# Patient Record
Sex: FEMALE | Race: WHITE | NOT HISPANIC OR LATINO
[De-identification: names, ages, dates, MRNs, and addresses within clinical notes are randomized per-mention and may not be internally consistent; named-entity substitution may affect disease eponyms.]

---

## 2017-01-05 ENCOUNTER — APPOINTMENT (OUTPATIENT)
Dept: CARDIOLOGY | Facility: CLINIC | Age: 72
End: 2017-01-05

## 2017-01-05 VITALS — HEIGHT: 61 IN | BODY MASS INDEX: 25.3 KG/M2 | WEIGHT: 134 LBS

## 2017-01-05 VITALS — SYSTOLIC BLOOD PRESSURE: 132 MMHG | HEART RATE: 57 BPM | DIASTOLIC BLOOD PRESSURE: 80 MMHG

## 2017-01-05 RX ORDER — FLUTICASONE PROPIONATE 50 UG/1
50 SPRAY, METERED NASAL DAILY
Refills: 0 | Status: ACTIVE | COMMUNITY

## 2017-02-16 ENCOUNTER — APPOINTMENT (OUTPATIENT)
Dept: CARDIOLOGY | Facility: CLINIC | Age: 72
End: 2017-02-16

## 2017-02-16 VITALS — DIASTOLIC BLOOD PRESSURE: 58 MMHG | SYSTOLIC BLOOD PRESSURE: 112 MMHG

## 2017-02-16 VITALS — BODY MASS INDEX: 25.7 KG/M2 | WEIGHT: 136 LBS

## 2017-03-06 ENCOUNTER — APPOINTMENT (OUTPATIENT)
Dept: HEMATOLOGY ONCOLOGY | Facility: CLINIC | Age: 72
End: 2017-03-06

## 2017-03-06 ENCOUNTER — OUTPATIENT (OUTPATIENT)
Dept: OUTPATIENT SERVICES | Facility: HOSPITAL | Age: 72
LOS: 1 days | Discharge: HOME | End: 2017-03-06

## 2017-03-06 VITALS
TEMPERATURE: 97.1 F | WEIGHT: 136 LBS | BODY MASS INDEX: 25.68 KG/M2 | RESPIRATION RATE: 14 BRPM | HEART RATE: 58 BPM | DIASTOLIC BLOOD PRESSURE: 75 MMHG | HEIGHT: 61 IN | SYSTOLIC BLOOD PRESSURE: 184 MMHG

## 2017-03-06 DIAGNOSIS — I49.5 SICK SINUS SYNDROME: ICD-10-CM

## 2017-03-07 LAB
ALBUMIN MFR SERPL ELPH: 57.9 %
ALBUMIN SERPL ELPH-MCNC: 4.2 G/DL
ALBUMIN/GLOB SERPL ELPH: 1.4 ZZ
ALPHA1 GLOB MFR SERPL ELPH: 4 %
ALPHA1 GLOB SERPL ELPH-MCNC: 0.3 G/DL
ALPHA2 GLOB MFR SERPL ELPH: 9.8 %
ALPHA2 GLOB SERPL ELPH-MCNC: 0.7 G/DL
B-GLOBULIN SERPL ELPH-MCNC: 0.9 G/DL
BASOPHILS # BLD: 0.02 TH/MM3
BASOPHILS NFR BLD: 0.3 %
BETA1 GLOB MFR SERPL ELPH: 12.4 %
EOSINOPHIL # BLD: 0.2 TH/MM3
EOSINOPHIL NFR BLD: 3.2 %
ERYTHROCYTE [DISTWIDTH] IN BLOOD BY AUTOMATED COUNT: 14.3 %
FERRITIN SERPL-MCNC: 30 NG/ML
GAMMA GLOB MFR SERPL ELPH: 15.9 %
GAMMA GLOB SERPL ELPH-MCNC: 1.2 G/DL
GRANULOCYTES # BLD: 3.47 TH/MM3
GRANULOCYTES NFR BLD: 55.9 %
HCT VFR BLD AUTO: 34.7 %
HGB BLD-MCNC: 11.8 G/DL
IMM GRANULOCYTES # BLD: 0.01 TH/MM3
IMM GRANULOCYTES NFR BLD: 0.2 %
IRON SERPL-MCNC: 49 UG/DL
LACTATE DEHYDROGENASE (NORTH): 220 IU/L
LYMPHOCYTES # BLD: 1.65 TH/MM3
LYMPHOCYTES NFR BLD: 26.6 %
MCH RBC QN AUTO: 29.6 PG
MCHC RBC AUTO-ENTMCNC: 34 G/DL
MCV RBC AUTO: 87.2 FL
MONOCYTES # BLD: 0.86 TH/MM3
MONOCYTES NFR BLD: 13.8 %
PLATELET # BLD: 242 TH/MM3
PMV BLD AUTO: 11.1 FL
PROT PATTERN SERPL ELPH-IMP: 7.3 G/DL
PROT PATTERN SERPL ELPH-IMP: NORMAL
PROT SERPL-MCNC: 7.3 G/DL
RBC # BLD AUTO: 3.98 MIL/MM3
RETICS/RBC NFR: 0.61 %
TIBC SERPL-MCNC: 422 UG/DL
VIT B12 SERPL-MCNC: 476 PG/ML
WBC # BLD: 6.21 TH/MM3

## 2017-03-08 LAB
KAPPA LC FREE SER-MCNC: 20.7 MG/L
KAPPA LC FREE/LAMBDA FREE SER: 1.01
LAMBDA LC FREE SERPL-MCNC: 20.5 MG/L

## 2017-03-13 ENCOUNTER — RESULT REVIEW (OUTPATIENT)
Age: 72
End: 2017-03-13

## 2017-03-23 ENCOUNTER — APPOINTMENT (OUTPATIENT)
Dept: CARDIOLOGY | Facility: CLINIC | Age: 72
End: 2017-03-23

## 2017-03-23 VITALS — BODY MASS INDEX: 25.89 KG/M2 | SYSTOLIC BLOOD PRESSURE: 140 MMHG | DIASTOLIC BLOOD PRESSURE: 64 MMHG | WEIGHT: 137 LBS

## 2017-05-04 ENCOUNTER — MEDICATION RENEWAL (OUTPATIENT)
Age: 72
End: 2017-05-04

## 2017-05-25 ENCOUNTER — APPOINTMENT (OUTPATIENT)
Dept: CARDIOLOGY | Facility: CLINIC | Age: 72
End: 2017-05-25

## 2017-05-25 VITALS — HEIGHT: 61 IN | WEIGHT: 132 LBS | BODY MASS INDEX: 24.92 KG/M2

## 2017-05-25 VITALS — SYSTOLIC BLOOD PRESSURE: 150 MMHG | HEART RATE: 54 BPM | DIASTOLIC BLOOD PRESSURE: 70 MMHG

## 2017-05-25 RX ORDER — ERGOCALCIFEROL 1.25 MG/1
1.25 MG CAPSULE, LIQUID FILLED ORAL
Refills: 0 | Status: ACTIVE | COMMUNITY

## 2017-06-12 ENCOUNTER — APPOINTMENT (OUTPATIENT)
Dept: HEMATOLOGY ONCOLOGY | Facility: CLINIC | Age: 72
End: 2017-06-12

## 2017-06-14 ENCOUNTER — OUTPATIENT (OUTPATIENT)
Dept: OUTPATIENT SERVICES | Facility: HOSPITAL | Age: 72
LOS: 1 days | Discharge: HOME | End: 2017-06-14

## 2017-06-14 DIAGNOSIS — I49.5 SICK SINUS SYNDROME: ICD-10-CM

## 2017-06-20 ENCOUNTER — APPOINTMENT (OUTPATIENT)
Dept: HEMATOLOGY ONCOLOGY | Facility: CLINIC | Age: 72
End: 2017-06-20

## 2017-06-27 ENCOUNTER — OUTPATIENT (OUTPATIENT)
Dept: OUTPATIENT SERVICES | Facility: HOSPITAL | Age: 72
LOS: 1 days | Discharge: HOME | End: 2017-06-27

## 2017-06-27 ENCOUNTER — APPOINTMENT (OUTPATIENT)
Dept: HEMATOLOGY ONCOLOGY | Facility: CLINIC | Age: 72
End: 2017-06-27

## 2017-06-27 VITALS
HEART RATE: 62 BPM | BODY MASS INDEX: 24.73 KG/M2 | TEMPERATURE: 96.7 F | HEIGHT: 61 IN | DIASTOLIC BLOOD PRESSURE: 88 MMHG | RESPIRATION RATE: 14 BRPM | SYSTOLIC BLOOD PRESSURE: 181 MMHG | WEIGHT: 131 LBS

## 2017-06-27 DIAGNOSIS — I49.5 SICK SINUS SYNDROME: ICD-10-CM

## 2017-06-28 ENCOUNTER — OTHER (OUTPATIENT)
Age: 72
End: 2017-06-28

## 2017-06-28 DIAGNOSIS — R52 PAIN, UNSPECIFIED: ICD-10-CM

## 2017-06-28 LAB
BASOPHILS # BLD: 0.03 TH/MM3
BASOPHILS NFR BLD: 0.5 %
EOSINOPHIL # BLD: 0.2 TH/MM3
EOSINOPHIL NFR BLD: 3.2 %
ERYTHROCYTE [DISTWIDTH] IN BLOOD BY AUTOMATED COUNT: 14.6 %
FERRITIN SERPL-MCNC: 55 NG/ML
GRANULOCYTES # BLD: 3.19 TH/MM3
GRANULOCYTES NFR BLD: 51.6 %
HCT VFR BLD AUTO: 35 %
HGB BLD-MCNC: 11.8 G/DL
IMM GRANULOCYTES # BLD: 0.01 TH/MM3
IMM GRANULOCYTES NFR BLD: 0.2 %
LYMPHOCYTES # BLD: 1.98 TH/MM3
LYMPHOCYTES NFR BLD: 32 %
MCH RBC QN AUTO: 29.1 PG
MCHC RBC AUTO-ENTMCNC: 33.7 G/DL
MCV RBC AUTO: 86.4 FL
MONOCYTES # BLD: 0.77 TH/MM3
MONOCYTES NFR BLD: 12.5 %
PLATELET # BLD: 275 TH/MM3
PMV BLD AUTO: 10.8 FL
RBC # BLD AUTO: 4.05 MIL/MM3
WBC # BLD: 6.18 TH/MM3

## 2017-06-29 DIAGNOSIS — D64.9 ANEMIA, UNSPECIFIED: ICD-10-CM

## 2017-07-19 ENCOUNTER — MEDICATION RENEWAL (OUTPATIENT)
Age: 72
End: 2017-07-19

## 2017-07-26 ENCOUNTER — MOBILE ON CALL (OUTPATIENT)
Age: 72
End: 2017-07-26

## 2017-07-28 ENCOUNTER — APPOINTMENT (OUTPATIENT)
Dept: CARDIOLOGY | Facility: CLINIC | Age: 72
End: 2017-07-28

## 2017-07-28 VITALS
HEART RATE: 65 BPM | SYSTOLIC BLOOD PRESSURE: 181 MMHG | DIASTOLIC BLOOD PRESSURE: 73 MMHG | OXYGEN SATURATION: 100 % | WEIGHT: 131 LBS | BODY MASS INDEX: 24.75 KG/M2

## 2017-07-28 VITALS — DIASTOLIC BLOOD PRESSURE: 70 MMHG | SYSTOLIC BLOOD PRESSURE: 160 MMHG

## 2017-08-10 ENCOUNTER — APPOINTMENT (OUTPATIENT)
Dept: CARDIOLOGY | Facility: CLINIC | Age: 72
End: 2017-08-10

## 2017-08-29 ENCOUNTER — OUTPATIENT (OUTPATIENT)
Dept: OUTPATIENT SERVICES | Facility: HOSPITAL | Age: 72
LOS: 1 days | Discharge: HOME | End: 2017-08-29

## 2017-08-29 DIAGNOSIS — I48.0 PAROXYSMAL ATRIAL FIBRILLATION: ICD-10-CM

## 2017-08-29 DIAGNOSIS — Z01.818 ENCOUNTER FOR OTHER PREPROCEDURAL EXAMINATION: ICD-10-CM

## 2017-08-29 DIAGNOSIS — I49.5 SICK SINUS SYNDROME: ICD-10-CM

## 2017-09-08 ENCOUNTER — INPATIENT (INPATIENT)
Facility: HOSPITAL | Age: 72
LOS: 0 days | Discharge: HOME | End: 2017-09-09
Attending: INTERNAL MEDICINE

## 2017-09-08 DIAGNOSIS — E78.5 HYPERLIPIDEMIA, UNSPECIFIED: ICD-10-CM

## 2017-09-08 DIAGNOSIS — I49.5 SICK SINUS SYNDROME: ICD-10-CM

## 2017-09-08 DIAGNOSIS — I48.0 PAROXYSMAL ATRIAL FIBRILLATION: ICD-10-CM

## 2017-09-08 DIAGNOSIS — Z88.2 ALLERGY STATUS TO SULFONAMIDES: ICD-10-CM

## 2017-09-14 DIAGNOSIS — Z72.0 TOBACCO USE: ICD-10-CM

## 2017-09-14 DIAGNOSIS — D64.9 ANEMIA, UNSPECIFIED: ICD-10-CM

## 2017-09-14 DIAGNOSIS — E11.51 TYPE 2 DIABETES MELLITUS WITH DIABETIC PERIPHERAL ANGIOPATHY WITHOUT GANGRENE: ICD-10-CM

## 2017-09-14 DIAGNOSIS — I10 ESSENTIAL (PRIMARY) HYPERTENSION: ICD-10-CM

## 2017-09-14 DIAGNOSIS — Z85.828 PERSONAL HISTORY OF OTHER MALIGNANT NEOPLASM OF SKIN: ICD-10-CM

## 2017-09-14 DIAGNOSIS — Z79.4 LONG TERM (CURRENT) USE OF INSULIN: ICD-10-CM

## 2017-09-14 DIAGNOSIS — E03.9 HYPOTHYROIDISM, UNSPECIFIED: ICD-10-CM

## 2017-09-14 DIAGNOSIS — Z96.41 PRESENCE OF INSULIN PUMP (EXTERNAL) (INTERNAL): ICD-10-CM

## 2017-09-14 DIAGNOSIS — Z88.2 ALLERGY STATUS TO SULFONAMIDES: ICD-10-CM

## 2017-09-25 ENCOUNTER — APPOINTMENT (OUTPATIENT)
Dept: CARDIOLOGY | Facility: CLINIC | Age: 72
End: 2017-09-25

## 2017-09-25 VITALS
HEIGHT: 61 IN | DIASTOLIC BLOOD PRESSURE: 67 MMHG | BODY MASS INDEX: 24.73 KG/M2 | HEART RATE: 62 BPM | OXYGEN SATURATION: 97 % | WEIGHT: 131 LBS | SYSTOLIC BLOOD PRESSURE: 138 MMHG

## 2017-10-16 ENCOUNTER — APPOINTMENT (OUTPATIENT)
Dept: CARDIOLOGY | Facility: CLINIC | Age: 72
End: 2017-10-16

## 2017-10-16 VITALS — SYSTOLIC BLOOD PRESSURE: 140 MMHG | HEART RATE: 60 BPM | DIASTOLIC BLOOD PRESSURE: 80 MMHG

## 2017-10-16 VITALS — WEIGHT: 131 LBS | BODY MASS INDEX: 24.73 KG/M2 | HEIGHT: 61 IN

## 2017-10-20 ENCOUNTER — MEDICATION RENEWAL (OUTPATIENT)
Age: 72
End: 2017-10-20

## 2017-11-27 ENCOUNTER — OUTPATIENT (OUTPATIENT)
Dept: OUTPATIENT SERVICES | Facility: HOSPITAL | Age: 72
LOS: 1 days | Discharge: HOME | End: 2017-11-27

## 2017-11-27 ENCOUNTER — APPOINTMENT (OUTPATIENT)
Dept: HEMATOLOGY ONCOLOGY | Facility: CLINIC | Age: 72
End: 2017-11-27

## 2017-11-27 VITALS
HEART RATE: 63 BPM | DIASTOLIC BLOOD PRESSURE: 77 MMHG | TEMPERATURE: 96.5 F | RESPIRATION RATE: 14 BRPM | WEIGHT: 133 LBS | BODY MASS INDEX: 25.11 KG/M2 | SYSTOLIC BLOOD PRESSURE: 157 MMHG | HEIGHT: 61 IN

## 2017-11-27 DIAGNOSIS — Z00.00 ENCOUNTER FOR GENERAL ADULT MEDICAL EXAMINATION W/OUT ABNORMAL FINDINGS: ICD-10-CM

## 2017-11-27 LAB
BASOPHILS # BLD: 0.02 TH/MM3
BASOPHILS NFR BLD: 0.3 %
EOSINOPHIL # BLD: 0.18 TH/MM3
EOSINOPHIL NFR BLD: 2.7 %
ERYTHROCYTE [DISTWIDTH] IN BLOOD BY AUTOMATED COUNT: 14.3 %
GRANULOCYTES # BLD: 3.99 TH/MM3
GRANULOCYTES NFR BLD: 59.9 %
HCT VFR BLD AUTO: 32.4 %
HGB BLD-MCNC: 10.8 G/DL
IMM GRANULOCYTES # BLD: 0.01 TH/MM3
IMM GRANULOCYTES NFR BLD: 0.1 %
LYMPHOCYTES # BLD: 1.71 TH/MM3
LYMPHOCYTES NFR BLD: 25.6 %
MCH RBC QN AUTO: 29.8 PG
MCHC RBC AUTO-ENTMCNC: 33.3 G/DL
MCV RBC AUTO: 89.3 FL
MONOCYTES # BLD: 0.76 TH/MM3
MONOCYTES NFR BLD: 11.4 %
PLATELET # BLD: 243 TH/MM3
PMV BLD AUTO: 10.5 FL
RBC # BLD AUTO: 3.63 MIL/MM3
WBC # BLD: 6.67 TH/MM3

## 2017-11-28 DIAGNOSIS — D64.9 ANEMIA, UNSPECIFIED: ICD-10-CM

## 2017-11-28 LAB — FERRITIN SERPL-MCNC: 63 NG/ML

## 2017-12-08 ENCOUNTER — APPOINTMENT (OUTPATIENT)
Dept: CARDIOLOGY | Facility: CLINIC | Age: 72
End: 2017-12-08

## 2017-12-08 VITALS
OXYGEN SATURATION: 100 % | BODY MASS INDEX: 24.75 KG/M2 | HEART RATE: 60 BPM | SYSTOLIC BLOOD PRESSURE: 155 MMHG | WEIGHT: 131 LBS | DIASTOLIC BLOOD PRESSURE: 75 MMHG

## 2017-12-11 ENCOUNTER — MEDICATION RENEWAL (OUTPATIENT)
Age: 72
End: 2017-12-11

## 2018-01-09 ENCOUNTER — RESULT REVIEW (OUTPATIENT)
Age: 73
End: 2018-01-09

## 2018-01-09 ENCOUNTER — OTHER (OUTPATIENT)
Age: 73
End: 2018-01-09

## 2018-01-09 ENCOUNTER — APPOINTMENT (OUTPATIENT)
Dept: HEMATOLOGY ONCOLOGY | Facility: CLINIC | Age: 73
End: 2018-01-09

## 2018-01-09 VITALS
BODY MASS INDEX: 24.55 KG/M2 | RESPIRATION RATE: 14 BRPM | HEART RATE: 67 BPM | DIASTOLIC BLOOD PRESSURE: 73 MMHG | SYSTOLIC BLOOD PRESSURE: 156 MMHG | WEIGHT: 130 LBS | HEIGHT: 61 IN | TEMPERATURE: 97.2 F

## 2018-01-09 LAB
BASOPHILS # BLD: 0.02 TH/MM3
BASOPHILS NFR BLD: 0.3 %
EOSINOPHIL # BLD: 0.26 TH/MM3
EOSINOPHIL NFR BLD: 4.2 %
ERYTHROCYTE [DISTWIDTH] IN BLOOD BY AUTOMATED COUNT: 14 %
GRANULOCYTES # BLD: 3.49 TH/MM3
GRANULOCYTES NFR BLD: 55.7 %
HCT VFR BLD AUTO: 33.8 %
HGB BLD-MCNC: 11.2 G/DL
IMM GRANULOCYTES # BLD: 0.01 TH/MM3
IMM GRANULOCYTES NFR BLD: 0.2 %
LYMPHOCYTES # BLD: 1.74 TH/MM3
LYMPHOCYTES NFR BLD: 27.8 %
MCH RBC QN AUTO: 29.8 PG
MCHC RBC AUTO-ENTMCNC: 33.1 G/DL
MCV RBC AUTO: 89.9 FL
MONOCYTES # BLD: 0.74 TH/MM3
MONOCYTES NFR BLD: 11.8 %
PLATELET # BLD: 206 TH/MM3
PMV BLD AUTO: 10.9 FL
RBC # BLD AUTO: 3.76 MIL/MM3
RETICS/RBC NFR: 0.57 %
WBC # BLD: 6.26 TH/MM3

## 2018-01-12 ENCOUNTER — OTHER (OUTPATIENT)
Age: 73
End: 2018-01-12

## 2018-01-12 LAB — FERRITIN SERPL-MCNC: 79 NG/ML

## 2018-01-17 ENCOUNTER — CLINICAL ADVICE (OUTPATIENT)
Age: 73
End: 2018-01-17

## 2018-02-12 ENCOUNTER — OUTPATIENT (OUTPATIENT)
Dept: OUTPATIENT SERVICES | Facility: HOSPITAL | Age: 73
LOS: 1 days | Discharge: HOME | End: 2018-02-12

## 2018-02-12 DIAGNOSIS — R91.1 SOLITARY PULMONARY NODULE: ICD-10-CM

## 2018-03-01 ENCOUNTER — APPOINTMENT (OUTPATIENT)
Dept: HEMATOLOGY ONCOLOGY | Facility: CLINIC | Age: 73
End: 2018-03-01

## 2018-03-01 ENCOUNTER — OUTPATIENT (OUTPATIENT)
Dept: OUTPATIENT SERVICES | Facility: HOSPITAL | Age: 73
LOS: 1 days | Discharge: HOME | End: 2018-03-01

## 2018-03-01 VITALS
RESPIRATION RATE: 14 BRPM | BODY MASS INDEX: 24.55 KG/M2 | TEMPERATURE: 97.8 F | DIASTOLIC BLOOD PRESSURE: 73 MMHG | WEIGHT: 130 LBS | HEIGHT: 61 IN | SYSTOLIC BLOOD PRESSURE: 157 MMHG | HEART RATE: 65 BPM

## 2018-03-01 DIAGNOSIS — D64.9 ANEMIA, UNSPECIFIED: ICD-10-CM

## 2018-03-22 ENCOUNTER — RX RENEWAL (OUTPATIENT)
Age: 73
End: 2018-03-22

## 2018-05-15 ENCOUNTER — APPOINTMENT (OUTPATIENT)
Dept: CARDIOLOGY | Facility: CLINIC | Age: 73
End: 2018-05-15

## 2018-05-15 VITALS
BODY MASS INDEX: 23.41 KG/M2 | HEART RATE: 64 BPM | SYSTOLIC BLOOD PRESSURE: 140 MMHG | HEIGHT: 61 IN | DIASTOLIC BLOOD PRESSURE: 60 MMHG | WEIGHT: 124 LBS

## 2018-06-01 ENCOUNTER — APPOINTMENT (OUTPATIENT)
Dept: CARDIOLOGY | Facility: CLINIC | Age: 73
End: 2018-06-01

## 2018-06-01 VITALS
OXYGEN SATURATION: 99 % | HEART RATE: 61 BPM | DIASTOLIC BLOOD PRESSURE: 74 MMHG | WEIGHT: 134 LBS | BODY MASS INDEX: 25.3 KG/M2 | SYSTOLIC BLOOD PRESSURE: 135 MMHG | HEIGHT: 61 IN

## 2018-06-14 ENCOUNTER — OUTPATIENT (OUTPATIENT)
Dept: OUTPATIENT SERVICES | Facility: HOSPITAL | Age: 73
LOS: 1 days | Discharge: HOME | End: 2018-06-14

## 2018-06-14 ENCOUNTER — LABORATORY RESULT (OUTPATIENT)
Age: 73
End: 2018-06-14

## 2018-06-14 ENCOUNTER — APPOINTMENT (OUTPATIENT)
Dept: HEMATOLOGY ONCOLOGY | Facility: CLINIC | Age: 73
End: 2018-06-14

## 2018-06-14 VITALS
BODY MASS INDEX: 24.55 KG/M2 | SYSTOLIC BLOOD PRESSURE: 185 MMHG | WEIGHT: 130 LBS | TEMPERATURE: 97.6 F | RESPIRATION RATE: 14 BRPM | HEIGHT: 61 IN | HEART RATE: 66 BPM | DIASTOLIC BLOOD PRESSURE: 90 MMHG

## 2018-06-16 LAB
FERRITIN SERPL-MCNC: 105 NG/ML
HCT VFR BLD CALC: 33.8 %
HGB BLD-MCNC: 11.5 G/DL
MCHC RBC-ENTMCNC: 29.9 PG
MCHC RBC-ENTMCNC: 34 G/DL
MCV RBC AUTO: 88 FL
PLATELET # BLD AUTO: 247 K/UL
PMV BLD: 11.8 FL
RBC # BLD: 3.84 M/UL
RBC # FLD: 13.2 %
WBC # FLD AUTO: 7.26 K/UL

## 2018-06-18 DIAGNOSIS — D64.9 ANEMIA, UNSPECIFIED: ICD-10-CM

## 2018-06-26 ENCOUNTER — OUTPATIENT (OUTPATIENT)
Dept: OUTPATIENT SERVICES | Facility: HOSPITAL | Age: 73
LOS: 1 days | Discharge: HOME | End: 2018-06-26

## 2018-06-26 DIAGNOSIS — M54.2 CERVICALGIA: ICD-10-CM

## 2018-06-26 DIAGNOSIS — M54.9 DORSALGIA, UNSPECIFIED: ICD-10-CM

## 2018-07-31 ENCOUNTER — APPOINTMENT (OUTPATIENT)
Dept: NEUROSURGERY | Facility: CLINIC | Age: 73
End: 2018-07-31
Payer: MEDICARE

## 2018-07-31 PROCEDURE — 99205 OFFICE O/P NEW HI 60 MIN: CPT

## 2018-08-20 ENCOUNTER — OUTPATIENT (OUTPATIENT)
Dept: OUTPATIENT SERVICES | Facility: HOSPITAL | Age: 73
LOS: 1 days | Discharge: HOME | End: 2018-08-20

## 2018-08-20 VITALS
TEMPERATURE: 97 F | RESPIRATION RATE: 20 BRPM | WEIGHT: 132.06 LBS | OXYGEN SATURATION: 99 % | HEART RATE: 60 BPM | HEIGHT: 61 IN

## 2018-08-20 DIAGNOSIS — I73.9 PERIPHERAL VASCULAR DISEASE, UNSPECIFIED: ICD-10-CM

## 2018-08-20 DIAGNOSIS — I48.91 UNSPECIFIED ATRIAL FIBRILLATION: ICD-10-CM

## 2018-08-20 DIAGNOSIS — E11.9 TYPE 2 DIABETES MELLITUS WITHOUT COMPLICATIONS: ICD-10-CM

## 2018-08-20 DIAGNOSIS — J30.9 ALLERGIC RHINITIS, UNSPECIFIED: ICD-10-CM

## 2018-08-20 DIAGNOSIS — D64.9 ANEMIA, UNSPECIFIED: ICD-10-CM

## 2018-08-20 DIAGNOSIS — Z90.710 ACQUIRED ABSENCE OF BOTH CERVIX AND UTERUS: Chronic | ICD-10-CM

## 2018-08-20 DIAGNOSIS — E01.8 OTHER IODINE-DEFICIENCY RELATED THYROID DISORDERS AND ALLIED CONDITIONS: ICD-10-CM

## 2018-08-20 DIAGNOSIS — G72.9 MYOPATHY, UNSPECIFIED: ICD-10-CM

## 2018-08-20 DIAGNOSIS — Z01.818 ENCOUNTER FOR OTHER PREPROCEDURAL EXAMINATION: ICD-10-CM

## 2018-08-20 DIAGNOSIS — C80.1 MALIGNANT (PRIMARY) NEOPLASM, UNSPECIFIED: ICD-10-CM

## 2018-08-20 DIAGNOSIS — Z95.0 PRESENCE OF CARDIAC PACEMAKER: Chronic | ICD-10-CM

## 2018-08-20 DIAGNOSIS — Z98.890 OTHER SPECIFIED POSTPROCEDURAL STATES: Chronic | ICD-10-CM

## 2018-08-20 DIAGNOSIS — I10 ESSENTIAL (PRIMARY) HYPERTENSION: ICD-10-CM

## 2018-08-20 LAB
ALBUMIN SERPL ELPH-MCNC: 4.8 G/DL — SIGNIFICANT CHANGE UP (ref 3.5–5.2)
ALP SERPL-CCNC: 134 U/L — HIGH (ref 30–115)
ALT FLD-CCNC: 23 U/L — SIGNIFICANT CHANGE UP (ref 0–41)
ANION GAP SERPL CALC-SCNC: 14 MMOL/L — SIGNIFICANT CHANGE UP (ref 7–14)
APTT BLD: 44.6 SEC — HIGH (ref 27–39.2)
AST SERPL-CCNC: 26 U/L — SIGNIFICANT CHANGE UP (ref 0–41)
BASOPHILS # BLD AUTO: 0.03 K/UL — SIGNIFICANT CHANGE UP (ref 0–0.2)
BASOPHILS NFR BLD AUTO: 0.5 % — SIGNIFICANT CHANGE UP (ref 0–1)
BILIRUB SERPL-MCNC: 0.3 MG/DL — SIGNIFICANT CHANGE UP (ref 0.2–1.2)
BUN SERPL-MCNC: 8 MG/DL — LOW (ref 10–20)
CALCIUM SERPL-MCNC: 9.8 MG/DL — SIGNIFICANT CHANGE UP (ref 8.5–10.1)
CHLORIDE SERPL-SCNC: 99 MMOL/L — SIGNIFICANT CHANGE UP (ref 98–110)
CO2 SERPL-SCNC: 25 MMOL/L — SIGNIFICANT CHANGE UP (ref 17–32)
CREAT SERPL-MCNC: <0.5 MG/DL — LOW (ref 0.7–1.5)
EOSINOPHIL # BLD AUTO: 0.16 K/UL — SIGNIFICANT CHANGE UP (ref 0–0.7)
EOSINOPHIL NFR BLD AUTO: 2.9 % — SIGNIFICANT CHANGE UP (ref 0–8)
GLUCOSE SERPL-MCNC: 95 MG/DL — SIGNIFICANT CHANGE UP (ref 70–99)
HCT VFR BLD CALC: 36 % — LOW (ref 37–47)
HGB BLD-MCNC: 12.1 G/DL — SIGNIFICANT CHANGE UP (ref 12–16)
IMM GRANULOCYTES NFR BLD AUTO: 0.2 % — SIGNIFICANT CHANGE UP (ref 0.1–0.3)
INR BLD: 1.68 RATIO — HIGH (ref 0.65–1.3)
LYMPHOCYTES # BLD AUTO: 1.62 K/UL — SIGNIFICANT CHANGE UP (ref 1.2–3.4)
LYMPHOCYTES # BLD AUTO: 29.3 % — SIGNIFICANT CHANGE UP (ref 20.5–51.1)
MCHC RBC-ENTMCNC: 29.8 PG — SIGNIFICANT CHANGE UP (ref 27–31)
MCHC RBC-ENTMCNC: 33.6 G/DL — SIGNIFICANT CHANGE UP (ref 32–37)
MCV RBC AUTO: 88.7 FL — SIGNIFICANT CHANGE UP (ref 81–99)
MONOCYTES # BLD AUTO: 0.7 K/UL — HIGH (ref 0.1–0.6)
MONOCYTES NFR BLD AUTO: 12.7 % — HIGH (ref 1.7–9.3)
NEUTROPHILS # BLD AUTO: 3.01 K/UL — SIGNIFICANT CHANGE UP (ref 1.4–6.5)
NEUTROPHILS NFR BLD AUTO: 54.4 % — SIGNIFICANT CHANGE UP (ref 42.2–75.2)
NRBC # BLD: 0 /100 WBCS — SIGNIFICANT CHANGE UP (ref 0–0)
PLATELET # BLD AUTO: 265 K/UL — SIGNIFICANT CHANGE UP (ref 130–400)
POTASSIUM SERPL-MCNC: 5.3 MMOL/L — HIGH (ref 3.5–5)
POTASSIUM SERPL-SCNC: 5.3 MMOL/L — HIGH (ref 3.5–5)
PROT SERPL-MCNC: 7.5 G/DL — SIGNIFICANT CHANGE UP (ref 6–8)
PROTHROM AB SERPL-ACNC: 18 SEC — HIGH (ref 9.95–12.87)
RBC # BLD: 4.06 M/UL — LOW (ref 4.2–5.4)
RBC # FLD: 13.5 % — SIGNIFICANT CHANGE UP (ref 11.5–14.5)
SODIUM SERPL-SCNC: 138 MMOL/L — SIGNIFICANT CHANGE UP (ref 135–146)
WBC # BLD: 5.53 K/UL — SIGNIFICANT CHANGE UP (ref 4.8–10.8)
WBC # FLD AUTO: 5.53 K/UL — SIGNIFICANT CHANGE UP (ref 4.8–10.8)

## 2018-08-20 RX ORDER — AMLODIPINE BESYLATE AND BENAZEPRIL HYDROCHLORIDE 10; 20 MG/1; MG/1
1 CAPSULE ORAL
Qty: 0 | Refills: 0 | COMMUNITY

## 2018-08-20 NOTE — H&P PST ADULT - PSH
Cardiac pacemaker  9/9/17  H/O total hysterectomy  25+ yrs ago  History of surgery  MOHS procedure (face) x 3  History of surgery  Right Peripheral Stent 5 yrs

## 2018-08-20 NOTE — H&P PST ADULT - FAMILY HISTORY
Mother  Still living? Unknown  Family history of lung cancer, Age at diagnosis: Age Unknown     Father  Still living? Unknown  Cancer, Age at diagnosis: Age Unknown

## 2018-08-20 NOTE — H&P PST ADULT - HISTORY OF PRESENT ILLNESS
12/27/2017  Jessica Almonte  [address] [facility]      Dear Jesus Mckeon,    Your pap smear report has been reviewed by your physician and is determined to be normal.  Please call with any questions.           Sincerely,    Nursing Staff
72 y/o female reports persistent and worsening weakness bilateral lower extremities, associated with chronic lower back pain, and difficulty walking for couple years; elected for procedure. Denies chest pain, palp, SOB / STAPLES, cough, fever, dizziness, or syncope.

## 2018-08-20 NOTE — H&P PST ADULT - OTHER CARE PROVIDERS
Dr Paniagua Has appt 8/21/18;  Dr De La Cruz (PPM - LV May 2018);  Dr Ole Ag (Pulm; LV 2 weeks); Dr Dobbs (End) Dr Win (Neurology)

## 2018-08-20 NOTE — H&P PST ADULT - PMH
Afib  5+ yrs; on Eliquis  Anemia  Chronic, on Iron supplement  Diabetes  Insulin dependent - (has insulin Pump)  Dry eyes, bilateral    Essential hypertension    Hypothyroid    Lung nodules    Pacemaker  Sept 2017  Palpitations  occasionally; not for couple yrs  PVD (peripheral vascular disease)  S/p Right Peripheral Stent  Seasonal allergies    Skin cancer  Basal Cell Cancer face  (around nose) s/p MOHS procedure x 3  SSS (sick sinus syndrome)  S/p PPM  Transfusion history  S/p Hysterectomy

## 2018-08-21 ENCOUNTER — APPOINTMENT (OUTPATIENT)
Dept: CARDIOLOGY | Facility: CLINIC | Age: 73
End: 2018-08-21

## 2018-08-21 VITALS
BODY MASS INDEX: 25.3 KG/M2 | SYSTOLIC BLOOD PRESSURE: 140 MMHG | WEIGHT: 134 LBS | DIASTOLIC BLOOD PRESSURE: 56 MMHG | HEIGHT: 61 IN | HEART RATE: 61 BPM

## 2018-08-21 RX ORDER — BLOOD SUGAR DIAGNOSTIC
STRIP MISCELLANEOUS
Qty: 700 | Refills: 0 | Status: ACTIVE | COMMUNITY
Start: 2018-03-13

## 2018-08-23 ENCOUNTER — RESULT REVIEW (OUTPATIENT)
Age: 73
End: 2018-08-23

## 2018-08-23 ENCOUNTER — OUTPATIENT (OUTPATIENT)
Dept: OUTPATIENT SERVICES | Facility: HOSPITAL | Age: 73
LOS: 1 days | Discharge: HOME | End: 2018-08-23

## 2018-08-23 VITALS
TEMPERATURE: 98 F | HEIGHT: 61 IN | DIASTOLIC BLOOD PRESSURE: 58 MMHG | WEIGHT: 132.06 LBS | HEART RATE: 59 BPM | RESPIRATION RATE: 18 BRPM | SYSTOLIC BLOOD PRESSURE: 142 MMHG

## 2018-08-23 VITALS — DIASTOLIC BLOOD PRESSURE: 75 MMHG | SYSTOLIC BLOOD PRESSURE: 151 MMHG | RESPIRATION RATE: 18 BRPM | HEART RATE: 65 BPM

## 2018-08-23 DIAGNOSIS — Z95.0 PRESENCE OF CARDIAC PACEMAKER: Chronic | ICD-10-CM

## 2018-08-23 DIAGNOSIS — Z90.710 ACQUIRED ABSENCE OF BOTH CERVIX AND UTERUS: Chronic | ICD-10-CM

## 2018-08-23 DIAGNOSIS — Z98.890 OTHER SPECIFIED POSTPROCEDURAL STATES: Chronic | ICD-10-CM

## 2018-08-23 LAB — GLUCOSE BLDC GLUCOMTR-MCNC: 182 MG/DL — HIGH (ref 70–99)

## 2018-08-23 RX ORDER — APIXABAN 2.5 MG/1
1 TABLET, FILM COATED ORAL
Qty: 0 | Refills: 0 | COMMUNITY

## 2018-08-23 RX ORDER — MORPHINE SULFATE 50 MG/1
1 CAPSULE, EXTENDED RELEASE ORAL
Qty: 0 | Refills: 0 | Status: DISCONTINUED | OUTPATIENT
Start: 2018-08-23 | End: 2018-08-23

## 2018-08-23 RX ORDER — SODIUM CHLORIDE 9 MG/ML
1000 INJECTION, SOLUTION INTRAVENOUS
Qty: 0 | Refills: 0 | Status: DISCONTINUED | OUTPATIENT
Start: 2018-08-23 | End: 2018-08-23

## 2018-08-23 RX ORDER — ONDANSETRON 8 MG/1
4 TABLET, FILM COATED ORAL ONCE
Qty: 0 | Refills: 0 | Status: DISCONTINUED | OUTPATIENT
Start: 2018-08-23 | End: 2018-08-23

## 2018-08-23 RX ADMIN — MORPHINE SULFATE 1 MILLIGRAM(S): 50 CAPSULE, EXTENDED RELEASE ORAL at 10:39

## 2018-08-23 RX ADMIN — SODIUM CHLORIDE 80 MILLILITER(S): 9 INJECTION, SOLUTION INTRAVENOUS at 10:27

## 2018-08-23 NOTE — CHART NOTE - NSCHARTNOTEFT_GEN_A_CORE
Anesthesia Post Op Assessment  		(    ) Intubated           TV _____	Rate __sv___	FiO2__4LNC___  		( x   ) Patent airway. Full return of protective reflexes  		( x   )Full recovery from anesthesia/sedation to baseline status      Cardiovascular Function:  		BP:	122/87	                  Pulse:		65                  RR:		12                  Temp:		97.5                  O2Sat:           99%      Mental Status:  	        (  x  ) awake		  (  x  ) alert		 (    ) drowsy	               (    ) sedated      Nausea/Vomiting:  		(    ) Yes, See post-op orders		   ( x   ) No      Pain Scale: (0-10):			Treatment:     (  x  ) None	            (    ) See Post-Op/PCA Orders      Post-operative Fluids: 	   (    ) Oral	          (  x  ) See post-op Orders        Comments:    Uneventful. No complications from anesthesia.  Discharge when criteria met.

## 2018-08-23 NOTE — BRIEF OPERATIVE NOTE - PROCEDURE
<<-----Click on this checkbox to enter Procedure Biopsy of deep muscle of lower extremity  08/23/2018    Active  URMILA

## 2018-08-23 NOTE — ASU DISCHARGE PLAN (ADULT/PEDIATRIC). - MEDICATION SUMMARY - MEDICATIONS TO TAKE
I will START or STAY ON the medications listed below when I get home from the hospital:    Vit D 50, 000 units  -- 1 cap(s) by mouth every 7 days  -- Indication: For supplement    Vit C 500 mg  -- 1 tab(s) by mouth 2 times a day  -- Indication: For supplement    Fiber Well Gummy  -- 1 tab(s) by mouth once a day  -- Indication: For fiber supplement    Eliquis 5 mg oral tablet  -- 1 tab(s) by mouth 2 times a day  -- Indication: For blood thinner    NovoLOG 100 units/mL injectable solution  -- 2.8 unit(s) injectable once a day  Doses range 2 - 5 units / meal Bolus      -- Indication: For DM    NovoLOG  -- PUMP Basal 18.525 units / day  -- Indication: For DM    amlodipine-benazepril 5 mg-40 mg oral capsule  -- 1 cap(s) by mouth once a day (at bedtime)  -- Indication: For HTN    carvedilol 12.5 mg oral tablet  -- 1 tab(s) by mouth 2 times a day  -- Indication: For Cardiac    montelukast 10 mg oral tablet  -- 1 tab(s) by mouth once a day  -- Indication: For pulmonary    L-Carnitine  -- 1100 milligram(s) by mouth once a day  -- Indication: For alternative medicine    Tears Naturale preserved ophthalmic solution  -- 1 drop(s) to each affected eye once a day, As Needed  -- Indication: For topical agent    levothyroxine 88 mcg (0.088 mg) oral tablet  -- 1 tab(s) by mouth once a day  -- Indication: For hypothyroid    Multiple Vitamins oral tablet  -- 1 tab(s) by mouth once a day  -- Indication: For multi vits    Hair, Skin & Nails 5 mg oral capsule  -- 1 cap(s) by mouth once a day  -- Indication: For supplement

## 2018-08-24 PROBLEM — E11.9 TYPE 2 DIABETES MELLITUS WITHOUT COMPLICATIONS: Chronic | Status: ACTIVE | Noted: 2018-08-20

## 2018-08-24 PROBLEM — C44.90 UNSPECIFIED MALIGNANT NEOPLASM OF SKIN, UNSPECIFIED: Chronic | Status: ACTIVE | Noted: 2018-08-20

## 2018-08-24 PROBLEM — Z95.0 PRESENCE OF CARDIAC PACEMAKER: Chronic | Status: ACTIVE | Noted: 2018-08-20

## 2018-08-24 PROBLEM — I10 ESSENTIAL (PRIMARY) HYPERTENSION: Chronic | Status: ACTIVE | Noted: 2018-08-20

## 2018-08-24 PROBLEM — R00.2 PALPITATIONS: Chronic | Status: ACTIVE | Noted: 2018-08-20

## 2018-08-24 PROBLEM — E03.9 HYPOTHYROIDISM, UNSPECIFIED: Chronic | Status: ACTIVE | Noted: 2018-08-20

## 2018-08-24 PROBLEM — H04.123 DRY EYE SYNDROME OF BILATERAL LACRIMAL GLANDS: Chronic | Status: ACTIVE | Noted: 2018-08-20

## 2018-08-24 PROBLEM — J30.2 OTHER SEASONAL ALLERGIC RHINITIS: Chronic | Status: ACTIVE | Noted: 2018-08-20

## 2018-08-24 PROBLEM — I49.5 SICK SINUS SYNDROME: Chronic | Status: ACTIVE | Noted: 2018-08-20

## 2018-08-24 PROBLEM — Z92.89 PERSONAL HISTORY OF OTHER MEDICAL TREATMENT: Chronic | Status: ACTIVE | Noted: 2018-08-20

## 2018-08-24 LAB — SURGICAL PATHOLOGY STUDY: SIGNIFICANT CHANGE UP

## 2018-08-24 RX ORDER — APIXABAN 2.5 MG/1
1 TABLET, FILM COATED ORAL
Qty: 0 | Refills: 0 | DISCHARGE
Start: 2018-08-24

## 2018-09-05 DIAGNOSIS — I10 ESSENTIAL (PRIMARY) HYPERTENSION: ICD-10-CM

## 2018-09-05 DIAGNOSIS — E03.9 HYPOTHYROIDISM, UNSPECIFIED: ICD-10-CM

## 2018-09-05 DIAGNOSIS — Z79.01 LONG TERM (CURRENT) USE OF ANTICOAGULANTS: ICD-10-CM

## 2018-09-05 DIAGNOSIS — E11.9 TYPE 2 DIABETES MELLITUS WITHOUT COMPLICATIONS: ICD-10-CM

## 2018-09-05 DIAGNOSIS — Z91.040 LATEX ALLERGY STATUS: ICD-10-CM

## 2018-09-05 DIAGNOSIS — I48.91 UNSPECIFIED ATRIAL FIBRILLATION: ICD-10-CM

## 2018-09-05 DIAGNOSIS — G72.89 OTHER SPECIFIED MYOPATHIES: ICD-10-CM

## 2018-09-05 DIAGNOSIS — R29.898 OTHER SYMPTOMS AND SIGNS INVOLVING THE MUSCULOSKELETAL SYSTEM: ICD-10-CM

## 2018-09-05 DIAGNOSIS — Z79.4 LONG TERM (CURRENT) USE OF INSULIN: ICD-10-CM

## 2018-09-05 DIAGNOSIS — Z88.2 ALLERGY STATUS TO SULFONAMIDES: ICD-10-CM

## 2018-09-05 DIAGNOSIS — Z95.0 PRESENCE OF CARDIAC PACEMAKER: ICD-10-CM

## 2018-09-12 ENCOUNTER — APPOINTMENT (OUTPATIENT)
Dept: NEUROSURGERY | Facility: CLINIC | Age: 73
End: 2018-09-12
Payer: MEDICARE

## 2018-09-12 ENCOUNTER — OUTPATIENT (OUTPATIENT)
Dept: OUTPATIENT SERVICES | Facility: HOSPITAL | Age: 73
LOS: 1 days | Discharge: HOME | End: 2018-09-12

## 2018-09-12 DIAGNOSIS — I83.813 VARICOSE VEINS OF BILATERAL LOWER EXTREMITIES WITH PAIN: ICD-10-CM

## 2018-09-12 DIAGNOSIS — Z95.0 PRESENCE OF CARDIAC PACEMAKER: Chronic | ICD-10-CM

## 2018-09-12 DIAGNOSIS — Z98.890 OTHER SPECIFIED POSTPROCEDURAL STATES: Chronic | ICD-10-CM

## 2018-09-12 DIAGNOSIS — Z90.710 ACQUIRED ABSENCE OF BOTH CERVIX AND UTERUS: Chronic | ICD-10-CM

## 2018-09-12 PROCEDURE — 99213 OFFICE O/P EST LOW 20 MIN: CPT

## 2018-10-02 ENCOUNTER — APPOINTMENT (OUTPATIENT)
Dept: CARDIOLOGY | Facility: CLINIC | Age: 73
End: 2018-10-02

## 2018-10-02 VITALS — HEART RATE: 61 BPM

## 2018-10-02 VITALS — SYSTOLIC BLOOD PRESSURE: 132 MMHG | DIASTOLIC BLOOD PRESSURE: 64 MMHG

## 2018-10-02 VITALS — WEIGHT: 131 LBS | HEIGHT: 61 IN | BODY MASS INDEX: 24.73 KG/M2

## 2018-10-11 ENCOUNTER — APPOINTMENT (OUTPATIENT)
Dept: HEMATOLOGY ONCOLOGY | Facility: CLINIC | Age: 73
End: 2018-10-11

## 2018-10-23 ENCOUNTER — LABORATORY RESULT (OUTPATIENT)
Age: 73
End: 2018-10-23

## 2018-10-23 ENCOUNTER — OUTPATIENT (OUTPATIENT)
Dept: OUTPATIENT SERVICES | Facility: HOSPITAL | Age: 73
LOS: 1 days | Discharge: HOME | End: 2018-10-23

## 2018-10-23 ENCOUNTER — APPOINTMENT (OUTPATIENT)
Dept: HEMATOLOGY ONCOLOGY | Facility: CLINIC | Age: 73
End: 2018-10-23

## 2018-10-23 VITALS
WEIGHT: 132 LBS | HEART RATE: 61 BPM | HEIGHT: 61 IN | RESPIRATION RATE: 14 BRPM | DIASTOLIC BLOOD PRESSURE: 67 MMHG | TEMPERATURE: 97.5 F | SYSTOLIC BLOOD PRESSURE: 173 MMHG | BODY MASS INDEX: 24.92 KG/M2

## 2018-10-23 DIAGNOSIS — Z95.0 PRESENCE OF CARDIAC PACEMAKER: Chronic | ICD-10-CM

## 2018-10-23 DIAGNOSIS — Z98.890 OTHER SPECIFIED POSTPROCEDURAL STATES: Chronic | ICD-10-CM

## 2018-10-23 DIAGNOSIS — Z90.710 ACQUIRED ABSENCE OF BOTH CERVIX AND UTERUS: Chronic | ICD-10-CM

## 2018-10-24 LAB
FERRITIN SERPL-MCNC: 120 NG/ML
HCT VFR BLD CALC: 35.1 %
HGB BLD-MCNC: 11.7 G/DL
MCHC RBC-ENTMCNC: 30.5 PG
MCHC RBC-ENTMCNC: 33.3 G/DL
MCV RBC AUTO: 91.4 FL
PLATELET # BLD AUTO: 269 K/UL
PMV BLD: 11.2 FL
RBC # BLD: 3.84 M/UL
RBC # FLD: 13.6 %
WBC # FLD AUTO: 7.86 K/UL

## 2018-10-25 DIAGNOSIS — D64.9 ANEMIA, UNSPECIFIED: ICD-10-CM

## 2018-10-29 ENCOUNTER — OUTPATIENT (OUTPATIENT)
Dept: OUTPATIENT SERVICES | Facility: HOSPITAL | Age: 73
LOS: 1 days | Discharge: HOME | End: 2018-10-29

## 2018-10-29 VITALS
DIASTOLIC BLOOD PRESSURE: 59 MMHG | SYSTOLIC BLOOD PRESSURE: 133 MMHG | WEIGHT: 130.07 LBS | HEIGHT: 61 IN | RESPIRATION RATE: 18 BRPM | TEMPERATURE: 98 F | HEART RATE: 72 BPM

## 2018-10-29 VITALS — RESPIRATION RATE: 18 BRPM | SYSTOLIC BLOOD PRESSURE: 127 MMHG | DIASTOLIC BLOOD PRESSURE: 57 MMHG | HEART RATE: 60 BPM

## 2018-10-29 DIAGNOSIS — Z98.890 OTHER SPECIFIED POSTPROCEDURAL STATES: Chronic | ICD-10-CM

## 2018-10-29 DIAGNOSIS — Z95.0 PRESENCE OF CARDIAC PACEMAKER: Chronic | ICD-10-CM

## 2018-10-29 DIAGNOSIS — Z90.710 ACQUIRED ABSENCE OF BOTH CERVIX AND UTERUS: Chronic | ICD-10-CM

## 2018-10-29 LAB — GLUCOSE BLDC GLUCOMTR-MCNC: 210 MG/DL — HIGH (ref 70–99)

## 2018-10-29 RX ORDER — ACETAMINOPHEN 500 MG
650 TABLET ORAL ONCE
Qty: 0 | Refills: 0 | Status: COMPLETED | OUTPATIENT
Start: 2018-10-29 | End: 2018-10-29

## 2018-10-29 RX ORDER — DIPHENHYDRAMINE HCL 50 MG
25 CAPSULE ORAL EVERY 4 HOURS
Qty: 0 | Refills: 0 | Status: DISCONTINUED | OUTPATIENT
Start: 2018-10-29 | End: 2018-11-13

## 2018-10-29 RX ORDER — IMMUNE GLOBULIN (HUMAN) 10 G/100ML
25 INJECTION INTRAVENOUS; SUBCUTANEOUS ONCE
Qty: 0 | Refills: 0 | Status: COMPLETED | OUTPATIENT
Start: 2018-10-29 | End: 2018-10-29

## 2018-10-29 RX ADMIN — IMMUNE GLOBULIN (HUMAN) 41.67 GRAM(S): 10 INJECTION INTRAVENOUS; SUBCUTANEOUS at 10:04

## 2018-10-29 RX ADMIN — Medication 25 MILLIGRAM(S): at 10:05

## 2018-10-29 RX ADMIN — Medication 650 MILLIGRAM(S): at 10:05

## 2018-10-29 NOTE — ASU PATIENT PROFILE, ADULT - PMH
Afib  5+ yrs; on Eliquis  Anemia  Chronic, on Iron supplement  Diabetes  Insulin dependent - (has insulin Pump)  Dry eyes, bilateral    Essential hypertension    Hypothyroid    Inclusion body myositis (IBM)    Lung nodules    Pacemaker  Sept 2017  Palpitations  occasionally; not for couple yrs  PVD (peripheral vascular disease)  S/p Right Peripheral Stent  Seasonal allergies    Skin cancer  Basal Cell Cancer face  (around nose) s/p MOHS procedure x 3  SSS (sick sinus syndrome)  S/p PPM  Transfusion history  S/p Hysterectomy

## 2018-10-30 ENCOUNTER — OUTPATIENT (OUTPATIENT)
Dept: OUTPATIENT SERVICES | Facility: HOSPITAL | Age: 73
LOS: 1 days | Discharge: HOME | End: 2018-10-30

## 2018-10-30 VITALS
DIASTOLIC BLOOD PRESSURE: 64 MMHG | SYSTOLIC BLOOD PRESSURE: 130 MMHG | HEART RATE: 59 BPM | RESPIRATION RATE: 18 BRPM | TEMPERATURE: 98 F

## 2018-10-30 DIAGNOSIS — Z98.890 OTHER SPECIFIED POSTPROCEDURAL STATES: Chronic | ICD-10-CM

## 2018-10-30 DIAGNOSIS — Z90.710 ACQUIRED ABSENCE OF BOTH CERVIX AND UTERUS: Chronic | ICD-10-CM

## 2018-10-30 DIAGNOSIS — Z95.0 PRESENCE OF CARDIAC PACEMAKER: Chronic | ICD-10-CM

## 2018-10-30 RX ORDER — ACETAMINOPHEN 500 MG
650 TABLET ORAL ONCE
Qty: 0 | Refills: 0 | Status: COMPLETED | OUTPATIENT
Start: 2018-10-30 | End: 2018-10-30

## 2018-10-30 RX ORDER — IMMUNE GLOBULIN (HUMAN) 10 G/100ML
25 INJECTION INTRAVENOUS; SUBCUTANEOUS ONCE
Qty: 0 | Refills: 0 | Status: COMPLETED | OUTPATIENT
Start: 2018-10-30 | End: 2018-10-30

## 2018-10-30 RX ORDER — DIPHENHYDRAMINE HCL 50 MG
25 CAPSULE ORAL ONCE
Qty: 0 | Refills: 0 | Status: COMPLETED | OUTPATIENT
Start: 2018-10-30 | End: 2018-10-30

## 2018-10-30 RX ADMIN — IMMUNE GLOBULIN (HUMAN) 41.67 GRAM(S): 10 INJECTION INTRAVENOUS; SUBCUTANEOUS at 09:11

## 2018-10-30 RX ADMIN — Medication 25 MILLIGRAM(S): at 09:11

## 2018-10-30 RX ADMIN — Medication 650 MILLIGRAM(S): at 09:12

## 2018-10-31 ENCOUNTER — OUTPATIENT (OUTPATIENT)
Dept: OUTPATIENT SERVICES | Facility: HOSPITAL | Age: 73
LOS: 1 days | Discharge: HOME | End: 2018-10-31

## 2018-10-31 VITALS
TEMPERATURE: 98 F | SYSTOLIC BLOOD PRESSURE: 152 MMHG | HEART RATE: 59 BPM | WEIGHT: 130.07 LBS | DIASTOLIC BLOOD PRESSURE: 75 MMHG | RESPIRATION RATE: 18 BRPM | HEIGHT: 61 IN

## 2018-10-31 VITALS — SYSTOLIC BLOOD PRESSURE: 136 MMHG | DIASTOLIC BLOOD PRESSURE: 68 MMHG | RESPIRATION RATE: 18 BRPM | HEART RATE: 59 BPM

## 2018-10-31 VITALS — DIASTOLIC BLOOD PRESSURE: 75 MMHG | TEMPERATURE: 98 F | HEART RATE: 59 BPM | SYSTOLIC BLOOD PRESSURE: 152 MMHG

## 2018-10-31 DIAGNOSIS — G72.41 INCLUSION BODY MYOSITIS [IBM]: ICD-10-CM

## 2018-10-31 DIAGNOSIS — Z98.890 OTHER SPECIFIED POSTPROCEDURAL STATES: Chronic | ICD-10-CM

## 2018-10-31 DIAGNOSIS — Z95.0 PRESENCE OF CARDIAC PACEMAKER: Chronic | ICD-10-CM

## 2018-10-31 DIAGNOSIS — Z90.710 ACQUIRED ABSENCE OF BOTH CERVIX AND UTERUS: Chronic | ICD-10-CM

## 2018-10-31 RX ORDER — DIPHENHYDRAMINE HCL 50 MG
25 CAPSULE ORAL ONCE
Qty: 0 | Refills: 0 | Status: COMPLETED | OUTPATIENT
Start: 2018-10-31 | End: 2018-10-31

## 2018-10-31 RX ORDER — IMMUNE GLOBULIN (HUMAN) 10 G/100ML
25 INJECTION INTRAVENOUS; SUBCUTANEOUS ONCE
Qty: 0 | Refills: 0 | Status: COMPLETED | OUTPATIENT
Start: 2018-10-31 | End: 2018-10-31

## 2018-10-31 RX ORDER — ACETAMINOPHEN 500 MG
650 TABLET ORAL ONCE
Qty: 0 | Refills: 0 | Status: COMPLETED | OUTPATIENT
Start: 2018-10-31 | End: 2018-10-31

## 2018-10-31 RX ADMIN — Medication 25 MILLIGRAM(S): at 08:49

## 2018-10-31 RX ADMIN — IMMUNE GLOBULIN (HUMAN) 41.67 GRAM(S): 10 INJECTION INTRAVENOUS; SUBCUTANEOUS at 08:48

## 2018-10-31 RX ADMIN — Medication 650 MILLIGRAM(S): at 08:49

## 2018-11-01 ENCOUNTER — OUTPATIENT (OUTPATIENT)
Dept: OUTPATIENT SERVICES | Facility: HOSPITAL | Age: 73
LOS: 1 days | Discharge: HOME | End: 2018-11-01

## 2018-11-01 VITALS
HEIGHT: 61 IN | RESPIRATION RATE: 20 BRPM | WEIGHT: 130.07 LBS | TEMPERATURE: 98 F | SYSTOLIC BLOOD PRESSURE: 121 MMHG | DIASTOLIC BLOOD PRESSURE: 65 MMHG | HEART RATE: 59 BPM

## 2018-11-01 VITALS — SYSTOLIC BLOOD PRESSURE: 130 MMHG | RESPIRATION RATE: 18 BRPM | DIASTOLIC BLOOD PRESSURE: 60 MMHG | HEART RATE: 61 BPM

## 2018-11-01 DIAGNOSIS — Z95.0 PRESENCE OF CARDIAC PACEMAKER: Chronic | ICD-10-CM

## 2018-11-01 DIAGNOSIS — Z90.710 ACQUIRED ABSENCE OF BOTH CERVIX AND UTERUS: Chronic | ICD-10-CM

## 2018-11-01 DIAGNOSIS — Z98.890 OTHER SPECIFIED POSTPROCEDURAL STATES: Chronic | ICD-10-CM

## 2018-11-01 RX ORDER — DIPHENHYDRAMINE HCL 50 MG
25 CAPSULE ORAL EVERY 4 HOURS
Qty: 0 | Refills: 0 | Status: DISCONTINUED | OUTPATIENT
Start: 2018-11-01 | End: 2018-11-16

## 2018-11-01 RX ORDER — IMMUNE GLOBULIN (HUMAN) 10 G/100ML
25 INJECTION INTRAVENOUS; SUBCUTANEOUS ONCE
Qty: 0 | Refills: 0 | Status: COMPLETED | OUTPATIENT
Start: 2018-11-01 | End: 2018-11-01

## 2018-11-01 RX ORDER — ACETAMINOPHEN 500 MG
650 TABLET ORAL ONCE
Qty: 0 | Refills: 0 | Status: COMPLETED | OUTPATIENT
Start: 2018-11-01 | End: 2018-11-01

## 2018-11-01 RX ADMIN — Medication 650 MILLIGRAM(S): at 10:20

## 2018-11-01 RX ADMIN — IMMUNE GLOBULIN (HUMAN) 41.67 GRAM(S): 10 INJECTION INTRAVENOUS; SUBCUTANEOUS at 10:17

## 2018-11-01 RX ADMIN — Medication 25 MILLIGRAM(S): at 10:21

## 2018-11-02 ENCOUNTER — OUTPATIENT (OUTPATIENT)
Dept: OUTPATIENT SERVICES | Facility: HOSPITAL | Age: 73
LOS: 1 days | Discharge: HOME | End: 2018-11-02

## 2018-11-02 VITALS
DIASTOLIC BLOOD PRESSURE: 72 MMHG | TEMPERATURE: 98 F | HEART RATE: 60 BPM | RESPIRATION RATE: 18 BRPM | SYSTOLIC BLOOD PRESSURE: 129 MMHG

## 2018-11-02 VITALS — HEART RATE: 62 BPM | DIASTOLIC BLOOD PRESSURE: 66 MMHG | RESPIRATION RATE: 16 BRPM | SYSTOLIC BLOOD PRESSURE: 122 MMHG

## 2018-11-02 DIAGNOSIS — Z90.710 ACQUIRED ABSENCE OF BOTH CERVIX AND UTERUS: Chronic | ICD-10-CM

## 2018-11-02 DIAGNOSIS — Z98.890 OTHER SPECIFIED POSTPROCEDURAL STATES: Chronic | ICD-10-CM

## 2018-11-02 DIAGNOSIS — Z95.0 PRESENCE OF CARDIAC PACEMAKER: Chronic | ICD-10-CM

## 2018-11-02 RX ORDER — ACETAMINOPHEN 500 MG
650 TABLET ORAL ONCE
Qty: 0 | Refills: 0 | Status: COMPLETED | OUTPATIENT
Start: 2018-11-02 | End: 2018-11-02

## 2018-11-02 RX ORDER — DIPHENHYDRAMINE HCL 50 MG
25 CAPSULE ORAL EVERY 4 HOURS
Qty: 0 | Refills: 0 | Status: DISCONTINUED | OUTPATIENT
Start: 2018-11-02 | End: 2018-11-17

## 2018-11-02 RX ORDER — IMMUNE GLOBULIN (HUMAN) 10 G/100ML
25 INJECTION INTRAVENOUS; SUBCUTANEOUS ONCE
Qty: 0 | Refills: 0 | Status: COMPLETED | OUTPATIENT
Start: 2018-11-02 | End: 2018-11-02

## 2018-11-02 RX ADMIN — Medication 650 MILLIGRAM(S): at 09:28

## 2018-11-02 RX ADMIN — Medication 650 MILLIGRAM(S): at 09:29

## 2018-11-02 RX ADMIN — IMMUNE GLOBULIN (HUMAN) 41.67 GRAM(S): 10 INJECTION INTRAVENOUS; SUBCUTANEOUS at 09:28

## 2018-11-02 RX ADMIN — Medication 25 MILLIGRAM(S): at 09:29

## 2018-11-05 DIAGNOSIS — G72.41 INCLUSION BODY MYOSITIS [IBM]: ICD-10-CM

## 2018-11-07 DIAGNOSIS — G72.41 INCLUSION BODY MYOSITIS [IBM]: ICD-10-CM

## 2018-11-08 DIAGNOSIS — G72.41 INCLUSION BODY MYOSITIS [IBM]: ICD-10-CM

## 2018-11-09 DIAGNOSIS — G72.41 INCLUSION BODY MYOSITIS [IBM]: ICD-10-CM

## 2018-11-26 PROBLEM — G72.41 INCLUSION BODY MYOSITIS [IBM]: Chronic | Status: ACTIVE | Noted: 2018-10-29

## 2018-12-07 ENCOUNTER — RX RENEWAL (OUTPATIENT)
Age: 73
End: 2018-12-07

## 2018-12-07 NOTE — H&P ADULT - NSHPPOASURGSITEINCISION_GEN_ALL_CORE
no
Implemented All Universal Safety Interventions:  [place] to call system. Call bell, personal items and telephone within reach. Instruct patient to call for assistance. Room bathroom lighting operational. Non-slip footwear when patient is off stretcher. Physically safe environment: no spills, clutter or unnecessary equipment. Stretcher in lowest position, wheels locked, appropriate side rails in place.

## 2018-12-10 ENCOUNTER — OUTPATIENT (OUTPATIENT)
Dept: OUTPATIENT SERVICES | Facility: HOSPITAL | Age: 73
LOS: 1 days | Discharge: HOME | End: 2018-12-10

## 2018-12-10 VITALS
SYSTOLIC BLOOD PRESSURE: 133 MMHG | HEART RATE: 64 BPM | WEIGHT: 130.07 LBS | RESPIRATION RATE: 18 BRPM | HEIGHT: 61 IN | DIASTOLIC BLOOD PRESSURE: 73 MMHG | TEMPERATURE: 98 F

## 2018-12-10 VITALS — SYSTOLIC BLOOD PRESSURE: 128 MMHG | HEART RATE: 59 BPM | DIASTOLIC BLOOD PRESSURE: 62 MMHG | RESPIRATION RATE: 18 BRPM

## 2018-12-10 DIAGNOSIS — Z90.710 ACQUIRED ABSENCE OF BOTH CERVIX AND UTERUS: Chronic | ICD-10-CM

## 2018-12-10 DIAGNOSIS — Z98.890 OTHER SPECIFIED POSTPROCEDURAL STATES: Chronic | ICD-10-CM

## 2018-12-10 DIAGNOSIS — Z95.0 PRESENCE OF CARDIAC PACEMAKER: Chronic | ICD-10-CM

## 2018-12-10 RX ORDER — DIPHENHYDRAMINE HCL 50 MG
25 CAPSULE ORAL ONCE
Qty: 0 | Refills: 0 | Status: COMPLETED | OUTPATIENT
Start: 2018-12-10 | End: 2018-12-10

## 2018-12-10 RX ORDER — ACETAMINOPHEN 500 MG
650 TABLET ORAL ONCE
Qty: 0 | Refills: 0 | Status: COMPLETED | OUTPATIENT
Start: 2018-12-10 | End: 2018-12-10

## 2018-12-10 RX ORDER — IMMUNE GLOBULIN (HUMAN) 10 G/100ML
10 INJECTION INTRAVENOUS; SUBCUTANEOUS ONCE
Qty: 0 | Refills: 0 | Status: COMPLETED | OUTPATIENT
Start: 2018-12-10 | End: 2018-12-10

## 2018-12-10 RX ADMIN — Medication 650 MILLIGRAM(S): at 10:12

## 2018-12-10 RX ADMIN — IMMUNE GLOBULIN (HUMAN) 20 GRAM(S): 10 INJECTION INTRAVENOUS; SUBCUTANEOUS at 10:12

## 2018-12-10 RX ADMIN — Medication 25 MILLIGRAM(S): at 10:12

## 2018-12-11 ENCOUNTER — OUTPATIENT (OUTPATIENT)
Dept: OUTPATIENT SERVICES | Facility: HOSPITAL | Age: 73
LOS: 1 days | Discharge: HOME | End: 2018-12-11

## 2018-12-11 VITALS — SYSTOLIC BLOOD PRESSURE: 130 MMHG | HEART RATE: 58 BPM | DIASTOLIC BLOOD PRESSURE: 61 MMHG | RESPIRATION RATE: 18 BRPM

## 2018-12-11 VITALS
TEMPERATURE: 98 F | SYSTOLIC BLOOD PRESSURE: 144 MMHG | DIASTOLIC BLOOD PRESSURE: 74 MMHG | RESPIRATION RATE: 18 BRPM | HEART RATE: 57 BPM

## 2018-12-11 DIAGNOSIS — Z98.890 OTHER SPECIFIED POSTPROCEDURAL STATES: Chronic | ICD-10-CM

## 2018-12-11 DIAGNOSIS — Z95.0 PRESENCE OF CARDIAC PACEMAKER: Chronic | ICD-10-CM

## 2018-12-11 DIAGNOSIS — Z90.710 ACQUIRED ABSENCE OF BOTH CERVIX AND UTERUS: Chronic | ICD-10-CM

## 2018-12-11 RX ORDER — ACETAMINOPHEN 500 MG
650 TABLET ORAL ONCE
Qty: 0 | Refills: 0 | Status: COMPLETED | OUTPATIENT
Start: 2018-12-11 | End: 2018-12-11

## 2018-12-11 RX ORDER — IMMUNE GLOBULIN (HUMAN) 10 G/100ML
12 INJECTION INTRAVENOUS; SUBCUTANEOUS ONCE
Qty: 0 | Refills: 0 | Status: COMPLETED | OUTPATIENT
Start: 2018-12-11 | End: 2018-12-11

## 2018-12-11 RX ORDER — DIPHENHYDRAMINE HCL 50 MG
25 CAPSULE ORAL ONCE
Qty: 0 | Refills: 0 | Status: COMPLETED | OUTPATIENT
Start: 2018-12-11 | End: 2018-12-11

## 2018-12-11 RX ADMIN — IMMUNE GLOBULIN (HUMAN) 24 GRAM(S): 10 INJECTION INTRAVENOUS; SUBCUTANEOUS at 09:41

## 2018-12-11 RX ADMIN — Medication 650 MILLIGRAM(S): at 09:37

## 2018-12-11 RX ADMIN — Medication 25 MILLIGRAM(S): at 09:37

## 2018-12-12 ENCOUNTER — OUTPATIENT (OUTPATIENT)
Dept: OUTPATIENT SERVICES | Facility: HOSPITAL | Age: 73
LOS: 1 days | Discharge: HOME | End: 2018-12-12

## 2018-12-12 VITALS — SYSTOLIC BLOOD PRESSURE: 132 MMHG | HEART RATE: 57 BPM | DIASTOLIC BLOOD PRESSURE: 64 MMHG | RESPIRATION RATE: 16 BRPM

## 2018-12-12 VITALS
SYSTOLIC BLOOD PRESSURE: 129 MMHG | DIASTOLIC BLOOD PRESSURE: 70 MMHG | HEART RATE: 55 BPM | TEMPERATURE: 97 F | RESPIRATION RATE: 18 BRPM

## 2018-12-12 DIAGNOSIS — Z98.890 OTHER SPECIFIED POSTPROCEDURAL STATES: Chronic | ICD-10-CM

## 2018-12-12 DIAGNOSIS — Z90.710 ACQUIRED ABSENCE OF BOTH CERVIX AND UTERUS: Chronic | ICD-10-CM

## 2018-12-12 DIAGNOSIS — Z95.0 PRESENCE OF CARDIAC PACEMAKER: Chronic | ICD-10-CM

## 2018-12-12 RX ORDER — IMMUNE GLOBULIN (HUMAN) 10 G/100ML
12 INJECTION INTRAVENOUS; SUBCUTANEOUS ONCE
Qty: 0 | Refills: 0 | Status: COMPLETED | OUTPATIENT
Start: 2018-12-12 | End: 2018-12-12

## 2018-12-12 RX ORDER — DIPHENHYDRAMINE HCL 50 MG
25 CAPSULE ORAL ONCE
Qty: 0 | Refills: 0 | Status: COMPLETED | OUTPATIENT
Start: 2018-12-12 | End: 2018-12-12

## 2018-12-12 RX ORDER — ACETAMINOPHEN 500 MG
650 TABLET ORAL ONCE
Qty: 0 | Refills: 0 | Status: COMPLETED | OUTPATIENT
Start: 2018-12-12 | End: 2018-12-12

## 2018-12-12 RX ADMIN — Medication 650 MILLIGRAM(S): at 09:15

## 2018-12-12 RX ADMIN — Medication 25 MILLIGRAM(S): at 09:15

## 2018-12-12 RX ADMIN — IMMUNE GLOBULIN (HUMAN) 24 GRAM(S): 10 INJECTION INTRAVENOUS; SUBCUTANEOUS at 09:16

## 2018-12-12 NOTE — ASU PATIENT PROFILE, ADULT - AS SC BRADEN MOISTURE
Outpatient follow up consultation    IBD PAST HISTORY:  Joni is a 12yo male who returns to the Pediatric Gastroenterology clinic for ongoing management of penetrating perianal Crohn's disease diagnosed 2016 at age 11. Doing well on infliximab monotherapy.     Visual Extent of disease involvement:  Macroscopic lower tract involvement: colonic only  Macroscopic upper GI tract disease proximal to Ligament of Treitz: no      Macroscopic upper GI tract disease distal to Ligament of Treitz: no        Histopathologic involvement: Entire colon  Disease type: Inflammatory, penetrating  Growth: No evidence of growth delay  Extraintestinal manifestations: Arthritis, erythema nodosum (resolved on IFX)    Last exacerbation: 2016 - diagnosis  Last EGD: 2016 - Reactive changes in esophagus. Chronic gastric inflammation with single probable granuloma. Normal duodenum.  Last colonoscopy: 2016 - Abnormal perianal exam with large fleshy skin tags and likely draining fistula tracts. Active colitis with cryptitis and crypt abscesses, worst in the right and rectosigmoid. Granulomas present.   Small bowel imagin2016 - No small bowel involvement on MRE    Last medication change: Infliximab induction started 16.   Previous medications: None              Last prolonged prednisone course discontinued: 2016    TPMT: not done    Hospitalizations: None  Surgeries: None    Bone health: 25-OH vitamin D was 32 in 2016  Last DEXA scan: N/A    Vaccinations/Immunity:  Last influenza vaccine: 10/2016  Last pneumococcal vaccine: PCV23 given 17  HPV series completed: Dose #1 2013, Dose #2 2015, Dose #3 17  Varicella nonimmune based on titers 2016.  Hepatitis B immune based on titers 2016.  PPD/quantiferon gold: Negative 2016  CXR: Negative for TB, histoplasomosis 2016    RADIOLOGIC STUDIES: MRE 2016 - no small bowel involvement, possible right intersphincteric fistula,  mild diffuse cecal inflammation    PROCEDURES:    - 7/2016 EGD/colonoscopy - Reactive changes in esophagus. Chronic gastric inflammation with single probable granuloma. Normal duodenum. Abnormal perianal exam with large fleshy skin tags and likely draining fistula tracts. Active colitis with cryptitis and crypt abscesses, worst in the right and rectosigmoid. Granulomas present.       INTERVAL HISTORY: Joni returns with his mother. A few days before the fourth infusion he felt tired and had some abdominal pain and loose stools. His mother increased his dietary fiber and his symptoms improved. He has continued on a high fiber diet and felt good the entire 10-week interval in between infusions. He is receiving the infusion 2 weeks late because they have been busy and struggled to find time to schedule the infusion. Today his mother is wondering if it would be reasonable to stop the infusions, increase the interval, or decrease the dose since he seems to be doing well on the diet. She worries about the side effects of the infliximab.     Regular formed bowel movements. No diarrhea or rectal bleeding. Joni has not experienced any perianal discomfort and has not noticed any irregularities. His mother reports his activity level is back to baseline. No oral lesions, joint pain or rashes.     Current symptoms (on the worst day in past 7 days)  He reports on the worst day his general well-being is normal.     Limitations in daily activities were described as: no limitations.     Abdominal pain: none.    Stool number on the worst day in past 7 days: 2  . The number of liquid/watery stools per day was 0  . Most of the stools were described as formed.     Nocturnal diarrhea: no  .   He reported no bloody stools  . Typical amount of blood:  .    Extraintestinal manifestations:   Fever greater than 38.5C for 3 of last 7 days: no    Definite arthritis: no    Uveitis: no    Erythema nodosum:  no     Pyoderma gangrenosum: no     "    Enteral supplement: is not on an enteral supplement.  Enteral therapy is not being used as primary therapy.      Current Outpatient Prescriptions   Medication Sig Dispense Refill     InFLIXimab (REMICADE IV)        EPINEPHrine (EPIPEN) 0.3 MG/0.3ML injection Inject 0.3 mLs (0.3 mg) into the muscle once as needed for anaphylaxis 0.6 mL      albuterol (2.5 MG/3ML) 0.083% nebulizer solution Take 1 vial by nebulization every 6 hours as needed for shortness of breath / dyspnea or wheezing       CLARITIN 5 MG/5ML OR SYRP 1 tsp prn        BENADRYL 12.5 MG/5ML OR ELIX 1 1/2 tsp prn       Allergies: Nuts; Animal dander; and Cats    Past Medical History   Diagnosis Date     Crohn's disease (H) 7/2016     Nut allergy      Peanut, tree nut     FTND (full term normal delivery)      Environmental allergies      Family History   Problem Relation Age of Onset     Asthma Mother      Melanoma Mother      GASTROINTESTINAL DISEASE Maternal Grandmother      non-collagenous colitis     Skin Cancer Maternal Grandmother      squamous cell carcinoma     Crohn Disease No family hx of      Ulcerative Colitis No family hx of      Autoimmune Disease No family hx of      Liver Disease No family hx of      Pancreatitis No family hx of      Celiac Disease Maternal Aunt      Breast Cancer Other      Social History: Lives at home with parents and 17yo sister. Attends 8th grade. No pets. Enjoys recess.     Review of Systems: No oral lesions, fevers, joint pain or skin changes. Otherwise as above. All other systems negative per complete ROS.       Physical exam: BP 95/50 mmHg  Pulse 68  Ht 1.608 m (5' 3.31\")  Wt 40.4 kg (89 lb 1.1 oz)  BMI 15.62 kg/m2   GEN: Thin male in no acute distress. Answers questions appropriately. Cooperative with exam.   HEENT: NC/AT. Pupils equal and round. No scleral icterus. No rhinorrhea. MMMs. Braces. Small 1mm erythematous lesion on left buccal membrane.    LYMPH: No cervical or supraclavicular LAD " bilaterally.  PULM: CTAB. Breath sounds symmetric. No wheezes or crackles.  CV: RRR. Normal S1, S2. No murmurs.  ABD: Nondistended. Normoactive bowel sounds. Soft, no tenderness to palpation. No HSM or other masses.   EXT: No deformities, no clubbing. Cap refill <2sec. Radial pulse 2+.   SKIN: Dry chapped skin on hands. No jaundice, bruising or petechiae on incomplete skin exam.  RECTAL: Appropriately placed spherical anus. Two large perianal skin tags at 1200 and 0400, nontender. No fissures or fistulas. Digital exam deferred.    Results Reviewed:   Results for orders placed or performed in visit on 01/23/17   CBC with platelets differential   Result Value Ref Range    WBC 6.3 4.0 - 11.0 10e9/L    RBC Count 4.16 3.7 - 5.3 10e12/L    Hemoglobin 12.1 11.7 - 15.7 g/dL    Hematocrit 35.2 35.0 - 47.0 %    MCV 85 77 - 100 fl    MCH 29.1 26.5 - 33.0 pg    MCHC 34.4 31.5 - 36.5 g/dL    RDW 13.0 10.0 - 15.0 %    Platelet Count 323 150 - 450 10e9/L    Diff Method Automated Method     % Neutrophils 46.8 %    % Lymphocytes 36.6 %    % Monocytes 5.9 %    % Eosinophils 9.2 %    % Basophils 1.3 %    % Immature Granulocytes 0.2 %    Nucleated RBCs 0 0 /100    Absolute Neutrophil 2.9 1.3 - 7.0 10e9/L    Absolute Lymphocytes 2.3 1.0 - 5.8 10e9/L    Absolute Monocytes 0.4 0.0 - 1.3 10e9/L    Absolute Eosinophils 0.6 0.0 - 0.7 10e9/L    Absolute Basophils 0.1 0.0 - 0.2 10e9/L    Abs Immature Granulocytes 0.0 0 - 0.4 10e9/L    Absolute Nucleated RBC 0.0    Erythrocyte sedimentation rate auto   Result Value Ref Range    Sed Rate 8 0 - 15 mm/h   Hepatic panel   Result Value Ref Range    Bilirubin Direct 0.1 0.0 - 0.2 mg/dL    Bilirubin Total 0.4 0.2 - 1.3 mg/dL    Albumin 3.6 3.4 - 5.0 g/dL    Protein Total 7.4 6.8 - 8.8 g/dL    Alkaline Phosphatase 193 130 - 530 U/L    ALT 18 0 - 50 U/L    AST 18 0 - 35 U/L   CRP inflammation   Result Value Ref Range    CRP Inflammation <2.9 0.0 - 8.0 mg/L     Recent Results (from the past 168 hour(s))    CBC with platelets differential    Collection Time: 01/23/17  9:15 AM   Result Value Ref Range    WBC 6.3 4.0 - 11.0 10e9/L    RBC Count 4.16 3.7 - 5.3 10e12/L    Hemoglobin 12.1 11.7 - 15.7 g/dL    Hematocrit 35.2 35.0 - 47.0 %    MCV 85 77 - 100 fl    MCH 29.1 26.5 - 33.0 pg    MCHC 34.4 31.5 - 36.5 g/dL    RDW 13.0 10.0 - 15.0 %    Platelet Count 323 150 - 450 10e9/L    Diff Method Automated Method     % Neutrophils 46.8 %    % Lymphocytes 36.6 %    % Monocytes 5.9 %    % Eosinophils 9.2 %    % Basophils 1.3 %    % Immature Granulocytes 0.2 %    Nucleated RBCs 0 0 /100    Absolute Neutrophil 2.9 1.3 - 7.0 10e9/L    Absolute Lymphocytes 2.3 1.0 - 5.8 10e9/L    Absolute Monocytes 0.4 0.0 - 1.3 10e9/L    Absolute Eosinophils 0.6 0.0 - 0.7 10e9/L    Absolute Basophils 0.1 0.0 - 0.2 10e9/L    Abs Immature Granulocytes 0.0 0 - 0.4 10e9/L    Absolute Nucleated RBC 0.0    Erythrocyte sedimentation rate auto    Collection Time: 01/23/17  9:15 AM   Result Value Ref Range    Sed Rate 8 0 - 15 mm/h   Hepatic panel    Collection Time: 01/23/17  9:15 AM   Result Value Ref Range    Bilirubin Direct 0.1 0.0 - 0.2 mg/dL    Bilirubin Total 0.4 0.2 - 1.3 mg/dL    Albumin 3.6 3.4 - 5.0 g/dL    Protein Total 7.4 6.8 - 8.8 g/dL    Alkaline Phosphatase 193 130 - 530 U/L    ALT 18 0 - 50 U/L    AST 18 0 - 35 U/L   CRP inflammation    Collection Time: 01/23/17  9:15 AM   Result Value Ref Range    CRP Inflammation <2.9 0.0 - 8.0 mg/L       Assessment: Joni is a 12yo male with   1. Penetrating, perianal Crohn's disease - in clinical and biochemical remission on infliximab monotherapy (5mg/kg)  2. Intermittent abdominal and perianal discomfort, resolved  3. Perianal fistula(s), resolved  4. Anemia, resolved  5. Elevated inflammatory markers, resolved  6. Hypoalbuminemia, resolved  7. Low BMI, improving - Z-score improved from -5 at diagnosis to -1.9  8. Varicella nonimmune - discussed need to notify us if Joni is exposed to chickenpox  9.  FHx of squamous cell carcinoma and melanoma - needs yearly dermatology evaluation    In regards to the question of dietary therapy for Crohn's disease, there are no studies to support dietary therapy alone for Crohn's disease. Fistulizing disease is aggressive and I would recommend continuing infliximab. I would not recommend stopping or decreasing the infusions, because studies have shown that the best response to therapy occurs if a therapeutic level of drug is maintained until the next infusion. Gaps or irregular intervals in between infusion increase the risk of developing antibodies to infliximab, which render the drug ineffective.     Based on current information, my global assessment of current disease status is his disease is quiescent  Adherence assessment: Satisfactory  Joni s growth status is satisfactory  The overall nutritional status is at risk     Plan:   1. Continue infliximab 5mg/kg q8wks.  2. PCV23 and HPV#3 given in clinic today.   3. Will add on vitamin D to today's labs.   4. Referral placed to Peds Dermatology.   5. Follow up in 4 months. Will try to coordinate with infliximab infusion. Please call for any questions or concerns.     Health maintenance:    Screening for colon cancer should begin in 8/2024 at age 19, 8 years after diagnosis.    Recommend pneumococcal vaccine every 5 years. Next due in 2022 at age 16.    Recommend intramuscular influenza vaccine as soon as it is available each year.    Sincerely,     Jazmyn Clement MD  Pediatric Gastroenterology  [facility]      CC:  Yvan Coleman MD     (4) rarely moist

## 2018-12-13 ENCOUNTER — OUTPATIENT (OUTPATIENT)
Dept: OUTPATIENT SERVICES | Facility: HOSPITAL | Age: 73
LOS: 1 days | Discharge: HOME | End: 2018-12-13

## 2018-12-13 VITALS
RESPIRATION RATE: 18 BRPM | HEIGHT: 61 IN | DIASTOLIC BLOOD PRESSURE: 68 MMHG | HEART RATE: 62 BPM | WEIGHT: 130.07 LBS | TEMPERATURE: 98 F | SYSTOLIC BLOOD PRESSURE: 135 MMHG

## 2018-12-13 VITALS — RESPIRATION RATE: 18 BRPM | SYSTOLIC BLOOD PRESSURE: 133 MMHG | HEART RATE: 64 BPM | DIASTOLIC BLOOD PRESSURE: 73 MMHG

## 2018-12-13 DIAGNOSIS — Z98.890 OTHER SPECIFIED POSTPROCEDURAL STATES: Chronic | ICD-10-CM

## 2018-12-13 DIAGNOSIS — Z95.0 PRESENCE OF CARDIAC PACEMAKER: Chronic | ICD-10-CM

## 2018-12-13 DIAGNOSIS — Z90.710 ACQUIRED ABSENCE OF BOTH CERVIX AND UTERUS: Chronic | ICD-10-CM

## 2018-12-13 RX ORDER — IMMUNE GLOBULIN (HUMAN) 10 G/100ML
10 INJECTION INTRAVENOUS; SUBCUTANEOUS ONCE
Qty: 0 | Refills: 0 | Status: COMPLETED | OUTPATIENT
Start: 2018-12-13 | End: 2018-12-13

## 2018-12-13 RX ORDER — ACETAMINOPHEN 500 MG
650 TABLET ORAL ONCE
Qty: 0 | Refills: 0 | Status: COMPLETED | OUTPATIENT
Start: 2018-12-13 | End: 2018-12-13

## 2018-12-13 RX ORDER — DIPHENHYDRAMINE HCL 50 MG
25 CAPSULE ORAL EVERY 6 HOURS
Qty: 0 | Refills: 0 | Status: DISCONTINUED | OUTPATIENT
Start: 2018-12-13 | End: 2018-12-28

## 2018-12-13 RX ADMIN — Medication 25 MILLIGRAM(S): at 09:11

## 2018-12-13 RX ADMIN — Medication 650 MILLIGRAM(S): at 09:11

## 2018-12-13 RX ADMIN — IMMUNE GLOBULIN (HUMAN) 20 GRAM(S): 10 INJECTION INTRAVENOUS; SUBCUTANEOUS at 09:11

## 2018-12-14 ENCOUNTER — OUTPATIENT (OUTPATIENT)
Dept: OUTPATIENT SERVICES | Facility: HOSPITAL | Age: 73
LOS: 1 days | Discharge: HOME | End: 2018-12-14

## 2018-12-14 VITALS — HEART RATE: 62 BPM | SYSTOLIC BLOOD PRESSURE: 130 MMHG | RESPIRATION RATE: 18 BRPM | DIASTOLIC BLOOD PRESSURE: 68 MMHG

## 2018-12-14 VITALS
RESPIRATION RATE: 18 BRPM | SYSTOLIC BLOOD PRESSURE: 129 MMHG | TEMPERATURE: 99 F | HEIGHT: 61 IN | WEIGHT: 132.06 LBS | DIASTOLIC BLOOD PRESSURE: 67 MMHG | HEART RATE: 62 BPM

## 2018-12-14 DIAGNOSIS — Z98.890 OTHER SPECIFIED POSTPROCEDURAL STATES: Chronic | ICD-10-CM

## 2018-12-14 DIAGNOSIS — Z95.0 PRESENCE OF CARDIAC PACEMAKER: Chronic | ICD-10-CM

## 2018-12-14 DIAGNOSIS — Z90.710 ACQUIRED ABSENCE OF BOTH CERVIX AND UTERUS: Chronic | ICD-10-CM

## 2018-12-14 DIAGNOSIS — G72.41 INCLUSION BODY MYOSITIS [IBM]: ICD-10-CM

## 2018-12-14 RX ORDER — DIPHENHYDRAMINE HCL 50 MG
25 CAPSULE ORAL EVERY 4 HOURS
Qty: 0 | Refills: 0 | Status: DISCONTINUED | OUTPATIENT
Start: 2018-12-14 | End: 2018-12-29

## 2018-12-14 RX ORDER — IMMUNE GLOBULIN (HUMAN) 10 G/100ML
10 INJECTION INTRAVENOUS; SUBCUTANEOUS ONCE
Qty: 0 | Refills: 0 | Status: COMPLETED | OUTPATIENT
Start: 2018-12-14 | End: 2018-12-14

## 2018-12-14 RX ORDER — ACETAMINOPHEN 500 MG
650 TABLET ORAL ONCE
Qty: 0 | Refills: 0 | Status: COMPLETED | OUTPATIENT
Start: 2018-12-14 | End: 2018-12-14

## 2018-12-14 RX ADMIN — Medication 650 MILLIGRAM(S): at 10:00

## 2018-12-14 RX ADMIN — IMMUNE GLOBULIN (HUMAN) 20 GRAM(S): 10 INJECTION INTRAVENOUS; SUBCUTANEOUS at 10:12

## 2018-12-14 RX ADMIN — Medication 25 MILLIGRAM(S): at 10:09

## 2018-12-17 DIAGNOSIS — G72.41 INCLUSION BODY MYOSITIS [IBM]: ICD-10-CM

## 2018-12-18 DIAGNOSIS — G72.41 INCLUSION BODY MYOSITIS [IBM]: ICD-10-CM

## 2018-12-19 DIAGNOSIS — G72.41 INCLUSION BODY MYOSITIS [IBM]: ICD-10-CM

## 2018-12-19 LAB — GLUCOSE BLDC GLUCOMTR-MCNC: 119 MG/DL — HIGH (ref 70–99)

## 2018-12-28 ENCOUNTER — APPOINTMENT (OUTPATIENT)
Dept: CARDIOLOGY | Facility: CLINIC | Age: 73
End: 2018-12-28

## 2018-12-28 VITALS
OXYGEN SATURATION: 100 % | BODY MASS INDEX: 24.92 KG/M2 | HEART RATE: 61 BPM | HEIGHT: 61 IN | DIASTOLIC BLOOD PRESSURE: 73 MMHG | SYSTOLIC BLOOD PRESSURE: 133 MMHG | WEIGHT: 132 LBS

## 2018-12-28 NOTE — PROCEDURE
[No] : not [Pacemaker] : pacemaker [DDDR] : DDDR [Longevity: ___ months] : The estimated remaining battery life is [unfilled] months [Lead Imp:  ___ohms] : lead impedance was [unfilled] ohms [Sensing Amplitude ___mv] : sensing amplitude was [unfilled] mv [___V @] : [unfilled] V [___ ms] : [unfilled] ms [Pace ___ %] : Pace [unfilled]% [Programmed for Longevity] : output reprogrammed for improved battery longevity [de-identified] : [facility]  [de-identified] : L311 [de-identified] : 09/08/2017 [de-identified] : 60 [de-identified] : No events. Normal functioning device. No changes made. Patient monitor on and transmitting.

## 2019-01-07 ENCOUNTER — APPOINTMENT (OUTPATIENT)
Dept: HEMATOLOGY ONCOLOGY | Facility: CLINIC | Age: 74
End: 2019-01-07

## 2019-01-07 ENCOUNTER — LABORATORY RESULT (OUTPATIENT)
Age: 74
End: 2019-01-07

## 2019-01-07 ENCOUNTER — OUTPATIENT (OUTPATIENT)
Dept: OUTPATIENT SERVICES | Facility: HOSPITAL | Age: 74
LOS: 1 days | Discharge: HOME | End: 2019-01-07

## 2019-01-07 VITALS
SYSTOLIC BLOOD PRESSURE: 161 MMHG | HEIGHT: 61 IN | RESPIRATION RATE: 14 BRPM | TEMPERATURE: 96.3 F | BODY MASS INDEX: 24.55 KG/M2 | DIASTOLIC BLOOD PRESSURE: 71 MMHG | HEART RATE: 59 BPM | WEIGHT: 130 LBS

## 2019-01-07 DIAGNOSIS — Z98.890 OTHER SPECIFIED POSTPROCEDURAL STATES: Chronic | ICD-10-CM

## 2019-01-07 DIAGNOSIS — Z90.710 ACQUIRED ABSENCE OF BOTH CERVIX AND UTERUS: Chronic | ICD-10-CM

## 2019-01-07 DIAGNOSIS — Z95.0 PRESENCE OF CARDIAC PACEMAKER: Chronic | ICD-10-CM

## 2019-01-07 LAB
HCT VFR BLD CALC: 33.9 %
HGB BLD-MCNC: 11.5 G/DL
MCHC RBC-ENTMCNC: 30.7 PG
MCHC RBC-ENTMCNC: 33.9 G/DL
MCV RBC AUTO: 90.4 FL
PLATELET # BLD AUTO: 222 K/UL
PMV BLD: 11.6 FL
RBC # BLD: 3.75 M/UL
RBC # FLD: 13.4 %
WBC # FLD AUTO: 5.94 K/UL

## 2019-01-08 DIAGNOSIS — D64.9 ANEMIA, UNSPECIFIED: ICD-10-CM

## 2019-01-08 LAB
ALBUMIN SERPL ELPH-MCNC: 4.4 G/DL
ALP BLD-CCNC: 118 U/L
ALT SERPL-CCNC: 21 U/L
ANION GAP SERPL CALC-SCNC: 21 MMOL/L
AST SERPL-CCNC: 25 U/L
BILIRUB SERPL-MCNC: <0.2 MG/DL
BUN SERPL-MCNC: 17 MG/DL
CALCIUM SERPL-MCNC: 9.2 MG/DL
CHLORIDE SERPL-SCNC: 94 MMOL/L
CO2 SERPL-SCNC: 20 MMOL/L
CREAT SERPL-MCNC: 0.5 MG/DL
FERRITIN SERPL-MCNC: 113 NG/ML
GLUCOSE SERPL-MCNC: 95 MG/DL
IRON SERPL-MCNC: 57 UG/DL
POTASSIUM SERPL-SCNC: 4.8 MMOL/L
PROT SERPL-MCNC: 7.4 G/DL
SODIUM SERPL-SCNC: 135 MMOL/L

## 2019-01-08 NOTE — HISTORY OF PRESENT ILLNESS
[de-identified] : \par 72 year old female with hypertension, paroxysmal atrial fibrillation on xarelto for 2 years, diabetes, GE reflux, peripheral arterial disease s/ right lower extremity angioplasty. referred for anemia first noted in December 2016. Hemoglobin :10.8, mcv 91, iro 35 TIBC : 330 , saturation 11%. chemistry unremarkable. including kidney function. She denies hematochezia, reports mild fatigue and dyspnea on exertion. no craving for ice.no weight loss.She takes multivitamins , no iron. last colonoscopy 5 years ago. \par \par \par \par Interval History ( June 27 ,2017 ) : patient returns for follow up. she continues on iron on daily. She denies any recurrence of bleeding , EGD and colonoscopy were negative. Prior to her first visit , she had apparently awakened from sleep twice with severe bleeding from the mouth. \par \par \par Interval History (  patient returns for follow up. she continues on iron on daily. She denies any recurrence of bleeding , EGD and colonoscopy were negative. Prior to her first visit , she had apparently awakened from sleep twice with severe bleeding from the mouth. \par  [de-identified] : 01/09/2018 The patient returns for follow-up for iron deficiency anemia, she continues on iron one daily , she had pacemaker inserted and is stable from cardiac standpoint. Hb is down to 10.8 . She denies any hematochezia.\par \par 03/01/2018  atient returns for follow-up , she is currently on iron twice daily . She feels well and offers no complaints. Last ferritin from Jan 9 is 79 . recent CBC shows 11.3 , MCV 90 . wbc and platelets are normal . Follow-up CT chest for lung nodule shows new scattered right upper lobe tree-in-bud nodules and decreased nodular opacities in right lower lobe, likely infectious/inflammatory bronchiolitis. LLL nodule is unchanged. She denies any respiratory symptoms, no fever , weight loss. \par \par \par 06/14/2018 : patient returns for follow up , she denies any new complaints. 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 \par Colonoscopy: 2 years ago negative\par EGD 2 years: negative\par Uptodate with mammo. \par Follows up with  for myositis.

## 2019-01-08 NOTE — PHYSICAL EXAM
[Ambulatory and capable of all self care but unable to carry out any work activities] : Status 2- Ambulatory and capable of all self care but unable to carry out any work activities. Up and about more than 50% of waking hours [Normal] : normoactive bowel sounds, soft and nontender, no hepatosplenomegaly or masses appreciated [de-identified] : well preserved female in NAD. [de-identified] : 3/5 strength bilateral proximal legs

## 2019-01-08 NOTE — REVIEW OF SYSTEMS
[Fatigue] : fatigue [Negative] : Genitourinary [FreeTextEntry2] : Lightheadedness [FreeTextEntry9] : lower extremity weakness

## 2019-01-08 NOTE — ASSESSMENT
[FreeTextEntry1] : 74yo F with Chronic normocytic anemia , partially corrected with iron replacement PO with recent diagnosis of inclusion body myositis \par \par PLAN:\par Normocytic Anemia:\par -Hgb today 11.5. \par -Last ferritin 120.\par -Patient on one PO iron daily. Continue it. \par -Colonoscopy: 2 years ago negative\par -EGD 2 years: negative \par - Uptodate with mammo.\par - Labs ordered. \par \par Inclusion Body Myositis:\par -On IVIG treatment. F/u with .\par \par RTC in 3 months.\par Pt seen and examined with \par

## 2019-01-15 NOTE — H&P ADULT - NSHPPOASURGSITEINCISION_GEN_ALL_CORE
----- Message from Katie Mallory sent at 1/15/2019  3:19 PM CST -----  Type:  Patient Returning Call    Who Called: Patient  Who Left Message for Patient:  Leticia  Does the patient know what this is regarding?:  yes  Best Call Back Number:  [phone] (home)     Additional Information:  Na     no

## 2019-02-25 ENCOUNTER — APPOINTMENT (OUTPATIENT)
Dept: CARDIOLOGY | Facility: CLINIC | Age: 74
End: 2019-02-25

## 2019-02-25 VITALS
WEIGHT: 132 LBS | DIASTOLIC BLOOD PRESSURE: 70 MMHG | BODY MASS INDEX: 24.92 KG/M2 | HEART RATE: 62 BPM | SYSTOLIC BLOOD PRESSURE: 130 MMHG | HEIGHT: 61 IN

## 2019-02-25 NOTE — HISTORY OF PRESENT ILLNESS
[FreeTextEntry1] : The patient is being treated for inclusion myositis. She has had pain in her right calf. denies swelling. she is on A./C No chest pain or palpitations. .

## 2019-02-25 NOTE — REVIEW OF SYSTEMS
[Feeling Fatigued] : feeling fatigued [Negative] : Endocrine [Fever] : no fever [Headache] : no headache [Chills] : no chills

## 2019-02-25 NOTE — ASSESSMENT
[FreeTextEntry1] : The patient has has swelling of her right calf. The right leg appears larger than the left. . The patient has not had chest pain or shortness of breath . Pedal pulses are 2+ on the right side.

## 2019-02-25 NOTE — PHYSICAL EXAM
[General Appearance - Well Developed] : well developed [Normal Appearance] : normal appearance [Well Groomed] : well groomed [General Appearance - Well Nourished] : well nourished [No Deformities] : no deformities [General Appearance - In No Acute Distress] : no acute distress [Normal Conjunctiva] : the conjunctiva exhibited no abnormalities [Eyelids - No Xanthelasma] : the eyelids demonstrated no xanthelasmas [Normal Oral Mucosa] : normal oral mucosa [No Oral Pallor] : no oral pallor [No Oral Cyanosis] : no oral cyanosis [Respiration, Rhythm And Depth] : normal respiratory rhythm and effort [Exaggerated Use Of Accessory Muscles For Inspiration] : no accessory muscle use [Auscultation Breath Sounds / Voice Sounds] : lungs were clear to auscultation bilaterally [Abdomen Soft] : soft [Abdomen Tenderness] : non-tender [Abdomen Mass (___ Cm)] : no abdominal mass palpated [Abnormal Walk] : normal gait [Gait - Sufficient For Exercise Testing] : the gait was sufficient for exercise testing [Nail Clubbing] : no clubbing of the fingernails [Cyanosis, Localized] : no localized cyanosis [Petechial Hemorrhages (___cm)] : no petechial hemorrhages [Skin Color & Pigmentation] : normal skin color and pigmentation [] : no rash [No Venous Stasis] : no venous stasis [Skin Lesions] : no skin lesions [No Skin Ulcers] : no skin ulcer [No Xanthoma] : no  xanthoma was observed [Oriented To Time, Place, And Person] : oriented to person, place, and time [Affect] : the affect was normal [Mood] : the mood was normal [No Anxiety] : not feeling anxious [Normal Rate] : normal [Rhythm Regular] : regular [No Murmur] : no murmurs heard [2+] : left 2+ [No Pitting Edema] : no pitting edema present [FreeTextEntry1] : No JVD [Rt] : no varicose veins of the right leg

## 2019-02-25 NOTE — REASON FOR VISIT
[Follow-Up - Clinic] : a clinic follow-up of [Atrial Fibrillation] : atrial fibrillation [Hyperlipidemia] : hyperlipidemia [Hypertension] : hypertension [Palpitations] : palpitations [Supraventricular Tachycardia] : supraventricular tachycardia

## 2019-02-27 ENCOUNTER — OUTPATIENT (OUTPATIENT)
Dept: OUTPATIENT SERVICES | Facility: HOSPITAL | Age: 74
LOS: 1 days | Discharge: HOME | End: 2019-02-27

## 2019-02-27 DIAGNOSIS — Z90.710 ACQUIRED ABSENCE OF BOTH CERVIX AND UTERUS: Chronic | ICD-10-CM

## 2019-02-27 DIAGNOSIS — Z98.890 OTHER SPECIFIED POSTPROCEDURAL STATES: Chronic | ICD-10-CM

## 2019-02-27 DIAGNOSIS — Z95.0 PRESENCE OF CARDIAC PACEMAKER: Chronic | ICD-10-CM

## 2019-02-27 DIAGNOSIS — R91.8 OTHER NONSPECIFIC ABNORMAL FINDING OF LUNG FIELD: ICD-10-CM

## 2019-03-02 ENCOUNTER — FORM ENCOUNTER (OUTPATIENT)
Age: 74
End: 2019-03-02

## 2019-03-03 ENCOUNTER — OUTPATIENT (OUTPATIENT)
Dept: OUTPATIENT SERVICES | Facility: HOSPITAL | Age: 74
LOS: 1 days | Discharge: HOME | End: 2019-03-03

## 2019-03-03 DIAGNOSIS — Z98.890 OTHER SPECIFIED POSTPROCEDURAL STATES: Chronic | ICD-10-CM

## 2019-03-03 DIAGNOSIS — I49.9 CARDIAC ARRHYTHMIA, UNSPECIFIED: ICD-10-CM

## 2019-03-03 DIAGNOSIS — Z95.0 PRESENCE OF CARDIAC PACEMAKER: Chronic | ICD-10-CM

## 2019-03-03 DIAGNOSIS — Z90.710 ACQUIRED ABSENCE OF BOTH CERVIX AND UTERUS: Chronic | ICD-10-CM

## 2019-03-03 DIAGNOSIS — E11.9 TYPE 2 DIABETES MELLITUS WITHOUT COMPLICATIONS: ICD-10-CM

## 2019-03-03 DIAGNOSIS — K21.9 GASTRO-ESOPHAGEAL REFLUX DISEASE WITHOUT ESOPHAGITIS: ICD-10-CM

## 2019-03-03 DIAGNOSIS — I10 ESSENTIAL (PRIMARY) HYPERTENSION: ICD-10-CM

## 2019-03-03 DIAGNOSIS — R00.2 PALPITATIONS: ICD-10-CM

## 2019-04-14 ENCOUNTER — RX RENEWAL (OUTPATIENT)
Age: 74
End: 2019-04-14

## 2019-05-02 ENCOUNTER — APPOINTMENT (OUTPATIENT)
Dept: NEUROLOGY | Facility: CLINIC | Age: 74
End: 2019-05-02

## 2019-05-08 ENCOUNTER — RECORD ABSTRACTING (OUTPATIENT)
Age: 74
End: 2019-05-08

## 2019-05-08 DIAGNOSIS — Z87.81 PERSONAL HISTORY OF (HEALED) TRAUMATIC FRACTURE: ICD-10-CM

## 2019-05-08 DIAGNOSIS — Z87.828 PERSONAL HISTORY OF OTHER (HEALED) PHYSICAL INJURY AND TRAUMA: ICD-10-CM

## 2019-05-12 ENCOUNTER — RX RENEWAL (OUTPATIENT)
Age: 74
End: 2019-05-12

## 2019-05-30 ENCOUNTER — APPOINTMENT (OUTPATIENT)
Dept: NEUROLOGY | Facility: CLINIC | Age: 74
End: 2019-05-30
Payer: MEDICARE

## 2019-05-30 VITALS
HEART RATE: 73 BPM | BODY MASS INDEX: 24.55 KG/M2 | TEMPERATURE: 97 F | HEIGHT: 61 IN | SYSTOLIC BLOOD PRESSURE: 129 MMHG | OXYGEN SATURATION: 98 % | DIASTOLIC BLOOD PRESSURE: 69 MMHG | WEIGHT: 130 LBS

## 2019-05-30 PROCEDURE — 99214 OFFICE O/P EST MOD 30 MIN: CPT

## 2019-06-02 NOTE — PHYSICAL EXAM
[FreeTextEntry1] : Aranza is in the good mood. Her mental status exam is normal.\par She does have decreased strength in her .\par Her cervical range of motion this is slightly limited.\par She does have difficulties standing up from sitting position without using her hands.\par hergait is stable, but wide. and waddling in feature\par

## 2019-06-02 NOTE — HISTORY OF PRESENT ILLNESS
[FreeTextEntry1] : Aranza is here for followup she carries the diagnosis of inclusion body myositis. This is suggestive and confirmed based on her muscle biopsy.\par Aside from this she does have history of lumbar radicular disease.\par She says she is doing definitely better on IVIG treatment.\par On one occasion when her treatment was delayed clearly she was became more symptomatic.\par She reports her swallowing and breathing to be normal ankle her hand  did not change there ongoing issue continues to be her difficulty climbing steps or standing up from sitting position in other words the lower action with the proximal weakness. She is seeing her primary  physician. Her mood is described to be correct and her memory and cognitive function is good and he describes herself to be quite happy

## 2019-06-02 NOTE — ASSESSMENT
[FreeTextEntry1] : Angie is here for followup with the diagnosis of inclusion body myositis. She is on IVIG treatment and she says it is quite effective. We will continue the current level of care and the treatment I will see her in followup in 4 months

## 2019-06-06 ENCOUNTER — OUTPATIENT (OUTPATIENT)
Dept: OUTPATIENT SERVICES | Facility: HOSPITAL | Age: 74
LOS: 1 days | Discharge: HOME | End: 2019-06-06

## 2019-06-06 ENCOUNTER — APPOINTMENT (OUTPATIENT)
Dept: HEMATOLOGY ONCOLOGY | Facility: CLINIC | Age: 74
End: 2019-06-06
Payer: MEDICARE

## 2019-06-06 ENCOUNTER — LABORATORY RESULT (OUTPATIENT)
Age: 74
End: 2019-06-06

## 2019-06-06 VITALS
HEART RATE: 68 BPM | DIASTOLIC BLOOD PRESSURE: 79 MMHG | WEIGHT: 130 LBS | HEIGHT: 61 IN | SYSTOLIC BLOOD PRESSURE: 151 MMHG | BODY MASS INDEX: 24.55 KG/M2 | TEMPERATURE: 97.7 F | RESPIRATION RATE: 16 BRPM

## 2019-06-06 DIAGNOSIS — Z90.710 ACQUIRED ABSENCE OF BOTH CERVIX AND UTERUS: Chronic | ICD-10-CM

## 2019-06-06 DIAGNOSIS — Z98.890 OTHER SPECIFIED POSTPROCEDURAL STATES: Chronic | ICD-10-CM

## 2019-06-06 DIAGNOSIS — Z95.0 PRESENCE OF CARDIAC PACEMAKER: Chronic | ICD-10-CM

## 2019-06-06 PROCEDURE — 99212 OFFICE O/P EST SF 10 MIN: CPT

## 2019-06-06 NOTE — HISTORY OF PRESENT ILLNESS
[de-identified] : 01/09/2018 The patient returns for follow-up for iron deficiency anemia, she continues on iron one daily , she had pacemaker inserted and is stable from cardiac standpoint. Hb is down to 10.8 . She denies any hematochezia.\par \par 03/01/2018  atient returns for follow-up , she is currently on iron twice daily . She feels well and offers no complaints. Last ferritin from Jan 9 is 79 . recent CBC shows 11.3 , MCV 90 . wbc and platelets are normal . Follow-up CT chest for lung nodule shows new scattered right upper lobe tree-in-bud nodules and decreased nodular opacities in right lower lobe, likely infectious/inflammatory bronchiolitis. LLL nodule is unchanged. She denies any respiratory symptoms, no fever , weight loss. \par \par \par 06/14/2018 : patient returns for follow up , she denies any new complaints. Last blood work showed mild anemia , she continues on po iron twice daily and denies any new complaints .\par \par 06/06/2019 Patient returns for routine follow up for mild normocytic anemia . She complains of mild easy fatigability she attributes to her running around to visit her  in the hospital . she continues on iron one daily and denies hematochezia .  [de-identified] : \par 72 year old female with hypertension, paroxysmal atrial fibrillation on xarelto for 2 years, diabetes, GE reflux, peripheral arterial disease s/ right lower extremity angioplasty. referred for anemia first noted in December 2016. Hemoglobin :10.8, mcv 91, iro 35 TIBC : 330 , saturation 11%. chemistry unremarkable. including kidney function. She denies hematochezia, reports mild fatigue and dyspnea on exertion. no craving for ice.no weight loss.She takes multivitamins , no iron. last colonoscopy 5 years ago. \par \par \par \par Interval History ( June 27 ,2017 ) : patient returns for follow up. she continues on iron on daily. She denies any recurrence of bleeding , EGD and colonoscopy were negative. Prior to her first visit , she had apparently awakened from sleep twice with severe bleeding from the mouth. \par \par \par Interval History (  patient returns for follow up. she continues on iron on daily. She denies any recurrence of bleeding , EGD and colonoscopy were negative. Prior to her first visit , she had apparently awakened from sleep twice with severe bleeding from the mouth. \par

## 2019-06-06 NOTE — PHYSICAL EXAM
[Normal] : no JVD, no calf tenderness, venous stasis changes, varices [de-identified] : well preserved female , slightly pale in NAD.

## 2019-06-06 NOTE — ASSESSMENT
[FreeTextEntry1] : Chronic normocytic anemia , partially corrected with iron replacement . Nodular lung opacities probably inflammatory to be followed by pulmonary . CBC stable , continue on iron without change. follow up in 6 months .

## 2019-06-07 LAB
FERRITIN SERPL-MCNC: 115 NG/ML
HCT VFR BLD CALC: 34.7 %
HGB BLD-MCNC: 11.6 G/DL
MCHC RBC-ENTMCNC: 30.4 PG
MCHC RBC-ENTMCNC: 33.4 G/DL
MCV RBC AUTO: 91.1 FL
PLATELET # BLD AUTO: 245 K/UL
PMV BLD: 11.5 FL
RBC # BLD: 3.81 M/UL
RBC # FLD: 13.2 %
WBC # FLD AUTO: 6.39 K/UL

## 2019-06-11 DIAGNOSIS — D64.9 ANEMIA, UNSPECIFIED: ICD-10-CM

## 2019-06-14 ENCOUNTER — APPOINTMENT (OUTPATIENT)
Dept: CARDIOLOGY | Facility: CLINIC | Age: 74
End: 2019-06-14
Payer: MEDICARE

## 2019-06-14 VITALS
DIASTOLIC BLOOD PRESSURE: 52 MMHG | WEIGHT: 132 LBS | SYSTOLIC BLOOD PRESSURE: 130 MMHG | BODY MASS INDEX: 24.92 KG/M2 | HEIGHT: 61 IN | HEART RATE: 63 BPM

## 2019-06-14 PROCEDURE — 93000 ELECTROCARDIOGRAM COMPLETE: CPT

## 2019-06-14 PROCEDURE — 99214 OFFICE O/P EST MOD 30 MIN: CPT

## 2019-06-14 NOTE — ASSESSMENT
[FreeTextEntry1] : The patient has had no chest pain . She is a diabetic on insulin for many years . She has inclusion body Myositis . She is being by Neurology . The patient has PAF . PSVT and had had an ablation for the SVT . She wanted to have permanent make up done. She is diabetic with multiple medical issues and I had advised her not have this done. She has had MR in the past . Patient has a PPM and is atiral pacing .

## 2019-06-14 NOTE — REASON FOR VISIT
[Follow-Up - Clinic] : a clinic follow-up of [Atrial Fibrillation] : atrial fibrillation [Hyperlipidemia] : hyperlipidemia [Palpitations] : palpitations [Hypertension] : hypertension [Supraventricular Tachycardia] : supraventricular tachycardia

## 2019-06-14 NOTE — REVIEW OF SYSTEMS
[Feeling Fatigued] : feeling fatigued [Negative] : Endocrine [Headache] : no headache [Chills] : no chills [Fever] : no fever

## 2019-06-14 NOTE — HISTORY OF PRESENT ILLNESS
[FreeTextEntry1] : The patient is being treated for inclusion myositis. She has had pain in her right calf. denies swelling. she is on A./C No chest pain or palpitations. She has DM and is concerned about her risk of MI .

## 2019-06-19 ENCOUNTER — OTHER (OUTPATIENT)
Age: 74
End: 2019-06-19

## 2019-06-28 ENCOUNTER — APPOINTMENT (OUTPATIENT)
Dept: CARDIOLOGY | Facility: CLINIC | Age: 74
End: 2019-06-28
Payer: MEDICARE

## 2019-06-28 PROCEDURE — 93280 PM DEVICE PROGR EVAL DUAL: CPT

## 2019-06-28 PROCEDURE — 99213 OFFICE O/P EST LOW 20 MIN: CPT | Mod: 25

## 2019-07-04 NOTE — PROCEDURE
[No] : not [NSR] : normal sinus rhythm [Pacemaker] : pacemaker [DDD] : DDD [Longevity: ___ months] : The estimated remaining battery life is [unfilled] months [Threshold Testing Performed] : Threshold testing was performed [Lead Imp:  ___ohms] : lead impedance was [unfilled] ohms [Sensing Amplitude ___mv] : sensing amplitude was [unfilled] mv [___V @] : [unfilled] V [___ ms] : [unfilled] ms [Programmed for Longevity] : output reprogrammed for improved battery longevity [Pace ___ %] : Pace [unfilled]% [de-identified] : [facility] [de-identified] : 9/8/17 [de-identified] : L311 [de-identified] : 60 [de-identified] : 2 episdoes of AFL, longest 24 seconds fastest 122 bpm. on eloquis.

## 2019-07-04 NOTE — PHYSICAL EXAM
[General Appearance - Well Developed] : well developed [Normal Appearance] : normal appearance [General Appearance - Well Nourished] : well nourished [Well Groomed] : well groomed [No Deformities] : no deformities [General Appearance - In No Acute Distress] : no acute distress [Heart Sounds] : normal S1 and S2 [Heart Rate And Rhythm] : heart rate and rhythm were normal [Murmurs] : no murmurs present [Respiration, Rhythm And Depth] : normal respiratory rhythm and effort [Exaggerated Use Of Accessory Muscles For Inspiration] : no accessory muscle use [Clean] : clean [Auscultation Breath Sounds / Voice Sounds] : lungs were clear to auscultation bilaterally [Dry] : dry [Well-Healed] : well-healed [Abdomen Soft] : soft [Abdomen Tenderness] : non-tender [Nail Clubbing] : no clubbing of the fingernails [Abdomen Mass (___ Cm)] : no abdominal mass palpated [Petechial Hemorrhages (___cm)] : no petechial hemorrhages [Cyanosis, Localized] : no localized cyanosis [] : no ischemic changes

## 2019-07-17 ENCOUNTER — FORM ENCOUNTER (OUTPATIENT)
Age: 74
End: 2019-07-17

## 2019-07-18 ENCOUNTER — OUTPATIENT (OUTPATIENT)
Dept: OUTPATIENT SERVICES | Facility: HOSPITAL | Age: 74
LOS: 1 days | Discharge: HOME | End: 2019-07-18
Payer: MEDICARE

## 2019-07-18 DIAGNOSIS — Z98.890 OTHER SPECIFIED POSTPROCEDURAL STATES: Chronic | ICD-10-CM

## 2019-07-18 DIAGNOSIS — E78.5 HYPERLIPIDEMIA, UNSPECIFIED: ICD-10-CM

## 2019-07-18 DIAGNOSIS — Z95.0 PRESENCE OF CARDIAC PACEMAKER: Chronic | ICD-10-CM

## 2019-07-18 DIAGNOSIS — Z90.710 ACQUIRED ABSENCE OF BOTH CERVIX AND UTERUS: Chronic | ICD-10-CM

## 2019-07-18 PROCEDURE — 78452 HT MUSCLE IMAGE SPECT MULT: CPT | Mod: 26

## 2019-07-21 NOTE — ASU PATIENT PROFILE, ADULT - ACCEPTABLE
CT abdomen and pelvis:

7/21/2019



COMPARISON: 6/20/2017



HISTORY: Nausea, vomiting, assess for bowel obstruction



TECHNIQUE: Axial CT imaging at 5 mm intervals from lung bases through pubic symphysis with IV and ora
l contrast. Coronal reformatted imaging obtained.



FINDINGS: The imaged lung bases demonstrate mild linear density suggesting scar and/or volume loss. N
o free intraperitoneal air noted.



The hepatic parenchyma is diffusely hypodense, suggesting steatosis. The gallbladder, pancreas, adren
al glands, and kidneys are grossly unremarkable. There are small soft tissue nodules noted in the

upper abdomen, including 1 in the anterior gastrohepatic ligament region, a few adjacent to the super
ior posterior right aspect of the liver, and a few in the left upper quadrant. No normal spleen is

seen. Findings suggest splenosis.



There are post surgical clips in the presacral space with associated streak artifact limiting detaile
d assessment. The patient appears status post colectomy with a stable left lower quadrant ostomy.



The stomach is mildly distended and contains oral contrast media. There are multiple moderately dilat
ed fluid-filled loops of small bowel in the mid left abdomen extending into the left lower

quadrant. There appears to to be a transition point to decompressed small bowel in the right lower qu
adrant, best seen on axial image 73. Mild associated right lower quadrant mesenteric fat stranding.

Findings suggest small bowel obstruction.



There is scattered atherosclerotic calcification of the abdominal aorta and its branches. No abdomina
l or pelvic lymphadenopathy is noted.



There is a left hip prosthesis. No acute osseous abnormality is seen. Stable pelvic and lumbar spine 
degenerative changes.



IMPRESSION: Small bowel obstruction with transition point in right lower quadrant. Additional chronic
 findings as described above.



Reported By: Marcus Mercedes 

Electronically Signed:  7/21/2019 8:53 PM 0

## 2019-07-22 ENCOUNTER — APPOINTMENT (OUTPATIENT)
Dept: CARDIOLOGY | Facility: CLINIC | Age: 74
End: 2019-07-22
Payer: MEDICARE

## 2019-07-22 PROCEDURE — 93306 TTE W/DOPPLER COMPLETE: CPT

## 2019-07-29 ENCOUNTER — APPOINTMENT (OUTPATIENT)
Dept: CARDIOLOGY | Facility: CLINIC | Age: 74
End: 2019-07-29
Payer: MEDICARE

## 2019-07-29 PROCEDURE — 93880 EXTRACRANIAL BILAT STUDY: CPT

## 2019-08-22 ENCOUNTER — INPATIENT (INPATIENT)
Facility: HOSPITAL | Age: 74
LOS: 6 days | Discharge: NURSING, REHAB OR LONG-TERM CARE | End: 2019-08-29
Attending: SURGERY | Admitting: SURGERY
Payer: MEDICARE

## 2019-08-22 ENCOUNTER — APPOINTMENT (OUTPATIENT)
Dept: CARDIOLOGY | Facility: CLINIC | Age: 74
End: 2019-08-22

## 2019-08-22 VITALS
OXYGEN SATURATION: 98 % | TEMPERATURE: 97 F | SYSTOLIC BLOOD PRESSURE: 188 MMHG | DIASTOLIC BLOOD PRESSURE: 80 MMHG | RESPIRATION RATE: 18 BRPM | WEIGHT: 139.99 LBS | HEART RATE: 73 BPM

## 2019-08-22 DIAGNOSIS — Z98.890 OTHER SPECIFIED POSTPROCEDURAL STATES: Chronic | ICD-10-CM

## 2019-08-22 DIAGNOSIS — Z90.710 ACQUIRED ABSENCE OF BOTH CERVIX AND UTERUS: Chronic | ICD-10-CM

## 2019-08-22 DIAGNOSIS — Z95.0 PRESENCE OF CARDIAC PACEMAKER: Chronic | ICD-10-CM

## 2019-08-22 LAB
ALBUMIN SERPL ELPH-MCNC: 4.5 G/DL — SIGNIFICANT CHANGE UP (ref 3.5–5.2)
ALP SERPL-CCNC: 113 U/L — SIGNIFICANT CHANGE UP (ref 30–115)
ALT FLD-CCNC: 21 U/L — SIGNIFICANT CHANGE UP (ref 0–41)
ANION GAP SERPL CALC-SCNC: 11 MMOL/L — SIGNIFICANT CHANGE UP (ref 7–14)
ANION GAP SERPL CALC-SCNC: 13 MMOL/L — SIGNIFICANT CHANGE UP (ref 7–14)
APPEARANCE UR: CLEAR — SIGNIFICANT CHANGE UP
APTT BLD: 40.7 SEC — HIGH (ref 27–39.2)
AST SERPL-CCNC: 25 U/L — SIGNIFICANT CHANGE UP (ref 0–41)
BASOPHILS # BLD AUTO: 0.02 K/UL — SIGNIFICANT CHANGE UP (ref 0–0.2)
BASOPHILS # BLD AUTO: 0.03 K/UL — SIGNIFICANT CHANGE UP (ref 0–0.2)
BASOPHILS NFR BLD AUTO: 0.3 % — SIGNIFICANT CHANGE UP (ref 0–1)
BASOPHILS NFR BLD AUTO: 0.3 % — SIGNIFICANT CHANGE UP (ref 0–1)
BILIRUB SERPL-MCNC: 0.2 MG/DL — SIGNIFICANT CHANGE UP (ref 0.2–1.2)
BILIRUB UR-MCNC: NEGATIVE — SIGNIFICANT CHANGE UP
BUN SERPL-MCNC: 18 MG/DL — SIGNIFICANT CHANGE UP (ref 10–20)
BUN SERPL-MCNC: 8 MG/DL — LOW (ref 10–20)
CALCIUM SERPL-MCNC: 8.2 MG/DL — LOW (ref 8.5–10.1)
CALCIUM SERPL-MCNC: 9.3 MG/DL — SIGNIFICANT CHANGE UP (ref 8.5–10.1)
CHLORIDE SERPL-SCNC: 103 MMOL/L — SIGNIFICANT CHANGE UP (ref 98–110)
CHLORIDE SERPL-SCNC: 95 MMOL/L — LOW (ref 98–110)
CO2 SERPL-SCNC: 22 MMOL/L — SIGNIFICANT CHANGE UP (ref 17–32)
CO2 SERPL-SCNC: 22 MMOL/L — SIGNIFICANT CHANGE UP (ref 17–32)
COLOR SPEC: COLORLESS — SIGNIFICANT CHANGE UP
CREAT SERPL-MCNC: 0.5 MG/DL — LOW (ref 0.7–1.5)
CREAT SERPL-MCNC: <0.5 MG/DL — LOW (ref 0.7–1.5)
DIFF PNL FLD: NEGATIVE — SIGNIFICANT CHANGE UP
EOSINOPHIL # BLD AUTO: 0.15 K/UL — SIGNIFICANT CHANGE UP (ref 0–0.7)
EOSINOPHIL # BLD AUTO: 0.26 K/UL — SIGNIFICANT CHANGE UP (ref 0–0.7)
EOSINOPHIL NFR BLD AUTO: 1.4 % — SIGNIFICANT CHANGE UP (ref 0–8)
EOSINOPHIL NFR BLD AUTO: 3.3 % — SIGNIFICANT CHANGE UP (ref 0–8)
GLUCOSE BLDC GLUCOMTR-MCNC: 152 MG/DL — HIGH (ref 70–99)
GLUCOSE BLDC GLUCOMTR-MCNC: 313 MG/DL — HIGH (ref 70–99)
GLUCOSE SERPL-MCNC: 200 MG/DL — HIGH (ref 70–99)
GLUCOSE SERPL-MCNC: 209 MG/DL — HIGH (ref 70–99)
GLUCOSE UR QL: NEGATIVE — SIGNIFICANT CHANGE UP
HCT VFR BLD CALC: 32.6 % — LOW (ref 37–47)
HCT VFR BLD CALC: 35 % — LOW (ref 37–47)
HGB BLD-MCNC: 11.1 G/DL — LOW (ref 12–16)
HGB BLD-MCNC: 12.1 G/DL — SIGNIFICANT CHANGE UP (ref 12–16)
IMM GRANULOCYTES NFR BLD AUTO: 0.3 % — SIGNIFICANT CHANGE UP (ref 0.1–0.3)
IMM GRANULOCYTES NFR BLD AUTO: 0.7 % — HIGH (ref 0.1–0.3)
INR BLD: 1.29 RATIO — SIGNIFICANT CHANGE UP (ref 0.65–1.3)
KETONES UR-MCNC: SIGNIFICANT CHANGE UP
LEUKOCYTE ESTERASE UR-ACNC: NEGATIVE — SIGNIFICANT CHANGE UP
LYMPHOCYTES # BLD AUTO: 1.56 K/UL — SIGNIFICANT CHANGE UP (ref 1.2–3.4)
LYMPHOCYTES # BLD AUTO: 1.57 K/UL — SIGNIFICANT CHANGE UP (ref 1.2–3.4)
LYMPHOCYTES # BLD AUTO: 14.5 % — LOW (ref 20.5–51.1)
LYMPHOCYTES # BLD AUTO: 19.7 % — LOW (ref 20.5–51.1)
MAGNESIUM SERPL-MCNC: 1.6 MG/DL — LOW (ref 1.8–2.4)
MCHC RBC-ENTMCNC: 30.1 PG — SIGNIFICANT CHANGE UP (ref 27–31)
MCHC RBC-ENTMCNC: 30.9 PG — SIGNIFICANT CHANGE UP (ref 27–31)
MCHC RBC-ENTMCNC: 34 G/DL — SIGNIFICANT CHANGE UP (ref 32–37)
MCHC RBC-ENTMCNC: 34.6 G/DL — SIGNIFICANT CHANGE UP (ref 32–37)
MCV RBC AUTO: 88.3 FL — SIGNIFICANT CHANGE UP (ref 81–99)
MCV RBC AUTO: 89.3 FL — SIGNIFICANT CHANGE UP (ref 81–99)
MONOCYTES # BLD AUTO: 0.75 K/UL — HIGH (ref 0.1–0.6)
MONOCYTES # BLD AUTO: 0.79 K/UL — HIGH (ref 0.1–0.6)
MONOCYTES NFR BLD AUTO: 7.4 % — SIGNIFICANT CHANGE UP (ref 1.7–9.3)
MONOCYTES NFR BLD AUTO: 9.4 % — HIGH (ref 1.7–9.3)
NEUTROPHILS # BLD AUTO: 5.34 K/UL — SIGNIFICANT CHANGE UP (ref 1.4–6.5)
NEUTROPHILS # BLD AUTO: 8.13 K/UL — HIGH (ref 1.4–6.5)
NEUTROPHILS NFR BLD AUTO: 67 % — SIGNIFICANT CHANGE UP (ref 42.2–75.2)
NEUTROPHILS NFR BLD AUTO: 75.7 % — HIGH (ref 42.2–75.2)
NITRITE UR-MCNC: NEGATIVE — SIGNIFICANT CHANGE UP
NRBC # BLD: 0 /100 WBCS — SIGNIFICANT CHANGE UP (ref 0–0)
NRBC # BLD: 0 /100 WBCS — SIGNIFICANT CHANGE UP (ref 0–0)
PH UR: 7 — SIGNIFICANT CHANGE UP (ref 5–8)
PHOSPHATE SERPL-MCNC: 2.9 MG/DL — SIGNIFICANT CHANGE UP (ref 2.1–4.9)
PLATELET # BLD AUTO: 227 K/UL — SIGNIFICANT CHANGE UP (ref 130–400)
PLATELET # BLD AUTO: 244 K/UL — SIGNIFICANT CHANGE UP (ref 130–400)
POTASSIUM SERPL-MCNC: 3.8 MMOL/L — SIGNIFICANT CHANGE UP (ref 3.5–5)
POTASSIUM SERPL-MCNC: 4.7 MMOL/L — SIGNIFICANT CHANGE UP (ref 3.5–5)
POTASSIUM SERPL-SCNC: 3.8 MMOL/L — SIGNIFICANT CHANGE UP (ref 3.5–5)
POTASSIUM SERPL-SCNC: 4.7 MMOL/L — SIGNIFICANT CHANGE UP (ref 3.5–5)
PROT SERPL-MCNC: 8.2 G/DL — HIGH (ref 6–8)
PROT UR-MCNC: NEGATIVE — SIGNIFICANT CHANGE UP
PROTHROM AB SERPL-ACNC: 14.8 SEC — HIGH (ref 9.95–12.87)
RBC # BLD: 3.69 M/UL — LOW (ref 4.2–5.4)
RBC # BLD: 3.92 M/UL — LOW (ref 4.2–5.4)
RBC # FLD: 13.1 % — SIGNIFICANT CHANGE UP (ref 11.5–14.5)
RBC # FLD: 13.1 % — SIGNIFICANT CHANGE UP (ref 11.5–14.5)
SODIUM SERPL-SCNC: 130 MMOL/L — LOW (ref 135–146)
SODIUM SERPL-SCNC: 136 MMOL/L — SIGNIFICANT CHANGE UP (ref 135–146)
SP GR SPEC: 1.02 — SIGNIFICANT CHANGE UP (ref 1.01–1.02)
UROBILINOGEN FLD QL: SIGNIFICANT CHANGE UP
WBC # BLD: 10.73 K/UL — SIGNIFICANT CHANGE UP (ref 4.8–10.8)
WBC # BLD: 7.96 K/UL — SIGNIFICANT CHANGE UP (ref 4.8–10.8)
WBC # FLD AUTO: 10.73 K/UL — SIGNIFICANT CHANGE UP (ref 4.8–10.8)
WBC # FLD AUTO: 7.96 K/UL — SIGNIFICANT CHANGE UP (ref 4.8–10.8)

## 2019-08-22 PROCEDURE — 71260 CT THORAX DX C+: CPT | Mod: 26

## 2019-08-22 PROCEDURE — 73562 X-RAY EXAM OF KNEE 3: CPT | Mod: 26,LT

## 2019-08-22 PROCEDURE — 99283 EMERGENCY DEPT VISIT LOW MDM: CPT

## 2019-08-22 PROCEDURE — 73552 X-RAY EXAM OF FEMUR 2/>: CPT | Mod: 26,LT

## 2019-08-22 PROCEDURE — 71045 X-RAY EXAM CHEST 1 VIEW: CPT | Mod: 26

## 2019-08-22 PROCEDURE — 70450 CT HEAD/BRAIN W/O DYE: CPT | Mod: 26

## 2019-08-22 PROCEDURE — 72125 CT NECK SPINE W/O DYE: CPT | Mod: 26

## 2019-08-22 PROCEDURE — 74177 CT ABD & PELVIS W/CONTRAST: CPT | Mod: 26

## 2019-08-22 PROCEDURE — 99285 EMERGENCY DEPT VISIT HI MDM: CPT

## 2019-08-22 PROCEDURE — 72170 X-RAY EXAM OF PELVIS: CPT | Mod: 26

## 2019-08-22 PROCEDURE — 73630 X-RAY EXAM OF FOOT: CPT | Mod: 26,LT

## 2019-08-22 PROCEDURE — 73700 CT LOWER EXTREMITY W/O DYE: CPT | Mod: 26,LT

## 2019-08-22 RX ORDER — IBUPROFEN 200 MG
400 TABLET ORAL EVERY 8 HOURS
Refills: 0 | Status: DISCONTINUED | OUTPATIENT
Start: 2019-08-22 | End: 2019-08-29

## 2019-08-22 RX ORDER — MAGNESIUM SULFATE 500 MG/ML
1 VIAL (ML) INJECTION ONCE
Refills: 0 | Status: COMPLETED | OUTPATIENT
Start: 2019-08-22 | End: 2019-08-22

## 2019-08-22 RX ORDER — CARVEDILOL PHOSPHATE 80 MG/1
12.5 CAPSULE, EXTENDED RELEASE ORAL DAILY
Refills: 0 | Status: DISCONTINUED | OUTPATIENT
Start: 2019-08-22 | End: 2019-08-29

## 2019-08-22 RX ORDER — ACETAMINOPHEN 500 MG
650 TABLET ORAL ONCE
Refills: 0 | Status: COMPLETED | OUTPATIENT
Start: 2019-08-22 | End: 2019-08-22

## 2019-08-22 RX ORDER — ACETAMINOPHEN 500 MG
650 TABLET ORAL EVERY 6 HOURS
Refills: 0 | Status: DISCONTINUED | OUTPATIENT
Start: 2019-08-22 | End: 2019-08-29

## 2019-08-22 RX ORDER — INSULIN ASPART 100 [IU]/ML
2.8 INJECTION, SOLUTION SUBCUTANEOUS
Qty: 0 | Refills: 0 | DISCHARGE

## 2019-08-22 RX ORDER — OXYCODONE HYDROCHLORIDE 5 MG/1
5 TABLET ORAL EVERY 6 HOURS
Refills: 0 | Status: DISCONTINUED | OUTPATIENT
Start: 2019-08-22 | End: 2019-08-22

## 2019-08-22 RX ORDER — MONTELUKAST 4 MG/1
10 TABLET, CHEWABLE ORAL DAILY
Refills: 0 | Status: DISCONTINUED | OUTPATIENT
Start: 2019-08-22 | End: 2019-08-29

## 2019-08-22 RX ORDER — LISINOPRIL 2.5 MG/1
40 TABLET ORAL DAILY
Refills: 0 | Status: DISCONTINUED | OUTPATIENT
Start: 2019-08-22 | End: 2019-08-29

## 2019-08-22 RX ORDER — AMLODIPINE BESYLATE 2.5 MG/1
5 TABLET ORAL DAILY
Refills: 0 | Status: DISCONTINUED | OUTPATIENT
Start: 2019-08-22 | End: 2019-08-29

## 2019-08-22 RX ORDER — SODIUM CHLORIDE 9 MG/ML
1000 INJECTION INTRAMUSCULAR; INTRAVENOUS; SUBCUTANEOUS
Refills: 0 | Status: DISCONTINUED | OUTPATIENT
Start: 2019-08-22 | End: 2019-08-23

## 2019-08-22 RX ORDER — PANTOPRAZOLE SODIUM 20 MG/1
40 TABLET, DELAYED RELEASE ORAL
Refills: 0 | Status: DISCONTINUED | OUTPATIENT
Start: 2019-08-22 | End: 2019-08-29

## 2019-08-22 RX ORDER — ACETAMINOPHEN 500 MG
500 TABLET ORAL ONCE
Refills: 0 | Status: DISCONTINUED | OUTPATIENT
Start: 2019-08-22 | End: 2019-08-22

## 2019-08-22 RX ORDER — HEPARIN SODIUM 5000 [USP'U]/ML
5000 INJECTION INTRAVENOUS; SUBCUTANEOUS EVERY 8 HOURS
Refills: 0 | Status: DISCONTINUED | OUTPATIENT
Start: 2019-08-22 | End: 2019-08-26

## 2019-08-22 RX ORDER — INSULIN ASPART 100 [IU]/ML
0 INJECTION, SOLUTION SUBCUTANEOUS
Qty: 0 | Refills: 0 | DISCHARGE

## 2019-08-22 RX ORDER — OXYCODONE AND ACETAMINOPHEN 5; 325 MG/1; MG/1
1 TABLET ORAL ONCE
Refills: 0 | Status: DISCONTINUED | OUTPATIENT
Start: 2019-08-22 | End: 2019-08-22

## 2019-08-22 RX ORDER — LEVOCARNITINE 330 MG/1
1100 TABLET ORAL
Qty: 0 | Refills: 0 | DISCHARGE

## 2019-08-22 RX ORDER — LEVOTHYROXINE SODIUM 125 MCG
88 TABLET ORAL DAILY
Refills: 0 | Status: DISCONTINUED | OUTPATIENT
Start: 2019-08-22 | End: 2019-08-29

## 2019-08-22 RX ADMIN — HEPARIN SODIUM 5000 UNIT(S): 5000 INJECTION INTRAVENOUS; SUBCUTANEOUS at 21:54

## 2019-08-22 RX ADMIN — Medication 650 MILLIGRAM(S): at 17:14

## 2019-08-22 RX ADMIN — Medication 650 MILLIGRAM(S): at 07:56

## 2019-08-22 RX ADMIN — SODIUM CHLORIDE 100 MILLILITER(S): 9 INJECTION INTRAMUSCULAR; INTRAVENOUS; SUBCUTANEOUS at 17:16

## 2019-08-22 RX ADMIN — Medication 400 MILLIGRAM(S): at 21:49

## 2019-08-22 RX ADMIN — Medication 650 MILLIGRAM(S): at 03:05

## 2019-08-22 RX ADMIN — Medication 650 MILLIGRAM(S): at 05:16

## 2019-08-22 RX ADMIN — Medication 650 MILLIGRAM(S): at 11:42

## 2019-08-22 NOTE — H&P ADULT - HISTORY OF PRESENT ILLNESS
74y old f s/p pt's left knee "gave out" causing her to fall; pt with pain and swelling to her left knee; pt also hit head on carpeted floor, no LOC, pt is on Eliquis. The patient has RLE pain and swelling, was unable to ambulate in the ED, CT of the leg was done and showed Patella fx. 74y old f s/p pt's left knee "gave out" causing her to fall; pt with pain and swelling to her left knee; pt also hit head on carpeted floor, no LOC, pt is on Eliquis. The patient has LLE pain and swelling, was unable to ambulate in the ED, CT of the leg was done and showed Patella fx.

## 2019-08-22 NOTE — H&P ADULT - ASSESSMENT
74F s/p mechanical fall with Right patella fracture  -admit  -PT/Rehab  -Ortho consult  -NPO, IV fluids until Ortho sees the patient 74F s/p mechanical fall with Left patella fracture  -admit  -PT/Rehab  -Ortho consult  -NPO, IV fluids until Ortho sees the patient

## 2019-08-22 NOTE — ED PROVIDER NOTE - NS ED ROS FT
Constitutional: No fever, chills, unintended weight loss.  Eyes:  No visual changes, eye pain or discharge.  ENMT:  No hearing changes, pain, no sore throat or runny nose, no difficulty swallowing  Cardiac:  No chest pain, SOB or edema. No chest pain with exertion.  Respiratory:  No cough or respiratory distress. No hemoptysis. No history of asthma or RAD.  GI:  No nausea, vomiting, diarrhea or abdominal pain.  :  No dysuria, frequency or burning.  MS:  see hpi  Neuro:  No headache or weakness.  No LOC.  Skin:  No skin rash.   Endocrine: +hypothyroidism, +dm

## 2019-08-22 NOTE — H&P ADULT - ATTENDING COMMENTS
74F s/p mechanical fall with Left patella fracture  -admit  -PT/Rehab  -Ortho consult  -NPO, IV fluids until Ortho sees the patient

## 2019-08-22 NOTE — ED PROVIDER NOTE - CARE PLAN
Principal Discharge DX:	Patellar fracture  Secondary Diagnosis:	Fall, initial encounter Principal Discharge DX:	Patellar fracture  Secondary Diagnosis:	Fall, initial encounter  Secondary Diagnosis:	Inability to ambulate due to knee

## 2019-08-22 NOTE — H&P ADULT - NSHPLABSRESULTS_GEN_ALL_CORE
Labs:  CAPILLARY BLOOD GLUCOSE      POCT Blood Glucose.: 154 mg/dL (22 Aug 2019 09:44)  POCT Blood Glucose.: 209 mg/dL (22 Aug 2019 01:58)                          12.1   10.73 )-----------( 244      ( 22 Aug 2019 02:30 )             35.0       Auto Neutrophil %: 75.7 % (19 @ 02:30)  Auto Immature Granulocyte %: 0.7 % (19 @ 02:30)        130<L>  |  95<L>  |  18  ----------------------------<  209<H>  4.7   |  22  |  0.5<L>      Calcium, Total Serum: 9.3 mg/dL (19 @ 02:06)      LFTs:             8.2  | 0.2  | 25       ------------------[113     ( 22 Aug 2019 02:06 )  4.5  | x    | 21          Lipase:x      Amylase:x             Coags:     14.80  ----< 1.29    ( 22 Aug 2019 02:06 )     40.7            Urinalysis Basic - ( 22 Aug 2019 09:46 )    Color: Colorless / Appearance: Clear / S.025 / pH: x  Gluc: x / Ketone: Trace  / Bili: Negative / Urobili: <2 mg/dL   Blood: x / Protein: Negative / Nitrite: Negative   Leuk Esterase: Negative / RBC: x / WBC x   Sq Epi: x / Non Sq Epi: x / Bacteria: x        < from: CT Abdomen and Pelvis w/ IV Cont (19 @ 04:26) >    IMPRESSION:    1.  No CT evidence of acute traumatic injury to the chest, abdomen, or   pelvis.  2.  Scattered tree-in-bud nodules, not significantly changed since   2019 - likely on the basis of small airway disease/infection.    < end of copied text >    < from: CT Chest w/ IV Cont (19 @ 04:26) >    IMPRESSION:    1.  No CT evidence of acute traumatic injury to the chest, abdomen, or   pelvis.  2.  Scattered tree-in-bud nodules, not significantly changed since   2019 - likely on the basis of small airway disease/infection.    < end of copied text >    1< from: CT Cervical Spine No Cont (19 @ 02:36) >    IMPRESSION:    No evidence of acute fracture of the cervical spine.    < end of copied text > Labs:  CAPILLARY BLOOD GLUCOSE      POCT Blood Glucose.: 154 mg/dL (22 Aug 2019 09:44)  POCT Blood Glucose.: 209 mg/dL (22 Aug 2019 01:58)                          12.1   10.73 )-----------( 244      ( 22 Aug 2019 02:30 )             35.0       Auto Neutrophil %: 75.7 % (19 @ 02:30)  Auto Immature Granulocyte %: 0.7 % (19 @ 02:30)        130<L>  |  95<L>  |  18  ----------------------------<  209<H>  4.7   |  22  |  0.5<L>      Calcium, Total Serum: 9.3 mg/dL (19 @ 02:06)      LFTs:             8.2  | 0.2  | 25       ------------------[113     ( 22 Aug 2019 02:06 )  4.5  | x    | 21          Lipase:x      Amylase:x             Coags:     14.80  ----< 1.29    ( 22 Aug 2019 02:06 )     40.7            Urinalysis Basic - ( 22 Aug 2019 09:46 )    Color: Colorless / Appearance: Clear / S.025 / pH: x  Gluc: x / Ketone: Trace  / Bili: Negative / Urobili: <2 mg/dL   Blood: x / Protein: Negative / Nitrite: Negative   Leuk Esterase: Negative / RBC: x / WBC x   Sq Epi: x / Non Sq Epi: x / Bacteria: x        < from: CT Abdomen and Pelvis w/ IV Cont (19 @ 04:26) >    IMPRESSION:    1.  No CT evidence of acute traumatic injury to the chest, abdomen, or   pelvis.  2.  Scattered tree-in-bud nodules, not significantly changed since   2019 - likely on the basis of small airway disease/infection.    < end of copied text >    < from: CT Chest w/ IV Cont (19 @ 04:26) >    IMPRESSION:    1.  No CT evidence of acute traumatic injury to the chest, abdomen, or   pelvis.  2.  Scattered tree-in-bud nodules, not significantly changed since   2019 - likely on the basis of small airway disease/infection.    < end of copied text >    1< from: CT Cervical Spine No Cont (19 @ 02:36) >    IMPRESSION:    No evidence of acute fracture of the cervical spine.    < end of copied text >  < from: CT Knee No Cont, Left (19 @ 13:53) >      IMPRESSION:    1.  Diffuse osteopenia. Acute comminuted mildly displaced fracture of the   patella.    2.  Small joint lipohemarthrosis.    3.  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    < end of copied text >

## 2019-08-22 NOTE — ED PROVIDER NOTE - CLINICAL SUMMARY MEDICAL DECISION MAKING FREE TEXT BOX
75 Y/O F PMHX AS DOCUMENTED, AFIB ON ELIQUIS, S/P MECHANICAL FALL, C/O R KNEE PAIN. CT KENE WITH MINIMALLY DISPLACED PATELLAR FX. ORTHO CONSULTED. PT UNABLE TO AMBULATE AND ADMITTED TO TRAUMA SERVICE.

## 2019-08-22 NOTE — ED PROVIDER NOTE - PROGRESS NOTE DETAILS
trauma Dr. Ramsey request panCT panCT neg for acute injury, ?poss patella fx seen on lat view of femur XR, pt still w/sig pain, unable to bear weight or use crutches, fall risk may need admittion for PT/Rehab c/s - Trauma aware, pending final dispo recs from Dr. Epperson - Ortho called, resident currently scrubbed into case trauma team in conference until 8am - pt noted w/incr ecchymosis to L foot, states  stepped on her foot after she fell - will order add'l XR ORTHO PA AWARE, WILL EVALUATE PT IN THE ED PT SIGNED OUT TO ME BY DR. SMART, FOLLOW UP TRAUMA CONSULTATION, ORTHO CONSULTATION, REASSESS AND DISPO. RACHEL: patient resting comfortably, requesting something to eat. ct knee and ortho eval pending. RACHEL: patient to ct. + PATELLA FX, ORTHO AWARE. RACHEL: trauma resident Ade aware of patellar fx, will discuss with attending regarding patient admission given inability to ambulate. RACHEL: per trauma, admit to surgical floor.

## 2019-08-22 NOTE — ED ADULT NURSE NOTE - OBJECTIVE STATEMENT
Pt with complaints of S/P fall, claimed legs gave way and fell, hit her head, denies LOC, on Eliquis with left knee pain Pt with complaints of S/P fall, claimed legs gave way and fell, probably tripped on the rug, hit her head, denies LOC, on Eliquis with left knee pain and ecchymosis to right upper arm & elbow

## 2019-08-22 NOTE — H&P ADULT - NSICDXPASTSURGICALHX_GEN_ALL_CORE_FT
PAST SURGICAL HISTORY:  Cardiac pacemaker 9/9/17    H/O total hysterectomy 25+ yrs ago    History of surgery Right Peripheral Stent 5 yrs    History of surgery MOHS procedure (face) x 3

## 2019-08-22 NOTE — ED ADULT TRIAGE NOTE - CHIEF COMPLAINT QUOTE
pt's left knee "gave out" causing her to fall; pt with pain and swelling to her left knee; pt also hit head on carpeted floor, no LOC, pt is on Eliquis

## 2019-08-22 NOTE — H&P ADULT - NSICDXPASTMEDICALHX_GEN_ALL_CORE_FT
PAST MEDICAL HISTORY:  Afib 5+ yrs; on Eliquis    Anemia Chronic, on Iron supplement    Diabetes Insulin dependent - (has insulin Pump)    Dry eyes, bilateral     Essential hypertension     Hypothyroid     Inclusion body myositis (IBM)     Lung nodules     Pacemaker Sept 2017    Palpitations occasionally; not for couple yrs    PVD (peripheral vascular disease) S/p Right Peripheral Stent    Seasonal allergies     Skin cancer Basal Cell Cancer face  (around nose) s/p MOHS procedure x 3    SSS (sick sinus syndrome) S/p PPM    Transfusion history S/p Hysterectomy

## 2019-08-22 NOTE — ED ADULT NURSE NOTE - NSIMPLEMENTINTERV_GEN_ALL_ED
Implemented All Fall with Harm Risk Interventions:  [place] to call system. Call bell, personal items and telephone within reach. Instruct patient to call for assistance. Room bathroom lighting operational. Non-slip footwear when patient is off stretcher. Physically safe environment: no spills, clutter or unnecessary equipment. Stretcher in lowest position, wheels locked, appropriate side rails in place. Provide visual cue, wrist band, yellow gown, etc. Monitor gait and stability. Monitor for mental status changes and reorient to person, place, and time. Review medications for side effects contributing to fall risk. Reinforce activity limits and safety measures with patient and family. Provide visual clues: red socks.

## 2019-08-22 NOTE — H&P ADULT - NSICDXFAMILYHX_GEN_ALL_CORE_FT
FAMILY HISTORY:  Father  Still living? Unknown  Cancer, Age at diagnosis: Age Unknown    Mother  Still living? Unknown  Family history of lung cancer, Age at diagnosis: Age Unknown

## 2019-08-22 NOTE — CONSULT NOTE ADULT - SUBJECTIVE AND OBJECTIVE BOX
TRAUMA ACTIVATION LEVEL:  Aert    MECHANISM OF INJURY:      [] Blunt  	[] MVC	[x] Fall	[] Pedestrian Struck	[] Motorcycle   [] Assault   [] Bicycle collision  [] Sports injury     [] Penetrating  	[] Gun Shot Wound 		[] Stab Wound    GCS: 15	E: 4	V: 5	M: 6    HPI:  74y old f s/p pt's left knee "gave out" causing her to fall; pt with pain and swelling to her left knee; pt also hit head on carpeted floor, no LOC, pt is on Eliquis       PAST MEDICAL & SURGICAL HISTORY:  Inclusion body myositis (IBM)  Lung nodules  Transfusion history: S/p Hysterectomy  Dry eyes, bilateral  PVD (peripheral vascular disease): S/p Right Peripheral Stent  Seasonal allergies  Skin cancer: Basal Cell Cancer face  (around nose) s/p MOHS procedure x 3  Anemia: Chronic, on Iron supplement  Hypothyroid  SSS (sick sinus syndrome): S/p PPM  Diabetes: Insulin dependent - (has insulin Pump)  Pacemaker: 2017  Palpitations: occasionally; not for couple yrs  Essential hypertension  Afib: 5+ yrs; on Eliquis  History of surgery: MOHS procedure (face) x 3  Cardiac pacemaker: 17  History of surgery: Right Peripheral Stent 5 yrs  H/O total hysterectomy: 25+ yrs ago      Allergies    latex (Pruritus; Rash)  sulfonamides (Unknown)    Intolerances        Home Medications:  amlodipine-benazepril 5 mg-40 mg oral capsule: 1 cap(s) orally once a day (at bedtime) (12 Dec 2018 08:05)  carvedilol 12.5 mg oral tablet: 1 tab(s) orally 2 times a day (12 Dec 2018 08:05)  Eliquis 5 mg oral tablet: 1 tab(s) orally 2 times a day (12 Dec 2018 08:05)  Fiber Well Gummy: 1 tab(s) orally once a day (12 Dec 2018 08:05)  Hair, Skin &amp; Nails 5 mg oral capsule: 1 cap(s) orally once a day (12 Dec 2018 08:05)  L-Carnitine: 1100 milligram(s) orally once a day (12 Dec 2018 08:05)  levothyroxine 88 mcg (0.088 mg) oral tablet: 1 tab(s) orally once a day (12 Dec 2018 08:05)  montelukast 10 mg oral tablet: 1 tab(s) orally once a day (12 Dec 2018 08:05)  Multiple Vitamins oral tablet: 1 tab(s) orally once a day (12 Dec 2018 08:05)  NovoLOG: PUMP Basal 18.525 units / day (12 Dec 2018 08:05)  NovoLOG 100 units/mL injectable solution: 2.8 unit(s) injectable once a day  Doses range 2 - 5 units / meal Bolus     (12 Dec 2018 08:05)  Tears Naturale preserved ophthalmic solution: 1 drop(s) to each affected eye once a day, As Needed (12 Dec 2018 08:05)  Vit C 500 m tab(s) orally 2 times a day (12 Dec 2018 08:05)  Vit D 50, 000 units: 1 cap(s) orally every 7 days (12 Dec 2018 08:05)      ROS: 10-system review is otherwise negative except HPI above.      Primary Survey:    A - airway intact  B - bilateral breath sounds and good chest rise  C - palpable pulses in all extremities  D - GCS 15 on arrival, LAZARO  Exposure obtained    Vital Signs Last 24 Hrs  T(C): 36.7 (22 Aug 2019 02:00), Max: 36.7 (22 Aug 2019 02:00)  T(F): 98 (22 Aug 2019 02:00), Max: 98 (22 Aug 2019 02:00)  HR: 66 (22 Aug 2019 05:56) (66 - 75)  BP: 123/64 (22 Aug 2019 05:56) (123/64 - 199/81)  BP(mean): --  RR: 18 (22 Aug 2019 05:56) (18 - 19)  SpO2: 96% (22 Aug 2019 05:56) (96% - 98%)    Secondary Survey:   General: NAD  HEENT: Normocephalic, atraumatic, EOMI, PEERLA. no scalp lacerations   Neck: Soft, midline trachea. no cspine tenderness  Chest: No chest wall tenderness. or subq  emphysema   Cardiac: S1, S2, RRR  Respiratory: Bilateral breath sounds, clear and equal bilaterally  Abdomen: Soft, non-distended, non-tender, no rebound,   Groin: Normal appearing, pelvis stable   Ext: palp radial b/l UE, b/l DP palp in Lower Extrem.   Back: no TTP, no palpable runoff/stepoff/deformity  Rectal: No janusz blood, ROLAND with good tone    FAST    Procedures:    LABS:  Labs:  CAPILLARY BLOOD GLUCOSE      POCT Blood Glucose.: 209 mg/dL (22 Aug 2019 01:58)                          12.1   10.73 )-----------( 244      ( 22 Aug 2019 02:30 )             35.0       Auto Neutrophil %: 75.7 % (19 @ 02:30)  Auto Immature Granulocyte %: 0.7 % (19 @ 02:30)        130<L>  |  95<L>  |  18  ----------------------------<  209<H>  4.7   |  22  |  0.5<L>      Calcium, Total Serum: 9.3 mg/dL (19 @ 02:06)      LFTs:             8.2  | 0.2  | 25       ------------------[113     ( 22 Aug 2019 02:06 )  4.5  | x    | 21          Lipase:x      Amylase:x             Coags:     14.80  ----< 1.29    ( 22 Aug 2019 02:06 )     40.7                            RADIOLOGY & ADDITIONAL STUDIES:    < from: CT Head No Cont (19 @ 02:35) >  IMPRESSION:    No CT evidence of acute intracranial hemorrhage, midline shift, or mass   effect.    < end of copied text >    < from: CT Cervical Spine No Cont (19 @ 02:36) >    IMPRESSION:    No evidence of acute fracture of the cervical spine.    < end of copied text >      < from: CT Chest w/ IV Cont (19 @ 04:26) >  IMPRESSION:    No CT evidence of any acute or chronic injury to the chest, abdomen, or   pelvis.    < end of copied text >    < from: CT Abdomen and Pelvis w/ IV Cont (19 @ 04:26) >  IMPRESSION:    No CT evidence of any acute or chronic injury to the chest, abdomen, or   pelvis.    Stable appearance of focal left upper and right lower lobe   reticulonodular opacities.    < end of copied text >

## 2019-08-22 NOTE — ED ADULT NURSE REASSESSMENT NOTE - NS ED NURSE REASSESS COMMENT FT1
Pt returned from xray. Family at bedside. Pt comfortable. Awaiting CT. Will continue to monitor and assess.

## 2019-08-22 NOTE — ED PROVIDER NOTE - PHYSICAL EXAMINATION
VITAL SIGNS: I have reviewed nursing notes and confirm.  CONSTITUTIONAL: elderly f nad  SKIN: Skin exam is warm and dry, no acute rash.  HEAD: Normocephalic; atraumatic.  EYES: PERRL, EOM intact; conjunctiva and sclera clear.  ENT: No nasal discharge; airway clear.  NECK: Supple; non tender.  CHEST: atraumatic  CARD: S1, S2 normal; no murmurs, gallops, or rubs. Regular rate.  RESP: No wheezes, rales or rhonchi.  ABD: Normal bowel sounds; soft; non-distended; non-tender; no hepatosplenomegaly.  BACK: non-tender, no CVAT  PELVIS: stable, non-tender  EXT: RLE- +edema/ecchymosis of R knee, +ttp directly over patella, no crepitus, rom limited 2/2 pain, nvid, remainder of ext normal ROM. No clubbing, cyanosis or edema. DPI.  NEURO: Alert, oriented. Grossly unremarkable. No focal deficits.  PSYCH: Cooperative, appropriate.

## 2019-08-22 NOTE — ED PROVIDER NOTE - OBJECTIVE STATEMENT
74y f h/o AF on elliquis, sss s/p ppm, dm, htn, hypothyroidism, pvd s/p rle stent p/w L knee pain s/p mechanical fall. Tripped and fell onto R knee, then fell backward from pain & hit back of head. No loc. Unable to bear weight on RLE after fall. No ha, dizziness, blurry vision, neck pain or stiffness, cp/sob, nv, abd pain, back pain, hip pain, focal numbness or weakness. PMD Shalonda. 74y f h/o AF on eliquis, sss s/p ppm, dm, htn, hypothyroidism, pvd s/p rle stent p/w L knee pain s/p mechanical fall. Tripped and fell onto R knee, then fell backward from pain & hit back of head. No loc. Unable to bear weight on RLE after fall. No ha, dizziness, blurry vision, neck pain or stiffness, cp/sob, nv, abd pain, back pain, hip pain, focal numbness or weakness. PMD Shalonda.

## 2019-08-22 NOTE — CONSULT NOTE ADULT - ASSESSMENT
ASSESSMENT:      74y old f s/p mechanical fall from standing, found to be GCS 15 and hemodynamically stable within normal limits.     PLAN:    -Observation

## 2019-08-22 NOTE — CONSULT NOTE ADULT - SUBJECTIVE AND OBJECTIVE BOX
74F presenting with left knee pain s/p mechanical fall onto left knee. Patient denies pain elsewhere. Unable to ambulate post fall. Endorses hitting head but denies LOC. Prior to fall patient ambulated mostly without assistive devices, only cane for long distances. Denies any numbness, tingling of LLE    PAST MEDICAL & SURGICAL HISTORY:  Inclusion body myositis (IBM)  Lung nodules  Transfusion history: S/p Hysterectomy  Dry eyes, bilateral  PVD (peripheral vascular disease): S/p Right Peripheral Stent  Seasonal allergies  Skin cancer: Basal Cell Cancer face  (around nose) s/p MOHS procedure x 3  Anemia: Chronic, on Iron supplement  Hypothyroid  SSS (sick sinus syndrome): S/p PPM  Diabetes: Insulin dependent - (has insulin Pump)  Pacemaker: Sept 2017  Palpitations: occasionally; not for couple yrs  Essential hypertension  Afib: 5+ yrs; on Eliquis  History of surgery: MOHS procedure (face) x 3  Cardiac pacemaker: 9/9/17  History of surgery: Right Peripheral Stent 5 yrs  H/O total hysterectomy: 25+ yrs ago    Home Medications:  amlodipine-benazepril 5 mg-40 mg oral capsule: 1 cap(s) orally once a day (at bedtime) (22 Aug 2019 15:59)  carvedilol 12.5 mg oral tablet: 1 tab(s) orally 2 times a day (22 Aug 2019 15:59)  Eliquis 5 mg oral tablet: 1 tab(s) orally 2 times a day (22 Aug 2019 15:59)  levothyroxine 88 mcg (0.088 mg) oral tablet: 1 tab(s) orally once a day (22 Aug 2019 15:59)  montelukast 10 mg oral tablet: 1 tab(s) orally once a day (22 Aug 2019 15:59)  Multiple Vitamins oral tablet: 1 tab(s) orally once a day (22 Aug 2019 15:59)  Tears Naturale preserved ophthalmic solution: 1 drop(s) to each affected eye once a day, As Needed (22 Aug 2019 15:59)    Physical exam  NAD, AOx3  Non-labored breathing   LLE  No pain in the hip with log roll, axial loading   Skin intact  Mild swelling of left knee  TTP about knee  Unable to straight leg raise   No step off appreciated   EHL/FHL Motor intact  SP/DP/T/S/S SILT distally   Toes WWP      NTTP LUE, RUE, RLE, NVID, SILT, Extremities wwp    Pelvis stable to compression    Imaging:  Left knee XR/CT:  Acute comminuted minimally displaced intra-articular fracture of the patella.     Under sterile conditions, 7cc 1% lidocaine without epi was injected into left knee, rexamined, still unable to SLR    74F with minimally displaced patellar fx     -Knee Immbolizer   -WBAT in KI  -Ice/elevation  -Pain control  -Upon discharge patient can follow up in orthopedic clinic within 1 week   -PT 74F presenting with left knee pain s/p mechanical fall onto left knee. Patient denies pain elsewhere. Unable to ambulate post fall. Endorses hitting head but denies LOC. Prior to fall patient ambulated mostly without assistive devices, only cane for long distances. Denies any numbness, tingling of LLE    PAST MEDICAL & SURGICAL HISTORY:  Inclusion body myositis (IBM)  Lung nodules  Transfusion history: S/p Hysterectomy  Dry eyes, bilateral  PVD (peripheral vascular disease): S/p Right Peripheral Stent  Seasonal allergies  Skin cancer: Basal Cell Cancer face  (around nose) s/p MOHS procedure x 3  Anemia: Chronic, on Iron supplement  Hypothyroid  SSS (sick sinus syndrome): S/p PPM  Diabetes: Insulin dependent - (has insulin Pump)  Pacemaker: Sept 2017  Palpitations: occasionally; not for couple yrs  Essential hypertension  Afib: 5+ yrs; on Eliquis  History of surgery: MOHS procedure (face) x 3  Cardiac pacemaker: 9/9/17  History of surgery: Right Peripheral Stent 5 yrs  H/O total hysterectomy: 25+ yrs ago    Home Medications:  amlodipine-benazepril 5 mg-40 mg oral capsule: 1 cap(s) orally once a day (at bedtime) (22 Aug 2019 15:59)  carvedilol 12.5 mg oral tablet: 1 tab(s) orally 2 times a day (22 Aug 2019 15:59)  Eliquis 5 mg oral tablet: 1 tab(s) orally 2 times a day (22 Aug 2019 15:59)  levothyroxine 88 mcg (0.088 mg) oral tablet: 1 tab(s) orally once a day (22 Aug 2019 15:59)  montelukast 10 mg oral tablet: 1 tab(s) orally once a day (22 Aug 2019 15:59)  Multiple Vitamins oral tablet: 1 tab(s) orally once a day (22 Aug 2019 15:59)  Tears Naturale preserved ophthalmic solution: 1 drop(s) to each affected eye once a day, As Needed (22 Aug 2019 15:59)    Physical exam  NAD, AOx3  Non-labored breathing   LLE  No pain in the hip with log roll, axial loading   Skin intact  Mild swelling of left knee  TTP about knee  Unable to straight leg raise   No step off appreciated   EHL/FHL Motor intact  SP/DP/T/S/S SILT distally   Toes WWP      NTTP LUE, RUE, RLE, NVID, SILT, Extremities wwp    Pelvis stable to compression    Imaging:  Left knee XR/CT:  Acute comminuted minimally displaced intra-articular fracture of the patella.     Under sterile conditions, 7cc 1% lidocaine without epi was injected into left knee, rexamined, still unable to SLR    74F with minimally displaced patellar fx     -Knee Immbolizer   -WBAT in KI  -Ice/elevation  -Pain control  -Upon discharge patient can follow up in orthopedic clinic within 1 week   -PT  pt seen and examined   difficulty to SLR  xray/CT   left patella fx   will get US to evaluate injury to ext mech

## 2019-08-22 NOTE — ED ADULT NURSE REASSESSMENT NOTE - COMFORT CARE
assisted to bedpan/repositioned/plan of care explained/warm blanket provided
treatment delay explained/wait time explained
plan of care explained/repositioned/warm blanket provided/assisted to bedpan/treatment delay explained/wait time explained

## 2019-08-23 ENCOUNTER — TRANSCRIPTION ENCOUNTER (OUTPATIENT)
Age: 74
End: 2019-08-23

## 2019-08-23 LAB
ANION GAP SERPL CALC-SCNC: 12 MMOL/L — SIGNIFICANT CHANGE UP (ref 7–14)
BUN SERPL-MCNC: 13 MG/DL — SIGNIFICANT CHANGE UP (ref 10–20)
CALCIUM SERPL-MCNC: 9 MG/DL — SIGNIFICANT CHANGE UP (ref 8.5–10.1)
CHLORIDE SERPL-SCNC: 96 MMOL/L — LOW (ref 98–110)
CO2 SERPL-SCNC: 22 MMOL/L — SIGNIFICANT CHANGE UP (ref 17–32)
CREAT SERPL-MCNC: 0.5 MG/DL — LOW (ref 0.7–1.5)
GLUCOSE BLDC GLUCOMTR-MCNC: 157 MG/DL — HIGH (ref 70–99)
GLUCOSE BLDC GLUCOMTR-MCNC: 173 MG/DL — HIGH (ref 70–99)
GLUCOSE BLDC GLUCOMTR-MCNC: 242 MG/DL — HIGH (ref 70–99)
GLUCOSE BLDC GLUCOMTR-MCNC: 242 MG/DL — HIGH (ref 70–99)
GLUCOSE BLDC GLUCOMTR-MCNC: 48 MG/DL — LOW (ref 70–99)
GLUCOSE BLDC GLUCOMTR-MCNC: 81 MG/DL — SIGNIFICANT CHANGE UP (ref 70–99)
GLUCOSE SERPL-MCNC: 194 MG/DL — HIGH (ref 70–99)
HCT VFR BLD CALC: 34.4 % — LOW (ref 37–47)
HGB BLD-MCNC: 11.6 G/DL — LOW (ref 12–16)
MAGNESIUM SERPL-MCNC: 1.9 MG/DL — SIGNIFICANT CHANGE UP (ref 1.8–2.4)
MCHC RBC-ENTMCNC: 30.4 PG — SIGNIFICANT CHANGE UP (ref 27–31)
MCHC RBC-ENTMCNC: 33.7 G/DL — SIGNIFICANT CHANGE UP (ref 32–37)
MCV RBC AUTO: 90.1 FL — SIGNIFICANT CHANGE UP (ref 81–99)
NRBC # BLD: 0 /100 WBCS — SIGNIFICANT CHANGE UP (ref 0–0)
PHOSPHATE SERPL-MCNC: 3 MG/DL — SIGNIFICANT CHANGE UP (ref 2.1–4.9)
PLATELET # BLD AUTO: 256 K/UL — SIGNIFICANT CHANGE UP (ref 130–400)
POTASSIUM SERPL-MCNC: 4.9 MMOL/L — SIGNIFICANT CHANGE UP (ref 3.5–5)
POTASSIUM SERPL-SCNC: 4.9 MMOL/L — SIGNIFICANT CHANGE UP (ref 3.5–5)
RBC # BLD: 3.82 M/UL — LOW (ref 4.2–5.4)
RBC # FLD: 13.2 % — SIGNIFICANT CHANGE UP (ref 11.5–14.5)
SODIUM SERPL-SCNC: 130 MMOL/L — LOW (ref 135–146)
WBC # BLD: 9.98 K/UL — SIGNIFICANT CHANGE UP (ref 4.8–10.8)
WBC # FLD AUTO: 9.98 K/UL — SIGNIFICANT CHANGE UP (ref 4.8–10.8)

## 2019-08-23 PROCEDURE — 99232 SBSQ HOSP IP/OBS MODERATE 35: CPT

## 2019-08-23 RX ADMIN — Medication 400 MILLIGRAM(S): at 05:12

## 2019-08-23 RX ADMIN — HEPARIN SODIUM 5000 UNIT(S): 5000 INJECTION INTRAVENOUS; SUBCUTANEOUS at 13:17

## 2019-08-23 RX ADMIN — HEPARIN SODIUM 5000 UNIT(S): 5000 INJECTION INTRAVENOUS; SUBCUTANEOUS at 05:10

## 2019-08-23 RX ADMIN — Medication 400 MILLIGRAM(S): at 21:35

## 2019-08-23 RX ADMIN — Medication 88 MICROGRAM(S): at 05:12

## 2019-08-23 RX ADMIN — Medication 650 MILLIGRAM(S): at 18:38

## 2019-08-23 RX ADMIN — CARVEDILOL PHOSPHATE 12.5 MILLIGRAM(S): 80 CAPSULE, EXTENDED RELEASE ORAL at 05:10

## 2019-08-23 RX ADMIN — Medication 400 MILLIGRAM(S): at 21:50

## 2019-08-23 RX ADMIN — Medication 650 MILLIGRAM(S): at 18:08

## 2019-08-23 RX ADMIN — AMLODIPINE BESYLATE 5 MILLIGRAM(S): 2.5 TABLET ORAL at 05:10

## 2019-08-23 RX ADMIN — Medication 100 GRAM(S): at 00:29

## 2019-08-23 RX ADMIN — Medication 650 MILLIGRAM(S): at 12:26

## 2019-08-23 RX ADMIN — Medication 650 MILLIGRAM(S): at 12:27

## 2019-08-23 RX ADMIN — PANTOPRAZOLE SODIUM 40 MILLIGRAM(S): 20 TABLET, DELAYED RELEASE ORAL at 05:12

## 2019-08-23 RX ADMIN — Medication 650 MILLIGRAM(S): at 05:11

## 2019-08-23 RX ADMIN — Medication 1 TABLET(S): at 12:26

## 2019-08-23 RX ADMIN — LISINOPRIL 40 MILLIGRAM(S): 2.5 TABLET ORAL at 05:11

## 2019-08-23 RX ADMIN — Medication 400 MILLIGRAM(S): at 05:13

## 2019-08-23 RX ADMIN — SODIUM CHLORIDE 100 MILLILITER(S): 9 INJECTION INTRAMUSCULAR; INTRAVENOUS; SUBCUTANEOUS at 05:13

## 2019-08-23 RX ADMIN — HEPARIN SODIUM 5000 UNIT(S): 5000 INJECTION INTRAVENOUS; SUBCUTANEOUS at 21:36

## 2019-08-23 RX ADMIN — Medication 400 MILLIGRAM(S): at 13:18

## 2019-08-23 RX ADMIN — MONTELUKAST 10 MILLIGRAM(S): 4 TABLET, CHEWABLE ORAL at 12:26

## 2019-08-23 RX ADMIN — Medication 650 MILLIGRAM(S): at 05:13

## 2019-08-23 RX ADMIN — Medication 400 MILLIGRAM(S): at 13:19

## 2019-08-23 NOTE — PHYSICAL THERAPY INITIAL EVALUATION ADULT - GENERAL OBSERVATIONS, REHAB EVAL
pt encountered sitting in bedside chair, + left knee in immobilizer,  bedside pt encountered sitting in bedside chair, + left knee in immobilizer,  bedside. PT order is RLE NWB, PT order in ortho note is WBAT LLE with KI. ortho was called and writer personally met with Dr Ritchie who confirmed WBAT. writer consulted with medicine team who changed the order to WBAT BL LE.

## 2019-08-23 NOTE — PROVIDER CONTACT NOTE (OTHER) - SITUATION
Order for provider to RN/ok to use pt own insulin pump discontinued. Pt insulin pump remains in place at this time.

## 2019-08-23 NOTE — PHYSICAL THERAPY INITIAL EVALUATION ADULT - DISCHARGE DISPOSITION, PT EVAL
Abdomen soft, non-tender and non-distended without organomegaly or masses. Normal bowel sounds. rehabilitation facility

## 2019-08-23 NOTE — PHYSICAL THERAPY INITIAL EVALUATION ADULT - PLANNED THERAPY INTERVENTIONS, PT EVAL
balance training/gait training/bed mobility training/strengthening/stretching/neuromuscular re-education/ROM/transfer training

## 2019-08-23 NOTE — DISCHARGE NOTE NURSING/CASE MANAGEMENT/SOCIAL WORK - NSDCDPATPORTLINK_GEN_ALL_CORE
You can access the [facility] Patient Portal, offered by [facility], by registering with the following website: [email or web address]

## 2019-08-23 NOTE — CONSULT NOTE ADULT - ASSESSMENT
IMPRESSION: Rehab of left patella fx    PRECAUTIONS: [  ] Cardiac  [  ] Respiratory  [  ] Seizures [  ] Contact Isolation  [  ] Droplet Isolation  [  ] Other    Weight Bearing Status: wbat with knee immobilizer    RECOMMENDATION:    Out of Bed to Chair     DVT/Decubiti Prophylaxis    REHAB PLAN:     [ x  ] Bedside P/T 3-5 times a week   [ x  ]   Bedside O/T  2-3 times a week             [   ] No Rehab Therapy Indicated                   [   ]  Speech Therapy   Conditioning/ROM                                    ADL  Bed Mobility                                               Conditioning/ROM  Transfers                                                     Bed Mobility  Sitting /Standing Balance                         Transfers                                        Gait Training                                               Sitting/Standing Balance  Stair Training [   ]Applicable                    Home equipment Eval                                                                        Splinting  [   ] Only      GOALS:   ADL   [x   ]   Independent                    Transfers  [x   ] Independent                          Ambulation  [ x  ] Independent     [  x  ] With device                            [   ]  CG                                                         [   ]  CG                                                                  [   ] CG                            [    ] Min A                                                   [   ] Min A                                                              [   ] Min  A          DISCHARGE PLAN:   [   ]  Good candidate for Intensive Rehabilitation/Hospital based                                             Will tolerate 3hrs Intensive Rehab Daily                                       [  x  ]  Short Term Rehab in Skilled Nursing Facility                            vs           [  x  ]  Home with Outpatient or VN services                                         [    ]  Possible Candidate for Intensive Hospital based Rehab

## 2019-08-23 NOTE — PHYSICAL THERAPY INITIAL EVALUATION ADULT - LEVEL OF INDEPENDENCE: SIT/STAND, REHAB EVAL
difficult for patient to stand up from chair due to LLE immobilizer brace/moderate assist (50% patients effort)

## 2019-08-23 NOTE — PROGRESS NOTE ADULT - SUBJECTIVE AND OBJECTIVE BOX
GENERAL SURGERY PROGRESS NOTE     OSWMYA AMADO  74y  Female  Hospital day :1d  POD:  Procedure:     OVERNIGHT EVENTS:  Found to have L patellar fracture, immobilized by ortho.    T(F): 97.3 (19 @ 23:30), Max: 98 (19 @ 02:00)  HR: 61 (19 @ 23:30) (61 - 75)  BP: 167/73 (19 @ 23:30) (123/64 - 199/81)  ABP: --  ABP(mean): --  RR: 18 (19 @ 23:30) (16 - 19)  SpO2: 99% (19 @ 23:30) (96% - 100%)    DIET/FLUIDS: multivitamin 1 Tablet(s) Oral daily  sodium chloride 0.9%. 1000 milliLiter(s) IV Continuous <Continuous>  GI proph:  pantoprazole    Tablet 40 milliGRAM(s) Oral before breakfast  AC/ proph: heparin  Injectable 5000 Unit(s) SubCutaneous every 8 hours    PHYSICAL EXAM:  GENERAL: NAD, well-appearing  CHEST/LUNG: Clear to auscultation bilaterally  HEART: Regular rate and rhythm  ABDOMEN: Soft, Nontender, Nondistended;   EXTREMITIES: LLE in knee immobilizer      LABS  Labs:  CAPILLARY BLOOD GLUCOSE      POCT Blood Glucose.: 81 mg/dL (23 Aug 2019 00:51)  POCT Blood Glucose.: 48 mg/dL (23 Aug 2019 00:35)  POCT Blood Glucose.: 152 mg/dL (22 Aug 2019 21:54)  POCT Blood Glucose.: 313 mg/dL (22 Aug 2019 19:13)  POCT Blood Glucose.: 154 mg/dL (22 Aug 2019 09:44)  POCT Blood Glucose.: 209 mg/dL (22 Aug 2019 01:58)                          11.1   7.96  )-----------( 227      ( 22 Aug 2019 20:54 )             32.6       Auto Neutrophil %: 67.0 % (19 @ 20:54)  Auto Immature Granulocyte %: 0.3 % (19 @ 20:54)  Auto Neutrophil %: 75.7 % (19 @ 02:30)  Auto Immature Granulocyte %: 0.7 % (19 @ 02:30)    08-22    136  |  103  |  8<L>  ----------------------------<  200<H>  3.8   |  22  |  <0.5<L>      Calcium, Total Serum: 8.2 mg/dL (19 @ 20:54)      LFTs:             8.2  | 0.2  | 25       ------------------[113     ( 22 Aug 2019 02:06 )  4.5  | x    | 21          Coags:     14.80  ----< 1.29    ( 22 Aug 2019 02:06 )     40.7      Urinalysis Basic - ( 22 Aug 2019 09:46 )    Color: Colorless / Appearance: Clear / S.025 / pH: x  Gluc: x / Ketone: Trace  / Bili: Negative / Urobili: <2 mg/dL   Blood: x / Protein: Negative / Nitrite: Negative   Leuk Esterase: Negative / RBC: x / WBC x   Sq Epi: x / Non Sq Epi: x / Bacteria: x

## 2019-08-23 NOTE — CONSULT NOTE ADULT - SUBJECTIVE AND OBJECTIVE BOX
HPI:  74y old f s/p pt's left knee "gave out" causing her to fall; pt with pain and swelling to her left knee; pt also hit head on carpeted floor, no LOC, pt is on Eliquis. The patient has LLE pain and swelling, was unable to ambulate in the ED, CT of the leg was done and showed Patella fx. (22 Aug 2019 16:12). wbat with knee immobilizer as per ortho.      PAST MEDICAL & SURGICAL HISTORY:  Inclusion body myositis (IBM)  Lung nodules  Transfusion history: S/p Hysterectomy  Dry eyes, bilateral  PVD (peripheral vascular disease): S/p Right Peripheral Stent  Seasonal allergies  Skin cancer: Basal Cell Cancer face  (around nose) s/p MOHS procedure x 3  Anemia: Chronic, on Iron supplement  Hypothyroid  SSS (sick sinus syndrome): S/p PPM  Diabetes: Insulin dependent - (has insulin Pump)  Pacemaker: 2017  Palpitations: occasionally; not for couple yrs  Essential hypertension  Afib: 5+ yrs; on Eliquis  History of surgery: MOHS procedure (face) x 3  Cardiac pacemaker: 17  History of surgery: Right Peripheral Stent 5 yrs  H/O total hysterectomy: 25+ yrs ago      Hospital Course:    TODAY'S SUBJECTIVE & REVIEW OF SYMPTOMS:     Constitutional WNL   Cardio WNL   Resp WNL   GI WNL  Heme WNL  Endo WNL  Skin WNL  MSK pain  Neuro WNL  Cognitive WNL  Psych WNL      MEDICATIONS  (STANDING):  acetaminophen   Tablet .. 650 milliGRAM(s) Oral every 6 hours  amLODIPine   Tablet 5 milliGRAM(s) Oral daily  carvedilol Oral Tab/Cap - Peds 12.5 milliGRAM(s) Oral daily  heparin  Injectable 5000 Unit(s) SubCutaneous every 8 hours  ibuprofen  Tablet. 400 milliGRAM(s) Oral every 8 hours  levothyroxine 88 MICROGram(s) Oral daily  lisinopril 40 milliGRAM(s) Oral daily  montelukast 10 milliGRAM(s) Oral daily  multivitamin 1 Tablet(s) Oral daily  pantoprazole    Tablet 40 milliGRAM(s) Oral before breakfast  sodium chloride 0.9%. 1000 milliLiter(s) (100 mL/Hr) IV Continuous <Continuous>    MEDICATIONS  (PRN):  oxyCODONE    IR 5 milliGRAM(s) Oral every 6 hours PRN Severe Pain (7 - 10)  polyvinyl alcohol 1.4%/povidone 0.6% Ophthalmic Solution - Peds 1 Drop(s) Both EYES every 2 hours PRN Dry Eyes      FAMILY HISTORY:  Cancer (Father): Myeloma  Family history of lung cancer (Mother)      Allergies    latex (Pruritus; Rash)  sulfonamides (Unknown)    Intolerances        SOCIAL HISTORY:    [  ] Etoh  [  ] Smoking  [  ] Substance abuse     Home Environment:  [  ] Home Alone  [ x ] Lives with Family  [  ] Home Health Aid    Dwelling:  [  ] Apartment  [x  ] Private House  [  ] Adult Home  [  ] Skilled Nursing Facility      [  ] Short Term  [  ] Long Term  [x  ] Stairs       Elevator [  ]    FUNCTIONAL STATUS PTA: (Check all that apply)  Ambulation: [ x  ]Independent    [  ] Dependent     [  ] Non-Ambulatory  Assistive Device: [ x ] SA Cane  [  ]  Q Cane  [  ] Walker  [  ]  Wheelchair  ADL : [x  ] Independent  [  ]  Dependent       Vital Signs Last 24 Hrs  T(C): 35.6 (23 Aug 2019 07:55), Max: 36.3 (22 Aug 2019 23:30)  T(F): 96 (23 Aug 2019 07:55), Max: 97.3 (22 Aug 2019 23:30)  HR: 60 (23 Aug 2019 07:55) (60 - 66)  BP: 157/70 (23 Aug 2019 07:55) (157/70 - 181/76)  BP(mean): --  RR: 18 (23 Aug 2019 07:55) (17 - 18)  SpO2: 99% (22 Aug 2019 23:30) (97% - 99%)      PHYSICAL EXAM: Alert & Oriented X3  GENERAL: NAD, well-groomed, well-developed  HEAD:  Atraumatic, Normocephalic  CHEST/LUNG: Clear   HEART: S1S2+  ABDOMEN: Soft, Nontender  EXTREMITIES:  no calf tenderness    NERVOUS SYSTEM:  Cranial Nerves 2-12 intact [  ] Abnormal  [  ]  ROM: WFL all extremities [  ]  Abnormal [ x ]limited lle  Motor Strength: WFL all extremities  [  ]  Abnormal [ x ]limited lle  Sensation: intact to light touch [ x ] Abnormal [  ]  Reflexes: Symmetric [  ]  Abnormal [  ]    FUNCTIONAL STATUS:  Bed Mobility: Independent [  ]  Supervision [  ]  Needs Assistance [x  ]  N/A [  ]  Transfers: Independent [  ]  Supervision [  ]  Needs Assistance [x  ]  N/A [  ]   Ambulation: Independent [  ]  Supervision [  ]  Needs Assistance [  ]  N/A [  ]  ADL: Independent [  ] Requires Assistance [  ] N/A [  ]      LABS:                        11.1   7.96  )-----------( 227      ( 22 Aug 2019 20:54 )             32.6         136  |  103  |  8<L>  ----------------------------<  200<H>  3.8   |  22  |  <0.5<L>    Ca    8.2<L>      22 Aug 2019 20:54  Phos  2.9       Mg     1.6         TPro  8.2<H>  /  Alb  4.5  /  TBili  0.2  /  DBili  x   /  AST  25  /  ALT  21  /  AlkPhos  113      PT/INR - ( 22 Aug 2019 02:06 )   PT: 14.80 sec;   INR: 1.29 ratio         PTT - ( 22 Aug 2019 02:06 )  PTT:40.7 sec  Urinalysis Basic - ( 22 Aug 2019 09:46 )    Color: Colorless / Appearance: Clear / S.025 / pH: x  Gluc: x / Ketone: Trace  / Bili: Negative / Urobili: <2 mg/dL   Blood: x / Protein: Negative / Nitrite: Negative   Leuk Esterase: Negative / RBC: x / WBC x   Sq Epi: x / Non Sq Epi: x / Bacteria: x        RADIOLOGY & ADDITIONAL STUDIES:    Assesment:

## 2019-08-24 LAB
ANION GAP SERPL CALC-SCNC: 11 MMOL/L — SIGNIFICANT CHANGE UP (ref 7–14)
BASOPHILS # BLD AUTO: 0.03 K/UL — SIGNIFICANT CHANGE UP (ref 0–0.2)
BASOPHILS NFR BLD AUTO: 0.4 % — SIGNIFICANT CHANGE UP (ref 0–1)
BUN SERPL-MCNC: 17 MG/DL — SIGNIFICANT CHANGE UP (ref 10–20)
CALCIUM SERPL-MCNC: 9.1 MG/DL — SIGNIFICANT CHANGE UP (ref 8.5–10.1)
CHLORIDE SERPL-SCNC: 97 MMOL/L — LOW (ref 98–110)
CO2 SERPL-SCNC: 25 MMOL/L — SIGNIFICANT CHANGE UP (ref 17–32)
CREAT SERPL-MCNC: 0.8 MG/DL — SIGNIFICANT CHANGE UP (ref 0.7–1.5)
EOSINOPHIL # BLD AUTO: 0.47 K/UL — SIGNIFICANT CHANGE UP (ref 0–0.7)
EOSINOPHIL NFR BLD AUTO: 6.3 % — SIGNIFICANT CHANGE UP (ref 0–8)
GLUCOSE BLDC GLUCOMTR-MCNC: 116 MG/DL — HIGH (ref 70–99)
GLUCOSE BLDC GLUCOMTR-MCNC: 145 MG/DL — HIGH (ref 70–99)
GLUCOSE BLDC GLUCOMTR-MCNC: 200 MG/DL — HIGH (ref 70–99)
GLUCOSE BLDC GLUCOMTR-MCNC: 215 MG/DL — HIGH (ref 70–99)
GLUCOSE SERPL-MCNC: 146 MG/DL — HIGH (ref 70–99)
HCT VFR BLD CALC: 33.6 % — LOW (ref 37–47)
HGB BLD-MCNC: 11.4 G/DL — LOW (ref 12–16)
IMM GRANULOCYTES NFR BLD AUTO: 0.3 % — SIGNIFICANT CHANGE UP (ref 0.1–0.3)
LYMPHOCYTES # BLD AUTO: 1.76 K/UL — SIGNIFICANT CHANGE UP (ref 1.2–3.4)
LYMPHOCYTES # BLD AUTO: 23.7 % — SIGNIFICANT CHANGE UP (ref 20.5–51.1)
MAGNESIUM SERPL-MCNC: 1.8 MG/DL — SIGNIFICANT CHANGE UP (ref 1.8–2.4)
MCHC RBC-ENTMCNC: 30.2 PG — SIGNIFICANT CHANGE UP (ref 27–31)
MCHC RBC-ENTMCNC: 33.9 G/DL — SIGNIFICANT CHANGE UP (ref 32–37)
MCV RBC AUTO: 89.1 FL — SIGNIFICANT CHANGE UP (ref 81–99)
MONOCYTES # BLD AUTO: 0.71 K/UL — HIGH (ref 0.1–0.6)
MONOCYTES NFR BLD AUTO: 9.5 % — HIGH (ref 1.7–9.3)
NEUTROPHILS # BLD AUTO: 4.45 K/UL — SIGNIFICANT CHANGE UP (ref 1.4–6.5)
NEUTROPHILS NFR BLD AUTO: 59.8 % — SIGNIFICANT CHANGE UP (ref 42.2–75.2)
NRBC # BLD: 0 /100 WBCS — SIGNIFICANT CHANGE UP (ref 0–0)
PHOSPHATE SERPL-MCNC: 3.7 MG/DL — SIGNIFICANT CHANGE UP (ref 2.1–4.9)
PLATELET # BLD AUTO: 247 K/UL — SIGNIFICANT CHANGE UP (ref 130–400)
POTASSIUM SERPL-MCNC: 4.6 MMOL/L — SIGNIFICANT CHANGE UP (ref 3.5–5)
POTASSIUM SERPL-SCNC: 4.6 MMOL/L — SIGNIFICANT CHANGE UP (ref 3.5–5)
RBC # BLD: 3.77 M/UL — LOW (ref 4.2–5.4)
RBC # FLD: 13.3 % — SIGNIFICANT CHANGE UP (ref 11.5–14.5)
SODIUM SERPL-SCNC: 133 MMOL/L — LOW (ref 135–146)
WBC # BLD: 7.44 K/UL — SIGNIFICANT CHANGE UP (ref 4.8–10.8)
WBC # FLD AUTO: 7.44 K/UL — SIGNIFICANT CHANGE UP (ref 4.8–10.8)

## 2019-08-24 PROCEDURE — 99233 SBSQ HOSP IP/OBS HIGH 50: CPT

## 2019-08-24 RX ADMIN — Medication 88 MICROGRAM(S): at 06:47

## 2019-08-24 RX ADMIN — Medication 650 MILLIGRAM(S): at 23:41

## 2019-08-24 RX ADMIN — Medication 650 MILLIGRAM(S): at 06:48

## 2019-08-24 RX ADMIN — Medication 650 MILLIGRAM(S): at 23:37

## 2019-08-24 RX ADMIN — Medication 650 MILLIGRAM(S): at 17:56

## 2019-08-24 RX ADMIN — Medication 400 MILLIGRAM(S): at 06:46

## 2019-08-24 RX ADMIN — HEPARIN SODIUM 5000 UNIT(S): 5000 INJECTION INTRAVENOUS; SUBCUTANEOUS at 21:48

## 2019-08-24 RX ADMIN — Medication 400 MILLIGRAM(S): at 13:17

## 2019-08-24 RX ADMIN — Medication 1 TABLET(S): at 12:27

## 2019-08-24 RX ADMIN — Medication 650 MILLIGRAM(S): at 12:28

## 2019-08-24 RX ADMIN — Medication 650 MILLIGRAM(S): at 01:41

## 2019-08-24 RX ADMIN — Medication 650 MILLIGRAM(S): at 01:40

## 2019-08-24 RX ADMIN — MONTELUKAST 10 MILLIGRAM(S): 4 TABLET, CHEWABLE ORAL at 12:27

## 2019-08-24 RX ADMIN — LISINOPRIL 40 MILLIGRAM(S): 2.5 TABLET ORAL at 06:47

## 2019-08-24 RX ADMIN — Medication 400 MILLIGRAM(S): at 06:48

## 2019-08-24 RX ADMIN — Medication 650 MILLIGRAM(S): at 12:27

## 2019-08-24 RX ADMIN — Medication 400 MILLIGRAM(S): at 21:48

## 2019-08-24 RX ADMIN — AMLODIPINE BESYLATE 5 MILLIGRAM(S): 2.5 TABLET ORAL at 06:45

## 2019-08-24 RX ADMIN — CARVEDILOL PHOSPHATE 12.5 MILLIGRAM(S): 80 CAPSULE, EXTENDED RELEASE ORAL at 06:43

## 2019-08-24 RX ADMIN — Medication 400 MILLIGRAM(S): at 22:06

## 2019-08-24 RX ADMIN — HEPARIN SODIUM 5000 UNIT(S): 5000 INJECTION INTRAVENOUS; SUBCUTANEOUS at 13:17

## 2019-08-24 RX ADMIN — Medication 650 MILLIGRAM(S): at 17:30

## 2019-08-24 RX ADMIN — PANTOPRAZOLE SODIUM 40 MILLIGRAM(S): 20 TABLET, DELAYED RELEASE ORAL at 06:46

## 2019-08-24 RX ADMIN — Medication 650 MILLIGRAM(S): at 06:42

## 2019-08-24 RX ADMIN — HEPARIN SODIUM 5000 UNIT(S): 5000 INJECTION INTRAVENOUS; SUBCUTANEOUS at 06:44

## 2019-08-24 NOTE — OCCUPATIONAL THERAPY INITIAL EVALUATION ADULT - LIVES WITH, PROFILE
spouse/Pt lives in 2 story apartment. +13 steps to access second floor however pt utilized elevator in the hallway 2* inability to perform steps PTA. 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

## 2019-08-24 NOTE — OCCUPATIONAL THERAPY INITIAL EVALUATION ADULT - LEVEL OF INDEPENDENCE: SIT/STAND, REHAB EVAL
Pt with difficulty initiating/ completing lift off with LLE immobilized/moderate assist (50% patients effort)

## 2019-08-24 NOTE — OCCUPATIONAL THERAPY INITIAL EVALUATION ADULT - GENERAL OBSERVATIONS, REHAB EVAL
Pt received semi aceves in bed. NAD +IV lock, +insulin pump +immobilizer to LLE. +ace wrap to L knee. Pt. agreeable to OT IE.

## 2019-08-24 NOTE — PROGRESS NOTE ADULT - SUBJECTIVE AND OBJECTIVE BOX
GENERAL SURGERY PROGRESS NOTE     SOWMYA AMADO  [address] [place] [address] day :2d  POD:  Procedure:   Surgical Attending: Heriberto Walls  Overnight events: No acute events overnight. DC held for placement of SNF. Ambulationg with NGT. NGT will be clamped.    T(F): 96.5 (19 @ 00:00), Max: 96.9 (19 @ 04:41)  HR: 74 (19 @ 00:00) (60 - 74)  BP: 165/70 (19 @ 00:00) (157/70 - 168/69)  ABP: --  ABP(mean): --  RR: 18 (19 @ 00:00) (18 - 18)  SpO2: --      19 @ 07:01  -  19 @ 07:00  --------------------------------------------------------  IN:  Total IN: 0 mL    OUT:    Voided: 400 mL  Total OUT: 400 mL    Total NET: -400 mL      19 @ 07:01  -  19 @ 04:07  --------------------------------------------------------  IN:    Oral Fluid: 480 mL  Total IN: 480 mL    OUT:    Voided: 300 mL  Total OUT: 300 mL    Total NET: 180 mL        DIET/FLUIDS: multivitamin 1 Tablet(s) Oral daily     GI proph:  pantoprazole    Tablet 40 milliGRAM(s) Oral before breakfast    AC/ proph: heparin  Injectable 5000 Unit(s) SubCutaneous every 8 hours    ABx:     PHYSICAL EXAM:  GENERAL: NAD, well-appearing  CHEST/LUNG: Clear to auscultation bilaterally  HEART: Regular rate and rhythm  ABDOMEN: Soft, Nontender, Nondistended;   EXTREMITIES:  No clubbing, cyanosis, or edema    LABS  Labs:  CAPILLARY BLOOD GLUCOSE      POCT Blood Glucose.: 173 mg/dL (23 Aug 2019 21:19)  POCT Blood Glucose.: 242 mg/dL (23 Aug 2019 17:28)  POCT Blood Glucose.: 242 mg/dL (23 Aug 2019 12:02)  POCT Blood Glucose.: 157 mg/dL (23 Aug 2019 07:58)                    11.6   9.98  )-----------( 256      ( 23 Aug 2019 21:20 )             34.4           130<L>  |  96<L>  |  13  ----------------------------<  194<H>  4.9   |  22  |  0.5<L>    Calcium, Total Serum: 9.0 mg/dL (19 @ 21:20)    Urinalysis Basic - ( 22 Aug 2019 09:46 )    Color: Colorless / Appearance: Clear / S.025 / pH: x  Gluc: x / Ketone: Trace  / Bili: Negative / Urobili: <2 mg/dL   Blood: x / Protein: Negative / Nitrite: Negative   Leuk Esterase: Negative / RBC: x / WBC x   Sq Epi: x / Non Sq Epi: x / Bacteria: x    RADIOLOGY & ADDITIONAL TESTS: GENERAL SURGERY PROGRESS NOTE     SOWMYA AMADO  [address] [place] [address] day :2d  POD:  Procedure:   Surgical Attending: Heriberto Walls  Overnight events: No acute events overnight. DC held for placement of SNF. Ambulating with rolling walker    T(F): 96.5 (19 @ 00:00), Max: 96.9 (19 @ 04:41)  HR: 74 (19 @ 00:00) (60 - 74)  BP: 165/70 (19 @ 00:00) (157/70 - 168/69)  ABP: --  ABP(mean): --  RR: 18 (19 @ 00:00) (18 - 18)  SpO2: --      19 @ 07:01  -  19 @ 07:00  --------------------------------------------------------  IN:  Total IN: 0 mL    OUT:    Voided: 400 mL  Total OUT: 400 mL    Total NET: -400 mL      19 @ 07:01  -  19 @ 04:07  --------------------------------------------------------  IN:    Oral Fluid: 480 mL  Total IN: 480 mL    OUT:    Voided: 300 mL  Total OUT: 300 mL    Total NET: 180 mL        DIET/FLUIDS: multivitamin 1 Tablet(s) Oral daily     GI proph:  pantoprazole    Tablet 40 milliGRAM(s) Oral before breakfast    AC/ proph: heparin  Injectable 5000 Unit(s) SubCutaneous every 8 hours    ABx:     PHYSICAL EXAM:  GENERAL: NAD, well-appearing  CHEST/LUNG: Clear to auscultation bilaterally  HEART: Regular rate and rhythm  ABDOMEN: Soft, Nontender, Nondistended;   EXTREMITIES:  No clubbing, cyanosis, or edema    LABS  Labs:  CAPILLARY BLOOD GLUCOSE      POCT Blood Glucose.: 173 mg/dL (23 Aug 2019 21:19)  POCT Blood Glucose.: 242 mg/dL (23 Aug 2019 17:28)  POCT Blood Glucose.: 242 mg/dL (23 Aug 2019 12:02)  POCT Blood Glucose.: 157 mg/dL (23 Aug 2019 07:58)                    11.6   9.98  )-----------( 256      ( 23 Aug 2019 21:20 )             34.4           130<L>  |  96<L>  |  13  ----------------------------<  194<H>  4.9   |  22  |  0.5<L>    Calcium, Total Serum: 9.0 mg/dL (19 @ 21:20)    Urinalysis Basic - ( 22 Aug 2019 09:46 )    Color: Colorless / Appearance: Clear / S.025 / pH: x  Gluc: x / Ketone: Trace  / Bili: Negative / Urobili: <2 mg/dL   Blood: x / Protein: Negative / Nitrite: Negative   Leuk Esterase: Negative / RBC: x / WBC x   Sq Epi: x / Non Sq Epi: x / Bacteria: x    RADIOLOGY & ADDITIONAL TESTS:

## 2019-08-24 NOTE — OCCUPATIONAL THERAPY INITIAL EVALUATION ADULT - IMPAIRED TRANSFERS: TOILET, REHAB EVAL
Call to pt - requesting refills of lisinopril, metformin and Accu-check test strips.  New pharmacy - [facility].  Does not need refills on other meds at this time.  Last appointment 8/7/17, has appointment scheduled for 2/7/18  Prescription sent to pharmacy via E-prescribe, per Medication Refill Protocol     
Pt is calling to update pharmacy, he would like all medication refills or new prescription to be sent to [facility] on [address] [facility]. The phone number is [phone]  
impaired balance/decreased ROM/pain/LLE/decreased strength/decreased flexibility

## 2019-08-24 NOTE — OCCUPATIONAL THERAPY INITIAL EVALUATION ADULT - LEVEL OF INDEPENDENCE: STAND/SIT, REHAB EVAL
unable to perform/moderate assist (50% patients effort)/Pt with difficulty maintaining control with LLE immobilized during stand to sit transition.

## 2019-08-25 LAB
ANION GAP SERPL CALC-SCNC: 12 MMOL/L — SIGNIFICANT CHANGE UP (ref 7–14)
BASOPHILS # BLD AUTO: 0.02 K/UL — SIGNIFICANT CHANGE UP (ref 0–0.2)
BASOPHILS NFR BLD AUTO: 0.2 % — SIGNIFICANT CHANGE UP (ref 0–1)
BUN SERPL-MCNC: 19 MG/DL — SIGNIFICANT CHANGE UP (ref 10–20)
CALCIUM SERPL-MCNC: 9.1 MG/DL — SIGNIFICANT CHANGE UP (ref 8.5–10.1)
CHLORIDE SERPL-SCNC: 98 MMOL/L — SIGNIFICANT CHANGE UP (ref 98–110)
CO2 SERPL-SCNC: 23 MMOL/L — SIGNIFICANT CHANGE UP (ref 17–32)
CREAT SERPL-MCNC: 0.6 MG/DL — LOW (ref 0.7–1.5)
EOSINOPHIL # BLD AUTO: 0.26 K/UL — SIGNIFICANT CHANGE UP (ref 0–0.7)
EOSINOPHIL NFR BLD AUTO: 3.2 % — SIGNIFICANT CHANGE UP (ref 0–8)
GLUCOSE BLDC GLUCOMTR-MCNC: 135 MG/DL — HIGH (ref 70–99)
GLUCOSE BLDC GLUCOMTR-MCNC: 166 MG/DL — HIGH (ref 70–99)
GLUCOSE BLDC GLUCOMTR-MCNC: 167 MG/DL — HIGH (ref 70–99)
GLUCOSE BLDC GLUCOMTR-MCNC: 242 MG/DL — HIGH (ref 70–99)
GLUCOSE BLDC GLUCOMTR-MCNC: 58 MG/DL — LOW (ref 70–99)
GLUCOSE BLDC GLUCOMTR-MCNC: 86 MG/DL — SIGNIFICANT CHANGE UP (ref 70–99)
GLUCOSE SERPL-MCNC: 248 MG/DL — HIGH (ref 70–99)
HCT VFR BLD CALC: 34.3 % — LOW (ref 37–47)
HGB BLD-MCNC: 11.5 G/DL — LOW (ref 12–16)
IMM GRANULOCYTES NFR BLD AUTO: 0.2 % — SIGNIFICANT CHANGE UP (ref 0.1–0.3)
LYMPHOCYTES # BLD AUTO: 1.87 K/UL — SIGNIFICANT CHANGE UP (ref 1.2–3.4)
LYMPHOCYTES # BLD AUTO: 22.9 % — SIGNIFICANT CHANGE UP (ref 20.5–51.1)
MAGNESIUM SERPL-MCNC: 2 MG/DL — SIGNIFICANT CHANGE UP (ref 1.8–2.4)
MCHC RBC-ENTMCNC: 30.3 PG — SIGNIFICANT CHANGE UP (ref 27–31)
MCHC RBC-ENTMCNC: 33.5 G/DL — SIGNIFICANT CHANGE UP (ref 32–37)
MCV RBC AUTO: 90.3 FL — SIGNIFICANT CHANGE UP (ref 81–99)
MONOCYTES # BLD AUTO: 0.77 K/UL — HIGH (ref 0.1–0.6)
MONOCYTES NFR BLD AUTO: 9.4 % — HIGH (ref 1.7–9.3)
NEUTROPHILS # BLD AUTO: 5.24 K/UL — SIGNIFICANT CHANGE UP (ref 1.4–6.5)
NEUTROPHILS NFR BLD AUTO: 64.1 % — SIGNIFICANT CHANGE UP (ref 42.2–75.2)
NRBC # BLD: 0 /100 WBCS — SIGNIFICANT CHANGE UP (ref 0–0)
PHOSPHATE SERPL-MCNC: 4.3 MG/DL — SIGNIFICANT CHANGE UP (ref 2.1–4.9)
PLATELET # BLD AUTO: 257 K/UL — SIGNIFICANT CHANGE UP (ref 130–400)
POTASSIUM SERPL-MCNC: 5.5 MMOL/L — HIGH (ref 3.5–5)
POTASSIUM SERPL-SCNC: 5.5 MMOL/L — HIGH (ref 3.5–5)
RBC # BLD: 3.8 M/UL — LOW (ref 4.2–5.4)
RBC # FLD: 13.4 % — SIGNIFICANT CHANGE UP (ref 11.5–14.5)
SODIUM SERPL-SCNC: 133 MMOL/L — LOW (ref 135–146)
WBC # BLD: 8.18 K/UL — SIGNIFICANT CHANGE UP (ref 4.8–10.8)
WBC # FLD AUTO: 8.18 K/UL — SIGNIFICANT CHANGE UP (ref 4.8–10.8)

## 2019-08-25 PROCEDURE — 99233 SBSQ HOSP IP/OBS HIGH 50: CPT

## 2019-08-25 RX ORDER — MAGNESIUM SULFATE 500 MG/ML
2 VIAL (ML) INJECTION ONCE
Refills: 0 | Status: COMPLETED | OUTPATIENT
Start: 2019-08-25 | End: 2019-08-25

## 2019-08-25 RX ADMIN — Medication 400 MILLIGRAM(S): at 13:48

## 2019-08-25 RX ADMIN — Medication 650 MILLIGRAM(S): at 13:00

## 2019-08-25 RX ADMIN — Medication 650 MILLIGRAM(S): at 12:30

## 2019-08-25 RX ADMIN — Medication 400 MILLIGRAM(S): at 21:26

## 2019-08-25 RX ADMIN — PANTOPRAZOLE SODIUM 40 MILLIGRAM(S): 20 TABLET, DELAYED RELEASE ORAL at 06:03

## 2019-08-25 RX ADMIN — CARVEDILOL PHOSPHATE 12.5 MILLIGRAM(S): 80 CAPSULE, EXTENDED RELEASE ORAL at 05:52

## 2019-08-25 RX ADMIN — Medication 650 MILLIGRAM(S): at 23:52

## 2019-08-25 RX ADMIN — Medication 400 MILLIGRAM(S): at 05:51

## 2019-08-25 RX ADMIN — Medication 88 MICROGRAM(S): at 05:51

## 2019-08-25 RX ADMIN — Medication 650 MILLIGRAM(S): at 17:11

## 2019-08-25 RX ADMIN — Medication 400 MILLIGRAM(S): at 21:27

## 2019-08-25 RX ADMIN — Medication 650 MILLIGRAM(S): at 05:52

## 2019-08-25 RX ADMIN — AMLODIPINE BESYLATE 5 MILLIGRAM(S): 2.5 TABLET ORAL at 05:52

## 2019-08-25 RX ADMIN — Medication 400 MILLIGRAM(S): at 13:18

## 2019-08-25 RX ADMIN — Medication 1 TABLET(S): at 12:31

## 2019-08-25 RX ADMIN — HEPARIN SODIUM 5000 UNIT(S): 5000 INJECTION INTRAVENOUS; SUBCUTANEOUS at 05:52

## 2019-08-25 RX ADMIN — HEPARIN SODIUM 5000 UNIT(S): 5000 INJECTION INTRAVENOUS; SUBCUTANEOUS at 21:26

## 2019-08-25 RX ADMIN — Medication 50 GRAM(S): at 06:02

## 2019-08-25 RX ADMIN — HEPARIN SODIUM 5000 UNIT(S): 5000 INJECTION INTRAVENOUS; SUBCUTANEOUS at 13:19

## 2019-08-25 RX ADMIN — LISINOPRIL 40 MILLIGRAM(S): 2.5 TABLET ORAL at 05:51

## 2019-08-25 RX ADMIN — MONTELUKAST 10 MILLIGRAM(S): 4 TABLET, CHEWABLE ORAL at 12:31

## 2019-08-25 RX ADMIN — Medication 650 MILLIGRAM(S): at 17:08

## 2019-08-25 NOTE — PROGRESS NOTE ADULT - SUBJECTIVE AND OBJECTIVE BOX
GENERAL SURGERY PROGRESS NOTE     SOWMYA AMADO  74y  Female  Hospital day :3d  POD:  Procedure:   OVERNIGHT EVENTS: No acute events.     T(F): 97 (08-25-19 @ 00:00), Max: 97 (08-25-19 @ 00:00)  HR: 66 (08-25-19 @ 00:00) (60 - 73)  BP: 133/60 (08-25-19 @ 00:00) (133/60 - 196/67)  ABP: --  ABP(mean): --  RR: 18 (08-25-19 @ 00:00) (18 - 19)  SpO2: --    DIET/FLUIDS: multivitamin 1 Tablet(s) Oral daily    NG:                                                                                DRAINS:     BM:     EMESIS:     URINE:      GI proph:  pantoprazole    Tablet 40 milliGRAM(s) Oral before breakfast    AC/ proph: heparin  Injectable 5000 Unit(s) SubCutaneous every 8 hours    ABx:     PHYSICAL EXAM:  GENERAL: NAD, well-appearing  CHEST/LUNG: Clear to auscultation bilaterally  HEART: Regular rate and rhythm  ABDOMEN: Soft, Nontender, Nondistended;   EXTREMITIES:  No clubbing, cyanosis, or edema      LABS  Labs:  CAPILLARY BLOOD GLUCOSE      POCT Blood Glucose.: 58 mg/dL (25 Aug 2019 05:58)  POCT Blood Glucose.: 145 mg/dL (24 Aug 2019 21:12)  POCT Blood Glucose.: 215 mg/dL (24 Aug 2019 17:04)  POCT Blood Glucose.: 200 mg/dL (24 Aug 2019 11:45)  POCT Blood Glucose.: 116 mg/dL (24 Aug 2019 07:51)                          11.4   7.44  )-----------( 247      ( 24 Aug 2019 21:19 )             33.6       Auto Neutrophil %: 59.8 % (08-24-19 @ 21:19)  Auto Immature Granulocyte %: 0.3 % (08-24-19 @ 21:19)    08-24    133<L>  |  97<L>  |  17  ----------------------------<  146<H>  4.6   |  25  |  0.8      Calcium, Total Serum: 9.1 mg/dL (08-24-19 @ 21:19)      LFTs:         Coags:                    RADIOLOGY & ADDITIONAL TESTS:  no new studies since 8/22

## 2019-08-26 LAB
ANION GAP SERPL CALC-SCNC: 12 MMOL/L — SIGNIFICANT CHANGE UP (ref 7–14)
ANION GAP SERPL CALC-SCNC: 12 MMOL/L — SIGNIFICANT CHANGE UP (ref 7–14)
BASOPHILS # BLD AUTO: 0.04 K/UL — SIGNIFICANT CHANGE UP (ref 0–0.2)
BASOPHILS NFR BLD AUTO: 0.5 % — SIGNIFICANT CHANGE UP (ref 0–1)
BUN SERPL-MCNC: 15 MG/DL — SIGNIFICANT CHANGE UP (ref 10–20)
BUN SERPL-MCNC: 18 MG/DL — SIGNIFICANT CHANGE UP (ref 10–20)
CALCIUM SERPL-MCNC: 9.1 MG/DL — SIGNIFICANT CHANGE UP (ref 8.5–10.1)
CALCIUM SERPL-MCNC: 9.2 MG/DL — SIGNIFICANT CHANGE UP (ref 8.5–10.1)
CHLORIDE SERPL-SCNC: 95 MMOL/L — LOW (ref 98–110)
CHLORIDE SERPL-SCNC: 98 MMOL/L — SIGNIFICANT CHANGE UP (ref 98–110)
CO2 SERPL-SCNC: 22 MMOL/L — SIGNIFICANT CHANGE UP (ref 17–32)
CO2 SERPL-SCNC: 23 MMOL/L — SIGNIFICANT CHANGE UP (ref 17–32)
CREAT SERPL-MCNC: 0.5 MG/DL — LOW (ref 0.7–1.5)
CREAT SERPL-MCNC: 0.5 MG/DL — LOW (ref 0.7–1.5)
EOSINOPHIL # BLD AUTO: 0.28 K/UL — SIGNIFICANT CHANGE UP (ref 0–0.7)
EOSINOPHIL NFR BLD AUTO: 3.4 % — SIGNIFICANT CHANGE UP (ref 0–8)
GLUCOSE BLDC GLUCOMTR-MCNC: 126 MG/DL — HIGH (ref 70–99)
GLUCOSE BLDC GLUCOMTR-MCNC: 139 MG/DL — HIGH (ref 70–99)
GLUCOSE BLDC GLUCOMTR-MCNC: 169 MG/DL — HIGH (ref 70–99)
GLUCOSE BLDC GLUCOMTR-MCNC: 199 MG/DL — HIGH (ref 70–99)
GLUCOSE SERPL-MCNC: 144 MG/DL — HIGH (ref 70–99)
GLUCOSE SERPL-MCNC: 216 MG/DL — HIGH (ref 70–99)
HCT VFR BLD CALC: 32.3 % — LOW (ref 37–47)
HGB BLD-MCNC: 11 G/DL — LOW (ref 12–16)
IMM GRANULOCYTES NFR BLD AUTO: 0.4 % — HIGH (ref 0.1–0.3)
LYMPHOCYTES # BLD AUTO: 2.22 K/UL — SIGNIFICANT CHANGE UP (ref 1.2–3.4)
LYMPHOCYTES # BLD AUTO: 27.3 % — SIGNIFICANT CHANGE UP (ref 20.5–51.1)
MAGNESIUM SERPL-MCNC: 1.9 MG/DL — SIGNIFICANT CHANGE UP (ref 1.8–2.4)
MCHC RBC-ENTMCNC: 30.1 PG — SIGNIFICANT CHANGE UP (ref 27–31)
MCHC RBC-ENTMCNC: 34.1 G/DL — SIGNIFICANT CHANGE UP (ref 32–37)
MCV RBC AUTO: 88.3 FL — SIGNIFICANT CHANGE UP (ref 81–99)
MONOCYTES # BLD AUTO: 0.73 K/UL — HIGH (ref 0.1–0.6)
MONOCYTES NFR BLD AUTO: 9 % — SIGNIFICANT CHANGE UP (ref 1.7–9.3)
NEUTROPHILS # BLD AUTO: 4.83 K/UL — SIGNIFICANT CHANGE UP (ref 1.4–6.5)
NEUTROPHILS NFR BLD AUTO: 59.4 % — SIGNIFICANT CHANGE UP (ref 42.2–75.2)
NRBC # BLD: 0 /100 WBCS — SIGNIFICANT CHANGE UP (ref 0–0)
PHOSPHATE SERPL-MCNC: 4.2 MG/DL — SIGNIFICANT CHANGE UP (ref 2.1–4.9)
PLATELET # BLD AUTO: 252 K/UL — SIGNIFICANT CHANGE UP (ref 130–400)
POTASSIUM SERPL-MCNC: 5.1 MMOL/L — HIGH (ref 3.5–5)
POTASSIUM SERPL-MCNC: 5.2 MMOL/L — HIGH (ref 3.5–5)
POTASSIUM SERPL-SCNC: 5.1 MMOL/L — HIGH (ref 3.5–5)
POTASSIUM SERPL-SCNC: 5.2 MMOL/L — HIGH (ref 3.5–5)
RBC # BLD: 3.66 M/UL — LOW (ref 4.2–5.4)
RBC # FLD: 13.4 % — SIGNIFICANT CHANGE UP (ref 11.5–14.5)
SODIUM SERPL-SCNC: 129 MMOL/L — LOW (ref 135–146)
SODIUM SERPL-SCNC: 133 MMOL/L — LOW (ref 135–146)
WBC # BLD: 8.13 K/UL — SIGNIFICANT CHANGE UP (ref 4.8–10.8)
WBC # FLD AUTO: 8.13 K/UL — SIGNIFICANT CHANGE UP (ref 4.8–10.8)

## 2019-08-26 PROCEDURE — 99231 SBSQ HOSP IP/OBS SF/LOW 25: CPT

## 2019-08-26 RX ORDER — PSYLLIUM SEED (WITH DEXTROSE)
1 POWDER (GRAM) ORAL DAILY
Refills: 0 | Status: DISCONTINUED | OUTPATIENT
Start: 2019-08-26 | End: 2019-08-29

## 2019-08-26 RX ORDER — DOCUSATE SODIUM 100 MG
100 CAPSULE ORAL
Refills: 0 | Status: DISCONTINUED | OUTPATIENT
Start: 2019-08-26 | End: 2019-08-29

## 2019-08-26 RX ORDER — KETOROLAC TROMETHAMINE 30 MG/ML
15 SYRINGE (ML) INJECTION ONCE
Refills: 0 | Status: DISCONTINUED | OUTPATIENT
Start: 2019-08-26 | End: 2019-08-29

## 2019-08-26 RX ORDER — SENNA PLUS 8.6 MG/1
2 TABLET ORAL AT BEDTIME
Refills: 0 | Status: DISCONTINUED | OUTPATIENT
Start: 2019-08-26 | End: 2019-08-29

## 2019-08-26 RX ORDER — APIXABAN 2.5 MG/1
5 TABLET, FILM COATED ORAL
Refills: 0 | Status: DISCONTINUED | OUTPATIENT
Start: 2019-08-26 | End: 2019-08-29

## 2019-08-26 RX ADMIN — Medication 650 MILLIGRAM(S): at 17:53

## 2019-08-26 RX ADMIN — APIXABAN 5 MILLIGRAM(S): 2.5 TABLET, FILM COATED ORAL at 17:53

## 2019-08-26 RX ADMIN — Medication 650 MILLIGRAM(S): at 05:27

## 2019-08-26 RX ADMIN — Medication 650 MILLIGRAM(S): at 05:26

## 2019-08-26 RX ADMIN — Medication 650 MILLIGRAM(S): at 11:45

## 2019-08-26 RX ADMIN — Medication 400 MILLIGRAM(S): at 05:26

## 2019-08-26 RX ADMIN — Medication 400 MILLIGRAM(S): at 21:48

## 2019-08-26 RX ADMIN — AMLODIPINE BESYLATE 5 MILLIGRAM(S): 2.5 TABLET ORAL at 05:26

## 2019-08-26 RX ADMIN — Medication 400 MILLIGRAM(S): at 13:45

## 2019-08-26 RX ADMIN — HEPARIN SODIUM 5000 UNIT(S): 5000 INJECTION INTRAVENOUS; SUBCUTANEOUS at 05:26

## 2019-08-26 RX ADMIN — Medication 1 TABLET(S): at 11:45

## 2019-08-26 RX ADMIN — HEPARIN SODIUM 5000 UNIT(S): 5000 INJECTION INTRAVENOUS; SUBCUTANEOUS at 13:15

## 2019-08-26 RX ADMIN — Medication 400 MILLIGRAM(S): at 13:15

## 2019-08-26 RX ADMIN — CARVEDILOL PHOSPHATE 12.5 MILLIGRAM(S): 80 CAPSULE, EXTENDED RELEASE ORAL at 05:26

## 2019-08-26 RX ADMIN — Medication 650 MILLIGRAM(S): at 12:15

## 2019-08-26 RX ADMIN — LISINOPRIL 40 MILLIGRAM(S): 2.5 TABLET ORAL at 05:26

## 2019-08-26 RX ADMIN — Medication 88 MICROGRAM(S): at 05:26

## 2019-08-26 RX ADMIN — PANTOPRAZOLE SODIUM 40 MILLIGRAM(S): 20 TABLET, DELAYED RELEASE ORAL at 05:26

## 2019-08-26 RX ADMIN — Medication 650 MILLIGRAM(S): at 18:23

## 2019-08-26 RX ADMIN — Medication 400 MILLIGRAM(S): at 22:53

## 2019-08-26 RX ADMIN — Medication 400 MILLIGRAM(S): at 05:27

## 2019-08-26 RX ADMIN — MONTELUKAST 10 MILLIGRAM(S): 4 TABLET, CHEWABLE ORAL at 11:45

## 2019-08-26 RX ADMIN — SENNA PLUS 2 TABLET(S): 8.6 TABLET ORAL at 21:48

## 2019-08-26 RX ADMIN — Medication 1 PACKET(S): at 11:45

## 2019-08-26 RX ADMIN — Medication 100 MILLIGRAM(S): at 17:53

## 2019-08-26 NOTE — PROGRESS NOTE ADULT - SUBJECTIVE AND OBJECTIVE BOX
unable to obtain in patient ultrasound to r/o tendon tear    discussed with dr shah     knee immobilizer   wbat no rom   fu in office next tuesday   call [phone] for appoint ext [phone]

## 2019-08-26 NOTE — PROGRESS NOTE ADULT - SUBJECTIVE AND OBJECTIVE BOX
GENERAL SURGERY PROGRESS NOTE     SOMWYA AMADO  74y  Female  Hospital day: 5d    OVERNIGHT EVENTS: no acute events overnight. Patient ambulated a lot yesterday and was complaining of significant pain in L knee but did not want opioid medications due to nausea. Was given a one time dose of Toradol. Otherwise patient had no complaints.    T(F): 97.9 (08-26-19 @ 00:00), Max: 97.9 (08-26-19 @ 00:00)  HR: 62 (08-26-19 @ 00:30) (60 - 66)  BP: 156/60 (08-26-19 @ 00:30) (144/61 - 169/71)  RR: 18 (08-26-19 @ 00:30) (16 - 18)    DIET/FLUIDS: Regular diet     GI proph:  pantoprazole    Tablet 40 milliGRAM(s) Oral before breakfast    AC/ proph: heparin  Injectable 5000 Unit(s) SubCutaneous every 8 hours    PHYSICAL EXAM:  GENERAL: NAD, well-appearing  CHEST/LUNG: Clear to auscultation bilaterally  HEART: Regular rate and rhythm  ABDOMEN: Soft, Nontender, Nondistended;   EXTREMITIES:  No clubbing, cyanosis, or edema in RLE, LLE in knee imobilizer    Labs:  CAPILLARY BLOOD GLUCOSE  POCT Blood Glucose.: 242 mg/dL (25 Aug 2019 21:00)  POCT Blood Glucose.: 166 mg/dL (25 Aug 2019 16:38)  POCT Blood Glucose.: 167 mg/dL (25 Aug 2019 12:02)  POCT Blood Glucose.: 135 mg/dL (25 Aug 2019 07:45)  POCT Blood Glucose.: 86 mg/dL (25 Aug 2019 06:24)  POCT Blood Glucose.: 58 mg/dL (25 Aug 2019 05:58)                       11.5   8.18  )-----------( 257      ( 25 Aug 2019 20:47 )             34.3       Auto Neutrophil %: 64.1 % (08-25-19 @ 20:47)  Auto Immature Granulocyte %: 0.2 % (08-25-19 @ 20:47)    08-25    133<L>  |  98  |  19  ----------------------------<  248<H>  5.5<H>   |  23  |  0.6<L>    Calcium, Total Serum: 9.1 mg/dL (08-25-19 @ 20:47)

## 2019-08-27 LAB
ANION GAP SERPL CALC-SCNC: 12 MMOL/L — SIGNIFICANT CHANGE UP (ref 7–14)
BASOPHILS # BLD AUTO: 0.04 K/UL — SIGNIFICANT CHANGE UP (ref 0–0.2)
BASOPHILS NFR BLD AUTO: 0.4 % — SIGNIFICANT CHANGE UP (ref 0–1)
BUN SERPL-MCNC: 25 MG/DL — HIGH (ref 10–20)
CALCIUM SERPL-MCNC: 9.2 MG/DL — SIGNIFICANT CHANGE UP (ref 8.5–10.1)
CHLORIDE SERPL-SCNC: 94 MMOL/L — LOW (ref 98–110)
CO2 SERPL-SCNC: 23 MMOL/L — SIGNIFICANT CHANGE UP (ref 17–32)
CREAT SERPL-MCNC: 0.6 MG/DL — LOW (ref 0.7–1.5)
EOSINOPHIL # BLD AUTO: 0.24 K/UL — SIGNIFICANT CHANGE UP (ref 0–0.7)
EOSINOPHIL NFR BLD AUTO: 2.6 % — SIGNIFICANT CHANGE UP (ref 0–8)
GLUCOSE BLDC GLUCOMTR-MCNC: 126 MG/DL — HIGH (ref 70–99)
GLUCOSE BLDC GLUCOMTR-MCNC: 158 MG/DL — HIGH (ref 70–99)
GLUCOSE BLDC GLUCOMTR-MCNC: 219 MG/DL — HIGH (ref 70–99)
GLUCOSE BLDC GLUCOMTR-MCNC: 235 MG/DL — HIGH (ref 70–99)
GLUCOSE SERPL-MCNC: 228 MG/DL — HIGH (ref 70–99)
HCT VFR BLD CALC: 35 % — LOW (ref 37–47)
HGB BLD-MCNC: 11.8 G/DL — LOW (ref 12–16)
IMM GRANULOCYTES NFR BLD AUTO: 0.3 % — SIGNIFICANT CHANGE UP (ref 0.1–0.3)
LYMPHOCYTES # BLD AUTO: 2.19 K/UL — SIGNIFICANT CHANGE UP (ref 1.2–3.4)
LYMPHOCYTES # BLD AUTO: 23.8 % — SIGNIFICANT CHANGE UP (ref 20.5–51.1)
MAGNESIUM SERPL-MCNC: 1.7 MG/DL — LOW (ref 1.8–2.4)
MCHC RBC-ENTMCNC: 29.9 PG — SIGNIFICANT CHANGE UP (ref 27–31)
MCHC RBC-ENTMCNC: 33.7 G/DL — SIGNIFICANT CHANGE UP (ref 32–37)
MCV RBC AUTO: 88.8 FL — SIGNIFICANT CHANGE UP (ref 81–99)
MONOCYTES # BLD AUTO: 0.88 K/UL — HIGH (ref 0.1–0.6)
MONOCYTES NFR BLD AUTO: 9.5 % — HIGH (ref 1.7–9.3)
NEUTROPHILS # BLD AUTO: 5.84 K/UL — SIGNIFICANT CHANGE UP (ref 1.4–6.5)
NEUTROPHILS NFR BLD AUTO: 63.4 % — SIGNIFICANT CHANGE UP (ref 42.2–75.2)
NRBC # BLD: 0 /100 WBCS — SIGNIFICANT CHANGE UP (ref 0–0)
PHOSPHATE SERPL-MCNC: 4.2 MG/DL — SIGNIFICANT CHANGE UP (ref 2.1–4.9)
PLATELET # BLD AUTO: 291 K/UL — SIGNIFICANT CHANGE UP (ref 130–400)
POTASSIUM SERPL-MCNC: 5 MMOL/L — SIGNIFICANT CHANGE UP (ref 3.5–5)
POTASSIUM SERPL-SCNC: 5 MMOL/L — SIGNIFICANT CHANGE UP (ref 3.5–5)
RBC # BLD: 3.94 M/UL — LOW (ref 4.2–5.4)
RBC # FLD: 13.3 % — SIGNIFICANT CHANGE UP (ref 11.5–14.5)
SODIUM SERPL-SCNC: 129 MMOL/L — LOW (ref 135–146)
WBC # BLD: 9.22 K/UL — SIGNIFICANT CHANGE UP (ref 4.8–10.8)
WBC # FLD AUTO: 9.22 K/UL — SIGNIFICANT CHANGE UP (ref 4.8–10.8)

## 2019-08-27 PROCEDURE — 99231 SBSQ HOSP IP/OBS SF/LOW 25: CPT

## 2019-08-27 PROCEDURE — 76882 US LMTD JT/FCL EVL NVASC XTR: CPT | Mod: 26,LT

## 2019-08-27 RX ADMIN — Medication 100 MILLIGRAM(S): at 17:21

## 2019-08-27 RX ADMIN — Medication 400 MILLIGRAM(S): at 21:27

## 2019-08-27 RX ADMIN — Medication 400 MILLIGRAM(S): at 13:41

## 2019-08-27 RX ADMIN — Medication 650 MILLIGRAM(S): at 17:21

## 2019-08-27 RX ADMIN — Medication 650 MILLIGRAM(S): at 13:11

## 2019-08-27 RX ADMIN — Medication 100 MILLIGRAM(S): at 06:47

## 2019-08-27 RX ADMIN — Medication 650 MILLIGRAM(S): at 01:33

## 2019-08-27 RX ADMIN — Medication 88 MICROGRAM(S): at 06:46

## 2019-08-27 RX ADMIN — APIXABAN 5 MILLIGRAM(S): 2.5 TABLET, FILM COATED ORAL at 17:21

## 2019-08-27 RX ADMIN — APIXABAN 5 MILLIGRAM(S): 2.5 TABLET, FILM COATED ORAL at 06:46

## 2019-08-27 RX ADMIN — Medication 650 MILLIGRAM(S): at 13:41

## 2019-08-27 RX ADMIN — CARVEDILOL PHOSPHATE 12.5 MILLIGRAM(S): 80 CAPSULE, EXTENDED RELEASE ORAL at 06:46

## 2019-08-27 RX ADMIN — Medication 1 TABLET(S): at 13:11

## 2019-08-27 RX ADMIN — Medication 400 MILLIGRAM(S): at 06:48

## 2019-08-27 RX ADMIN — Medication 400 MILLIGRAM(S): at 06:47

## 2019-08-27 RX ADMIN — Medication 400 MILLIGRAM(S): at 21:28

## 2019-08-27 RX ADMIN — Medication 400 MILLIGRAM(S): at 13:12

## 2019-08-27 RX ADMIN — Medication 1 PACKET(S): at 13:11

## 2019-08-27 RX ADMIN — PANTOPRAZOLE SODIUM 40 MILLIGRAM(S): 20 TABLET, DELAYED RELEASE ORAL at 06:47

## 2019-08-27 RX ADMIN — SENNA PLUS 2 TABLET(S): 8.6 TABLET ORAL at 21:27

## 2019-08-27 RX ADMIN — Medication 650 MILLIGRAM(S): at 06:48

## 2019-08-27 RX ADMIN — Medication 650 MILLIGRAM(S): at 23:25

## 2019-08-27 RX ADMIN — MONTELUKAST 10 MILLIGRAM(S): 4 TABLET, CHEWABLE ORAL at 13:12

## 2019-08-27 RX ADMIN — Medication 650 MILLIGRAM(S): at 06:46

## 2019-08-27 RX ADMIN — LISINOPRIL 40 MILLIGRAM(S): 2.5 TABLET ORAL at 06:47

## 2019-08-27 RX ADMIN — AMLODIPINE BESYLATE 5 MILLIGRAM(S): 2.5 TABLET ORAL at 06:46

## 2019-08-27 NOTE — PROGRESS NOTE ADULT - SUBJECTIVE AND OBJECTIVE BOX
GENERAL SURGERY PROGRESS NOTE     SOWMYA AMADO  74y  Female  Hospital day :5d    OVERNIGHT EVENTS: No acute overnight events.     T(F): 96.7 (08-27-19 @ 07:50), Max: 97.3 (08-26-19 @ 15:00)  HR: 60 (08-27-19 @ 07:50) (60 - 61)  BP: 131/62 (08-27-19 @ 07:50) (131/62 - 170/71)  RR: 19 (08-27-19 @ 07:50) (18 - 19)    DIET/FLUIDS: multivitamin 1 Tablet(s) Oral daily     GI proph:  pantoprazole    Tablet 40 milliGRAM(s) Oral before breakfast    AC/ proph:   ABx:     PHYSICAL EXAM:  GENERAL: NAD, well-appearing  CHEST/LUNG: Clear to auscultation bilaterally  HEART: Regular rate and rhythm  ABDOMEN: Soft, Nontender, Nondistended;   EXTREMITIES:  No clubbing, cyanosis, or edema      LABS  Labs:  CAPILLARY BLOOD GLUCOSE      POCT Blood Glucose.: 158 mg/dL (27 Aug 2019 08:17)  POCT Blood Glucose.: 199 mg/dL (26 Aug 2019 21:14)  POCT Blood Glucose.: 169 mg/dL (26 Aug 2019 17:03)  POCT Blood Glucose.: 139 mg/dL (26 Aug 2019 12:15)                          11.0   8.13  )-----------( 252      ( 26 Aug 2019 21:51 )             32.3       Auto Immature Granulocyte %: 0.4 % (08-26-19 @ 21:51)  Auto Neutrophil %: 59.4 % (08-26-19 @ 21:51)    08-26    129<L>  |  95<L>  |  18  ----------------------------<  216<H>  5.1<H>   |  22  |  0.5<L>      Calcium, Total Serum: 9.2 mg/dL (08-26-19 @ 21:51)

## 2019-08-28 LAB
ANION GAP SERPL CALC-SCNC: 13 MMOL/L — SIGNIFICANT CHANGE UP (ref 7–14)
BUN SERPL-MCNC: 19 MG/DL — SIGNIFICANT CHANGE UP (ref 10–20)
CALCIUM SERPL-MCNC: 9.3 MG/DL — SIGNIFICANT CHANGE UP (ref 8.5–10.1)
CHLORIDE SERPL-SCNC: 101 MMOL/L — SIGNIFICANT CHANGE UP (ref 98–110)
CO2 SERPL-SCNC: 24 MMOL/L — SIGNIFICANT CHANGE UP (ref 17–32)
CREAT SERPL-MCNC: 0.5 MG/DL — LOW (ref 0.7–1.5)
GLUCOSE BLDC GLUCOMTR-MCNC: 134 MG/DL — HIGH (ref 70–99)
GLUCOSE BLDC GLUCOMTR-MCNC: 140 MG/DL — HIGH (ref 70–99)
GLUCOSE BLDC GLUCOMTR-MCNC: 148 MG/DL — HIGH (ref 70–99)
GLUCOSE BLDC GLUCOMTR-MCNC: 157 MG/DL — HIGH (ref 70–99)
GLUCOSE BLDC GLUCOMTR-MCNC: 191 MG/DL — HIGH (ref 70–99)
GLUCOSE BLDC GLUCOMTR-MCNC: 54 MG/DL — LOW (ref 70–99)
GLUCOSE SERPL-MCNC: 128 MG/DL — HIGH (ref 70–99)
HCT VFR BLD CALC: 33.3 % — LOW (ref 37–47)
HGB BLD-MCNC: 11.3 G/DL — LOW (ref 12–16)
MAGNESIUM SERPL-MCNC: 1.8 MG/DL — SIGNIFICANT CHANGE UP (ref 1.8–2.4)
MCHC RBC-ENTMCNC: 30.4 PG — SIGNIFICANT CHANGE UP (ref 27–31)
MCHC RBC-ENTMCNC: 33.9 G/DL — SIGNIFICANT CHANGE UP (ref 32–37)
MCV RBC AUTO: 89.5 FL — SIGNIFICANT CHANGE UP (ref 81–99)
NRBC # BLD: 0 /100 WBCS — SIGNIFICANT CHANGE UP (ref 0–0)
PHOSPHATE SERPL-MCNC: 4.9 MG/DL — SIGNIFICANT CHANGE UP (ref 2.1–4.9)
PLATELET # BLD AUTO: 264 K/UL — SIGNIFICANT CHANGE UP (ref 130–400)
POTASSIUM SERPL-MCNC: 4.9 MMOL/L — SIGNIFICANT CHANGE UP (ref 3.5–5)
POTASSIUM SERPL-SCNC: 4.9 MMOL/L — SIGNIFICANT CHANGE UP (ref 3.5–5)
RBC # BLD: 3.72 M/UL — LOW (ref 4.2–5.4)
RBC # FLD: 13.6 % — SIGNIFICANT CHANGE UP (ref 11.5–14.5)
SODIUM SERPL-SCNC: 138 MMOL/L — SIGNIFICANT CHANGE UP (ref 135–146)
WBC # BLD: 6.11 K/UL — SIGNIFICANT CHANGE UP (ref 4.8–10.8)
WBC # FLD AUTO: 6.11 K/UL — SIGNIFICANT CHANGE UP (ref 4.8–10.8)

## 2019-08-28 PROCEDURE — 99231 SBSQ HOSP IP/OBS SF/LOW 25: CPT

## 2019-08-28 RX ORDER — MAGNESIUM SULFATE 500 MG/ML
1 VIAL (ML) INJECTION ONCE
Refills: 0 | Status: COMPLETED | OUTPATIENT
Start: 2019-08-28 | End: 2019-08-28

## 2019-08-28 RX ADMIN — AMLODIPINE BESYLATE 5 MILLIGRAM(S): 2.5 TABLET ORAL at 05:35

## 2019-08-28 RX ADMIN — Medication 650 MILLIGRAM(S): at 17:15

## 2019-08-28 RX ADMIN — Medication 400 MILLIGRAM(S): at 05:36

## 2019-08-28 RX ADMIN — Medication 400 MILLIGRAM(S): at 21:10

## 2019-08-28 RX ADMIN — Medication 400 MILLIGRAM(S): at 14:47

## 2019-08-28 RX ADMIN — Medication 100 GRAM(S): at 05:34

## 2019-08-28 RX ADMIN — Medication 1 TABLET(S): at 12:41

## 2019-08-28 RX ADMIN — Medication 400 MILLIGRAM(S): at 14:17

## 2019-08-28 RX ADMIN — LISINOPRIL 40 MILLIGRAM(S): 2.5 TABLET ORAL at 05:35

## 2019-08-28 RX ADMIN — Medication 650 MILLIGRAM(S): at 13:11

## 2019-08-28 RX ADMIN — APIXABAN 5 MILLIGRAM(S): 2.5 TABLET, FILM COATED ORAL at 17:15

## 2019-08-28 RX ADMIN — Medication 650 MILLIGRAM(S): at 05:36

## 2019-08-28 RX ADMIN — PANTOPRAZOLE SODIUM 40 MILLIGRAM(S): 20 TABLET, DELAYED RELEASE ORAL at 06:26

## 2019-08-28 RX ADMIN — CARVEDILOL PHOSPHATE 12.5 MILLIGRAM(S): 80 CAPSULE, EXTENDED RELEASE ORAL at 05:35

## 2019-08-28 RX ADMIN — Medication 100 MILLIGRAM(S): at 05:35

## 2019-08-28 RX ADMIN — Medication 650 MILLIGRAM(S): at 12:41

## 2019-08-28 RX ADMIN — Medication 650 MILLIGRAM(S): at 05:35

## 2019-08-28 RX ADMIN — Medication 88 MICROGRAM(S): at 05:35

## 2019-08-28 RX ADMIN — APIXABAN 5 MILLIGRAM(S): 2.5 TABLET, FILM COATED ORAL at 05:35

## 2019-08-28 RX ADMIN — SENNA PLUS 2 TABLET(S): 8.6 TABLET ORAL at 21:10

## 2019-08-28 RX ADMIN — Medication 100 MILLIGRAM(S): at 17:15

## 2019-08-28 NOTE — PROGRESS NOTE ADULT - SUBJECTIVE AND OBJECTIVE BOX
GENERAL SURGERY PROGRESS NOTE     SOWMYA AMADO  74y  Female  Hospital day :6d    OVERNIGHT EVENTS: No acute overnight events    T(F): 96.1 (08-27-19 @ 23:00), Max: 96.7 (08-27-19 @ 07:50)  HR: 59 (08-27-19 @ 23:00) (59 - 65)  BP: 158/69 (08-27-19 @ 23:00) (131/62 - 179/72)  RR: 18 (08-27-19 @ 23:00) (18 - 20)      DIET/FLUIDS: magnesium sulfate  IVPB 1 Gram(s) IV Intermittent once  multivitamin 1 Tablet(s) Oral daily       GI proph:  pantoprazole    Tablet 40 milliGRAM(s) Oral before breakfast      PHYSICAL EXAM:  GENERAL: NAD, well-appearing  CHEST/LUNG: Clear to auscultation bilaterally  HEART: Regular rate and rhythm  ABDOMEN: Soft, Nontender, Nondistended;   EXTREMITIES:  No clubbing, cyanosis, or edema    LABS  Labs:  CAPILLARY BLOOD GLUCOSE  POCT Blood Glucose.: 219 mg/dL (27 Aug 2019 21:13)  POCT Blood Glucose.: 235 mg/dL (27 Aug 2019 16:55)  POCT Blood Glucose.: 126 mg/dL (27 Aug 2019 12:37)  POCT Blood Glucose.: 158 mg/dL (27 Aug 2019 08:17)                          11.8   9.22  )-----------( 291      ( 27 Aug 2019 21:14 )             35.0       Auto Neutrophil %: 63.4 % (08-27-19 @ 21:14)  Auto Immature Granulocyte %: 0.3 % (08-27-19 @ 21:14)    08-27    129<L>  |  94<L>  |  25<H>  ----------------------------<  228<H>  5.0   |  23  |  0.6<L>      Calcium, Total Serum: 9.2 mg/dL (08-27-19 @ 21:14)      RADIOLOGY & ADDITIONAL TESTS:  < from: US Extremity Nonvasc Limited, Left (08.27.19 @ 09:17) >  Impression:    Intact left distal quadriceps and patellar tendons.    Acute transverse patella fracture with 5 mm bony distraction, unchanged.    < end of copied text >

## 2019-08-29 ENCOUNTER — TRANSCRIPTION ENCOUNTER (OUTPATIENT)
Age: 74
End: 2019-08-29

## 2019-08-29 VITALS
RESPIRATION RATE: 18 BRPM | TEMPERATURE: 97 F | HEART RATE: 79 BPM | SYSTOLIC BLOOD PRESSURE: 155 MMHG | DIASTOLIC BLOOD PRESSURE: 67 MMHG

## 2019-08-29 LAB
GLUCOSE BLDC GLUCOMTR-MCNC: 116 MG/DL — HIGH (ref 70–99)
GLUCOSE BLDC GLUCOMTR-MCNC: 146 MG/DL — HIGH (ref 70–99)
GLUCOSE BLDC GLUCOMTR-MCNC: 152 MG/DL — HIGH (ref 70–99)

## 2019-08-29 PROCEDURE — 99231 SBSQ HOSP IP/OBS SF/LOW 25: CPT

## 2019-08-29 RX ORDER — OXYCODONE HYDROCHLORIDE 5 MG/1
1 TABLET ORAL
Qty: 6 | Refills: 0
Start: 2019-08-29 | End: 2019-08-30

## 2019-08-29 RX ADMIN — Medication 650 MILLIGRAM(S): at 17:25

## 2019-08-29 RX ADMIN — Medication 100 MILLIGRAM(S): at 05:29

## 2019-08-29 RX ADMIN — APIXABAN 5 MILLIGRAM(S): 2.5 TABLET, FILM COATED ORAL at 17:25

## 2019-08-29 RX ADMIN — Medication 650 MILLIGRAM(S): at 00:23

## 2019-08-29 RX ADMIN — AMLODIPINE BESYLATE 5 MILLIGRAM(S): 2.5 TABLET ORAL at 05:28

## 2019-08-29 RX ADMIN — PANTOPRAZOLE SODIUM 40 MILLIGRAM(S): 20 TABLET, DELAYED RELEASE ORAL at 08:10

## 2019-08-29 RX ADMIN — Medication 650 MILLIGRAM(S): at 06:15

## 2019-08-29 RX ADMIN — Medication 650 MILLIGRAM(S): at 11:55

## 2019-08-29 RX ADMIN — Medication 650 MILLIGRAM(S): at 11:56

## 2019-08-29 RX ADMIN — Medication 400 MILLIGRAM(S): at 05:30

## 2019-08-29 RX ADMIN — Medication 88 MICROGRAM(S): at 05:30

## 2019-08-29 RX ADMIN — CARVEDILOL PHOSPHATE 12.5 MILLIGRAM(S): 80 CAPSULE, EXTENDED RELEASE ORAL at 05:28

## 2019-08-29 RX ADMIN — Medication 1 TABLET(S): at 11:55

## 2019-08-29 RX ADMIN — LISINOPRIL 40 MILLIGRAM(S): 2.5 TABLET ORAL at 05:30

## 2019-08-29 RX ADMIN — Medication 100 MILLIGRAM(S): at 17:25

## 2019-08-29 RX ADMIN — Medication 400 MILLIGRAM(S): at 06:15

## 2019-08-29 RX ADMIN — Medication 650 MILLIGRAM(S): at 05:28

## 2019-08-29 RX ADMIN — APIXABAN 5 MILLIGRAM(S): 2.5 TABLET, FILM COATED ORAL at 05:28

## 2019-08-29 RX ADMIN — Medication 400 MILLIGRAM(S): at 13:53

## 2019-08-29 NOTE — PROGRESS NOTE ADULT - REASON FOR ADMISSION
s/p mechanical fall, +ht, -loc, +eliquis

## 2019-08-29 NOTE — DISCHARGE NOTE PROVIDER - CARE PROVIDER_API CALL
Jordan Argueta)  Orthopaedic Surgery  [address] [place] [address]  Phone: [phone]  Fax: [phone]  Follow Up Time:     Torsten Luu)  Surgery  [address] [place] [address], [address] [place] [address]  Phone: [phone]  Fax: [phone]  Follow Up Time:

## 2019-08-29 NOTE — PROGRESS NOTE ADULT - ATTENDING COMMENTS
74F s/p mechanical fall w/ +HT, -LOC, +eliquis found to have L patellar fracture.     Plan:  -appreciate ortho recs: WBAT, knee immobilizer  -FU PT rehab
awaiting auth, pt ready for discharge
patient may not qualify for rehab given mobility status, however she reports that the reason she is not comfortable going home is that her [employer] often causes her to be unable to rise from a chair.  We will have pt see her again and will have to assess whether this qualifies or is not likely to be improved from a rehab stay
pt ambulating, pain tolerable post-ambulation with po meds  awaiting sw for dispo  us now or as outpatient
pt refeused for snf by insurance, will perform p2p  pt says needs rehab
stable overnight   ambulating   discharge planning
doing well overnight   pain well controlled   ambulating   discharge planning

## 2019-08-29 NOTE — DIETITIAN INITIAL EVALUATION ADULT. - OTHER INFO
Nutrition Hx PTA: Pt reports great appetite/po intake, typically consumes 3 meals daily + snacks. Will occasionally drink a Glucerna for adequate nutrition and follows a gluten-free diet; she is allergic to pineapples. Follows a diabetic diet at home.     Pt p/w L patella fracture. Patient is recovering well. US knee found to have acute transverse patella fracture with 5 mm bony distraction.  Patient currently anticipated for discharge and awaiting insurance authorization.

## 2019-08-29 NOTE — DISCHARGE NOTE PROVIDER - HOSPITAL COURSE
74F w/hx of Afib on Eliquis, anemia, DM, HTN, hypothyroidism, inclusion body myositis, s/p pacemaker, sick sinus syndrome, PVD, who presented s/p fall from standing due to "knee giving out", +HT, -LOC, +Eliquis. Imaging performed was significant for intra-articular fracture of the patella. Patient was seen by orthopedics, recommended weight-bearing as tolerated , pain control, and follow up as outpatient. Patient was admitted to trauma surgery, was seen by physical therapy and recommended for short-term rehab at a SNF. Ultrasound was performed as an outpatient and showed no rupture of the quadriceps or patellar tendon. Patient is tolerating her diet, her pain is well-controlled, and working with physical therapy. She is stable for discharge to SNF for short-term rehab.

## 2019-08-29 NOTE — PROGRESS NOTE ADULT - PROVIDER SPECIALTY LIST ADULT
Orthopedics
Trauma Surgery

## 2019-08-29 NOTE — DIETITIAN INITIAL EVALUATION ADULT. - PHYSICAL APPEARANCE
alert and oriented. BMI: 25.8, IBW: 105#, UBW: ~130#, denies any recent wt loss. no edema noted, ecchymosis./well nourished

## 2019-08-29 NOTE — DIETITIAN INITIAL EVALUATION ADULT. - NAME AND PHONE
Pt to maintain % po intake of meals and snacks over the next 7 days. RD to monitor diet order, energy intake, glucose profile, NFPF

## 2019-08-29 NOTE — DIETITIAN INITIAL EVALUATION ADULT. - FEEDING SKILL
independent/Pt reports excellent appetite and is currently consuming % of her meal trays w/o any issues.

## 2019-08-29 NOTE — DISCHARGE NOTE PROVIDER - NSDCCPCAREPLAN_GEN_ALL_CORE_FT
PRINCIPAL DISCHARGE DIAGNOSIS  Diagnosis: Patellar fracture  Assessment and Plan of Treatment:       SECONDARY DISCHARGE DIAGNOSES  Diagnosis: Inability to ambulate due to knee  Assessment and Plan of Treatment:     Diagnosis: Fall, initial encounter  Assessment and Plan of Treatment:

## 2019-08-29 NOTE — DIETITIAN INITIAL EVALUATION ADULT. - ENERGY NEEDS
Calories: 1408-8279 kcal/day (MSJ x 1.2-1.3 AF)  Protein: 62-74 gm/day (1-1.2 gm/kg CBW)  Fluid: 1 ml/kcal

## 2019-08-29 NOTE — PROGRESS NOTE ADULT - ASSESSMENT
74F s/p mechanical fall w/ +HT, -LOC, +eliquis found to have L patellar fracture.     Plan:  -appreciate ortho recs: WBAT, knee immobilizer  -FU PT rehab / SNF
74F presented with L patella fracture. Patient is recovering well. US knee found to have acute transverse patella fracture with 5 mm bony distraction. Patient was able to get up from bed with only one patient care assistant. Patient continually stable within normal limits    Plan:  - Patient currently anticipated for discharge and awaiting insurance authorization
74F s/p mechanical fall w/ +HT, -LOC, +eliquis found to have L patellar fracture    Patient doing well. No pain at rest. Patient working with PT and ambulating with walker.     Plan:   - Patient wants to go to SNF, pending insurance (not able to contact insurance company over weekend)   - possibly US for query patella tendon rupture on Monday  - PT/rehab/encourage ambulation
74F s/p mechanical fall w/ +HT, -LOC, +eliquis found to have L patellar fracture.     Plan:  -appreciate ortho recs: WBAT, knee immobilizer  -FU PT rehab
Assessment:  74F presented with L patella fracture. Patient is recovering well.    Plan:  - Dispo planning  - Pending insurance authorization
Assessment:  74F presented with L patella fracture. Patient is recovering well. US knee was performed in hospital yesterday. K level now at 5.0.    Plan:  - Dispo planning  - Pending insurance authorization
Assessment:  74F s/p mechanical fall w/ +HT, -LOC, +eliquis found to have L patellar fracture    Plan:  -US vs MRI (pacemaker) for ? patellar rupture  -f/u social work for placement  -f/u physiatry  -f/u PT/rehab  -dispo planning

## 2019-08-29 NOTE — DISCHARGE NOTE PROVIDER - NSDCFUADDAPPT_GEN_ALL_CORE_FT
You are being discharged from [facility]. Please schedule a follow up appointment with Dr. Argueta[facility] in 1 week. You have been prescribed oxycodone for breakthrough pain relief, please take as directed and avoid driving while taking narcotic pain medications. If you have any further questions about your care, please do not hesitate to contact Dr. Luu's office.

## 2019-08-29 NOTE — DISCHARGE NOTE PROVIDER - CARE PROVIDERS DIRECT ADDRESSES
[email or web address]

## 2019-08-29 NOTE — PROGRESS NOTE ADULT - SUBJECTIVE AND OBJECTIVE BOX
GENERAL SURGERY PROGRESS NOTE     SOWMYA AMADO  74y  Female  Hospital day :7d    OVERNIGHT EVENTS: No acute events    T(F): 96.8 (08-29-19 @ 08:00), Max: 97 (08-28-19 @ 15:42)  HR: 62 (08-29-19 @ 08:00) (62 - 64)  BP: 138/65 (08-29-19 @ 08:00) (137/65 - 168/74)  RR: 18 (08-29-19 @ 08:00) (18 - 18)  SpO2: 99% (08-29-19 @ 05:25) (99% - 99%)    DIET/FLUIDS: multivitamin 1 Tablet(s) Oral daily       GI proph:  pantoprazole    Tablet 40 milliGRAM(s) Oral before breakfast    AC/ proph:   ABx:     PHYSICAL EXAM:  GENERAL: NAD, well-appearing  CHEST/LUNG: Clear to auscultation bilaterally  HEART: Regular rate and rhythm  ABDOMEN: Soft, Nontender, Nondistended;   EXTREMITIES:  No clubbing, cyanosis, or edema      LABS  Labs:  CAPILLARY BLOOD GLUCOSE      POCT Blood Glucose.: 116 mg/dL (29 Aug 2019 08:12)  POCT Blood Glucose.: 148 mg/dL (28 Aug 2019 21:10)  POCT Blood Glucose.: 191 mg/dL (28 Aug 2019 16:48)  POCT Blood Glucose.: 140 mg/dL (28 Aug 2019 11:33)                          11.3   6.11  )-----------( 264      ( 28 Aug 2019 06:06 )             33.3         08-28    138  |  101  |  19  ----------------------------<  128<H>  4.9   |  24  |  0.5<L>          RADIOLOGY & ADDITIONAL TESTS:        < from: US Extremity Nonvasc Limited, Left (08.27.19 @ 09:17) >    Impression:    Intact left distal quadriceps and patellar tendons.    Acute transverse patella fracture with 5 mm bony distraction, unchanged.    < end of copied text >

## 2019-08-29 NOTE — DISCHARGE NOTE PROVIDER - NSDCFUSCHEDAPPT_GEN_ALL_CORE_FT
SOWMYA AMADO ; 10/31/2019 ; [facility] [address] SOWMYA Jung ; 11/12/2019 ; [facility] [address]

## 2019-09-03 DIAGNOSIS — Z88.2 ALLERGY STATUS TO SULFONAMIDES: ICD-10-CM

## 2019-09-03 DIAGNOSIS — H04.123 DRY EYE SYNDROME OF BILATERAL LACRIMAL GLANDS: ICD-10-CM

## 2019-09-03 DIAGNOSIS — Z96.41 PRESENCE OF INSULIN PUMP (EXTERNAL) (INTERNAL): ICD-10-CM

## 2019-09-03 DIAGNOSIS — E03.9 HYPOTHYROIDISM, UNSPECIFIED: ICD-10-CM

## 2019-09-03 DIAGNOSIS — Z90.710 ACQUIRED ABSENCE OF BOTH CERVIX AND UTERUS: ICD-10-CM

## 2019-09-03 DIAGNOSIS — I49.5 SICK SINUS SYNDROME: ICD-10-CM
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
## 2019-09-03 DIAGNOSIS — S82.042A DISPLACED COMMINUTED FRACTURE OF LEFT PATELLA, INITIAL ENCOUNTER FOR CLOSED FRACTURE: ICD-10-CM

## 2019-09-03 DIAGNOSIS — Z79.01 LONG TERM (CURRENT) USE OF ANTICOAGULANTS: ICD-10-CM
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
## 2019-09-03 DIAGNOSIS — E11.51 TYPE 2 DIABETES MELLITUS WITH DIABETIC PERIPHERAL ANGIOPATHY WITHOUT GANGRENE: ICD-10-CM

## 2019-09-03 DIAGNOSIS — G72.41 INCLUSION BODY MYOSITIS [IBM]: ICD-10-CM

## 2019-09-03 DIAGNOSIS — Z95.0 PRESENCE OF CARDIAC PACEMAKER: ICD-10-CM
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
## 2019-09-03 DIAGNOSIS — Z79.899 OTHER LONG TERM (CURRENT) DRUG THERAPY: ICD-10-CM

## 2019-09-03 DIAGNOSIS — Z91.040 LATEX ALLERGY STATUS: ICD-10-CM
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
## 2019-09-03 DIAGNOSIS — I48.91 UNSPECIFIED ATRIAL FIBRILLATION: ICD-10-CM

## 2019-09-03 DIAGNOSIS — I10 ESSENTIAL (PRIMARY) HYPERTENSION: ICD-10-CM

## 2019-09-03 DIAGNOSIS — Z95.820 PERIPHERAL VASCULAR ANGIOPLASTY STATUS WITH IMPLANTS AND GRAFTS: ICD-10-CM

## 2019-09-03 DIAGNOSIS — D50.9 IRON DEFICIENCY ANEMIA, UNSPECIFIED: ICD-10-CM

## 2019-09-23 ENCOUNTER — APPOINTMENT (OUTPATIENT)
Dept: CARDIOLOGY | Facility: CLINIC | Age: 74
End: 2019-09-23
Payer: MEDICARE

## 2019-09-23 PROCEDURE — 93294 REM INTERROG EVL PM/LDLS PM: CPT

## 2019-09-23 PROCEDURE — 93296 REM INTERROG EVL PM/IDS: CPT

## 2019-10-31 ENCOUNTER — APPOINTMENT (OUTPATIENT)
Dept: CARDIOLOGY | Facility: CLINIC | Age: 74
End: 2019-10-31
Payer: MEDICARE

## 2019-10-31 VITALS
WEIGHT: 130 LBS | HEIGHT: 61 IN | BODY MASS INDEX: 24.55 KG/M2 | HEART RATE: 61 BPM | DIASTOLIC BLOOD PRESSURE: 46 MMHG | SYSTOLIC BLOOD PRESSURE: 124 MMHG

## 2019-10-31 DIAGNOSIS — Z87.898 PERSONAL HISTORY OF OTHER SPECIFIED CONDITIONS: ICD-10-CM

## 2019-10-31 PROCEDURE — 93000 ELECTROCARDIOGRAM COMPLETE: CPT

## 2019-10-31 PROCEDURE — 99214 OFFICE O/P EST MOD 30 MIN: CPT

## 2019-10-31 NOTE — PHYSICAL EXAM
[General Appearance - Well Developed] : well developed [Normal Appearance] : normal appearance [Well Groomed] : well groomed [General Appearance - Well Nourished] : well nourished [No Deformities] : no deformities [General Appearance - In No Acute Distress] : no acute distress [Normal Conjunctiva] : the conjunctiva exhibited no abnormalities [Eyelids - No Xanthelasma] : the eyelids demonstrated no xanthelasmas [Normal Oral Mucosa] : normal oral mucosa [No Oral Pallor] : no oral pallor [No Oral Cyanosis] : no oral cyanosis [FreeTextEntry1] : No JVD [Respiration, Rhythm And Depth] : normal respiratory rhythm and effort [Exaggerated Use Of Accessory Muscles For Inspiration] : no accessory muscle use [Auscultation Breath Sounds / Voice Sounds] : lungs were clear to auscultation bilaterally [Abdomen Soft] : soft [Abdomen Tenderness] : non-tender [Abdomen Mass (___ Cm)] : no abdominal mass palpated [Abnormal Walk] : normal gait [Gait - Sufficient For Exercise Testing] : the gait was sufficient for exercise testing [Nail Clubbing] : no clubbing of the fingernails [Cyanosis, Localized] : no localized cyanosis [Petechial Hemorrhages (___cm)] : no petechial hemorrhages [Skin Color & Pigmentation] : normal skin color and pigmentation [] : no rash [No Venous Stasis] : no venous stasis [Skin Lesions] : no skin lesions [No Skin Ulcers] : no skin ulcer [No Xanthoma] : no  xanthoma was observed [Oriented To Time, Place, And Person] : oriented to person, place, and time [Affect] : the affect was normal [Mood] : the mood was normal [No Anxiety] : not feeling anxious [Normal Rate] : normal [Rhythm Regular] : regular [No Murmur] : no murmurs heard [2+] : left 2+ [No Pitting Edema] : no pitting edema present [Rt] : no varicose veins of the right leg

## 2019-10-31 NOTE — ASSESSMENT
[FreeTextEntry1] : The patient has had no chest pain . She is a diabetic on insulin for many years . She has inclusion body Myositis . She is being by Neurology . The patient has PAF . PSVT and had had an ablation for the SVT . She has a PPM for bradycardia and this is followed by Dr. De La Cruz

## 2019-10-31 NOTE — REVIEW OF SYSTEMS
[Fever] : no fever [Headache] : no headache [Chills] : no chills [Feeling Fatigued] : feeling fatigued [Negative] : Endocrine

## 2019-11-18 ENCOUNTER — APPOINTMENT (OUTPATIENT)
Dept: HEMATOLOGY ONCOLOGY | Facility: CLINIC | Age: 74
End: 2019-11-18
Payer: MEDICARE

## 2019-11-18 ENCOUNTER — OUTPATIENT (OUTPATIENT)
Dept: OUTPATIENT SERVICES | Facility: HOSPITAL | Age: 74
LOS: 1 days | Discharge: HOME | End: 2019-11-18

## 2019-11-18 ENCOUNTER — LABORATORY RESULT (OUTPATIENT)
Age: 74
End: 2019-11-18

## 2019-11-18 VITALS
SYSTOLIC BLOOD PRESSURE: 161 MMHG | HEART RATE: 66 BPM | BODY MASS INDEX: 24.73 KG/M2 | RESPIRATION RATE: 16 BRPM | WEIGHT: 131 LBS | TEMPERATURE: 96.1 F | HEIGHT: 61 IN | DIASTOLIC BLOOD PRESSURE: 58 MMHG

## 2019-11-18 DIAGNOSIS — Z98.890 OTHER SPECIFIED POSTPROCEDURAL STATES: Chronic | ICD-10-CM

## 2019-11-18 DIAGNOSIS — Z95.0 PRESENCE OF CARDIAC PACEMAKER: Chronic | ICD-10-CM

## 2019-11-18 DIAGNOSIS — Z90.710 ACQUIRED ABSENCE OF BOTH CERVIX AND UTERUS: Chronic | ICD-10-CM

## 2019-11-18 PROCEDURE — 99213 OFFICE O/P EST LOW 20 MIN: CPT

## 2019-11-19 DIAGNOSIS — D64.9 ANEMIA, UNSPECIFIED: ICD-10-CM

## 2019-11-19 LAB
FERRITIN SERPL-MCNC: 111 NG/ML
HCT VFR BLD CALC: 32.1 %
HGB BLD-MCNC: 10.8 G/DL
MCHC RBC-ENTMCNC: 30.9 PG
MCHC RBC-ENTMCNC: 33.6 G/DL
MCV RBC AUTO: 91.7 FL
PLATELET # BLD AUTO: 238 K/UL
PMV BLD: 11 FL
RBC # BLD: 3.5 M/UL
RBC # FLD: 13.4 %
WBC # FLD AUTO: 10.11 K/UL

## 2019-11-19 NOTE — PHYSICAL EXAM
[Normal] : no JVD, no calf tenderness, venous stasis changes, varices [de-identified] : well preserved female , slightly pale in NAD.

## 2019-11-19 NOTE — HISTORY OF PRESENT ILLNESS
[de-identified] : 72 year old female with hypertension, paroxysmal atrial fibrillation on xarelto for 2 years, diabetes, GE reflux, peripheral arterial disease s/ right lower extremity angioplasty. referred for anemia first noted in December 2016. Hemoglobin :10.8, mcv 91, iro 35 TIBC : 330 , saturation 11%. chemistry unremarkable. including kidney function. She denies hematochezia, reports mild fatigue and dyspnea on exertion. no craving for ice.no weight loss.She takes multivitamins , no iron. last colonoscopy 5 years ago.  [de-identified] : 01/09/2018 The patient returns for follow-up for iron deficiency anemia, she continues on iron one daily , she had pacemaker inserted and is stable from cardiac standpoint. Hb is down to 10.8 . She denies any hematochezia.\par \par 03/01/2018  atient returns for follow-up , she is currently on iron twice daily . She feels well and offers no complaints. Last ferritin from Jan 9 is 79 . recent CBC shows 11.3 , MCV 90 . wbc and platelets are normal . Follow-up CT chest for lung nodule shows new scattered right upper lobe tree-in-bud nodules and decreased nodular opacities in right lower lobe, likely infectious/inflammatory bronchiolitis. LLL nodule is unchanged. She denies any respiratory symptoms, no fever , weight loss. \par \par \par 06/14/2018 : patient returns for follow up , she denies any new complaints. Last blood work showed mild anemia , she continues on po iron twice daily and denies any new complaints .\par \par 06/06/2019 Patient returns for routine follow up for mild normocytic anemia . She complains of mild easy fatigability she attributes to her running around to visit her  in the hospital . she continues on iron one daily and denies hematochezia . \par \par 11/18/2019 Patient returns for follow up , she sustained fracture of left patellae in August and was placed in a long leg cast in rehab for 6 weeks . not sure if she was given iron in rehab . Last Hb was 11.3 in August . she offers no complaints today , denies NSAID .

## 2019-11-27 ENCOUNTER — MEDICATION RENEWAL (OUTPATIENT)
Age: 74
End: 2019-11-27

## 2019-12-23 ENCOUNTER — APPOINTMENT (OUTPATIENT)
Dept: NEUROLOGY | Facility: CLINIC | Age: 74
End: 2019-12-23
Payer: MEDICARE

## 2019-12-23 VITALS
OXYGEN SATURATION: 99 % | HEIGHT: 61 IN | HEART RATE: 79 BPM | SYSTOLIC BLOOD PRESSURE: 122 MMHG | TEMPERATURE: 98.6 F | WEIGHT: 130 LBS | BODY MASS INDEX: 24.55 KG/M2 | DIASTOLIC BLOOD PRESSURE: 76 MMHG

## 2019-12-23 PROCEDURE — 99214 OFFICE O/P EST MOD 30 MIN: CPT

## 2019-12-24 NOTE — HISTORY OF PRESENT ILLNESS
[FreeTextEntry1] : Aranza is here for followup. 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 Currently she is being treated to 4 inclusion body myositis receiving IVIG treatment every 6 weeks. She says clearly  she sees advantage to the treatment and in fact when she reaches the 5th week in the course of her treatment of every 6 weeks she would feel becoming more weak and symptomatic.\par After her last visit to my office in May 2019 she did have a fall in August which was purely mechanical. She landed on her left knee and had a fracture of the kneecap. She was in a brace for few weeks. Now she is walking with a cane.\par Her description of the event is purely becoming more mechanically compromised in the setting that was already not fully function.\par Otherwise she is doing very well she is as usual in a very good spirits.\par She also has been taking care of her  with some medical issues. She is excised to that now she is having a great  great grandson. She denies having headache. There is no issues with her bladder or bowel movements. She has no issues with swallowing. She says she is still quite functional with her hands although it is not as strong as it should be she manages to walk even without cane.\par Her balance is good although patient stands from a sitting position she has to be more careful

## 2019-12-24 NOTE — PHYSICAL EXAM
[FreeTextEntry1] : Awake alert oriented x3 in a good mood. Follows 3-4 step commands. Crosses the midline well.\par She does have a tendency to tilt her head to the right side when she is sitting.\par Her attention and concentration is normal executive behavior and language are normal\par Cranial nerve exam is normal\par Motor exam shows 5 over 5 strength in proximal upper extremity and -4-4/5 distal.\par She does have a slight difficulty standing up without using her hands however she is able to manage it using her hands initially her gait is slightly wide and gradually is more stable and she lacks. She does limp on the left side considering her left knee pain.\par Her gait is a stable Romberg is negative\par The carotid exam shows no bruit the heart rate is regular and sinus

## 2019-12-24 NOTE — ASSESSMENT
[FreeTextEntry1] : Aranza is here for followup with a diagnosis of inclusion body myositis and significant other medical issues as described above. She has had a mechanical fall in August 2019. During that she did have a left kneecap fracture fortunately she is doing very well in that regard.\par She is at her baseline based on her description and understanding of the symptoms do IVIG treatment is working however towards the end of the course between 2 treatments she becomes a slightly more symptomatic.\par I will increase the frequency of the treatment from every 6 weeks to every 5 weeks. I will see her in the followup in 4-5 months. She will continue her physical therapy.

## 2019-12-26 ENCOUNTER — APPOINTMENT (OUTPATIENT)
Dept: CARDIOLOGY | Facility: CLINIC | Age: 74
End: 2019-12-26
Payer: MEDICARE

## 2019-12-26 PROCEDURE — 93294 REM INTERROG EVL PM/LDLS PM: CPT

## 2019-12-26 PROCEDURE — 93296 REM INTERROG EVL PM/IDS: CPT

## 2020-01-23 NOTE — H&P ADULT - NSHPPOADEEPVENOUSTHROMB_GEN_A_CORE
no CSE explained to pt. in detail;  risks include but not limited to HA, failure, nv injury.  All questions answered.

## 2020-03-03 ENCOUNTER — OUTPATIENT (OUTPATIENT)
Dept: OUTPATIENT SERVICES | Facility: HOSPITAL | Age: 75
LOS: 1 days | Discharge: HOME | End: 2020-03-03

## 2020-03-03 DIAGNOSIS — Z98.890 OTHER SPECIFIED POSTPROCEDURAL STATES: Chronic | ICD-10-CM

## 2020-03-03 DIAGNOSIS — Z90.710 ACQUIRED ABSENCE OF BOTH CERVIX AND UTERUS: Chronic | ICD-10-CM

## 2020-03-03 DIAGNOSIS — Z95.0 PRESENCE OF CARDIAC PACEMAKER: Chronic | ICD-10-CM

## 2020-03-03 DIAGNOSIS — M25.60 STIFFNESS OF UNSPECIFIED JOINT, NOT ELSEWHERE CLASSIFIED: ICD-10-CM

## 2020-03-03 DIAGNOSIS — S82.001A UNSPECIFIED FRACTURE OF RIGHT PATELLA, INITIAL ENCOUNTER FOR CLOSED FRACTURE: ICD-10-CM

## 2020-03-04 ENCOUNTER — APPOINTMENT (OUTPATIENT)
Dept: CARDIOLOGY | Facility: CLINIC | Age: 75
End: 2020-03-04
Payer: MEDICARE

## 2020-03-04 VITALS — HEIGHT: 61 IN | BODY MASS INDEX: 24.92 KG/M2 | WEIGHT: 132 LBS

## 2020-03-04 VITALS — SYSTOLIC BLOOD PRESSURE: 122 MMHG | DIASTOLIC BLOOD PRESSURE: 82 MMHG

## 2020-03-04 DIAGNOSIS — K90.0 CELIAC DISEASE: ICD-10-CM

## 2020-03-04 PROCEDURE — 93000 ELECTROCARDIOGRAM COMPLETE: CPT

## 2020-03-04 PROCEDURE — 99214 OFFICE O/P EST MOD 30 MIN: CPT

## 2020-03-04 NOTE — ASSESSMENT
[FreeTextEntry1] : The patient is going for cataract surgery . The patient has known DM and has had PAF and PSVT and has had an ablation in the past . She has had Sick sinus syndrome and PPM . She needs to go for cataract surgery . She has more than 4 METS exercise tolerance .The patiet had a negative nuclear stress test less than one year ago and has known normal LV systolic function   The patient should be considered an intermediate cardiac risk undergoing a minor risk procedure. If there is an unacceptable bleeding risk may stop her Eliquis for 48 hours before the procedure. She should take her BP medications on the morning of the procedure. She has multiple medical issues including insulin requiring DM and she needs separate medical clearance

## 2020-03-04 NOTE — HISTORY OF PRESENT ILLNESS
[FreeTextEntry1] : The patient is going for cataract surgery . She has had no chest pain . Ischemia work up was done less than one year ago .

## 2020-03-04 NOTE — PHYSICAL EXAM
[Normal Appearance] : normal appearance [General Appearance - Well Developed] : well developed [Well Groomed] : well groomed [General Appearance - Well Nourished] : well nourished [No Deformities] : no deformities [General Appearance - In No Acute Distress] : no acute distress [Normal Conjunctiva] : the conjunctiva exhibited no abnormalities [Eyelids - No Xanthelasma] : the eyelids demonstrated no xanthelasmas [Normal Oral Mucosa] : normal oral mucosa [No Oral Pallor] : no oral pallor [No Oral Cyanosis] : no oral cyanosis [FreeTextEntry1] : No JVD [Respiration, Rhythm And Depth] : normal respiratory rhythm and effort [Exaggerated Use Of Accessory Muscles For Inspiration] : no accessory muscle use [Auscultation Breath Sounds / Voice Sounds] : lungs were clear to auscultation bilaterally [Abdomen Soft] : soft [Abdomen Tenderness] : non-tender [Abdomen Mass (___ Cm)] : no abdominal mass palpated [Gait - Sufficient For Exercise Testing] : the gait was sufficient for exercise testing [Abnormal Walk] : normal gait [Cyanosis, Localized] : no localized cyanosis [Nail Clubbing] : no clubbing of the fingernails [Petechial Hemorrhages (___cm)] : no petechial hemorrhages [Skin Color & Pigmentation] : normal skin color and pigmentation [] : no rash [No Venous Stasis] : no venous stasis [Skin Lesions] : no skin lesions [No Skin Ulcers] : no skin ulcer [No Xanthoma] : no  xanthoma was observed [Oriented To Time, Place, And Person] : oriented to person, place, and time [Affect] : the affect was normal [Mood] : the mood was normal [No Anxiety] : not feeling anxious [Normal Rate] : normal [Rhythm Regular] : regular [No Murmur] : no murmurs heard [2+] : left 2+ [No Pitting Edema] : no pitting edema present [Rt] : no varicose veins of the right leg

## 2020-03-26 ENCOUNTER — RESULT REVIEW (OUTPATIENT)
Age: 75
End: 2020-03-26

## 2020-03-26 ENCOUNTER — APPOINTMENT (OUTPATIENT)
Dept: CARDIOLOGY | Facility: CLINIC | Age: 75
End: 2020-03-26
Payer: MEDICARE

## 2020-03-26 ENCOUNTER — OUTPATIENT (OUTPATIENT)
Dept: OUTPATIENT SERVICES | Facility: HOSPITAL | Age: 75
LOS: 1 days | Discharge: HOME | End: 2020-03-26
Payer: MEDICARE

## 2020-03-26 DIAGNOSIS — Z98.890 OTHER SPECIFIED POSTPROCEDURAL STATES: Chronic | ICD-10-CM

## 2020-03-26 DIAGNOSIS — Z90.710 ACQUIRED ABSENCE OF BOTH CERVIX AND UTERUS: Chronic | ICD-10-CM

## 2020-03-26 DIAGNOSIS — I48.0 PAROXYSMAL ATRIAL FIBRILLATION: ICD-10-CM

## 2020-03-26 DIAGNOSIS — Z95.0 PRESENCE OF CARDIAC PACEMAKER: Chronic | ICD-10-CM

## 2020-03-26 PROCEDURE — 75574 CT ANGIO HRT W/3D IMAGE: CPT | Mod: 26

## 2020-03-26 PROCEDURE — 93296 REM INTERROG EVL PM/IDS: CPT

## 2020-03-26 PROCEDURE — 93294 REM INTERROG EVL PM/LDLS PM: CPT

## 2020-04-02 ENCOUNTER — RX RENEWAL (OUTPATIENT)
Age: 75
End: 2020-04-02

## 2020-04-20 ENCOUNTER — RX RENEWAL (OUTPATIENT)
Age: 75
End: 2020-04-20

## 2020-05-07 ENCOUNTER — OUTPATIENT (OUTPATIENT)
Dept: OUTPATIENT SERVICES | Facility: HOSPITAL | Age: 75
LOS: 1 days | Discharge: HOME | End: 2020-05-07
Payer: MEDICARE

## 2020-05-07 DIAGNOSIS — R10.11 RIGHT UPPER QUADRANT PAIN: ICD-10-CM

## 2020-05-07 DIAGNOSIS — Z98.890 OTHER SPECIFIED POSTPROCEDURAL STATES: Chronic | ICD-10-CM

## 2020-05-07 DIAGNOSIS — Z95.0 PRESENCE OF CARDIAC PACEMAKER: Chronic | ICD-10-CM

## 2020-05-07 DIAGNOSIS — Z90.710 ACQUIRED ABSENCE OF BOTH CERVIX AND UTERUS: Chronic | ICD-10-CM

## 2020-05-07 PROCEDURE — 76700 US EXAM ABDOM COMPLETE: CPT | Mod: 26

## 2020-05-15 ENCOUNTER — APPOINTMENT (OUTPATIENT)
Dept: CARDIOLOGY | Facility: CLINIC | Age: 75
End: 2020-05-15
Payer: MEDICARE

## 2020-05-15 DIAGNOSIS — M50.90 CERVICAL DISC DISORDER, UNSPECIFIED, UNSPECIFIED CERVICAL REGION: ICD-10-CM

## 2020-05-15 DIAGNOSIS — I47.1 SUPRAVENTRICULAR TACHYCARDIA: ICD-10-CM

## 2020-05-15 DIAGNOSIS — I73.9 PERIPHERAL VASCULAR DISEASE, UNSPECIFIED: ICD-10-CM

## 2020-05-15 DIAGNOSIS — K21.9 GASTRO-ESOPHAGEAL REFLUX DISEASE W/OUT ESOPHAGITIS: ICD-10-CM

## 2020-05-15 DIAGNOSIS — I34.0 NONRHEUMATIC MITRAL (VALVE) INSUFFICIENCY: ICD-10-CM

## 2020-05-15 DIAGNOSIS — R00.2 PALPITATIONS: ICD-10-CM

## 2020-05-15 DIAGNOSIS — I49.9 CARDIAC ARRHYTHMIA, UNSPECIFIED: ICD-10-CM

## 2020-05-15 PROCEDURE — 99442: CPT

## 2020-05-15 NOTE — HISTORY OF PRESENT ILLNESS
[Verbal consent obtained from patient] : the patient, [unfilled] [FreeTextEntry1] : The patient had an episode of vague chest discomfort and arm pain while getting anesthesia for a cataract surgery . The episode lasted a couple of seconds only. Since that time she has not had further chest pain or SOB .

## 2020-05-15 NOTE — PLAN
[FreeTextEntry1] : For cardiac cath . Will have to stop Eliquis 3 days before the procedure but start ASA 81 mg qd . She has known PAD and suspected severe lesion in the LCX . She had a vague episode of left arm and chest pain which is atypical but she has long standing insulin requiring DM . Discussed with patient and she is agreeable to this

## 2020-05-22 ENCOUNTER — OUTPATIENT (OUTPATIENT)
Dept: OUTPATIENT SERVICES | Facility: HOSPITAL | Age: 75
LOS: 1 days | Discharge: HOME | End: 2020-05-22
Payer: MEDICARE

## 2020-05-22 VITALS
OXYGEN SATURATION: 100 % | HEART RATE: 60 BPM | RESPIRATION RATE: 16 BRPM | TEMPERATURE: 98 F | DIASTOLIC BLOOD PRESSURE: 65 MMHG | WEIGHT: 130.07 LBS | HEIGHT: 61 IN | SYSTOLIC BLOOD PRESSURE: 144 MMHG

## 2020-05-22 DIAGNOSIS — Z98.890 OTHER SPECIFIED POSTPROCEDURAL STATES: Chronic | ICD-10-CM

## 2020-05-22 DIAGNOSIS — Z01.818 ENCOUNTER FOR OTHER PREPROCEDURAL EXAMINATION: ICD-10-CM

## 2020-05-22 DIAGNOSIS — I25.10 ATHEROSCLEROTIC HEART DISEASE OF NATIVE CORONARY ARTERY WITHOUT ANGINA PECTORIS: ICD-10-CM

## 2020-05-22 DIAGNOSIS — Z98.49 CATARACT EXTRACTION STATUS, UNSPECIFIED EYE: Chronic | ICD-10-CM

## 2020-05-22 DIAGNOSIS — Z90.710 ACQUIRED ABSENCE OF BOTH CERVIX AND UTERUS: Chronic | ICD-10-CM

## 2020-05-22 DIAGNOSIS — Z95.0 PRESENCE OF CARDIAC PACEMAKER: Chronic | ICD-10-CM

## 2020-05-22 LAB
A1C WITH ESTIMATED AVERAGE GLUCOSE RESULT: 7.9 % — HIGH (ref 4–5.6)
ALBUMIN SERPL ELPH-MCNC: 4.4 G/DL — SIGNIFICANT CHANGE UP (ref 3.5–5.2)
ALP SERPL-CCNC: 118 U/L — HIGH (ref 30–115)
ALT FLD-CCNC: 23 U/L — SIGNIFICANT CHANGE UP (ref 0–41)
ANION GAP SERPL CALC-SCNC: 9 MMOL/L — SIGNIFICANT CHANGE UP (ref 7–14)
APTT BLD: 38.3 SEC — SIGNIFICANT CHANGE UP (ref 27–39.2)
AST SERPL-CCNC: 24 U/L — SIGNIFICANT CHANGE UP (ref 0–41)
BASOPHILS # BLD AUTO: 0.03 K/UL — SIGNIFICANT CHANGE UP (ref 0–0.2)
BASOPHILS NFR BLD AUTO: 0.4 % — SIGNIFICANT CHANGE UP (ref 0–1)
BILIRUB SERPL-MCNC: 0.3 MG/DL — SIGNIFICANT CHANGE UP (ref 0.2–1.2)
BUN SERPL-MCNC: 19 MG/DL — SIGNIFICANT CHANGE UP (ref 10–20)
CALCIUM SERPL-MCNC: 9.2 MG/DL — SIGNIFICANT CHANGE UP (ref 8.5–10.1)
CHLORIDE SERPL-SCNC: 98 MMOL/L — SIGNIFICANT CHANGE UP (ref 98–110)
CO2 SERPL-SCNC: 27 MMOL/L — SIGNIFICANT CHANGE UP (ref 17–32)
CREAT SERPL-MCNC: 0.5 MG/DL — LOW (ref 0.7–1.5)
EOSINOPHIL # BLD AUTO: 0.16 K/UL — SIGNIFICANT CHANGE UP (ref 0–0.7)
EOSINOPHIL NFR BLD AUTO: 2.4 % — SIGNIFICANT CHANGE UP (ref 0–8)
ESTIMATED AVERAGE GLUCOSE: 180 MG/DL — HIGH (ref 68–114)
GLUCOSE SERPL-MCNC: 94 MG/DL — SIGNIFICANT CHANGE UP (ref 70–99)
HCT VFR BLD CALC: 35.4 % — LOW (ref 37–47)
HGB BLD-MCNC: 11.6 G/DL — LOW (ref 12–16)
IMM GRANULOCYTES NFR BLD AUTO: 0.1 % — SIGNIFICANT CHANGE UP (ref 0.1–0.3)
INR BLD: 1.3 RATIO — SIGNIFICANT CHANGE UP (ref 0.65–1.3)
LYMPHOCYTES # BLD AUTO: 1.71 K/UL — SIGNIFICANT CHANGE UP (ref 1.2–3.4)
LYMPHOCYTES # BLD AUTO: 25.1 % — SIGNIFICANT CHANGE UP (ref 20.5–51.1)
MCHC RBC-ENTMCNC: 30.1 PG — SIGNIFICANT CHANGE UP (ref 27–31)
MCHC RBC-ENTMCNC: 32.8 G/DL — SIGNIFICANT CHANGE UP (ref 32–37)
MCV RBC AUTO: 91.7 FL — SIGNIFICANT CHANGE UP (ref 81–99)
MONOCYTES # BLD AUTO: 0.71 K/UL — HIGH (ref 0.1–0.6)
MONOCYTES NFR BLD AUTO: 10.4 % — HIGH (ref 1.7–9.3)
NEUTROPHILS # BLD AUTO: 4.18 K/UL — SIGNIFICANT CHANGE UP (ref 1.4–6.5)
NEUTROPHILS NFR BLD AUTO: 61.6 % — SIGNIFICANT CHANGE UP (ref 42.2–75.2)
NRBC # BLD: 0 /100 WBCS — SIGNIFICANT CHANGE UP (ref 0–0)
PLATELET # BLD AUTO: 247 K/UL — SIGNIFICANT CHANGE UP (ref 130–400)
POTASSIUM SERPL-MCNC: 5.4 MMOL/L — HIGH (ref 3.5–5)
POTASSIUM SERPL-SCNC: 5.4 MMOL/L — HIGH (ref 3.5–5)
PROT SERPL-MCNC: 7.8 G/DL — SIGNIFICANT CHANGE UP (ref 6–8)
PROTHROM AB SERPL-ACNC: 14.9 SEC — HIGH (ref 9.95–12.87)
RBC # BLD: 3.86 M/UL — LOW (ref 4.2–5.4)
RBC # FLD: 13.4 % — SIGNIFICANT CHANGE UP (ref 11.5–14.5)
SODIUM SERPL-SCNC: 134 MMOL/L — LOW (ref 135–146)
WBC # BLD: 6.8 K/UL — SIGNIFICANT CHANGE UP (ref 4.8–10.8)
WBC # FLD AUTO: 6.8 K/UL — SIGNIFICANT CHANGE UP (ref 4.8–10.8)

## 2020-05-22 PROCEDURE — 93010 ELECTROCARDIOGRAM REPORT: CPT

## 2020-05-22 RX ORDER — MONTELUKAST 4 MG/1
1 TABLET, CHEWABLE ORAL
Qty: 0 | Refills: 0 | DISCHARGE

## 2020-05-22 NOTE — H&P PST ADULT - NSICDXPASTSURGICALHX_GEN_ALL_CORE_FT
PAST SURGICAL HISTORY:  Cardiac pacemaker 9/9/17    H/O total hysterectomy 25+ yrs ago    History of surgery Right Peripheral Stent 5 yrs    History of surgery MOHS procedure (face) x 3    S/P ablation of atrial fibrillation     S/P cataract surgery

## 2020-05-22 NOTE — H&P PST ADULT - HISTORY OF PRESENT ILLNESS
Pt states a blockage was seen in the heart. Denies any chest pain, difficulty breathing, SOB, palpitations, dysuria, URI, or any other infections in the last 2 weeks. Denies any recent travel, contact, or exposure to any persons with known or suspected COVID-19. Pt also denies COVID testing within the last 2 weeks. Unable to climb flights of stairs without dyspnea. RADHA reviewed with patient.

## 2020-05-22 NOTE — H&P PST ADULT - NSICDXPASTMEDICALHX_GEN_ALL_CORE_FT
PAST MEDICAL HISTORY:  Afib 5+ yrs; on Eliquis    Anemia Chronic, on Iron supplement    Diabetes Insulin dependent - (has insulin Pump)    Dry eyes, bilateral     Essential hypertension     Hypothyroid     Inclusion body myositis (IBM)     Lung nodules     Pacemaker Sept 2017    Palpitations occasionally; not for couple yrs    PVD (peripheral vascular disease) S/p Right Peripheral leg Stent    Seasonal allergies     Skin cancer Basal Cell Cancer face  (around nose) s/p MOHS procedure x 3    SSS (sick sinus syndrome) S/p PPM    Transfusion history S/p Hysterectomy

## 2020-05-22 NOTE — H&P PST ADULT - PRIMARY CARE PROVIDER
Dr. Liz LV 3/2020 Dr. Paniagua (cardiologist) LV 3/2020 Dr. Augustine (pulmonary) LV 2/2020 Dr. Dobbs (endocrinologist) LV 9/2019

## 2020-06-04 ENCOUNTER — INPATIENT (INPATIENT)
Facility: HOSPITAL | Age: 75
LOS: 0 days | Discharge: HOME | End: 2020-06-05
Attending: INTERNAL MEDICINE | Admitting: INTERNAL MEDICINE
Payer: MEDICARE

## 2020-06-04 VITALS
OXYGEN SATURATION: 99 % | HEART RATE: 66 BPM | DIASTOLIC BLOOD PRESSURE: 58 MMHG | RESPIRATION RATE: 17 BRPM | TEMPERATURE: 97 F | SYSTOLIC BLOOD PRESSURE: 148 MMHG

## 2020-06-04 DIAGNOSIS — Z98.890 OTHER SPECIFIED POSTPROCEDURAL STATES: Chronic | ICD-10-CM

## 2020-06-04 DIAGNOSIS — Z90.710 ACQUIRED ABSENCE OF BOTH CERVIX AND UTERUS: Chronic | ICD-10-CM

## 2020-06-04 DIAGNOSIS — Z95.0 PRESENCE OF CARDIAC PACEMAKER: Chronic | ICD-10-CM

## 2020-06-04 DIAGNOSIS — Z98.49 CATARACT EXTRACTION STATUS, UNSPECIFIED EYE: Chronic | ICD-10-CM

## 2020-06-04 LAB
ANION GAP SERPL CALC-SCNC: 11 MMOL/L — SIGNIFICANT CHANGE UP (ref 7–14)
ANION GAP SERPL CALC-SCNC: 13 MMOL/L — SIGNIFICANT CHANGE UP (ref 7–14)
BUN SERPL-MCNC: 13 MG/DL — SIGNIFICANT CHANGE UP (ref 10–20)
BUN SERPL-MCNC: 28 MG/DL — HIGH (ref 10–20)
CALCIUM SERPL-MCNC: 9 MG/DL — SIGNIFICANT CHANGE UP (ref 8.5–10.1)
CALCIUM SERPL-MCNC: 9.3 MG/DL — SIGNIFICANT CHANGE UP (ref 8.5–10.1)
CHLORIDE SERPL-SCNC: 100 MMOL/L — SIGNIFICANT CHANGE UP (ref 98–110)
CHLORIDE SERPL-SCNC: 98 MMOL/L — SIGNIFICANT CHANGE UP (ref 98–110)
CO2 SERPL-SCNC: 23 MMOL/L — SIGNIFICANT CHANGE UP (ref 17–32)
CO2 SERPL-SCNC: 26 MMOL/L — SIGNIFICANT CHANGE UP (ref 17–32)
CREAT SERPL-MCNC: 0.5 MG/DL — LOW (ref 0.7–1.5)
CREAT SERPL-MCNC: <0.5 MG/DL — LOW (ref 0.7–1.5)
GLUCOSE BLDC GLUCOMTR-MCNC: 108 MG/DL — HIGH (ref 70–99)
GLUCOSE BLDC GLUCOMTR-MCNC: 113 MG/DL — HIGH (ref 70–99)
GLUCOSE BLDC GLUCOMTR-MCNC: 140 MG/DL — HIGH (ref 70–99)
GLUCOSE BLDC GLUCOMTR-MCNC: 240 MG/DL — HIGH (ref 70–99)
GLUCOSE BLDC GLUCOMTR-MCNC: 240 MG/DL — HIGH (ref 70–99)
GLUCOSE BLDC GLUCOMTR-MCNC: 258 MG/DL — HIGH (ref 70–99)
GLUCOSE BLDC GLUCOMTR-MCNC: 67 MG/DL — LOW (ref 70–99)
GLUCOSE BLDC GLUCOMTR-MCNC: 95 MG/DL — SIGNIFICANT CHANGE UP (ref 70–99)
GLUCOSE SERPL-MCNC: 158 MG/DL — HIGH (ref 70–99)
GLUCOSE SERPL-MCNC: 247 MG/DL — HIGH (ref 70–99)
HCT VFR BLD CALC: 34.1 % — LOW (ref 37–47)
HCT VFR BLD CALC: 34.4 % — LOW (ref 37–47)
HGB BLD-MCNC: 11.3 G/DL — LOW (ref 12–16)
HGB BLD-MCNC: 11.5 G/DL — LOW (ref 12–16)
MCHC RBC-ENTMCNC: 30.3 PG — SIGNIFICANT CHANGE UP (ref 27–31)
MCHC RBC-ENTMCNC: 30.6 PG — SIGNIFICANT CHANGE UP (ref 27–31)
MCHC RBC-ENTMCNC: 33.1 G/DL — SIGNIFICANT CHANGE UP (ref 32–37)
MCHC RBC-ENTMCNC: 33.4 G/DL — SIGNIFICANT CHANGE UP (ref 32–37)
MCV RBC AUTO: 90.5 FL — SIGNIFICANT CHANGE UP (ref 81–99)
MCV RBC AUTO: 92.4 FL — SIGNIFICANT CHANGE UP (ref 81–99)
NRBC # BLD: 0 /100 WBCS — SIGNIFICANT CHANGE UP (ref 0–0)
NRBC # BLD: 0 /100 WBCS — SIGNIFICANT CHANGE UP (ref 0–0)
PLATELET # BLD AUTO: 243 K/UL — SIGNIFICANT CHANGE UP (ref 130–400)
PLATELET # BLD AUTO: 258 K/UL — SIGNIFICANT CHANGE UP (ref 130–400)
POTASSIUM SERPL-MCNC: 4.4 MMOL/L — SIGNIFICANT CHANGE UP (ref 3.5–5)
POTASSIUM SERPL-MCNC: 4.9 MMOL/L — SIGNIFICANT CHANGE UP (ref 3.5–5)
POTASSIUM SERPL-SCNC: 4.4 MMOL/L — SIGNIFICANT CHANGE UP (ref 3.5–5)
POTASSIUM SERPL-SCNC: 4.9 MMOL/L — SIGNIFICANT CHANGE UP (ref 3.5–5)
RBC # BLD: 3.69 M/UL — LOW (ref 4.2–5.4)
RBC # BLD: 3.8 M/UL — LOW (ref 4.2–5.4)
RBC # FLD: 13.3 % — SIGNIFICANT CHANGE UP (ref 11.5–14.5)
RBC # FLD: 13.5 % — SIGNIFICANT CHANGE UP (ref 11.5–14.5)
SODIUM SERPL-SCNC: 135 MMOL/L — SIGNIFICANT CHANGE UP (ref 135–146)
SODIUM SERPL-SCNC: 136 MMOL/L — SIGNIFICANT CHANGE UP (ref 135–146)
WBC # BLD: 11.23 K/UL — HIGH (ref 4.8–10.8)
WBC # BLD: 6.79 K/UL — SIGNIFICANT CHANGE UP (ref 4.8–10.8)
WBC # FLD AUTO: 11.23 K/UL — HIGH (ref 4.8–10.8)
WBC # FLD AUTO: 6.79 K/UL — SIGNIFICANT CHANGE UP (ref 4.8–10.8)

## 2020-06-04 PROCEDURE — 93458 L HRT ARTERY/VENTRICLE ANGIO: CPT | Mod: 26,XU

## 2020-06-04 PROCEDURE — 92928 PRQ TCAT PLMT NTRAC ST 1 LES: CPT | Mod: LC

## 2020-06-04 PROCEDURE — 93571 IV DOP VEL&/PRESS C FLO 1ST: CPT | Mod: 26,LD

## 2020-06-04 PROCEDURE — 93572 IV DOP VEL&/PRESS C FLO EA: CPT | Mod: 26,LC

## 2020-06-04 RX ORDER — CARVEDILOL PHOSPHATE 80 MG/1
12.5 CAPSULE, EXTENDED RELEASE ORAL EVERY 12 HOURS
Refills: 0 | Status: DISCONTINUED | OUTPATIENT
Start: 2020-06-04 | End: 2020-06-05

## 2020-06-04 RX ORDER — SODIUM CHLORIDE 9 MG/ML
1000 INJECTION INTRAMUSCULAR; INTRAVENOUS; SUBCUTANEOUS
Refills: 0 | Status: DISCONTINUED | OUTPATIENT
Start: 2020-06-04 | End: 2020-06-05

## 2020-06-04 RX ORDER — APIXABAN 2.5 MG/1
1 TABLET, FILM COATED ORAL
Qty: 60 | Refills: 0
Start: 2020-06-04 | End: 2020-07-03

## 2020-06-04 RX ORDER — AMLODIPINE BESYLATE 2.5 MG/1
5 TABLET ORAL DAILY
Refills: 0 | Status: DISCONTINUED | OUTPATIENT
Start: 2020-06-05 | End: 2020-06-05

## 2020-06-04 RX ORDER — AMLODIPINE BESYLATE 2.5 MG/1
5 TABLET ORAL ONCE
Refills: 0 | Status: COMPLETED | OUTPATIENT
Start: 2020-06-04 | End: 2020-06-04

## 2020-06-04 RX ORDER — ATORVASTATIN CALCIUM 80 MG/1
40 TABLET, FILM COATED ORAL AT BEDTIME
Refills: 0 | Status: DISCONTINUED | OUTPATIENT
Start: 2020-06-04 | End: 2020-06-04

## 2020-06-04 RX ORDER — CLOPIDOGREL BISULFATE 75 MG/1
75 TABLET, FILM COATED ORAL DAILY
Refills: 0 | Status: DISCONTINUED | OUTPATIENT
Start: 2020-06-05 | End: 2020-06-05

## 2020-06-04 RX ORDER — LEVOTHYROXINE SODIUM 125 MCG
88 TABLET ORAL DAILY
Refills: 0 | Status: DISCONTINUED | OUTPATIENT
Start: 2020-06-05 | End: 2020-06-05

## 2020-06-04 RX ORDER — ACETAMINOPHEN 500 MG
650 TABLET ORAL ONCE
Refills: 0 | Status: COMPLETED | OUTPATIENT
Start: 2020-06-04 | End: 2020-06-04

## 2020-06-04 RX ORDER — ASPIRIN/CALCIUM CARB/MAGNESIUM 324 MG
1 TABLET ORAL
Qty: 30 | Refills: 0
Start: 2020-06-04 | End: 2020-07-03

## 2020-06-04 RX ORDER — CLOPIDOGREL BISULFATE 75 MG/1
1 TABLET, FILM COATED ORAL
Qty: 30 | Refills: 0
Start: 2020-06-04 | End: 2020-07-03

## 2020-06-04 RX ORDER — ASPIRIN/CALCIUM CARB/MAGNESIUM 324 MG
81 TABLET ORAL DAILY
Refills: 0 | Status: DISCONTINUED | OUTPATIENT
Start: 2020-06-05 | End: 2020-06-05

## 2020-06-04 RX ORDER — LISINOPRIL 2.5 MG/1
20 TABLET ORAL DAILY
Refills: 0 | Status: DISCONTINUED | OUTPATIENT
Start: 2020-06-04 | End: 2020-06-05

## 2020-06-04 RX ADMIN — LISINOPRIL 20 MILLIGRAM(S): 2.5 TABLET ORAL at 15:12

## 2020-06-04 RX ADMIN — SODIUM CHLORIDE 75 MILLILITER(S): 9 INJECTION INTRAMUSCULAR; INTRAVENOUS; SUBCUTANEOUS at 18:11

## 2020-06-04 RX ADMIN — AMLODIPINE BESYLATE 5 MILLIGRAM(S): 2.5 TABLET ORAL at 15:11

## 2020-06-04 RX ADMIN — Medication 650 MILLIGRAM(S): at 20:11

## 2020-06-04 RX ADMIN — SODIUM CHLORIDE 75 MILLILITER(S): 9 INJECTION INTRAMUSCULAR; INTRAVENOUS; SUBCUTANEOUS at 15:12

## 2020-06-04 RX ADMIN — CARVEDILOL PHOSPHATE 12.5 MILLIGRAM(S): 80 CAPSULE, EXTENDED RELEASE ORAL at 18:52

## 2020-06-04 NOTE — ASU PATIENT PROFILE, ADULT - PMH
Afib  5+ yrs; on Eliquis  Anemia  Chronic, on Iron supplement  Diabetes  Insulin dependent - (has insulin Pump)  Dry eyes, bilateral    Essential hypertension    Hypothyroid    Inclusion body myositis (IBM)    Lung nodules    Pacemaker  Sept 2017  Palpitations  occasionally; not for couple yrs  PVD (peripheral vascular disease)  S/p Right Peripheral leg Stent  Seasonal allergies    Skin cancer  Basal Cell Cancer face  (around nose) s/p MOHS procedure x 3  SSS (sick sinus syndrome)  S/p PPM  Transfusion history  S/p Hysterectomy

## 2020-06-04 NOTE — PROGRESS NOTE ADULT - SUBJECTIVE AND OBJECTIVE BOX
Cardiology Follow up    SOWMYA AMADO   75y Female  PAST MEDICAL & SURGICAL HISTORY:  Inclusion body myositis (IBM)  Lung nodules  Transfusion history: S/p Hysterectomy  Dry eyes, bilateral  PVD (peripheral vascular disease): S/p Right Peripheral leg Stent  Seasonal allergies  Skin cancer: Basal Cell Cancer face  (around nose) s/p MOHS procedure x 3  Anemia: Chronic, on Iron supplement  Hypothyroid  SSS (sick sinus syndrome): S/p PPM  Diabetes: Insulin dependent - (has insulin Pump)  Pacemaker: Sept 2017  Palpitations: occasionally; not for couple yrs  Essential hypertension  Afib: 5+ yrs; on Eliquis  S/P ablation of atrial fibrillation  S/P cataract surgery  History of surgery: MOHS procedure (face) x 3  Cardiac pacemaker: 9/9/17  History of surgery: Right Peripheral Stent 5 yrs  H/O total hysterectomy: 25+ yrs ago     HPI:    Allergies    latex (Pruritus; Rash)  sulfonamides (Unknown)    Intolerances    Pineapple (Unknown)    Patient without complaints.  Denies CP, SOB, palpitations, or dizziness    HR: 66  BP: 142/68  BP(mean): 105  RR: 12  SpO2: 98% on RA    REVIEW OF SYSTEMS:          CONSTITUTIONAL: No weakness, fevers or chills          EYES/ENT: No visual changes;  No vertigo or throat pain           NECK: No pain or stiffness          RESPIRATORY: No cough, wheezing, hemoptysis          CARDIOVASCULAR: no pain, no STAPLES, no palpitations           GASTROINTESTINAL: No abdominal or epigastric pain. No nausea, vomiting, or hematemesis;           GENITOURINARY: No dysuria, frequency or hematuria          NEUROLOGICAL: No numbness or weakness          SKIN: No itching, rashes    PHYSICAL EXAM:           CONSTITUTIONAL: Well-developed; well-nourished; in no acute distress  	SKIN: warm, dry  	HEAD: Normocephalic; atraumatic  	EYES: PERRL.  	ENT: No nasal discharge, airway clear, mucous membranes moist  	NECK: Supple; non tender.  	CARD: +S1, +S2, no murmurs, gallops, or rubs. irregular rate and rhythm    	RESP: No wheezes, rales or rhonchi. CTA B/L  	ABD: soft ntnd, + BS x 4 quadrants  	EXT: moves all extremities,  no clubbing, cyanosis or edema  	NEURO: Alert and oriented x3, no focal deficits          PSYCH: Cooperative, appropriate          VASCULAR:  + Rad / + PTs / + DPs          EXTREMITY:             Right Groin:  Dressing D/C/I, access site soft, no hematoma, no pain, + pulses/same as baseline, no sign of infection, no numbness  	              ECG:   P                                                                                                          LABS:  P    A/P:  I discussed the case with Cardiologist Dr. Gonzalez and recommend the following:    S/P PCI:    ACCESS: right femoral  CLOSURE: angioseal     INTERVENTION  ifR mid LAD : 0.82 --> significant lesion  iFR LCX : 0.87 --> significant lesion  PCI LCX: SYNERGY 2.75 x 38 mm                     CBC and BMP at 6 pm                   12 lead ECG at 6 pm                   Decrease Eliquis to 2.5 mg PO q12hr                    Continue DAPT ( Aspirin 81 mg daily and Plavix 75 mg daily ), B-Blocker, Statin Therapy                   Patient given 30 day supply of ( Aspirin 81 mg daily and Plavix 75 mg daily ) to take at home                   Patient agreeing to take DAPT as directed by cardiologist                    Pt given instructions on importance of taking antiplatelet medication or risk acute stent thrombosis/death                   Post cath instructions, access site care and activity restrictions reviewed with patient                     Discussed with patient to return to hospital if experience chest pain, shortness breath, dizziness and site bleeding                   Aggressive risk factor modification, diet counseling, smoking cessation discussed with patient                       Can discharge patient from cardiac standpoint at 7 pm after ambulating without symptoms and access site wnl blood work and ECG reviewed                    Follow up with Cardiology Dr. Gonzalez in two weeks. Instructed to call and make an appointment Cardiology Follow up    SOWMYA AMADO   75y Female  PAST MEDICAL & SURGICAL HISTORY:  Inclusion body myositis (IBM)  Lung nodules  Transfusion history: S/p Hysterectomy  Dry eyes, bilateral  PVD (peripheral vascular disease): S/p Right Peripheral leg Stent  Seasonal allergies  Skin cancer: Basal Cell Cancer face  (around nose) s/p MOHS procedure x 3  Anemia: Chronic, on Iron supplement  Hypothyroid  SSS (sick sinus syndrome): S/p PPM  Diabetes: Insulin dependent - (has insulin Pump)  Pacemaker: Sept 2017  Palpitations: occasionally; not for couple yrs  Essential hypertension  Afib: 5+ yrs; on Eliquis  S/P ablation of atrial fibrillation  S/P cataract surgery  History of surgery: MOHS procedure (face) x 3  Cardiac pacemaker: 9/9/17  History of surgery: Right Peripheral Stent 5 yrs  H/O total hysterectomy: 25+ yrs ago     HPI:    Allergies    latex (Pruritus; Rash)  sulfonamides (Unknown)    Intolerances    Pineapple (Unknown)    Patient without complaints.  Denies CP, SOB, palpitations, or dizziness    HR: 66  BP: 142/68  BP(mean): 105  RR: 12  SpO2: 98% on RA    REVIEW OF SYSTEMS:          CONSTITUTIONAL: No weakness, fevers or chills          EYES/ENT: No visual changes;  No vertigo or throat pain           NECK: No pain or stiffness          RESPIRATORY: No cough, wheezing, hemoptysis          CARDIOVASCULAR: no pain, no STAPLES, no palpitations           GASTROINTESTINAL: No abdominal or epigastric pain. No nausea, vomiting, or hematemesis;           GENITOURINARY: No dysuria, frequency or hematuria          NEUROLOGICAL: No numbness or weakness          SKIN: No itching, rashes    PHYSICAL EXAM:           CONSTITUTIONAL: Well-developed; well-nourished; in no acute distress  	SKIN: warm, dry  	HEAD: Normocephalic; atraumatic  	EYES: PERRL.  	ENT: No nasal discharge, airway clear, mucous membranes moist  	NECK: Supple; non tender.  	CARD: +S1, +S2, no murmurs, gallops, or rubs. irregular rate and rhythm    	RESP: No wheezes, rales or rhonchi. CTA B/L  	ABD: soft ntnd, + BS x 4 quadrants  	EXT: moves all extremities,  no clubbing, cyanosis or edema  	NEURO: Alert and oriented x3, no focal deficits          PSYCH: Cooperative, appropriate          VASCULAR:  + Rad / + PTs / + DPs          EXTREMITY:             Right Groin:  Dressing D/C/I, access site soft, no hematoma, no pain, + pulses/same as baseline, no sign of infection, no numbness  	              ECG:   P                                                                                                          LABS:  P    A/P:  I discussed the case with Cardiologist Dr. Gonzalez and recommend the following:    S/P PCI:    ACCESS: right femoral  CLOSURE: angioseal     INTERVENTION  ifR mid LAD : 0.82 --> significant lesion  iFR LCX : 0.87 --> significant lesion  PCI LCX: SYNERGY 2.75 x 38 mm                     CBC and BMP at 6 pm                   12 lead ECG at 6 pm                   Decrease Eliquis to 2.5 mg PO q12hr                    Resume feeding in 2hrs if no nausea. Monitor FS and go back to home dose insulin pump once feeding resumed                   Continue DAPT ( Aspirin 81 mg daily and Plavix 75 mg daily ), B-Blocker, Statin Therapy                   Patient given 30 day supply of ( Aspirin 81 mg daily and Plavix 75 mg daily ) to take at home                   Patient agreeing to take DAPT as directed by cardiologist                    Pt given instructions on importance of taking antiplatelet medication or risk acute stent thrombosis/death                   Post cath instructions, access site care and activity restrictions reviewed with patient                     Discussed with patient to return to hospital if experience chest pain, shortness breath, dizziness and site bleeding                   Aggressive risk factor modification, diet counseling, smoking cessation discussed with patient                       Can discharge patient from cardiac standpoint at 7 pm after ambulating without symptoms and access site wnl blood work and ECG reviewed                    Follow up with Cardiology Dr. Gonzalez in two weeks. Instructed to call and make an appointment

## 2020-06-04 NOTE — CHART NOTE - NSCHARTNOTEFT_GEN_A_CORE
PRE-OP DIAGNOSIS: recurrent chest pain, abnormal CCTA , HTN DM on insulin pump, DL    PROCEDURE: [facility] with coronary angiography    Physician: Dr Gonzalez  Assistant: Peter Pearson    ANESTHESIA TYPE:  [  ]General Anesthesia  [  ] Sedation  [  x] Local/Regional    ESTIMATED BLOOD LOSS:    10   mL    CONDITION  [  ] Critical  [  ] Serious  [  ]Fair  [x  ]Good      SPECIMENS REMOVED (IF APPLICABLE): N/A      IV CONTRAST:    150     mL      IMPLANTS (IF APPLICABLE)      FINDINGS    Left Heart Catheterization:    LVEDP: mild elevation         LEFT HEART CATHETERIZATION                                    Left main normal short    LAD:    MId LAD 80% lesion iFR 0.82                    Diag: patent     Left Circumflex: Prox/distal 80% diffuse disease   OM: patent     Right Coronary Artery: normal  RPDA normal       DOMINANCE: Right    ACCESS: right femoral  CLOSURE: angioseal     INTERVENTION  ifR mid LAD : 0.82 --> significant lesion  iFR LCX : 0.87 --> significant lesion  PCI LCX: SYNERGY 2.75 x 38 mm            PLAN OF CARE  [x ] D/C Home today after 6hrs if stable    DAPT(ASA plavix), B-blocker & Statin therapy  change eliquis to 2.5 mg bid   IV fluid  repeat cbc before discharge  resume feeding in 2hrs if no nausea . monitor FS and go back to home dose insulin pump once feeding resumed     Results of procedure/ plan of care discussed with patient/  in detail. PRE-OP DIAGNOSIS: recurrent chest pain, abnormal CCTA , HTN DM on insulin pump, DL    PROCEDURE: [facility] with coronary angiography    Physician: Dr Gonzalez  Assistant: Peter Pearson    ANESTHESIA TYPE:  [  ]General Anesthesia  [  ] Sedation  [  x] Local/Regional    ESTIMATED BLOOD LOSS:    10   mL    CONDITION  [  ] Critical  [  ] Serious  [  ]Fair  [x  ]Good      SPECIMENS REMOVED (IF APPLICABLE): N/A      IV CONTRAST:    150     mL      IMPLANTS (IF APPLICABLE)      FINDINGS    Left Heart Catheterization:    LVEDP: mild elevation         LEFT HEART CATHETERIZATION                                    Left main normal short    LAD:    MId LAD 80% lesion iFR 0.82                    Diag: patent     Left Circumflex: Prox/distal 80% diffuse disease , iFR .87  OM: patent     Right Coronary Artery: normal  RPDA normal       DOMINANCE: Right    ACCESS: right femoral  CLOSURE: angioseal     INTERVENTION  ifR mid LAD : 0.82 --> significant lesion  iFR LCX : 0.87 --> significant lesion  PCI LCX: SYNERGY 2.75 x 38 mm            PLAN OF CARE  [x ] D/C Home today after 6hrs if stable    DAPT(ASA plavix), B-blocker & Statin therapy  change eliquis to 2.5 mg bid   IV fluid  repeat cbc before discharge  resume feeding in 2hrs if no nausea . monitor FS and go back to home dose insulin pump once feeding resumed     Results of procedure/ plan of care discussed with patient/  in detail.

## 2020-06-04 NOTE — ASU PATIENT PROFILE, ADULT - PSH
Cardiac pacemaker  9/9/17  H/O total hysterectomy  25+ yrs ago  History of surgery  MOHS procedure (face) x 3  History of surgery  Right Peripheral Stent 5 yrs  S/P ablation of atrial fibrillation    S/P cataract surgery

## 2020-06-05 ENCOUNTER — TRANSCRIPTION ENCOUNTER (OUTPATIENT)
Age: 75
End: 2020-06-05

## 2020-06-05 VITALS — DIASTOLIC BLOOD PRESSURE: 57 MMHG | RESPIRATION RATE: 38 BRPM | SYSTOLIC BLOOD PRESSURE: 129 MMHG | HEART RATE: 70 BPM

## 2020-06-05 LAB
ANION GAP SERPL CALC-SCNC: 11 MMOL/L — SIGNIFICANT CHANGE UP (ref 7–14)
BUN SERPL-MCNC: 15 MG/DL — SIGNIFICANT CHANGE UP (ref 10–20)
CALCIUM SERPL-MCNC: 8.1 MG/DL — LOW (ref 8.5–10.1)
CHLORIDE SERPL-SCNC: 100 MMOL/L — SIGNIFICANT CHANGE UP (ref 98–110)
CO2 SERPL-SCNC: 22 MMOL/L — SIGNIFICANT CHANGE UP (ref 17–32)
CREAT SERPL-MCNC: 0.5 MG/DL — LOW (ref 0.7–1.5)
GLUCOSE BLDC GLUCOMTR-MCNC: 231 MG/DL — HIGH (ref 70–99)
GLUCOSE BLDC GLUCOMTR-MCNC: 294 MG/DL — HIGH (ref 70–99)
GLUCOSE BLDC GLUCOMTR-MCNC: 299 MG/DL — HIGH (ref 70–99)
GLUCOSE BLDC GLUCOMTR-MCNC: 311 MG/DL — HIGH (ref 70–99)
GLUCOSE SERPL-MCNC: 299 MG/DL — HIGH (ref 70–99)
HCT VFR BLD CALC: 29.5 % — LOW (ref 37–47)
HGB BLD-MCNC: 10 G/DL — LOW (ref 12–16)
MCHC RBC-ENTMCNC: 30.7 PG — SIGNIFICANT CHANGE UP (ref 27–31)
MCHC RBC-ENTMCNC: 33.9 G/DL — SIGNIFICANT CHANGE UP (ref 32–37)
MCV RBC AUTO: 90.5 FL — SIGNIFICANT CHANGE UP (ref 81–99)
NRBC # BLD: 0 /100 WBCS — SIGNIFICANT CHANGE UP (ref 0–0)
PLATELET # BLD AUTO: 209 K/UL — SIGNIFICANT CHANGE UP (ref 130–400)
POTASSIUM SERPL-MCNC: 4.6 MMOL/L — SIGNIFICANT CHANGE UP (ref 3.5–5)
POTASSIUM SERPL-SCNC: 4.6 MMOL/L — SIGNIFICANT CHANGE UP (ref 3.5–5)
RBC # BLD: 3.26 M/UL — LOW (ref 4.2–5.4)
RBC # FLD: 13.4 % — SIGNIFICANT CHANGE UP (ref 11.5–14.5)
SODIUM SERPL-SCNC: 133 MMOL/L — LOW (ref 135–146)
WBC # BLD: 7.78 K/UL — SIGNIFICANT CHANGE UP (ref 4.8–10.8)
WBC # FLD AUTO: 7.78 K/UL — SIGNIFICANT CHANGE UP (ref 4.8–10.8)

## 2020-06-05 PROCEDURE — 93280 PM DEVICE PROGR EVAL DUAL: CPT | Mod: 26

## 2020-06-05 PROCEDURE — 93010 ELECTROCARDIOGRAM REPORT: CPT

## 2020-06-05 PROCEDURE — 99238 HOSP IP/OBS DSCHRG MGMT 30/<: CPT

## 2020-06-05 RX ORDER — EZETIMIBE 10 MG/1
1 TABLET ORAL
Qty: 30 | Refills: 0
Start: 2020-06-05 | End: 2020-07-04

## 2020-06-05 RX ORDER — CHLORHEXIDINE GLUCONATE 213 G/1000ML
1 SOLUTION TOPICAL
Refills: 0 | Status: DISCONTINUED | OUTPATIENT
Start: 2020-06-05 | End: 2020-06-05

## 2020-06-05 RX ADMIN — CLOPIDOGREL BISULFATE 75 MILLIGRAM(S): 75 TABLET, FILM COATED ORAL at 14:18

## 2020-06-05 RX ADMIN — CARVEDILOL PHOSPHATE 12.5 MILLIGRAM(S): 80 CAPSULE, EXTENDED RELEASE ORAL at 05:51

## 2020-06-05 RX ADMIN — Medication 81 MILLIGRAM(S): at 14:18

## 2020-06-05 RX ADMIN — LISINOPRIL 20 MILLIGRAM(S): 2.5 TABLET ORAL at 05:51

## 2020-06-05 RX ADMIN — Medication 88 MICROGRAM(S): at 05:51

## 2020-06-05 RX ADMIN — AMLODIPINE BESYLATE 5 MILLIGRAM(S): 2.5 TABLET ORAL at 05:51

## 2020-06-05 RX ADMIN — CHLORHEXIDINE GLUCONATE 1 APPLICATION(S): 213 SOLUTION TOPICAL at 05:51

## 2020-06-05 NOTE — DISCHARGE NOTE PROVIDER - HOSPITAL COURSE
76 yo female patient with PMH DM on insulin pump, HTN, hypothyroidism admitted for elective cardiac cath.    Patient had done CCTA and was found to have CAD.    She had cardiac cath on 5/4/20 that showed mid LAD 80% lesion iFR 0.82 and prox/distal left circumflex 80% diffuse disease , iFR 0.87 s/p stent placement in left circumflex.    patient was admitted to CCU post cath for monitoring. She has minor bleed from groin access site that was controlled.    Patient is stable and ready for discharge. She will follow up with Dr Gonzalez in the clinic in 2 weeks.

## 2020-06-05 NOTE — CHART NOTE - NSCHARTNOTEFT_GEN_A_CORE
Patient with DC PPM [employer], nterrogation complete.  Device functioning well.   Full report in paper chart.    EP [id number]

## 2020-06-05 NOTE — PROGRESS NOTE ADULT - SUBJECTIVE AND OBJECTIVE BOX
Cardiology Follow up    SOWMYA AMADO   75y Female  PAST MEDICAL & SURGICAL HISTORY:  Inclusion body myositis (IBM)  Lung nodules  Transfusion history: S/p Hysterectomy  Dry eyes, bilateral  PVD (peripheral vascular disease): S/p Right Peripheral leg Stent  Seasonal allergies  Skin cancer: Basal Cell Cancer face  (around nose) s/p MOHS procedure x 3  Anemia: Chronic, on Iron supplement  Hypothyroid  SSS (sick sinus syndrome): S/p PPM  Diabetes: Insulin dependent - (has insulin Pump)  Pacemaker: Sept 2017  Palpitations: occasionally; not for couple yrs  Essential hypertension  Afib: 5+ yrs; on Eliquis  S/P ablation of atrial fibrillation  S/P cataract surgery  History of surgery: MOHS procedure (face) x 3  Cardiac pacemaker: 9/9/17  History of surgery: Right Peripheral Stent 5 yrs  H/O total hysterectomy: 25+ yrs ago       Allergies    latex (Pruritus; Rash)  sulfonamides (Unknown)    Intolerances    Pineapple (Unknown)    Patient without complaints.  Denies CP, SOB, palpitations, or dizziness  No events on telemetry overnight    Vital Signs Last 24 Hrs  T(C): 35.9 (05 Jun 2020 04:00), Max: 36.3 (04 Jun 2020 20:00)  T(F): 96.7 (05 Jun 2020 04:00), Max: 97.4 (04 Jun 2020 20:00)  HR: 68 (05 Jun 2020 08:15) (60 - 76)  BP: 136/65 (05 Jun 2020 08:15) (114/52 - 161/62)  BP(mean): 101 (05 Jun 2020 08:15) (68 - 705)  RR: 35 (05 Jun 2020 08:15) (17 - 35)  SpO2: 99% (05 Jun 2020 08:15) (96% - 100%)    MEDICATIONS  (STANDING):  amLODIPine   Tablet 5 milliGRAM(s) Oral daily  aspirin enteric coated 81 milliGRAM(s) Oral daily  carvedilol 12.5 milliGRAM(s) Oral every 12 hours  chlorhexidine 4% Liquid 1 Application(s) Topical <User Schedule>  clopidogrel Tablet 75 milliGRAM(s) Oral daily  levothyroxine 88 MICROGram(s) Oral daily  lisinopril 20 milliGRAM(s) Oral daily  sodium chloride 0.9%. 1000 milliLiter(s) (75 mL/Hr) IV Continuous <Continuous>    MEDICATIONS  (PRN):      REVIEW OF SYSTEMS:          CONSTITUTIONAL: No weakness, fevers or chills          EYES/ENT: No visual changes;  No vertigo or throat pain           NECK: No pain or stiffness          RESPIRATORY: No cough, wheezing, hemoptysis          CARDIOVASCULAR: no pain, no STAPLES, no palpitations           GASTROINTESTINAL: No abdominal or epigastric pain. No nausea, vomiting, or hematemesis;           GENITOURINARY: No dysuria, frequency or hematuria          NEUROLOGICAL: No numbness or weakness          SKIN: No itching, rashes    PHYSICAL EXAM:           CONSTITUTIONAL: Well-developed; well-nourished; in no acute distress  	SKIN: warm, dry  	HEAD: Normocephalic; atraumatic  	EYES: PERRL.  	ENT: No nasal discharge, airway clear, mucous membranes moist  	NECK: Supple; non tender.  	CARD: +S1, +S2, no murmurs, gallops, or rubs. Regular rate and rhythm    	RESP: No wheezes, rales or rhonchi. CTA B/L  	ABD: soft ntnd, + BS x 4 quadrants  	EXT: moves all extremities,  no clubbing, cyanosis or edema  	NEURO: Alert and oriented x3, no focal deficits          PSYCH: Cooperative, appropriate          VASCULAR:  +2 Rad / +2 PTs / + 2 DPs          EXTREMITY:             Right Groin: dressing removed, access site soft, ecchymotic area noted, s/p hematoma yesterday, no pain, + pulses, no sign of infection, no numbness  	             ECG:     < from: 12 Lead ECG (06.05.20 @ 07:36) >  Ventricular Rate 65 BPM    Atrial Rate 65 BPM    P-R Interval 118 ms    QRS Duration 88 ms    Q-T Interval 424 ms    QTC Calculation(Bezet) 440 ms    P Axis 56 degrees    R Axis 14 degrees    T Axis 20 degrees    Diagnosis Line Atrial-paced rhythm with Premature atrial complexes  Otherwise normal ECG    Confirmed by Kenyon Gonzalez ([id number]) on 6/5/2020 8:16:22 AM                                                                                                                  2D ECHO:      LABS:                        10.0   7.78  )-----------( 209      ( 05 Jun 2020 04:06 )             29.5     06-05    133<L>  |  100  |  15  ----------------------------<  299<H>  4.6   |  22  |  0.5<L>    Ca    8.1<L>      05 Jun 2020 04:06      A/P:  I discussed the case with Cardiologist Dr. Gonzalez and recommend the following:    ACCESS: right femoral  CLOSURE: angioseal     INTERVENTION  ifR mid LAD : 0.82 --> significant lesion  iFR LCX : 0.87 --> significant lesion  PCI LCX: SYNERGY 2.75 x 38 mm                     D/C Springer                   Ambulate around the unit                   Start taking Eliquis 2.5 mg PO Q12hr on 6/6/2020                   Start Zetia 10 mg PO q HS  	     Continue DAPT ( Aspirin 81 mg daily and Plavix 75 mg daily ), B-Blocker, Statin Therapy                   Patient given 30 day supply of ( Aspirin 81 mg daily and Plavix 75 mg daily ) to take at home                   Patient agreeing to take DAPT for at least one year or as directed by cardiologist                    Pt given instructions on importance of taking antiplatelet medication or risk acute stent thrombosis/death                   Post cath instructions, access site care and activity restrictions reviewed with patient                     Discussed with patient to return to hospital if experience chest pain, shortness breath, dizziness and site bleeding                   Aggressive risk factor modification, diet counseling, smoking cessation discussed with patient                       Can discharge patient from cardiac standpoint after ambulating without symptoms and access site wnl and patient voided after rosalie d/c                                           Follow up with Cardiology Dr. Gonzalez in two weeks.  Instructed to call and make an appointment [phone]

## 2020-06-05 NOTE — DISCHARGE NOTE NURSING/CASE MANAGEMENT/SOCIAL WORK - PATIENT PORTAL LINK FT
You can access the FollowMyHealth Patient Portal offered by [facility] by registering at the following website: [email or web address]. By joining [facility]’s FollowMyHealth portal, you will also be able to view your health information using other applications (apps) compatible with our system.

## 2020-06-05 NOTE — DISCHARGE NOTE PROVIDER - NSDCMRMEDTOKEN_GEN_ALL_CORE_FT
amlodipine-benazepril 5 mg-40 mg oral capsule: 1 cap(s) orally once a day (at bedtime)  Aspirin Enteric Coated 81 mg oral delayed release tablet: 1 tab(s) orally once a day MDD:1  carvedilol 12.5 mg oral tablet: 1 tab(s) orally 2 times a day  clopidogrel 75 mg oral tablet: 1 tab(s) orally once a day MDD:1  Eliquis 2.5 mg oral tablet: 1 tab(s) orally 2 times a day MDD:2  START TAKING MEDICATION ON 6/6/2020  ezetimibe 10 mg oral tablet: 1 tab(s) orally once a day (at bedtime) MDD:1  Gamunex 10% intravenous solution: every 5 weeks  insulin: Insulin Pump  levothyroxine 88 mcg (0.088 mg) oral tablet: 1 tab(s) orally once a day  Multiple Vitamins oral tablet: 1 tab(s) orally once a day  Vitamin C 250 mg oral tablet: 1 tab(s) orally 2 times a day  Zinc 140 mg (as elemental zinc 50 mg) oral tablet: 1 tab(s) orally once a day  ZyrTEC 10 mg oral tablet: 1 tab(s) orally once a day

## 2020-06-05 NOTE — DISCHARGE NOTE PROVIDER - NSDCCPCAREPLAN_GEN_ALL_CORE_FT
PRINCIPAL DISCHARGE DIAGNOSIS  Diagnosis: Coronary artery disease  Assessment and Plan of Treatment: You presented for elective cardiac catheterization that was done on 5/4/2020 and showed narrowing of 2 vessels. One stent was inserted in the left circumflex artery with no complications. You are ready and stable for discharge. Take your medications as prescribed and follow up with Dr Gonzalez in the clinics in 2 weeks.      SECONDARY DISCHARGE DIAGNOSES  Diagnosis: Atrial fibrillation  Assessment and Plan of Treatment: Eliquis dose was reduced to 2.5 mg twice daily because you were started on other blood thinners. Take your medications as prescribed and follow up with Dr Gonzalez in the clinics in 2 weeks.

## 2020-06-05 NOTE — DISCHARGE NOTE PROVIDER - NSDCFUSCHEDAPPT_GEN_ALL_CORE_FT
SOWMYA AMADO ; 06/15/2020 ; NP HemOnc [address] ; 06/19/2020 ; [facility] Cardio [address] ; 06/25/2020 ; [facility] Cardio [address] ; 07/09/2020 ; NP Neuro [address] SOWMYA AMADO ; 06/15/2020 ; NP HemOnc [address] ; 06/19/2020 ; [facility] Cardio [address] ; 06/25/2020 ; [facility] Cardio [address] ; 07/09/2020 ; NP Neuro [address] SOWMYA AMADO ; 06/15/2020 ; NP HemOnc [address] SOWMYA [address] ; 06/19/2020 ; [facility] Cardio [address] ; 06/25/2020 ; [facility] Cardio [address] ; 07/09/2020 ; NP Neuro [address] SOWMYA AMADO ; 06/15/2020 ; NP HemOnc [address] SOWMYA [address] ; 06/19/2020 ; [facility] Cardio [address] ; 06/25/2020 ; [facility] Cardio [address] ; 07/09/2020 ; NP Neuro [address]

## 2020-06-05 NOTE — DISCHARGE NOTE PROVIDER - CARE PROVIDER_API CALL
Kenyon Gonzalez)  Cardiovascular Disease; Internal Medicine; Interventional Cardiology; Nuclear Cardiology  [address] [place] [address]  Phone: [phone]  Fax: [phone]  Follow Up Time: 2 weeks

## 2020-06-06 PROBLEM — I73.9 PERIPHERAL VASCULAR DISEASE, UNSPECIFIED: Chronic | Status: ACTIVE | Noted: 2018-08-20
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
## 2020-06-09 DIAGNOSIS — Z79.01 LONG TERM (CURRENT) USE OF ANTICOAGULANTS: ICD-10-CM

## 2020-06-09 DIAGNOSIS — Z79.4 LONG TERM (CURRENT) USE OF INSULIN: ICD-10-CM

## 2020-06-09 DIAGNOSIS — E11.51 TYPE 2 DIABETES MELLITUS WITH DIABETIC PERIPHERAL ANGIOPATHY WITHOUT GANGRENE: ICD-10-CM

## 2020-06-09 DIAGNOSIS — I97.610 POSTPROCEDURAL HEMORRHAGE OF A CIRCULATORY SYSTEM ORGAN OR STRUCTURE FOLLOWING A CARDIAC CATHETERIZATION: ICD-10-CM

## 2020-06-09 DIAGNOSIS — I25.119 ATHEROSCLEROTIC HEART DISEASE OF NATIVE CORONARY ARTERY WITH UNSPECIFIED ANGINA PECTORIS: ICD-10-CM

## 2020-06-09 DIAGNOSIS — D64.9 ANEMIA, UNSPECIFIED: ICD-10-CM

## 2020-06-09 DIAGNOSIS — Z95.0 PRESENCE OF CARDIAC PACEMAKER: ICD-10-CM

## 2020-06-09 DIAGNOSIS — E78.5 HYPERLIPIDEMIA, UNSPECIFIED: ICD-10-CM

## 2020-06-09 DIAGNOSIS — Z95.820 PERIPHERAL VASCULAR ANGIOPLASTY STATUS WITH IMPLANTS AND GRAFTS: ICD-10-CM

## 2020-06-09 DIAGNOSIS — Z96.41 PRESENCE OF INSULIN PUMP (EXTERNAL) (INTERNAL): ICD-10-CM

## 2020-06-09 DIAGNOSIS — R91.8 OTHER NONSPECIFIC ABNORMAL FINDING OF LUNG FIELD: ICD-10-CM

## 2020-06-09 DIAGNOSIS — E03.9 HYPOTHYROIDISM, UNSPECIFIED: ICD-10-CM
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
## 2020-06-09 DIAGNOSIS — G72.41 INCLUSION BODY MYOSITIS [IBM]: ICD-10-CM

## 2020-06-09 DIAGNOSIS — I48.91 UNSPECIFIED ATRIAL FIBRILLATION: ICD-10-CM

## 2020-06-09 DIAGNOSIS — I10 ESSENTIAL (PRIMARY) HYPERTENSION: ICD-10-CM

## 2020-06-09 DIAGNOSIS — Z98.890 OTHER SPECIFIED POSTPROCEDURAL STATES: ICD-10-CM

## 2020-06-16 ENCOUNTER — APPOINTMENT (OUTPATIENT)
Dept: CARDIOLOGY | Facility: CLINIC | Age: 75
End: 2020-06-16
Payer: MEDICARE

## 2020-06-16 VITALS
DIASTOLIC BLOOD PRESSURE: 70 MMHG | TEMPERATURE: 97.8 F | WEIGHT: 133 LBS | HEART RATE: 60 BPM | BODY MASS INDEX: 25.11 KG/M2 | HEIGHT: 61 IN | SYSTOLIC BLOOD PRESSURE: 100 MMHG

## 2020-06-16 DIAGNOSIS — I10 ESSENTIAL (PRIMARY) HYPERTENSION: ICD-10-CM

## 2020-06-16 DIAGNOSIS — I49.5 SICK SINUS SYNDROME: ICD-10-CM

## 2020-06-16 DIAGNOSIS — E03.9 HYPOTHYROIDISM, UNSPECIFIED: ICD-10-CM

## 2020-06-16 PROCEDURE — 93000 ELECTROCARDIOGRAM COMPLETE: CPT

## 2020-06-16 PROCEDURE — 99214 OFFICE O/P EST MOD 30 MIN: CPT

## 2020-06-16 RX ORDER — CLOPIDOGREL BISULFATE 75 MG/1
75 TABLET, FILM COATED ORAL
Qty: 90 | Refills: 3 | Status: ACTIVE | COMMUNITY
Start: 2020-06-16 | End: 1900-01-01

## 2020-06-16 NOTE — HISTORY OF PRESENT ILLNESS
[Verbal consent obtained from patient] : the patient, [unfilled] [FreeTextEntry1] : The patient had cardiac cath . She had  a severe lesion in the LAD and LAD , both documented by FFR . She had some bleeding in the groin and had a brief period of PAF . Since that tie she has been feelng well.

## 2020-06-16 NOTE — REVIEW OF SYSTEMS
[Feeling Fatigued] : feeling fatigued [Negative] : Psychiatric [Fever] : no fever [Headache] : no headache [Chills] : no chills

## 2020-06-16 NOTE — PHYSICAL EXAM
[Normal Appearance] : normal appearance [General Appearance - Well Developed] : well developed [Well Groomed] : well groomed [General Appearance - Well Nourished] : well nourished [General Appearance - In No Acute Distress] : no acute distress [No Deformities] : no deformities [Normal Conjunctiva] : the conjunctiva exhibited no abnormalities [Eyelids - No Xanthelasma] : the eyelids demonstrated no xanthelasmas [No Oral Pallor] : no oral pallor [Normal Oral Mucosa] : normal oral mucosa [No Oral Cyanosis] : no oral cyanosis [Exaggerated Use Of Accessory Muscles For Inspiration] : no accessory muscle use [Auscultation Breath Sounds / Voice Sounds] : lungs were clear to auscultation bilaterally [Respiration, Rhythm And Depth] : normal respiratory rhythm and effort [Abdomen Soft] : soft [Abdomen Tenderness] : non-tender [Gait - Sufficient For Exercise Testing] : the gait was sufficient for exercise testing [Abdomen Mass (___ Cm)] : no abdominal mass palpated [Abnormal Walk] : normal gait [Cyanosis, Localized] : no localized cyanosis [Petechial Hemorrhages (___cm)] : no petechial hemorrhages [Nail Clubbing] : no clubbing of the fingernails [Skin Color & Pigmentation] : normal skin color and pigmentation [] : no ischemic changes [Skin Lesions] : no skin lesions [No Skin Ulcers] : no skin ulcer [No Venous Stasis] : no venous stasis [No Xanthoma] : no  xanthoma was observed [Oriented To Time, Place, And Person] : oriented to person, place, and time [Mood] : the mood was normal [Affect] : the affect was normal [No Anxiety] : not feeling anxious [Rhythm Regular] : regular [Normal Rate] : normal [No Murmur] : no murmurs heard [2+] : right 2+ [No Pitting Edema] : no pitting edema present [FreeTextEntry1] : No JVD [Rt] : no varicose veins of the right leg

## 2020-06-16 NOTE — ASSESSMENT
[FreeTextEntry1] : The patient had chest pain during cataract surgery . Work up had led to a cardiac cath .She has 2 vessel CAD and had PCI of the LCX and needs to come back for staged PCI of the LAD . She had some bleeding from the gorin and had PAF and she was held overnight . No symptoms since discharge . She is on DAPT and Eliquis . Will maintain this until after the second PCI .

## 2020-06-25 ENCOUNTER — APPOINTMENT (OUTPATIENT)
Dept: CARDIOLOGY | Facility: CLINIC | Age: 75
End: 2020-06-25
Payer: MEDICARE

## 2020-06-25 ENCOUNTER — APPOINTMENT (OUTPATIENT)
Dept: HEMATOLOGY ONCOLOGY | Facility: CLINIC | Age: 75
End: 2020-06-25

## 2020-06-25 PROCEDURE — 93294 REM INTERROG EVL PM/LDLS PM: CPT

## 2020-06-25 PROCEDURE — 93296 REM INTERROG EVL PM/IDS: CPT

## 2020-07-08 RX ORDER — CLOPIDOGREL 75 MG/1
75 TABLET, FILM COATED ORAL
Refills: 0 | Status: DISCONTINUED | COMMUNITY
End: 2020-07-08

## 2020-07-08 RX ORDER — APIXABAN 2.5 MG/1
2.5 TABLET, FILM COATED ORAL
Qty: 180 | Refills: 3 | Status: DISCONTINUED | COMMUNITY
Start: 2020-06-16 | End: 2020-07-08

## 2020-07-09 ENCOUNTER — APPOINTMENT (OUTPATIENT)
Dept: NEUROLOGY | Facility: CLINIC | Age: 75
End: 2020-07-09
Payer: MEDICARE

## 2020-07-09 VITALS
OXYGEN SATURATION: 97 % | WEIGHT: 130 LBS | BODY MASS INDEX: 24.55 KG/M2 | DIASTOLIC BLOOD PRESSURE: 80 MMHG | SYSTOLIC BLOOD PRESSURE: 118 MMHG | HEART RATE: 67 BPM | HEIGHT: 61 IN | TEMPERATURE: 98.5 F

## 2020-07-09 DIAGNOSIS — G72.41 INCLUSION BODY MYOSITIS [IBM]: ICD-10-CM

## 2020-07-09 PROCEDURE — 99214 OFFICE O/P EST MOD 30 MIN: CPT

## 2020-07-10 ENCOUNTER — APPOINTMENT (OUTPATIENT)
Dept: CARDIOLOGY | Facility: CLINIC | Age: 75
End: 2020-07-10
Payer: MEDICARE

## 2020-07-10 VITALS
DIASTOLIC BLOOD PRESSURE: 60 MMHG | HEIGHT: 61 IN | HEART RATE: 60 BPM | WEIGHT: 135 LBS | SYSTOLIC BLOOD PRESSURE: 110 MMHG | BODY MASS INDEX: 25.49 KG/M2

## 2020-07-10 PROBLEM — G72.41 INCLUSION BODY MYOSITIS: Status: ACTIVE | Noted: 2019-12-24

## 2020-07-10 PROCEDURE — 99214 OFFICE O/P EST MOD 30 MIN: CPT

## 2020-07-10 PROCEDURE — 93000 ELECTROCARDIOGRAM COMPLETE: CPT

## 2020-07-10 NOTE — ASSESSMENT
[FreeTextEntry1] : Inclusion body Myositis\par CAD\par Lumbar spine disease\par DLD\par \par will continue the IVIG\par F/u in 6 months

## 2020-07-10 NOTE — PHYSICAL EXAM
[General Appearance - Well Developed] : well developed [Normal Appearance] : normal appearance [Well Groomed] : well groomed [No Deformities] : no deformities [General Appearance - Well Nourished] : well nourished [Normal Conjunctiva] : the conjunctiva exhibited no abnormalities [General Appearance - In No Acute Distress] : no acute distress [Eyelids - No Xanthelasma] : the eyelids demonstrated no xanthelasmas [Normal Oral Mucosa] : normal oral mucosa [No Oral Pallor] : no oral pallor [No Oral Cyanosis] : no oral cyanosis [Respiration, Rhythm And Depth] : normal respiratory rhythm and effort [Exaggerated Use Of Accessory Muscles For Inspiration] : no accessory muscle use [Abdomen Soft] : soft [Abdomen Tenderness] : non-tender [Auscultation Breath Sounds / Voice Sounds] : lungs were clear to auscultation bilaterally [Abdomen Mass (___ Cm)] : no abdominal mass palpated [Gait - Sufficient For Exercise Testing] : the gait was sufficient for exercise testing [Abnormal Walk] : normal gait [Nail Clubbing] : no clubbing of the fingernails [Petechial Hemorrhages (___cm)] : no petechial hemorrhages [Cyanosis, Localized] : no localized cyanosis [Skin Color & Pigmentation] : normal skin color and pigmentation [] : no rash [No Venous Stasis] : no venous stasis [Skin Lesions] : no skin lesions [No Xanthoma] : no  xanthoma was observed [No Skin Ulcers] : no skin ulcer [Oriented To Time, Place, And Person] : oriented to person, place, and time [Mood] : the mood was normal [No Anxiety] : not feeling anxious [Affect] : the affect was normal [Normal Rate] : normal [No Murmur] : no murmurs heard [Rhythm Regular] : regular [2+] : left 2+ [No Pitting Edema] : no pitting edema present [FreeTextEntry1] : No JVD [Rt] : no varicose veins of the right leg

## 2020-07-10 NOTE — ASSESSMENT
[FreeTextEntry1] : The patient had chest pain during cataract surgery . Work up had led to a cardiac cath .She has 2 vessel CAD and had PCI of the LCX and needs to come back for staged PCI of the LAD . The patient has had no further CP . She may have aspirated her BP pill last night . She is going to see Dr. Augustine today . The patient had hyponatremia . Sodium will be repeated at PAT

## 2020-07-10 NOTE — HISTORY OF PRESENT ILLNESS
[FreeTextEntry1] : The patient had cardiac cath . She had  a severe lesion in the LCX and LAD , both documented by FFR . She had some bleeding in the groin and had a brief period of PAF .. The patient had PCI of LCX . She is awaiting PCI of LAD . Last night while taking her BP medication she had choked and had wheezing most of the night . She had thought she may have asirated the pill . She is not improved however.

## 2020-07-10 NOTE — PHYSICAL EXAM
[FreeTextEntry1] : A/A/Ox3\par follows 4 step commands\par looks tired \par good moods\par CN--normal\par No double vision\par normal slightly low tone voice\par slightly week but stable \par able to stand up with a little help from the hand\par wide based gait \par puts less pressure on the left side\par

## 2020-07-10 NOTE — HISTORY OF PRESENT ILLNESS
[FreeTextEntry1] : Aranza is here for F/U.She is very much at her baseline.\par In March had her cataract surgery ,at the same time had a chest  and left arm pain. Later on was seen by her cardiologist Siva Hogue.\par The work up was suggestive of CAD.. Did an angio and after that in early June received a stent .\par Now she is doing well in that regard. Denies significant S.O.B .Has difficulty with the strength mainly proximal legs. She uses a lift to go up and down the stairs . OTher wise continues to be quite functional and taking care of the house and her  who has some memory decline.\par At times or in fact often the  is not sleeping well during the night and goes up and doen and this might also keep her sleep fragmented.\par She denies any issues with swallowing and B/B function.\par She continues to receive her IVIG treatment every 6 weeks . She describes her moods as good\par No Falls .\par Wals with a Walker

## 2020-07-15 ENCOUNTER — OUTPATIENT (OUTPATIENT)
Dept: OUTPATIENT SERVICES | Facility: HOSPITAL | Age: 75
LOS: 1 days | Discharge: HOME | End: 2020-07-15
Payer: MEDICARE

## 2020-07-15 VITALS
SYSTOLIC BLOOD PRESSURE: 177 MMHG | TEMPERATURE: 98 F | RESPIRATION RATE: 18 BRPM | HEIGHT: 61 IN | OXYGEN SATURATION: 98 % | WEIGHT: 132.28 LBS | DIASTOLIC BLOOD PRESSURE: 79 MMHG | HEART RATE: 64 BPM

## 2020-07-15 DIAGNOSIS — I25.10 ATHEROSCLEROTIC HEART DISEASE OF NATIVE CORONARY ARTERY WITHOUT ANGINA PECTORIS: ICD-10-CM

## 2020-07-15 DIAGNOSIS — Z01.818 ENCOUNTER FOR OTHER PREPROCEDURAL EXAMINATION: ICD-10-CM

## 2020-07-15 DIAGNOSIS — Z98.49 CATARACT EXTRACTION STATUS, UNSPECIFIED EYE: Chronic | ICD-10-CM

## 2020-07-15 DIAGNOSIS — Z98.890 OTHER SPECIFIED POSTPROCEDURAL STATES: Chronic | ICD-10-CM

## 2020-07-15 DIAGNOSIS — Z90.710 ACQUIRED ABSENCE OF BOTH CERVIX AND UTERUS: Chronic | ICD-10-CM

## 2020-07-15 DIAGNOSIS — Z95.0 PRESENCE OF CARDIAC PACEMAKER: Chronic | ICD-10-CM

## 2020-07-15 LAB
A1C WITH ESTIMATED AVERAGE GLUCOSE RESULT: 7.4 % — HIGH (ref 4–5.6)
ALBUMIN SERPL ELPH-MCNC: 4.9 G/DL — SIGNIFICANT CHANGE UP (ref 3.5–5.2)
ALP SERPL-CCNC: 110 U/L — SIGNIFICANT CHANGE UP (ref 30–115)
ALT FLD-CCNC: 19 U/L — SIGNIFICANT CHANGE UP (ref 0–41)
ANION GAP SERPL CALC-SCNC: 12 MMOL/L — SIGNIFICANT CHANGE UP (ref 7–14)
APPEARANCE UR: CLEAR — SIGNIFICANT CHANGE UP
APTT BLD: 38.8 SEC — SIGNIFICANT CHANGE UP (ref 27–39.2)
AST SERPL-CCNC: 25 U/L — SIGNIFICANT CHANGE UP (ref 0–41)
BASOPHILS # BLD AUTO: 0.04 K/UL — SIGNIFICANT CHANGE UP (ref 0–0.2)
BASOPHILS NFR BLD AUTO: 0.5 % — SIGNIFICANT CHANGE UP (ref 0–1)
BILIRUB SERPL-MCNC: 0.3 MG/DL — SIGNIFICANT CHANGE UP (ref 0.2–1.2)
BILIRUB UR-MCNC: NEGATIVE — SIGNIFICANT CHANGE UP
BUN SERPL-MCNC: 16 MG/DL — SIGNIFICANT CHANGE UP (ref 10–20)
CALCIUM SERPL-MCNC: 9.4 MG/DL — SIGNIFICANT CHANGE UP (ref 8.5–10.1)
CHLORIDE SERPL-SCNC: 94 MMOL/L — LOW (ref 98–110)
CO2 SERPL-SCNC: 25 MMOL/L — SIGNIFICANT CHANGE UP (ref 17–32)
COLOR SPEC: COLORLESS — SIGNIFICANT CHANGE UP
CREAT SERPL-MCNC: <0.5 MG/DL — LOW (ref 0.7–1.5)
DIFF PNL FLD: SIGNIFICANT CHANGE UP
EOSINOPHIL # BLD AUTO: 0.13 K/UL — SIGNIFICANT CHANGE UP (ref 0–0.7)
EOSINOPHIL NFR BLD AUTO: 1.8 % — SIGNIFICANT CHANGE UP (ref 0–8)
ESTIMATED AVERAGE GLUCOSE: 166 MG/DL — HIGH (ref 68–114)
GLUCOSE SERPL-MCNC: 124 MG/DL — HIGH (ref 70–99)
GLUCOSE UR QL: NEGATIVE — SIGNIFICANT CHANGE UP
HCT VFR BLD CALC: 33.6 % — LOW (ref 37–47)
HGB BLD-MCNC: 11.1 G/DL — LOW (ref 12–16)
IMM GRANULOCYTES NFR BLD AUTO: 0.3 % — SIGNIFICANT CHANGE UP (ref 0.1–0.3)
INR BLD: 1.32 RATIO — HIGH (ref 0.65–1.3)
KETONES UR-MCNC: NEGATIVE — SIGNIFICANT CHANGE UP
LEUKOCYTE ESTERASE UR-ACNC: NEGATIVE — SIGNIFICANT CHANGE UP
LYMPHOCYTES # BLD AUTO: 1.37 K/UL — SIGNIFICANT CHANGE UP (ref 1.2–3.4)
LYMPHOCYTES # BLD AUTO: 18.5 % — LOW (ref 20.5–51.1)
MCHC RBC-ENTMCNC: 31.2 PG — HIGH (ref 27–31)
MCHC RBC-ENTMCNC: 33 G/DL — SIGNIFICANT CHANGE UP (ref 32–37)
MCV RBC AUTO: 94.4 FL — SIGNIFICANT CHANGE UP (ref 81–99)
MONOCYTES # BLD AUTO: 0.73 K/UL — HIGH (ref 0.1–0.6)
MONOCYTES NFR BLD AUTO: 9.9 % — HIGH (ref 1.7–9.3)
NEUTROPHILS # BLD AUTO: 5.12 K/UL — SIGNIFICANT CHANGE UP (ref 1.4–6.5)
NEUTROPHILS NFR BLD AUTO: 69 % — SIGNIFICANT CHANGE UP (ref 42.2–75.2)
NITRITE UR-MCNC: NEGATIVE — SIGNIFICANT CHANGE UP
NRBC # BLD: 0 /100 WBCS — SIGNIFICANT CHANGE UP (ref 0–0)
PH UR: 6 — SIGNIFICANT CHANGE UP (ref 5–8)
PLATELET # BLD AUTO: 266 K/UL — SIGNIFICANT CHANGE UP (ref 130–400)
POTASSIUM SERPL-MCNC: 5.2 MMOL/L — HIGH (ref 3.5–5)
POTASSIUM SERPL-SCNC: 5.2 MMOL/L — HIGH (ref 3.5–5)
PROT SERPL-MCNC: 7.6 G/DL — SIGNIFICANT CHANGE UP (ref 6–8)
PROT UR-MCNC: NEGATIVE — SIGNIFICANT CHANGE UP
PROTHROM AB SERPL-ACNC: 15.2 SEC — HIGH (ref 9.95–12.87)
RBC # BLD: 3.56 M/UL — LOW (ref 4.2–5.4)
RBC # FLD: 12.8 % — SIGNIFICANT CHANGE UP (ref 11.5–14.5)
SODIUM SERPL-SCNC: 131 MMOL/L — LOW (ref 135–146)
SP GR SPEC: 1.01 — LOW (ref 1.01–1.02)
UROBILINOGEN FLD QL: SIGNIFICANT CHANGE UP
WBC # BLD: 7.41 K/UL — SIGNIFICANT CHANGE UP (ref 4.8–10.8)
WBC # FLD AUTO: 7.41 K/UL — SIGNIFICANT CHANGE UP (ref 4.8–10.8)

## 2020-07-15 PROCEDURE — 93010 ELECTROCARDIOGRAM REPORT: CPT

## 2020-07-15 RX ORDER — CETIRIZINE HYDROCHLORIDE 10 MG/1
1 TABLET ORAL
Qty: 0 | Refills: 0 | DISCHARGE

## 2020-07-15 RX ORDER — ZINC SULFATE TAB 220 MG (50 MG ZINC EQUIVALENT) 220 (50 ZN) MG
1 TAB ORAL
Qty: 0 | Refills: 0 | DISCHARGE

## 2020-07-15 NOTE — H&P PST ADULT - REASON FOR ADMISSION
74 yo female presents for PAST in preparation for left heart catheterization with possible PTCA on 7/23/2020

## 2020-07-15 NOTE — H&P PST ADULT - HISTORY OF PRESENT ILLNESS
Pt states there is a blockage in her heart. Denies any chest pain, difficulty breathing, SOB, palpitations, dysuria, URI, or any other infections in the last 2 weeks. Denies any recent travel, contact, or exposure to any persons with known or suspected COVID-19. Pt also denies COVID testing within the last 2 weeks. Poor exercise tolerance d/t muscle disorder. Unable to climb flight of stairs. RADHA reviewed with patient.

## 2020-07-16 ENCOUNTER — OUTPATIENT (OUTPATIENT)
Dept: OUTPATIENT SERVICES | Facility: HOSPITAL | Age: 75
LOS: 1 days | Discharge: HOME | End: 2020-07-16

## 2020-07-16 DIAGNOSIS — Z02.9 ENCOUNTER FOR ADMINISTRATIVE EXAMINATIONS, UNSPECIFIED: ICD-10-CM

## 2020-07-16 DIAGNOSIS — Z90.710 ACQUIRED ABSENCE OF BOTH CERVIX AND UTERUS: Chronic | ICD-10-CM

## 2020-07-16 DIAGNOSIS — Z98.890 OTHER SPECIFIED POSTPROCEDURAL STATES: Chronic | ICD-10-CM

## 2020-07-16 DIAGNOSIS — Z98.49 CATARACT EXTRACTION STATUS, UNSPECIFIED EYE: Chronic | ICD-10-CM

## 2020-07-16 DIAGNOSIS — Z95.0 PRESENCE OF CARDIAC PACEMAKER: Chronic | ICD-10-CM

## 2020-07-16 LAB
ANION GAP SERPL CALC-SCNC: 12 MMOL/L — SIGNIFICANT CHANGE UP (ref 7–14)
BUN SERPL-MCNC: 15 MG/DL — SIGNIFICANT CHANGE UP (ref 10–20)
CALCIUM SERPL-MCNC: 9.6 MG/DL — SIGNIFICANT CHANGE UP (ref 8.5–10.1)
CHLORIDE SERPL-SCNC: 92 MMOL/L — LOW (ref 98–110)
CO2 SERPL-SCNC: 25 MMOL/L — SIGNIFICANT CHANGE UP (ref 17–32)
CREAT SERPL-MCNC: 0.5 MG/DL — LOW (ref 0.7–1.5)
GLUCOSE SERPL-MCNC: 205 MG/DL — HIGH (ref 70–99)
POTASSIUM SERPL-MCNC: 5.4 MMOL/L — HIGH (ref 3.5–5)
POTASSIUM SERPL-SCNC: 5.4 MMOL/L — HIGH (ref 3.5–5)
SODIUM SERPL-SCNC: 129 MMOL/L — LOW (ref 135–146)

## 2020-07-20 ENCOUNTER — LABORATORY RESULT (OUTPATIENT)
Age: 75
End: 2020-07-20

## 2020-07-20 ENCOUNTER — OUTPATIENT (OUTPATIENT)
Dept: OUTPATIENT SERVICES | Facility: HOSPITAL | Age: 75
LOS: 1 days | Discharge: HOME | End: 2020-07-20

## 2020-07-20 DIAGNOSIS — Z98.890 OTHER SPECIFIED POSTPROCEDURAL STATES: Chronic | ICD-10-CM

## 2020-07-20 DIAGNOSIS — Z95.0 PRESENCE OF CARDIAC PACEMAKER: Chronic | ICD-10-CM

## 2020-07-20 DIAGNOSIS — Z98.49 CATARACT EXTRACTION STATUS, UNSPECIFIED EYE: Chronic | ICD-10-CM

## 2020-07-20 DIAGNOSIS — Z11.59 ENCOUNTER FOR SCREENING FOR OTHER VIRAL DISEASES: ICD-10-CM

## 2020-07-20 DIAGNOSIS — Z90.710 ACQUIRED ABSENCE OF BOTH CERVIX AND UTERUS: Chronic | ICD-10-CM

## 2020-07-23 ENCOUNTER — OUTPATIENT (OUTPATIENT)
Dept: OUTPATIENT SERVICES | Facility: HOSPITAL | Age: 75
LOS: 1 days | Discharge: HOME | End: 2020-07-23
Payer: MEDICARE

## 2020-07-23 DIAGNOSIS — Z98.890 OTHER SPECIFIED POSTPROCEDURAL STATES: Chronic | ICD-10-CM

## 2020-07-23 DIAGNOSIS — Z90.710 ACQUIRED ABSENCE OF BOTH CERVIX AND UTERUS: Chronic | ICD-10-CM

## 2020-07-23 DIAGNOSIS — Z95.0 PRESENCE OF CARDIAC PACEMAKER: Chronic | ICD-10-CM

## 2020-07-23 DIAGNOSIS — Z98.49 CATARACT EXTRACTION STATUS, UNSPECIFIED EYE: Chronic | ICD-10-CM

## 2020-07-23 LAB
ANION GAP SERPL CALC-SCNC: 13 MMOL/L — SIGNIFICANT CHANGE UP (ref 7–14)
ANION GAP SERPL CALC-SCNC: 14 MMOL/L — SIGNIFICANT CHANGE UP (ref 7–14)
BUN SERPL-MCNC: 12 MG/DL — SIGNIFICANT CHANGE UP (ref 10–20)
BUN SERPL-MCNC: 15 MG/DL — SIGNIFICANT CHANGE UP (ref 10–20)
CALCIUM SERPL-MCNC: 8.9 MG/DL — SIGNIFICANT CHANGE UP (ref 8.5–10.1)
CALCIUM SERPL-MCNC: 9.4 MG/DL — SIGNIFICANT CHANGE UP (ref 8.5–10.1)
CHLORIDE SERPL-SCNC: 92 MMOL/L — LOW (ref 98–110)
CHLORIDE SERPL-SCNC: 94 MMOL/L — LOW (ref 98–110)
CO2 SERPL-SCNC: 18 MMOL/L — SIGNIFICANT CHANGE UP (ref 17–32)
CO2 SERPL-SCNC: 21 MMOL/L — SIGNIFICANT CHANGE UP (ref 17–32)
CREAT SERPL-MCNC: 0.5 MG/DL — LOW (ref 0.7–1.5)
CREAT SERPL-MCNC: <0.5 MG/DL — LOW (ref 0.7–1.5)
GLUCOSE BLDC GLUCOMTR-MCNC: 295 MG/DL — HIGH (ref 70–99)
GLUCOSE BLDC GLUCOMTR-MCNC: 365 MG/DL — HIGH (ref 70–99)
GLUCOSE SERPL-MCNC: 275 MG/DL — HIGH (ref 70–99)
GLUCOSE SERPL-MCNC: 303 MG/DL — HIGH (ref 70–99)
HCT VFR BLD CALC: 30.4 % — LOW (ref 37–47)
HCT VFR BLD CALC: 31.7 % — LOW (ref 37–47)
HGB BLD-MCNC: 10.5 G/DL — LOW (ref 12–16)
HGB BLD-MCNC: 10.9 G/DL — LOW (ref 12–16)
MCHC RBC-ENTMCNC: 31 PG — SIGNIFICANT CHANGE UP (ref 27–31)
MCHC RBC-ENTMCNC: 31.3 PG — HIGH (ref 27–31)
MCHC RBC-ENTMCNC: 34.4 G/DL — SIGNIFICANT CHANGE UP (ref 32–37)
MCHC RBC-ENTMCNC: 34.5 G/DL — SIGNIFICANT CHANGE UP (ref 32–37)
MCV RBC AUTO: 89.7 FL — SIGNIFICANT CHANGE UP (ref 81–99)
MCV RBC AUTO: 91.1 FL — SIGNIFICANT CHANGE UP (ref 81–99)
NRBC # BLD: 0 /100 WBCS — SIGNIFICANT CHANGE UP (ref 0–0)
NRBC # BLD: 0 /100 WBCS — SIGNIFICANT CHANGE UP (ref 0–0)
PLATELET # BLD AUTO: 209 K/UL — SIGNIFICANT CHANGE UP (ref 130–400)
PLATELET # BLD AUTO: 211 K/UL — SIGNIFICANT CHANGE UP (ref 130–400)
POTASSIUM SERPL-MCNC: 5 MMOL/L — SIGNIFICANT CHANGE UP (ref 3.5–5)
POTASSIUM SERPL-MCNC: 5 MMOL/L — SIGNIFICANT CHANGE UP (ref 3.5–5)
POTASSIUM SERPL-SCNC: 5 MMOL/L — SIGNIFICANT CHANGE UP (ref 3.5–5)
POTASSIUM SERPL-SCNC: 5 MMOL/L — SIGNIFICANT CHANGE UP (ref 3.5–5)
RBC # BLD: 3.39 M/UL — LOW (ref 4.2–5.4)
RBC # BLD: 3.48 M/UL — LOW (ref 4.2–5.4)
RBC # FLD: 12.4 % — SIGNIFICANT CHANGE UP (ref 11.5–14.5)
RBC # FLD: 12.4 % — SIGNIFICANT CHANGE UP (ref 11.5–14.5)
SODIUM SERPL-SCNC: 126 MMOL/L — LOW (ref 135–146)
SODIUM SERPL-SCNC: 126 MMOL/L — LOW (ref 135–146)
WBC # BLD: 5.59 K/UL — SIGNIFICANT CHANGE UP (ref 4.8–10.8)
WBC # BLD: 9.15 K/UL — SIGNIFICANT CHANGE UP (ref 4.8–10.8)
WBC # FLD AUTO: 5.59 K/UL — SIGNIFICANT CHANGE UP (ref 4.8–10.8)
WBC # FLD AUTO: 9.15 K/UL — SIGNIFICANT CHANGE UP (ref 4.8–10.8)

## 2020-07-23 PROCEDURE — 92928 PRQ TCAT PLMT NTRAC ST 1 LES: CPT | Mod: LD

## 2020-07-23 PROCEDURE — 93458 L HRT ARTERY/VENTRICLE ANGIO: CPT | Mod: 26,XU

## 2020-07-23 RX ORDER — ASPIRIN/CALCIUM CARB/MAGNESIUM 324 MG
1 TABLET ORAL
Qty: 30 | Refills: 0
Start: 2020-07-23 | End: 2020-08-21

## 2020-07-23 RX ORDER — LEVOTHYROXINE SODIUM 125 MCG
1 TABLET ORAL
Qty: 0 | Refills: 0 | DISCHARGE

## 2020-07-23 RX ORDER — ASCORBIC ACID 60 MG
1 TABLET,CHEWABLE ORAL
Qty: 0 | Refills: 0 | DISCHARGE

## 2020-07-23 RX ORDER — APIXABAN 2.5 MG/1
1 TABLET, FILM COATED ORAL
Qty: 60 | Refills: 0
Start: 2020-07-23 | End: 2020-08-21

## 2020-07-23 NOTE — H&P CARDIOLOGY - HISTORY OF PRESENT ILLNESS
Pre cath note: Staged-PCI to LAD     indication:  [ ] STEMI                [ ] NSTEMI                 [ ] Acute coronary syndrome                     [ ]Unstable Angina   [ ] high risk  [ ] intermediate risk  [ ] low risk                     [ ] Stable Angina     non-invasive testing:                          Date:                     result: [ ] high risk  [ ] intermediate risk  [ ] low risk    Anti- Anginal medications:                    [ ] not used                       [x] used                   [ ] not used but strong indication not to use    Ejection Fraction                   [ ] <29            [ ] 30-39%   [ ] 40-49%     [x]>50%    CHF                   [ ] active (within last 14 days on meds   [ ] Chronic (on meds but no exacerbation)    COPD                   [ ] mild (on chronic bronchodilators)  [ ] moderate (on chronic steroid therapy)      [ ] severe (indication for home O2 or PACO2 >50)    Other risk factors:                       [ ] Previous MI                     [ ] CVA/ stroke                    [ ] carotid stent/ CEA                    [ ] PVD/PAD- (arterial aneurysm, non-palpable pulses, tortuous vessel with inability to insert catheter, infra-renal dissection, renal or subclavian artery stenosis)                    [x] diabetic                    [ ] previous CABG                    [ ] Renal Failure                           10.5   5.59  )-----------( 209      ( 23 Jul 2020 08:16 )             30.4     07-23    126<L>  |  92<L>  |  15  ----------------------------<  303<H>  5.0   |  21  |  0.5<L>    Ca    9.4      23 Jul 2020 08:16                         -Start IV Fluids NS at : 3ml/Kg for 1 Hr prior to proecudure

## 2020-07-23 NOTE — PROGRESS NOTE ADULT - SUBJECTIVE AND OBJECTIVE BOX
Cardiology Follow up    SOWMYA AMADO   75y Female  PAST MEDICAL & SURGICAL HISTORY:  Inclusion body myositis (IBM)  Lung nodules  Transfusion history: S/p Hysterectomy  Dry eyes, bilateral  PVD (peripheral vascular disease): S/p Right Peripheral leg Stent  Seasonal allergies  Skin cancer: Basal Cell Cancer face  (around nose) s/p MOHS procedure x 3  Anemia: Chronic, on Iron supplement  Hypothyroid  SSS (sick sinus syndrome): S/p PPM  Diabetes: Insulin dependent - (has insulin Pump)  Pacemaker: Sept 2017  Palpitations: occasionally; not for couple yrs  Essential hypertension  Afib: 5+ yrs; on Eliquis  S/P ablation of atrial fibrillation  S/P cataract surgery  History of surgery: MOHS procedure (face) x 3  Cardiac pacemaker: 9/9/17  History of surgery: Right Peripheral Stent 5 yrs  H/O total hysterectomy: 25+ yrs ago       HPI:  Pre cath note: Staged-PCI to LAD                               10.5   5.59  )-----------( 209      ( 23 Jul 2020 08:16 )             30.4     07-23    126<L>  |  92<L>  |  15  ----------------------------<  303<H>  5.0   |  21  |  0.5<L>    Ca    9.4      23 Jul 2020 08:16                         -Start IV Fluids NS at : 3ml/Kg for 1 Hr prior to proecudure (23 Jul 2020 08:57)    Allergies    latex (Pruritus; Rash)  sulfonamides (Unknown)    Intolerances    Pineapple (Unknown)    Patient without complaints. Pt ambulated without issues/symptoms  Denies CP, SOB, palpitations, or dizziness            REVIEW OF SYSTEMS:          All negative except as mentioned in HPI    PHYSICAL EXAM:           CONSTITUTIONAL: Well-developed; well-nourished; in no acute distress  	SKIN: warm, dry  	HEAD: Normocephalic; atraumatic  	EYES: PERRL.  	ENT: No nasal discharge, airway clear, mucous membranes moist  	NECK: Supple; non tender.  	CARD: +S1, +S2, no murmurs, gallops, or rubs. Regular rate and rhythm    	RESP: No wheezes, rales or rhonchi. CTA B/L  	ABD: soft ntnd, + BS x 4 quadrants  	EXT: moves all extremities,  no clubbing, cyanosis or edema  	NEURO: Alert and oriented x3, no focal deficits          PSYCH: Cooperative, appropriate          VASCULAR:  +2 Rad / +2 PTs / + 2 DPs          EXTREMITY:             Right Groin: **** Dressing D/C/I, pressure dressing removed, access site soft, no hematoma, no pain, + pulses/same as baseline, no sign of infection, no numbness****  	             ECG:                     Pending                                                                            LABS:                        10.5   5.59  )-----------( 209      ( 23 Jul 2020 08:16 )             30.4     07-23    126<L>  |  92<L>  |  15  ----------------------------<  303<H>  5.0   |  21  |  0.5<L>    Ca    9.4      23 Jul 2020 08:16              A/P:  I discussed the case with Cardiologist Dr. Gonzalez  and recommend the following:    S/P PCI:    Implants: Balloon Angioplasty and LUPE x 1 to prox to mid LAD (2.5x24mm Synergy)    Follow-up Care:                                           D/C Home today    Continue with ASA 81mg x 1 week then D/c    Plavix 75mg,   Eliquis (resume tomorrow, next dose 7/24 in am),    Monitor CBC/ BMP @ 1600    NS@100cc/hr x 6 hours                                        Monitor access site                   Patient agreeing to take DAPT for at least one year or as directed by cardiologist                    Pt given instructions on importance of taking antiplatelet medication or risk acute stent thrombosis/death                   Post cath instructions, access site care and activity restrictions reviewed with patient                     Discussed with patient to return to hospital if experience chest pain, shortness breath, dizziness and site bleeding                   Aggressive risk factor modification, diet counseling, smoking cessation discussed with patient                       Can discharge patient @1800 from cardiac standpoint after ambulating without symptoms and access site wnl and ECG reviewed                    Follow up with Cardiology Dr. Gonzalez in 2 weeks.  Instructed to call and make an appointment Cardiology Follow up    SOWMYA AMADO   75y Female  PAST MEDICAL & SURGICAL HISTORY:  Inclusion body myositis (IBM)  Lung nodules  Transfusion history: S/p Hysterectomy  Dry eyes, bilateral  PVD (peripheral vascular disease): S/p Right Peripheral leg Stent  Seasonal allergies  Skin cancer: Basal Cell Cancer face  (around nose) s/p MOHS procedure x 3  Anemia: Chronic, on Iron supplement  Hypothyroid  SSS (sick sinus syndrome): S/p PPM  Diabetes: Insulin dependent - (has insulin Pump)  Pacemaker: Sept 2017  Palpitations: occasionally; not for couple yrs  Essential hypertension  Afib: 5+ yrs; on Eliquis  S/P ablation of atrial fibrillation  S/P cataract surgery  History of surgery: MOHS procedure (face) x 3  Cardiac pacemaker: 9/9/17  History of surgery: Right Peripheral Stent 5 yrs  H/O total hysterectomy: 25+ yrs ago       HPI:  Pre cath note: Staged-PCI to LAD                               10.5 5.59  )-----------( 209      ( 23 Jul 2020 08:16 )             30.4     07-23    126<L>  |  92<L>  |  15  ----------------------------<  303<H>  5.0   |  21  |  0.5<L>    Ca    9.4      23 Jul 2020 08:16                         -Start IV Fluids NS at : 3ml/Kg for 1 Hr prior to proecudure (23 Jul 2020 08:57)    Allergies    latex (Pruritus; Rash)  sulfonamides (Unknown)    Intolerances    Pineapple (Unknown)    Patient without complaints. Pt ambulated without issues/symptoms  Denies CP, SOB, palpitations, or dizziness    	             ECG:                     Pending                                                                            LABS:                        10.5   5.59  )-----------( 209      ( 23 Jul 2020 08:16 )             30.4     07-23    126<L>  |  92<L>  |  15  ----------------------------<  303<H>  5.0   |  21  |  0.5<L>    Ca    9.4      23 Jul 2020 08:16              A/P:  I discussed the case with Cardiologist Dr. Gonzalez  and recommend the following:    S/P PCI:    Implants: Balloon Angioplasty and LUPE x 1 to prox to mid LAD (2.5x24mm Synergy)    Follow-up Care:                                           D/C Home today    Continue with ASA 81mg x 1 week then D/c    Plavix 75mg,   Eliquis (resume tomorrow, next dose 7/24 in am),    Monitor CBC/ BMP @ 1600    NS@100cc/hr x 6 hours                                        Monitor access site                   Patient agreeing to take DAPT for at least one year or as directed by cardiologist                    Pt given instructions on importance of taking antiplatelet medication or risk acute stent thrombosis/death                   Post cath instructions, access site care and activity restrictions reviewed with patient                     Discussed with patient to return to hospital if experience chest pain, shortness breath, dizziness and site bleeding                   Aggressive risk factor modification, diet counseling, smoking cessation discussed with patient                       Can discharge patient @1800 from cardiac standpoint after ambulating without symptoms and access site wnl and ECG reviewed                    Follow up with Cardiology Dr. Gonzalez in 2 weeks.  Instructed to call and make an appointment Cardiology Follow up    SOWMYA AMADO   75y Female  PAST MEDICAL & SURGICAL HISTORY:  Inclusion body myositis (IBM)  Lung nodules  Transfusion history: S/p Hysterectomy  Dry eyes, bilateral  PVD (peripheral vascular disease): S/p Right Peripheral leg Stent  Seasonal allergies  Skin cancer: Basal Cell Cancer face  (around nose) s/p MOHS procedure x 3  Anemia: Chronic, on Iron supplement  Hypothyroid  SSS (sick sinus syndrome): S/p PPM  Diabetes: Insulin dependent - (has insulin Pump)  Pacemaker: Sept 2017  Palpitations: occasionally; not for couple yrs  Essential hypertension  Afib: 5+ yrs; on Eliquis  S/P ablation of atrial fibrillation  S/P cataract surgery  History of surgery: MOHS procedure (face) x 3  Cardiac pacemaker: 9/9/17  History of surgery: Right Peripheral Stent 5 yrs  H/O total hysterectomy: 25+ yrs ago       HPI:  Pre cath note: Staged-PCI to LAD                               10.5   5.59  )-----------( 209      ( 23 Jul 2020 08:16 )             30.4     07-23    126<L>  |  92<L>  |  15  ----------------------------<  303<H>  5.0   |  21  |  0.5<L>    Ca    9.4      23 Jul 2020 08:16                         -Start IV Fluids NS at : 3ml/Kg for 1 Hr prior to proecudure (23 Jul 2020 08:57)    Allergies    latex (Pruritus; Rash)  sulfonamides (Unknown)    Intolerances    Pineapple (Unknown)    Patient without complaints. Pt ambulated without issues/symptoms  Denies CP, SOB, palpitations, or dizziness      REVIEW OF SYSTEMS:          All negative except as mentioned in HPI    PHYSICAL EXAM:           CONSTITUTIONAL: Well-developed; well-nourished; in no acute distress  	SKIN: warm, dry  	HEAD: Normocephalic; atraumatic  	EYES: PERRL.  	ENT: No nasal discharge, airway clear, mucous membranes moist  	NECK: Supple; non tender.  	CARD: +S1, +S2, no murmurs, gallops, or rubs. Regular rate and rhythm    	RESP: No wheezes, rales or rhonchi. CTA B/L  	ABD: soft ntnd, + BS x 4 quadrants  	EXT: moves all extremities,  no clubbing, cyanosis or edema  	NEURO: Alert and oriented x3, no focal deficits          PSYCH: Cooperative, appropriate          VASCULAR:  +2 Rad / +2 PTs / + 2 DPs          EXTREMITY:             Right Groin:  Dressing D/C/I, pressure dressing removed, access site small hematoma noted, no pain, + pulses, no sign of infection, no numbness  	               	             ECG:                     Pending                                                                            LABS:                        10.5   5.59  )-----------( 209      ( 23 Jul 2020 08:16 )             30.4     07-23    126<L>  |  92<L>  |  15  ----------------------------<  303<H>  5.0   |  21  |  0.5<L>    Ca    9.4      23 Jul 2020 08:16              A/P:  I discussed the case with Cardiologist Dr. Gonzalez  and recommend the following:    S/P PCI:    Implants: Balloon Angioplasty and LUPE x 1 to prox to mid LAD (2.5x24mm Synergy)    Follow-up Care:                                           D/C Home today    Continue with ASA 81mg x 1 week then D/c    Plavix 75mg,   Eliquis (resume Saturday, next dose 7/25 in am),    Monitor CBC/ BMP @ 1600    NS@100cc/hr x 6 hours                                        Monitor access site                   Patient agreeing to take DAPT for at least one year or as directed by cardiologist                    Pt given instructions on importance of taking antiplatelet medication or risk acute stent thrombosis/death                   Post cath instructions, access site care and activity restrictions reviewed with patient                     Discussed with patient to return to hospital if experience chest pain, shortness breath, dizziness and site bleeding                   Aggressive risk factor modification, diet counseling, smoking cessation discussed with patient                       Can discharge patient @1800 from cardiac standpoint after ambulating without symptoms and access site wnl and ECG reviewed                    Follow up with Cardiology Dr. Gonzalez in 2 weeks.  Instructed to call and make an appointment

## 2020-07-23 NOTE — CHART NOTE - NSCHARTNOTEFT_GEN_A_CORE
Preliminary Cardiac Catheterization Post-Procedure Report:07-23-20 @ 11:40    Procedure Performed:  [x] Left Heart Catheterization  [ ] Right Heart Catheterization  [x] Percutaneous Coronary Intervention    Primary Physician: Dr. Carlos MD  Assistant(s): Dr. Ismael MD and Dr. Deisi MD    Preliminary Procedure Summary (Official full report to follow)    Pre-procedure diagnosis: Stable Angina, staged pci of prox/mid LAD (iFR 0.82)  Post Procedure Diagnosis/Impression: staged pci of prox/mid LAD (iFR 0.82)    Left Heart Catheterization:  approximate EF%: not evaluated  [ ] Normal Coronary Arteries  [ ] Luminal Irregularities  [ ] non-obstructive CAD  [ ] 1 vessel coronary artery disease (prox/mid LAD)      Anesthesia Type  [x] conscious sedation  [x] local/regional anesthesia  [  ] general anesthesia    Estimated Blood Loss  [x ] less than 30 ml    Amount of Contrast used:  150 ml    Access  [x] Rt. Femoral A (manual)  [ ] Rt. Femoral V  [ ] Rt. Radial A  [ ] Rt. Brachial V    Condition of patient after procedure  [x] stable  [  ] guarded  [  ] satisfactory     CATH SUMMARY/FINDINGS:    Dominance:   [x] Right  [ ] Left                  LM: normal        LAD:  80% diffuse lesion from prox to mid LAD, s/p PCI and stent placement    Diag:   D1: mild disease    LCx: minor irregularities, patent prox LCX to OM2 stent    OM1: minor irregularities  OM2: patent stent    RCA:  prox RCA: minor irregularities    RPL: normal    RPDA: normal    Implants: Balloon Angioplasty and LUPE x 1 to prox to mid LAD (2.5x24mm Synergy)    Follow-up Care:  [x] D/C Home today  [x] Continue with ASA 81mg, Plavix 75mg, Eliquis (resume tomorrow, next dose 7/24 in am), Crestor, amlodipine/benazapril, Coreg, ad zetia  [x] intensive medical management  Monitor BUN/Cr  NS@100cc/hr x 6 hours  F/U with Cardiologist as outpatient

## 2020-07-24 LAB — GLUCOSE BLDC GLUCOMTR-MCNC: 268 MG/DL — HIGH (ref 70–99)

## 2020-07-28 DIAGNOSIS — E78.49 OTHER HYPERLIPIDEMIA: ICD-10-CM

## 2020-07-28 DIAGNOSIS — I10 ESSENTIAL (PRIMARY) HYPERTENSION: ICD-10-CM

## 2020-07-28 DIAGNOSIS — Z95.5 PRESENCE OF CORONARY ANGIOPLASTY IMPLANT AND GRAFT: ICD-10-CM

## 2020-07-28 DIAGNOSIS — Z79.01 LONG TERM (CURRENT) USE OF ANTICOAGULANTS: ICD-10-CM

## 2020-07-28 DIAGNOSIS — I20.8 OTHER FORMS OF ANGINA PECTORIS: ICD-10-CM

## 2020-07-28 DIAGNOSIS — Z95.0 PRESENCE OF CARDIAC PACEMAKER: ICD-10-CM

## 2020-07-28 DIAGNOSIS — D64.9 ANEMIA, UNSPECIFIED: ICD-10-CM

## 2020-07-28 DIAGNOSIS — I48.91 UNSPECIFIED ATRIAL FIBRILLATION: ICD-10-CM

## 2020-07-28 DIAGNOSIS — E03.9 HYPOTHYROIDISM, UNSPECIFIED: ICD-10-CM

## 2020-07-28 DIAGNOSIS — Z87.891 PERSONAL HISTORY OF NICOTINE DEPENDENCE: ICD-10-CM

## 2020-07-28 DIAGNOSIS — I25.118 ATHEROSCLEROTIC HEART DISEASE OF NATIVE CORONARY ARTERY WITH OTHER FORMS OF ANGINA PECTORIS: ICD-10-CM

## 2020-07-28 DIAGNOSIS — E11.9 TYPE 2 DIABETES MELLITUS WITHOUT COMPLICATIONS: ICD-10-CM

## 2020-07-28 DIAGNOSIS — Z79.02 LONG TERM (CURRENT) USE OF ANTITHROMBOTICS/ANTIPLATELETS: ICD-10-CM

## 2020-07-28 DIAGNOSIS — Z91.040 LATEX ALLERGY STATUS: ICD-10-CM

## 2020-07-28 DIAGNOSIS — Z88.2 ALLERGY STATUS TO SULFONAMIDES: ICD-10-CM

## 2020-08-08 ENCOUNTER — APPOINTMENT (OUTPATIENT)
Dept: CARDIOLOGY | Facility: CLINIC | Age: 75
End: 2020-08-08
Payer: MEDICARE

## 2020-08-08 VITALS
TEMPERATURE: 97.3 F | DIASTOLIC BLOOD PRESSURE: 60 MMHG | BODY MASS INDEX: 24.94 KG/M2 | WEIGHT: 132 LBS | SYSTOLIC BLOOD PRESSURE: 120 MMHG

## 2020-08-08 PROCEDURE — 93000 ELECTROCARDIOGRAM COMPLETE: CPT

## 2020-08-08 PROCEDURE — 99214 OFFICE O/P EST MOD 30 MIN: CPT

## 2020-08-08 RX ORDER — ASPIRIN 81 MG
81 TABLET, DELAYED RELEASE (ENTERIC COATED) ORAL DAILY
Refills: 0 | Status: DISCONTINUED | COMMUNITY
End: 2020-08-08

## 2020-08-08 NOTE — REVIEW OF SYSTEMS
[Fever] : no fever [Chills] : no chills [Headache] : no headache [Feeling Fatigued] : feeling fatigued [Negative] : Endocrine

## 2020-08-08 NOTE — REASON FOR VISIT
[Follow-Up - Clinic] : a clinic follow-up of [Hyperlipidemia] : hyperlipidemia [Hypertension] : hypertension [Atrial Fibrillation] : atrial fibrillation [Palpitations] : palpitations [Supraventricular Tachycardia] : supraventricular tachycardia

## 2020-08-08 NOTE — HISTORY OF PRESENT ILLNESS
[FreeTextEntry1] : The patient had cardiac cath . She had  a severe lesion in the LCX and LAD , both documented by FFR . She had some bleeding in the groin and had a brief period of PAF .. The patient had PCI of LCX .  She has subsequently had PCI of the LAD . She has not had chest pain . Had some bleeding pst procedure but was able to go home the same day .

## 2020-08-08 NOTE — ASSESSMENT
[FreeTextEntry1] : The patient had PCI of the LAD . LCX stent was patent . LDL is very low . She has some groin pain but no hematoma on exam . The patient has had no chest pain .

## 2020-08-08 NOTE — PHYSICAL EXAM
[General Appearance - Well Developed] : well developed [Normal Appearance] : normal appearance [Well Groomed] : well groomed [General Appearance - Well Nourished] : well nourished [General Appearance - In No Acute Distress] : no acute distress [Normal Conjunctiva] : the conjunctiva exhibited no abnormalities [No Deformities] : no deformities [Normal Oral Mucosa] : normal oral mucosa [Eyelids - No Xanthelasma] : the eyelids demonstrated no xanthelasmas [No Oral Pallor] : no oral pallor [No Oral Cyanosis] : no oral cyanosis [FreeTextEntry1] : No JVD [Respiration, Rhythm And Depth] : normal respiratory rhythm and effort [Exaggerated Use Of Accessory Muscles For Inspiration] : no accessory muscle use [Auscultation Breath Sounds / Voice Sounds] : lungs were clear to auscultation bilaterally [Abdomen Tenderness] : non-tender [Abdomen Soft] : soft [Nail Clubbing] : no clubbing of the fingernails [Abnormal Walk] : normal gait [Abdomen Mass (___ Cm)] : no abdominal mass palpated [Gait - Sufficient For Exercise Testing] : the gait was sufficient for exercise testing [Petechial Hemorrhages (___cm)] : no petechial hemorrhages [Cyanosis, Localized] : no localized cyanosis [Skin Color & Pigmentation] : normal skin color and pigmentation [] : no rash [No Venous Stasis] : no venous stasis [No Xanthoma] : no  xanthoma was observed [No Skin Ulcers] : no skin ulcer [Skin Lesions] : no skin lesions [Oriented To Time, Place, And Person] : oriented to person, place, and time [Affect] : the affect was normal [Mood] : the mood was normal [No Anxiety] : not feeling anxious [Normal Rate] : normal [Rhythm Regular] : regular [No Murmur] : no murmurs heard [No Pitting Edema] : no pitting edema present [2+] : right 2+ [Rt] : no varicose veins of the right leg

## 2020-08-16 ENCOUNTER — OUTPATIENT (OUTPATIENT)
Dept: OUTPATIENT SERVICES | Facility: HOSPITAL | Age: 75
LOS: 1 days | Discharge: HOME | End: 2020-08-16
Payer: MEDICARE

## 2020-08-16 ENCOUNTER — RESULT REVIEW (OUTPATIENT)
Age: 75
End: 2020-08-16

## 2020-08-16 DIAGNOSIS — Z90.710 ACQUIRED ABSENCE OF BOTH CERVIX AND UTERUS: Chronic | ICD-10-CM

## 2020-08-16 DIAGNOSIS — R07.9 CHEST PAIN, UNSPECIFIED: ICD-10-CM

## 2020-08-16 DIAGNOSIS — Z98.890 OTHER SPECIFIED POSTPROCEDURAL STATES: Chronic | ICD-10-CM

## 2020-08-16 DIAGNOSIS — Z98.49 CATARACT EXTRACTION STATUS, UNSPECIFIED EYE: Chronic | ICD-10-CM

## 2020-08-16 DIAGNOSIS — Z95.0 PRESENCE OF CARDIAC PACEMAKER: Chronic | ICD-10-CM

## 2020-08-16 PROCEDURE — 71250 CT THORAX DX C-: CPT | Mod: 26

## 2020-09-18 ENCOUNTER — RX RENEWAL (OUTPATIENT)
Age: 75
End: 2020-09-18

## 2020-09-18 RX ORDER — EZETIMIBE 10 MG/1
10 TABLET ORAL
Qty: 90 | Refills: 3 | Status: ACTIVE | COMMUNITY
Start: 2020-09-18 | End: 1900-01-01

## 2020-09-24 ENCOUNTER — APPOINTMENT (OUTPATIENT)
Dept: CARDIOLOGY | Facility: CLINIC | Age: 75
End: 2020-09-24
Payer: MEDICARE

## 2020-09-24 PROCEDURE — 93296 REM INTERROG EVL PM/IDS: CPT

## 2020-09-24 PROCEDURE — 93294 REM INTERROG EVL PM/LDLS PM: CPT

## 2020-09-25 ENCOUNTER — APPOINTMENT (OUTPATIENT)
Dept: CARDIOLOGY | Facility: CLINIC | Age: 75
End: 2020-09-25
Payer: MEDICARE

## 2020-09-25 VITALS
WEIGHT: 130 LBS | DIASTOLIC BLOOD PRESSURE: 72 MMHG | HEIGHT: 61 IN | BODY MASS INDEX: 24.55 KG/M2 | HEART RATE: 72 BPM | SYSTOLIC BLOOD PRESSURE: 130 MMHG | TEMPERATURE: 97.4 F

## 2020-09-25 DIAGNOSIS — R00.1 BRADYCARDIA, UNSPECIFIED: ICD-10-CM

## 2020-09-25 PROCEDURE — 93280 PM DEVICE PROGR EVAL DUAL: CPT

## 2020-09-25 RX ORDER — EZETIMIBE 10 MG/1
10 TABLET ORAL DAILY
Refills: 0 | Status: COMPLETED | COMMUNITY
End: 2020-09-25

## 2020-10-14 ENCOUNTER — RESULT REVIEW (OUTPATIENT)
Age: 75
End: 2020-10-14

## 2020-10-14 ENCOUNTER — OUTPATIENT (OUTPATIENT)
Dept: OUTPATIENT SERVICES | Facility: HOSPITAL | Age: 75
LOS: 1 days | Discharge: HOME | End: 2020-10-14
Payer: MEDICARE

## 2020-10-14 DIAGNOSIS — R10.2 PELVIC AND PERINEAL PAIN: ICD-10-CM

## 2020-10-14 DIAGNOSIS — Z95.0 PRESENCE OF CARDIAC PACEMAKER: Chronic | ICD-10-CM

## 2020-10-14 DIAGNOSIS — Z98.890 OTHER SPECIFIED POSTPROCEDURAL STATES: Chronic | ICD-10-CM

## 2020-10-14 DIAGNOSIS — Z90.710 ACQUIRED ABSENCE OF BOTH CERVIX AND UTERUS: Chronic | ICD-10-CM

## 2020-10-14 DIAGNOSIS — Z98.49 CATARACT EXTRACTION STATUS, UNSPECIFIED EYE: Chronic | ICD-10-CM

## 2020-10-14 PROCEDURE — 72190 X-RAY EXAM OF PELVIS: CPT | Mod: 26

## 2020-10-20 ENCOUNTER — NON-APPOINTMENT (OUTPATIENT)
Age: 75
End: 2020-10-20

## 2020-10-30 ENCOUNTER — NON-APPOINTMENT (OUTPATIENT)
Age: 75
End: 2020-10-30

## 2020-11-05 ENCOUNTER — APPOINTMENT (OUTPATIENT)
Dept: CARDIOLOGY | Facility: CLINIC | Age: 75
End: 2020-11-05
Payer: MEDICARE

## 2020-11-05 VITALS
WEIGHT: 139 LBS | HEIGHT: 61 IN | HEART RATE: 60 BPM | TEMPERATURE: 95.8 F | DIASTOLIC BLOOD PRESSURE: 60 MMHG | BODY MASS INDEX: 26.24 KG/M2 | SYSTOLIC BLOOD PRESSURE: 120 MMHG

## 2020-11-05 PROCEDURE — 99072 ADDL SUPL MATRL&STAF TM PHE: CPT

## 2020-11-05 PROCEDURE — 93000 ELECTROCARDIOGRAM COMPLETE: CPT

## 2020-11-05 PROCEDURE — 99214 OFFICE O/P EST MOD 30 MIN: CPT

## 2020-11-05 RX ORDER — ETHACRYNIC ACID 25 MG/1
25 TABLET ORAL
Qty: 15 | Refills: 3 | Status: ACTIVE | COMMUNITY
Start: 2020-11-05 | End: 1900-01-01

## 2020-11-05 NOTE — HISTORY OF PRESENT ILLNESS
[FreeTextEntry1] : The patient had cardiac cath . She had  a severe lesion in the LCX and LAD , both documented by FFR . She had some bleeding in the groin and had a brief period of PAF .. The patient had PCI of LCX .  She has subsequently had PCI of the LAD . She has not had chest pain . The patient has had a fall about a month ago . She was said to have a pelvic fracture . She has had selling over both legs for the past one month . She is on Lotrel but has been on this for many years . No chest pain .

## 2020-11-05 NOTE — ASSESSMENT
[FreeTextEntry1] : The patient had an apparent hip fracture more than a month ago .Since that time she has gained 9 pounds and developed edema . No SOB . The patient has a sulfa allergy . Will try Edecrin however the patient had a low sodium back in July on the blood work from her PCP .  .

## 2020-11-06 ENCOUNTER — LABORATORY RESULT (OUTPATIENT)
Age: 75
End: 2020-11-06

## 2020-11-06 DIAGNOSIS — E87.5 HYPERKALEMIA: ICD-10-CM

## 2020-11-08 LAB
ANION GAP SERPL CALC-SCNC: 12 MMOL/L
BUN SERPL-MCNC: 16 MG/DL
CALCIUM SERPL-MCNC: 9.7 MG/DL
CHLORIDE SERPL-SCNC: 100 MMOL/L
CO2 SERPL-SCNC: 23 MMOL/L
CREAT SERPL-MCNC: 0.9 MG/DL
GLUCOSE SERPL-MCNC: 119 MG/DL
POTASSIUM SERPL-SCNC: 4.9 MMOL/L
POTASSIUM SERPL-SCNC: 6.1 MMOL/L
SODIUM SERPL-SCNC: 135 MMOL/L
T4 FREE SERPL-MCNC: 1.5 NG/DL
TSH SERPL-ACNC: 2.03 UIU/ML

## 2020-11-13 ENCOUNTER — NON-APPOINTMENT (OUTPATIENT)
Age: 75
End: 2020-11-13

## 2020-11-17 ENCOUNTER — NON-APPOINTMENT (OUTPATIENT)
Age: 75
End: 2020-11-17

## 2020-11-17 ENCOUNTER — APPOINTMENT (OUTPATIENT)
Dept: CARDIOLOGY | Facility: CLINIC | Age: 75
End: 2020-11-17
Payer: MEDICARE

## 2020-11-17 VITALS
SYSTOLIC BLOOD PRESSURE: 117 MMHG | HEIGHT: 61 IN | WEIGHT: 130 LBS | BODY MASS INDEX: 24.55 KG/M2 | TEMPERATURE: 97.5 F | DIASTOLIC BLOOD PRESSURE: 70 MMHG

## 2020-11-17 DIAGNOSIS — I48.0 PAROXYSMAL ATRIAL FIBRILLATION: ICD-10-CM

## 2020-11-17 PROCEDURE — 99213 OFFICE O/P EST LOW 20 MIN: CPT

## 2020-11-17 PROCEDURE — 93280 PM DEVICE PROGR EVAL DUAL: CPT

## 2020-11-17 PROCEDURE — 99072 ADDL SUPL MATRL&STAF TM PHE: CPT

## 2020-11-17 RX ORDER — BENAZEPRIL HYDROCHLORIDE 40 MG/1
40 TABLET, FILM COATED ORAL DAILY
Qty: 90 | Refills: 3 | Status: ACTIVE | COMMUNITY
Start: 2020-11-05

## 2020-11-17 RX ORDER — APIXABAN 2.5 MG/1
2.5 TABLET, FILM COATED ORAL
Refills: 0 | Status: ACTIVE | COMMUNITY

## 2020-11-17 RX ORDER — INSULIN ASPART 100 [IU]/ML
100 INJECTION, SOLUTION INTRAVENOUS; SUBCUTANEOUS
Refills: 0 | Status: ACTIVE | COMMUNITY
Start: 2018-05-23

## 2020-11-17 NOTE — PROCEDURE
[No] : not [NSR] : normal sinus rhythm [See Scanned Paceart Report] : See scanned paceart report [See Device Printout] : See device printout [Pacemaker] : pacemaker [DDD] : DDD [Threshold Testing Performed] : Threshold testing was performed [Programmed for Longevity] : output reprogrammed for improved battery longevity [Pace ___ %] : Pace [unfilled]% [de-identified] : [facility] [de-identified] : L311 [de-identified] : 9/8/17 [de-identified] : 60

## 2020-11-18 ENCOUNTER — OUTPATIENT (OUTPATIENT)
Dept: OUTPATIENT SERVICES | Facility: HOSPITAL | Age: 75
LOS: 1 days | Discharge: HOME | End: 2020-11-18
Payer: MEDICARE

## 2020-11-18 ENCOUNTER — RESULT REVIEW (OUTPATIENT)
Age: 75
End: 2020-11-18

## 2020-11-18 DIAGNOSIS — Z98.49 CATARACT EXTRACTION STATUS, UNSPECIFIED EYE: Chronic | ICD-10-CM

## 2020-11-18 DIAGNOSIS — Z90.710 ACQUIRED ABSENCE OF BOTH CERVIX AND UTERUS: Chronic | ICD-10-CM

## 2020-11-18 DIAGNOSIS — Z95.0 PRESENCE OF CARDIAC PACEMAKER: Chronic | ICD-10-CM

## 2020-11-18 DIAGNOSIS — E03.9 HYPOTHYROIDISM, UNSPECIFIED: ICD-10-CM

## 2020-11-18 DIAGNOSIS — Z98.890 OTHER SPECIFIED POSTPROCEDURAL STATES: Chronic | ICD-10-CM

## 2020-11-18 PROCEDURE — 93970 EXTREMITY STUDY: CPT | Mod: 26

## 2020-11-20 ENCOUNTER — APPOINTMENT (OUTPATIENT)
Dept: CARDIOLOGY | Facility: CLINIC | Age: 75
End: 2020-11-20
Payer: MEDICARE

## 2020-11-20 DIAGNOSIS — E11.9 TYPE 2 DIABETES MELLITUS W/OUT COMPLICATIONS: ICD-10-CM

## 2020-11-20 DIAGNOSIS — E78.5 HYPERLIPIDEMIA, UNSPECIFIED: ICD-10-CM

## 2020-11-20 DIAGNOSIS — Z86.79 PERSONAL HISTORY OF OTHER DISEASES OF THE CIRCULATORY SYSTEM: ICD-10-CM

## 2020-11-20 DIAGNOSIS — R60.0 LOCALIZED EDEMA: ICD-10-CM

## 2020-11-20 DIAGNOSIS — D64.9 ANEMIA, UNSPECIFIED: ICD-10-CM

## 2020-11-20 PROCEDURE — 93306 TTE W/DOPPLER COMPLETE: CPT

## 2020-11-23 PROBLEM — R60.0 EDEMA OF BOTH LEGS: Status: ACTIVE | Noted: 2020-11-05

## 2020-12-18 PROBLEM — R00.1 BRADYCARDIA: Status: ACTIVE | Noted: 2018-12-28

## 2020-12-18 NOTE — PROCEDURE
[See Scanned Paceart Report] : See scanned paceart report [Pacemaker] : pacemaker [Programmed for Longevity] : output reprogrammed for improved battery longevity [de-identified] : [facility]

## 2021-01-01 ENCOUNTER — FORM ENCOUNTER (OUTPATIENT)
Age: 76
End: 2021-01-01

## 2021-01-01 ENCOUNTER — APPOINTMENT (OUTPATIENT)
Dept: UROLOGY | Facility: CLINIC | Age: 76
End: 2021-01-01

## 2021-01-01 ENCOUNTER — TRANSCRIPTION ENCOUNTER (OUTPATIENT)
Age: 76
End: 2021-01-01

## 2021-01-01 ENCOUNTER — INPATIENT (INPATIENT)
Facility: HOSPITAL | Age: 76
LOS: 13 days | Discharge: NURSING, REHAB OR LONG-TERM CARE | End: 2021-05-22
Attending: HOSPITALIST | Admitting: HOSPITALIST
Payer: MEDICARE

## 2021-01-01 ENCOUNTER — APPOINTMENT (OUTPATIENT)
Dept: NEUROLOGY | Facility: CLINIC | Age: 76
End: 2021-01-01

## 2021-01-01 ENCOUNTER — INPATIENT (INPATIENT)
Facility: HOSPITAL | Age: 76
LOS: 10 days | Discharge: NURSING, REHAB OR LONG-TERM CARE | End: 2021-04-27
Attending: STUDENT IN AN ORGANIZED HEALTH CARE EDUCATION/TRAINING PROGRAM | Admitting: STUDENT IN AN ORGANIZED HEALTH CARE EDUCATION/TRAINING PROGRAM
Payer: MEDICARE

## 2021-01-01 ENCOUNTER — APPOINTMENT (OUTPATIENT)
Dept: CARDIOLOGY | Facility: CLINIC | Age: 76
End: 2021-01-01

## 2021-01-01 ENCOUNTER — RX RENEWAL (OUTPATIENT)
Age: 76
End: 2021-01-01

## 2021-01-01 ENCOUNTER — NON-APPOINTMENT (OUTPATIENT)
Age: 76
End: 2021-01-01

## 2021-01-01 ENCOUNTER — APPOINTMENT (OUTPATIENT)
Dept: CARDIOLOGY | Facility: CLINIC | Age: 76
End: 2021-01-01
Payer: MEDICARE

## 2021-01-01 ENCOUNTER — APPOINTMENT (OUTPATIENT)
Dept: NEUROLOGY | Facility: CLINIC | Age: 76
End: 2021-01-01
Payer: MEDICARE

## 2021-01-01 ENCOUNTER — INPATIENT (INPATIENT)
Facility: HOSPITAL | Age: 76
LOS: 18 days | Discharge: NURSING, REHAB OR LONG-TERM CARE | End: 2021-04-02
Attending: INTERNAL MEDICINE | Admitting: INTERNAL MEDICINE
Payer: MEDICARE

## 2021-01-01 VITALS
RESPIRATION RATE: 18 BRPM | SYSTOLIC BLOOD PRESSURE: 156 MMHG | TEMPERATURE: 97 F | DIASTOLIC BLOOD PRESSURE: 69 MMHG | OXYGEN SATURATION: 97 % | WEIGHT: 119.93 LBS | HEIGHT: 61 IN | HEART RATE: 80 BPM

## 2021-01-01 VITALS
OXYGEN SATURATION: 99 % | SYSTOLIC BLOOD PRESSURE: 132 MMHG | WEIGHT: 123 LBS | HEART RATE: 79 BPM | BODY MASS INDEX: 24.15 KG/M2 | HEIGHT: 60 IN | TEMPERATURE: 97 F | DIASTOLIC BLOOD PRESSURE: 70 MMHG

## 2021-01-01 VITALS
RESPIRATION RATE: 18 BRPM | SYSTOLIC BLOOD PRESSURE: 184 MMHG | HEART RATE: 72 BPM | TEMPERATURE: 96 F | DIASTOLIC BLOOD PRESSURE: 87 MMHG

## 2021-01-01 VITALS
HEART RATE: 87 BPM | DIASTOLIC BLOOD PRESSURE: 61 MMHG | SYSTOLIC BLOOD PRESSURE: 127 MMHG | OXYGEN SATURATION: 99 % | RESPIRATION RATE: 22 BRPM | TEMPERATURE: 98 F | WEIGHT: 130.07 LBS | HEIGHT: 61 IN

## 2021-01-01 VITALS
DIASTOLIC BLOOD PRESSURE: 62 MMHG | HEART RATE: 59 BPM | RESPIRATION RATE: 19 BRPM | SYSTOLIC BLOOD PRESSURE: 137 MMHG | TEMPERATURE: 97 F

## 2021-01-01 VITALS
TEMPERATURE: 97 F | DIASTOLIC BLOOD PRESSURE: 57 MMHG | OXYGEN SATURATION: 98 % | HEART RATE: 59 BPM | RESPIRATION RATE: 18 BRPM | SYSTOLIC BLOOD PRESSURE: 120 MMHG

## 2021-01-01 VITALS
HEIGHT: 61 IN | DIASTOLIC BLOOD PRESSURE: 57 MMHG | SYSTOLIC BLOOD PRESSURE: 112 MMHG | RESPIRATION RATE: 17 BRPM | HEART RATE: 112 BPM | TEMPERATURE: 99 F | OXYGEN SATURATION: 100 %

## 2021-01-01 DIAGNOSIS — Z79.4 LONG TERM (CURRENT) USE OF INSULIN: ICD-10-CM

## 2021-01-01 DIAGNOSIS — N12 TUBULO-INTERSTITIAL NEPHRITIS, NOT SPECIFIED AS ACUTE OR CHRONIC: ICD-10-CM

## 2021-01-01 DIAGNOSIS — Z95.5 PRESENCE OF CORONARY ANGIOPLASTY IMPLANT AND GRAFT: ICD-10-CM

## 2021-01-01 DIAGNOSIS — N17.9 ACUTE KIDNEY FAILURE, UNSPECIFIED: ICD-10-CM

## 2021-01-01 DIAGNOSIS — E86.0 DEHYDRATION: ICD-10-CM

## 2021-01-01 DIAGNOSIS — A41.9 SEPSIS, UNSPECIFIED ORGANISM: ICD-10-CM

## 2021-01-01 DIAGNOSIS — I49.5 SICK SINUS SYNDROME: ICD-10-CM

## 2021-01-01 DIAGNOSIS — Z98.890 OTHER SPECIFIED POSTPROCEDURAL STATES: Chronic | ICD-10-CM

## 2021-01-01 DIAGNOSIS — Z90.710 ACQUIRED ABSENCE OF BOTH CERVIX AND UTERUS: Chronic | ICD-10-CM

## 2021-01-01 DIAGNOSIS — M51.9 UNSPECIFIED THORACIC, THORACOLUMBAR AND LUMBOSACRAL INTERVERTEBRAL DISC DISORDER: ICD-10-CM

## 2021-01-01 DIAGNOSIS — N13.6 PYONEPHROSIS: ICD-10-CM

## 2021-01-01 DIAGNOSIS — E03.9 HYPOTHYROIDISM, UNSPECIFIED: ICD-10-CM

## 2021-01-01 DIAGNOSIS — N13.30 UNSPECIFIED HYDRONEPHROSIS: ICD-10-CM

## 2021-01-01 DIAGNOSIS — Z88.2 ALLERGY STATUS TO SULFONAMIDES: ICD-10-CM

## 2021-01-01 DIAGNOSIS — D63.1 ANEMIA IN CHRONIC KIDNEY DISEASE: ICD-10-CM

## 2021-01-01 DIAGNOSIS — E87.1 HYPO-OSMOLALITY AND HYPONATREMIA: ICD-10-CM

## 2021-01-01 DIAGNOSIS — D50.9 IRON DEFICIENCY ANEMIA, UNSPECIFIED: ICD-10-CM

## 2021-01-01 DIAGNOSIS — G72.41 INCLUSION BODY MYOSITIS [IBM]: ICD-10-CM

## 2021-01-01 DIAGNOSIS — Z91.018 ALLERGY TO OTHER FOODS: ICD-10-CM

## 2021-01-01 DIAGNOSIS — N13.39 OTHER HYDRONEPHROSIS: ICD-10-CM

## 2021-01-01 DIAGNOSIS — E87.2 ACIDOSIS: ICD-10-CM

## 2021-01-01 DIAGNOSIS — E11.51 TYPE 2 DIABETES MELLITUS WITH DIABETIC PERIPHERAL ANGIOPATHY WITHOUT GANGRENE: ICD-10-CM

## 2021-01-01 DIAGNOSIS — R65.21 SEVERE SEPSIS WITH SEPTIC SHOCK: ICD-10-CM

## 2021-01-01 DIAGNOSIS — I13.0 HYPERTENSIVE HEART AND CHRONIC KIDNEY DISEASE WITH HEART FAILURE AND STAGE 1 THROUGH STAGE 4 CHRONIC KIDNEY DISEASE, OR UNSPECIFIED CHRONIC KIDNEY DISEASE: ICD-10-CM

## 2021-01-01 DIAGNOSIS — A41.51 SEPSIS DUE TO ESCHERICHIA COLI [E. COLI]: ICD-10-CM

## 2021-01-01 DIAGNOSIS — I50.33 ACUTE ON CHRONIC DIASTOLIC (CONGESTIVE) HEART FAILURE: ICD-10-CM

## 2021-01-01 DIAGNOSIS — Z87.440 PERSONAL HISTORY OF URINARY (TRACT) INFECTIONS: ICD-10-CM

## 2021-01-01 DIAGNOSIS — I50.32 CHRONIC DIASTOLIC (CONGESTIVE) HEART FAILURE: ICD-10-CM

## 2021-01-01 DIAGNOSIS — I48.20 CHRONIC ATRIAL FIBRILLATION, UNSPECIFIED: ICD-10-CM

## 2021-01-01 DIAGNOSIS — D64.9 ANEMIA, UNSPECIFIED: ICD-10-CM

## 2021-01-01 DIAGNOSIS — D61.811 OTHER DRUG-INDUCED PANCYTOPENIA: ICD-10-CM

## 2021-01-01 DIAGNOSIS — I48.0 PAROXYSMAL ATRIAL FIBRILLATION: ICD-10-CM

## 2021-01-01 DIAGNOSIS — D69.59 OTHER SECONDARY THROMBOCYTOPENIA: ICD-10-CM

## 2021-01-01 DIAGNOSIS — I25.10 ATHEROSCLEROTIC HEART DISEASE OF NATIVE CORONARY ARTERY WITHOUT ANGINA PECTORIS: ICD-10-CM

## 2021-01-01 DIAGNOSIS — Z95.0 PRESENCE OF CARDIAC PACEMAKER: ICD-10-CM

## 2021-01-01 DIAGNOSIS — Z96.41 PRESENCE OF INSULIN PUMP (EXTERNAL) (INTERNAL): ICD-10-CM

## 2021-01-01 DIAGNOSIS — N18.30 CHRONIC KIDNEY DISEASE, STAGE 3 UNSPECIFIED: ICD-10-CM

## 2021-01-01 DIAGNOSIS — T36.1X5A ADVERSE EFFECT OF CEPHALOSPORINS AND OTHER BETA-LACTAM ANTIBIOTICS, INITIAL ENCOUNTER: ICD-10-CM

## 2021-01-01 DIAGNOSIS — M60.9 MYOSITIS, UNSPECIFIED: ICD-10-CM

## 2021-01-01 DIAGNOSIS — Z98.49 CATARACT EXTRACTION STATUS, UNSPECIFIED EYE: Chronic | ICD-10-CM

## 2021-01-01 DIAGNOSIS — E11.10 TYPE 2 DIABETES MELLITUS WITH KETOACIDOSIS WITHOUT COMA: ICD-10-CM

## 2021-01-01 DIAGNOSIS — E11.22 TYPE 2 DIABETES MELLITUS WITH DIABETIC CHRONIC KIDNEY DISEASE: ICD-10-CM

## 2021-01-01 DIAGNOSIS — Z79.01 LONG TERM (CURRENT) USE OF ANTICOAGULANTS: ICD-10-CM

## 2021-01-01 DIAGNOSIS — Z91.040 LATEX ALLERGY STATUS: ICD-10-CM

## 2021-01-01 DIAGNOSIS — Z85.828 PERSONAL HISTORY OF OTHER MALIGNANT NEOPLASM OF SKIN: ICD-10-CM

## 2021-01-01 DIAGNOSIS — Z95.0 PRESENCE OF CARDIAC PACEMAKER: Chronic | ICD-10-CM

## 2021-01-01 DIAGNOSIS — N17.0 ACUTE KIDNEY FAILURE WITH TUBULAR NECROSIS: ICD-10-CM

## 2021-01-01 DIAGNOSIS — I25.10 ATHEROSCLEROTIC HEART DISEASE OF NATIVE CORONARY ARTERY W/OUT ANGINA PECTORIS: ICD-10-CM

## 2021-01-01 DIAGNOSIS — Z79.890 HORMONE REPLACEMENT THERAPY: ICD-10-CM

## 2021-01-01 DIAGNOSIS — I47.2 VENTRICULAR TACHYCARDIA: ICD-10-CM

## 2021-01-01 DIAGNOSIS — Z95.828 PRESENCE OF OTHER VASCULAR IMPLANTS AND GRAFTS: ICD-10-CM

## 2021-01-01 DIAGNOSIS — K90.0 CELIAC DISEASE: ICD-10-CM

## 2021-01-01 DIAGNOSIS — Z16.12 EXTENDED SPECTRUM BETA LACTAMASE (ESBL) RESISTANCE: ICD-10-CM

## 2021-01-01 DIAGNOSIS — E87.5 HYPERKALEMIA: ICD-10-CM

## 2021-01-01 DIAGNOSIS — R33.9 RETENTION OF URINE, UNSPECIFIED: ICD-10-CM

## 2021-01-01 DIAGNOSIS — I77.9 DISORDER OF ARTERIES AND ARTERIOLES, UNSPECIFIED: ICD-10-CM

## 2021-01-01 DIAGNOSIS — B37.0 CANDIDAL STOMATITIS: ICD-10-CM

## 2021-01-01 DIAGNOSIS — Z98.61 ATHEROSCLEROTIC HEART DISEASE OF NATIVE CORONARY ARTERY W/OUT ANGINA PECTORIS: ICD-10-CM

## 2021-01-01 DIAGNOSIS — I27.20 PULMONARY HYPERTENSION, UNSPECIFIED: ICD-10-CM

## 2021-01-01 DIAGNOSIS — I21.A1 MYOCARDIAL INFARCTION TYPE 2: ICD-10-CM

## 2021-01-01 DIAGNOSIS — R53.1 WEAKNESS: ICD-10-CM

## 2021-01-01 DIAGNOSIS — B96.20 UNSPECIFIED ESCHERICHIA COLI [E. COLI] AS THE CAUSE OF DISEASES CLASSIFIED ELSEWHERE: ICD-10-CM

## 2021-01-01 LAB
% ALBUMIN: 43.5 % — SIGNIFICANT CHANGE UP
% ALPHA 1: 8.5 % — SIGNIFICANT CHANGE UP
% ALPHA 2: 15.5 % — SIGNIFICANT CHANGE UP
% BETA: 12.2 % — SIGNIFICANT CHANGE UP
% GAMMA, URINE: 12.7 % — SIGNIFICANT CHANGE UP
% GAMMA, URINE: 15.4 % — SIGNIFICANT CHANGE UP
% GAMMA: 20.3 % — SIGNIFICANT CHANGE UP
% M SPIKE: SIGNIFICANT CHANGE UP
-  AMIKACIN: SIGNIFICANT CHANGE UP
-  AMOXICILLIN/CLAVULANIC ACID: SIGNIFICANT CHANGE UP
-  AMPICILLIN/SULBACTAM: SIGNIFICANT CHANGE UP
-  AMPICILLIN: SIGNIFICANT CHANGE UP
-  AZTREONAM: SIGNIFICANT CHANGE UP
-  CEFAZOLIN: SIGNIFICANT CHANGE UP
-  CEFEPIME: SIGNIFICANT CHANGE UP
-  CEFOXITIN: SIGNIFICANT CHANGE UP
-  CEFTRIAXONE: SIGNIFICANT CHANGE UP
-  CIPROFLOXACIN: SIGNIFICANT CHANGE UP
-  DAPTOMYCIN: SIGNIFICANT CHANGE UP
-  ERTAPENEM: SIGNIFICANT CHANGE UP
-  ESBL: SIGNIFICANT CHANGE UP
-  GENTAMICIN: SIGNIFICANT CHANGE UP
-  IMIPENEM: SIGNIFICANT CHANGE UP
-  LEVOFLOXACIN: SIGNIFICANT CHANGE UP
-  LINEZOLID: SIGNIFICANT CHANGE UP
-  MEROPENEM: SIGNIFICANT CHANGE UP
-  NITROFURANTOIN: SIGNIFICANT CHANGE UP
-  PIPERACILLIN/TAZOBACTAM: SIGNIFICANT CHANGE UP
-  TETRACYCLINE: SIGNIFICANT CHANGE UP
-  TIGECYCLINE: SIGNIFICANT CHANGE UP
-  TOBRAMYCIN: SIGNIFICANT CHANGE UP
-  TRIMETHOPRIM/SULFAMETHOXAZOLE: SIGNIFICANT CHANGE UP
-  VANCOMYCIN: SIGNIFICANT CHANGE UP
A1C WITH ESTIMATED AVERAGE GLUCOSE RESULT: 8.1 % — HIGH (ref 4–5.6)
A1C WITH ESTIMATED AVERAGE GLUCOSE RESULT: 8.3 % — HIGH (ref 4–5.6)
ALBUMIN 24H MFR UR ELPH: 18.1 % — SIGNIFICANT CHANGE UP
ALBUMIN 24H MFR UR ELPH: 32.3 % — SIGNIFICANT CHANGE UP
ALBUMIN SERPL ELPH-MCNC: 1.8 G/DL — LOW (ref 3.5–5.2)
ALBUMIN SERPL ELPH-MCNC: 1.8 G/DL — LOW (ref 3.5–5.2)
ALBUMIN SERPL ELPH-MCNC: 2 G/DL — LOW (ref 3.5–5.2)
ALBUMIN SERPL ELPH-MCNC: 2 G/DL — LOW (ref 3.5–5.2)
ALBUMIN SERPL ELPH-MCNC: 2.1 G/DL — LOW (ref 3.5–5.2)
ALBUMIN SERPL ELPH-MCNC: 2.2 G/DL — LOW (ref 3.5–5.2)
ALBUMIN SERPL ELPH-MCNC: 2.2 G/DL — LOW (ref 3.6–5.5)
ALBUMIN SERPL ELPH-MCNC: 2.3 G/DL — LOW (ref 3.5–5.2)
ALBUMIN SERPL ELPH-MCNC: 2.5 G/DL — LOW (ref 3.5–5.2)
ALBUMIN SERPL ELPH-MCNC: 2.6 G/DL — LOW (ref 3.5–5.2)
ALBUMIN SERPL ELPH-MCNC: 2.7 G/DL — LOW (ref 3.5–5.2)
ALBUMIN SERPL ELPH-MCNC: 2.7 G/DL — LOW (ref 3.5–5.2)
ALBUMIN SERPL ELPH-MCNC: 2.8 G/DL — LOW (ref 3.5–5.2)
ALBUMIN SERPL ELPH-MCNC: 2.8 G/DL — LOW (ref 3.5–5.2)
ALBUMIN SERPL ELPH-MCNC: 2.9 G/DL — LOW (ref 3.5–5.2)
ALBUMIN SERPL ELPH-MCNC: 2.9 G/DL — LOW (ref 3.5–5.2)
ALBUMIN SERPL ELPH-MCNC: 3 G/DL — LOW (ref 3.5–5.2)
ALBUMIN SERPL ELPH-MCNC: 3 G/DL — LOW (ref 3.5–5.2)
ALBUMIN SERPL ELPH-MCNC: 3.1 G/DL — LOW (ref 3.5–5.2)
ALBUMIN SERPL ELPH-MCNC: 4 G/DL — SIGNIFICANT CHANGE UP (ref 3.5–5.2)
ALBUMIN/GLOB SERPL ELPH: 0.8 RATIO — SIGNIFICANT CHANGE UP
ALP SERPL-CCNC: 120 U/L — HIGH (ref 30–115)
ALP SERPL-CCNC: 128 U/L — HIGH (ref 30–115)
ALP SERPL-CCNC: 128 U/L — HIGH (ref 30–115)
ALP SERPL-CCNC: 129 U/L — HIGH (ref 30–115)
ALP SERPL-CCNC: 133 U/L — HIGH (ref 30–115)
ALP SERPL-CCNC: 135 U/L — HIGH (ref 30–115)
ALP SERPL-CCNC: 137 U/L — HIGH (ref 30–115)
ALP SERPL-CCNC: 143 U/L — HIGH (ref 30–115)
ALP SERPL-CCNC: 144 U/L — HIGH (ref 30–115)
ALP SERPL-CCNC: 148 U/L — HIGH (ref 30–115)
ALP SERPL-CCNC: 153 U/L — HIGH (ref 30–115)
ALP SERPL-CCNC: 158 U/L — HIGH (ref 30–115)
ALP SERPL-CCNC: 166 U/L — HIGH (ref 30–115)
ALP SERPL-CCNC: 169 U/L — HIGH (ref 30–115)
ALP SERPL-CCNC: 170 U/L — HIGH (ref 30–115)
ALP SERPL-CCNC: 172 U/L — HIGH (ref 30–115)
ALP SERPL-CCNC: 172 U/L — HIGH (ref 30–115)
ALP SERPL-CCNC: 174 U/L — HIGH (ref 30–115)
ALP SERPL-CCNC: 177 U/L — HIGH (ref 30–115)
ALP SERPL-CCNC: 179 U/L — HIGH (ref 30–115)
ALP SERPL-CCNC: 186 U/L — HIGH (ref 30–115)
ALP SERPL-CCNC: 190 U/L — HIGH (ref 30–115)
ALP SERPL-CCNC: 190 U/L — HIGH (ref 30–115)
ALP SERPL-CCNC: 191 U/L — HIGH (ref 30–115)
ALP SERPL-CCNC: 193 U/L — HIGH (ref 30–115)
ALP SERPL-CCNC: 194 U/L — HIGH (ref 30–115)
ALP SERPL-CCNC: 194 U/L — HIGH (ref 30–115)
ALP SERPL-CCNC: 196 U/L — HIGH (ref 30–115)
ALP SERPL-CCNC: 207 U/L — HIGH (ref 30–115)
ALP SERPL-CCNC: 216 U/L — HIGH (ref 30–115)
ALP SERPL-CCNC: 228 U/L — HIGH (ref 30–115)
ALP SERPL-CCNC: 232 U/L — HIGH (ref 30–115)
ALP SERPL-CCNC: 265 U/L — HIGH (ref 30–115)
ALP SERPL-CCNC: 285 U/L — HIGH (ref 30–115)
ALP SERPL-CCNC: 98 U/L — SIGNIFICANT CHANGE UP (ref 30–115)
ALPHA1 GLOB 24H MFR UR ELPH: 20.5 % — SIGNIFICANT CHANGE UP
ALPHA1 GLOB 24H MFR UR ELPH: 26.6 % — SIGNIFICANT CHANGE UP
ALPHA1 GLOB SERPL ELPH-MCNC: 0.4 G/DL — SIGNIFICANT CHANGE UP (ref 0.1–0.4)
ALPHA2 GLOB 24H MFR UR ELPH: 12.4 % — SIGNIFICANT CHANGE UP
ALPHA2 GLOB 24H MFR UR ELPH: 15.8 % — SIGNIFICANT CHANGE UP
ALPHA2 GLOB SERPL ELPH-MCNC: 0.8 G/DL — SIGNIFICANT CHANGE UP (ref 0.5–1)
ALT FLD-CCNC: 19 U/L — SIGNIFICANT CHANGE UP (ref 0–41)
ALT FLD-CCNC: 19 U/L — SIGNIFICANT CHANGE UP (ref 0–41)
ALT FLD-CCNC: 20 U/L — SIGNIFICANT CHANGE UP (ref 0–41)
ALT FLD-CCNC: 20 U/L — SIGNIFICANT CHANGE UP (ref 0–41)
ALT FLD-CCNC: 26 U/L — SIGNIFICANT CHANGE UP (ref 0–41)
ALT FLD-CCNC: 29 U/L — SIGNIFICANT CHANGE UP (ref 0–41)
ALT FLD-CCNC: 29 U/L — SIGNIFICANT CHANGE UP (ref 0–41)
ALT FLD-CCNC: 30 U/L — SIGNIFICANT CHANGE UP (ref 0–41)
ALT FLD-CCNC: 31 U/L — SIGNIFICANT CHANGE UP (ref 0–41)
ALT FLD-CCNC: 32 U/L — SIGNIFICANT CHANGE UP (ref 0–41)
ALT FLD-CCNC: 34 U/L — SIGNIFICANT CHANGE UP (ref 0–41)
ALT FLD-CCNC: 35 U/L — SIGNIFICANT CHANGE UP (ref 0–41)
ALT FLD-CCNC: 36 U/L — SIGNIFICANT CHANGE UP (ref 0–41)
ALT FLD-CCNC: 36 U/L — SIGNIFICANT CHANGE UP (ref 0–41)
ALT FLD-CCNC: 37 U/L — SIGNIFICANT CHANGE UP (ref 0–41)
ALT FLD-CCNC: 37 U/L — SIGNIFICANT CHANGE UP (ref 0–41)
ALT FLD-CCNC: 38 U/L — SIGNIFICANT CHANGE UP (ref 0–41)
ALT FLD-CCNC: 39 U/L — SIGNIFICANT CHANGE UP (ref 0–41)
ALT FLD-CCNC: 41 U/L — SIGNIFICANT CHANGE UP (ref 0–41)
ALT FLD-CCNC: 42 U/L — HIGH (ref 0–41)
ALT FLD-CCNC: 43 U/L — HIGH (ref 0–41)
ALT FLD-CCNC: 43 U/L — HIGH (ref 0–41)
ALT FLD-CCNC: 49 U/L — HIGH (ref 0–41)
ALT FLD-CCNC: 50 U/L — HIGH (ref 0–41)
ALT FLD-CCNC: 51 U/L — HIGH (ref 0–41)
ALT FLD-CCNC: 52 U/L — HIGH (ref 0–41)
ALT FLD-CCNC: 53 U/L — HIGH (ref 0–41)
ALT FLD-CCNC: 56 U/L — HIGH (ref 0–41)
ALT FLD-CCNC: 59 U/L — HIGH (ref 0–41)
ALT FLD-CCNC: 60 U/L — HIGH (ref 0–41)
ALT FLD-CCNC: 61 U/L — HIGH (ref 0–41)
ALT FLD-CCNC: 62 U/L — HIGH (ref 0–41)
ALT FLD-CCNC: 70 U/L — HIGH (ref 0–41)
ALT FLD-CCNC: 72 U/L — HIGH (ref 0–41)
ALT FLD-CCNC: 77 U/L — HIGH (ref 0–41)
ALT FLD-CCNC: 84 U/L — HIGH (ref 0–41)
ALT FLD-CCNC: 93 U/L — HIGH (ref 0–41)
AMYLASE P1 CFR SERPL: 36 U/L — SIGNIFICANT CHANGE UP (ref 25–115)
ANION GAP SERPL CALC-SCNC: 10 MMOL/L — SIGNIFICANT CHANGE UP (ref 7–14)
ANION GAP SERPL CALC-SCNC: 11 MMOL/L — SIGNIFICANT CHANGE UP (ref 7–14)
ANION GAP SERPL CALC-SCNC: 12 MMOL/L — SIGNIFICANT CHANGE UP (ref 7–14)
ANION GAP SERPL CALC-SCNC: 13 MMOL/L — SIGNIFICANT CHANGE UP (ref 7–14)
ANION GAP SERPL CALC-SCNC: 14 MMOL/L — SIGNIFICANT CHANGE UP (ref 7–14)
ANION GAP SERPL CALC-SCNC: 14 MMOL/L — SIGNIFICANT CHANGE UP (ref 7–14)
ANION GAP SERPL CALC-SCNC: 15 MMOL/L — HIGH (ref 7–14)
ANION GAP SERPL CALC-SCNC: 15 MMOL/L — HIGH (ref 7–14)
ANION GAP SERPL CALC-SCNC: 16 MMOL/L — HIGH (ref 7–14)
ANION GAP SERPL CALC-SCNC: 17 MMOL/L — HIGH (ref 7–14)
ANION GAP SERPL CALC-SCNC: 17 MMOL/L — HIGH (ref 7–14)
ANION GAP SERPL CALC-SCNC: 23 MMOL/L — HIGH (ref 7–14)
ANION GAP SERPL CALC-SCNC: 6 MMOL/L — LOW (ref 7–14)
ANION GAP SERPL CALC-SCNC: 6 MMOL/L — LOW (ref 7–14)
ANION GAP SERPL CALC-SCNC: 7 MMOL/L — SIGNIFICANT CHANGE UP (ref 7–14)
ANION GAP SERPL CALC-SCNC: 8 MMOL/L — SIGNIFICANT CHANGE UP (ref 7–14)
ANION GAP SERPL CALC-SCNC: 9 MMOL/L — SIGNIFICANT CHANGE UP (ref 7–14)
ANISOCYTOSIS BLD QL: SLIGHT — SIGNIFICANT CHANGE UP
APPEARANCE UR: ABNORMAL
APPEARANCE UR: CLEAR — SIGNIFICANT CHANGE UP
APPEARANCE UR: CLEAR — SIGNIFICANT CHANGE UP
APTT BLD: 26.8 SEC — LOW (ref 27–39.2)
APTT BLD: 29.1 SEC — SIGNIFICANT CHANGE UP (ref 27–39.2)
APTT BLD: 35.8 SEC — SIGNIFICANT CHANGE UP (ref 27–39.2)
APTT BLD: 37 SEC — SIGNIFICANT CHANGE UP (ref 27–39.2)
AST SERPL-CCNC: 133 U/L — HIGH (ref 0–41)
AST SERPL-CCNC: 163 U/L — HIGH (ref 0–41)
AST SERPL-CCNC: 24 U/L — SIGNIFICANT CHANGE UP (ref 0–41)
AST SERPL-CCNC: 24 U/L — SIGNIFICANT CHANGE UP (ref 0–41)
AST SERPL-CCNC: 26 U/L — SIGNIFICANT CHANGE UP (ref 0–41)
AST SERPL-CCNC: 26 U/L — SIGNIFICANT CHANGE UP (ref 0–41)
AST SERPL-CCNC: 27 U/L — SIGNIFICANT CHANGE UP (ref 0–41)
AST SERPL-CCNC: 28 U/L — SIGNIFICANT CHANGE UP (ref 0–41)
AST SERPL-CCNC: 30 U/L — SIGNIFICANT CHANGE UP (ref 0–41)
AST SERPL-CCNC: 30 U/L — SIGNIFICANT CHANGE UP (ref 0–41)
AST SERPL-CCNC: 31 U/L — SIGNIFICANT CHANGE UP (ref 0–41)
AST SERPL-CCNC: 32 U/L — SIGNIFICANT CHANGE UP (ref 0–41)
AST SERPL-CCNC: 35 U/L — SIGNIFICANT CHANGE UP (ref 0–41)
AST SERPL-CCNC: 35 U/L — SIGNIFICANT CHANGE UP (ref 0–41)
AST SERPL-CCNC: 36 U/L — SIGNIFICANT CHANGE UP (ref 0–41)
AST SERPL-CCNC: 38 U/L — SIGNIFICANT CHANGE UP (ref 0–41)
AST SERPL-CCNC: 39 U/L — SIGNIFICANT CHANGE UP (ref 0–41)
AST SERPL-CCNC: 40 U/L — SIGNIFICANT CHANGE UP (ref 0–41)
AST SERPL-CCNC: 42 U/L — HIGH (ref 0–41)
AST SERPL-CCNC: 43 U/L — HIGH (ref 0–41)
AST SERPL-CCNC: 45 U/L — HIGH (ref 0–41)
AST SERPL-CCNC: 47 U/L — HIGH (ref 0–41)
AST SERPL-CCNC: 48 U/L — HIGH (ref 0–41)
AST SERPL-CCNC: 49 U/L — HIGH (ref 0–41)
AST SERPL-CCNC: 51 U/L — HIGH (ref 0–41)
AST SERPL-CCNC: 56 U/L — HIGH (ref 0–41)
AST SERPL-CCNC: 56 U/L — HIGH (ref 0–41)
AST SERPL-CCNC: 57 U/L — HIGH (ref 0–41)
AST SERPL-CCNC: 59 U/L — HIGH (ref 0–41)
AST SERPL-CCNC: 62 U/L — HIGH (ref 0–41)
AST SERPL-CCNC: 64 U/L — HIGH (ref 0–41)
AST SERPL-CCNC: 68 U/L — HIGH (ref 0–41)
AST SERPL-CCNC: 71 U/L — HIGH (ref 0–41)
AST SERPL-CCNC: 80 U/L — HIGH (ref 0–41)
AST SERPL-CCNC: 84 U/L — HIGH (ref 0–41)
AST SERPL-CCNC: 96 U/L — HIGH (ref 0–41)
AST SERPL-CCNC: 97 U/L — HIGH (ref 0–41)
B-GLOBULIN 24H MFR UR ELPH: 19.4 % — SIGNIFICANT CHANGE UP
B-GLOBULIN 24H MFR UR ELPH: 26.8 % — SIGNIFICANT CHANGE UP
B-GLOBULIN SERPL ELPH-MCNC: 0.6 G/DL — SIGNIFICANT CHANGE UP (ref 0.5–1)
B-OH-BUTYR SERPL-SCNC: 0.3 MMOL/L — SIGNIFICANT CHANGE UP
B-OH-BUTYR SERPL-SCNC: 2.2 MMOL/L — HIGH
B-OH-BUTYR SERPL-SCNC: 2.8 MMOL/L — HIGH
B-OH-BUTYR SERPL-SCNC: 3.6 MMOL/L — HIGH
B-OH-BUTYR SERPL-SCNC: <0.2 MMOL/L — SIGNIFICANT CHANGE UP
B-OH-BUTYR SERPL-SCNC: <0.2 MMOL/L — SIGNIFICANT CHANGE UP
BACTERIA # UR AUTO: ABNORMAL
BACTERIA # UR AUTO: NEGATIVE — SIGNIFICANT CHANGE UP
BACTERIA # UR AUTO: NEGATIVE — SIGNIFICANT CHANGE UP
BASE EXCESS BLDV CALC-SCNC: -0.9 MMOL/L — SIGNIFICANT CHANGE UP (ref -2–2)
BASE EXCESS BLDV CALC-SCNC: -4.6 MMOL/L — LOW (ref -2–2)
BASOPHILS # BLD AUTO: 0 K/UL — SIGNIFICANT CHANGE UP (ref 0–0.2)
BASOPHILS # BLD AUTO: 0 K/UL — SIGNIFICANT CHANGE UP (ref 0–0.2)
BASOPHILS # BLD AUTO: 0.01 K/UL — SIGNIFICANT CHANGE UP (ref 0–0.2)
BASOPHILS # BLD AUTO: 0.01 K/UL — SIGNIFICANT CHANGE UP (ref 0–0.2)
BASOPHILS # BLD AUTO: 0.02 K/UL — SIGNIFICANT CHANGE UP (ref 0–0.2)
BASOPHILS # BLD AUTO: 0.03 K/UL — SIGNIFICANT CHANGE UP (ref 0–0.2)
BASOPHILS # BLD AUTO: 0.04 K/UL — SIGNIFICANT CHANGE UP (ref 0–0.2)
BASOPHILS # BLD AUTO: 0.05 K/UL — SIGNIFICANT CHANGE UP (ref 0–0.2)
BASOPHILS # BLD AUTO: 0.06 K/UL — SIGNIFICANT CHANGE UP (ref 0–0.2)
BASOPHILS # BLD AUTO: 0.06 K/UL — SIGNIFICANT CHANGE UP (ref 0–0.2)
BASOPHILS # BLD AUTO: 0.07 K/UL — SIGNIFICANT CHANGE UP (ref 0–0.2)
BASOPHILS # BLD AUTO: 0.08 K/UL — SIGNIFICANT CHANGE UP (ref 0–0.2)
BASOPHILS NFR BLD AUTO: 0 % — SIGNIFICANT CHANGE UP (ref 0–1)
BASOPHILS NFR BLD AUTO: 0 % — SIGNIFICANT CHANGE UP (ref 0–1)
BASOPHILS NFR BLD AUTO: 0.1 % — SIGNIFICANT CHANGE UP (ref 0–1)
BASOPHILS NFR BLD AUTO: 0.2 % — SIGNIFICANT CHANGE UP (ref 0–1)
BASOPHILS NFR BLD AUTO: 0.3 % — SIGNIFICANT CHANGE UP (ref 0–1)
BASOPHILS NFR BLD AUTO: 0.4 % — SIGNIFICANT CHANGE UP (ref 0–1)
BASOPHILS NFR BLD AUTO: 0.5 % — SIGNIFICANT CHANGE UP (ref 0–1)
BASOPHILS NFR BLD AUTO: 0.6 % — SIGNIFICANT CHANGE UP (ref 0–1)
BASOPHILS NFR BLD AUTO: 0.8 % — SIGNIFICANT CHANGE UP (ref 0–1)
BILIRUB DIRECT SERPL-MCNC: 0.2 MG/DL — SIGNIFICANT CHANGE UP (ref 0–0.2)
BILIRUB DIRECT SERPL-MCNC: <0.2 MG/DL — SIGNIFICANT CHANGE UP (ref 0–0.2)
BILIRUB INDIRECT FLD-MCNC: 0.2 MG/DL — SIGNIFICANT CHANGE UP (ref 0.2–1.2)
BILIRUB INDIRECT FLD-MCNC: >0 MG/DL — LOW (ref 0.2–1.2)
BILIRUB SERPL-MCNC: 0.2 MG/DL — SIGNIFICANT CHANGE UP (ref 0.2–1.2)
BILIRUB SERPL-MCNC: 0.3 MG/DL — SIGNIFICANT CHANGE UP (ref 0.2–1.2)
BILIRUB SERPL-MCNC: 0.4 MG/DL — SIGNIFICANT CHANGE UP (ref 0.2–1.2)
BILIRUB SERPL-MCNC: 0.5 MG/DL — SIGNIFICANT CHANGE UP (ref 0.2–1.2)
BILIRUB SERPL-MCNC: 0.6 MG/DL — SIGNIFICANT CHANGE UP (ref 0.2–1.2)
BILIRUB SERPL-MCNC: 0.7 MG/DL — SIGNIFICANT CHANGE UP (ref 0.2–1.2)
BILIRUB SERPL-MCNC: <0.2 MG/DL — SIGNIFICANT CHANGE UP (ref 0.2–1.2)
BILIRUB UR-MCNC: NEGATIVE — SIGNIFICANT CHANGE UP
BLD GP AB SCN SERPL QL: SIGNIFICANT CHANGE UP
BUN SERPL-MCNC: 12 MG/DL — SIGNIFICANT CHANGE UP (ref 10–20)
BUN SERPL-MCNC: 13 MG/DL — SIGNIFICANT CHANGE UP (ref 10–20)
BUN SERPL-MCNC: 15 MG/DL — SIGNIFICANT CHANGE UP (ref 10–20)
BUN SERPL-MCNC: 16 MG/DL — SIGNIFICANT CHANGE UP (ref 10–20)
BUN SERPL-MCNC: 16 MG/DL — SIGNIFICANT CHANGE UP (ref 10–20)
BUN SERPL-MCNC: 17 MG/DL — SIGNIFICANT CHANGE UP (ref 10–20)
BUN SERPL-MCNC: 18 MG/DL — SIGNIFICANT CHANGE UP (ref 10–20)
BUN SERPL-MCNC: 19 MG/DL — SIGNIFICANT CHANGE UP (ref 10–20)
BUN SERPL-MCNC: 20 MG/DL — SIGNIFICANT CHANGE UP (ref 10–20)
BUN SERPL-MCNC: 21 MG/DL — HIGH (ref 10–20)
BUN SERPL-MCNC: 21 MG/DL — HIGH (ref 10–20)
BUN SERPL-MCNC: 23 MG/DL — HIGH (ref 10–20)
BUN SERPL-MCNC: 24 MG/DL — HIGH (ref 10–20)
BUN SERPL-MCNC: 24 MG/DL — HIGH (ref 10–20)
BUN SERPL-MCNC: 25 MG/DL — HIGH (ref 10–20)
BUN SERPL-MCNC: 26 MG/DL — HIGH (ref 10–20)
BUN SERPL-MCNC: 26 MG/DL — HIGH (ref 10–20)
BUN SERPL-MCNC: 27 MG/DL — HIGH (ref 10–20)
BUN SERPL-MCNC: 27 MG/DL — HIGH (ref 10–20)
BUN SERPL-MCNC: 28 MG/DL — HIGH (ref 10–20)
BUN SERPL-MCNC: 29 MG/DL — HIGH (ref 10–20)
BUN SERPL-MCNC: 29 MG/DL — HIGH (ref 10–20)
BUN SERPL-MCNC: 30 MG/DL — HIGH (ref 10–20)
BUN SERPL-MCNC: 32 MG/DL — HIGH (ref 10–20)
BUN SERPL-MCNC: 33 MG/DL — HIGH (ref 10–20)
BUN SERPL-MCNC: 34 MG/DL — HIGH (ref 10–20)
BUN SERPL-MCNC: 34 MG/DL — HIGH (ref 10–20)
BUN SERPL-MCNC: 35 MG/DL — HIGH (ref 10–20)
BUN SERPL-MCNC: 37 MG/DL — HIGH (ref 10–20)
BUN SERPL-MCNC: 37 MG/DL — HIGH (ref 10–20)
BUN SERPL-MCNC: 38 MG/DL — HIGH (ref 10–20)
BUN SERPL-MCNC: 40 MG/DL — HIGH (ref 10–20)
BUN SERPL-MCNC: 42 MG/DL — HIGH (ref 10–20)
BUN SERPL-MCNC: 43 MG/DL — HIGH (ref 10–20)
BUN SERPL-MCNC: 49 MG/DL — HIGH (ref 10–20)
BUN SERPL-MCNC: 54 MG/DL — HIGH (ref 10–20)
BUN SERPL-MCNC: 55 MG/DL — HIGH (ref 10–20)
BUN SERPL-MCNC: 55 MG/DL — HIGH (ref 10–20)
BUN SERPL-MCNC: 57 MG/DL — HIGH (ref 10–20)
BUN SERPL-MCNC: 58 MG/DL — HIGH (ref 10–20)
CA-I BLD-SCNC: 1.09 MMOL/L — LOW (ref 1.12–1.3)
CA-I SERPL-SCNC: 1.05 MMOL/L — LOW (ref 1.12–1.3)
CA-I SERPL-SCNC: 1.09 MMOL/L — LOW (ref 1.12–1.3)
CALCIUM SERPL-MCNC: 6.2 MG/DL — LOW (ref 8.5–10.1)
CALCIUM SERPL-MCNC: 6.5 MG/DL — LOW (ref 8.5–10.1)
CALCIUM SERPL-MCNC: 6.5 MG/DL — LOW (ref 8.5–10.1)
CALCIUM SERPL-MCNC: 6.6 MG/DL — LOW (ref 8.5–10.1)
CALCIUM SERPL-MCNC: 6.7 MG/DL — LOW (ref 8.5–10.1)
CALCIUM SERPL-MCNC: 6.8 MG/DL — LOW (ref 8.5–10.1)
CALCIUM SERPL-MCNC: 7 MG/DL — LOW (ref 8.5–10.1)
CALCIUM SERPL-MCNC: 7.1 MG/DL — LOW (ref 8.5–10.1)
CALCIUM SERPL-MCNC: 7.2 MG/DL — LOW (ref 8.5–10.1)
CALCIUM SERPL-MCNC: 7.3 MG/DL — LOW (ref 8.5–10.1)
CALCIUM SERPL-MCNC: 7.3 MG/DL — LOW (ref 8.5–10.1)
CALCIUM SERPL-MCNC: 7.4 MG/DL — LOW (ref 8.5–10.1)
CALCIUM SERPL-MCNC: 7.5 MG/DL — LOW (ref 8.5–10.1)
CALCIUM SERPL-MCNC: 7.5 MG/DL — LOW (ref 8.5–10.1)
CALCIUM SERPL-MCNC: 7.6 MG/DL — LOW (ref 8.5–10.1)
CALCIUM SERPL-MCNC: 7.7 MG/DL — LOW (ref 8.5–10.1)
CALCIUM SERPL-MCNC: 7.8 MG/DL — LOW (ref 8.5–10.1)
CALCIUM SERPL-MCNC: 7.9 MG/DL — LOW (ref 8.5–10.1)
CALCIUM SERPL-MCNC: 8 MG/DL — LOW (ref 8.5–10.1)
CALCIUM SERPL-MCNC: 8.1 MG/DL — LOW (ref 8.5–10.1)
CALCIUM SERPL-MCNC: 8.1 MG/DL — LOW (ref 8.5–10.1)
CALCIUM SERPL-MCNC: 8.2 MG/DL — LOW (ref 8.5–10.1)
CALCIUM SERPL-MCNC: 8.3 MG/DL — LOW (ref 8.5–10.1)
CALCIUM SERPL-MCNC: 8.3 MG/DL — LOW (ref 8.5–10.1)
CALCIUM SERPL-MCNC: 8.4 MG/DL — LOW (ref 8.5–10.1)
CALCIUM SERPL-MCNC: 8.4 MG/DL — LOW (ref 8.5–10.1)
CALCIUM SERPL-MCNC: 8.6 MG/DL — SIGNIFICANT CHANGE UP (ref 8.5–10.1)
CALCIUM SERPL-MCNC: 8.7 MG/DL — SIGNIFICANT CHANGE UP (ref 8.5–10.1)
CERULOPLASMIN SERPL-MCNC: 30 MG/DL — SIGNIFICANT CHANGE UP (ref 16–45)
CHLORIDE SERPL-SCNC: 100 MMOL/L — SIGNIFICANT CHANGE UP (ref 98–110)
CHLORIDE SERPL-SCNC: 101 MMOL/L — SIGNIFICANT CHANGE UP (ref 98–110)
CHLORIDE SERPL-SCNC: 102 MMOL/L — SIGNIFICANT CHANGE UP (ref 98–110)
CHLORIDE SERPL-SCNC: 102 MMOL/L — SIGNIFICANT CHANGE UP (ref 98–110)
CHLORIDE SERPL-SCNC: 107 MMOL/L — SIGNIFICANT CHANGE UP (ref 98–110)
CHLORIDE SERPL-SCNC: 108 MMOL/L — SIGNIFICANT CHANGE UP (ref 98–110)
CHLORIDE SERPL-SCNC: 109 MMOL/L — SIGNIFICANT CHANGE UP (ref 98–110)
CHLORIDE SERPL-SCNC: 84 MMOL/L — LOW (ref 98–110)
CHLORIDE SERPL-SCNC: 85 MMOL/L — LOW (ref 98–110)
CHLORIDE SERPL-SCNC: 88 MMOL/L — LOW (ref 98–110)
CHLORIDE SERPL-SCNC: 88 MMOL/L — LOW (ref 98–110)
CHLORIDE SERPL-SCNC: 89 MMOL/L — LOW (ref 98–110)
CHLORIDE SERPL-SCNC: 90 MMOL/L — LOW (ref 98–110)
CHLORIDE SERPL-SCNC: 91 MMOL/L — LOW (ref 98–110)
CHLORIDE SERPL-SCNC: 92 MMOL/L — LOW (ref 98–110)
CHLORIDE SERPL-SCNC: 93 MMOL/L — LOW (ref 98–110)
CHLORIDE SERPL-SCNC: 94 MMOL/L — LOW (ref 98–110)
CHLORIDE SERPL-SCNC: 95 MMOL/L — LOW (ref 98–110)
CHLORIDE SERPL-SCNC: 96 MMOL/L — LOW (ref 98–110)
CHLORIDE SERPL-SCNC: 97 MMOL/L — LOW (ref 98–110)
CHLORIDE SERPL-SCNC: 98 MMOL/L — SIGNIFICANT CHANGE UP (ref 98–110)
CHLORIDE SERPL-SCNC: 99 MMOL/L — SIGNIFICANT CHANGE UP (ref 98–110)
CHLORIDE SERPL-SCNC: 99 MMOL/L — SIGNIFICANT CHANGE UP (ref 98–110)
CHLORIDE UR-SCNC: 41 — SIGNIFICANT CHANGE UP
CK MB CFR SERPL CALC: 2.5 NG/ML — SIGNIFICANT CHANGE UP (ref 0.6–6.3)
CK SERPL-CCNC: 24 U/L — SIGNIFICANT CHANGE UP (ref 0–225)
CK SERPL-CCNC: 29 U/L — SIGNIFICANT CHANGE UP (ref 0–225)
CMV DNA CSF QL NAA+PROBE: SIGNIFICANT CHANGE UP
CO2 SERPL-SCNC: 11 MMOL/L — LOW (ref 17–32)
CO2 SERPL-SCNC: 12 MMOL/L — LOW (ref 17–32)
CO2 SERPL-SCNC: 13 MMOL/L — LOW (ref 17–32)
CO2 SERPL-SCNC: 14 MMOL/L — LOW (ref 17–32)
CO2 SERPL-SCNC: 14 MMOL/L — LOW (ref 17–32)
CO2 SERPL-SCNC: 15 MMOL/L — LOW (ref 17–32)
CO2 SERPL-SCNC: 16 MMOL/L — LOW (ref 17–32)
CO2 SERPL-SCNC: 17 MMOL/L — SIGNIFICANT CHANGE UP (ref 17–32)
CO2 SERPL-SCNC: 18 MMOL/L — SIGNIFICANT CHANGE UP (ref 17–32)
CO2 SERPL-SCNC: 19 MMOL/L — SIGNIFICANT CHANGE UP (ref 17–32)
CO2 SERPL-SCNC: 20 MMOL/L — SIGNIFICANT CHANGE UP (ref 17–32)
CO2 SERPL-SCNC: 21 MMOL/L — SIGNIFICANT CHANGE UP (ref 17–32)
CO2 SERPL-SCNC: 22 MMOL/L — SIGNIFICANT CHANGE UP (ref 17–32)
CO2 SERPL-SCNC: 23 MMOL/L — SIGNIFICANT CHANGE UP (ref 17–32)
CO2 SERPL-SCNC: 24 MMOL/L — SIGNIFICANT CHANGE UP (ref 17–32)
CO2 SERPL-SCNC: 25 MMOL/L — SIGNIFICANT CHANGE UP (ref 17–32)
CO2 SERPL-SCNC: 25 MMOL/L — SIGNIFICANT CHANGE UP (ref 17–32)
CO2 SERPL-SCNC: 26 MMOL/L — SIGNIFICANT CHANGE UP (ref 17–32)
CO2 SERPL-SCNC: 26 MMOL/L — SIGNIFICANT CHANGE UP (ref 17–32)
CO2 SERPL-SCNC: 27 MMOL/L — SIGNIFICANT CHANGE UP (ref 17–32)
CO2 SERPL-SCNC: 28 MMOL/L — SIGNIFICANT CHANGE UP (ref 17–32)
CO2 SERPL-SCNC: 29 MMOL/L — SIGNIFICANT CHANGE UP (ref 17–32)
CO2 SERPL-SCNC: 29 MMOL/L — SIGNIFICANT CHANGE UP (ref 17–32)
CO2 SERPL-SCNC: 7 MMOL/L — CRITICAL LOW (ref 17–32)
COLOR SPEC: SIGNIFICANT CHANGE UP
COLOR SPEC: YELLOW — SIGNIFICANT CHANGE UP
CORTICOSTEROID BINDING GLOBULIN RESULT: 1.1 MG/DL — LOW
CORTIS AM PEAK SERPL-MCNC: 14.4 UG/DL — SIGNIFICANT CHANGE UP (ref 6–18.4)
CORTIS AM PEAK SERPL-MCNC: 22.9 UG/DL — HIGH (ref 6–18.4)
CORTIS F/TOTAL MFR SERPL: 55 % — SIGNIFICANT CHANGE UP
CORTIS SERPL-MCNC: 16 UG/DL — SIGNIFICANT CHANGE UP
CORTISOL, FREE RESULT: 8.7 UG/DL — HIGH
COVID-19 SPIKE DOMAIN AB INTERP: POSITIVE
COVID-19 SPIKE DOMAIN AB INTERP: POSITIVE
COVID-19 SPIKE DOMAIN ANTIBODY RESULT: 1.53 U/ML — HIGH
COVID-19 SPIKE DOMAIN ANTIBODY RESULT: 2.61 U/ML — HIGH
CREAT ?TM UR-MCNC: 12 MG/DL — SIGNIFICANT CHANGE UP
CREAT ?TM UR-MCNC: 20 MG/DL — SIGNIFICANT CHANGE UP
CREAT ?TM UR-MCNC: 54 MG/DL — SIGNIFICANT CHANGE UP
CREAT SERPL-MCNC: 1 MG/DL — SIGNIFICANT CHANGE UP (ref 0.7–1.5)
CREAT SERPL-MCNC: 1.1 MG/DL — SIGNIFICANT CHANGE UP (ref 0.7–1.5)
CREAT SERPL-MCNC: 1.1 MG/DL — SIGNIFICANT CHANGE UP (ref 0.7–1.5)
CREAT SERPL-MCNC: 1.2 MG/DL — SIGNIFICANT CHANGE UP (ref 0.7–1.5)
CREAT SERPL-MCNC: 1.3 MG/DL — SIGNIFICANT CHANGE UP (ref 0.7–1.5)
CREAT SERPL-MCNC: 1.4 MG/DL — SIGNIFICANT CHANGE UP (ref 0.7–1.5)
CREAT SERPL-MCNC: 1.5 MG/DL — SIGNIFICANT CHANGE UP (ref 0.7–1.5)
CREAT SERPL-MCNC: 1.6 MG/DL — HIGH (ref 0.7–1.5)
CREAT SERPL-MCNC: 1.7 MG/DL — HIGH (ref 0.7–1.5)
CREAT SERPL-MCNC: 1.8 MG/DL — HIGH (ref 0.7–1.5)
CREAT SERPL-MCNC: 1.9 MG/DL — HIGH (ref 0.7–1.5)
CREAT SERPL-MCNC: 2.1 MG/DL — HIGH (ref 0.7–1.5)
CREAT SERPL-MCNC: 2.2 MG/DL — HIGH (ref 0.7–1.5)
CREATININE, URINE RESULT: 14 MG/DL — SIGNIFICANT CHANGE UP
CREATININE, URINE RESULT: 20 MG/DL — SIGNIFICANT CHANGE UP
CRP SERPL-MCNC: 125 MG/L — HIGH
CULTURE RESULTS: SIGNIFICANT CHANGE UP
D DIMER BLD IA.RAPID-MCNC: 2257 NG/ML DDU — HIGH (ref 0–230)
DAT IGG-SP REAG RBC-IMP: ABNORMAL
DIFF PNL FLD: ABNORMAL
DIFF PNL FLD: NEGATIVE — SIGNIFICANT CHANGE UP
DIFF PNL FLD: SIGNIFICANT CHANGE UP
DIR ANTIGLOB POLYSPECIFIC INTERPRETATION: ABNORMAL
E COLI DNA BLD POS QL NAA+NON-PROBE: SIGNIFICANT CHANGE UP
EOSINOPHIL # BLD AUTO: 0 K/UL — SIGNIFICANT CHANGE UP (ref 0–0.7)
EOSINOPHIL # BLD AUTO: 0.01 K/UL — SIGNIFICANT CHANGE UP (ref 0–0.7)
EOSINOPHIL # BLD AUTO: 0.01 K/UL — SIGNIFICANT CHANGE UP (ref 0–0.7)
EOSINOPHIL # BLD AUTO: 0.02 K/UL — SIGNIFICANT CHANGE UP (ref 0–0.7)
EOSINOPHIL # BLD AUTO: 0.04 K/UL — SIGNIFICANT CHANGE UP (ref 0–0.7)
EOSINOPHIL # BLD AUTO: 0.06 K/UL — SIGNIFICANT CHANGE UP (ref 0–0.7)
EOSINOPHIL # BLD AUTO: 0.07 K/UL — SIGNIFICANT CHANGE UP (ref 0–0.7)
EOSINOPHIL # BLD AUTO: 0.09 K/UL — SIGNIFICANT CHANGE UP (ref 0–0.7)
EOSINOPHIL # BLD AUTO: 0.11 K/UL — SIGNIFICANT CHANGE UP (ref 0–0.7)
EOSINOPHIL # BLD AUTO: 0.12 K/UL — SIGNIFICANT CHANGE UP (ref 0–0.7)
EOSINOPHIL # BLD AUTO: 0.13 K/UL — SIGNIFICANT CHANGE UP (ref 0–0.7)
EOSINOPHIL # BLD AUTO: 0.14 K/UL — SIGNIFICANT CHANGE UP (ref 0–0.7)
EOSINOPHIL # BLD AUTO: 0.14 K/UL — SIGNIFICANT CHANGE UP (ref 0–0.7)
EOSINOPHIL # BLD AUTO: 0.15 K/UL — SIGNIFICANT CHANGE UP (ref 0–0.7)
EOSINOPHIL # BLD AUTO: 0.17 K/UL — SIGNIFICANT CHANGE UP (ref 0–0.7)
EOSINOPHIL # BLD AUTO: 0.23 K/UL — SIGNIFICANT CHANGE UP (ref 0–0.7)
EOSINOPHIL # BLD AUTO: 0.25 K/UL — SIGNIFICANT CHANGE UP (ref 0–0.7)
EOSINOPHIL # BLD AUTO: 0.28 K/UL — SIGNIFICANT CHANGE UP (ref 0–0.7)
EOSINOPHIL # BLD AUTO: 0.31 K/UL — SIGNIFICANT CHANGE UP (ref 0–0.7)
EOSINOPHIL # BLD AUTO: 0.34 K/UL — SIGNIFICANT CHANGE UP (ref 0–0.7)
EOSINOPHIL # BLD AUTO: 0.35 K/UL — SIGNIFICANT CHANGE UP (ref 0–0.7)
EOSINOPHIL # BLD AUTO: 0.36 K/UL — SIGNIFICANT CHANGE UP (ref 0–0.7)
EOSINOPHIL # BLD AUTO: 0.38 K/UL — SIGNIFICANT CHANGE UP (ref 0–0.7)
EOSINOPHIL # BLD AUTO: 0.38 K/UL — SIGNIFICANT CHANGE UP (ref 0–0.7)
EOSINOPHIL # BLD AUTO: 0.41 K/UL — SIGNIFICANT CHANGE UP (ref 0–0.7)
EOSINOPHIL # BLD AUTO: 0.45 K/UL — SIGNIFICANT CHANGE UP (ref 0–0.7)
EOSINOPHIL # BLD AUTO: 0.71 K/UL — HIGH (ref 0–0.7)
EOSINOPHIL NFR BLD AUTO: 0 % — SIGNIFICANT CHANGE UP (ref 0–8)
EOSINOPHIL NFR BLD AUTO: 0.1 % — SIGNIFICANT CHANGE UP (ref 0–8)
EOSINOPHIL NFR BLD AUTO: 0.1 % — SIGNIFICANT CHANGE UP (ref 0–8)
EOSINOPHIL NFR BLD AUTO: 0.2 % — SIGNIFICANT CHANGE UP (ref 0–8)
EOSINOPHIL NFR BLD AUTO: 0.4 % — SIGNIFICANT CHANGE UP (ref 0–8)
EOSINOPHIL NFR BLD AUTO: 0.5 % — SIGNIFICANT CHANGE UP (ref 0–8)
EOSINOPHIL NFR BLD AUTO: 0.9 % — SIGNIFICANT CHANGE UP (ref 0–8)
EOSINOPHIL NFR BLD AUTO: 0.9 % — SIGNIFICANT CHANGE UP (ref 0–8)
EOSINOPHIL NFR BLD AUTO: 1 % — SIGNIFICANT CHANGE UP (ref 0–8)
EOSINOPHIL NFR BLD AUTO: 1 % — SIGNIFICANT CHANGE UP (ref 0–8)
EOSINOPHIL NFR BLD AUTO: 1.1 % — SIGNIFICANT CHANGE UP (ref 0–8)
EOSINOPHIL NFR BLD AUTO: 1.2 % — SIGNIFICANT CHANGE UP (ref 0–8)
EOSINOPHIL NFR BLD AUTO: 1.6 % — SIGNIFICANT CHANGE UP (ref 0–8)
EOSINOPHIL NFR BLD AUTO: 1.8 % — SIGNIFICANT CHANGE UP (ref 0–8)
EOSINOPHIL NFR BLD AUTO: 2.1 % — SIGNIFICANT CHANGE UP (ref 0–8)
EOSINOPHIL NFR BLD AUTO: 2.2 % — SIGNIFICANT CHANGE UP (ref 0–8)
EOSINOPHIL NFR BLD AUTO: 2.3 % — SIGNIFICANT CHANGE UP (ref 0–8)
EOSINOPHIL NFR BLD AUTO: 2.4 % — SIGNIFICANT CHANGE UP (ref 0–8)
EOSINOPHIL NFR BLD AUTO: 2.6 % — SIGNIFICANT CHANGE UP (ref 0–8)
EOSINOPHIL NFR BLD AUTO: 2.8 % — SIGNIFICANT CHANGE UP (ref 0–8)
EOSINOPHIL NFR BLD AUTO: 3.6 % — SIGNIFICANT CHANGE UP (ref 0–8)
EOSINOPHIL NFR BLD AUTO: 3.7 % — SIGNIFICANT CHANGE UP (ref 0–8)
EOSINOPHIL NFR BLD AUTO: 3.8 % — SIGNIFICANT CHANGE UP (ref 0–8)
EOSINOPHIL NFR BLD AUTO: 4 % — SIGNIFICANT CHANGE UP (ref 0–8)
EOSINOPHIL NFR BLD AUTO: 4.9 % — SIGNIFICANT CHANGE UP (ref 0–8)
EOSINOPHIL NFR BLD AUTO: 6 % — SIGNIFICANT CHANGE UP (ref 0–8)
EOSINOPHIL NFR BLD AUTO: 6 % — SIGNIFICANT CHANGE UP (ref 0–8)
EOSINOPHIL NFR BLD AUTO: 6.4 % — SIGNIFICANT CHANGE UP (ref 0–8)
EOSINOPHIL NFR BLD AUTO: 7 % — SIGNIFICANT CHANGE UP (ref 0–8)
EOSINOPHIL NFR BLD AUTO: 7.6 % — SIGNIFICANT CHANGE UP (ref 0–8)
EOSINOPHIL NFR BLD AUTO: 7.7 % — SIGNIFICANT CHANGE UP (ref 0–8)
EOSINOPHIL NFR BLD AUTO: 8.4 % — HIGH (ref 0–8)
EPI CELLS # UR: 0 /HPF — SIGNIFICANT CHANGE UP (ref 0–5)
EPI CELLS # UR: 1 /HPF — SIGNIFICANT CHANGE UP (ref 0–5)
EPI CELLS # UR: 1 /HPF — SIGNIFICANT CHANGE UP (ref 0–5)
EPI CELLS # UR: 2 /HPF — SIGNIFICANT CHANGE UP (ref 0–5)
EPI CELLS # UR: 5 /HPF — SIGNIFICANT CHANGE UP (ref 0–5)
ERYTHROCYTE [SEDIMENTATION RATE] IN BLOOD: 63 MM/HR — HIGH (ref 0–20)
ESTIMATED AVERAGE GLUCOSE: 186 MG/DL — HIGH (ref 68–114)
ESTIMATED AVERAGE GLUCOSE: 192 MG/DL — HIGH (ref 68–114)
FERRITIN SERPL-MCNC: 1473 NG/ML — HIGH (ref 15–150)
FIBRINOGEN PPP-MCNC: 670 MG/DL — HIGH (ref 204.4–570.6)
FIBRINOGEN PPP-MCNC: 685 MG/DL — HIGH (ref 204.4–570.6)
FIBRINOGEN PPP-MCNC: >700 MG/DL — HIGH (ref 204.4–570.6)
FOLATE SERPL-MCNC: 5.7 NG/ML — SIGNIFICANT CHANGE UP
FOLATE SERPL-MCNC: 7.4 NG/ML — SIGNIFICANT CHANGE UP
GAMMA GLOBULIN: 1 G/DL — SIGNIFICANT CHANGE UP (ref 0.6–1.6)
GAS PNL BLDA: SIGNIFICANT CHANGE UP
GAS PNL BLDV: 119 MMOL/L — LOW (ref 136–145)
GAS PNL BLDV: 121 MMOL/L — LOW (ref 136–145)
GAS PNL BLDV: SIGNIFICANT CHANGE UP
GGT SERPL-CCNC: 63 U/L — HIGH (ref 1–40)
GIANT PLATELETS BLD QL SMEAR: PRESENT — SIGNIFICANT CHANGE UP
GIANT PLATELETS BLD QL SMEAR: PRESENT — SIGNIFICANT CHANGE UP
GLUCOSE BLDC GLUCOMTR-MCNC: 101 MG/DL — HIGH (ref 70–99)
GLUCOSE BLDC GLUCOMTR-MCNC: 102 MG/DL — HIGH (ref 70–99)
GLUCOSE BLDC GLUCOMTR-MCNC: 103 MG/DL — HIGH (ref 70–99)
GLUCOSE BLDC GLUCOMTR-MCNC: 104 MG/DL — HIGH (ref 70–99)
GLUCOSE BLDC GLUCOMTR-MCNC: 105 MG/DL — HIGH (ref 70–99)
GLUCOSE BLDC GLUCOMTR-MCNC: 106 MG/DL — HIGH (ref 70–99)
GLUCOSE BLDC GLUCOMTR-MCNC: 108 MG/DL — HIGH (ref 70–99)
GLUCOSE BLDC GLUCOMTR-MCNC: 108 MG/DL — HIGH (ref 70–99)
GLUCOSE BLDC GLUCOMTR-MCNC: 109 MG/DL — HIGH (ref 70–99)
GLUCOSE BLDC GLUCOMTR-MCNC: 111 MG/DL — HIGH (ref 70–99)
GLUCOSE BLDC GLUCOMTR-MCNC: 113 MG/DL — HIGH (ref 70–99)
GLUCOSE BLDC GLUCOMTR-MCNC: 114 MG/DL — HIGH (ref 70–99)
GLUCOSE BLDC GLUCOMTR-MCNC: 119 MG/DL — HIGH (ref 70–99)
GLUCOSE BLDC GLUCOMTR-MCNC: 120 MG/DL — HIGH (ref 70–99)
GLUCOSE BLDC GLUCOMTR-MCNC: 123 MG/DL — HIGH (ref 70–99)
GLUCOSE BLDC GLUCOMTR-MCNC: 124 MG/DL — HIGH (ref 70–99)
GLUCOSE BLDC GLUCOMTR-MCNC: 125 MG/DL — HIGH (ref 70–99)
GLUCOSE BLDC GLUCOMTR-MCNC: 125 MG/DL — HIGH (ref 70–99)
GLUCOSE BLDC GLUCOMTR-MCNC: 134 MG/DL — HIGH (ref 70–99)
GLUCOSE BLDC GLUCOMTR-MCNC: 134 MG/DL — HIGH (ref 70–99)
GLUCOSE BLDC GLUCOMTR-MCNC: 137 MG/DL — HIGH (ref 70–99)
GLUCOSE BLDC GLUCOMTR-MCNC: 138 MG/DL — HIGH (ref 70–99)
GLUCOSE BLDC GLUCOMTR-MCNC: 139 MG/DL — HIGH (ref 70–99)
GLUCOSE BLDC GLUCOMTR-MCNC: 139 MG/DL — HIGH (ref 70–99)
GLUCOSE BLDC GLUCOMTR-MCNC: 141 MG/DL — HIGH (ref 70–99)
GLUCOSE BLDC GLUCOMTR-MCNC: 143 MG/DL — HIGH (ref 70–99)
GLUCOSE BLDC GLUCOMTR-MCNC: 143 MG/DL — HIGH (ref 70–99)
GLUCOSE BLDC GLUCOMTR-MCNC: 144 MG/DL — HIGH (ref 70–99)
GLUCOSE BLDC GLUCOMTR-MCNC: 146 MG/DL — HIGH (ref 70–99)
GLUCOSE BLDC GLUCOMTR-MCNC: 148 MG/DL — HIGH (ref 70–99)
GLUCOSE BLDC GLUCOMTR-MCNC: 150 MG/DL — HIGH (ref 70–99)
GLUCOSE BLDC GLUCOMTR-MCNC: 152 MG/DL — HIGH (ref 70–99)
GLUCOSE BLDC GLUCOMTR-MCNC: 154 MG/DL — HIGH (ref 70–99)
GLUCOSE BLDC GLUCOMTR-MCNC: 155 MG/DL — HIGH (ref 70–99)
GLUCOSE BLDC GLUCOMTR-MCNC: 155 MG/DL — HIGH (ref 70–99)
GLUCOSE BLDC GLUCOMTR-MCNC: 156 MG/DL — HIGH (ref 70–99)
GLUCOSE BLDC GLUCOMTR-MCNC: 157 MG/DL — HIGH (ref 70–99)
GLUCOSE BLDC GLUCOMTR-MCNC: 158 MG/DL — HIGH (ref 70–99)
GLUCOSE BLDC GLUCOMTR-MCNC: 159 MG/DL — HIGH (ref 70–99)
GLUCOSE BLDC GLUCOMTR-MCNC: 160 MG/DL — HIGH (ref 70–99)
GLUCOSE BLDC GLUCOMTR-MCNC: 162 MG/DL — HIGH (ref 70–99)
GLUCOSE BLDC GLUCOMTR-MCNC: 162 MG/DL — HIGH (ref 70–99)
GLUCOSE BLDC GLUCOMTR-MCNC: 163 MG/DL — HIGH (ref 70–99)
GLUCOSE BLDC GLUCOMTR-MCNC: 163 MG/DL — HIGH (ref 70–99)
GLUCOSE BLDC GLUCOMTR-MCNC: 164 MG/DL — HIGH (ref 70–99)
GLUCOSE BLDC GLUCOMTR-MCNC: 164 MG/DL — HIGH (ref 70–99)
GLUCOSE BLDC GLUCOMTR-MCNC: 166 MG/DL — HIGH (ref 70–99)
GLUCOSE BLDC GLUCOMTR-MCNC: 167 MG/DL — HIGH (ref 70–99)
GLUCOSE BLDC GLUCOMTR-MCNC: 168 MG/DL — HIGH (ref 70–99)
GLUCOSE BLDC GLUCOMTR-MCNC: 170 MG/DL — HIGH (ref 70–99)
GLUCOSE BLDC GLUCOMTR-MCNC: 171 MG/DL — HIGH (ref 70–99)
GLUCOSE BLDC GLUCOMTR-MCNC: 171 MG/DL — HIGH (ref 70–99)
GLUCOSE BLDC GLUCOMTR-MCNC: 172 MG/DL — HIGH (ref 70–99)
GLUCOSE BLDC GLUCOMTR-MCNC: 172 MG/DL — HIGH (ref 70–99)
GLUCOSE BLDC GLUCOMTR-MCNC: 173 MG/DL — HIGH (ref 70–99)
GLUCOSE BLDC GLUCOMTR-MCNC: 173 MG/DL — HIGH (ref 70–99)
GLUCOSE BLDC GLUCOMTR-MCNC: 174 MG/DL — HIGH (ref 70–99)
GLUCOSE BLDC GLUCOMTR-MCNC: 175 MG/DL — HIGH (ref 70–99)
GLUCOSE BLDC GLUCOMTR-MCNC: 175 MG/DL — HIGH (ref 70–99)
GLUCOSE BLDC GLUCOMTR-MCNC: 176 MG/DL — HIGH (ref 70–99)
GLUCOSE BLDC GLUCOMTR-MCNC: 176 MG/DL — HIGH (ref 70–99)
GLUCOSE BLDC GLUCOMTR-MCNC: 177 MG/DL — HIGH (ref 70–99)
GLUCOSE BLDC GLUCOMTR-MCNC: 178 MG/DL — HIGH (ref 70–99)
GLUCOSE BLDC GLUCOMTR-MCNC: 179 MG/DL — HIGH (ref 70–99)
GLUCOSE BLDC GLUCOMTR-MCNC: 180 MG/DL — HIGH (ref 70–99)
GLUCOSE BLDC GLUCOMTR-MCNC: 182 MG/DL — HIGH (ref 70–99)
GLUCOSE BLDC GLUCOMTR-MCNC: 183 MG/DL — HIGH (ref 70–99)
GLUCOSE BLDC GLUCOMTR-MCNC: 184 MG/DL — HIGH (ref 70–99)
GLUCOSE BLDC GLUCOMTR-MCNC: 185 MG/DL — HIGH (ref 70–99)
GLUCOSE BLDC GLUCOMTR-MCNC: 185 MG/DL — HIGH (ref 70–99)
GLUCOSE BLDC GLUCOMTR-MCNC: 186 MG/DL — HIGH (ref 70–99)
GLUCOSE BLDC GLUCOMTR-MCNC: 187 MG/DL — HIGH (ref 70–99)
GLUCOSE BLDC GLUCOMTR-MCNC: 188 MG/DL — HIGH (ref 70–99)
GLUCOSE BLDC GLUCOMTR-MCNC: 190 MG/DL — HIGH (ref 70–99)
GLUCOSE BLDC GLUCOMTR-MCNC: 192 MG/DL — HIGH (ref 70–99)
GLUCOSE BLDC GLUCOMTR-MCNC: 194 MG/DL — HIGH (ref 70–99)
GLUCOSE BLDC GLUCOMTR-MCNC: 196 MG/DL — HIGH (ref 70–99)
GLUCOSE BLDC GLUCOMTR-MCNC: 199 MG/DL — HIGH (ref 70–99)
GLUCOSE BLDC GLUCOMTR-MCNC: 203 MG/DL — HIGH (ref 70–99)
GLUCOSE BLDC GLUCOMTR-MCNC: 205 MG/DL — HIGH (ref 70–99)
GLUCOSE BLDC GLUCOMTR-MCNC: 206 MG/DL — HIGH (ref 70–99)
GLUCOSE BLDC GLUCOMTR-MCNC: 209 MG/DL — HIGH (ref 70–99)
GLUCOSE BLDC GLUCOMTR-MCNC: 216 MG/DL — HIGH (ref 70–99)
GLUCOSE BLDC GLUCOMTR-MCNC: 218 MG/DL — HIGH (ref 70–99)
GLUCOSE BLDC GLUCOMTR-MCNC: 220 MG/DL — HIGH (ref 70–99)
GLUCOSE BLDC GLUCOMTR-MCNC: 220 MG/DL — HIGH (ref 70–99)
GLUCOSE BLDC GLUCOMTR-MCNC: 222 MG/DL — HIGH (ref 70–99)
GLUCOSE BLDC GLUCOMTR-MCNC: 222 MG/DL — HIGH (ref 70–99)
GLUCOSE BLDC GLUCOMTR-MCNC: 223 MG/DL — HIGH (ref 70–99)
GLUCOSE BLDC GLUCOMTR-MCNC: 224 MG/DL — HIGH (ref 70–99)
GLUCOSE BLDC GLUCOMTR-MCNC: 225 MG/DL — HIGH (ref 70–99)
GLUCOSE BLDC GLUCOMTR-MCNC: 226 MG/DL — HIGH (ref 70–99)
GLUCOSE BLDC GLUCOMTR-MCNC: 227 MG/DL — HIGH (ref 70–99)
GLUCOSE BLDC GLUCOMTR-MCNC: 227 MG/DL — HIGH (ref 70–99)
GLUCOSE BLDC GLUCOMTR-MCNC: 228 MG/DL — HIGH (ref 70–99)
GLUCOSE BLDC GLUCOMTR-MCNC: 233 MG/DL — HIGH (ref 70–99)
GLUCOSE BLDC GLUCOMTR-MCNC: 235 MG/DL — HIGH (ref 70–99)
GLUCOSE BLDC GLUCOMTR-MCNC: 236 MG/DL — HIGH (ref 70–99)
GLUCOSE BLDC GLUCOMTR-MCNC: 237 MG/DL — HIGH (ref 70–99)
GLUCOSE BLDC GLUCOMTR-MCNC: 238 MG/DL — HIGH (ref 70–99)
GLUCOSE BLDC GLUCOMTR-MCNC: 240 MG/DL — HIGH (ref 70–99)
GLUCOSE BLDC GLUCOMTR-MCNC: 243 MG/DL — HIGH (ref 70–99)
GLUCOSE BLDC GLUCOMTR-MCNC: 243 MG/DL — HIGH (ref 70–99)
GLUCOSE BLDC GLUCOMTR-MCNC: 244 MG/DL — HIGH (ref 70–99)
GLUCOSE BLDC GLUCOMTR-MCNC: 245 MG/DL — HIGH (ref 70–99)
GLUCOSE BLDC GLUCOMTR-MCNC: 245 MG/DL — HIGH (ref 70–99)
GLUCOSE BLDC GLUCOMTR-MCNC: 246 MG/DL — HIGH (ref 70–99)
GLUCOSE BLDC GLUCOMTR-MCNC: 247 MG/DL — HIGH (ref 70–99)
GLUCOSE BLDC GLUCOMTR-MCNC: 247 MG/DL — HIGH (ref 70–99)
GLUCOSE BLDC GLUCOMTR-MCNC: 249 MG/DL — HIGH (ref 70–99)
GLUCOSE BLDC GLUCOMTR-MCNC: 249 MG/DL — HIGH (ref 70–99)
GLUCOSE BLDC GLUCOMTR-MCNC: 251 MG/DL — HIGH (ref 70–99)
GLUCOSE BLDC GLUCOMTR-MCNC: 251 MG/DL — HIGH (ref 70–99)
GLUCOSE BLDC GLUCOMTR-MCNC: 252 MG/DL — HIGH (ref 70–99)
GLUCOSE BLDC GLUCOMTR-MCNC: 252 MG/DL — HIGH (ref 70–99)
GLUCOSE BLDC GLUCOMTR-MCNC: 254 MG/DL — HIGH (ref 70–99)
GLUCOSE BLDC GLUCOMTR-MCNC: 255 MG/DL — HIGH (ref 70–99)
GLUCOSE BLDC GLUCOMTR-MCNC: 257 MG/DL — HIGH (ref 70–99)
GLUCOSE BLDC GLUCOMTR-MCNC: 258 MG/DL — HIGH (ref 70–99)
GLUCOSE BLDC GLUCOMTR-MCNC: 262 MG/DL — HIGH (ref 70–99)
GLUCOSE BLDC GLUCOMTR-MCNC: 262 MG/DL — HIGH (ref 70–99)
GLUCOSE BLDC GLUCOMTR-MCNC: 263 MG/DL — HIGH (ref 70–99)
GLUCOSE BLDC GLUCOMTR-MCNC: 264 MG/DL — HIGH (ref 70–99)
GLUCOSE BLDC GLUCOMTR-MCNC: 267 MG/DL — HIGH (ref 70–99)
GLUCOSE BLDC GLUCOMTR-MCNC: 267 MG/DL — HIGH (ref 70–99)
GLUCOSE BLDC GLUCOMTR-MCNC: 269 MG/DL — HIGH (ref 70–99)
GLUCOSE BLDC GLUCOMTR-MCNC: 270 MG/DL — HIGH (ref 70–99)
GLUCOSE BLDC GLUCOMTR-MCNC: 273 MG/DL — HIGH (ref 70–99)
GLUCOSE BLDC GLUCOMTR-MCNC: 280 MG/DL — HIGH (ref 70–99)
GLUCOSE BLDC GLUCOMTR-MCNC: 281 MG/DL — HIGH (ref 70–99)
GLUCOSE BLDC GLUCOMTR-MCNC: 285 MG/DL — HIGH (ref 70–99)
GLUCOSE BLDC GLUCOMTR-MCNC: 288 MG/DL — HIGH (ref 70–99)
GLUCOSE BLDC GLUCOMTR-MCNC: 289 MG/DL — HIGH (ref 70–99)
GLUCOSE BLDC GLUCOMTR-MCNC: 294 MG/DL — HIGH (ref 70–99)
GLUCOSE BLDC GLUCOMTR-MCNC: 296 MG/DL — HIGH (ref 70–99)
GLUCOSE BLDC GLUCOMTR-MCNC: 301 MG/DL — HIGH (ref 70–99)
GLUCOSE BLDC GLUCOMTR-MCNC: 303 MG/DL — HIGH (ref 70–99)
GLUCOSE BLDC GLUCOMTR-MCNC: 303 MG/DL — HIGH (ref 70–99)
GLUCOSE BLDC GLUCOMTR-MCNC: 310 MG/DL — HIGH (ref 70–99)
GLUCOSE BLDC GLUCOMTR-MCNC: 312 MG/DL — HIGH (ref 70–99)
GLUCOSE BLDC GLUCOMTR-MCNC: 314 MG/DL — HIGH (ref 70–99)
GLUCOSE BLDC GLUCOMTR-MCNC: 315 MG/DL — HIGH (ref 70–99)
GLUCOSE BLDC GLUCOMTR-MCNC: 318 MG/DL — HIGH (ref 70–99)
GLUCOSE BLDC GLUCOMTR-MCNC: 318 MG/DL — HIGH (ref 70–99)
GLUCOSE BLDC GLUCOMTR-MCNC: 322 MG/DL — HIGH (ref 70–99)
GLUCOSE BLDC GLUCOMTR-MCNC: 325 MG/DL — HIGH (ref 70–99)
GLUCOSE BLDC GLUCOMTR-MCNC: 329 MG/DL — HIGH (ref 70–99)
GLUCOSE BLDC GLUCOMTR-MCNC: 329 MG/DL — HIGH (ref 70–99)
GLUCOSE BLDC GLUCOMTR-MCNC: 330 MG/DL — HIGH (ref 70–99)
GLUCOSE BLDC GLUCOMTR-MCNC: 339 MG/DL — HIGH (ref 70–99)
GLUCOSE BLDC GLUCOMTR-MCNC: 340 MG/DL — HIGH (ref 70–99)
GLUCOSE BLDC GLUCOMTR-MCNC: 343 MG/DL — HIGH (ref 70–99)
GLUCOSE BLDC GLUCOMTR-MCNC: 344 MG/DL — HIGH (ref 70–99)
GLUCOSE BLDC GLUCOMTR-MCNC: 348 MG/DL — HIGH (ref 70–99)
GLUCOSE BLDC GLUCOMTR-MCNC: 351 MG/DL — HIGH (ref 70–99)
GLUCOSE BLDC GLUCOMTR-MCNC: 353 MG/DL — HIGH (ref 70–99)
GLUCOSE BLDC GLUCOMTR-MCNC: 360 MG/DL — HIGH (ref 70–99)
GLUCOSE BLDC GLUCOMTR-MCNC: 363 MG/DL — HIGH (ref 70–99)
GLUCOSE BLDC GLUCOMTR-MCNC: 365 MG/DL — HIGH (ref 70–99)
GLUCOSE BLDC GLUCOMTR-MCNC: 368 MG/DL — HIGH (ref 70–99)
GLUCOSE BLDC GLUCOMTR-MCNC: 377 MG/DL — HIGH (ref 70–99)
GLUCOSE BLDC GLUCOMTR-MCNC: 377 MG/DL — HIGH (ref 70–99)
GLUCOSE BLDC GLUCOMTR-MCNC: 391 MG/DL — HIGH (ref 70–99)
GLUCOSE BLDC GLUCOMTR-MCNC: 401 MG/DL — HIGH (ref 70–99)
GLUCOSE BLDC GLUCOMTR-MCNC: 418 MG/DL — HIGH (ref 70–99)
GLUCOSE BLDC GLUCOMTR-MCNC: 426 MG/DL — HIGH (ref 70–99)
GLUCOSE BLDC GLUCOMTR-MCNC: 436 MG/DL — HIGH (ref 70–99)
GLUCOSE BLDC GLUCOMTR-MCNC: 443 MG/DL — HIGH (ref 70–99)
GLUCOSE BLDC GLUCOMTR-MCNC: 444 MG/DL — HIGH (ref 70–99)
GLUCOSE BLDC GLUCOMTR-MCNC: 457 MG/DL — CRITICAL HIGH (ref 70–99)
GLUCOSE BLDC GLUCOMTR-MCNC: 473 MG/DL — CRITICAL HIGH (ref 70–99)
GLUCOSE BLDC GLUCOMTR-MCNC: 49 MG/DL — CRITICAL LOW (ref 70–99)
GLUCOSE BLDC GLUCOMTR-MCNC: 54 MG/DL — CRITICAL LOW (ref 70–99)
GLUCOSE BLDC GLUCOMTR-MCNC: 57 MG/DL — LOW (ref 70–99)
GLUCOSE BLDC GLUCOMTR-MCNC: 59 MG/DL — LOW (ref 70–99)
GLUCOSE BLDC GLUCOMTR-MCNC: 60 MG/DL — LOW (ref 70–99)
GLUCOSE BLDC GLUCOMTR-MCNC: 60 MG/DL — LOW (ref 70–99)
GLUCOSE BLDC GLUCOMTR-MCNC: 61 MG/DL — LOW (ref 70–99)
GLUCOSE BLDC GLUCOMTR-MCNC: 61 MG/DL — LOW (ref 70–99)
GLUCOSE BLDC GLUCOMTR-MCNC: 62 MG/DL — LOW (ref 70–99)
GLUCOSE BLDC GLUCOMTR-MCNC: 63 MG/DL — LOW (ref 70–99)
GLUCOSE BLDC GLUCOMTR-MCNC: 66 MG/DL — LOW (ref 70–99)
GLUCOSE BLDC GLUCOMTR-MCNC: 69 MG/DL — LOW (ref 70–99)
GLUCOSE BLDC GLUCOMTR-MCNC: 70 MG/DL — SIGNIFICANT CHANGE UP (ref 70–99)
GLUCOSE BLDC GLUCOMTR-MCNC: 72 MG/DL — SIGNIFICANT CHANGE UP (ref 70–99)
GLUCOSE BLDC GLUCOMTR-MCNC: 74 MG/DL — SIGNIFICANT CHANGE UP (ref 70–99)
GLUCOSE BLDC GLUCOMTR-MCNC: 79 MG/DL — SIGNIFICANT CHANGE UP (ref 70–99)
GLUCOSE BLDC GLUCOMTR-MCNC: 82 MG/DL — SIGNIFICANT CHANGE UP (ref 70–99)
GLUCOSE BLDC GLUCOMTR-MCNC: 83 MG/DL — SIGNIFICANT CHANGE UP (ref 70–99)
GLUCOSE BLDC GLUCOMTR-MCNC: 84 MG/DL — SIGNIFICANT CHANGE UP (ref 70–99)
GLUCOSE BLDC GLUCOMTR-MCNC: 85 MG/DL — SIGNIFICANT CHANGE UP (ref 70–99)
GLUCOSE BLDC GLUCOMTR-MCNC: 89 MG/DL — SIGNIFICANT CHANGE UP (ref 70–99)
GLUCOSE BLDC GLUCOMTR-MCNC: 90 MG/DL — SIGNIFICANT CHANGE UP (ref 70–99)
GLUCOSE BLDC GLUCOMTR-MCNC: 92 MG/DL — SIGNIFICANT CHANGE UP (ref 70–99)
GLUCOSE BLDC GLUCOMTR-MCNC: 92 MG/DL — SIGNIFICANT CHANGE UP (ref 70–99)
GLUCOSE BLDC GLUCOMTR-MCNC: 93 MG/DL — SIGNIFICANT CHANGE UP (ref 70–99)
GLUCOSE BLDC GLUCOMTR-MCNC: 98 MG/DL — SIGNIFICANT CHANGE UP (ref 70–99)
GLUCOSE BLDC GLUCOMTR-MCNC: 98 MG/DL — SIGNIFICANT CHANGE UP (ref 70–99)
GLUCOSE BLDC GLUCOMTR-MCNC: 99 MG/DL — SIGNIFICANT CHANGE UP (ref 70–99)
GLUCOSE SERPL-MCNC: 104 MG/DL — HIGH (ref 70–99)
GLUCOSE SERPL-MCNC: 107 MG/DL — HIGH (ref 70–99)
GLUCOSE SERPL-MCNC: 108 MG/DL — HIGH (ref 70–99)
GLUCOSE SERPL-MCNC: 108 MG/DL — HIGH (ref 70–99)
GLUCOSE SERPL-MCNC: 109 MG/DL — HIGH (ref 70–99)
GLUCOSE SERPL-MCNC: 114 MG/DL — HIGH (ref 70–99)
GLUCOSE SERPL-MCNC: 123 MG/DL — HIGH (ref 70–99)
GLUCOSE SERPL-MCNC: 130 MG/DL — HIGH (ref 70–99)
GLUCOSE SERPL-MCNC: 135 MG/DL — HIGH (ref 70–99)
GLUCOSE SERPL-MCNC: 149 MG/DL — HIGH (ref 70–99)
GLUCOSE SERPL-MCNC: 152 MG/DL — HIGH (ref 70–99)
GLUCOSE SERPL-MCNC: 152 MG/DL — HIGH (ref 70–99)
GLUCOSE SERPL-MCNC: 153 MG/DL — HIGH (ref 70–99)
GLUCOSE SERPL-MCNC: 155 MG/DL — HIGH (ref 70–99)
GLUCOSE SERPL-MCNC: 168 MG/DL — HIGH (ref 70–99)
GLUCOSE SERPL-MCNC: 177 MG/DL — HIGH (ref 70–99)
GLUCOSE SERPL-MCNC: 178 MG/DL — HIGH (ref 70–99)
GLUCOSE SERPL-MCNC: 179 MG/DL — HIGH (ref 70–99)
GLUCOSE SERPL-MCNC: 185 MG/DL — HIGH (ref 70–99)
GLUCOSE SERPL-MCNC: 190 MG/DL — HIGH (ref 70–99)
GLUCOSE SERPL-MCNC: 191 MG/DL — HIGH (ref 70–99)
GLUCOSE SERPL-MCNC: 192 MG/DL — HIGH (ref 70–99)
GLUCOSE SERPL-MCNC: 198 MG/DL — HIGH (ref 70–99)
GLUCOSE SERPL-MCNC: 207 MG/DL — HIGH (ref 70–99)
GLUCOSE SERPL-MCNC: 211 MG/DL — HIGH (ref 70–99)
GLUCOSE SERPL-MCNC: 212 MG/DL — HIGH (ref 70–99)
GLUCOSE SERPL-MCNC: 228 MG/DL — HIGH (ref 70–99)
GLUCOSE SERPL-MCNC: 238 MG/DL — HIGH (ref 70–99)
GLUCOSE SERPL-MCNC: 251 MG/DL — HIGH (ref 70–99)
GLUCOSE SERPL-MCNC: 254 MG/DL — HIGH (ref 70–99)
GLUCOSE SERPL-MCNC: 261 MG/DL — HIGH (ref 70–99)
GLUCOSE SERPL-MCNC: 265 MG/DL — HIGH (ref 70–99)
GLUCOSE SERPL-MCNC: 267 MG/DL — HIGH (ref 70–99)
GLUCOSE SERPL-MCNC: 269 MG/DL — HIGH (ref 70–99)
GLUCOSE SERPL-MCNC: 310 MG/DL — HIGH (ref 70–99)
GLUCOSE SERPL-MCNC: 350 MG/DL — HIGH (ref 70–99)
GLUCOSE SERPL-MCNC: 361 MG/DL — HIGH (ref 70–99)
GLUCOSE SERPL-MCNC: 38 MG/DL — CRITICAL LOW (ref 70–99)
GLUCOSE SERPL-MCNC: 400 MG/DL — HIGH (ref 70–99)
GLUCOSE SERPL-MCNC: 51 MG/DL — CRITICAL LOW (ref 70–99)
GLUCOSE SERPL-MCNC: 527 MG/DL — CRITICAL HIGH (ref 70–99)
GLUCOSE SERPL-MCNC: 63 MG/DL — LOW (ref 70–99)
GLUCOSE SERPL-MCNC: 63 MG/DL — LOW (ref 70–99)
GLUCOSE SERPL-MCNC: 68 MG/DL — LOW (ref 70–99)
GLUCOSE SERPL-MCNC: 76 MG/DL — SIGNIFICANT CHANGE UP (ref 70–99)
GLUCOSE SERPL-MCNC: 87 MG/DL — SIGNIFICANT CHANGE UP (ref 70–99)
GLUCOSE SERPL-MCNC: 88 MG/DL — SIGNIFICANT CHANGE UP (ref 70–99)
GLUCOSE SERPL-MCNC: 88 MG/DL — SIGNIFICANT CHANGE UP (ref 70–99)
GLUCOSE SERPL-MCNC: 95 MG/DL — SIGNIFICANT CHANGE UP (ref 70–99)
GLUCOSE SERPL-MCNC: 97 MG/DL — SIGNIFICANT CHANGE UP (ref 70–99)
GLUCOSE SERPL-MCNC: 98 MG/DL — SIGNIFICANT CHANGE UP (ref 70–99)
GLUCOSE UR QL: ABNORMAL
GLUCOSE UR QL: ABNORMAL
GLUCOSE UR QL: NEGATIVE — SIGNIFICANT CHANGE UP
GRAM STN FLD: SIGNIFICANT CHANGE UP
GRAZOPREVIR RESISTANCE: SIGNIFICANT CHANGE UP
HAPTOGLOB SERPL-MCNC: 239 MG/DL — HIGH (ref 34–200)
HAPTOGLOB SERPL-MCNC: 257 MG/DL — HIGH (ref 34–200)
HAPTOGLOB SERPL-MCNC: 273 MG/DL — HIGH (ref 34–200)
HAV IGM SER-ACNC: SIGNIFICANT CHANGE UP
HBV CORE IGM SER-ACNC: SIGNIFICANT CHANGE UP
HBV SURFACE AG SER-ACNC: SIGNIFICANT CHANGE UP
HCO3 BLDV-SCNC: 20 MMOL/L — LOW (ref 22–29)
HCO3 BLDV-SCNC: 25 MMOL/L — SIGNIFICANT CHANGE UP (ref 22–29)
HCT VFR BLD CALC: 20.9 % — LOW (ref 37–47)
HCT VFR BLD CALC: 21.8 % — LOW (ref 37–47)
HCT VFR BLD CALC: 22.2 % — LOW (ref 37–47)
HCT VFR BLD CALC: 22.5 % — LOW (ref 37–47)
HCT VFR BLD CALC: 22.9 % — LOW (ref 37–47)
HCT VFR BLD CALC: 23.2 % — LOW (ref 37–47)
HCT VFR BLD CALC: 23.2 % — LOW (ref 37–47)
HCT VFR BLD CALC: 23.3 % — LOW (ref 37–47)
HCT VFR BLD CALC: 23.4 % — LOW (ref 37–47)
HCT VFR BLD CALC: 23.6 % — LOW (ref 37–47)
HCT VFR BLD CALC: 23.9 % — LOW (ref 37–47)
HCT VFR BLD CALC: 24.1 % — LOW (ref 37–47)
HCT VFR BLD CALC: 24.5 % — LOW (ref 37–47)
HCT VFR BLD CALC: 24.9 % — LOW (ref 37–47)
HCT VFR BLD CALC: 25.3 % — LOW (ref 37–47)
HCT VFR BLD CALC: 25.4 % — LOW (ref 37–47)
HCT VFR BLD CALC: 25.5 % — LOW (ref 37–47)
HCT VFR BLD CALC: 25.7 % — LOW (ref 37–47)
HCT VFR BLD CALC: 25.8 % — LOW (ref 37–47)
HCT VFR BLD CALC: 26 % — LOW (ref 37–47)
HCT VFR BLD CALC: 26.1 % — LOW (ref 37–47)
HCT VFR BLD CALC: 26.3 % — LOW (ref 37–47)
HCT VFR BLD CALC: 26.4 % — LOW (ref 37–47)
HCT VFR BLD CALC: 26.6 % — LOW (ref 37–47)
HCT VFR BLD CALC: 26.8 % — LOW (ref 37–47)
HCT VFR BLD CALC: 26.9 % — LOW (ref 37–47)
HCT VFR BLD CALC: 27.1 % — LOW (ref 37–47)
HCT VFR BLD CALC: 27.1 % — LOW (ref 37–47)
HCT VFR BLD CALC: 27.4 % — LOW (ref 37–47)
HCT VFR BLD CALC: 27.8 % — LOW (ref 37–47)
HCT VFR BLD CALC: 28.3 % — LOW (ref 37–47)
HCT VFR BLD CALC: 28.4 % — LOW (ref 37–47)
HCT VFR BLD CALC: 29.2 % — LOW (ref 37–47)
HCT VFR BLD CALC: 29.2 % — LOW (ref 37–47)
HCT VFR BLD CALC: 29.3 % — LOW (ref 37–47)
HCT VFR BLD CALC: 30.7 % — LOW (ref 37–47)
HCT VFR BLD CALC: 31.7 % — LOW (ref 37–47)
HCT VFR BLD CALC: 33.6 % — LOW (ref 37–47)
HCT VFR BLDA CALC: 27 % — LOW (ref 34–44)
HCT VFR BLDA CALC: 30.1 % — LOW (ref 34–44)
HCV AB S/CO SERPL IA: 6.47 S/CO — HIGH (ref 0–0.99)
HCV AB SERPL-IMP: REACTIVE
HCV NS3 MUT DET ISLT GENOTYP: SIGNIFICANT CHANGE UP
HCV RNA FLD QL NAA+PROBE: SIGNIFICANT CHANGE UP
HCV RNA FLD QL NAA+PROBE: SIGNIFICANT CHANGE UP
HEPARIN-PF4 AB RESULT: <0.6 U/ML — SIGNIFICANT CHANGE UP (ref 0–0.9)
HGB BLD CALC-MCNC: 8.8 G/DL — LOW (ref 14–18)
HGB BLD CALC-MCNC: 9.8 G/DL — LOW (ref 14–18)
HGB BLD-MCNC: 10 G/DL — LOW (ref 12–16)
HGB BLD-MCNC: 10.2 G/DL — LOW (ref 12–16)
HGB BLD-MCNC: 10.4 G/DL — LOW (ref 12–16)
HGB BLD-MCNC: 10.5 G/DL — LOW (ref 12–16)
HGB BLD-MCNC: 11.2 G/DL — LOW (ref 12–16)
HGB BLD-MCNC: 11.7 G/DL — LOW (ref 12–16)
HGB BLD-MCNC: 6.9 G/DL — CRITICAL LOW (ref 12–16)
HGB BLD-MCNC: 7.3 G/DL — LOW (ref 12–16)
HGB BLD-MCNC: 7.3 G/DL — LOW (ref 12–16)
HGB BLD-MCNC: 7.5 G/DL — LOW (ref 12–16)
HGB BLD-MCNC: 7.6 G/DL — LOW (ref 12–16)
HGB BLD-MCNC: 7.7 G/DL — LOW (ref 12–16)
HGB BLD-MCNC: 7.7 G/DL — LOW (ref 12–16)
HGB BLD-MCNC: 7.9 G/DL — LOW (ref 12–16)
HGB BLD-MCNC: 8 G/DL — LOW (ref 12–16)
HGB BLD-MCNC: 8.1 G/DL — LOW (ref 12–16)
HGB BLD-MCNC: 8.2 G/DL — LOW (ref 12–16)
HGB BLD-MCNC: 8.2 G/DL — LOW (ref 12–16)
HGB BLD-MCNC: 8.3 G/DL — LOW (ref 12–16)
HGB BLD-MCNC: 8.4 G/DL — LOW (ref 12–16)
HGB BLD-MCNC: 8.5 G/DL — LOW (ref 12–16)
HGB BLD-MCNC: 8.5 G/DL — LOW (ref 12–16)
HGB BLD-MCNC: 8.6 G/DL — LOW (ref 12–16)
HGB BLD-MCNC: 8.6 G/DL — LOW (ref 12–16)
HGB BLD-MCNC: 8.7 G/DL — LOW (ref 12–16)
HGB BLD-MCNC: 8.8 G/DL — LOW (ref 12–16)
HGB BLD-MCNC: 8.9 G/DL — LOW (ref 12–16)
HGB BLD-MCNC: 8.9 G/DL — LOW (ref 12–16)
HGB BLD-MCNC: 9.2 G/DL — LOW (ref 12–16)
HGB BLD-MCNC: 9.2 G/DL — LOW (ref 12–16)
HGB BLD-MCNC: 9.3 G/DL — LOW (ref 12–16)
HGB BLD-MCNC: 9.5 G/DL — LOW (ref 12–16)
HGB BLD-MCNC: 9.5 G/DL — LOW (ref 12–16)
HGB BLD-MCNC: 9.6 G/DL — LOW (ref 12–16)
HGB BLD-MCNC: 9.8 G/DL — LOW (ref 12–16)
HGB BLD-MCNC: 9.9 G/DL — LOW (ref 12–16)
HGB BLD-MCNC: 9.9 G/DL — LOW (ref 12–16)
HYALINE CASTS # UR AUTO: 0 /LPF — SIGNIFICANT CHANGE UP (ref 0–7)
HYALINE CASTS # UR AUTO: 23 /LPF — HIGH (ref 0–7)
HYALINE CASTS # UR AUTO: 7 /LPF — SIGNIFICANT CHANGE UP (ref 0–7)
IAT COMP-SP REAG SERPL QL: SIGNIFICANT CHANGE UP
IMM GRANULOCYTES NFR BLD AUTO: 0.2 % — SIGNIFICANT CHANGE UP (ref 0.1–0.3)
IMM GRANULOCYTES NFR BLD AUTO: 0.3 % — SIGNIFICANT CHANGE UP (ref 0.1–0.3)
IMM GRANULOCYTES NFR BLD AUTO: 0.4 % — HIGH (ref 0.1–0.3)
IMM GRANULOCYTES NFR BLD AUTO: 0.5 % — HIGH (ref 0.1–0.3)
IMM GRANULOCYTES NFR BLD AUTO: 0.6 % — HIGH (ref 0.1–0.3)
IMM GRANULOCYTES NFR BLD AUTO: 0.6 % — HIGH (ref 0.1–0.3)
IMM GRANULOCYTES NFR BLD AUTO: 0.7 % — HIGH (ref 0.1–0.3)
IMM GRANULOCYTES NFR BLD AUTO: 0.8 % — HIGH (ref 0.1–0.3)
IMM GRANULOCYTES NFR BLD AUTO: 0.9 % — HIGH (ref 0.1–0.3)
IMM GRANULOCYTES NFR BLD AUTO: 1 % — HIGH (ref 0.1–0.3)
IMM GRANULOCYTES NFR BLD AUTO: 1.1 % — HIGH (ref 0.1–0.3)
IMM GRANULOCYTES NFR BLD AUTO: 1.2 % — HIGH (ref 0.1–0.3)
IMM GRANULOCYTES NFR BLD AUTO: 1.2 % — HIGH (ref 0.1–0.3)
IMM GRANULOCYTES NFR BLD AUTO: 1.3 % — HIGH (ref 0.1–0.3)
IMM GRANULOCYTES NFR BLD AUTO: 1.3 % — HIGH (ref 0.1–0.3)
IMM GRANULOCYTES NFR BLD AUTO: 1.4 % — HIGH (ref 0.1–0.3)
IMM GRANULOCYTES NFR BLD AUTO: 1.4 % — HIGH (ref 0.1–0.3)
IMM GRANULOCYTES NFR BLD AUTO: 2.4 % — HIGH (ref 0.1–0.3)
IMM GRANULOCYTES NFR BLD AUTO: 2.4 % — HIGH (ref 0.1–0.3)
IMM GRANULOCYTES NFR BLD AUTO: 3.3 % — HIGH (ref 0.1–0.3)
IMM GRANULOCYTES NFR BLD AUTO: 4.1 % — HIGH (ref 0.1–0.3)
IMM GRANULOCYTES NFR BLD AUTO: 6.3 % — HIGH (ref 0.1–0.3)
IMM GRANULOCYTES NFR BLD AUTO: 8.4 % — HIGH (ref 0.1–0.3)
IMM GRANULOCYTES NFR BLD AUTO: 8.8 % — HIGH (ref 0.1–0.3)
INR BLD: 1.01 RATIO — SIGNIFICANT CHANGE UP (ref 0.65–1.3)
INR BLD: 1.17 RATIO — SIGNIFICANT CHANGE UP (ref 0.65–1.3)
INR BLD: 1.37 RATIO — HIGH (ref 0.65–1.3)
INR BLD: 1.8 RATIO — HIGH (ref 0.65–1.3)
INTERPRETATION 24H UR IFE-IMP: SIGNIFICANT CHANGE UP
IRON SATN MFR SERPL: 53 % — HIGH (ref 15–50)
IRON SATN MFR SERPL: 93 UG/DL — SIGNIFICANT CHANGE UP (ref 35–150)
KETONES UR-MCNC: ABNORMAL
KETONES UR-MCNC: NEGATIVE — SIGNIFICANT CHANGE UP
KETONES UR-MCNC: SIGNIFICANT CHANGE UP
LACTATE BLDV-MCNC: 1.3 MMOL/L — SIGNIFICANT CHANGE UP (ref 0.5–1.6)
LACTATE BLDV-MCNC: 1.6 MMOL/L — SIGNIFICANT CHANGE UP (ref 0.5–1.6)
LACTATE SERPL-SCNC: 1.1 MMOL/L — SIGNIFICANT CHANGE UP (ref 0.7–2)
LDH SERPL L TO P-CCNC: 253 — HIGH (ref 50–242)
LEUKOCYTE ESTERASE UR-ACNC: ABNORMAL
LIDOCAIN IGE QN: 12 U/L — SIGNIFICANT CHANGE UP (ref 7–60)
LYMPHOCYTES # BLD AUTO: 0.47 K/UL — LOW (ref 1.2–3.4)
LYMPHOCYTES # BLD AUTO: 0.57 K/UL — LOW (ref 1.2–3.4)
LYMPHOCYTES # BLD AUTO: 0.79 K/UL — LOW (ref 1.2–3.4)
LYMPHOCYTES # BLD AUTO: 0.85 K/UL — LOW (ref 1.2–3.4)
LYMPHOCYTES # BLD AUTO: 0.87 K/UL — LOW (ref 1.2–3.4)
LYMPHOCYTES # BLD AUTO: 0.96 K/UL — LOW (ref 1.2–3.4)
LYMPHOCYTES # BLD AUTO: 1.08 K/UL — LOW (ref 1.2–3.4)
LYMPHOCYTES # BLD AUTO: 1.2 K/UL — SIGNIFICANT CHANGE UP (ref 1.2–3.4)
LYMPHOCYTES # BLD AUTO: 1.3 K/UL — SIGNIFICANT CHANGE UP (ref 1.2–3.4)
LYMPHOCYTES # BLD AUTO: 1.37 K/UL — SIGNIFICANT CHANGE UP (ref 1.2–3.4)
LYMPHOCYTES # BLD AUTO: 1.53 K/UL — SIGNIFICANT CHANGE UP (ref 1.2–3.4)
LYMPHOCYTES # BLD AUTO: 1.58 K/UL — SIGNIFICANT CHANGE UP (ref 1.2–3.4)
LYMPHOCYTES # BLD AUTO: 1.62 K/UL — SIGNIFICANT CHANGE UP (ref 1.2–3.4)
LYMPHOCYTES # BLD AUTO: 1.68 K/UL — SIGNIFICANT CHANGE UP (ref 1.2–3.4)
LYMPHOCYTES # BLD AUTO: 1.7 K/UL — SIGNIFICANT CHANGE UP (ref 1.2–3.4)
LYMPHOCYTES # BLD AUTO: 1.8 % — LOW (ref 20.5–51.1)
LYMPHOCYTES # BLD AUTO: 1.93 K/UL — SIGNIFICANT CHANGE UP (ref 1.2–3.4)
LYMPHOCYTES # BLD AUTO: 1.97 K/UL — SIGNIFICANT CHANGE UP (ref 1.2–3.4)
LYMPHOCYTES # BLD AUTO: 11 % — LOW (ref 20.5–51.1)
LYMPHOCYTES # BLD AUTO: 11.9 % — LOW (ref 20.5–51.1)
LYMPHOCYTES # BLD AUTO: 14.7 % — LOW (ref 20.5–51.1)
LYMPHOCYTES # BLD AUTO: 15.4 % — LOW (ref 20.5–51.1)
LYMPHOCYTES # BLD AUTO: 16.6 % — LOW (ref 20.5–51.1)
LYMPHOCYTES # BLD AUTO: 19.9 % — LOW (ref 20.5–51.1)
LYMPHOCYTES # BLD AUTO: 2.13 K/UL — SIGNIFICANT CHANGE UP (ref 1.2–3.4)
LYMPHOCYTES # BLD AUTO: 2.13 K/UL — SIGNIFICANT CHANGE UP (ref 1.2–3.4)
LYMPHOCYTES # BLD AUTO: 2.15 K/UL — SIGNIFICANT CHANGE UP (ref 1.2–3.4)
LYMPHOCYTES # BLD AUTO: 2.2 K/UL — SIGNIFICANT CHANGE UP (ref 1.2–3.4)
LYMPHOCYTES # BLD AUTO: 2.24 K/UL — SIGNIFICANT CHANGE UP (ref 1.2–3.4)
LYMPHOCYTES # BLD AUTO: 2.27 K/UL — SIGNIFICANT CHANGE UP (ref 1.2–3.4)
LYMPHOCYTES # BLD AUTO: 2.35 K/UL — SIGNIFICANT CHANGE UP (ref 1.2–3.4)
LYMPHOCYTES # BLD AUTO: 2.5 K/UL — SIGNIFICANT CHANGE UP (ref 1.2–3.4)
LYMPHOCYTES # BLD AUTO: 2.56 K/UL — SIGNIFICANT CHANGE UP (ref 1.2–3.4)
LYMPHOCYTES # BLD AUTO: 2.71 K/UL — SIGNIFICANT CHANGE UP (ref 1.2–3.4)
LYMPHOCYTES # BLD AUTO: 2.77 K/UL — SIGNIFICANT CHANGE UP (ref 1.2–3.4)
LYMPHOCYTES # BLD AUTO: 2.97 K/UL — SIGNIFICANT CHANGE UP (ref 1.2–3.4)
LYMPHOCYTES # BLD AUTO: 20.3 % — LOW (ref 20.5–51.1)
LYMPHOCYTES # BLD AUTO: 3 % — LOW (ref 20.5–51.1)
LYMPHOCYTES # BLD AUTO: 3.04 K/UL — SIGNIFICANT CHANGE UP (ref 1.2–3.4)
LYMPHOCYTES # BLD AUTO: 3.39 K/UL — SIGNIFICANT CHANGE UP (ref 1.2–3.4)
LYMPHOCYTES # BLD AUTO: 3.5 % — LOW (ref 20.5–51.1)
LYMPHOCYTES # BLD AUTO: 3.96 K/UL — HIGH (ref 1.2–3.4)
LYMPHOCYTES # BLD AUTO: 32.9 % — SIGNIFICANT CHANGE UP (ref 20.5–51.1)
LYMPHOCYTES # BLD AUTO: 33.5 % — SIGNIFICANT CHANGE UP (ref 20.5–51.1)
LYMPHOCYTES # BLD AUTO: 35 % — SIGNIFICANT CHANGE UP (ref 20.5–51.1)
LYMPHOCYTES # BLD AUTO: 37.4 % — SIGNIFICANT CHANGE UP (ref 20.5–51.1)
LYMPHOCYTES # BLD AUTO: 37.6 % — SIGNIFICANT CHANGE UP (ref 20.5–51.1)
LYMPHOCYTES # BLD AUTO: 37.9 % — SIGNIFICANT CHANGE UP (ref 20.5–51.1)
LYMPHOCYTES # BLD AUTO: 4.5 % — LOW (ref 20.5–51.1)
LYMPHOCYTES # BLD AUTO: 4.7 % — LOW (ref 20.5–51.1)
LYMPHOCYTES # BLD AUTO: 41.6 % — SIGNIFICANT CHANGE UP (ref 20.5–51.1)
LYMPHOCYTES # BLD AUTO: 42.6 % — SIGNIFICANT CHANGE UP (ref 20.5–51.1)
LYMPHOCYTES # BLD AUTO: 43.5 % — SIGNIFICANT CHANGE UP (ref 20.5–51.1)
LYMPHOCYTES # BLD AUTO: 45.8 % — SIGNIFICANT CHANGE UP (ref 20.5–51.1)
LYMPHOCYTES # BLD AUTO: 47.4 % — SIGNIFICANT CHANGE UP (ref 20.5–51.1)
LYMPHOCYTES # BLD AUTO: 48.7 % — SIGNIFICANT CHANGE UP (ref 20.5–51.1)
LYMPHOCYTES # BLD AUTO: 50.8 % — SIGNIFICANT CHANGE UP (ref 20.5–51.1)
LYMPHOCYTES # BLD AUTO: 53.4 % — HIGH (ref 20.5–51.1)
LYMPHOCYTES # BLD AUTO: 54.6 % — HIGH (ref 20.5–51.1)
LYMPHOCYTES # BLD AUTO: 55 % — HIGH (ref 20.5–51.1)
LYMPHOCYTES # BLD AUTO: 55.9 % — HIGH (ref 20.5–51.1)
LYMPHOCYTES # BLD AUTO: 6.5 % — LOW (ref 20.5–51.1)
LYMPHOCYTES # BLD AUTO: 8.9 % — LOW (ref 20.5–51.1)
LYMPHOCYTES # BLD AUTO: 9.7 % — LOW (ref 20.5–51.1)
M PROTEIN 24H UR ELPH-MRATE: SIGNIFICANT CHANGE UP
M PROTEIN 24H UR ELPH-MRATE: SIGNIFICANT CHANGE UP
M-SPIKE: SIGNIFICANT CHANGE UP (ref 0–0)
MAGNESIUM SERPL-MCNC: 1.2 MG/DL — LOW (ref 1.8–2.4)
MAGNESIUM SERPL-MCNC: 1.5 MG/DL — LOW (ref 1.8–2.4)
MAGNESIUM SERPL-MCNC: 1.6 MG/DL — LOW (ref 1.8–2.4)
MAGNESIUM SERPL-MCNC: 1.7 MG/DL — LOW (ref 1.8–2.4)
MAGNESIUM SERPL-MCNC: 1.8 MG/DL — SIGNIFICANT CHANGE UP (ref 1.8–2.4)
MAGNESIUM SERPL-MCNC: 1.8 MG/DL — SIGNIFICANT CHANGE UP (ref 1.8–2.4)
MAGNESIUM SERPL-MCNC: 1.9 MG/DL — SIGNIFICANT CHANGE UP (ref 1.8–2.4)
MAGNESIUM SERPL-MCNC: 1.9 MG/DL — SIGNIFICANT CHANGE UP (ref 1.8–2.4)
MAGNESIUM SERPL-MCNC: 2 MG/DL — SIGNIFICANT CHANGE UP (ref 1.8–2.4)
MAGNESIUM SERPL-MCNC: 2.2 MG/DL — SIGNIFICANT CHANGE UP (ref 1.8–2.4)
MAGNESIUM SERPL-MCNC: 2.3 MG/DL — SIGNIFICANT CHANGE UP (ref 1.8–2.4)
MANUAL SMEAR VERIFICATION: SIGNIFICANT CHANGE UP
MANUAL SMEAR VERIFICATION: SIGNIFICANT CHANGE UP
MCHC RBC-ENTMCNC: 26.8 PG — LOW (ref 27–31)
MCHC RBC-ENTMCNC: 27.1 PG — SIGNIFICANT CHANGE UP (ref 27–31)
MCHC RBC-ENTMCNC: 27.2 PG — SIGNIFICANT CHANGE UP (ref 27–31)
MCHC RBC-ENTMCNC: 27.4 PG — SIGNIFICANT CHANGE UP (ref 27–31)
MCHC RBC-ENTMCNC: 27.5 PG — SIGNIFICANT CHANGE UP (ref 27–31)
MCHC RBC-ENTMCNC: 27.5 PG — SIGNIFICANT CHANGE UP (ref 27–31)
MCHC RBC-ENTMCNC: 27.6 PG — SIGNIFICANT CHANGE UP (ref 27–31)
MCHC RBC-ENTMCNC: 27.7 PG — SIGNIFICANT CHANGE UP (ref 27–31)
MCHC RBC-ENTMCNC: 27.8 PG — SIGNIFICANT CHANGE UP (ref 27–31)
MCHC RBC-ENTMCNC: 27.9 PG — SIGNIFICANT CHANGE UP (ref 27–31)
MCHC RBC-ENTMCNC: 28 PG — SIGNIFICANT CHANGE UP (ref 27–31)
MCHC RBC-ENTMCNC: 28.1 PG — SIGNIFICANT CHANGE UP (ref 27–31)
MCHC RBC-ENTMCNC: 28.2 PG — SIGNIFICANT CHANGE UP (ref 27–31)
MCHC RBC-ENTMCNC: 28.3 PG — SIGNIFICANT CHANGE UP (ref 27–31)
MCHC RBC-ENTMCNC: 28.4 PG — SIGNIFICANT CHANGE UP (ref 27–31)
MCHC RBC-ENTMCNC: 28.5 PG — SIGNIFICANT CHANGE UP (ref 27–31)
MCHC RBC-ENTMCNC: 28.6 PG — SIGNIFICANT CHANGE UP (ref 27–31)
MCHC RBC-ENTMCNC: 28.7 PG — SIGNIFICANT CHANGE UP (ref 27–31)
MCHC RBC-ENTMCNC: 28.8 PG — SIGNIFICANT CHANGE UP (ref 27–31)
MCHC RBC-ENTMCNC: 28.8 PG — SIGNIFICANT CHANGE UP (ref 27–31)
MCHC RBC-ENTMCNC: 28.9 PG — SIGNIFICANT CHANGE UP (ref 27–31)
MCHC RBC-ENTMCNC: 28.9 PG — SIGNIFICANT CHANGE UP (ref 27–31)
MCHC RBC-ENTMCNC: 29.1 PG — SIGNIFICANT CHANGE UP (ref 27–31)
MCHC RBC-ENTMCNC: 31.8 G/DL — LOW (ref 32–37)
MCHC RBC-ENTMCNC: 32.2 G/DL — SIGNIFICANT CHANGE UP (ref 32–37)
MCHC RBC-ENTMCNC: 32.4 G/DL — SIGNIFICANT CHANGE UP (ref 32–37)
MCHC RBC-ENTMCNC: 32.5 G/DL — SIGNIFICANT CHANGE UP (ref 32–37)
MCHC RBC-ENTMCNC: 32.6 G/DL — SIGNIFICANT CHANGE UP (ref 32–37)
MCHC RBC-ENTMCNC: 32.7 G/DL — SIGNIFICANT CHANGE UP (ref 32–37)
MCHC RBC-ENTMCNC: 32.8 G/DL — SIGNIFICANT CHANGE UP (ref 32–37)
MCHC RBC-ENTMCNC: 32.9 G/DL — SIGNIFICANT CHANGE UP (ref 32–37)
MCHC RBC-ENTMCNC: 33 G/DL — SIGNIFICANT CHANGE UP (ref 32–37)
MCHC RBC-ENTMCNC: 33 G/DL — SIGNIFICANT CHANGE UP (ref 32–37)
MCHC RBC-ENTMCNC: 33.1 G/DL — SIGNIFICANT CHANGE UP (ref 32–37)
MCHC RBC-ENTMCNC: 33.2 G/DL — SIGNIFICANT CHANGE UP (ref 32–37)
MCHC RBC-ENTMCNC: 33.3 G/DL — SIGNIFICANT CHANGE UP (ref 32–37)
MCHC RBC-ENTMCNC: 33.5 G/DL — SIGNIFICANT CHANGE UP (ref 32–37)
MCHC RBC-ENTMCNC: 33.6 G/DL — SIGNIFICANT CHANGE UP (ref 32–37)
MCHC RBC-ENTMCNC: 33.7 G/DL — SIGNIFICANT CHANGE UP (ref 32–37)
MCHC RBC-ENTMCNC: 33.8 G/DL — SIGNIFICANT CHANGE UP (ref 32–37)
MCHC RBC-ENTMCNC: 33.8 G/DL — SIGNIFICANT CHANGE UP (ref 32–37)
MCHC RBC-ENTMCNC: 33.9 G/DL — SIGNIFICANT CHANGE UP (ref 32–37)
MCHC RBC-ENTMCNC: 33.9 G/DL — SIGNIFICANT CHANGE UP (ref 32–37)
MCHC RBC-ENTMCNC: 34.1 G/DL — SIGNIFICANT CHANGE UP (ref 32–37)
MCHC RBC-ENTMCNC: 34.2 G/DL — SIGNIFICANT CHANGE UP (ref 32–37)
MCHC RBC-ENTMCNC: 34.3 G/DL — SIGNIFICANT CHANGE UP (ref 32–37)
MCHC RBC-ENTMCNC: 34.6 G/DL — SIGNIFICANT CHANGE UP (ref 32–37)
MCHC RBC-ENTMCNC: 34.6 G/DL — SIGNIFICANT CHANGE UP (ref 32–37)
MCHC RBC-ENTMCNC: 34.8 G/DL — SIGNIFICANT CHANGE UP (ref 32–37)
MCHC RBC-ENTMCNC: 35.3 G/DL — SIGNIFICANT CHANGE UP (ref 32–37)
MCHC RBC-ENTMCNC: 35.6 G/DL — SIGNIFICANT CHANGE UP (ref 32–37)
MCHC RBC-ENTMCNC: 35.8 G/DL — SIGNIFICANT CHANGE UP (ref 32–37)
MCHC RBC-ENTMCNC: 36.7 G/DL — SIGNIFICANT CHANGE UP (ref 32–37)
MCHC RBC-ENTMCNC: 36.8 G/DL — SIGNIFICANT CHANGE UP (ref 32–37)
MCHC RBC-ENTMCNC: 36.8 G/DL — SIGNIFICANT CHANGE UP (ref 32–37)
MCHC RBC-ENTMCNC: 37 G/DL — SIGNIFICANT CHANGE UP (ref 32–37)
MCV RBC AUTO: 76.6 FL — LOW (ref 81–99)
MCV RBC AUTO: 77.6 FL — LOW (ref 81–99)
MCV RBC AUTO: 78.2 FL — LOW (ref 81–99)
MCV RBC AUTO: 78.9 FL — LOW (ref 81–99)
MCV RBC AUTO: 79.1 FL — LOW (ref 81–99)
MCV RBC AUTO: 79.5 FL — LOW (ref 81–99)
MCV RBC AUTO: 79.9 FL — LOW (ref 81–99)
MCV RBC AUTO: 81.1 FL — SIGNIFICANT CHANGE UP (ref 81–99)
MCV RBC AUTO: 81.3 FL — SIGNIFICANT CHANGE UP (ref 81–99)
MCV RBC AUTO: 81.8 FL — SIGNIFICANT CHANGE UP (ref 81–99)
MCV RBC AUTO: 81.9 FL — SIGNIFICANT CHANGE UP (ref 81–99)
MCV RBC AUTO: 82.1 FL — SIGNIFICANT CHANGE UP (ref 81–99)
MCV RBC AUTO: 82.4 FL — SIGNIFICANT CHANGE UP (ref 81–99)
MCV RBC AUTO: 82.6 FL — SIGNIFICANT CHANGE UP (ref 81–99)
MCV RBC AUTO: 82.6 FL — SIGNIFICANT CHANGE UP (ref 81–99)
MCV RBC AUTO: 83 FL — SIGNIFICANT CHANGE UP (ref 81–99)
MCV RBC AUTO: 83 FL — SIGNIFICANT CHANGE UP (ref 81–99)
MCV RBC AUTO: 83.1 FL — SIGNIFICANT CHANGE UP (ref 81–99)
MCV RBC AUTO: 83.2 FL — SIGNIFICANT CHANGE UP (ref 81–99)
MCV RBC AUTO: 83.2 FL — SIGNIFICANT CHANGE UP (ref 81–99)
MCV RBC AUTO: 83.3 FL — SIGNIFICANT CHANGE UP (ref 81–99)
MCV RBC AUTO: 83.4 FL — SIGNIFICANT CHANGE UP (ref 81–99)
MCV RBC AUTO: 84.1 FL — SIGNIFICANT CHANGE UP (ref 81–99)
MCV RBC AUTO: 84.1 FL — SIGNIFICANT CHANGE UP (ref 81–99)
MCV RBC AUTO: 84.2 FL — SIGNIFICANT CHANGE UP (ref 81–99)
MCV RBC AUTO: 84.3 FL — SIGNIFICANT CHANGE UP (ref 81–99)
MCV RBC AUTO: 84.4 FL — SIGNIFICANT CHANGE UP (ref 81–99)
MCV RBC AUTO: 84.7 FL — SIGNIFICANT CHANGE UP (ref 81–99)
MCV RBC AUTO: 84.8 FL — SIGNIFICANT CHANGE UP (ref 81–99)
MCV RBC AUTO: 84.9 FL — SIGNIFICANT CHANGE UP (ref 81–99)
MCV RBC AUTO: 85 FL — SIGNIFICANT CHANGE UP (ref 81–99)
MCV RBC AUTO: 85.1 FL — SIGNIFICANT CHANGE UP (ref 81–99)
MCV RBC AUTO: 85.6 FL — SIGNIFICANT CHANGE UP (ref 81–99)
MCV RBC AUTO: 85.6 FL — SIGNIFICANT CHANGE UP (ref 81–99)
MCV RBC AUTO: 85.7 FL — SIGNIFICANT CHANGE UP (ref 81–99)
MCV RBC AUTO: 85.8 FL — SIGNIFICANT CHANGE UP (ref 81–99)
MCV RBC AUTO: 87 FL — SIGNIFICANT CHANGE UP (ref 81–99)
MCV RBC AUTO: 87.3 FL — SIGNIFICANT CHANGE UP (ref 81–99)
METHOD TYPE: SIGNIFICANT CHANGE UP
MICROCYTES BLD QL: SLIGHT — SIGNIFICANT CHANGE UP
MONOCYTES # BLD AUTO: 0.29 K/UL — SIGNIFICANT CHANGE UP (ref 0.1–0.6)
MONOCYTES # BLD AUTO: 0.3 K/UL — SIGNIFICANT CHANGE UP (ref 0.1–0.6)
MONOCYTES # BLD AUTO: 0.34 K/UL — SIGNIFICANT CHANGE UP (ref 0.1–0.6)
MONOCYTES # BLD AUTO: 0.35 K/UL — SIGNIFICANT CHANGE UP (ref 0.1–0.6)
MONOCYTES # BLD AUTO: 0.41 K/UL — SIGNIFICANT CHANGE UP (ref 0.1–0.6)
MONOCYTES # BLD AUTO: 0.45 K/UL — SIGNIFICANT CHANGE UP (ref 0.1–0.6)
MONOCYTES # BLD AUTO: 0.55 K/UL — SIGNIFICANT CHANGE UP (ref 0.1–0.6)
MONOCYTES # BLD AUTO: 0.6 K/UL — SIGNIFICANT CHANGE UP (ref 0.1–0.6)
MONOCYTES # BLD AUTO: 0.66 K/UL — HIGH (ref 0.1–0.6)
MONOCYTES # BLD AUTO: 0.66 K/UL — HIGH (ref 0.1–0.6)
MONOCYTES # BLD AUTO: 0.68 K/UL — HIGH (ref 0.1–0.6)
MONOCYTES # BLD AUTO: 0.69 K/UL — HIGH (ref 0.1–0.6)
MONOCYTES # BLD AUTO: 0.71 K/UL — HIGH (ref 0.1–0.6)
MONOCYTES # BLD AUTO: 0.75 K/UL — HIGH (ref 0.1–0.6)
MONOCYTES # BLD AUTO: 0.77 K/UL — HIGH (ref 0.1–0.6)
MONOCYTES # BLD AUTO: 0.81 K/UL — HIGH (ref 0.1–0.6)
MONOCYTES # BLD AUTO: 0.83 K/UL — HIGH (ref 0.1–0.6)
MONOCYTES # BLD AUTO: 0.84 K/UL — HIGH (ref 0.1–0.6)
MONOCYTES # BLD AUTO: 0.85 K/UL — HIGH (ref 0.1–0.6)
MONOCYTES # BLD AUTO: 0.9 K/UL — HIGH (ref 0.1–0.6)
MONOCYTES # BLD AUTO: 0.9 K/UL — HIGH (ref 0.1–0.6)
MONOCYTES # BLD AUTO: 0.93 K/UL — HIGH (ref 0.1–0.6)
MONOCYTES # BLD AUTO: 0.94 K/UL — HIGH (ref 0.1–0.6)
MONOCYTES # BLD AUTO: 1.04 K/UL — HIGH (ref 0.1–0.6)
MONOCYTES # BLD AUTO: 1.06 K/UL — HIGH (ref 0.1–0.6)
MONOCYTES # BLD AUTO: 1.09 K/UL — HIGH (ref 0.1–0.6)
MONOCYTES # BLD AUTO: 1.12 K/UL — HIGH (ref 0.1–0.6)
MONOCYTES # BLD AUTO: 1.15 K/UL — HIGH (ref 0.1–0.6)
MONOCYTES # BLD AUTO: 1.17 K/UL — HIGH (ref 0.1–0.6)
MONOCYTES # BLD AUTO: 1.21 K/UL — HIGH (ref 0.1–0.6)
MONOCYTES NFR BLD AUTO: 10.6 % — HIGH (ref 1.7–9.3)
MONOCYTES NFR BLD AUTO: 11 % — HIGH (ref 1.7–9.3)
MONOCYTES NFR BLD AUTO: 11.3 % — HIGH (ref 1.7–9.3)
MONOCYTES NFR BLD AUTO: 11.5 % — HIGH (ref 1.7–9.3)
MONOCYTES NFR BLD AUTO: 12.1 % — HIGH (ref 1.7–9.3)
MONOCYTES NFR BLD AUTO: 12.3 % — HIGH (ref 1.7–9.3)
MONOCYTES NFR BLD AUTO: 12.4 % — HIGH (ref 1.7–9.3)
MONOCYTES NFR BLD AUTO: 13.4 % — HIGH (ref 1.7–9.3)
MONOCYTES NFR BLD AUTO: 14.1 % — HIGH (ref 1.7–9.3)
MONOCYTES NFR BLD AUTO: 14.5 % — HIGH (ref 1.7–9.3)
MONOCYTES NFR BLD AUTO: 15.5 % — HIGH (ref 1.7–9.3)
MONOCYTES NFR BLD AUTO: 15.6 % — HIGH (ref 1.7–9.3)
MONOCYTES NFR BLD AUTO: 15.8 % — HIGH (ref 1.7–9.3)
MONOCYTES NFR BLD AUTO: 16.3 % — HIGH (ref 1.7–9.3)
MONOCYTES NFR BLD AUTO: 16.4 % — HIGH (ref 1.7–9.3)
MONOCYTES NFR BLD AUTO: 2.6 % — SIGNIFICANT CHANGE UP (ref 1.7–9.3)
MONOCYTES NFR BLD AUTO: 3.8 % — SIGNIFICANT CHANGE UP (ref 1.7–9.3)
MONOCYTES NFR BLD AUTO: 4.1 % — SIGNIFICANT CHANGE UP (ref 1.7–9.3)
MONOCYTES NFR BLD AUTO: 4.4 % — SIGNIFICANT CHANGE UP (ref 1.7–9.3)
MONOCYTES NFR BLD AUTO: 4.8 % — SIGNIFICANT CHANGE UP (ref 1.7–9.3)
MONOCYTES NFR BLD AUTO: 4.8 % — SIGNIFICANT CHANGE UP (ref 1.7–9.3)
MONOCYTES NFR BLD AUTO: 5.1 % — SIGNIFICANT CHANGE UP (ref 1.7–9.3)
MONOCYTES NFR BLD AUTO: 5.8 % — SIGNIFICANT CHANGE UP (ref 1.7–9.3)
MONOCYTES NFR BLD AUTO: 6 % — SIGNIFICANT CHANGE UP (ref 1.7–9.3)
MONOCYTES NFR BLD AUTO: 6 % — SIGNIFICANT CHANGE UP (ref 1.7–9.3)
MONOCYTES NFR BLD AUTO: 6.1 % — SIGNIFICANT CHANGE UP (ref 1.7–9.3)
MONOCYTES NFR BLD AUTO: 6.4 % — SIGNIFICANT CHANGE UP (ref 1.7–9.3)
MONOCYTES NFR BLD AUTO: 6.7 % — SIGNIFICANT CHANGE UP (ref 1.7–9.3)
MONOCYTES NFR BLD AUTO: 8 % — SIGNIFICANT CHANGE UP (ref 1.7–9.3)
MONOCYTES NFR BLD AUTO: 8.8 % — SIGNIFICANT CHANGE UP (ref 1.7–9.3)
MONOCYTES NFR BLD AUTO: 9 % — SIGNIFICANT CHANGE UP (ref 1.7–9.3)
MONOCYTES NFR BLD AUTO: 9.4 % — HIGH (ref 1.7–9.3)
NEUTROPHILS # BLD AUTO: 0.78 K/UL — LOW (ref 1.4–6.5)
NEUTROPHILS # BLD AUTO: 1.04 K/UL — LOW (ref 1.4–6.5)
NEUTROPHILS # BLD AUTO: 1.21 K/UL — LOW (ref 1.4–6.5)
NEUTROPHILS # BLD AUTO: 1.22 K/UL — LOW (ref 1.4–6.5)
NEUTROPHILS # BLD AUTO: 1.41 K/UL — SIGNIFICANT CHANGE UP (ref 1.4–6.5)
NEUTROPHILS # BLD AUTO: 1.69 K/UL — SIGNIFICANT CHANGE UP (ref 1.4–6.5)
NEUTROPHILS # BLD AUTO: 1.88 K/UL — SIGNIFICANT CHANGE UP (ref 1.4–6.5)
NEUTROPHILS # BLD AUTO: 10.61 K/UL — HIGH (ref 1.4–6.5)
NEUTROPHILS # BLD AUTO: 13.94 K/UL — HIGH (ref 1.4–6.5)
NEUTROPHILS # BLD AUTO: 15.52 K/UL — HIGH (ref 1.4–6.5)
NEUTROPHILS # BLD AUTO: 15.77 K/UL — HIGH (ref 1.4–6.5)
NEUTROPHILS # BLD AUTO: 2.28 K/UL — SIGNIFICANT CHANGE UP (ref 1.4–6.5)
NEUTROPHILS # BLD AUTO: 2.36 K/UL — SIGNIFICANT CHANGE UP (ref 1.4–6.5)
NEUTROPHILS # BLD AUTO: 2.42 K/UL — SIGNIFICANT CHANGE UP (ref 1.4–6.5)
NEUTROPHILS # BLD AUTO: 2.63 K/UL — SIGNIFICANT CHANGE UP (ref 1.4–6.5)
NEUTROPHILS # BLD AUTO: 2.7 K/UL — SIGNIFICANT CHANGE UP (ref 1.4–6.5)
NEUTROPHILS # BLD AUTO: 2.96 K/UL — SIGNIFICANT CHANGE UP (ref 1.4–6.5)
NEUTROPHILS # BLD AUTO: 20.48 K/UL — HIGH (ref 1.4–6.5)
NEUTROPHILS # BLD AUTO: 22.23 K/UL — HIGH (ref 1.4–6.5)
NEUTROPHILS # BLD AUTO: 22.54 K/UL — HIGH (ref 1.4–6.5)
NEUTROPHILS # BLD AUTO: 24.4 K/UL — HIGH (ref 1.4–6.5)
NEUTROPHILS # BLD AUTO: 3.34 K/UL — SIGNIFICANT CHANGE UP (ref 1.4–6.5)
NEUTROPHILS # BLD AUTO: 3.39 K/UL — SIGNIFICANT CHANGE UP (ref 1.4–6.5)
NEUTROPHILS # BLD AUTO: 3.45 K/UL — SIGNIFICANT CHANGE UP (ref 1.4–6.5)
NEUTROPHILS # BLD AUTO: 3.45 K/UL — SIGNIFICANT CHANGE UP (ref 1.4–6.5)
NEUTROPHILS # BLD AUTO: 3.51 K/UL — SIGNIFICANT CHANGE UP (ref 1.4–6.5)
NEUTROPHILS # BLD AUTO: 4.02 K/UL — SIGNIFICANT CHANGE UP (ref 1.4–6.5)
NEUTROPHILS # BLD AUTO: 5.3 K/UL — SIGNIFICANT CHANGE UP (ref 1.4–6.5)
NEUTROPHILS # BLD AUTO: 6.53 K/UL — HIGH (ref 1.4–6.5)
NEUTROPHILS # BLD AUTO: 7.82 K/UL — HIGH (ref 1.4–6.5)
NEUTROPHILS # BLD AUTO: 8.05 K/UL — HIGH (ref 1.4–6.5)
NEUTROPHILS # BLD AUTO: 9.49 K/UL — HIGH (ref 1.4–6.5)
NEUTROPHILS NFR BLD AUTO: 21.1 % — LOW (ref 42.2–75.2)
NEUTROPHILS NFR BLD AUTO: 24.9 % — LOW (ref 42.2–75.2)
NEUTROPHILS NFR BLD AUTO: 26.7 % — LOW (ref 42.2–75.2)
NEUTROPHILS NFR BLD AUTO: 28.9 % — LOW (ref 42.2–75.2)
NEUTROPHILS NFR BLD AUTO: 30.1 % — LOW (ref 42.2–75.2)
NEUTROPHILS NFR BLD AUTO: 30.8 % — LOW (ref 42.2–75.2)
NEUTROPHILS NFR BLD AUTO: 34.8 % — LOW (ref 42.2–75.2)
NEUTROPHILS NFR BLD AUTO: 36.4 % — LOW (ref 42.2–75.2)
NEUTROPHILS NFR BLD AUTO: 36.7 % — LOW (ref 42.2–75.2)
NEUTROPHILS NFR BLD AUTO: 36.8 % — LOW (ref 42.2–75.2)
NEUTROPHILS NFR BLD AUTO: 38.5 % — LOW (ref 42.2–75.2)
NEUTROPHILS NFR BLD AUTO: 39.8 % — LOW (ref 42.2–75.2)
NEUTROPHILS NFR BLD AUTO: 44.5 % — SIGNIFICANT CHANGE UP (ref 42.2–75.2)
NEUTROPHILS NFR BLD AUTO: 46.7 % — SIGNIFICANT CHANGE UP (ref 42.2–75.2)
NEUTROPHILS NFR BLD AUTO: 48.7 % — SIGNIFICANT CHANGE UP (ref 42.2–75.2)
NEUTROPHILS NFR BLD AUTO: 52.5 % — SIGNIFICANT CHANGE UP (ref 42.2–75.2)
NEUTROPHILS NFR BLD AUTO: 52.7 % — SIGNIFICANT CHANGE UP (ref 42.2–75.2)
NEUTROPHILS NFR BLD AUTO: 63 % — SIGNIFICANT CHANGE UP (ref 42.2–75.2)
NEUTROPHILS NFR BLD AUTO: 66.8 % — SIGNIFICANT CHANGE UP (ref 42.2–75.2)
NEUTROPHILS NFR BLD AUTO: 67.2 % — SIGNIFICANT CHANGE UP (ref 42.2–75.2)
NEUTROPHILS NFR BLD AUTO: 71.7 % — SIGNIFICANT CHANGE UP (ref 42.2–75.2)
NEUTROPHILS NFR BLD AUTO: 75 % — SIGNIFICANT CHANGE UP (ref 42.2–75.2)
NEUTROPHILS NFR BLD AUTO: 76.2 % — HIGH (ref 42.2–75.2)
NEUTROPHILS NFR BLD AUTO: 79.5 % — HIGH (ref 42.2–75.2)
NEUTROPHILS NFR BLD AUTO: 80.5 % — HIGH (ref 42.2–75.2)
NEUTROPHILS NFR BLD AUTO: 82 % — HIGH (ref 42.2–75.2)
NEUTROPHILS NFR BLD AUTO: 83.5 % — HIGH (ref 42.2–75.2)
NEUTROPHILS NFR BLD AUTO: 86.8 % — HIGH (ref 42.2–75.2)
NEUTROPHILS NFR BLD AUTO: 88.3 % — HIGH (ref 42.2–75.2)
NEUTROPHILS NFR BLD AUTO: 89.9 % — HIGH (ref 42.2–75.2)
NEUTROPHILS NFR BLD AUTO: 91.3 % — HIGH (ref 42.2–75.2)
NEUTROPHILS NFR BLD AUTO: 91.6 % — HIGH (ref 42.2–75.2)
NEUTS BAND # BLD: 2.6 % — SIGNIFICANT CHANGE UP (ref 0–6)
NITRITE UR-MCNC: NEGATIVE — SIGNIFICANT CHANGE UP
NRBC # BLD: 0 /100 WBCS — SIGNIFICANT CHANGE UP (ref 0–0)
NT-PROBNP SERPL-SCNC: 7641 PG/ML — HIGH (ref 0–300)
ORGANISM # SPEC MICROSCOPIC CNT: SIGNIFICANT CHANGE UP
OSMOLALITY SERPL: 278 MOS/KG — LOW (ref 280–301)
OSMOLALITY SERPL: 283 MOS/KG — SIGNIFICANT CHANGE UP (ref 280–301)
OSMOLALITY UR: 212 MOS/KG — SIGNIFICANT CHANGE UP (ref 50–1200)
OSMOLALITY UR: 308 MOS/KG — SIGNIFICANT CHANGE UP (ref 50–1200)
OSMOLALITY UR: 393 MOS/KG — SIGNIFICANT CHANGE UP (ref 50–1200)
PARITAPREVIR RESISTANCE: SIGNIFICANT CHANGE UP
PCO2 BLDV: 36 MMHG — LOW (ref 41–51)
PCO2 BLDV: 44 MMHG — HIGH (ref 39–42)
PF4 HEPARIN CMPLX AB SER-ACNC: NEGATIVE — SIGNIFICANT CHANGE UP
PH BLDV: 7.35 — SIGNIFICANT CHANGE UP (ref 7.26–7.43)
PH BLDV: 7.36 — SIGNIFICANT CHANGE UP (ref 7.26–7.43)
PH UR: 6 — SIGNIFICANT CHANGE UP (ref 5–8)
PH UR: 7.5 — SIGNIFICANT CHANGE UP (ref 5–8)
PH UR: 8 — SIGNIFICANT CHANGE UP (ref 5–8)
PHOSPHATE SERPL-MCNC: 1 MG/DL — LOW (ref 2.1–4.9)
PHOSPHATE SERPL-MCNC: 1.9 MG/DL — LOW (ref 2.1–4.9)
PHOSPHATE SERPL-MCNC: 2.6 MG/DL — SIGNIFICANT CHANGE UP (ref 2.1–4.9)
PHOSPHATE SERPL-MCNC: 2.7 MG/DL — SIGNIFICANT CHANGE UP (ref 2.1–4.9)
PHOSPHATE SERPL-MCNC: 2.8 MG/DL — SIGNIFICANT CHANGE UP (ref 2.1–4.9)
PHOSPHATE SERPL-MCNC: 2.8 MG/DL — SIGNIFICANT CHANGE UP (ref 2.1–4.9)
PHOSPHATE SERPL-MCNC: 2.9 MG/DL — SIGNIFICANT CHANGE UP (ref 2.1–4.9)
PHOSPHATE SERPL-MCNC: 3 MG/DL — SIGNIFICANT CHANGE UP (ref 2.1–4.9)
PHOSPHATE SERPL-MCNC: 3.5 MG/DL — SIGNIFICANT CHANGE UP (ref 2.1–4.9)
PHOSPHATE SERPL-MCNC: 3.6 MG/DL — SIGNIFICANT CHANGE UP (ref 2.1–4.9)
PHOSPHATE SERPL-MCNC: 4 MG/DL — SIGNIFICANT CHANGE UP (ref 2.1–4.9)
PHOSPHATE SERPL-MCNC: 4.1 MG/DL — SIGNIFICANT CHANGE UP (ref 2.1–4.9)
PLAT MORPH BLD: NORMAL — SIGNIFICANT CHANGE UP
PLAT MORPH BLD: NORMAL — SIGNIFICANT CHANGE UP
PLATELET # BLD AUTO: 101 K/UL — LOW (ref 130–400)
PLATELET # BLD AUTO: 127 K/UL — LOW (ref 130–400)
PLATELET # BLD AUTO: 129 K/UL — LOW (ref 130–400)
PLATELET # BLD AUTO: 172 K/UL — SIGNIFICANT CHANGE UP (ref 130–400)
PLATELET # BLD AUTO: 18 K/UL — CRITICAL LOW (ref 130–400)
PLATELET # BLD AUTO: 187 K/UL — SIGNIFICANT CHANGE UP (ref 130–400)
PLATELET # BLD AUTO: 19 K/UL — CRITICAL LOW (ref 130–400)
PLATELET # BLD AUTO: 197 K/UL — SIGNIFICANT CHANGE UP (ref 130–400)
PLATELET # BLD AUTO: 20 K/UL — LOW (ref 130–400)
PLATELET # BLD AUTO: 21 K/UL — LOW (ref 130–400)
PLATELET # BLD AUTO: 218 K/UL — SIGNIFICANT CHANGE UP (ref 130–400)
PLATELET # BLD AUTO: 251 K/UL — SIGNIFICANT CHANGE UP (ref 130–400)
PLATELET # BLD AUTO: 257 K/UL — SIGNIFICANT CHANGE UP (ref 130–400)
PLATELET # BLD AUTO: 26 K/UL — LOW (ref 130–400)
PLATELET # BLD AUTO: 261 K/UL — SIGNIFICANT CHANGE UP (ref 130–400)
PLATELET # BLD AUTO: 264 K/UL — SIGNIFICANT CHANGE UP (ref 130–400)
PLATELET # BLD AUTO: 265 K/UL — SIGNIFICANT CHANGE UP (ref 130–400)
PLATELET # BLD AUTO: 265 K/UL — SIGNIFICANT CHANGE UP (ref 130–400)
PLATELET # BLD AUTO: 266 K/UL — SIGNIFICANT CHANGE UP (ref 130–400)
PLATELET # BLD AUTO: 267 K/UL — SIGNIFICANT CHANGE UP (ref 130–400)
PLATELET # BLD AUTO: 273 K/UL — SIGNIFICANT CHANGE UP (ref 130–400)
PLATELET # BLD AUTO: 283 K/UL — SIGNIFICANT CHANGE UP (ref 130–400)
PLATELET # BLD AUTO: 289 K/UL — SIGNIFICANT CHANGE UP (ref 130–400)
PLATELET # BLD AUTO: 306 K/UL — SIGNIFICANT CHANGE UP (ref 130–400)
PLATELET # BLD AUTO: 306 K/UL — SIGNIFICANT CHANGE UP (ref 130–400)
PLATELET # BLD AUTO: 321 K/UL — SIGNIFICANT CHANGE UP (ref 130–400)
PLATELET # BLD AUTO: 350 K/UL — SIGNIFICANT CHANGE UP (ref 130–400)
PLATELET # BLD AUTO: 376 K/UL — SIGNIFICANT CHANGE UP (ref 130–400)
PLATELET # BLD AUTO: 399 K/UL — SIGNIFICANT CHANGE UP (ref 130–400)
PLATELET # BLD AUTO: 416 K/UL — HIGH (ref 130–400)
PLATELET # BLD AUTO: 50 K/UL — LOW (ref 130–400)
PLATELET # BLD AUTO: 508 K/UL — HIGH (ref 130–400)
PLATELET # BLD AUTO: 538 K/UL — HIGH (ref 130–400)
PLATELET # BLD AUTO: 580 K/UL — HIGH (ref 130–400)
PLATELET # BLD AUTO: 585 K/UL — HIGH (ref 130–400)
PLATELET # BLD AUTO: 62 K/UL — LOW (ref 130–400)
PLATELET # BLD AUTO: 643 K/UL — HIGH (ref 130–400)
PLATELET # BLD AUTO: 68 K/UL — LOW (ref 130–400)
PLATELET # BLD AUTO: 81 K/UL — LOW (ref 130–400)
PLATELET # BLD AUTO: 84 K/UL — LOW (ref 130–400)
PLATELET # BLD AUTO: 85 K/UL — LOW (ref 130–400)
PLATELET # BLD AUTO: 88 K/UL — LOW (ref 130–400)
PLATELET # BLD AUTO: 99 K/UL — LOW (ref 130–400)
PO2 BLDV: 25 MMHG — SIGNIFICANT CHANGE UP (ref 20–40)
PO2 BLDV: 26 MMHG — SIGNIFICANT CHANGE UP (ref 20–40)
POLYCHROMASIA BLD QL SMEAR: SLIGHT — SIGNIFICANT CHANGE UP
POTASSIUM BLDV-SCNC: 4.5 MMOL/L — SIGNIFICANT CHANGE UP (ref 3.3–5.6)
POTASSIUM BLDV-SCNC: 6 MMOL/L — HIGH (ref 3.3–5.6)
POTASSIUM SERPL-MCNC: 3.5 MMOL/L — SIGNIFICANT CHANGE UP (ref 3.5–5)
POTASSIUM SERPL-MCNC: 3.5 MMOL/L — SIGNIFICANT CHANGE UP (ref 3.5–5)
POTASSIUM SERPL-MCNC: 3.6 MMOL/L — SIGNIFICANT CHANGE UP (ref 3.5–5)
POTASSIUM SERPL-MCNC: 3.7 MMOL/L — SIGNIFICANT CHANGE UP (ref 3.5–5)
POTASSIUM SERPL-MCNC: 3.8 MMOL/L — SIGNIFICANT CHANGE UP (ref 3.5–5)
POTASSIUM SERPL-MCNC: 3.9 MMOL/L — SIGNIFICANT CHANGE UP (ref 3.5–5)
POTASSIUM SERPL-MCNC: 4 MMOL/L — SIGNIFICANT CHANGE UP (ref 3.5–5)
POTASSIUM SERPL-MCNC: 4.1 MMOL/L — SIGNIFICANT CHANGE UP (ref 3.5–5)
POTASSIUM SERPL-MCNC: 4.2 MMOL/L — SIGNIFICANT CHANGE UP (ref 3.5–5)
POTASSIUM SERPL-MCNC: 4.3 MMOL/L — SIGNIFICANT CHANGE UP (ref 3.5–5)
POTASSIUM SERPL-MCNC: 4.4 MMOL/L — SIGNIFICANT CHANGE UP (ref 3.5–5)
POTASSIUM SERPL-MCNC: 4.4 MMOL/L — SIGNIFICANT CHANGE UP (ref 3.5–5)
POTASSIUM SERPL-MCNC: 4.5 MMOL/L — SIGNIFICANT CHANGE UP (ref 3.5–5)
POTASSIUM SERPL-MCNC: 4.6 MMOL/L — SIGNIFICANT CHANGE UP (ref 3.5–5)
POTASSIUM SERPL-MCNC: 4.7 MMOL/L — SIGNIFICANT CHANGE UP (ref 3.5–5)
POTASSIUM SERPL-MCNC: 4.9 MMOL/L — SIGNIFICANT CHANGE UP (ref 3.5–5)
POTASSIUM SERPL-MCNC: 4.9 MMOL/L — SIGNIFICANT CHANGE UP (ref 3.5–5)
POTASSIUM SERPL-MCNC: 5 MMOL/L — SIGNIFICANT CHANGE UP (ref 3.5–5)
POTASSIUM SERPL-MCNC: 5.1 MMOL/L — HIGH (ref 3.5–5)
POTASSIUM SERPL-MCNC: 5.3 MMOL/L — HIGH (ref 3.5–5)
POTASSIUM SERPL-MCNC: 5.6 MMOL/L — HIGH (ref 3.5–5)
POTASSIUM SERPL-MCNC: 5.7 MMOL/L — HIGH (ref 3.5–5)
POTASSIUM SERPL-MCNC: 6 MMOL/L — CRITICAL HIGH (ref 3.5–5)
POTASSIUM SERPL-SCNC: 3.5 MMOL/L — SIGNIFICANT CHANGE UP (ref 3.5–5)
POTASSIUM SERPL-SCNC: 3.5 MMOL/L — SIGNIFICANT CHANGE UP (ref 3.5–5)
POTASSIUM SERPL-SCNC: 3.6 MMOL/L — SIGNIFICANT CHANGE UP (ref 3.5–5)
POTASSIUM SERPL-SCNC: 3.7 MMOL/L — SIGNIFICANT CHANGE UP (ref 3.5–5)
POTASSIUM SERPL-SCNC: 3.8 MMOL/L — SIGNIFICANT CHANGE UP (ref 3.5–5)
POTASSIUM SERPL-SCNC: 3.9 MMOL/L — SIGNIFICANT CHANGE UP (ref 3.5–5)
POTASSIUM SERPL-SCNC: 4 MMOL/L — SIGNIFICANT CHANGE UP (ref 3.5–5)
POTASSIUM SERPL-SCNC: 4.1 MMOL/L — SIGNIFICANT CHANGE UP (ref 3.5–5)
POTASSIUM SERPL-SCNC: 4.2 MMOL/L — SIGNIFICANT CHANGE UP (ref 3.5–5)
POTASSIUM SERPL-SCNC: 4.3 MMOL/L — SIGNIFICANT CHANGE UP (ref 3.5–5)
POTASSIUM SERPL-SCNC: 4.4 MMOL/L — SIGNIFICANT CHANGE UP (ref 3.5–5)
POTASSIUM SERPL-SCNC: 4.4 MMOL/L — SIGNIFICANT CHANGE UP (ref 3.5–5)
POTASSIUM SERPL-SCNC: 4.5 MMOL/L — SIGNIFICANT CHANGE UP (ref 3.5–5)
POTASSIUM SERPL-SCNC: 4.6 MMOL/L — SIGNIFICANT CHANGE UP (ref 3.5–5)
POTASSIUM SERPL-SCNC: 4.7 MMOL/L — SIGNIFICANT CHANGE UP (ref 3.5–5)
POTASSIUM SERPL-SCNC: 4.9 MMOL/L — SIGNIFICANT CHANGE UP (ref 3.5–5)
POTASSIUM SERPL-SCNC: 4.9 MMOL/L — SIGNIFICANT CHANGE UP (ref 3.5–5)
POTASSIUM SERPL-SCNC: 5 MMOL/L — SIGNIFICANT CHANGE UP (ref 3.5–5)
POTASSIUM SERPL-SCNC: 5.1 MMOL/L — HIGH (ref 3.5–5)
POTASSIUM SERPL-SCNC: 5.3 MMOL/L — HIGH (ref 3.5–5)
POTASSIUM SERPL-SCNC: 5.6 MMOL/L — HIGH (ref 3.5–5)
POTASSIUM SERPL-SCNC: 5.7 MMOL/L — HIGH (ref 3.5–5)
POTASSIUM SERPL-SCNC: 6 MMOL/L — CRITICAL HIGH (ref 3.5–5)
POTASSIUM UR-SCNC: 13 MMOL/L — SIGNIFICANT CHANGE UP
PREALB SERPL-MCNC: 5 MG/DL — LOW (ref 20–40)
PROCALCITONIN SERPL-MCNC: 0.74 NG/ML — HIGH (ref 0.02–0.1)
PROT ?TM UR-MCNC: 17 MG/DLG/24H — SIGNIFICANT CHANGE UP
PROT ?TM UR-MCNC: 18 MG/DL — HIGH (ref 0–12)
PROT ?TM UR-MCNC: 18 MG/DL — HIGH (ref 0–12)
PROT ?TM UR-MCNC: 50 MG/DL — HIGH (ref 0–12)
PROT ?TM UR-MCNC: 50 MG/DL — HIGH (ref 0–12)
PROT PATTERN 24H UR ELPH-IMP: SIGNIFICANT CHANGE UP
PROT PATTERN 24H UR ELPH-IMP: SIGNIFICANT CHANGE UP
PROT PATTERN SERPL ELPH-IMP: SIGNIFICANT CHANGE UP
PROT SERPL-MCNC: 3.8 G/DL — LOW (ref 6–8)
PROT SERPL-MCNC: 3.9 G/DL — LOW (ref 6–8)
PROT SERPL-MCNC: 4.1 G/DL — LOW (ref 6–8)
PROT SERPL-MCNC: 4.1 G/DL — LOW (ref 6–8)
PROT SERPL-MCNC: 4.2 G/DL — LOW (ref 6–8)
PROT SERPL-MCNC: 4.2 G/DL — LOW (ref 6–8)
PROT SERPL-MCNC: 4.3 G/DL — LOW (ref 6–8)
PROT SERPL-MCNC: 4.3 G/DL — LOW (ref 6–8)
PROT SERPL-MCNC: 4.4 G/DL — LOW (ref 6–8)
PROT SERPL-MCNC: 4.5 G/DL — LOW (ref 6–8)
PROT SERPL-MCNC: 4.6 G/DL — LOW (ref 6–8)
PROT SERPL-MCNC: 4.7 G/DL — LOW (ref 6–8)
PROT SERPL-MCNC: 4.8 G/DL — LOW (ref 6–8)
PROT SERPL-MCNC: 4.9 G/DL — LOW (ref 6–8)
PROT SERPL-MCNC: 5 G/DL — LOW (ref 6–8)
PROT SERPL-MCNC: 5 G/DL — LOW (ref 6–8)
PROT SERPL-MCNC: 5 G/DL — LOW (ref 6–8.3)
PROT SERPL-MCNC: 5 G/DL — LOW (ref 6–8.3)
PROT SERPL-MCNC: 5.1 G/DL — LOW (ref 6–8)
PROT SERPL-MCNC: 5.3 G/DL — LOW (ref 6–8)
PROT SERPL-MCNC: 5.3 G/DL — LOW (ref 6–8)
PROT SERPL-MCNC: 5.4 G/DL — LOW (ref 6–8)
PROT SERPL-MCNC: 5.5 G/DL — LOW (ref 6–8)
PROT SERPL-MCNC: 5.5 G/DL — LOW (ref 6–8)
PROT SERPL-MCNC: 5.7 G/DL — LOW (ref 6–8)
PROT SERPL-MCNC: 5.8 G/DL — LOW (ref 6–8)
PROT SERPL-MCNC: 5.8 G/DL — LOW (ref 6–8)
PROT SERPL-MCNC: 6 G/DL — SIGNIFICANT CHANGE UP (ref 6–8)
PROT SERPL-MCNC: 6.2 G/DL — SIGNIFICANT CHANGE UP (ref 6–8)
PROT SERPL-MCNC: 7.1 G/DL — SIGNIFICANT CHANGE UP (ref 6–8)
PROT UR-MCNC: ABNORMAL
PROT UR-MCNC: SIGNIFICANT CHANGE UP
PROT/CREAT UR-RTO: 1.4 RATIO — HIGH (ref 0–0.2)
PROTHROM AB SERPL-ACNC: 11.6 SEC — SIGNIFICANT CHANGE UP (ref 9.95–12.87)
PROTHROM AB SERPL-ACNC: 13.5 SEC — HIGH (ref 9.95–12.87)
PROTHROM AB SERPL-ACNC: 15.8 SEC — HIGH (ref 9.95–12.87)
PROTHROM AB SERPL-ACNC: 20.7 SEC — HIGH (ref 9.95–12.87)
PTH-INTACT FLD-MCNC: 76 PG/ML — HIGH (ref 15–65)
RAPID RVP RESULT: SIGNIFICANT CHANGE UP
RBC # BLD: 2.57 M/UL — LOW (ref 4.2–5.4)
RBC # BLD: 2.61 M/UL — LOW (ref 4.2–5.4)
RBC # BLD: 2.64 M/UL — LOW (ref 4.2–5.4)
RBC # BLD: 2.67 M/UL — LOW (ref 4.2–5.4)
RBC # BLD: 2.73 M/UL — LOW (ref 4.2–5.4)
RBC # BLD: 2.74 M/UL — LOW (ref 4.2–5.4)
RBC # BLD: 2.75 M/UL — LOW (ref 4.2–5.4)
RBC # BLD: 2.75 M/UL — LOW (ref 4.2–5.4)
RBC # BLD: 2.79 M/UL — LOW (ref 4.2–5.4)
RBC # BLD: 2.8 M/UL — LOW (ref 4.2–5.4)
RBC # BLD: 2.81 M/UL — LOW (ref 4.2–5.4)
RBC # BLD: 2.83 M/UL — LOW (ref 4.2–5.4)
RBC # BLD: 2.86 M/UL — LOW (ref 4.2–5.4)
RBC # BLD: 2.89 M/UL — LOW (ref 4.2–5.4)
RBC # BLD: 2.95 M/UL — LOW (ref 4.2–5.4)
RBC # BLD: 2.95 M/UL — LOW (ref 4.2–5.4)
RBC # BLD: 2.98 M/UL — LOW (ref 4.2–5.4)
RBC # BLD: 2.98 M/UL — LOW (ref 4.2–5.4)
RBC # BLD: 3 M/UL — LOW (ref 4.2–5.4)
RBC # BLD: 3.01 M/UL — LOW (ref 4.2–5.4)
RBC # BLD: 3.02 M/UL — LOW (ref 4.2–5.4)
RBC # BLD: 3.05 M/UL — LOW (ref 4.2–5.4)
RBC # BLD: 3.06 M/UL — LOW (ref 4.2–5.4)
RBC # BLD: 3.12 M/UL — LOW (ref 4.2–5.4)
RBC # BLD: 3.13 M/UL — LOW (ref 4.2–5.4)
RBC # BLD: 3.14 M/UL — LOW (ref 4.2–5.4)
RBC # BLD: 3.18 M/UL — LOW (ref 4.2–5.4)
RBC # BLD: 3.2 M/UL — LOW (ref 4.2–5.4)
RBC # BLD: 3.21 M/UL — LOW (ref 4.2–5.4)
RBC # BLD: 3.27 M/UL — LOW (ref 4.2–5.4)
RBC # BLD: 3.28 M/UL — LOW (ref 4.2–5.4)
RBC # BLD: 3.29 M/UL — LOW (ref 4.2–5.4)
RBC # BLD: 3.29 M/UL — LOW (ref 4.2–5.4)
RBC # BLD: 3.3 M/UL — LOW (ref 4.2–5.4)
RBC # BLD: 3.37 M/UL — LOW (ref 4.2–5.4)
RBC # BLD: 3.48 M/UL — LOW (ref 4.2–5.4)
RBC # BLD: 3.51 M/UL — LOW (ref 4.2–5.4)
RBC # BLD: 3.53 M/UL — LOW (ref 4.2–5.4)
RBC # BLD: 3.54 M/UL — LOW (ref 4.2–5.4)
RBC # BLD: 3.63 M/UL — LOW (ref 4.2–5.4)
RBC # BLD: 3.66 M/UL — LOW (ref 4.2–5.4)
RBC # BLD: 3.69 M/UL — LOW (ref 4.2–5.4)
RBC # BLD: 3.84 M/UL — LOW (ref 4.2–5.4)
RBC # BLD: 3.91 M/UL — LOW (ref 4.2–5.4)
RBC # BLD: 4.05 M/UL — LOW (ref 4.2–5.4)
RBC # FLD: 14.7 % — HIGH (ref 11.5–14.5)
RBC # FLD: 14.9 % — HIGH (ref 11.5–14.5)
RBC # FLD: 15.1 % — HIGH (ref 11.5–14.5)
RBC # FLD: 15.3 % — HIGH (ref 11.5–14.5)
RBC # FLD: 15.5 % — HIGH (ref 11.5–14.5)
RBC # FLD: 15.6 % — HIGH (ref 11.5–14.5)
RBC # FLD: 15.7 % — HIGH (ref 11.5–14.5)
RBC # FLD: 15.8 % — HIGH (ref 11.5–14.5)
RBC # FLD: 15.9 % — HIGH (ref 11.5–14.5)
RBC # FLD: 16 % — HIGH (ref 11.5–14.5)
RBC # FLD: 16 % — HIGH (ref 11.5–14.5)
RBC # FLD: 16.1 % — HIGH (ref 11.5–14.5)
RBC # FLD: 16.2 % — HIGH (ref 11.5–14.5)
RBC # FLD: 16.3 % — HIGH (ref 11.5–14.5)
RBC # FLD: 16.4 % — HIGH (ref 11.5–14.5)
RBC # FLD: 16.5 % — HIGH (ref 11.5–14.5)
RBC # FLD: 16.5 % — HIGH (ref 11.5–14.5)
RBC # FLD: 16.6 % — HIGH (ref 11.5–14.5)
RBC # FLD: 16.7 % — HIGH (ref 11.5–14.5)
RBC # FLD: 16.8 % — HIGH (ref 11.5–14.5)
RBC # FLD: 16.8 % — HIGH (ref 11.5–14.5)
RBC # FLD: 16.9 % — HIGH (ref 11.5–14.5)
RBC # FLD: 16.9 % — HIGH (ref 11.5–14.5)
RBC # FLD: 17.2 % — HIGH (ref 11.5–14.5)
RBC BLD AUTO: ABNORMAL
RBC BLD AUTO: ABNORMAL
RBC CASTS # UR COMP ASSIST: 15 /HPF — HIGH (ref 0–4)
RBC CASTS # UR COMP ASSIST: 2 /HPF — SIGNIFICANT CHANGE UP (ref 0–4)
RBC CASTS # UR COMP ASSIST: 3 /HPF — SIGNIFICANT CHANGE UP (ref 0–4)
RBC CASTS # UR COMP ASSIST: 5 /HPF — HIGH (ref 0–4)
RBC CASTS # UR COMP ASSIST: 7 /HPF — HIGH (ref 0–4)
RETICS #: 30.7 K/UL — SIGNIFICANT CHANGE UP (ref 25–125)
RETICS #: 6.6 K/UL — LOW (ref 25–125)
RETICS/RBC NFR: 0.2 % — LOW (ref 0.5–1.5)
RETICS/RBC NFR: 0.8 % — SIGNIFICANT CHANGE UP (ref 0.5–1.5)
SAO2 % BLDV: 47 % — SIGNIFICANT CHANGE UP
SAO2 % BLDV: 48 % — SIGNIFICANT CHANGE UP
SARS-COV-2 IGG+IGM SERPL QL IA: 1.53 U/ML — HIGH
SARS-COV-2 IGG+IGM SERPL QL IA: 2.61 U/ML — HIGH
SARS-COV-2 IGG+IGM SERPL QL IA: POSITIVE
SARS-COV-2 IGG+IGM SERPL QL IA: POSITIVE
SARS-COV-2 RNA SPEC QL NAA+PROBE: SIGNIFICANT CHANGE UP
SIMPREVIR RESISTANCE: SIGNIFICANT CHANGE UP
SMUDGE CELLS # BLD: PRESENT — SIGNIFICANT CHANGE UP
SODIUM SERPL-SCNC: 118 MMOL/L — CRITICAL LOW (ref 135–146)
SODIUM SERPL-SCNC: 119 MMOL/L — CRITICAL LOW (ref 135–146)
SODIUM SERPL-SCNC: 120 MMOL/L — LOW (ref 135–146)
SODIUM SERPL-SCNC: 121 MMOL/L — LOW (ref 135–146)
SODIUM SERPL-SCNC: 122 MMOL/L — LOW (ref 135–146)
SODIUM SERPL-SCNC: 123 MMOL/L — LOW (ref 135–146)
SODIUM SERPL-SCNC: 124 MMOL/L — LOW (ref 135–146)
SODIUM SERPL-SCNC: 125 MMOL/L — LOW (ref 135–146)
SODIUM SERPL-SCNC: 125 MMOL/L — LOW (ref 135–146)
SODIUM SERPL-SCNC: 126 MMOL/L — LOW (ref 135–146)
SODIUM SERPL-SCNC: 127 MMOL/L — LOW (ref 135–146)
SODIUM SERPL-SCNC: 128 MMOL/L — LOW (ref 135–146)
SODIUM SERPL-SCNC: 129 MMOL/L — LOW (ref 135–146)
SODIUM SERPL-SCNC: 130 MMOL/L — LOW (ref 135–146)
SODIUM SERPL-SCNC: 130 MMOL/L — LOW (ref 135–146)
SODIUM SERPL-SCNC: 132 MMOL/L — LOW (ref 135–146)
SODIUM SERPL-SCNC: 133 MMOL/L — LOW (ref 135–146)
SODIUM SERPL-SCNC: 133 MMOL/L — LOW (ref 135–146)
SODIUM SERPL-SCNC: 134 MMOL/L — LOW (ref 135–146)
SODIUM SERPL-SCNC: 135 MMOL/L — SIGNIFICANT CHANGE UP (ref 135–146)
SODIUM SERPL-SCNC: 135 MMOL/L — SIGNIFICANT CHANGE UP (ref 135–146)
SODIUM SERPL-SCNC: 136 MMOL/L — SIGNIFICANT CHANGE UP (ref 135–146)
SODIUM SERPL-SCNC: 137 MMOL/L — SIGNIFICANT CHANGE UP (ref 135–146)
SODIUM SERPL-SCNC: 138 MMOL/L — SIGNIFICANT CHANGE UP (ref 135–146)
SODIUM UR-SCNC: 115 MMOL/L — SIGNIFICANT CHANGE UP
SODIUM UR-SCNC: 20 MMOL/L — SIGNIFICANT CHANGE UP
SODIUM UR-SCNC: 20 MMOL/L — SIGNIFICANT CHANGE UP
SODIUM UR-SCNC: 27 MMOL/L — SIGNIFICANT CHANGE UP
SODIUM UR-SCNC: 59 MMOL/L — SIGNIFICANT CHANGE UP
SP GR SPEC: 1.01 — SIGNIFICANT CHANGE UP (ref 1.01–1.03)
SP GR SPEC: 1.02 — SIGNIFICANT CHANGE UP (ref 1.01–1.03)
SP GR SPEC: 1.02 — SIGNIFICANT CHANGE UP (ref 1.01–1.03)
SPECIMEN SOURCE: SIGNIFICANT CHANGE UP
TIBC SERPL-MCNC: 174 UG/DL — LOW (ref 220–430)
TOTAL VOLUME - 24 HOUR: SIGNIFICANT CHANGE UP ML
TOTAL VOLUME - 24 HOUR: SIGNIFICANT CHANGE UP ML
TRANSFERRIN SERPL-MCNC: 146 MG/DL — LOW (ref 200–360)
TROPONIN T SERPL-MCNC: 0.03 NG/ML — CRITICAL HIGH
TROPONIN T SERPL-MCNC: 0.03 NG/ML — CRITICAL HIGH
TROPONIN T SERPL-MCNC: 0.21 NG/ML — CRITICAL HIGH
TROPONIN T SERPL-MCNC: 0.22 NG/ML — CRITICAL HIGH
TROPONIN T SERPL-MCNC: 0.25 NG/ML — CRITICAL HIGH
TROPONIN T SERPL-MCNC: 0.31 NG/ML — CRITICAL HIGH
TROPONIN T SERPL-MCNC: 0.56 NG/ML — CRITICAL HIGH
TSH SERPL-MCNC: 13.1 UIU/ML — HIGH (ref 0.27–4.2)
TSH SERPL-MCNC: 4.17 UIU/ML — SIGNIFICANT CHANGE UP (ref 0.27–4.2)
UIBC SERPL-MCNC: 81 UG/DL — LOW (ref 110–370)
URATE UR-MCNC: 16.4 MG/DL — SIGNIFICANT CHANGE UP
URINE CREATININE CALCULATION: SIGNIFICANT CHANGE UP G/24 H (ref 0.8–1.8)
URINE CREATININE CALCULATION: SIGNIFICANT CHANGE UP G/24 H (ref 0.8–1.8)
UROBILINOGEN FLD QL: SIGNIFICANT CHANGE UP
VARIANT LYMPHS # BLD: 1.7 % — SIGNIFICANT CHANGE UP (ref 0–5)
VIT B12 SERPL-MCNC: 1015 PG/ML — SIGNIFICANT CHANGE UP (ref 232–1245)
VIT B12 SERPL-MCNC: 1147 PG/ML — SIGNIFICANT CHANGE UP (ref 232–1245)
WBC # BLD: 10.41 K/UL — SIGNIFICANT CHANGE UP (ref 4.8–10.8)
WBC # BLD: 12.46 K/UL — HIGH (ref 4.8–10.8)
WBC # BLD: 13.32 K/UL — HIGH (ref 4.8–10.8)
WBC # BLD: 17.31 K/UL — HIGH (ref 4.8–10.8)
WBC # BLD: 17.57 K/UL — HIGH (ref 4.8–10.8)
WBC # BLD: 18.88 K/UL — HIGH (ref 4.8–10.8)
WBC # BLD: 20.51 K/UL — HIGH (ref 4.8–10.8)
WBC # BLD: 21.58 K/UL — HIGH (ref 4.8–10.8)
WBC # BLD: 22.79 K/UL — HIGH (ref 4.8–10.8)
WBC # BLD: 24.25 K/UL — HIGH (ref 4.8–10.8)
WBC # BLD: 24.53 K/UL — HIGH (ref 4.8–10.8)
WBC # BLD: 25.98 K/UL — HIGH (ref 4.8–10.8)
WBC # BLD: 25.99 K/UL — HIGH (ref 4.8–10.8)
WBC # BLD: 26.88 K/UL — HIGH (ref 4.8–10.8)
WBC # BLD: 3.69 K/UL — LOW (ref 4.8–10.8)
WBC # BLD: 3.9 K/UL — LOW (ref 4.8–10.8)
WBC # BLD: 4 K/UL — LOW (ref 4.8–10.8)
WBC # BLD: 4.58 K/UL — LOW (ref 4.8–10.8)
WBC # BLD: 4.73 K/UL — LOW (ref 4.8–10.8)
WBC # BLD: 4.92 K/UL — SIGNIFICANT CHANGE UP (ref 4.8–10.8)
WBC # BLD: 5.13 K/UL — SIGNIFICANT CHANGE UP (ref 4.8–10.8)
WBC # BLD: 5.65 K/UL — SIGNIFICANT CHANGE UP (ref 4.8–10.8)
WBC # BLD: 5.85 K/UL — SIGNIFICANT CHANGE UP (ref 4.8–10.8)
WBC # BLD: 5.92 K/UL — SIGNIFICANT CHANGE UP (ref 4.8–10.8)
WBC # BLD: 5.93 K/UL — SIGNIFICANT CHANGE UP (ref 4.8–10.8)
WBC # BLD: 6.02 K/UL — SIGNIFICANT CHANGE UP (ref 4.8–10.8)
WBC # BLD: 6.07 K/UL — SIGNIFICANT CHANGE UP (ref 4.8–10.8)
WBC # BLD: 6.08 K/UL — SIGNIFICANT CHANGE UP (ref 4.8–10.8)
WBC # BLD: 6.2 K/UL — SIGNIFICANT CHANGE UP (ref 4.8–10.8)
WBC # BLD: 6.54 K/UL — SIGNIFICANT CHANGE UP (ref 4.8–10.8)
WBC # BLD: 6.63 K/UL — SIGNIFICANT CHANGE UP (ref 4.8–10.8)
WBC # BLD: 6.69 K/UL — SIGNIFICANT CHANGE UP (ref 4.8–10.8)
WBC # BLD: 6.69 K/UL — SIGNIFICANT CHANGE UP (ref 4.8–10.8)
WBC # BLD: 6.83 K/UL — SIGNIFICANT CHANGE UP (ref 4.8–10.8)
WBC # BLD: 6.96 K/UL — SIGNIFICANT CHANGE UP (ref 4.8–10.8)
WBC # BLD: 7.08 K/UL — SIGNIFICANT CHANGE UP (ref 4.8–10.8)
WBC # BLD: 7.14 K/UL — SIGNIFICANT CHANGE UP (ref 4.8–10.8)
WBC # BLD: 7.35 K/UL — SIGNIFICANT CHANGE UP (ref 4.8–10.8)
WBC # BLD: 8.15 K/UL — SIGNIFICANT CHANGE UP (ref 4.8–10.8)
WBC # BLD: 8.43 K/UL — SIGNIFICANT CHANGE UP (ref 4.8–10.8)
WBC # BLD: 9.51 K/UL — SIGNIFICANT CHANGE UP (ref 4.8–10.8)
WBC # BLD: 9.73 K/UL — SIGNIFICANT CHANGE UP (ref 4.8–10.8)
WBC # BLD: 9.82 K/UL — SIGNIFICANT CHANGE UP (ref 4.8–10.8)
WBC # FLD AUTO: 10.41 K/UL — SIGNIFICANT CHANGE UP (ref 4.8–10.8)
WBC # FLD AUTO: 12.46 K/UL — HIGH (ref 4.8–10.8)
WBC # FLD AUTO: 13.32 K/UL — HIGH (ref 4.8–10.8)
WBC # FLD AUTO: 17.31 K/UL — HIGH (ref 4.8–10.8)
WBC # FLD AUTO: 17.57 K/UL — HIGH (ref 4.8–10.8)
WBC # FLD AUTO: 18.88 K/UL — HIGH (ref 4.8–10.8)
WBC # FLD AUTO: 20.51 K/UL — HIGH (ref 4.8–10.8)
WBC # FLD AUTO: 21.58 K/UL — HIGH (ref 4.8–10.8)
WBC # FLD AUTO: 22.79 K/UL — HIGH (ref 4.8–10.8)
WBC # FLD AUTO: 24.25 K/UL — HIGH (ref 4.8–10.8)
WBC # FLD AUTO: 24.53 K/UL — HIGH (ref 4.8–10.8)
WBC # FLD AUTO: 25.98 K/UL — HIGH (ref 4.8–10.8)
WBC # FLD AUTO: 25.99 K/UL — HIGH (ref 4.8–10.8)
WBC # FLD AUTO: 26.88 K/UL — HIGH (ref 4.8–10.8)
WBC # FLD AUTO: 3.69 K/UL — LOW (ref 4.8–10.8)
WBC # FLD AUTO: 3.9 K/UL — LOW (ref 4.8–10.8)
WBC # FLD AUTO: 4 K/UL — LOW (ref 4.8–10.8)
WBC # FLD AUTO: 4.58 K/UL — LOW (ref 4.8–10.8)
WBC # FLD AUTO: 4.73 K/UL — LOW (ref 4.8–10.8)
WBC # FLD AUTO: 4.92 K/UL — SIGNIFICANT CHANGE UP (ref 4.8–10.8)
WBC # FLD AUTO: 5.13 K/UL — SIGNIFICANT CHANGE UP (ref 4.8–10.8)
WBC # FLD AUTO: 5.65 K/UL — SIGNIFICANT CHANGE UP (ref 4.8–10.8)
WBC # FLD AUTO: 5.85 K/UL — SIGNIFICANT CHANGE UP (ref 4.8–10.8)
WBC # FLD AUTO: 5.92 K/UL — SIGNIFICANT CHANGE UP (ref 4.8–10.8)
WBC # FLD AUTO: 5.93 K/UL — SIGNIFICANT CHANGE UP (ref 4.8–10.8)
WBC # FLD AUTO: 6.02 K/UL — SIGNIFICANT CHANGE UP (ref 4.8–10.8)
WBC # FLD AUTO: 6.07 K/UL — SIGNIFICANT CHANGE UP (ref 4.8–10.8)
WBC # FLD AUTO: 6.08 K/UL — SIGNIFICANT CHANGE UP (ref 4.8–10.8)
WBC # FLD AUTO: 6.2 K/UL — SIGNIFICANT CHANGE UP (ref 4.8–10.8)
WBC # FLD AUTO: 6.54 K/UL — SIGNIFICANT CHANGE UP (ref 4.8–10.8)
WBC # FLD AUTO: 6.63 K/UL — SIGNIFICANT CHANGE UP (ref 4.8–10.8)
WBC # FLD AUTO: 6.69 K/UL — SIGNIFICANT CHANGE UP (ref 4.8–10.8)
WBC # FLD AUTO: 6.69 K/UL — SIGNIFICANT CHANGE UP (ref 4.8–10.8)
WBC # FLD AUTO: 6.83 K/UL — SIGNIFICANT CHANGE UP (ref 4.8–10.8)
WBC # FLD AUTO: 6.96 K/UL — SIGNIFICANT CHANGE UP (ref 4.8–10.8)
WBC # FLD AUTO: 7.08 K/UL — SIGNIFICANT CHANGE UP (ref 4.8–10.8)
WBC # FLD AUTO: 7.14 K/UL — SIGNIFICANT CHANGE UP (ref 4.8–10.8)
WBC # FLD AUTO: 7.35 K/UL — SIGNIFICANT CHANGE UP (ref 4.8–10.8)
WBC # FLD AUTO: 8.15 K/UL — SIGNIFICANT CHANGE UP (ref 4.8–10.8)
WBC # FLD AUTO: 8.43 K/UL — SIGNIFICANT CHANGE UP (ref 4.8–10.8)
WBC # FLD AUTO: 9.51 K/UL — SIGNIFICANT CHANGE UP (ref 4.8–10.8)
WBC # FLD AUTO: 9.73 K/UL — SIGNIFICANT CHANGE UP (ref 4.8–10.8)
WBC # FLD AUTO: 9.82 K/UL — SIGNIFICANT CHANGE UP (ref 4.8–10.8)
WBC UR QL: 15 /HPF — HIGH (ref 0–5)
WBC UR QL: 435 /HPF — HIGH (ref 0–5)
WBC UR QL: 7 /HPF — HIGH (ref 0–5)
WBC UR QL: >720 /HPF — HIGH (ref 0–5)
WBC UR QL: >720 /HPF — HIGH (ref 0–5)

## 2021-01-01 PROCEDURE — 74176 CT ABD & PELVIS W/O CONTRAST: CPT | Mod: 26

## 2021-01-01 PROCEDURE — 99233 SBSQ HOSP IP/OBS HIGH 50: CPT

## 2021-01-01 PROCEDURE — 93970 EXTREMITY STUDY: CPT | Mod: 26

## 2021-01-01 PROCEDURE — 99232 SBSQ HOSP IP/OBS MODERATE 35: CPT

## 2021-01-01 PROCEDURE — 71045 X-RAY EXAM CHEST 1 VIEW: CPT | Mod: 26

## 2021-01-01 PROCEDURE — 99072 ADDL SUPL MATRL&STAF TM PHE: CPT

## 2021-01-01 PROCEDURE — 99221 1ST HOSP IP/OBS SF/LOW 40: CPT

## 2021-01-01 PROCEDURE — 71045 X-RAY EXAM CHEST 1 VIEW: CPT | Mod: 26,76

## 2021-01-01 PROCEDURE — 93010 ELECTROCARDIOGRAM REPORT: CPT

## 2021-01-01 PROCEDURE — 99223 1ST HOSP IP/OBS HIGH 75: CPT

## 2021-01-01 PROCEDURE — 78708 K FLOW/FUNCT IMAGE W/DRUG: CPT | Mod: 26

## 2021-01-01 PROCEDURE — 71045 X-RAY EXAM CHEST 1 VIEW: CPT | Mod: 26,59

## 2021-01-01 PROCEDURE — 99291 CRITICAL CARE FIRST HOUR: CPT

## 2021-01-01 PROCEDURE — 76770 US EXAM ABDO BACK WALL COMP: CPT | Mod: 26,59

## 2021-01-01 PROCEDURE — 93975 VASCULAR STUDY: CPT | Mod: 26

## 2021-01-01 PROCEDURE — 99231 SBSQ HOSP IP/OBS SF/LOW 25: CPT

## 2021-01-01 PROCEDURE — 99214 OFFICE O/P EST MOD 30 MIN: CPT

## 2021-01-01 PROCEDURE — 76775 US EXAM ABDO BACK WALL LIM: CPT | Mod: 26

## 2021-01-01 PROCEDURE — 93296 REM INTERROG EVL PM/IDS: CPT

## 2021-01-01 PROCEDURE — 76770 US EXAM ABDO BACK WALL COMP: CPT | Mod: 26

## 2021-01-01 PROCEDURE — 99222 1ST HOSP IP/OBS MODERATE 55: CPT

## 2021-01-01 PROCEDURE — 36556 INSERT NON-TUNNEL CV CATH: CPT | Mod: GC,RT

## 2021-01-01 PROCEDURE — 99239 HOSP IP/OBS DSCHRG MGMT >30: CPT

## 2021-01-01 PROCEDURE — 99221 1ST HOSP IP/OBS SF/LOW 40: CPT | Mod: GC

## 2021-01-01 PROCEDURE — 86077 PHYS BLOOD BANK SERV XMATCH: CPT

## 2021-01-01 PROCEDURE — 76705 ECHO EXAM OF ABDOMEN: CPT | Mod: 26

## 2021-01-01 PROCEDURE — 99285 EMERGENCY DEPT VISIT HI MDM: CPT

## 2021-01-01 PROCEDURE — 93280 PM DEVICE PROGR EVAL DUAL: CPT | Mod: 26

## 2021-01-01 PROCEDURE — 93294 REM INTERROG EVL PM/LDLS PM: CPT

## 2021-01-01 PROCEDURE — 99291 CRITICAL CARE FIRST HOUR: CPT | Mod: 25

## 2021-01-01 PROCEDURE — 93306 TTE W/DOPPLER COMPLETE: CPT | Mod: 26

## 2021-01-01 RX ORDER — METOPROLOL TARTRATE 50 MG
25 TABLET ORAL EVERY 6 HOURS
Refills: 0 | Status: DISCONTINUED | OUTPATIENT
Start: 2021-01-01 | End: 2021-01-01

## 2021-01-01 RX ORDER — SODIUM CHLORIDE 9 MG/ML
500 INJECTION, SOLUTION INTRAVENOUS ONCE
Refills: 0 | Status: COMPLETED | OUTPATIENT
Start: 2021-01-01 | End: 2021-01-01

## 2021-01-01 RX ORDER — DEXTROSE MONOHYDRATE, SODIUM CHLORIDE, AND POTASSIUM CHLORIDE 50; .745; 4.5 G/1000ML; G/1000ML; G/1000ML
1000 INJECTION, SOLUTION INTRAVENOUS
Refills: 0 | Status: DISCONTINUED | OUTPATIENT
Start: 2021-01-01 | End: 2021-01-01

## 2021-01-01 RX ORDER — NYSTATIN 500MM UNIT
5 POWDER (EA) MISCELLANEOUS
Qty: 70 | Refills: 0
Start: 2021-01-01 | End: 2021-01-01

## 2021-01-01 RX ORDER — HUMAN INSULIN 100 [IU]/ML
20 INJECTION, SUSPENSION SUBCUTANEOUS
Refills: 0 | Status: DISCONTINUED | OUTPATIENT
Start: 2021-01-01 | End: 2021-01-01

## 2021-01-01 RX ORDER — PANTOPRAZOLE SODIUM 20 MG/1
1 TABLET, DELAYED RELEASE ORAL
Qty: 10 | Refills: 0
Start: 2021-01-01 | End: 2021-01-01

## 2021-01-01 RX ORDER — METOPROLOL TARTRATE 50 MG
1 TABLET ORAL
Qty: 0 | Refills: 0 | DISCHARGE
Start: 2021-01-01

## 2021-01-01 RX ORDER — ACETAMINOPHEN 500 MG
650 TABLET ORAL ONCE
Refills: 0 | Status: COMPLETED | OUTPATIENT
Start: 2021-01-01 | End: 2021-01-01

## 2021-01-01 RX ORDER — INSULIN GLARGINE 100 [IU]/ML
22.5 INJECTION, SOLUTION SUBCUTANEOUS ONCE
Refills: 0 | Status: COMPLETED | OUTPATIENT
Start: 2021-01-01 | End: 2021-01-01

## 2021-01-01 RX ORDER — DRONABINOL 2.5 MG
1 CAPSULE ORAL
Qty: 0 | Refills: 0 | DISCHARGE

## 2021-01-01 RX ORDER — METOPROLOL TARTRATE 50 MG
50 TABLET ORAL EVERY 6 HOURS
Refills: 0 | Status: DISCONTINUED | OUTPATIENT
Start: 2021-01-01 | End: 2021-01-01

## 2021-01-01 RX ORDER — SODIUM CHLORIDE 9 MG/ML
1000 INJECTION, SOLUTION INTRAVENOUS ONCE
Refills: 0 | Status: COMPLETED | OUTPATIENT
Start: 2021-01-01 | End: 2021-01-01

## 2021-01-01 RX ORDER — INSULIN HUMAN 100 [IU]/ML
5 INJECTION, SOLUTION SUBCUTANEOUS
Qty: 100 | Refills: 0 | Status: DISCONTINUED | OUTPATIENT
Start: 2021-01-01 | End: 2021-01-01

## 2021-01-01 RX ORDER — METOCLOPRAMIDE HCL 10 MG
5 TABLET ORAL ONCE
Refills: 0 | Status: COMPLETED | OUTPATIENT
Start: 2021-01-01 | End: 2021-01-01

## 2021-01-01 RX ORDER — SODIUM CHLORIDE 9 MG/ML
1000 INJECTION INTRAMUSCULAR; INTRAVENOUS; SUBCUTANEOUS ONCE
Refills: 0 | Status: COMPLETED | OUTPATIENT
Start: 2021-01-01 | End: 2021-01-01

## 2021-01-01 RX ORDER — ONDANSETRON 8 MG/1
4 TABLET, FILM COATED ORAL ONCE
Refills: 0 | Status: COMPLETED | OUTPATIENT
Start: 2021-01-01 | End: 2021-01-01

## 2021-01-01 RX ORDER — INSULIN LISPRO 100/ML
VIAL (ML) SUBCUTANEOUS
Refills: 0 | Status: DISCONTINUED | OUTPATIENT
Start: 2021-01-01 | End: 2021-01-01

## 2021-01-01 RX ORDER — MAGNESIUM SULFATE 500 MG/ML
2 VIAL (ML) INJECTION ONCE
Refills: 0 | Status: COMPLETED | OUTPATIENT
Start: 2021-01-01 | End: 2021-01-01

## 2021-01-01 RX ORDER — IMMUNE GLOBULIN (HUMAN) 10 G/100ML
15 INJECTION INTRAVENOUS; SUBCUTANEOUS DAILY
Refills: 0 | Status: COMPLETED | OUTPATIENT
Start: 2021-01-01 | End: 2021-01-01

## 2021-01-01 RX ORDER — CALCIUM GLUCONATE 100 MG/ML
2 VIAL (ML) INTRAVENOUS ONCE
Refills: 0 | Status: COMPLETED | OUTPATIENT
Start: 2021-01-01 | End: 2021-01-01

## 2021-01-01 RX ORDER — ALBUTEROL 90 UG/1
1 AEROSOL, METERED ORAL EVERY 6 HOURS
Refills: 0 | Status: DISCONTINUED | OUTPATIENT
Start: 2021-01-01 | End: 2021-01-01

## 2021-01-01 RX ORDER — ONDANSETRON 8 MG/1
4 TABLET, FILM COATED ORAL EVERY 6 HOURS
Refills: 0 | Status: DISCONTINUED | OUTPATIENT
Start: 2021-01-01 | End: 2021-01-01

## 2021-01-01 RX ORDER — ATORVASTATIN CALCIUM 80 MG/1
40 TABLET, FILM COATED ORAL AT BEDTIME
Refills: 0 | Status: DISCONTINUED | OUTPATIENT
Start: 2021-01-01 | End: 2021-01-01

## 2021-01-01 RX ORDER — AMIODARONE HYDROCHLORIDE 400 MG/1
200 TABLET ORAL
Refills: 0 | Status: DISCONTINUED | OUTPATIENT
Start: 2021-01-01 | End: 2021-01-01

## 2021-01-01 RX ORDER — SODIUM CHLORIDE 9 MG/ML
1000 INJECTION, SOLUTION INTRAVENOUS
Refills: 0 | Status: DISCONTINUED | OUTPATIENT
Start: 2021-01-01 | End: 2021-01-01

## 2021-01-01 RX ORDER — DIPHENHYDRAMINE HCL 50 MG
25 CAPSULE ORAL DAILY
Refills: 0 | Status: COMPLETED | OUTPATIENT
Start: 2021-01-01 | End: 2021-01-01

## 2021-01-01 RX ORDER — CHLORHEXIDINE GLUCONATE 213 G/1000ML
1 SOLUTION TOPICAL
Refills: 0 | Status: DISCONTINUED | OUTPATIENT
Start: 2021-01-01 | End: 2021-01-01

## 2021-01-01 RX ORDER — AMLODIPINE BESYLATE 2.5 MG/1
5 TABLET ORAL DAILY
Refills: 0 | Status: DISCONTINUED | OUTPATIENT
Start: 2021-01-01 | End: 2021-01-01

## 2021-01-01 RX ORDER — ROSUVASTATIN CALCIUM 5 MG/1
5 TABLET, FILM COATED ORAL
Qty: 45 | Refills: 0 | Status: ACTIVE | COMMUNITY
Start: 2020-06-16 | End: 1900-01-01

## 2021-01-01 RX ORDER — INSULIN LISPRO 100/ML
0 VIAL (ML) SUBCUTANEOUS
Qty: 0 | Refills: 0 | DISCHARGE
Start: 2021-01-01

## 2021-01-01 RX ORDER — INSULIN GLARGINE 100 [IU]/ML
22.5 INJECTION, SOLUTION SUBCUTANEOUS AT BEDTIME
Refills: 0 | Status: DISCONTINUED | OUTPATIENT
Start: 2021-01-01 | End: 2021-01-01

## 2021-01-01 RX ORDER — DEXTROSE 50 % IN WATER 50 %
12.5 SYRINGE (ML) INTRAVENOUS ONCE
Refills: 0 | Status: DISCONTINUED | OUTPATIENT
Start: 2021-01-01 | End: 2021-01-01

## 2021-01-01 RX ORDER — IMMUNE GLOBULIN,GAMMA(IGG) 5 %
0 VIAL (ML) INTRAVENOUS
Qty: 0 | Refills: 0 | DISCHARGE

## 2021-01-01 RX ORDER — POLYETHYLENE GLYCOL 3350 17 G/17G
17 POWDER, FOR SOLUTION ORAL DAILY
Refills: 0 | Status: DISCONTINUED | OUTPATIENT
Start: 2021-01-01 | End: 2021-01-01

## 2021-01-01 RX ORDER — INSULIN GLARGINE 100 [IU]/ML
16 INJECTION, SOLUTION SUBCUTANEOUS
Qty: 0 | Refills: 0 | DISCHARGE

## 2021-01-01 RX ORDER — INSULIN GLARGINE 100 [IU]/ML
7 INJECTION, SOLUTION SUBCUTANEOUS
Qty: 0 | Refills: 0 | DISCHARGE

## 2021-01-01 RX ORDER — DEXTROSE 50 % IN WATER 50 %
25 SYRINGE (ML) INTRAVENOUS ONCE
Refills: 0 | Status: DISCONTINUED | OUTPATIENT
Start: 2021-01-01 | End: 2021-01-01

## 2021-01-01 RX ORDER — INSULIN LISPRO 100/ML
3 VIAL (ML) SUBCUTANEOUS
Refills: 0 | Status: DISCONTINUED | OUTPATIENT
Start: 2021-01-01 | End: 2021-01-01

## 2021-01-01 RX ORDER — IPRATROPIUM/ALBUTEROL SULFATE 18-103MCG
3 AEROSOL WITH ADAPTER (GRAM) INHALATION EVERY 6 HOURS
Refills: 0 | Status: DISCONTINUED | OUTPATIENT
Start: 2021-01-01 | End: 2021-01-01

## 2021-01-01 RX ORDER — AMIODARONE HYDROCHLORIDE 400 MG/1
0.5 TABLET ORAL
Qty: 900 | Refills: 0 | Status: DISCONTINUED | OUTPATIENT
Start: 2021-01-01 | End: 2021-01-01

## 2021-01-01 RX ORDER — CLOPIDOGREL BISULFATE 75 MG/1
75 TABLET, FILM COATED ORAL DAILY
Refills: 0 | Status: DISCONTINUED | OUTPATIENT
Start: 2021-01-01 | End: 2021-01-01

## 2021-01-01 RX ORDER — FLUCONAZOLE 150 MG/1
1 TABLET ORAL
Qty: 1 | Refills: 0
Start: 2021-01-01 | End: 2021-01-01

## 2021-01-01 RX ORDER — CARVEDILOL PHOSPHATE 80 MG/1
1 CAPSULE, EXTENDED RELEASE ORAL
Qty: 0 | Refills: 0 | DISCHARGE

## 2021-01-01 RX ORDER — SENNA PLUS 8.6 MG/1
2 TABLET ORAL AT BEDTIME
Refills: 0 | Status: DISCONTINUED | OUTPATIENT
Start: 2021-01-01 | End: 2021-01-01

## 2021-01-01 RX ORDER — INSULIN HUMAN 100 [IU]/ML
2 INJECTION, SOLUTION SUBCUTANEOUS
Qty: 100 | Refills: 0 | Status: DISCONTINUED | OUTPATIENT
Start: 2021-01-01 | End: 2021-01-01

## 2021-01-01 RX ORDER — NYSTATIN 500MM UNIT
500000 POWDER (EA) MISCELLANEOUS
Refills: 0 | Status: DISCONTINUED | OUTPATIENT
Start: 2021-01-01 | End: 2021-01-01

## 2021-01-01 RX ORDER — APIXABAN 2.5 MG/1
2.5 TABLET, FILM COATED ORAL EVERY 12 HOURS
Refills: 0 | Status: DISCONTINUED | OUTPATIENT
Start: 2021-01-01 | End: 2021-01-01

## 2021-01-01 RX ORDER — LEVOTHYROXINE SODIUM 125 MCG
88 TABLET ORAL DAILY
Refills: 0 | Status: DISCONTINUED | OUTPATIENT
Start: 2021-01-01 | End: 2021-01-01

## 2021-01-01 RX ORDER — SODIUM ZIRCONIUM CYCLOSILICATE 10 G/10G
5 POWDER, FOR SUSPENSION ORAL ONCE
Refills: 0 | Status: COMPLETED | OUTPATIENT
Start: 2021-01-01 | End: 2021-01-01

## 2021-01-01 RX ORDER — FLUCONAZOLE 150 MG/1
100 TABLET ORAL DAILY
Refills: 0 | Status: DISCONTINUED | OUTPATIENT
Start: 2021-01-01 | End: 2021-01-01

## 2021-01-01 RX ORDER — ACETAMINOPHEN 500 MG
650 TABLET ORAL DAILY
Refills: 0 | Status: COMPLETED | OUTPATIENT
Start: 2021-01-01 | End: 2021-01-01

## 2021-01-01 RX ORDER — HEPARIN SODIUM 5000 [USP'U]/ML
5000 INJECTION INTRAVENOUS; SUBCUTANEOUS EVERY 12 HOURS
Refills: 0 | Status: DISCONTINUED | OUTPATIENT
Start: 2021-01-01 | End: 2021-01-01

## 2021-01-01 RX ORDER — SODIUM CHLORIDE 9 MG/ML
1000 INJECTION INTRAMUSCULAR; INTRAVENOUS; SUBCUTANEOUS
Refills: 0 | Status: DISCONTINUED | OUTPATIENT
Start: 2021-01-01 | End: 2021-01-01

## 2021-01-01 RX ORDER — INSULIN LISPRO 100/ML
4 VIAL (ML) SUBCUTANEOUS ONCE
Refills: 0 | Status: COMPLETED | OUTPATIENT
Start: 2021-01-01 | End: 2021-01-01

## 2021-01-01 RX ORDER — GLUCAGON INJECTION, SOLUTION 0.5 MG/.1ML
1 INJECTION, SOLUTION SUBCUTANEOUS ONCE
Refills: 0 | Status: DISCONTINUED | OUTPATIENT
Start: 2021-01-01 | End: 2021-01-01

## 2021-01-01 RX ORDER — INSULIN GLARGINE 100 [IU]/ML
6 INJECTION, SOLUTION SUBCUTANEOUS AT BEDTIME
Refills: 0 | Status: DISCONTINUED | OUTPATIENT
Start: 2021-01-01 | End: 2021-01-01

## 2021-01-01 RX ORDER — LINEZOLID 600 MG/300ML
1 INJECTION, SOLUTION INTRAVENOUS
Qty: 2 | Refills: 0
Start: 2021-01-01 | End: 2021-01-01

## 2021-01-01 RX ORDER — METOPROLOL TARTRATE 50 MG
50 TABLET ORAL
Refills: 0 | Status: DISCONTINUED | OUTPATIENT
Start: 2021-01-01 | End: 2021-01-01

## 2021-01-01 RX ORDER — INSULIN GLARGINE 100 [IU]/ML
18 INJECTION, SOLUTION SUBCUTANEOUS AT BEDTIME
Refills: 0 | Status: DISCONTINUED | OUTPATIENT
Start: 2021-01-01 | End: 2021-01-01

## 2021-01-01 RX ORDER — METOPROLOL TARTRATE 50 MG
25 TABLET ORAL ONCE
Refills: 0 | Status: COMPLETED | OUTPATIENT
Start: 2021-01-01 | End: 2021-01-01

## 2021-01-01 RX ORDER — CEFEPIME 1 G/1
1000 INJECTION, POWDER, FOR SOLUTION INTRAMUSCULAR; INTRAVENOUS ONCE
Refills: 0 | Status: COMPLETED | OUTPATIENT
Start: 2021-01-01 | End: 2021-01-01

## 2021-01-01 RX ORDER — MEROPENEM 1 G/30ML
500 INJECTION INTRAVENOUS EVERY 8 HOURS
Refills: 0 | Status: DISCONTINUED | OUTPATIENT
Start: 2021-01-01 | End: 2021-01-01

## 2021-01-01 RX ORDER — SODIUM BICARBONATE 1 MEQ/ML
650 SYRINGE (ML) INTRAVENOUS EVERY 8 HOURS
Refills: 0 | Status: DISCONTINUED | OUTPATIENT
Start: 2021-01-01 | End: 2021-01-01

## 2021-01-01 RX ORDER — ALBUTEROL 90 UG/1
1 AEROSOL, METERED ORAL
Qty: 0 | Refills: 0 | DISCHARGE
Start: 2021-01-01

## 2021-01-01 RX ORDER — HYDRALAZINE HCL 50 MG
10 TABLET ORAL ONCE
Refills: 0 | Status: COMPLETED | OUTPATIENT
Start: 2021-01-01 | End: 2021-01-01

## 2021-01-01 RX ORDER — ATORVASTATIN CALCIUM 80 MG/1
1 TABLET, FILM COATED ORAL
Qty: 0 | Refills: 0 | DISCHARGE
Start: 2021-01-01

## 2021-01-01 RX ORDER — FUROSEMIDE 40 MG
1 TABLET ORAL
Qty: 30 | Refills: 0
Start: 2021-01-01 | End: 2021-01-01

## 2021-01-01 RX ORDER — SENNA PLUS 8.6 MG/1
2 TABLET ORAL
Qty: 0 | Refills: 0 | DISCHARGE
Start: 2021-01-01

## 2021-01-01 RX ORDER — HUMAN INSULIN 100 [IU]/ML
22 INJECTION, SUSPENSION SUBCUTANEOUS
Refills: 0 | Status: DISCONTINUED | OUTPATIENT
Start: 2021-01-01 | End: 2021-01-01

## 2021-01-01 RX ORDER — INSULIN LISPRO 100/ML
6 VIAL (ML) SUBCUTANEOUS ONCE
Refills: 0 | Status: COMPLETED | OUTPATIENT
Start: 2021-01-01 | End: 2021-01-01

## 2021-01-01 RX ORDER — INSULIN LISPRO 100/ML
8 VIAL (ML) SUBCUTANEOUS
Refills: 0 | Status: DISCONTINUED | OUTPATIENT
Start: 2021-01-01 | End: 2021-01-01

## 2021-01-01 RX ORDER — INSULIN LISPRO 100/ML
4 VIAL (ML) SUBCUTANEOUS
Refills: 0 | Status: DISCONTINUED | OUTPATIENT
Start: 2021-01-01 | End: 2021-01-01

## 2021-01-01 RX ORDER — NYSTATIN CREAM 100000 [USP'U]/G
1 CREAM TOPICAL
Qty: 1 | Refills: 0
Start: 2021-01-01 | End: 2021-01-01

## 2021-01-01 RX ORDER — INSULIN HUMAN 100 [IU]/ML
5 INJECTION, SOLUTION SUBCUTANEOUS ONCE
Refills: 0 | Status: COMPLETED | OUTPATIENT
Start: 2021-01-01 | End: 2021-01-01

## 2021-01-01 RX ORDER — IPRATROPIUM/ALBUTEROL SULFATE 18-103MCG
6 AEROSOL WITH ADAPTER (GRAM) INHALATION EVERY 6 HOURS
Refills: 0 | Status: DISCONTINUED | OUTPATIENT
Start: 2021-01-01 | End: 2021-01-01

## 2021-01-01 RX ORDER — FUROSEMIDE 40 MG
20 TABLET ORAL ONCE
Refills: 0 | Status: COMPLETED | OUTPATIENT
Start: 2021-01-01 | End: 2021-01-01

## 2021-01-01 RX ORDER — FUROSEMIDE 40 MG
40 TABLET ORAL
Refills: 0 | Status: DISCONTINUED | OUTPATIENT
Start: 2021-01-01 | End: 2021-01-01

## 2021-01-01 RX ORDER — FERROUS SULFATE 325(65) MG
325 TABLET ORAL DAILY
Refills: 0 | Status: DISCONTINUED | OUTPATIENT
Start: 2021-01-01 | End: 2021-01-01

## 2021-01-01 RX ORDER — FUROSEMIDE 40 MG
40 TABLET ORAL DAILY
Refills: 0 | Status: DISCONTINUED | OUTPATIENT
Start: 2021-01-01 | End: 2021-01-01

## 2021-01-01 RX ORDER — BENZOCAINE AND MENTHOL 5; 1 G/100ML; G/100ML
1 LIQUID ORAL DAILY
Refills: 0 | Status: DISCONTINUED | OUTPATIENT
Start: 2021-01-01 | End: 2021-01-01

## 2021-01-01 RX ORDER — AMIODARONE HYDROCHLORIDE 400 MG/1
1 TABLET ORAL
Qty: 900 | Refills: 0 | Status: DISCONTINUED | OUTPATIENT
Start: 2021-01-01 | End: 2021-01-01

## 2021-01-01 RX ORDER — NYSTATIN 500MM UNIT
5 POWDER (EA) MISCELLANEOUS
Qty: 100 | Refills: 0
Start: 2021-01-01 | End: 2021-01-01

## 2021-01-01 RX ORDER — PANTOPRAZOLE SODIUM 20 MG/1
40 TABLET, DELAYED RELEASE ORAL
Refills: 0 | Status: DISCONTINUED | OUTPATIENT
Start: 2021-01-01 | End: 2021-01-01

## 2021-01-01 RX ORDER — AMIODARONE HYDROCHLORIDE 400 MG/1
1 TABLET ORAL
Qty: 900 | Refills: 0 | Status: COMPLETED | OUTPATIENT
Start: 2021-01-01 | End: 2021-01-01

## 2021-01-01 RX ORDER — HUMAN INSULIN 100 [IU]/ML
20 INJECTION, SUSPENSION SUBCUTANEOUS AT BEDTIME
Refills: 0 | Status: DISCONTINUED | OUTPATIENT
Start: 2021-01-01 | End: 2021-01-01

## 2021-01-01 RX ORDER — ERTAPENEM SODIUM 1 G/1
1 INJECTION, POWDER, LYOPHILIZED, FOR SOLUTION INTRAMUSCULAR; INTRAVENOUS
Qty: 0 | Refills: 0 | DISCHARGE

## 2021-01-01 RX ORDER — INSULIN GLARGINE 100 [IU]/ML
8 INJECTION, SOLUTION SUBCUTANEOUS AT BEDTIME
Refills: 0 | Status: DISCONTINUED | OUTPATIENT
Start: 2021-01-01 | End: 2021-01-01

## 2021-01-01 RX ORDER — INSULIN GLARGINE 100 [IU]/ML
7 INJECTION, SOLUTION SUBCUTANEOUS AT BEDTIME
Refills: 0 | Status: DISCONTINUED | OUTPATIENT
Start: 2021-01-01 | End: 2021-01-01

## 2021-01-01 RX ORDER — PANTOPRAZOLE SODIUM 20 MG/1
40 TABLET, DELAYED RELEASE ORAL DAILY
Refills: 0 | Status: DISCONTINUED | OUTPATIENT
Start: 2021-01-01 | End: 2021-01-01

## 2021-01-01 RX ORDER — CALAMINE AND ZINC OXIDE AND PHENOL 160; 10 MG/ML; MG/ML
1 LOTION TOPICAL THREE TIMES A DAY
Refills: 0 | Status: DISCONTINUED | OUTPATIENT
Start: 2021-01-01 | End: 2021-01-01

## 2021-01-01 RX ORDER — INSULIN GLARGINE 100 [IU]/ML
15 INJECTION, SOLUTION SUBCUTANEOUS
Qty: 0 | Refills: 0 | DISCHARGE

## 2021-01-01 RX ORDER — FUROSEMIDE 40 MG
20 TABLET ORAL DAILY
Refills: 0 | Status: DISCONTINUED | OUTPATIENT
Start: 2021-01-01 | End: 2021-01-01

## 2021-01-01 RX ORDER — FUROSEMIDE 40 MG
40 TABLET ORAL ONCE
Refills: 0 | Status: COMPLETED | OUTPATIENT
Start: 2021-01-01 | End: 2021-01-01

## 2021-01-01 RX ORDER — CEFEPIME 1 G/1
2000 INJECTION, POWDER, FOR SOLUTION INTRAMUSCULAR; INTRAVENOUS ONCE
Refills: 0 | Status: COMPLETED | OUTPATIENT
Start: 2021-01-01 | End: 2021-01-01

## 2021-01-01 RX ORDER — SENNA PLUS 8.6 MG/1
2 TABLET ORAL
Qty: 20 | Refills: 0
Start: 2021-01-01 | End: 2021-01-01

## 2021-01-01 RX ORDER — ASCORBIC ACID 60 MG
500 TABLET,CHEWABLE ORAL DAILY
Refills: 0 | Status: DISCONTINUED | OUTPATIENT
Start: 2021-01-01 | End: 2021-01-01

## 2021-01-01 RX ORDER — INSULIN LISPRO 100/ML
2 VIAL (ML) SUBCUTANEOUS
Qty: 0 | Refills: 0 | DISCHARGE
Start: 2021-01-01

## 2021-01-01 RX ORDER — INSULIN GLARGINE 100 [IU]/ML
10 INJECTION, SOLUTION SUBCUTANEOUS
Qty: 0 | Refills: 0 | DISCHARGE
Start: 2021-01-01

## 2021-01-01 RX ORDER — ACETAMINOPHEN 500 MG
975 TABLET ORAL ONCE
Refills: 0 | Status: COMPLETED | OUTPATIENT
Start: 2021-01-01 | End: 2021-01-01

## 2021-01-01 RX ORDER — INSULIN GLARGINE 100 [IU]/ML
10 INJECTION, SOLUTION SUBCUTANEOUS AT BEDTIME
Refills: 0 | Status: DISCONTINUED | OUTPATIENT
Start: 2021-01-01 | End: 2021-01-01

## 2021-01-01 RX ORDER — PETROLATUM,WHITE
1 JELLY (GRAM) TOPICAL
Refills: 0 | Status: DISCONTINUED | OUTPATIENT
Start: 2021-01-01 | End: 2021-01-01

## 2021-01-01 RX ORDER — NYSTATIN CREAM 100000 [USP'U]/G
1 CREAM TOPICAL
Refills: 0 | Status: DISCONTINUED | OUTPATIENT
Start: 2021-01-01 | End: 2021-01-01

## 2021-01-01 RX ORDER — MEROPENEM 1 G/30ML
500 INJECTION INTRAVENOUS ONCE
Refills: 0 | Status: COMPLETED | OUTPATIENT
Start: 2021-01-01 | End: 2021-01-01

## 2021-01-01 RX ORDER — DRONABINOL 2.5 MG
2.5 CAPSULE ORAL
Refills: 0 | Status: DISCONTINUED | OUTPATIENT
Start: 2021-01-01 | End: 2021-01-01

## 2021-01-01 RX ORDER — AMLODIPINE BESYLATE AND BENAZEPRIL HYDROCHLORIDE 10; 20 MG/1; MG/1
1 CAPSULE ORAL
Qty: 0 | Refills: 0 | DISCHARGE

## 2021-01-01 RX ORDER — APIXABAN 2.5 MG/1
2.5 TABLET, FILM COATED ORAL
Refills: 0 | Status: DISCONTINUED | OUTPATIENT
Start: 2021-01-01 | End: 2021-01-01

## 2021-01-01 RX ORDER — METOPROLOL TARTRATE 50 MG
5 TABLET ORAL ONCE
Refills: 0 | Status: COMPLETED | OUTPATIENT
Start: 2021-01-01 | End: 2021-01-01

## 2021-01-01 RX ORDER — APIXABAN 2.5 MG/1
1 TABLET, FILM COATED ORAL
Qty: 60 | Refills: 0
Start: 2021-01-01 | End: 2021-01-01

## 2021-01-01 RX ORDER — INSULIN GLARGINE 100 [IU]/ML
20 INJECTION, SOLUTION SUBCUTANEOUS AT BEDTIME
Refills: 0 | Status: DISCONTINUED | OUTPATIENT
Start: 2021-01-01 | End: 2021-01-01

## 2021-01-01 RX ORDER — METOPROLOL TARTRATE 50 MG
10 TABLET ORAL ONCE
Refills: 0 | Status: COMPLETED | OUTPATIENT
Start: 2021-01-01 | End: 2021-01-01

## 2021-01-01 RX ORDER — LISINOPRIL 2.5 MG/1
40 TABLET ORAL DAILY
Refills: 0 | Status: DISCONTINUED | OUTPATIENT
Start: 2021-01-01 | End: 2021-01-01

## 2021-01-01 RX ORDER — FLUCONAZOLE 150 MG/1
200 TABLET ORAL ONCE
Refills: 0 | Status: COMPLETED | OUTPATIENT
Start: 2021-01-01 | End: 2021-01-01

## 2021-01-01 RX ORDER — INSULIN LISPRO 100/ML
8 VIAL (ML) SUBCUTANEOUS ONCE
Refills: 0 | Status: COMPLETED | OUTPATIENT
Start: 2021-01-01 | End: 2021-01-01

## 2021-01-01 RX ORDER — IPRATROPIUM/ALBUTEROL SULFATE 18-103MCG
3 AEROSOL WITH ADAPTER (GRAM) INHALATION ONCE
Refills: 0 | Status: COMPLETED | OUTPATIENT
Start: 2021-01-01 | End: 2021-01-01

## 2021-01-01 RX ORDER — MEROPENEM 1 G/30ML
750 INJECTION INTRAVENOUS EVERY 8 HOURS
Refills: 0 | Status: DISCONTINUED | OUTPATIENT
Start: 2021-01-01 | End: 2021-01-01

## 2021-01-01 RX ORDER — MEROPENEM 1 G/30ML
1000 INJECTION INTRAVENOUS EVERY 12 HOURS
Refills: 0 | Status: DISCONTINUED | OUTPATIENT
Start: 2021-01-01 | End: 2021-01-01

## 2021-01-01 RX ORDER — SODIUM CHLORIDE 9 MG/ML
1 INJECTION INTRAMUSCULAR; INTRAVENOUS; SUBCUTANEOUS
Qty: 0 | Refills: 0 | DISCHARGE

## 2021-01-01 RX ORDER — CALCIUM GLUCONATE 100 MG/ML
1 VIAL (ML) INTRAVENOUS ONCE
Refills: 0 | Status: COMPLETED | OUTPATIENT
Start: 2021-01-01 | End: 2021-01-01

## 2021-01-01 RX ORDER — INSULIN GLARGINE 100 [IU]/ML
10 INJECTION, SOLUTION SUBCUTANEOUS EVERY MORNING
Refills: 0 | Status: DISCONTINUED | OUTPATIENT
Start: 2021-01-01 | End: 2021-01-01

## 2021-01-01 RX ORDER — LANOLIN ALCOHOL/MO/W.PET/CERES
5 CREAM (GRAM) TOPICAL AT BEDTIME
Refills: 0 | Status: DISCONTINUED | OUTPATIENT
Start: 2021-01-01 | End: 2021-01-01

## 2021-01-01 RX ORDER — MEROPENEM 1 G/30ML
500 INJECTION INTRAVENOUS EVERY 12 HOURS
Refills: 0 | Status: DISCONTINUED | OUTPATIENT
Start: 2021-01-01 | End: 2021-01-01

## 2021-01-01 RX ORDER — DEXTROSE 50 % IN WATER 50 %
15 SYRINGE (ML) INTRAVENOUS ONCE
Refills: 0 | Status: DISCONTINUED | OUTPATIENT
Start: 2021-01-01 | End: 2021-01-01

## 2021-01-01 RX ORDER — FAMOTIDINE 10 MG/ML
20 INJECTION INTRAVENOUS AT BEDTIME
Refills: 0 | Status: DISCONTINUED | OUTPATIENT
Start: 2021-01-01 | End: 2021-01-01

## 2021-01-01 RX ORDER — HEPARIN SODIUM 5000 [USP'U]/ML
5000 INJECTION INTRAVENOUS; SUBCUTANEOUS EVERY 8 HOURS
Refills: 0 | Status: DISCONTINUED | OUTPATIENT
Start: 2021-01-01 | End: 2021-01-01

## 2021-01-01 RX ORDER — INSULIN LISPRO 100/ML
2 VIAL (ML) SUBCUTANEOUS
Refills: 0 | Status: DISCONTINUED | OUTPATIENT
Start: 2021-01-01 | End: 2021-01-01

## 2021-01-01 RX ORDER — INSULIN GLARGINE 100 [IU]/ML
16 INJECTION, SOLUTION SUBCUTANEOUS AT BEDTIME
Refills: 0 | Status: DISCONTINUED | OUTPATIENT
Start: 2021-01-01 | End: 2021-01-01

## 2021-01-01 RX ORDER — POTASSIUM CHLORIDE 20 MEQ
20 PACKET (EA) ORAL ONCE
Refills: 0 | Status: COMPLETED | OUTPATIENT
Start: 2021-01-01 | End: 2021-01-01

## 2021-01-01 RX ORDER — MAGNESIUM SULFATE 500 MG/ML
2 VIAL (ML) INJECTION ONCE
Refills: 0 | Status: DISCONTINUED | OUTPATIENT
Start: 2021-01-01 | End: 2021-01-01

## 2021-01-01 RX ORDER — AMIODARONE HYDROCHLORIDE 400 MG/1
200 TABLET ORAL DAILY
Refills: 0 | Status: DISCONTINUED | OUTPATIENT
Start: 2021-01-01 | End: 2021-01-01

## 2021-01-01 RX ORDER — MEROPENEM 1 G/30ML
750 INJECTION INTRAVENOUS EVERY 12 HOURS
Refills: 0 | Status: DISCONTINUED | OUTPATIENT
Start: 2021-01-01 | End: 2021-01-01

## 2021-01-01 RX ORDER — HYDRALAZINE HCL 50 MG
5 TABLET ORAL ONCE
Refills: 0 | Status: COMPLETED | OUTPATIENT
Start: 2021-01-01 | End: 2021-01-01

## 2021-01-01 RX ORDER — CARVEDILOL PHOSPHATE 80 MG/1
12.5 CAPSULE, EXTENDED RELEASE ORAL
Refills: 0 | Status: DISCONTINUED | OUTPATIENT
Start: 2021-01-01 | End: 2021-01-01

## 2021-01-01 RX ORDER — INSULIN LISPRO 100/ML
5 VIAL (ML) SUBCUTANEOUS ONCE
Refills: 0 | Status: COMPLETED | OUTPATIENT
Start: 2021-01-01 | End: 2021-01-01

## 2021-01-01 RX ORDER — METOPROLOL TARTRATE 50 MG
25 TABLET ORAL ONCE
Refills: 0 | Status: DISCONTINUED | OUTPATIENT
Start: 2021-01-01 | End: 2021-01-01

## 2021-01-01 RX ORDER — ERTAPENEM SODIUM 1 G/1
1 INJECTION, POWDER, LYOPHILIZED, FOR SOLUTION INTRAMUSCULAR; INTRAVENOUS
Qty: 1 | Refills: 0
Start: 2021-01-01 | End: 2021-01-01

## 2021-01-01 RX ORDER — ERTAPENEM SODIUM 1 G/1
1000 INJECTION, POWDER, LYOPHILIZED, FOR SOLUTION INTRAMUSCULAR; INTRAVENOUS EVERY 24 HOURS
Refills: 0 | Status: DISCONTINUED | OUTPATIENT
Start: 2021-01-01 | End: 2021-01-01

## 2021-01-01 RX ORDER — INSULIN GLARGINE 100 [IU]/ML
15 INJECTION, SOLUTION SUBCUTANEOUS AT BEDTIME
Refills: 0 | Status: DISCONTINUED | OUTPATIENT
Start: 2021-01-01 | End: 2021-01-01

## 2021-01-01 RX ORDER — LINEZOLID 600 MG/300ML
600 INJECTION, SOLUTION INTRAVENOUS EVERY 12 HOURS
Refills: 0 | Status: DISCONTINUED | OUTPATIENT
Start: 2021-01-01 | End: 2021-01-01

## 2021-01-01 RX ORDER — ACETAMINOPHEN 500 MG
650 TABLET ORAL EVERY 6 HOURS
Refills: 0 | Status: DISCONTINUED | OUTPATIENT
Start: 2021-01-01 | End: 2021-01-01

## 2021-01-01 RX ORDER — INSULIN HUMAN 100 [IU]/ML
10 INJECTION, SOLUTION SUBCUTANEOUS ONCE
Refills: 0 | Status: COMPLETED | OUTPATIENT
Start: 2021-01-01 | End: 2021-01-01

## 2021-01-01 RX ORDER — SODIUM BICARBONATE 1 MEQ/ML
1 SYRINGE (ML) INTRAVENOUS
Qty: 0 | Refills: 0 | DISCHARGE
Start: 2021-01-01

## 2021-01-01 RX ORDER — NYSTATIN 500MM UNIT
500000 POWDER (EA) MISCELLANEOUS DAILY
Refills: 0 | Status: DISCONTINUED | OUTPATIENT
Start: 2021-01-01 | End: 2021-01-01

## 2021-01-01 RX ORDER — HUMAN INSULIN 100 [IU]/ML
15 INJECTION, SUSPENSION SUBCUTANEOUS
Refills: 0 | Status: DISCONTINUED | OUTPATIENT
Start: 2021-01-01 | End: 2021-01-01

## 2021-01-01 RX ORDER — DIPHENHYDRAMINE HCL 50 MG
25 CAPSULE ORAL EVERY 6 HOURS
Refills: 0 | Status: DISCONTINUED | OUTPATIENT
Start: 2021-01-01 | End: 2021-01-01

## 2021-01-01 RX ORDER — SODIUM ZIRCONIUM CYCLOSILICATE 10 G/10G
10 POWDER, FOR SUSPENSION ORAL ONCE
Refills: 0 | Status: COMPLETED | OUTPATIENT
Start: 2021-01-01 | End: 2021-01-01

## 2021-01-01 RX ORDER — ALPRAZOLAM 0.25 MG
0.25 TABLET ORAL ONCE
Refills: 0 | Status: DISCONTINUED | OUTPATIENT
Start: 2021-01-01 | End: 2021-01-01

## 2021-01-01 RX ORDER — AMIODARONE HYDROCHLORIDE 400 MG/1
1 TABLET ORAL
Qty: 0 | Refills: 0 | DISCHARGE
Start: 2021-01-01

## 2021-01-01 RX ORDER — MAGNESIUM SULFATE 500 MG/ML
1 VIAL (ML) INJECTION ONCE
Refills: 0 | Status: COMPLETED | OUTPATIENT
Start: 2021-01-01 | End: 2021-01-01

## 2021-01-01 RX ORDER — SODIUM POLYSTYRENE SULFONATE 4.1 MEQ/G
15 POWDER, FOR SUSPENSION ORAL ONCE
Refills: 0 | Status: COMPLETED | OUTPATIENT
Start: 2021-01-01 | End: 2021-01-01

## 2021-01-01 RX ORDER — ASCORBIC ACID 60 MG
250 TABLET,CHEWABLE ORAL
Refills: 0 | Status: DISCONTINUED | OUTPATIENT
Start: 2021-01-01 | End: 2021-01-01

## 2021-01-01 RX ORDER — POLYETHYLENE GLYCOL 3350 17 G/17G
17 POWDER, FOR SOLUTION ORAL EVERY 12 HOURS
Refills: 0 | Status: DISCONTINUED | OUTPATIENT
Start: 2021-01-01 | End: 2021-01-01

## 2021-01-01 RX ORDER — INSULIN HUMAN 100 [IU]/ML
5 INJECTION, SOLUTION SUBCUTANEOUS ONCE
Refills: 0 | Status: DISCONTINUED | OUTPATIENT
Start: 2021-01-01 | End: 2021-01-01

## 2021-01-01 RX ORDER — AMLODIPINE BESYLATE 2.5 MG/1
1 TABLET ORAL
Qty: 30 | Refills: 0
Start: 2021-01-01 | End: 2021-01-01

## 2021-01-01 RX ORDER — AMIODARONE HYDROCHLORIDE 400 MG/1
150 TABLET ORAL ONCE
Refills: 0 | Status: COMPLETED | OUTPATIENT
Start: 2021-01-01 | End: 2021-01-01

## 2021-01-01 RX ORDER — METOPROLOL TARTRATE 50 MG
25 TABLET ORAL
Refills: 0 | Status: DISCONTINUED | OUTPATIENT
Start: 2021-01-01 | End: 2021-01-01

## 2021-01-01 RX ORDER — LINEZOLID 600 MG/300ML
1 INJECTION, SOLUTION INTRAVENOUS
Qty: 0 | Refills: 0 | DISCHARGE
Start: 2021-01-01

## 2021-01-01 RX ORDER — INSULIN GLARGINE 100 [IU]/ML
10 INJECTION, SOLUTION SUBCUTANEOUS ONCE
Refills: 0 | Status: COMPLETED | OUTPATIENT
Start: 2021-01-01 | End: 2021-01-01

## 2021-01-01 RX ORDER — SODIUM BICARBONATE 1 MEQ/ML
0.11 SYRINGE (ML) INTRAVENOUS
Qty: 150 | Refills: 0 | Status: DISCONTINUED | OUTPATIENT
Start: 2021-01-01 | End: 2021-01-01

## 2021-01-01 RX ORDER — VANCOMYCIN HCL 1 G
1000 VIAL (EA) INTRAVENOUS ONCE
Refills: 0 | Status: COMPLETED | OUTPATIENT
Start: 2021-01-01 | End: 2021-01-01

## 2021-01-01 RX ORDER — INSULIN GLARGINE 100 [IU]/ML
5 INJECTION, SOLUTION SUBCUTANEOUS AT BEDTIME
Refills: 0 | Status: DISCONTINUED | OUTPATIENT
Start: 2021-01-01 | End: 2021-01-01

## 2021-01-01 RX ORDER — LEVOTHYROXINE SODIUM 125 MCG
44 TABLET ORAL AT BEDTIME
Refills: 0 | Status: DISCONTINUED | OUTPATIENT
Start: 2021-01-01 | End: 2021-01-01

## 2021-01-01 RX ORDER — INSULIN HUMAN 100 [IU]/ML
6 INJECTION, SOLUTION SUBCUTANEOUS ONCE
Refills: 0 | Status: COMPLETED | OUTPATIENT
Start: 2021-01-01 | End: 2021-01-01

## 2021-01-01 RX ORDER — NYSTATIN 500MM UNIT
5 POWDER (EA) MISCELLANEOUS
Qty: 0 | Refills: 0 | DISCHARGE

## 2021-01-01 RX ORDER — INSULIN GLARGINE 100 [IU]/ML
12 INJECTION, SOLUTION SUBCUTANEOUS AT BEDTIME
Refills: 0 | Status: DISCONTINUED | OUTPATIENT
Start: 2021-01-01 | End: 2021-01-01

## 2021-01-01 RX ORDER — FAMOTIDINE 10 MG/ML
20 INJECTION INTRAVENOUS ONCE
Refills: 0 | Status: COMPLETED | OUTPATIENT
Start: 2021-01-01 | End: 2021-01-01

## 2021-01-01 RX ORDER — FAMOTIDINE 10 MG/ML
1 INJECTION INTRAVENOUS
Qty: 0 | Refills: 0 | DISCHARGE

## 2021-01-01 RX ORDER — POVIDONE-IODINE 5 %
1 AEROSOL (ML) TOPICAL EVERY 12 HOURS
Refills: 0 | Status: DISCONTINUED | OUTPATIENT
Start: 2021-01-01 | End: 2021-01-01

## 2021-01-01 RX ORDER — SODIUM CHLORIDE 9 MG/ML
2000 INJECTION INTRAMUSCULAR; INTRAVENOUS; SUBCUTANEOUS ONCE
Refills: 0 | Status: COMPLETED | OUTPATIENT
Start: 2021-01-01 | End: 2021-01-01

## 2021-01-01 RX ORDER — DOCUSATE SODIUM 100 MG
1 CAPSULE ORAL
Qty: 0 | Refills: 0 | DISCHARGE

## 2021-01-01 RX ORDER — SODIUM CHLORIDE 9 MG/ML
250 INJECTION INTRAMUSCULAR; INTRAVENOUS; SUBCUTANEOUS ONCE
Refills: 0 | Status: COMPLETED | OUTPATIENT
Start: 2021-01-01 | End: 2021-01-01

## 2021-01-01 RX ORDER — NOREPINEPHRINE BITARTRATE/D5W 8 MG/250ML
0.05 PLASTIC BAG, INJECTION (ML) INTRAVENOUS
Qty: 16 | Refills: 0 | Status: DISCONTINUED | OUTPATIENT
Start: 2021-01-01 | End: 2021-01-01

## 2021-01-01 RX ORDER — INSULIN GLARGINE 100 [IU]/ML
10 INJECTION, SOLUTION SUBCUTANEOUS
Qty: 0 | Refills: 0 | DISCHARGE

## 2021-01-01 RX ORDER — DIPHENHYDRAMINE HCL 50 MG
1 CAPSULE ORAL
Qty: 28 | Refills: 0
Start: 2021-01-01 | End: 2021-01-01

## 2021-01-01 RX ORDER — ETHACRYNIC ACID 25 MG/1
50 TABLET ORAL ONCE
Refills: 0 | Status: COMPLETED | OUTPATIENT
Start: 2021-01-01 | End: 2021-01-01

## 2021-01-01 RX ORDER — SODIUM BICARBONATE 1 MEQ/ML
100 SYRINGE (ML) INTRAVENOUS ONCE
Refills: 0 | Status: COMPLETED | OUTPATIENT
Start: 2021-01-01 | End: 2021-01-01

## 2021-01-01 RX ORDER — ERTAPENEM SODIUM 1 G/1
1 INJECTION, POWDER, LYOPHILIZED, FOR SOLUTION INTRAMUSCULAR; INTRAVENOUS
Qty: 7 | Refills: 0
Start: 2021-01-01 | End: 2021-01-01

## 2021-01-01 RX ORDER — ROSUVASTATIN CALCIUM 5 MG/1
1 TABLET ORAL
Qty: 0 | Refills: 0 | DISCHARGE

## 2021-01-01 RX ADMIN — ALBUTEROL 1 PUFF(S): 90 AEROSOL, METERED ORAL at 20:49

## 2021-01-01 RX ADMIN — ONDANSETRON 4 MILLIGRAM(S): 8 TABLET, FILM COATED ORAL at 17:24

## 2021-01-01 RX ADMIN — Medication 650 MILLIGRAM(S): at 04:45

## 2021-01-01 RX ADMIN — Medication 650 MILLIGRAM(S): at 21:19

## 2021-01-01 RX ADMIN — Medication 88 MICROGRAM(S): at 05:02

## 2021-01-01 RX ADMIN — SODIUM CHLORIDE 2000 MILLILITER(S): 9 INJECTION INTRAMUSCULAR; INTRAVENOUS; SUBCUTANEOUS at 12:11

## 2021-01-01 RX ADMIN — Medication 1: at 11:59

## 2021-01-01 RX ADMIN — Medication 200 GRAM(S): at 16:12

## 2021-01-01 RX ADMIN — AMIODARONE HYDROCHLORIDE 200 MILLIGRAM(S): 400 TABLET ORAL at 05:20

## 2021-01-01 RX ADMIN — SODIUM ZIRCONIUM CYCLOSILICATE 10 GRAM(S): 10 POWDER, FOR SUSPENSION ORAL at 16:48

## 2021-01-01 RX ADMIN — ATORVASTATIN CALCIUM 40 MILLIGRAM(S): 80 TABLET, FILM COATED ORAL at 21:27

## 2021-01-01 RX ADMIN — Medication 500 MILLIGRAM(S): at 11:32

## 2021-01-01 RX ADMIN — CHLORHEXIDINE GLUCONATE 1 APPLICATION(S): 213 SOLUTION TOPICAL at 05:37

## 2021-01-01 RX ADMIN — SODIUM ZIRCONIUM CYCLOSILICATE 5 GRAM(S): 10 POWDER, FOR SUSPENSION ORAL at 19:05

## 2021-01-01 RX ADMIN — PANTOPRAZOLE SODIUM 40 MILLIGRAM(S): 20 TABLET, DELAYED RELEASE ORAL at 05:18

## 2021-01-01 RX ADMIN — Medication 650 MILLIGRAM(S): at 05:57

## 2021-01-01 RX ADMIN — Medication 50 MILLIGRAM(S): at 05:07

## 2021-01-01 RX ADMIN — Medication 4: at 16:37

## 2021-01-01 RX ADMIN — POLYETHYLENE GLYCOL 3350 17 GRAM(S): 17 POWDER, FOR SOLUTION ORAL at 17:45

## 2021-01-01 RX ADMIN — SENNA PLUS 2 TABLET(S): 8.6 TABLET ORAL at 22:17

## 2021-01-01 RX ADMIN — AMIODARONE HYDROCHLORIDE 200 MILLIGRAM(S): 400 TABLET ORAL at 05:02

## 2021-01-01 RX ADMIN — Medication 1 TABLET(S): at 12:02

## 2021-01-01 RX ADMIN — APIXABAN 2.5 MILLIGRAM(S): 2.5 TABLET, FILM COATED ORAL at 17:37

## 2021-01-01 RX ADMIN — Medication 2.5 MILLIGRAM(S): at 09:52

## 2021-01-01 RX ADMIN — Medication 2 UNIT(S): at 17:45

## 2021-01-01 RX ADMIN — INSULIN HUMAN 5 UNIT(S)/HR: 100 INJECTION, SOLUTION SUBCUTANEOUS at 07:59

## 2021-01-01 RX ADMIN — AMIODARONE HYDROCHLORIDE 200 MILLIGRAM(S): 400 TABLET ORAL at 17:16

## 2021-01-01 RX ADMIN — Medication 4 UNIT(S): at 07:43

## 2021-01-01 RX ADMIN — Medication 25 MILLIGRAM(S): at 17:24

## 2021-01-01 RX ADMIN — Medication 2 UNIT(S): at 11:51

## 2021-01-01 RX ADMIN — Medication 50 MILLIGRAM(S): at 17:10

## 2021-01-01 RX ADMIN — Medication 2 UNIT(S): at 17:14

## 2021-01-01 RX ADMIN — SODIUM CHLORIDE 1000 MILLILITER(S): 9 INJECTION, SOLUTION INTRAVENOUS at 23:43

## 2021-01-01 RX ADMIN — Medication 88 MICROGRAM(S): at 05:27

## 2021-01-01 RX ADMIN — MEROPENEM 100 MILLIGRAM(S): 1 INJECTION INTRAVENOUS at 17:50

## 2021-01-01 RX ADMIN — SENNA PLUS 2 TABLET(S): 8.6 TABLET ORAL at 21:08

## 2021-01-01 RX ADMIN — Medication 25 MILLIGRAM(S): at 17:39

## 2021-01-01 RX ADMIN — Medication 1 TABLET(S): at 11:39

## 2021-01-01 RX ADMIN — AMIODARONE HYDROCHLORIDE 200 MILLIGRAM(S): 400 TABLET ORAL at 17:12

## 2021-01-01 RX ADMIN — Medication 100 GRAM(S): at 10:05

## 2021-01-01 RX ADMIN — HUMAN INSULIN 15 UNIT(S): 100 INJECTION, SUSPENSION SUBCUTANEOUS at 16:53

## 2021-01-01 RX ADMIN — Medication 100 MILLIEQUIVALENT(S): at 12:14

## 2021-01-01 RX ADMIN — Medication 85 MILLIMOLE(S): at 16:04

## 2021-01-01 RX ADMIN — CLOPIDOGREL BISULFATE 75 MILLIGRAM(S): 75 TABLET, FILM COATED ORAL at 11:40

## 2021-01-01 RX ADMIN — Medication 25 MILLIGRAM(S): at 17:29

## 2021-01-01 RX ADMIN — Medication 2 UNIT(S): at 08:07

## 2021-01-01 RX ADMIN — APIXABAN 2.5 MILLIGRAM(S): 2.5 TABLET, FILM COATED ORAL at 17:04

## 2021-01-01 RX ADMIN — FAMOTIDINE 20 MILLIGRAM(S): 10 INJECTION INTRAVENOUS at 21:12

## 2021-01-01 RX ADMIN — Medication 8 UNIT(S): at 07:59

## 2021-01-01 RX ADMIN — Medication 2 UNIT(S): at 12:13

## 2021-01-01 RX ADMIN — ONDANSETRON 4 MILLIGRAM(S): 8 TABLET, FILM COATED ORAL at 15:39

## 2021-01-01 RX ADMIN — Medication 8 UNIT(S): at 07:17

## 2021-01-01 RX ADMIN — Medication 650 MILLIGRAM(S): at 17:50

## 2021-01-01 RX ADMIN — ALBUTEROL 1 PUFF(S): 90 AEROSOL, METERED ORAL at 14:36

## 2021-01-01 RX ADMIN — APIXABAN 2.5 MILLIGRAM(S): 2.5 TABLET, FILM COATED ORAL at 18:09

## 2021-01-01 RX ADMIN — AMIODARONE HYDROCHLORIDE 200 MILLIGRAM(S): 400 TABLET ORAL at 05:13

## 2021-01-01 RX ADMIN — Medication 325 MILLIGRAM(S): at 11:40

## 2021-01-01 RX ADMIN — Medication 25 MILLIGRAM(S): at 17:46

## 2021-01-01 RX ADMIN — Medication 4 UNIT(S): at 12:00

## 2021-01-01 RX ADMIN — ATORVASTATIN CALCIUM 40 MILLIGRAM(S): 80 TABLET, FILM COATED ORAL at 22:16

## 2021-01-01 RX ADMIN — CLOPIDOGREL BISULFATE 75 MILLIGRAM(S): 75 TABLET, FILM COATED ORAL at 11:12

## 2021-01-01 RX ADMIN — ATORVASTATIN CALCIUM 40 MILLIGRAM(S): 80 TABLET, FILM COATED ORAL at 21:14

## 2021-01-01 RX ADMIN — CLOPIDOGREL BISULFATE 75 MILLIGRAM(S): 75 TABLET, FILM COATED ORAL at 12:27

## 2021-01-01 RX ADMIN — PANTOPRAZOLE SODIUM 40 MILLIGRAM(S): 20 TABLET, DELAYED RELEASE ORAL at 17:31

## 2021-01-01 RX ADMIN — Medication 1 TABLET(S): at 12:14

## 2021-01-01 RX ADMIN — CLOPIDOGREL BISULFATE 75 MILLIGRAM(S): 75 TABLET, FILM COATED ORAL at 11:37

## 2021-01-01 RX ADMIN — APIXABAN 2.5 MILLIGRAM(S): 2.5 TABLET, FILM COATED ORAL at 17:27

## 2021-01-01 RX ADMIN — INSULIN GLARGINE 7 UNIT(S): 100 INJECTION, SOLUTION SUBCUTANEOUS at 21:12

## 2021-01-01 RX ADMIN — Medication 650 MILLIGRAM(S): at 05:11

## 2021-01-01 RX ADMIN — Medication 25 MILLIGRAM(S): at 22:24

## 2021-01-01 RX ADMIN — AMIODARONE HYDROCHLORIDE 200 MILLIGRAM(S): 400 TABLET ORAL at 05:11

## 2021-01-01 RX ADMIN — AMLODIPINE BESYLATE 5 MILLIGRAM(S): 2.5 TABLET ORAL at 05:11

## 2021-01-01 RX ADMIN — MEROPENEM 100 MILLIGRAM(S): 1 INJECTION INTRAVENOUS at 17:11

## 2021-01-01 RX ADMIN — FAMOTIDINE 20 MILLIGRAM(S): 10 INJECTION INTRAVENOUS at 22:16

## 2021-01-01 RX ADMIN — Medication 50 MILLIGRAM(S): at 05:13

## 2021-01-01 RX ADMIN — SENNA PLUS 2 TABLET(S): 8.6 TABLET ORAL at 22:15

## 2021-01-01 RX ADMIN — AMIODARONE HYDROCHLORIDE 200 MILLIGRAM(S): 400 TABLET ORAL at 06:37

## 2021-01-01 RX ADMIN — AMIODARONE HYDROCHLORIDE 200 MILLIGRAM(S): 400 TABLET ORAL at 17:15

## 2021-01-01 RX ADMIN — ALBUTEROL 1 PUFF(S): 90 AEROSOL, METERED ORAL at 07:50

## 2021-01-01 RX ADMIN — AMIODARONE HYDROCHLORIDE 200 MILLIGRAM(S): 400 TABLET ORAL at 05:51

## 2021-01-01 RX ADMIN — Medication 650 MILLIGRAM(S): at 22:18

## 2021-01-01 RX ADMIN — Medication 3 UNIT(S): at 12:35

## 2021-01-01 RX ADMIN — MEROPENEM 100 MILLIGRAM(S): 1 INJECTION INTRAVENOUS at 17:44

## 2021-01-01 RX ADMIN — Medication 8 UNIT(S): at 18:18

## 2021-01-01 RX ADMIN — SENNA PLUS 2 TABLET(S): 8.6 TABLET ORAL at 21:36

## 2021-01-01 RX ADMIN — Medication 88 MICROGRAM(S): at 05:11

## 2021-01-01 RX ADMIN — CARVEDILOL PHOSPHATE 12.5 MILLIGRAM(S): 80 CAPSULE, EXTENDED RELEASE ORAL at 17:23

## 2021-01-01 RX ADMIN — Medication 88 MICROGRAM(S): at 05:33

## 2021-01-01 RX ADMIN — Medication 50 GRAM(S): at 11:13

## 2021-01-01 RX ADMIN — AMIODARONE HYDROCHLORIDE 200 MILLIGRAM(S): 400 TABLET ORAL at 17:51

## 2021-01-01 RX ADMIN — Medication 25 MILLIGRAM(S): at 06:24

## 2021-01-01 RX ADMIN — Medication 1 TABLET(S): at 11:32

## 2021-01-01 RX ADMIN — Medication 3 UNIT(S): at 11:41

## 2021-01-01 RX ADMIN — Medication 650 MILLIGRAM(S): at 13:40

## 2021-01-01 RX ADMIN — Medication 20 MILLIGRAM(S): at 09:51

## 2021-01-01 RX ADMIN — CLOPIDOGREL BISULFATE 75 MILLIGRAM(S): 75 TABLET, FILM COATED ORAL at 11:14

## 2021-01-01 RX ADMIN — Medication 25 MILLIGRAM(S): at 06:33

## 2021-01-01 RX ADMIN — Medication 1 TABLET(S): at 12:31

## 2021-01-01 RX ADMIN — ATORVASTATIN CALCIUM 40 MILLIGRAM(S): 80 TABLET, FILM COATED ORAL at 21:07

## 2021-01-01 RX ADMIN — MEROPENEM 100 MILLIGRAM(S): 1 INJECTION INTRAVENOUS at 17:54

## 2021-01-01 RX ADMIN — AMIODARONE HYDROCHLORIDE 200 MILLIGRAM(S): 400 TABLET ORAL at 17:06

## 2021-01-01 RX ADMIN — SODIUM CHLORIDE 1000 MILLILITER(S): 9 INJECTION INTRAMUSCULAR; INTRAVENOUS; SUBCUTANEOUS at 17:56

## 2021-01-01 RX ADMIN — Medication 25 MILLIGRAM(S): at 17:22

## 2021-01-01 RX ADMIN — ATORVASTATIN CALCIUM 40 MILLIGRAM(S): 80 TABLET, FILM COATED ORAL at 22:59

## 2021-01-01 RX ADMIN — ATORVASTATIN CALCIUM 40 MILLIGRAM(S): 80 TABLET, FILM COATED ORAL at 21:38

## 2021-01-01 RX ADMIN — CHLORHEXIDINE GLUCONATE 1 APPLICATION(S): 213 SOLUTION TOPICAL at 05:59

## 2021-01-01 RX ADMIN — Medication 40 MILLIGRAM(S): at 05:43

## 2021-01-01 RX ADMIN — Medication 2 UNIT(S): at 08:10

## 2021-01-01 RX ADMIN — Medication 25 MILLIGRAM(S): at 06:27

## 2021-01-01 RX ADMIN — SODIUM CHLORIDE 100 MILLILITER(S): 9 INJECTION INTRAMUSCULAR; INTRAVENOUS; SUBCUTANEOUS at 17:43

## 2021-01-01 RX ADMIN — NYSTATIN CREAM 1 APPLICATION(S): 100000 CREAM TOPICAL at 05:43

## 2021-01-01 RX ADMIN — HUMAN INSULIN 20 UNIT(S): 100 INJECTION, SUSPENSION SUBCUTANEOUS at 07:04

## 2021-01-01 RX ADMIN — APIXABAN 2.5 MILLIGRAM(S): 2.5 TABLET, FILM COATED ORAL at 05:58

## 2021-01-01 RX ADMIN — AMIODARONE HYDROCHLORIDE 200 MILLIGRAM(S): 400 TABLET ORAL at 17:04

## 2021-01-01 RX ADMIN — AMIODARONE HYDROCHLORIDE 200 MILLIGRAM(S): 400 TABLET ORAL at 17:32

## 2021-01-01 RX ADMIN — AMIODARONE HYDROCHLORIDE 200 MILLIGRAM(S): 400 TABLET ORAL at 05:28

## 2021-01-01 RX ADMIN — INSULIN GLARGINE 16 UNIT(S): 100 INJECTION, SOLUTION SUBCUTANEOUS at 21:12

## 2021-01-01 RX ADMIN — Medication 325 MILLIGRAM(S): at 12:00

## 2021-01-01 RX ADMIN — MEROPENEM 100 MILLIGRAM(S): 1 INJECTION INTRAVENOUS at 06:04

## 2021-01-01 RX ADMIN — CARVEDILOL PHOSPHATE 12.5 MILLIGRAM(S): 80 CAPSULE, EXTENDED RELEASE ORAL at 09:58

## 2021-01-01 RX ADMIN — Medication 10 MILLIGRAM(S): at 21:20

## 2021-01-01 RX ADMIN — Medication 1 TABLET(S): at 11:42

## 2021-01-01 RX ADMIN — Medication 25 MILLIGRAM(S): at 11:14

## 2021-01-01 RX ADMIN — Medication 20 MILLIGRAM(S): at 13:40

## 2021-01-01 RX ADMIN — MEROPENEM 100 MILLIGRAM(S): 1 INJECTION INTRAVENOUS at 17:16

## 2021-01-01 RX ADMIN — Medication 500000 UNIT(S): at 11:42

## 2021-01-01 RX ADMIN — Medication 1: at 11:39

## 2021-01-01 RX ADMIN — AMIODARONE HYDROCHLORIDE 200 MILLIGRAM(S): 400 TABLET ORAL at 18:38

## 2021-01-01 RX ADMIN — Medication 650 MILLIGRAM(S): at 12:02

## 2021-01-01 RX ADMIN — Medication 25 MILLIGRAM(S): at 05:57

## 2021-01-01 RX ADMIN — AMIODARONE HYDROCHLORIDE 200 MILLIGRAM(S): 400 TABLET ORAL at 05:49

## 2021-01-01 RX ADMIN — AMIODARONE HYDROCHLORIDE 200 MILLIGRAM(S): 400 TABLET ORAL at 17:18

## 2021-01-01 RX ADMIN — POLYETHYLENE GLYCOL 3350 17 GRAM(S): 17 POWDER, FOR SOLUTION ORAL at 05:57

## 2021-01-01 RX ADMIN — Medication 100 GRAM(S): at 12:34

## 2021-01-01 RX ADMIN — APIXABAN 2.5 MILLIGRAM(S): 2.5 TABLET, FILM COATED ORAL at 05:27

## 2021-01-01 RX ADMIN — Medication 325 MILLIGRAM(S): at 11:14

## 2021-01-01 RX ADMIN — Medication 0: at 07:59

## 2021-01-01 RX ADMIN — Medication 325 MILLIGRAM(S): at 11:55

## 2021-01-01 RX ADMIN — Medication 25 MILLIGRAM(S): at 02:24

## 2021-01-01 RX ADMIN — SENNA PLUS 2 TABLET(S): 8.6 TABLET ORAL at 21:25

## 2021-01-01 RX ADMIN — Medication 500000 UNIT(S): at 08:19

## 2021-01-01 RX ADMIN — MEROPENEM 100 MILLIGRAM(S): 1 INJECTION INTRAVENOUS at 05:23

## 2021-01-01 RX ADMIN — Medication 88 MICROGRAM(S): at 06:27

## 2021-01-01 RX ADMIN — DEXTROSE MONOHYDRATE, SODIUM CHLORIDE, AND POTASSIUM CHLORIDE 250 MILLILITER(S): 50; .745; 4.5 INJECTION, SOLUTION INTRAVENOUS at 16:12

## 2021-01-01 RX ADMIN — Medication 3 UNIT(S): at 07:45

## 2021-01-01 RX ADMIN — SODIUM CHLORIDE 100 MILLILITER(S): 9 INJECTION INTRAMUSCULAR; INTRAVENOUS; SUBCUTANEOUS at 00:30

## 2021-01-01 RX ADMIN — AMIODARONE HYDROCHLORIDE 200 MILLIGRAM(S): 400 TABLET ORAL at 05:22

## 2021-01-01 RX ADMIN — MEROPENEM 100 MILLIGRAM(S): 1 INJECTION INTRAVENOUS at 05:50

## 2021-01-01 RX ADMIN — Medication 88 MICROGRAM(S): at 05:19

## 2021-01-01 RX ADMIN — Medication 1 TABLET(S): at 13:42

## 2021-01-01 RX ADMIN — Medication 50 MILLIGRAM(S): at 17:18

## 2021-01-01 RX ADMIN — CLOPIDOGREL BISULFATE 75 MILLIGRAM(S): 75 TABLET, FILM COATED ORAL at 12:15

## 2021-01-01 RX ADMIN — Medication 25 MILLIGRAM(S): at 17:27

## 2021-01-01 RX ADMIN — Medication 25 MILLIGRAM(S): at 13:31

## 2021-01-01 RX ADMIN — Medication 50 MILLIGRAM(S): at 12:42

## 2021-01-01 RX ADMIN — LINEZOLID 600 MILLIGRAM(S): 600 INJECTION, SOLUTION INTRAVENOUS at 18:19

## 2021-01-01 RX ADMIN — Medication 25 MILLIGRAM(S): at 13:21

## 2021-01-01 RX ADMIN — Medication 650 MILLIGRAM(S): at 04:15

## 2021-01-01 RX ADMIN — Medication 650 MILLIGRAM(S): at 21:30

## 2021-01-01 RX ADMIN — SODIUM CHLORIDE 1000 MILLILITER(S): 9 INJECTION INTRAMUSCULAR; INTRAVENOUS; SUBCUTANEOUS at 16:57

## 2021-01-01 RX ADMIN — MEROPENEM 100 MILLIGRAM(S): 1 INJECTION INTRAVENOUS at 17:52

## 2021-01-01 RX ADMIN — Medication 50 MILLIGRAM(S): at 11:53

## 2021-01-01 RX ADMIN — INSULIN GLARGINE 7 UNIT(S): 100 INJECTION, SOLUTION SUBCUTANEOUS at 22:24

## 2021-01-01 RX ADMIN — PANTOPRAZOLE SODIUM 40 MILLIGRAM(S): 20 TABLET, DELAYED RELEASE ORAL at 06:16

## 2021-01-01 RX ADMIN — ALBUTEROL 1 PUFF(S): 90 AEROSOL, METERED ORAL at 22:48

## 2021-01-01 RX ADMIN — Medication 500 MILLIGRAM(S): at 11:31

## 2021-01-01 RX ADMIN — Medication 2 UNIT(S): at 11:32

## 2021-01-01 RX ADMIN — SENNA PLUS 2 TABLET(S): 8.6 TABLET ORAL at 21:13

## 2021-01-01 RX ADMIN — Medication 4 UNIT(S): at 07:53

## 2021-01-01 RX ADMIN — INSULIN GLARGINE 7 UNIT(S): 100 INJECTION, SOLUTION SUBCUTANEOUS at 22:19

## 2021-01-01 RX ADMIN — Medication 2 UNIT(S): at 12:07

## 2021-01-01 RX ADMIN — PANTOPRAZOLE SODIUM 40 MILLIGRAM(S): 20 TABLET, DELAYED RELEASE ORAL at 05:06

## 2021-01-01 RX ADMIN — Medication 2 UNIT(S): at 07:54

## 2021-01-01 RX ADMIN — AMIODARONE HYDROCHLORIDE 200 MILLIGRAM(S): 400 TABLET ORAL at 17:27

## 2021-01-01 RX ADMIN — Medication 25 MILLIGRAM(S): at 13:41

## 2021-01-01 RX ADMIN — CHLORHEXIDINE GLUCONATE 1 APPLICATION(S): 213 SOLUTION TOPICAL at 05:11

## 2021-01-01 RX ADMIN — Medication 2 UNIT(S): at 11:30

## 2021-01-01 RX ADMIN — CHLORHEXIDINE GLUCONATE 1 APPLICATION(S): 213 SOLUTION TOPICAL at 08:07

## 2021-01-01 RX ADMIN — NYSTATIN CREAM 1 APPLICATION(S): 100000 CREAM TOPICAL at 05:16

## 2021-01-01 RX ADMIN — Medication 650 MILLIGRAM(S): at 21:13

## 2021-01-01 RX ADMIN — Medication 25 MILLIGRAM(S): at 05:42

## 2021-01-01 RX ADMIN — ALBUTEROL 1 PUFF(S): 90 AEROSOL, METERED ORAL at 21:27

## 2021-01-01 RX ADMIN — Medication 25 MILLIGRAM(S): at 17:44

## 2021-01-01 RX ADMIN — MEROPENEM 100 MILLIGRAM(S): 1 INJECTION INTRAVENOUS at 05:06

## 2021-01-01 RX ADMIN — Medication 88 MICROGRAM(S): at 05:04

## 2021-01-01 RX ADMIN — Medication 1 TABLET(S): at 12:16

## 2021-01-01 RX ADMIN — Medication 50 MILLIGRAM(S): at 12:27

## 2021-01-01 RX ADMIN — Medication 2 UNIT(S): at 17:24

## 2021-01-01 RX ADMIN — Medication 40 MILLIGRAM(S): at 15:40

## 2021-01-01 RX ADMIN — MEROPENEM 100 MILLIGRAM(S): 1 INJECTION INTRAVENOUS at 17:17

## 2021-01-01 RX ADMIN — Medication 1 TABLET(S): at 12:51

## 2021-01-01 RX ADMIN — CLOPIDOGREL BISULFATE 75 MILLIGRAM(S): 75 TABLET, FILM COATED ORAL at 12:00

## 2021-01-01 RX ADMIN — Medication 25 MILLIGRAM(S): at 14:22

## 2021-01-01 RX ADMIN — Medication 88 MICROGRAM(S): at 05:28

## 2021-01-01 RX ADMIN — PANTOPRAZOLE SODIUM 40 MILLIGRAM(S): 20 TABLET, DELAYED RELEASE ORAL at 06:06

## 2021-01-01 RX ADMIN — Medication 2 UNIT(S): at 17:26

## 2021-01-01 RX ADMIN — Medication 1: at 12:33

## 2021-01-01 RX ADMIN — AMIODARONE HYDROCHLORIDE 200 MILLIGRAM(S): 400 TABLET ORAL at 18:20

## 2021-01-01 RX ADMIN — PANTOPRAZOLE SODIUM 40 MILLIGRAM(S): 20 TABLET, DELAYED RELEASE ORAL at 11:27

## 2021-01-01 RX ADMIN — Medication 80 MEQ/KG/HR: at 12:10

## 2021-01-01 RX ADMIN — Medication 650 MILLIGRAM(S): at 14:38

## 2021-01-01 RX ADMIN — Medication 25 MILLIGRAM(S): at 12:14

## 2021-01-01 RX ADMIN — FAMOTIDINE 20 MILLIGRAM(S): 10 INJECTION INTRAVENOUS at 21:27

## 2021-01-01 RX ADMIN — MEROPENEM 100 MILLIGRAM(S): 1 INJECTION INTRAVENOUS at 06:21

## 2021-01-01 RX ADMIN — Medication 650 MILLIGRAM(S): at 05:17

## 2021-01-01 RX ADMIN — AMIODARONE HYDROCHLORIDE 200 MILLIGRAM(S): 400 TABLET ORAL at 17:37

## 2021-01-01 RX ADMIN — Medication 500000 UNIT(S): at 12:25

## 2021-01-01 RX ADMIN — POLYETHYLENE GLYCOL 3350 17 GRAM(S): 17 POWDER, FOR SOLUTION ORAL at 17:44

## 2021-01-01 RX ADMIN — Medication 650 MILLIGRAM(S): at 05:34

## 2021-01-01 RX ADMIN — Medication 40 MILLIGRAM(S): at 17:16

## 2021-01-01 RX ADMIN — APIXABAN 2.5 MILLIGRAM(S): 2.5 TABLET, FILM COATED ORAL at 17:10

## 2021-01-01 RX ADMIN — Medication 650 MILLIGRAM(S): at 13:04

## 2021-01-01 RX ADMIN — Medication 20 MILLIGRAM(S): at 11:47

## 2021-01-01 RX ADMIN — Medication 650 MILLIGRAM(S): at 06:27

## 2021-01-01 RX ADMIN — Medication 25 MILLIGRAM(S): at 13:01

## 2021-01-01 RX ADMIN — AMLODIPINE BESYLATE 5 MILLIGRAM(S): 2.5 TABLET ORAL at 05:22

## 2021-01-01 RX ADMIN — Medication 12: at 17:24

## 2021-01-01 RX ADMIN — FLUCONAZOLE 100 MILLIGRAM(S): 150 TABLET ORAL at 11:43

## 2021-01-01 RX ADMIN — Medication 500000 UNIT(S): at 18:09

## 2021-01-01 RX ADMIN — MEROPENEM 100 MILLIGRAM(S): 1 INJECTION INTRAVENOUS at 05:11

## 2021-01-01 RX ADMIN — ALBUTEROL 1 PUFF(S): 90 AEROSOL, METERED ORAL at 13:19

## 2021-01-01 RX ADMIN — INSULIN GLARGINE 20 UNIT(S): 100 INJECTION, SOLUTION SUBCUTANEOUS at 22:19

## 2021-01-01 RX ADMIN — Medication 25 MILLIGRAM(S): at 13:16

## 2021-01-01 RX ADMIN — APIXABAN 2.5 MILLIGRAM(S): 2.5 TABLET, FILM COATED ORAL at 17:45

## 2021-01-01 RX ADMIN — APIXABAN 2.5 MILLIGRAM(S): 2.5 TABLET, FILM COATED ORAL at 05:05

## 2021-01-01 RX ADMIN — Medication 0: at 16:41

## 2021-01-01 RX ADMIN — FAMOTIDINE 20 MILLIGRAM(S): 10 INJECTION INTRAVENOUS at 21:19

## 2021-01-01 RX ADMIN — SENNA PLUS 2 TABLET(S): 8.6 TABLET ORAL at 21:54

## 2021-01-01 RX ADMIN — Medication 25 MILLIGRAM(S): at 23:33

## 2021-01-01 RX ADMIN — Medication 2 UNIT(S): at 07:56

## 2021-01-01 RX ADMIN — Medication 650 MILLIGRAM(S): at 05:14

## 2021-01-01 RX ADMIN — Medication 650 MILLIGRAM(S): at 13:12

## 2021-01-01 RX ADMIN — Medication 2 UNIT(S): at 08:08

## 2021-01-01 RX ADMIN — Medication 1 APPLICATION(S): at 17:18

## 2021-01-01 RX ADMIN — AMIODARONE HYDROCHLORIDE 200 MILLIGRAM(S): 400 TABLET ORAL at 17:14

## 2021-01-01 RX ADMIN — Medication 3: at 07:43

## 2021-01-01 RX ADMIN — Medication 650 MILLIGRAM(S): at 21:31

## 2021-01-01 RX ADMIN — Medication 650 MILLIGRAM(S): at 05:36

## 2021-01-01 RX ADMIN — ALBUTEROL 1 PUFF(S): 90 AEROSOL, METERED ORAL at 09:04

## 2021-01-01 RX ADMIN — SODIUM CHLORIDE 500 MILLILITER(S): 9 INJECTION, SOLUTION INTRAVENOUS at 14:46

## 2021-01-01 RX ADMIN — Medication 650 MILLIGRAM(S): at 07:00

## 2021-01-01 RX ADMIN — Medication 500 MILLIGRAM(S): at 12:23

## 2021-01-01 RX ADMIN — AMIODARONE HYDROCHLORIDE 200 MILLIGRAM(S): 400 TABLET ORAL at 05:06

## 2021-01-01 RX ADMIN — Medication 50 MILLIGRAM(S): at 23:42

## 2021-01-01 RX ADMIN — Medication 650 MILLIGRAM(S): at 21:18

## 2021-01-01 RX ADMIN — APIXABAN 2.5 MILLIGRAM(S): 2.5 TABLET, FILM COATED ORAL at 06:03

## 2021-01-01 RX ADMIN — CEFEPIME 100 MILLIGRAM(S): 1 INJECTION, POWDER, FOR SOLUTION INTRAMUSCULAR; INTRAVENOUS at 04:00

## 2021-01-01 RX ADMIN — MEROPENEM 100 MILLIGRAM(S): 1 INJECTION INTRAVENOUS at 06:45

## 2021-01-01 RX ADMIN — AMIODARONE HYDROCHLORIDE 200 MILLIGRAM(S): 400 TABLET ORAL at 18:19

## 2021-01-01 RX ADMIN — HUMAN INSULIN 20 UNIT(S): 100 INJECTION, SUSPENSION SUBCUTANEOUS at 22:15

## 2021-01-01 RX ADMIN — MEROPENEM 100 MILLIGRAM(S): 1 INJECTION INTRAVENOUS at 05:19

## 2021-01-01 RX ADMIN — Medication 2 UNIT(S): at 11:25

## 2021-01-01 RX ADMIN — AMIODARONE HYDROCHLORIDE 200 MILLIGRAM(S): 400 TABLET ORAL at 05:40

## 2021-01-01 RX ADMIN — Medication 20 MILLIGRAM(S): at 19:22

## 2021-01-01 RX ADMIN — Medication 25 MILLIGRAM(S): at 23:20

## 2021-01-01 RX ADMIN — Medication 30 MILLILITER(S): at 05:58

## 2021-01-01 RX ADMIN — AMIODARONE HYDROCHLORIDE 200 MILLIGRAM(S): 400 TABLET ORAL at 17:24

## 2021-01-01 RX ADMIN — Medication 25 MILLIGRAM(S): at 05:27

## 2021-01-01 RX ADMIN — PANTOPRAZOLE SODIUM 40 MILLIGRAM(S): 20 TABLET, DELAYED RELEASE ORAL at 06:22

## 2021-01-01 RX ADMIN — CLOPIDOGREL BISULFATE 75 MILLIGRAM(S): 75 TABLET, FILM COATED ORAL at 12:35

## 2021-01-01 RX ADMIN — INSULIN HUMAN 10 UNIT(S): 100 INJECTION, SOLUTION SUBCUTANEOUS at 04:33

## 2021-01-01 RX ADMIN — ONDANSETRON 4 MILLIGRAM(S): 8 TABLET, FILM COATED ORAL at 06:27

## 2021-01-01 RX ADMIN — Medication 10 MILLIGRAM(S): at 23:20

## 2021-01-01 RX ADMIN — CLOPIDOGREL BISULFATE 75 MILLIGRAM(S): 75 TABLET, FILM COATED ORAL at 11:42

## 2021-01-01 RX ADMIN — INSULIN GLARGINE 10 UNIT(S): 100 INJECTION, SOLUTION SUBCUTANEOUS at 21:42

## 2021-01-01 RX ADMIN — POLYETHYLENE GLYCOL 3350 17 GRAM(S): 17 POWDER, FOR SOLUTION ORAL at 11:42

## 2021-01-01 RX ADMIN — SODIUM CHLORIDE 150 MILLILITER(S): 9 INJECTION, SOLUTION INTRAVENOUS at 05:02

## 2021-01-01 RX ADMIN — FAMOTIDINE 20 MILLIGRAM(S): 10 INJECTION INTRAVENOUS at 21:18

## 2021-01-01 RX ADMIN — Medication 25 MILLIGRAM(S): at 06:05

## 2021-01-01 RX ADMIN — HUMAN INSULIN 20 UNIT(S): 100 INJECTION, SUSPENSION SUBCUTANEOUS at 17:01

## 2021-01-01 RX ADMIN — APIXABAN 2.5 MILLIGRAM(S): 2.5 TABLET, FILM COATED ORAL at 18:19

## 2021-01-01 RX ADMIN — Medication 25 MILLIGRAM(S): at 05:50

## 2021-01-01 RX ADMIN — Medication 25 MILLIGRAM(S): at 19:04

## 2021-01-01 RX ADMIN — Medication 650 MILLIGRAM(S): at 05:28

## 2021-01-01 RX ADMIN — Medication 40 MILLIGRAM(S): at 17:07

## 2021-01-01 RX ADMIN — MEROPENEM 100 MILLIGRAM(S): 1 INJECTION INTRAVENOUS at 05:13

## 2021-01-01 RX ADMIN — CLOPIDOGREL BISULFATE 75 MILLIGRAM(S): 75 TABLET, FILM COATED ORAL at 11:43

## 2021-01-01 RX ADMIN — Medication 1: at 12:04

## 2021-01-01 RX ADMIN — Medication 1 TABLET(S): at 13:21

## 2021-01-01 RX ADMIN — SENNA PLUS 2 TABLET(S): 8.6 TABLET ORAL at 21:19

## 2021-01-01 RX ADMIN — Medication 1 TABLET(S): at 11:27

## 2021-01-01 RX ADMIN — Medication 2 UNIT(S): at 17:22

## 2021-01-01 RX ADMIN — Medication 2.5 MILLIGRAM(S): at 13:54

## 2021-01-01 RX ADMIN — CHLORHEXIDINE GLUCONATE 1 APPLICATION(S): 213 SOLUTION TOPICAL at 05:43

## 2021-01-01 RX ADMIN — Medication 650 MILLIGRAM(S): at 14:19

## 2021-01-01 RX ADMIN — Medication 1 TABLET(S): at 12:36

## 2021-01-01 RX ADMIN — INSULIN GLARGINE 22.5 UNIT(S): 100 INJECTION, SOLUTION SUBCUTANEOUS at 04:21

## 2021-01-01 RX ADMIN — AMIODARONE HYDROCHLORIDE 200 MILLIGRAM(S): 400 TABLET ORAL at 17:29

## 2021-01-01 RX ADMIN — Medication 650 MILLIGRAM(S): at 05:27

## 2021-01-01 RX ADMIN — CLOPIDOGREL BISULFATE 75 MILLIGRAM(S): 75 TABLET, FILM COATED ORAL at 12:01

## 2021-01-01 RX ADMIN — INSULIN GLARGINE 16 UNIT(S): 100 INJECTION, SOLUTION SUBCUTANEOUS at 22:04

## 2021-01-01 RX ADMIN — Medication 500000 UNIT(S): at 13:31

## 2021-01-01 RX ADMIN — Medication 5 MILLIGRAM(S): at 04:20

## 2021-01-01 RX ADMIN — APIXABAN 2.5 MILLIGRAM(S): 2.5 TABLET, FILM COATED ORAL at 06:22

## 2021-01-01 RX ADMIN — Medication 50 MILLIGRAM(S): at 17:41

## 2021-01-01 RX ADMIN — Medication 650 MILLIGRAM(S): at 06:05

## 2021-01-01 RX ADMIN — Medication 50 MILLIGRAM(S): at 00:35

## 2021-01-01 RX ADMIN — PANTOPRAZOLE SODIUM 40 MILLIGRAM(S): 20 TABLET, DELAYED RELEASE ORAL at 06:17

## 2021-01-01 RX ADMIN — Medication 650 MILLIGRAM(S): at 05:21

## 2021-01-01 RX ADMIN — Medication 1 APPLICATION(S): at 05:15

## 2021-01-01 RX ADMIN — Medication 25 MILLIGRAM(S): at 00:45

## 2021-01-01 RX ADMIN — Medication 50 MILLIGRAM(S): at 11:12

## 2021-01-01 RX ADMIN — Medication 25 MILLIGRAM(S): at 06:22

## 2021-01-01 RX ADMIN — APIXABAN 2.5 MILLIGRAM(S): 2.5 TABLET, FILM COATED ORAL at 05:35

## 2021-01-01 RX ADMIN — Medication 25 MILLIGRAM(S): at 11:02

## 2021-01-01 RX ADMIN — PANTOPRAZOLE SODIUM 40 MILLIGRAM(S): 20 TABLET, DELAYED RELEASE ORAL at 17:23

## 2021-01-01 RX ADMIN — Medication 25 MILLIGRAM(S): at 16:43

## 2021-01-01 RX ADMIN — Medication 500 MILLIGRAM(S): at 11:14

## 2021-01-01 RX ADMIN — AMIODARONE HYDROCHLORIDE 200 MILLIGRAM(S): 400 TABLET ORAL at 17:28

## 2021-01-01 RX ADMIN — CLOPIDOGREL BISULFATE 75 MILLIGRAM(S): 75 TABLET, FILM COATED ORAL at 12:14

## 2021-01-01 RX ADMIN — Medication 6 MILLILITER(S): at 23:03

## 2021-01-01 RX ADMIN — SENNA PLUS 2 TABLET(S): 8.6 TABLET ORAL at 22:16

## 2021-01-01 RX ADMIN — Medication 1 APPLICATION(S): at 16:49

## 2021-01-01 RX ADMIN — AMIODARONE HYDROCHLORIDE 200 MILLIGRAM(S): 400 TABLET ORAL at 18:17

## 2021-01-01 RX ADMIN — Medication 500000 UNIT(S): at 11:32

## 2021-01-01 RX ADMIN — Medication 25 MILLIGRAM(S): at 12:02

## 2021-01-01 RX ADMIN — AMIODARONE HYDROCHLORIDE 200 MILLIGRAM(S): 400 TABLET ORAL at 17:50

## 2021-01-01 RX ADMIN — APIXABAN 2.5 MILLIGRAM(S): 2.5 TABLET, FILM COATED ORAL at 17:29

## 2021-01-01 RX ADMIN — Medication 4 UNIT(S): at 11:40

## 2021-01-01 RX ADMIN — SODIUM CHLORIDE 100 MILLILITER(S): 9 INJECTION INTRAMUSCULAR; INTRAVENOUS; SUBCUTANEOUS at 21:08

## 2021-01-01 RX ADMIN — SENNA PLUS 2 TABLET(S): 8.6 TABLET ORAL at 21:27

## 2021-01-01 RX ADMIN — MEROPENEM 100 MILLIGRAM(S): 1 INJECTION INTRAVENOUS at 16:23

## 2021-01-01 RX ADMIN — Medication 1 TABLET(S): at 14:28

## 2021-01-01 RX ADMIN — INSULIN GLARGINE 8 UNIT(S): 100 INJECTION, SOLUTION SUBCUTANEOUS at 00:52

## 2021-01-01 RX ADMIN — MEROPENEM 100 MILLIGRAM(S): 1 INJECTION INTRAVENOUS at 05:57

## 2021-01-01 RX ADMIN — Medication 2 UNIT(S): at 07:30

## 2021-01-01 RX ADMIN — NYSTATIN CREAM 1 APPLICATION(S): 100000 CREAM TOPICAL at 17:55

## 2021-01-01 RX ADMIN — Medication 200 GRAM(S): at 12:12

## 2021-01-01 RX ADMIN — SODIUM CHLORIDE 150 MILLILITER(S): 9 INJECTION, SOLUTION INTRAVENOUS at 23:29

## 2021-01-01 RX ADMIN — HEPARIN SODIUM 5000 UNIT(S): 5000 INJECTION INTRAVENOUS; SUBCUTANEOUS at 14:29

## 2021-01-01 RX ADMIN — Medication 1 TABLET(S): at 11:05

## 2021-01-01 RX ADMIN — CLOPIDOGREL BISULFATE 75 MILLIGRAM(S): 75 TABLET, FILM COATED ORAL at 12:36

## 2021-01-01 RX ADMIN — CHLORHEXIDINE GLUCONATE 1 APPLICATION(S): 213 SOLUTION TOPICAL at 05:22

## 2021-01-01 RX ADMIN — Medication 2: at 12:24

## 2021-01-01 RX ADMIN — Medication 2: at 11:41

## 2021-01-01 RX ADMIN — MEROPENEM 100 MILLIGRAM(S): 1 INJECTION INTRAVENOUS at 17:20

## 2021-01-01 RX ADMIN — Medication 500000 UNIT(S): at 11:51

## 2021-01-01 RX ADMIN — Medication 1 TABLET(S): at 11:46

## 2021-01-01 RX ADMIN — Medication 2 UNIT(S): at 07:39

## 2021-01-01 RX ADMIN — Medication 1: at 11:37

## 2021-01-01 RX ADMIN — APIXABAN 2.5 MILLIGRAM(S): 2.5 TABLET, FILM COATED ORAL at 17:30

## 2021-01-01 RX ADMIN — SENNA PLUS 2 TABLET(S): 8.6 TABLET ORAL at 22:04

## 2021-01-01 RX ADMIN — Medication 25 MILLIGRAM(S): at 23:19

## 2021-01-01 RX ADMIN — Medication 500000 UNIT(S): at 18:20

## 2021-01-01 RX ADMIN — MEROPENEM 100 MILLIGRAM(S): 1 INJECTION INTRAVENOUS at 13:10

## 2021-01-01 RX ADMIN — CHLORHEXIDINE GLUCONATE 1 APPLICATION(S): 213 SOLUTION TOPICAL at 05:58

## 2021-01-01 RX ADMIN — CHLORHEXIDINE GLUCONATE 1 APPLICATION(S): 213 SOLUTION TOPICAL at 05:49

## 2021-01-01 RX ADMIN — MEROPENEM 100 MILLIGRAM(S): 1 INJECTION INTRAVENOUS at 17:14

## 2021-01-01 RX ADMIN — PANTOPRAZOLE SODIUM 40 MILLIGRAM(S): 20 TABLET, DELAYED RELEASE ORAL at 12:32

## 2021-01-01 RX ADMIN — AMIODARONE HYDROCHLORIDE 200 MILLIGRAM(S): 400 TABLET ORAL at 17:31

## 2021-01-01 RX ADMIN — Medication 1 TABLET(S): at 12:06

## 2021-01-01 RX ADMIN — Medication 4 UNIT(S): at 17:11

## 2021-01-01 RX ADMIN — Medication 200 GRAM(S): at 11:10

## 2021-01-01 RX ADMIN — APIXABAN 2.5 MILLIGRAM(S): 2.5 TABLET, FILM COATED ORAL at 17:18

## 2021-01-01 RX ADMIN — Medication 250 MILLIGRAM(S): at 05:13

## 2021-01-01 RX ADMIN — INSULIN GLARGINE 7 UNIT(S): 100 INJECTION, SOLUTION SUBCUTANEOUS at 21:25

## 2021-01-01 RX ADMIN — Medication 3 UNIT(S): at 11:37

## 2021-01-01 RX ADMIN — ALBUTEROL 1 PUFF(S): 90 AEROSOL, METERED ORAL at 20:53

## 2021-01-01 RX ADMIN — Medication 650 MILLIGRAM(S): at 13:53

## 2021-01-01 RX ADMIN — CHLORHEXIDINE GLUCONATE 1 APPLICATION(S): 213 SOLUTION TOPICAL at 06:06

## 2021-01-01 RX ADMIN — Medication 2: at 12:47

## 2021-01-01 RX ADMIN — CLOPIDOGREL BISULFATE 75 MILLIGRAM(S): 75 TABLET, FILM COATED ORAL at 11:53

## 2021-01-01 RX ADMIN — Medication 500 MILLIGRAM(S): at 12:34

## 2021-01-01 RX ADMIN — Medication 25 MILLIGRAM(S): at 06:17

## 2021-01-01 RX ADMIN — Medication 650 MILLIGRAM(S): at 05:06

## 2021-01-01 RX ADMIN — Medication 44 MICROGRAM(S): at 00:11

## 2021-01-01 RX ADMIN — APIXABAN 2.5 MILLIGRAM(S): 2.5 TABLET, FILM COATED ORAL at 17:14

## 2021-01-01 RX ADMIN — ATORVASTATIN CALCIUM 40 MILLIGRAM(S): 80 TABLET, FILM COATED ORAL at 21:25

## 2021-01-01 RX ADMIN — Medication 25 MILLIGRAM(S): at 12:06

## 2021-01-01 RX ADMIN — Medication 650 MILLIGRAM(S): at 11:31

## 2021-01-01 RX ADMIN — INSULIN GLARGINE 18 UNIT(S): 100 INJECTION, SOLUTION SUBCUTANEOUS at 23:00

## 2021-01-01 RX ADMIN — Medication 1: at 07:53

## 2021-01-01 RX ADMIN — AMIODARONE HYDROCHLORIDE 200 MILLIGRAM(S): 400 TABLET ORAL at 06:08

## 2021-01-01 RX ADMIN — Medication 650 MILLIGRAM(S): at 21:16

## 2021-01-01 RX ADMIN — SODIUM CHLORIDE 1000 MILLILITER(S): 9 INJECTION INTRAMUSCULAR; INTRAVENOUS; SUBCUTANEOUS at 09:45

## 2021-01-01 RX ADMIN — NYSTATIN CREAM 1 APPLICATION(S): 100000 CREAM TOPICAL at 05:57

## 2021-01-01 RX ADMIN — AMIODARONE HYDROCHLORIDE 200 MILLIGRAM(S): 400 TABLET ORAL at 06:03

## 2021-01-01 RX ADMIN — Medication 650 MILLIGRAM(S): at 18:39

## 2021-01-01 RX ADMIN — MEROPENEM 100 MILLIGRAM(S): 1 INJECTION INTRAVENOUS at 06:17

## 2021-01-01 RX ADMIN — HUMAN INSULIN 20 UNIT(S): 100 INJECTION, SUSPENSION SUBCUTANEOUS at 18:01

## 2021-01-01 RX ADMIN — Medication 25 MILLIGRAM(S): at 06:07

## 2021-01-01 RX ADMIN — Medication 25 MILLIGRAM(S): at 17:21

## 2021-01-01 RX ADMIN — Medication 2: at 11:42

## 2021-01-01 RX ADMIN — LINEZOLID 600 MILLIGRAM(S): 600 INJECTION, SOLUTION INTRAVENOUS at 05:07

## 2021-01-01 RX ADMIN — Medication 25 MILLIGRAM(S): at 05:06

## 2021-01-01 RX ADMIN — Medication 25 MILLIGRAM(S): at 00:17

## 2021-01-01 RX ADMIN — Medication 4 UNIT(S): at 11:54

## 2021-01-01 RX ADMIN — Medication 25 MILLIGRAM(S): at 18:05

## 2021-01-01 RX ADMIN — Medication 25 MILLIGRAM(S): at 05:28

## 2021-01-01 RX ADMIN — APIXABAN 2.5 MILLIGRAM(S): 2.5 TABLET, FILM COATED ORAL at 06:33

## 2021-01-01 RX ADMIN — Medication 650 MILLIGRAM(S): at 05:05

## 2021-01-01 RX ADMIN — APIXABAN 2.5 MILLIGRAM(S): 2.5 TABLET, FILM COATED ORAL at 17:31

## 2021-01-01 RX ADMIN — ATORVASTATIN CALCIUM 40 MILLIGRAM(S): 80 TABLET, FILM COATED ORAL at 21:12

## 2021-01-01 RX ADMIN — NYSTATIN CREAM 1 APPLICATION(S): 100000 CREAM TOPICAL at 17:17

## 2021-01-01 RX ADMIN — Medication 650 MILLIGRAM(S): at 21:27

## 2021-01-01 RX ADMIN — Medication 6 MILLILITER(S): at 08:19

## 2021-01-01 RX ADMIN — PANTOPRAZOLE SODIUM 40 MILLIGRAM(S): 20 TABLET, DELAYED RELEASE ORAL at 08:24

## 2021-01-01 RX ADMIN — SODIUM CHLORIDE 2000 MILLILITER(S): 9 INJECTION INTRAMUSCULAR; INTRAVENOUS; SUBCUTANEOUS at 15:36

## 2021-01-01 RX ADMIN — POLYETHYLENE GLYCOL 3350 17 GRAM(S): 17 POWDER, FOR SOLUTION ORAL at 12:25

## 2021-01-01 RX ADMIN — CHLORHEXIDINE GLUCONATE 1 APPLICATION(S): 213 SOLUTION TOPICAL at 05:05

## 2021-01-01 RX ADMIN — ATORVASTATIN CALCIUM 40 MILLIGRAM(S): 80 TABLET, FILM COATED ORAL at 21:17

## 2021-01-01 RX ADMIN — APIXABAN 2.5 MILLIGRAM(S): 2.5 TABLET, FILM COATED ORAL at 17:15

## 2021-01-01 RX ADMIN — AMIODARONE HYDROCHLORIDE 200 MILLIGRAM(S): 400 TABLET ORAL at 17:41

## 2021-01-01 RX ADMIN — CLOPIDOGREL BISULFATE 75 MILLIGRAM(S): 75 TABLET, FILM COATED ORAL at 12:06

## 2021-01-01 RX ADMIN — Medication 3: at 17:24

## 2021-01-01 RX ADMIN — AMIODARONE HYDROCHLORIDE 200 MILLIGRAM(S): 400 TABLET ORAL at 06:24

## 2021-01-01 RX ADMIN — Medication 50 MILLIGRAM(S): at 05:05

## 2021-01-01 RX ADMIN — Medication 325 MILLIGRAM(S): at 12:14

## 2021-01-01 RX ADMIN — AMIODARONE HYDROCHLORIDE 200 MILLIGRAM(S): 400 TABLET ORAL at 18:04

## 2021-01-01 RX ADMIN — APIXABAN 2.5 MILLIGRAM(S): 2.5 TABLET, FILM COATED ORAL at 05:24

## 2021-01-01 RX ADMIN — SODIUM CHLORIDE 250 MILLILITER(S): 9 INJECTION INTRAMUSCULAR; INTRAVENOUS; SUBCUTANEOUS at 19:56

## 2021-01-01 RX ADMIN — FLUCONAZOLE 100 MILLIGRAM(S): 150 TABLET ORAL at 13:51

## 2021-01-01 RX ADMIN — Medication 500 MILLIGRAM(S): at 12:06

## 2021-01-01 RX ADMIN — ETHACRYNIC ACID 50 MILLIGRAM(S): 25 TABLET ORAL at 05:51

## 2021-01-01 RX ADMIN — Medication 100 GRAM(S): at 17:53

## 2021-01-01 RX ADMIN — Medication 25 MILLIGRAM(S): at 17:31

## 2021-01-01 RX ADMIN — SENNA PLUS 2 TABLET(S): 8.6 TABLET ORAL at 21:16

## 2021-01-01 RX ADMIN — NYSTATIN CREAM 1 APPLICATION(S): 100000 CREAM TOPICAL at 17:24

## 2021-01-01 RX ADMIN — Medication 50 MILLIEQUIVALENT(S): at 11:33

## 2021-01-01 RX ADMIN — AMIODARONE HYDROCHLORIDE 33.3 MG/MIN: 400 TABLET ORAL at 15:40

## 2021-01-01 RX ADMIN — Medication 25 MILLIGRAM(S): at 21:25

## 2021-01-01 RX ADMIN — Medication 5 UNIT(S): at 21:09

## 2021-01-01 RX ADMIN — MEROPENEM 100 MILLIGRAM(S): 1 INJECTION INTRAVENOUS at 21:37

## 2021-01-01 RX ADMIN — FAMOTIDINE 20 MILLIGRAM(S): 10 INJECTION INTRAVENOUS at 21:54

## 2021-01-01 RX ADMIN — ATORVASTATIN CALCIUM 40 MILLIGRAM(S): 80 TABLET, FILM COATED ORAL at 21:31

## 2021-01-01 RX ADMIN — Medication 500000 UNIT(S): at 00:21

## 2021-01-01 RX ADMIN — MEROPENEM 100 MILLIGRAM(S): 1 INJECTION INTRAVENOUS at 05:49

## 2021-01-01 RX ADMIN — Medication 650 MILLIGRAM(S): at 03:20

## 2021-01-01 RX ADMIN — AMLODIPINE BESYLATE 5 MILLIGRAM(S): 2.5 TABLET ORAL at 05:28

## 2021-01-01 RX ADMIN — ALBUTEROL 1 PUFF(S): 90 AEROSOL, METERED ORAL at 14:26

## 2021-01-01 RX ADMIN — Medication 1 APPLICATION(S): at 17:14

## 2021-01-01 RX ADMIN — CHLORHEXIDINE GLUCONATE 1 APPLICATION(S): 213 SOLUTION TOPICAL at 05:50

## 2021-01-01 RX ADMIN — AMIODARONE HYDROCHLORIDE 200 MILLIGRAM(S): 400 TABLET ORAL at 18:08

## 2021-01-01 RX ADMIN — CLOPIDOGREL BISULFATE 75 MILLIGRAM(S): 75 TABLET, FILM COATED ORAL at 11:33

## 2021-01-01 RX ADMIN — Medication 4 UNIT(S): at 08:17

## 2021-01-01 RX ADMIN — Medication 88 MICROGRAM(S): at 05:57

## 2021-01-01 RX ADMIN — LINEZOLID 600 MILLIGRAM(S): 600 INJECTION, SOLUTION INTRAVENOUS at 05:05

## 2021-01-01 RX ADMIN — APIXABAN 2.5 MILLIGRAM(S): 2.5 TABLET, FILM COATED ORAL at 05:06

## 2021-01-01 RX ADMIN — Medication 650 MILLIGRAM(S): at 13:02

## 2021-01-01 RX ADMIN — POLYETHYLENE GLYCOL 3350 17 GRAM(S): 17 POWDER, FOR SOLUTION ORAL at 17:19

## 2021-01-01 RX ADMIN — Medication 25 MILLIGRAM(S): at 06:08

## 2021-01-01 RX ADMIN — Medication 25 MILLIGRAM(S): at 05:11

## 2021-01-01 RX ADMIN — INSULIN GLARGINE 10 UNIT(S): 100 INJECTION, SOLUTION SUBCUTANEOUS at 14:05

## 2021-01-01 RX ADMIN — Medication 88 MICROGRAM(S): at 05:40

## 2021-01-01 RX ADMIN — Medication 50 MILLIGRAM(S): at 17:04

## 2021-01-01 RX ADMIN — Medication 50 MILLIGRAM(S): at 05:38

## 2021-01-01 RX ADMIN — Medication 500 MILLIGRAM(S): at 11:55

## 2021-01-01 RX ADMIN — LINEZOLID 600 MILLIGRAM(S): 600 INJECTION, SOLUTION INTRAVENOUS at 05:06

## 2021-01-01 RX ADMIN — Medication 4 UNIT(S): at 17:18

## 2021-01-01 RX ADMIN — INSULIN GLARGINE 5 UNIT(S): 100 INJECTION, SOLUTION SUBCUTANEOUS at 21:07

## 2021-01-01 RX ADMIN — Medication 50 MEQ/KG/HR: at 09:30

## 2021-01-01 RX ADMIN — Medication 1: at 16:35

## 2021-01-01 RX ADMIN — APIXABAN 2.5 MILLIGRAM(S): 2.5 TABLET, FILM COATED ORAL at 18:04

## 2021-01-01 RX ADMIN — Medication 200 GRAM(S): at 14:28

## 2021-01-01 RX ADMIN — PANTOPRAZOLE SODIUM 40 MILLIGRAM(S): 20 TABLET, DELAYED RELEASE ORAL at 05:20

## 2021-01-01 RX ADMIN — Medication 20 MILLIGRAM(S): at 05:13

## 2021-01-01 RX ADMIN — APIXABAN 2.5 MILLIGRAM(S): 2.5 TABLET, FILM COATED ORAL at 17:24

## 2021-01-01 RX ADMIN — APIXABAN 2.5 MILLIGRAM(S): 2.5 TABLET, FILM COATED ORAL at 05:04

## 2021-01-01 RX ADMIN — Medication 25 MILLIGRAM(S): at 12:39

## 2021-01-01 RX ADMIN — POLYETHYLENE GLYCOL 3350 17 GRAM(S): 17 POWDER, FOR SOLUTION ORAL at 12:00

## 2021-01-01 RX ADMIN — APIXABAN 2.5 MILLIGRAM(S): 2.5 TABLET, FILM COATED ORAL at 17:07

## 2021-01-01 RX ADMIN — Medication 88 MICROGRAM(S): at 05:05

## 2021-01-01 RX ADMIN — Medication 25 MILLIGRAM(S): at 20:52

## 2021-01-01 RX ADMIN — APIXABAN 2.5 MILLIGRAM(S): 2.5 TABLET, FILM COATED ORAL at 06:25

## 2021-01-01 RX ADMIN — ONDANSETRON 4 MILLIGRAM(S): 8 TABLET, FILM COATED ORAL at 11:28

## 2021-01-01 RX ADMIN — HUMAN INSULIN 20 UNIT(S): 100 INJECTION, SUSPENSION SUBCUTANEOUS at 22:52

## 2021-01-01 RX ADMIN — INSULIN GLARGINE 7 UNIT(S): 100 INJECTION, SOLUTION SUBCUTANEOUS at 21:07

## 2021-01-01 RX ADMIN — Medication 650 MILLIGRAM(S): at 19:35

## 2021-01-01 RX ADMIN — PANTOPRAZOLE SODIUM 40 MILLIGRAM(S): 20 TABLET, DELAYED RELEASE ORAL at 05:12

## 2021-01-01 RX ADMIN — MEROPENEM 100 MILLIGRAM(S): 1 INJECTION INTRAVENOUS at 06:30

## 2021-01-01 RX ADMIN — Medication 4 UNIT(S): at 17:26

## 2021-01-01 RX ADMIN — Medication 3 UNIT(S): at 07:48

## 2021-01-01 RX ADMIN — INSULIN HUMAN 5 UNIT(S): 100 INJECTION, SOLUTION SUBCUTANEOUS at 14:57

## 2021-01-01 RX ADMIN — APIXABAN 2.5 MILLIGRAM(S): 2.5 TABLET, FILM COATED ORAL at 05:33

## 2021-01-01 RX ADMIN — Medication 25 MILLIGRAM(S): at 06:03

## 2021-01-01 RX ADMIN — ALBUTEROL 1 PUFF(S): 90 AEROSOL, METERED ORAL at 13:39

## 2021-01-01 RX ADMIN — Medication 50 GRAM(S): at 14:48

## 2021-01-01 RX ADMIN — Medication 1: at 17:08

## 2021-01-01 RX ADMIN — Medication 650 MILLIGRAM(S): at 14:35

## 2021-01-01 RX ADMIN — Medication 25 MILLIGRAM(S): at 06:39

## 2021-01-01 RX ADMIN — Medication 2 UNIT(S): at 16:20

## 2021-01-01 RX ADMIN — INSULIN GLARGINE 7 UNIT(S): 100 INJECTION, SOLUTION SUBCUTANEOUS at 21:21

## 2021-01-01 RX ADMIN — ALBUTEROL 1 PUFF(S): 90 AEROSOL, METERED ORAL at 08:09

## 2021-01-01 RX ADMIN — SODIUM POLYSTYRENE SULFONATE 15 GRAM(S): 4.1 POWDER, FOR SUSPENSION ORAL at 10:39

## 2021-01-01 RX ADMIN — APIXABAN 2.5 MILLIGRAM(S): 2.5 TABLET, FILM COATED ORAL at 05:22

## 2021-01-01 RX ADMIN — AMLODIPINE BESYLATE 5 MILLIGRAM(S): 2.5 TABLET ORAL at 06:05

## 2021-01-01 RX ADMIN — AMIODARONE HYDROCHLORIDE 200 MILLIGRAM(S): 400 TABLET ORAL at 05:27

## 2021-01-01 RX ADMIN — PANTOPRAZOLE SODIUM 40 MILLIGRAM(S): 20 TABLET, DELAYED RELEASE ORAL at 17:26

## 2021-01-01 RX ADMIN — AMIODARONE HYDROCHLORIDE 200 MILLIGRAM(S): 400 TABLET ORAL at 06:28

## 2021-01-01 RX ADMIN — Medication 650 MILLIGRAM(S): at 21:38

## 2021-01-01 RX ADMIN — Medication 2: at 12:42

## 2021-01-01 RX ADMIN — Medication 4 UNIT(S): at 08:05

## 2021-01-01 RX ADMIN — CLOPIDOGREL BISULFATE 75 MILLIGRAM(S): 75 TABLET, FILM COATED ORAL at 12:23

## 2021-01-01 RX ADMIN — PANTOPRAZOLE SODIUM 40 MILLIGRAM(S): 20 TABLET, DELAYED RELEASE ORAL at 06:43

## 2021-01-01 RX ADMIN — MEROPENEM 100 MILLIGRAM(S): 1 INJECTION INTRAVENOUS at 18:18

## 2021-01-01 RX ADMIN — Medication 2 UNIT(S): at 08:17

## 2021-01-01 RX ADMIN — Medication 20 MILLIGRAM(S): at 05:07

## 2021-01-01 RX ADMIN — FAMOTIDINE 20 MILLIGRAM(S): 10 INJECTION INTRAVENOUS at 21:16

## 2021-01-01 RX ADMIN — Medication 500000 UNIT(S): at 00:01

## 2021-01-01 RX ADMIN — Medication 25 MILLIGRAM(S): at 17:56

## 2021-01-01 RX ADMIN — FLUCONAZOLE 100 MILLIGRAM(S): 150 TABLET ORAL at 12:52

## 2021-01-01 RX ADMIN — Medication 40 MILLIGRAM(S): at 17:43

## 2021-01-01 RX ADMIN — CEFEPIME 1000 MILLIGRAM(S): 1 INJECTION, POWDER, FOR SOLUTION INTRAMUSCULAR; INTRAVENOUS at 04:50

## 2021-01-01 RX ADMIN — Medication 2 UNIT(S): at 17:30

## 2021-01-01 RX ADMIN — AMIODARONE HYDROCHLORIDE 200 MILLIGRAM(S): 400 TABLET ORAL at 11:14

## 2021-01-01 RX ADMIN — CLOPIDOGREL BISULFATE 75 MILLIGRAM(S): 75 TABLET, FILM COATED ORAL at 11:35

## 2021-01-01 RX ADMIN — CHLORHEXIDINE GLUCONATE 1 APPLICATION(S): 213 SOLUTION TOPICAL at 07:04

## 2021-01-01 RX ADMIN — Medication 50 MILLIGRAM(S): at 11:35

## 2021-01-01 RX ADMIN — Medication 50 MILLIGRAM(S): at 23:48

## 2021-01-01 RX ADMIN — Medication 325 MILLIGRAM(S): at 12:15

## 2021-01-01 RX ADMIN — SODIUM CHLORIDE 100 MILLILITER(S): 9 INJECTION INTRAMUSCULAR; INTRAVENOUS; SUBCUTANEOUS at 17:29

## 2021-01-01 RX ADMIN — Medication 4 UNIT(S): at 12:39

## 2021-01-01 RX ADMIN — PANTOPRAZOLE SODIUM 40 MILLIGRAM(S): 20 TABLET, DELAYED RELEASE ORAL at 05:42

## 2021-01-01 RX ADMIN — Medication 650 MILLIGRAM(S): at 21:12

## 2021-01-01 RX ADMIN — CHLORHEXIDINE GLUCONATE 1 APPLICATION(S): 213 SOLUTION TOPICAL at 05:21

## 2021-01-01 RX ADMIN — ATORVASTATIN CALCIUM 40 MILLIGRAM(S): 80 TABLET, FILM COATED ORAL at 22:18

## 2021-01-01 RX ADMIN — Medication 650 MILLIGRAM(S): at 22:16

## 2021-01-01 RX ADMIN — Medication 1 TABLET(S): at 12:04

## 2021-01-01 RX ADMIN — Medication 650 MILLIGRAM(S): at 13:11

## 2021-01-01 RX ADMIN — Medication 50 MILLIGRAM(S): at 17:32

## 2021-01-01 RX ADMIN — Medication 88 MICROGRAM(S): at 05:21

## 2021-01-01 RX ADMIN — Medication 2 UNIT(S): at 17:17

## 2021-01-01 RX ADMIN — CARVEDILOL PHOSPHATE 12.5 MILLIGRAM(S): 80 CAPSULE, EXTENDED RELEASE ORAL at 05:13

## 2021-01-01 RX ADMIN — APIXABAN 2.5 MILLIGRAM(S): 2.5 TABLET, FILM COATED ORAL at 06:41

## 2021-01-01 RX ADMIN — INSULIN GLARGINE 6 UNIT(S): 100 INJECTION, SOLUTION SUBCUTANEOUS at 21:20

## 2021-01-01 RX ADMIN — NYSTATIN CREAM 1 APPLICATION(S): 100000 CREAM TOPICAL at 17:32

## 2021-01-01 RX ADMIN — Medication 650 MILLIGRAM(S): at 21:08

## 2021-01-01 RX ADMIN — AMIODARONE HYDROCHLORIDE 200 MILLIGRAM(S): 400 TABLET ORAL at 17:23

## 2021-01-01 RX ADMIN — Medication 1: at 12:41

## 2021-01-01 RX ADMIN — MEROPENEM 100 MILLIGRAM(S): 1 INJECTION INTRAVENOUS at 06:06

## 2021-01-01 RX ADMIN — APIXABAN 2.5 MILLIGRAM(S): 2.5 TABLET, FILM COATED ORAL at 05:12

## 2021-01-01 RX ADMIN — Medication 2: at 17:05

## 2021-01-01 RX ADMIN — SENNA PLUS 2 TABLET(S): 8.6 TABLET ORAL at 22:43

## 2021-01-01 RX ADMIN — Medication 4 UNIT(S): at 17:19

## 2021-01-01 RX ADMIN — Medication 25 MILLIGRAM(S): at 23:07

## 2021-01-01 RX ADMIN — Medication 650 MILLIGRAM(S): at 05:19

## 2021-01-01 RX ADMIN — Medication 2 UNIT(S): at 12:14

## 2021-01-01 RX ADMIN — DEXTROSE MONOHYDRATE, SODIUM CHLORIDE, AND POTASSIUM CHLORIDE 250 MILLILITER(S): 50; .745; 4.5 INJECTION, SOLUTION INTRAVENOUS at 02:25

## 2021-01-01 RX ADMIN — MEROPENEM 100 MILLIGRAM(S): 1 INJECTION INTRAVENOUS at 05:02

## 2021-01-01 RX ADMIN — Medication 2 UNIT(S): at 11:42

## 2021-01-01 RX ADMIN — MEROPENEM 100 MILLIGRAM(S): 1 INJECTION INTRAVENOUS at 17:22

## 2021-01-01 RX ADMIN — PANTOPRAZOLE SODIUM 40 MILLIGRAM(S): 20 TABLET, DELAYED RELEASE ORAL at 17:46

## 2021-01-01 RX ADMIN — NYSTATIN CREAM 1 APPLICATION(S): 100000 CREAM TOPICAL at 17:38

## 2021-01-01 RX ADMIN — MEROPENEM 100 MILLIGRAM(S): 1 INJECTION INTRAVENOUS at 18:30

## 2021-01-01 RX ADMIN — APIXABAN 2.5 MILLIGRAM(S): 2.5 TABLET, FILM COATED ORAL at 05:36

## 2021-01-01 RX ADMIN — Medication 5 MILLIGRAM(S): at 18:40

## 2021-01-01 RX ADMIN — Medication 4 UNIT(S): at 17:27

## 2021-01-01 RX ADMIN — MEROPENEM 100 MILLIGRAM(S): 1 INJECTION INTRAVENOUS at 06:01

## 2021-01-01 RX ADMIN — Medication 25 MILLIGRAM(S): at 05:40

## 2021-01-01 RX ADMIN — PANTOPRAZOLE SODIUM 40 MILLIGRAM(S): 20 TABLET, DELAYED RELEASE ORAL at 06:15

## 2021-01-01 RX ADMIN — Medication 88 MICROGRAM(S): at 05:14

## 2021-01-01 RX ADMIN — Medication 88 MICROGRAM(S): at 05:13

## 2021-01-01 RX ADMIN — CHLORHEXIDINE GLUCONATE 1 APPLICATION(S): 213 SOLUTION TOPICAL at 07:28

## 2021-01-01 RX ADMIN — Medication 1 APPLICATION(S): at 17:09

## 2021-01-01 RX ADMIN — LINEZOLID 600 MILLIGRAM(S): 600 INJECTION, SOLUTION INTRAVENOUS at 17:17

## 2021-01-01 RX ADMIN — PANTOPRAZOLE SODIUM 40 MILLIGRAM(S): 20 TABLET, DELAYED RELEASE ORAL at 05:27

## 2021-01-01 RX ADMIN — Medication 325 MILLIGRAM(S): at 11:31

## 2021-01-01 RX ADMIN — CHLORHEXIDINE GLUCONATE 1 APPLICATION(S): 213 SOLUTION TOPICAL at 06:11

## 2021-01-01 RX ADMIN — Medication 50 MILLIGRAM(S): at 01:00

## 2021-01-01 RX ADMIN — Medication 25 MILLIGRAM(S): at 17:16

## 2021-01-01 RX ADMIN — Medication 2: at 17:10

## 2021-01-01 RX ADMIN — AMIODARONE HYDROCHLORIDE 200 MILLIGRAM(S): 400 TABLET ORAL at 17:43

## 2021-01-01 RX ADMIN — CHLORHEXIDINE GLUCONATE 1 APPLICATION(S): 213 SOLUTION TOPICAL at 06:21

## 2021-01-01 RX ADMIN — Medication 650 MILLIGRAM(S): at 09:31

## 2021-01-01 RX ADMIN — Medication 500000 UNIT(S): at 21:19

## 2021-01-01 RX ADMIN — Medication 1: at 17:14

## 2021-01-01 RX ADMIN — SODIUM CHLORIDE 100 MILLILITER(S): 9 INJECTION INTRAMUSCULAR; INTRAVENOUS; SUBCUTANEOUS at 17:14

## 2021-01-01 RX ADMIN — SENNA PLUS 2 TABLET(S): 8.6 TABLET ORAL at 21:43

## 2021-01-01 RX ADMIN — Medication 4 UNIT(S): at 11:39

## 2021-01-01 RX ADMIN — CHLORHEXIDINE GLUCONATE 1 APPLICATION(S): 213 SOLUTION TOPICAL at 06:37

## 2021-01-01 RX ADMIN — Medication 500000 UNIT(S): at 05:37

## 2021-01-01 RX ADMIN — Medication 88 MICROGRAM(S): at 05:45

## 2021-01-01 RX ADMIN — Medication 50 MILLIGRAM(S): at 17:15

## 2021-01-01 RX ADMIN — Medication 1: at 16:30

## 2021-01-01 RX ADMIN — Medication 25 MILLIGRAM(S): at 05:58

## 2021-01-01 RX ADMIN — Medication 500000 UNIT(S): at 05:02

## 2021-01-01 RX ADMIN — AMLODIPINE BESYLATE 5 MILLIGRAM(S): 2.5 TABLET ORAL at 05:13

## 2021-01-01 RX ADMIN — Medication 1 TABLET(S): at 11:36

## 2021-01-01 RX ADMIN — Medication 4 UNIT(S): at 11:43

## 2021-01-01 RX ADMIN — CHLORHEXIDINE GLUCONATE 1 APPLICATION(S): 213 SOLUTION TOPICAL at 04:44

## 2021-01-01 RX ADMIN — Medication 2: at 11:52

## 2021-01-01 RX ADMIN — PANTOPRAZOLE SODIUM 40 MILLIGRAM(S): 20 TABLET, DELAYED RELEASE ORAL at 06:04

## 2021-01-01 RX ADMIN — APIXABAN 2.5 MILLIGRAM(S): 2.5 TABLET, FILM COATED ORAL at 06:14

## 2021-01-01 RX ADMIN — Medication 650 MILLIGRAM(S): at 13:41

## 2021-01-01 RX ADMIN — Medication 2: at 11:24

## 2021-01-01 RX ADMIN — CHLORHEXIDINE GLUCONATE 1 APPLICATION(S): 213 SOLUTION TOPICAL at 05:28

## 2021-01-01 RX ADMIN — ALBUTEROL 1 PUFF(S): 90 AEROSOL, METERED ORAL at 19:56

## 2021-01-01 RX ADMIN — AMIODARONE HYDROCHLORIDE 200 MILLIGRAM(S): 400 TABLET ORAL at 05:33

## 2021-01-01 RX ADMIN — Medication 25 MILLIGRAM(S): at 17:26

## 2021-01-01 RX ADMIN — INSULIN GLARGINE 10 UNIT(S): 100 INJECTION, SOLUTION SUBCUTANEOUS at 21:53

## 2021-01-01 RX ADMIN — Medication 4 UNIT(S): at 12:05

## 2021-01-01 RX ADMIN — AMLODIPINE BESYLATE 5 MILLIGRAM(S): 2.5 TABLET ORAL at 06:24

## 2021-01-01 RX ADMIN — Medication 50 GRAM(S): at 11:20

## 2021-01-01 RX ADMIN — AMIODARONE HYDROCHLORIDE 200 MILLIGRAM(S): 400 TABLET ORAL at 06:22

## 2021-01-01 RX ADMIN — Medication 3 UNIT(S): at 16:46

## 2021-01-01 RX ADMIN — APIXABAN 2.5 MILLIGRAM(S): 2.5 TABLET, FILM COATED ORAL at 17:22

## 2021-01-01 RX ADMIN — Medication 25 MILLIGRAM(S): at 22:11

## 2021-01-01 RX ADMIN — Medication 25 MILLIGRAM(S): at 12:22

## 2021-01-01 RX ADMIN — Medication 4 UNIT(S): at 16:51

## 2021-01-01 RX ADMIN — AMIODARONE HYDROCHLORIDE 200 MILLIGRAM(S): 400 TABLET ORAL at 05:35

## 2021-01-01 RX ADMIN — Medication 2 UNIT(S): at 07:45

## 2021-01-01 RX ADMIN — Medication 3: at 17:19

## 2021-01-01 RX ADMIN — Medication 2: at 17:15

## 2021-01-01 RX ADMIN — Medication 975 MILLIGRAM(S): at 17:54

## 2021-01-01 RX ADMIN — Medication 88 MICROGRAM(S): at 06:17

## 2021-01-01 RX ADMIN — Medication 5: at 16:24

## 2021-01-01 RX ADMIN — Medication 88 MICROGRAM(S): at 06:36

## 2021-01-01 RX ADMIN — SENNA PLUS 2 TABLET(S): 8.6 TABLET ORAL at 21:12

## 2021-01-01 RX ADMIN — CLOPIDOGREL BISULFATE 75 MILLIGRAM(S): 75 TABLET, FILM COATED ORAL at 12:39

## 2021-01-01 RX ADMIN — Medication 25 MILLIGRAM(S): at 11:31

## 2021-01-01 RX ADMIN — Medication 1 TABLET(S): at 12:00

## 2021-01-01 RX ADMIN — APIXABAN 2.5 MILLIGRAM(S): 2.5 TABLET, FILM COATED ORAL at 05:19

## 2021-01-01 RX ADMIN — Medication 250 MILLIGRAM(S): at 17:23

## 2021-01-01 RX ADMIN — Medication 3: at 17:26

## 2021-01-01 RX ADMIN — PANTOPRAZOLE SODIUM 40 MILLIGRAM(S): 20 TABLET, DELAYED RELEASE ORAL at 05:21

## 2021-01-01 RX ADMIN — MEROPENEM 100 MILLIGRAM(S): 1 INJECTION INTRAVENOUS at 17:29

## 2021-01-01 RX ADMIN — PANTOPRAZOLE SODIUM 40 MILLIGRAM(S): 20 TABLET, DELAYED RELEASE ORAL at 17:27

## 2021-01-01 RX ADMIN — MEROPENEM 100 MILLIGRAM(S): 1 INJECTION INTRAVENOUS at 05:28

## 2021-01-01 RX ADMIN — PANTOPRAZOLE SODIUM 40 MILLIGRAM(S): 20 TABLET, DELAYED RELEASE ORAL at 06:20

## 2021-01-01 RX ADMIN — ALBUTEROL 1 PUFF(S): 90 AEROSOL, METERED ORAL at 11:34

## 2021-01-01 RX ADMIN — APIXABAN 2.5 MILLIGRAM(S): 2.5 TABLET, FILM COATED ORAL at 17:41

## 2021-01-01 RX ADMIN — Medication 5 MILLIGRAM(S): at 21:54

## 2021-01-01 RX ADMIN — INSULIN GLARGINE 16 UNIT(S): 100 INJECTION, SOLUTION SUBCUTANEOUS at 22:43

## 2021-01-01 RX ADMIN — Medication 25 MILLIGRAM(S): at 00:26

## 2021-01-01 RX ADMIN — Medication 5 MILLIGRAM(S): at 06:43

## 2021-01-01 RX ADMIN — PANTOPRAZOLE SODIUM 40 MILLIGRAM(S): 20 TABLET, DELAYED RELEASE ORAL at 06:37

## 2021-01-01 RX ADMIN — AMIODARONE HYDROCHLORIDE 200 MILLIGRAM(S): 400 TABLET ORAL at 05:57

## 2021-01-01 RX ADMIN — Medication 25 MILLIGRAM(S): at 23:09

## 2021-01-01 RX ADMIN — Medication 4: at 16:52

## 2021-01-01 RX ADMIN — MEROPENEM 100 MILLIGRAM(S): 1 INJECTION INTRAVENOUS at 17:56

## 2021-01-01 RX ADMIN — LINEZOLID 600 MILLIGRAM(S): 600 INJECTION, SOLUTION INTRAVENOUS at 17:19

## 2021-01-01 RX ADMIN — Medication 650 MILLIGRAM(S): at 21:52

## 2021-01-01 RX ADMIN — ALBUTEROL 1 PUFF(S): 90 AEROSOL, METERED ORAL at 20:45

## 2021-01-01 RX ADMIN — Medication 88 MICROGRAM(S): at 06:03

## 2021-01-01 RX ADMIN — Medication 500 MILLIGRAM(S): at 11:40

## 2021-01-01 RX ADMIN — Medication 4 UNIT(S): at 08:10

## 2021-01-01 RX ADMIN — Medication 25 MILLIGRAM(S): at 23:10

## 2021-01-01 RX ADMIN — SODIUM CHLORIDE 150 MILLILITER(S): 9 INJECTION, SOLUTION INTRAVENOUS at 13:13

## 2021-01-01 RX ADMIN — PANTOPRAZOLE SODIUM 40 MILLIGRAM(S): 20 TABLET, DELAYED RELEASE ORAL at 18:40

## 2021-01-01 RX ADMIN — Medication 88 MICROGRAM(S): at 05:59

## 2021-01-01 RX ADMIN — HEPARIN SODIUM 5000 UNIT(S): 5000 INJECTION INTRAVENOUS; SUBCUTANEOUS at 21:18

## 2021-01-01 RX ADMIN — MEROPENEM 100 MILLIGRAM(S): 1 INJECTION INTRAVENOUS at 17:32

## 2021-01-01 RX ADMIN — Medication 88 MICROGRAM(S): at 06:08

## 2021-01-01 RX ADMIN — INSULIN GLARGINE 20 UNIT(S): 100 INJECTION, SOLUTION SUBCUTANEOUS at 22:11

## 2021-01-01 RX ADMIN — MEROPENEM 100 MILLIGRAM(S): 1 INJECTION INTRAVENOUS at 05:04

## 2021-01-01 RX ADMIN — ATORVASTATIN CALCIUM 40 MILLIGRAM(S): 80 TABLET, FILM COATED ORAL at 21:11

## 2021-01-01 RX ADMIN — ALBUTEROL 1 PUFF(S): 90 AEROSOL, METERED ORAL at 13:22

## 2021-01-01 RX ADMIN — CHLORHEXIDINE GLUCONATE 1 APPLICATION(S): 213 SOLUTION TOPICAL at 07:37

## 2021-01-01 RX ADMIN — Medication 1 APPLICATION(S): at 05:10

## 2021-01-01 RX ADMIN — Medication 1 TABLET(S): at 11:31

## 2021-01-01 RX ADMIN — Medication 8 UNIT(S): at 14:08

## 2021-01-01 RX ADMIN — Medication 25 MILLIGRAM(S): at 17:15

## 2021-01-01 RX ADMIN — MEROPENEM 100 MILLIGRAM(S): 1 INJECTION INTRAVENOUS at 17:33

## 2021-01-01 RX ADMIN — Medication 25 MILLIGRAM(S): at 23:05

## 2021-01-01 RX ADMIN — ONDANSETRON 4 MILLIGRAM(S): 8 TABLET, FILM COATED ORAL at 13:40

## 2021-01-01 RX ADMIN — POLYETHYLENE GLYCOL 3350 17 GRAM(S): 17 POWDER, FOR SOLUTION ORAL at 12:23

## 2021-01-01 RX ADMIN — ERTAPENEM SODIUM 120 MILLIGRAM(S): 1 INJECTION, POWDER, LYOPHILIZED, FOR SOLUTION INTRAMUSCULAR; INTRAVENOUS at 12:41

## 2021-01-01 RX ADMIN — Medication 2 UNIT(S): at 11:39

## 2021-01-01 RX ADMIN — MEROPENEM 100 MILLIGRAM(S): 1 INJECTION INTRAVENOUS at 17:45

## 2021-01-01 RX ADMIN — Medication 650 MILLIGRAM(S): at 05:40

## 2021-01-01 RX ADMIN — ATORVASTATIN CALCIUM 40 MILLIGRAM(S): 80 TABLET, FILM COATED ORAL at 22:24

## 2021-01-01 RX ADMIN — INSULIN GLARGINE 7 UNIT(S): 100 INJECTION, SOLUTION SUBCUTANEOUS at 21:30

## 2021-01-01 RX ADMIN — Medication 2 UNIT(S): at 17:13

## 2021-01-01 RX ADMIN — Medication 1 TABLET(S): at 11:14

## 2021-01-01 RX ADMIN — Medication 5 MILLIGRAM(S): at 01:18

## 2021-01-01 RX ADMIN — CLOPIDOGREL BISULFATE 75 MILLIGRAM(S): 75 TABLET, FILM COATED ORAL at 19:01

## 2021-01-01 RX ADMIN — ATORVASTATIN CALCIUM 40 MILLIGRAM(S): 80 TABLET, FILM COATED ORAL at 21:53

## 2021-01-01 RX ADMIN — Medication 1 TABLET(S): at 12:23

## 2021-01-01 RX ADMIN — IMMUNE GLOBULIN (HUMAN) 30 GRAM(S): 10 INJECTION INTRAVENOUS; SUBCUTANEOUS at 11:54

## 2021-01-01 RX ADMIN — Medication 1: at 12:51

## 2021-01-01 RX ADMIN — AMIODARONE HYDROCHLORIDE 200 MILLIGRAM(S): 400 TABLET ORAL at 17:25

## 2021-01-01 RX ADMIN — CARVEDILOL PHOSPHATE 12.5 MILLIGRAM(S): 80 CAPSULE, EXTENDED RELEASE ORAL at 05:59

## 2021-01-01 RX ADMIN — APIXABAN 2.5 MILLIGRAM(S): 2.5 TABLET, FILM COATED ORAL at 18:20

## 2021-01-01 RX ADMIN — Medication 650 MILLIGRAM(S): at 23:44

## 2021-01-01 RX ADMIN — SODIUM CHLORIDE 75 MILLILITER(S): 9 INJECTION INTRAMUSCULAR; INTRAVENOUS; SUBCUTANEOUS at 18:56

## 2021-01-01 RX ADMIN — Medication 1: at 08:01

## 2021-01-01 RX ADMIN — Medication 500000 UNIT(S): at 00:58

## 2021-01-01 RX ADMIN — Medication 25 MILLIGRAM(S): at 11:42

## 2021-01-01 RX ADMIN — Medication 2 UNIT(S): at 12:34

## 2021-01-01 RX ADMIN — Medication 8 UNIT(S): at 11:04

## 2021-01-01 RX ADMIN — PANTOPRAZOLE SODIUM 40 MILLIGRAM(S): 20 TABLET, DELAYED RELEASE ORAL at 06:08

## 2021-01-01 RX ADMIN — CHLORHEXIDINE GLUCONATE 1 APPLICATION(S): 213 SOLUTION TOPICAL at 05:13

## 2021-01-01 RX ADMIN — Medication 88 MICROGRAM(S): at 05:35

## 2021-01-01 RX ADMIN — Medication 1 TABLET(S): at 11:43

## 2021-01-01 RX ADMIN — ALBUTEROL 1 PUFF(S): 90 AEROSOL, METERED ORAL at 19:40

## 2021-01-01 RX ADMIN — CHLORHEXIDINE GLUCONATE 1 APPLICATION(S): 213 SOLUTION TOPICAL at 06:23

## 2021-01-01 RX ADMIN — MEROPENEM 100 MILLIGRAM(S): 1 INJECTION INTRAVENOUS at 18:04

## 2021-01-01 RX ADMIN — FLUCONAZOLE 100 MILLIGRAM(S): 150 TABLET ORAL at 12:39

## 2021-01-01 RX ADMIN — POLYETHYLENE GLYCOL 3350 17 GRAM(S): 17 POWDER, FOR SOLUTION ORAL at 11:31

## 2021-01-01 RX ADMIN — AMLODIPINE BESYLATE 5 MILLIGRAM(S): 2.5 TABLET ORAL at 05:17

## 2021-01-01 RX ADMIN — Medication 4: at 08:51

## 2021-01-01 RX ADMIN — Medication 500000 UNIT(S): at 12:01

## 2021-01-01 RX ADMIN — Medication 250 MILLIGRAM(S): at 20:59

## 2021-01-01 RX ADMIN — Medication 88 MICROGRAM(S): at 06:41

## 2021-01-01 RX ADMIN — HUMAN INSULIN 20 UNIT(S): 100 INJECTION, SUSPENSION SUBCUTANEOUS at 07:51

## 2021-01-01 RX ADMIN — HUMAN INSULIN 22 UNIT(S): 100 INJECTION, SUSPENSION SUBCUTANEOUS at 21:41

## 2021-01-01 RX ADMIN — Medication 88 MICROGRAM(S): at 05:58

## 2021-01-01 RX ADMIN — Medication 1 APPLICATION(S): at 05:13

## 2021-01-01 RX ADMIN — Medication 88 MICROGRAM(S): at 05:51

## 2021-01-01 RX ADMIN — AMIODARONE HYDROCHLORIDE 200 MILLIGRAM(S): 400 TABLET ORAL at 17:46

## 2021-01-01 RX ADMIN — INSULIN GLARGINE 7 UNIT(S): 100 INJECTION, SOLUTION SUBCUTANEOUS at 22:26

## 2021-01-01 RX ADMIN — Medication 325 MILLIGRAM(S): at 11:32

## 2021-01-01 RX ADMIN — POLYETHYLENE GLYCOL 3350 17 GRAM(S): 17 POWDER, FOR SOLUTION ORAL at 06:04

## 2021-01-01 RX ADMIN — Medication 25 MILLIGRAM(S): at 14:03

## 2021-01-01 RX ADMIN — Medication 2 UNIT(S): at 08:01

## 2021-01-01 RX ADMIN — CHLORHEXIDINE GLUCONATE 1 APPLICATION(S): 213 SOLUTION TOPICAL at 06:20

## 2021-01-01 RX ADMIN — MEROPENEM 100 MILLIGRAM(S): 1 INJECTION INTRAVENOUS at 06:31

## 2021-01-01 RX ADMIN — AMIODARONE HYDROCHLORIDE 200 MILLIGRAM(S): 400 TABLET ORAL at 06:17

## 2021-01-01 RX ADMIN — NYSTATIN CREAM 1 APPLICATION(S): 100000 CREAM TOPICAL at 06:04

## 2021-01-01 RX ADMIN — Medication 650 MILLIGRAM(S): at 23:46

## 2021-01-01 RX ADMIN — MEROPENEM 100 MILLIGRAM(S): 1 INJECTION INTRAVENOUS at 06:00

## 2021-01-01 RX ADMIN — Medication 650 MILLIGRAM(S): at 18:17

## 2021-01-01 RX ADMIN — Medication 50 MILLIGRAM(S): at 11:42

## 2021-01-01 RX ADMIN — ALBUTEROL 1 PUFF(S): 90 AEROSOL, METERED ORAL at 07:46

## 2021-01-01 RX ADMIN — LINEZOLID 600 MILLIGRAM(S): 600 INJECTION, SOLUTION INTRAVENOUS at 17:08

## 2021-01-01 RX ADMIN — Medication 1 APPLICATION(S): at 05:08

## 2021-01-01 RX ADMIN — INSULIN GLARGINE 7 UNIT(S): 100 INJECTION, SOLUTION SUBCUTANEOUS at 21:27

## 2021-01-01 RX ADMIN — Medication 500000 UNIT(S): at 16:52

## 2021-01-01 RX ADMIN — APIXABAN 2.5 MILLIGRAM(S): 2.5 TABLET, FILM COATED ORAL at 17:23

## 2021-01-01 RX ADMIN — Medication 1 TABLET(S): at 11:02

## 2021-01-01 RX ADMIN — Medication 1 TABLET(S): at 12:25

## 2021-01-01 RX ADMIN — ALBUTEROL 1 PUFF(S): 90 AEROSOL, METERED ORAL at 08:15

## 2021-01-01 RX ADMIN — HUMAN INSULIN 20 UNIT(S): 100 INJECTION, SUSPENSION SUBCUTANEOUS at 17:10

## 2021-01-01 RX ADMIN — Medication 4: at 18:18

## 2021-01-01 RX ADMIN — Medication 50 MILLIGRAM(S): at 11:37

## 2021-01-01 RX ADMIN — Medication 650 MILLIGRAM(S): at 15:42

## 2021-01-01 RX ADMIN — Medication 50 MILLIGRAM(S): at 05:06

## 2021-01-01 RX ADMIN — ATORVASTATIN CALCIUM 40 MILLIGRAM(S): 80 TABLET, FILM COATED ORAL at 21:18

## 2021-01-01 RX ADMIN — SENNA PLUS 2 TABLET(S): 8.6 TABLET ORAL at 21:39

## 2021-01-01 RX ADMIN — INSULIN GLARGINE 5 UNIT(S): 100 INJECTION, SOLUTION SUBCUTANEOUS at 21:35

## 2021-01-01 RX ADMIN — AMLODIPINE BESYLATE 5 MILLIGRAM(S): 2.5 TABLET ORAL at 05:57

## 2021-01-01 RX ADMIN — PANTOPRAZOLE SODIUM 40 MILLIGRAM(S): 20 TABLET, DELAYED RELEASE ORAL at 06:23

## 2021-01-01 RX ADMIN — Medication 25 MILLIGRAM(S): at 17:45

## 2021-01-01 RX ADMIN — INSULIN HUMAN 2 UNIT(S)/HR: 100 INJECTION, SOLUTION SUBCUTANEOUS at 23:40

## 2021-01-01 RX ADMIN — INSULIN GLARGINE 10 UNIT(S): 100 INJECTION, SOLUTION SUBCUTANEOUS at 21:24

## 2021-01-01 RX ADMIN — PANTOPRAZOLE SODIUM 40 MILLIGRAM(S): 20 TABLET, DELAYED RELEASE ORAL at 05:35

## 2021-01-01 RX ADMIN — HEPARIN SODIUM 5000 UNIT(S): 5000 INJECTION INTRAVENOUS; SUBCUTANEOUS at 17:35

## 2021-01-01 RX ADMIN — Medication 50 MILLIGRAM(S): at 00:32

## 2021-01-01 RX ADMIN — AMIODARONE HYDROCHLORIDE 200 MILLIGRAM(S): 400 TABLET ORAL at 05:17

## 2021-01-01 RX ADMIN — PANTOPRAZOLE SODIUM 40 MILLIGRAM(S): 20 TABLET, DELAYED RELEASE ORAL at 05:10

## 2021-01-01 RX ADMIN — Medication 4 UNIT(S): at 07:50

## 2021-01-01 RX ADMIN — Medication 2 UNIT(S): at 17:03

## 2021-01-01 RX ADMIN — AMIODARONE HYDROCHLORIDE 200 MILLIGRAM(S): 400 TABLET ORAL at 06:05

## 2021-01-01 RX ADMIN — AMIODARONE HYDROCHLORIDE 200 MILLIGRAM(S): 400 TABLET ORAL at 05:38

## 2021-01-01 RX ADMIN — SENNA PLUS 2 TABLET(S): 8.6 TABLET ORAL at 21:31

## 2021-01-01 RX ADMIN — Medication 500 MILLIGRAM(S): at 13:42

## 2021-01-01 RX ADMIN — Medication 88 MICROGRAM(S): at 05:06

## 2021-01-01 RX ADMIN — Medication 2 UNIT(S): at 11:53

## 2021-01-01 RX ADMIN — CHLORHEXIDINE GLUCONATE 1 APPLICATION(S): 213 SOLUTION TOPICAL at 05:02

## 2021-01-01 RX ADMIN — Medication 50 GRAM(S): at 07:28

## 2021-01-01 RX ADMIN — ALBUTEROL 1 PUFF(S): 90 AEROSOL, METERED ORAL at 08:05

## 2021-01-01 RX ADMIN — Medication 25 MILLIGRAM(S): at 12:16

## 2021-01-01 RX ADMIN — Medication 50 MILLIGRAM(S): at 05:18

## 2021-01-01 RX ADMIN — Medication 2: at 08:06

## 2021-01-01 RX ADMIN — AMIODARONE HYDROCHLORIDE 200 MILLIGRAM(S): 400 TABLET ORAL at 05:58

## 2021-01-01 RX ADMIN — Medication 25 GRAM(S): at 17:18

## 2021-01-01 RX ADMIN — SENNA PLUS 2 TABLET(S): 8.6 TABLET ORAL at 22:24

## 2021-01-01 RX ADMIN — Medication 1 TABLET(S): at 12:39

## 2021-01-01 RX ADMIN — PANTOPRAZOLE SODIUM 40 MILLIGRAM(S): 20 TABLET, DELAYED RELEASE ORAL at 07:41

## 2021-01-01 RX ADMIN — Medication 10 MILLIGRAM(S): at 17:25

## 2021-01-01 RX ADMIN — APIXABAN 2.5 MILLIGRAM(S): 2.5 TABLET, FILM COATED ORAL at 17:55

## 2021-01-01 RX ADMIN — Medication 1 APPLICATION(S): at 17:05

## 2021-01-01 RX ADMIN — Medication 25 MILLIGRAM(S): at 18:38

## 2021-01-01 RX ADMIN — LINEZOLID 600 MILLIGRAM(S): 600 INJECTION, SOLUTION INTRAVENOUS at 17:15

## 2021-01-01 RX ADMIN — Medication 50 MILLIGRAM(S): at 00:01

## 2021-01-01 RX ADMIN — Medication 1 TABLET(S): at 12:43

## 2021-01-01 RX ADMIN — CLOPIDOGREL BISULFATE 75 MILLIGRAM(S): 75 TABLET, FILM COATED ORAL at 11:05

## 2021-01-01 RX ADMIN — PANTOPRAZOLE SODIUM 40 MILLIGRAM(S): 20 TABLET, DELAYED RELEASE ORAL at 11:35

## 2021-01-01 RX ADMIN — Medication 1: at 11:53

## 2021-01-01 RX ADMIN — Medication 650 MILLIGRAM(S): at 05:58

## 2021-01-01 RX ADMIN — INSULIN HUMAN 6 UNIT(S): 100 INJECTION, SOLUTION SUBCUTANEOUS at 02:01

## 2021-01-01 RX ADMIN — NYSTATIN CREAM 1 APPLICATION(S): 100000 CREAM TOPICAL at 17:45

## 2021-01-01 RX ADMIN — APIXABAN 2.5 MILLIGRAM(S): 2.5 TABLET, FILM COATED ORAL at 05:42

## 2021-01-01 RX ADMIN — FLUCONAZOLE 200 MILLIGRAM(S): 150 TABLET ORAL at 18:18

## 2021-01-01 RX ADMIN — APIXABAN 2.5 MILLIGRAM(S): 2.5 TABLET, FILM COATED ORAL at 06:28

## 2021-01-01 RX ADMIN — APIXABAN 2.5 MILLIGRAM(S): 2.5 TABLET, FILM COATED ORAL at 05:13

## 2021-01-01 RX ADMIN — Medication 650 MILLIGRAM(S): at 14:23

## 2021-01-01 RX ADMIN — AMIODARONE HYDROCHLORIDE 200 MILLIGRAM(S): 400 TABLET ORAL at 17:13

## 2021-01-01 RX ADMIN — INSULIN GLARGINE 15 UNIT(S): 100 INJECTION, SOLUTION SUBCUTANEOUS at 21:53

## 2021-01-01 RX ADMIN — Medication 88 MICROGRAM(S): at 06:05

## 2021-01-01 RX ADMIN — Medication 2 UNIT(S): at 11:21

## 2021-01-01 RX ADMIN — Medication 1: at 18:00

## 2021-01-01 RX ADMIN — INSULIN GLARGINE 12 UNIT(S): 100 INJECTION, SOLUTION SUBCUTANEOUS at 22:52

## 2021-01-01 RX ADMIN — PANTOPRAZOLE SODIUM 40 MILLIGRAM(S): 20 TABLET, DELAYED RELEASE ORAL at 05:57

## 2021-01-01 RX ADMIN — Medication 25 MILLIGRAM(S): at 06:36

## 2021-01-01 RX ADMIN — PANTOPRAZOLE SODIUM 40 MILLIGRAM(S): 20 TABLET, DELAYED RELEASE ORAL at 05:41

## 2021-01-01 RX ADMIN — Medication 50 MILLIGRAM(S): at 00:21

## 2021-01-01 RX ADMIN — SENNA PLUS 2 TABLET(S): 8.6 TABLET ORAL at 22:26

## 2021-01-01 RX ADMIN — INSULIN GLARGINE 6 UNIT(S): 100 INJECTION, SOLUTION SUBCUTANEOUS at 21:41

## 2021-01-01 RX ADMIN — PANTOPRAZOLE SODIUM 40 MILLIGRAM(S): 20 TABLET, DELAYED RELEASE ORAL at 12:44

## 2021-01-01 RX ADMIN — Medication 6: at 08:24

## 2021-01-01 RX ADMIN — AMIODARONE HYDROCHLORIDE 200 MILLIGRAM(S): 400 TABLET ORAL at 17:26

## 2021-01-01 RX ADMIN — Medication 2.5 MILLIGRAM(S): at 06:14

## 2021-01-01 RX ADMIN — FAMOTIDINE 20 MILLIGRAM(S): 10 INJECTION INTRAVENOUS at 22:59

## 2021-01-01 RX ADMIN — Medication 25 MILLIGRAM(S): at 00:04

## 2021-01-01 RX ADMIN — Medication 650 MILLIGRAM(S): at 22:26

## 2021-01-01 RX ADMIN — Medication 25 MILLIGRAM(S): at 11:39

## 2021-01-01 RX ADMIN — Medication 500000 UNIT(S): at 00:24

## 2021-01-01 RX ADMIN — AMIODARONE HYDROCHLORIDE 600 MILLIGRAM(S): 400 TABLET ORAL at 09:25

## 2021-01-01 RX ADMIN — Medication 25 GRAM(S): at 10:57

## 2021-01-01 RX ADMIN — Medication 25 MILLIGRAM(S): at 01:18

## 2021-01-01 RX ADMIN — INSULIN GLARGINE 5 UNIT(S): 100 INJECTION, SOLUTION SUBCUTANEOUS at 21:53

## 2021-01-01 RX ADMIN — Medication 50 GRAM(S): at 13:53

## 2021-01-01 RX ADMIN — Medication 30 MILLILITER(S): at 02:24

## 2021-01-01 RX ADMIN — Medication 2: at 11:21

## 2021-01-01 RX ADMIN — Medication 25 MILLIGRAM(S): at 14:25

## 2021-01-01 RX ADMIN — SODIUM CHLORIDE 75 MILLILITER(S): 9 INJECTION INTRAMUSCULAR; INTRAVENOUS; SUBCUTANEOUS at 05:29

## 2021-01-01 RX ADMIN — Medication 4 UNIT(S): at 12:24

## 2021-01-01 RX ADMIN — PANTOPRAZOLE SODIUM 40 MILLIGRAM(S): 20 TABLET, DELAYED RELEASE ORAL at 18:17

## 2021-01-01 RX ADMIN — AMIODARONE HYDROCHLORIDE 200 MILLIGRAM(S): 400 TABLET ORAL at 17:22

## 2021-01-01 RX ADMIN — Medication 25 MILLIGRAM(S): at 18:08

## 2021-01-01 RX ADMIN — Medication 325 MILLIGRAM(S): at 12:22

## 2021-01-01 RX ADMIN — Medication 2 UNIT(S): at 17:12

## 2021-01-01 RX ADMIN — SENNA PLUS 2 TABLET(S): 8.6 TABLET ORAL at 21:37

## 2021-01-01 RX ADMIN — CLOPIDOGREL BISULFATE 75 MILLIGRAM(S): 75 TABLET, FILM COATED ORAL at 13:51

## 2021-01-01 RX ADMIN — Medication 1 TABLET(S): at 11:12

## 2021-01-01 RX ADMIN — INSULIN GLARGINE 7 UNIT(S): 100 INJECTION, SOLUTION SUBCUTANEOUS at 21:31

## 2021-01-01 RX ADMIN — CHLORHEXIDINE GLUCONATE 1 APPLICATION(S): 213 SOLUTION TOPICAL at 06:26

## 2021-01-01 RX ADMIN — NYSTATIN CREAM 1 APPLICATION(S): 100000 CREAM TOPICAL at 06:37

## 2021-01-01 RX ADMIN — SODIUM CHLORIDE 100 MILLILITER(S): 9 INJECTION INTRAMUSCULAR; INTRAVENOUS; SUBCUTANEOUS at 18:18

## 2021-01-01 RX ADMIN — CLOPIDOGREL BISULFATE 75 MILLIGRAM(S): 75 TABLET, FILM COATED ORAL at 11:22

## 2021-01-01 RX ADMIN — ONDANSETRON 4 MILLIGRAM(S): 8 TABLET, FILM COATED ORAL at 06:48

## 2021-01-01 RX ADMIN — SENNA PLUS 2 TABLET(S): 8.6 TABLET ORAL at 21:52

## 2021-01-01 RX ADMIN — MEROPENEM 100 MILLIGRAM(S): 1 INJECTION INTRAVENOUS at 05:44

## 2021-01-01 RX ADMIN — Medication 650 MILLIGRAM(S): at 09:59

## 2021-01-01 RX ADMIN — Medication 25 MILLIGRAM(S): at 20:39

## 2021-01-01 RX ADMIN — Medication 3: at 07:04

## 2021-01-01 RX ADMIN — CHLORHEXIDINE GLUCONATE 1 APPLICATION(S): 213 SOLUTION TOPICAL at 06:17

## 2021-01-01 RX ADMIN — AMIODARONE HYDROCHLORIDE 200 MILLIGRAM(S): 400 TABLET ORAL at 17:21

## 2021-01-01 RX ADMIN — Medication 650 MILLIGRAM(S): at 21:17

## 2021-01-01 RX ADMIN — Medication 88 MICROGRAM(S): at 06:33

## 2021-01-01 RX ADMIN — Medication 3 UNIT(S): at 16:27

## 2021-01-01 RX ADMIN — AMIODARONE HYDROCHLORIDE 200 MILLIGRAM(S): 400 TABLET ORAL at 17:35

## 2021-01-01 RX ADMIN — Medication 2 UNIT(S): at 12:12

## 2021-01-01 RX ADMIN — PANTOPRAZOLE SODIUM 40 MILLIGRAM(S): 20 TABLET, DELAYED RELEASE ORAL at 05:28

## 2021-01-01 RX ADMIN — AMLODIPINE BESYLATE 5 MILLIGRAM(S): 2.5 TABLET ORAL at 05:58

## 2021-01-01 RX ADMIN — Medication 6 MILLILITER(S): at 21:10

## 2021-01-01 RX ADMIN — Medication 500000 UNIT(S): at 02:24

## 2021-01-01 RX ADMIN — SODIUM CHLORIDE 1000 MILLILITER(S): 9 INJECTION, SOLUTION INTRAVENOUS at 23:45

## 2021-01-01 RX ADMIN — Medication 500000 UNIT(S): at 06:41

## 2021-01-01 RX ADMIN — AMLODIPINE BESYLATE 5 MILLIGRAM(S): 2.5 TABLET ORAL at 05:35

## 2021-01-01 RX ADMIN — Medication 2 UNIT(S): at 08:13

## 2021-01-01 RX ADMIN — APIXABAN 2.5 MILLIGRAM(S): 2.5 TABLET, FILM COATED ORAL at 17:50

## 2021-01-01 RX ADMIN — Medication 500000 UNIT(S): at 23:48

## 2021-01-01 RX ADMIN — Medication 2 UNIT(S): at 16:41

## 2021-01-01 RX ADMIN — Medication 650 MILLIGRAM(S): at 05:35

## 2021-01-01 RX ADMIN — MEROPENEM 100 MILLIGRAM(S): 1 INJECTION INTRAVENOUS at 17:13

## 2021-01-01 RX ADMIN — FLUCONAZOLE 100 MILLIGRAM(S): 150 TABLET ORAL at 12:04

## 2021-01-01 RX ADMIN — Medication 500000 UNIT(S): at 05:06

## 2021-01-01 RX ADMIN — Medication 25 MILLIGRAM(S): at 05:35

## 2021-01-01 RX ADMIN — ATORVASTATIN CALCIUM 40 MILLIGRAM(S): 80 TABLET, FILM COATED ORAL at 21:16

## 2021-01-01 RX ADMIN — ALBUTEROL 1 PUFF(S): 90 AEROSOL, METERED ORAL at 14:38

## 2021-01-01 RX ADMIN — FAMOTIDINE 20 MILLIGRAM(S): 10 INJECTION INTRAVENOUS at 21:07

## 2021-01-01 RX ADMIN — AMIODARONE HYDROCHLORIDE 200 MILLIGRAM(S): 400 TABLET ORAL at 17:42

## 2021-01-01 RX ADMIN — Medication 88 MICROGRAM(S): at 05:07

## 2021-01-01 RX ADMIN — NYSTATIN CREAM 1 APPLICATION(S): 100000 CREAM TOPICAL at 06:06

## 2021-01-01 RX ADMIN — AMLODIPINE BESYLATE 5 MILLIGRAM(S): 2.5 TABLET ORAL at 05:33

## 2021-01-01 RX ADMIN — SODIUM CHLORIDE 150 MILLILITER(S): 9 INJECTION INTRAMUSCULAR; INTRAVENOUS; SUBCUTANEOUS at 03:28

## 2021-01-01 RX ADMIN — PANTOPRAZOLE SODIUM 40 MILLIGRAM(S): 20 TABLET, DELAYED RELEASE ORAL at 05:58

## 2021-01-01 RX ADMIN — APIXABAN 2.5 MILLIGRAM(S): 2.5 TABLET, FILM COATED ORAL at 05:51

## 2021-01-01 RX ADMIN — CHLORHEXIDINE GLUCONATE 1 APPLICATION(S): 213 SOLUTION TOPICAL at 06:49

## 2021-01-01 RX ADMIN — Medication 25 MILLIGRAM(S): at 17:51

## 2021-01-01 RX ADMIN — Medication 25 MILLIGRAM(S): at 05:17

## 2021-01-01 RX ADMIN — Medication 500000 UNIT(S): at 17:30

## 2021-01-01 RX ADMIN — FAMOTIDINE 20 MILLIGRAM(S): 10 INJECTION INTRAVENOUS at 00:24

## 2021-01-01 RX ADMIN — Medication 25 MILLIGRAM(S): at 17:43

## 2021-01-01 RX ADMIN — Medication 650 MILLIGRAM(S): at 06:24

## 2021-01-01 RX ADMIN — MEROPENEM 100 MILLIGRAM(S): 1 INJECTION INTRAVENOUS at 06:27

## 2021-01-01 RX ADMIN — Medication 250 MILLIGRAM(S): at 17:27

## 2021-01-01 RX ADMIN — SODIUM CHLORIDE 200 MILLILITER(S): 9 INJECTION INTRAMUSCULAR; INTRAVENOUS; SUBCUTANEOUS at 07:58

## 2021-01-01 RX ADMIN — Medication 88 MICROGRAM(S): at 06:25

## 2021-01-01 RX ADMIN — AMLODIPINE BESYLATE 5 MILLIGRAM(S): 2.5 TABLET ORAL at 06:28

## 2021-01-01 RX ADMIN — APIXABAN 2.5 MILLIGRAM(S): 2.5 TABLET, FILM COATED ORAL at 06:37

## 2021-01-01 RX ADMIN — Medication 25 MILLIGRAM(S): at 23:44

## 2021-01-01 RX ADMIN — SODIUM CHLORIDE 200 MILLILITER(S): 9 INJECTION INTRAMUSCULAR; INTRAVENOUS; SUBCUTANEOUS at 18:08

## 2021-01-01 RX ADMIN — Medication 2 UNIT(S): at 08:23

## 2021-01-01 RX ADMIN — Medication 2 UNIT(S): at 11:56

## 2021-01-01 RX ADMIN — Medication 500000 UNIT(S): at 11:31

## 2021-01-01 RX ADMIN — Medication 4 UNIT(S): at 16:29

## 2021-01-01 RX ADMIN — FLUCONAZOLE 100 MILLIGRAM(S): 150 TABLET ORAL at 12:06

## 2021-01-01 RX ADMIN — CLOPIDOGREL BISULFATE 75 MILLIGRAM(S): 75 TABLET, FILM COATED ORAL at 11:02

## 2021-01-01 RX ADMIN — Medication 3 MILLILITER(S): at 23:04

## 2021-01-01 RX ADMIN — Medication 88 MICROGRAM(S): at 05:17

## 2021-01-01 RX ADMIN — Medication 2 UNIT(S): at 08:06

## 2021-01-01 RX ADMIN — MEROPENEM 100 MILLIGRAM(S): 1 INJECTION INTRAVENOUS at 05:58

## 2021-01-01 RX ADMIN — Medication 650 MILLIGRAM(S): at 13:28

## 2021-01-01 RX ADMIN — SODIUM CHLORIDE 100 MILLILITER(S): 9 INJECTION INTRAMUSCULAR; INTRAVENOUS; SUBCUTANEOUS at 14:32

## 2021-01-01 RX ADMIN — INSULIN GLARGINE 10 UNIT(S): 100 INJECTION, SOLUTION SUBCUTANEOUS at 21:18

## 2021-01-01 RX ADMIN — Medication 500 MILLIGRAM(S): at 12:14

## 2021-01-01 RX ADMIN — Medication 1 TABLET(S): at 11:33

## 2021-01-01 RX ADMIN — Medication 40 MILLIGRAM(S): at 06:21

## 2021-01-01 RX ADMIN — PANTOPRAZOLE SODIUM 40 MILLIGRAM(S): 20 TABLET, DELAYED RELEASE ORAL at 11:32

## 2021-01-01 RX ADMIN — Medication 2 UNIT(S): at 11:40

## 2021-01-01 RX ADMIN — Medication 25 MILLIGRAM(S): at 23:31

## 2021-01-01 RX ADMIN — AMLODIPINE BESYLATE 5 MILLIGRAM(S): 2.5 TABLET ORAL at 05:40

## 2021-01-01 RX ADMIN — APIXABAN 2.5 MILLIGRAM(S): 2.5 TABLET, FILM COATED ORAL at 17:42

## 2021-01-01 RX ADMIN — APIXABAN 2.5 MILLIGRAM(S): 2.5 TABLET, FILM COATED ORAL at 05:57

## 2021-01-01 RX ADMIN — AMIODARONE HYDROCHLORIDE 200 MILLIGRAM(S): 400 TABLET ORAL at 05:04

## 2021-01-01 RX ADMIN — CLOPIDOGREL BISULFATE 75 MILLIGRAM(S): 75 TABLET, FILM COATED ORAL at 11:31

## 2021-01-01 RX ADMIN — Medication 650 MILLIGRAM(S): at 09:01

## 2021-01-01 RX ADMIN — CEFEPIME 100 MILLIGRAM(S): 1 INJECTION, POWDER, FOR SOLUTION INTRAMUSCULAR; INTRAVENOUS at 18:33

## 2021-01-01 RX ADMIN — Medication 25 MILLIGRAM(S): at 17:13

## 2021-01-01 RX ADMIN — LINEZOLID 600 MILLIGRAM(S): 600 INJECTION, SOLUTION INTRAVENOUS at 17:04

## 2021-01-01 RX ADMIN — Medication 50 MILLIGRAM(S): at 05:02

## 2021-01-01 RX ADMIN — Medication 250 MILLIGRAM(S): at 17:57

## 2021-01-01 RX ADMIN — Medication 500000 UNIT(S): at 05:13

## 2021-01-01 RX ADMIN — LINEZOLID 600 MILLIGRAM(S): 600 INJECTION, SOLUTION INTRAVENOUS at 05:13

## 2021-01-01 RX ADMIN — Medication 4: at 17:16

## 2021-01-01 RX ADMIN — Medication 4 UNIT(S): at 12:18

## 2021-01-01 RX ADMIN — Medication 50 MILLIGRAM(S): at 11:31

## 2021-01-01 RX ADMIN — APIXABAN 2.5 MILLIGRAM(S): 2.5 TABLET, FILM COATED ORAL at 05:49

## 2021-01-01 RX ADMIN — ALBUTEROL 1 PUFF(S): 90 AEROSOL, METERED ORAL at 20:22

## 2021-01-01 RX ADMIN — HEPARIN SODIUM 5000 UNIT(S): 5000 INJECTION INTRAVENOUS; SUBCUTANEOUS at 05:02

## 2021-01-01 RX ADMIN — APIXABAN 2.5 MILLIGRAM(S): 2.5 TABLET, FILM COATED ORAL at 18:38

## 2021-01-01 RX ADMIN — Medication 5 MILLIGRAM(S): at 00:45

## 2021-01-01 RX ADMIN — INSULIN GLARGINE 16 UNIT(S): 100 INJECTION, SOLUTION SUBCUTANEOUS at 21:37

## 2021-01-01 RX ADMIN — Medication 650 MILLIGRAM(S): at 14:04

## 2021-01-01 RX ADMIN — SODIUM ZIRCONIUM CYCLOSILICATE 10 GRAM(S): 10 POWDER, FOR SUSPENSION ORAL at 12:39

## 2021-01-01 RX ADMIN — Medication 2 UNIT(S): at 16:48

## 2021-01-01 RX ADMIN — PANTOPRAZOLE SODIUM 40 MILLIGRAM(S): 20 TABLET, DELAYED RELEASE ORAL at 17:32

## 2021-01-01 RX ADMIN — Medication 650 MILLIGRAM(S): at 21:25

## 2021-01-01 RX ADMIN — Medication 2 UNIT(S): at 11:55

## 2021-01-01 RX ADMIN — ALBUTEROL 1 PUFF(S): 90 AEROSOL, METERED ORAL at 19:59

## 2021-01-01 RX ADMIN — PANTOPRAZOLE SODIUM 40 MILLIGRAM(S): 20 TABLET, DELAYED RELEASE ORAL at 06:27

## 2021-01-01 RX ADMIN — Medication 2 UNIT(S): at 12:41

## 2021-01-01 RX ADMIN — Medication 4: at 11:40

## 2021-01-01 RX ADMIN — Medication 3 MILLILITER(S): at 11:55

## 2021-01-01 RX ADMIN — AMIODARONE HYDROCHLORIDE 200 MILLIGRAM(S): 400 TABLET ORAL at 06:33

## 2021-01-01 RX ADMIN — PANTOPRAZOLE SODIUM 40 MILLIGRAM(S): 20 TABLET, DELAYED RELEASE ORAL at 06:33

## 2021-01-01 RX ADMIN — Medication 2 UNIT(S): at 11:46

## 2021-01-01 RX ADMIN — Medication 2 UNIT(S): at 17:05

## 2021-01-01 RX ADMIN — DEXTROSE MONOHYDRATE, SODIUM CHLORIDE, AND POTASSIUM CHLORIDE 250 MILLILITER(S): 50; .745; 4.5 INJECTION, SOLUTION INTRAVENOUS at 21:27

## 2021-01-01 RX ADMIN — CLOPIDOGREL BISULFATE 75 MILLIGRAM(S): 75 TABLET, FILM COATED ORAL at 13:30

## 2021-01-01 RX ADMIN — Medication 3: at 08:05

## 2021-01-01 RX ADMIN — AMIODARONE HYDROCHLORIDE 33.3 MG/MIN: 400 TABLET ORAL at 12:13

## 2021-01-01 RX ADMIN — AMIODARONE HYDROCHLORIDE 200 MILLIGRAM(S): 400 TABLET ORAL at 05:12

## 2021-01-01 RX ADMIN — CLOPIDOGREL BISULFATE 75 MILLIGRAM(S): 75 TABLET, FILM COATED ORAL at 13:42

## 2021-01-01 RX ADMIN — Medication 4 UNIT(S): at 16:25

## 2021-01-01 RX ADMIN — Medication 6 UNIT(S): at 23:14

## 2021-01-01 RX ADMIN — ATORVASTATIN CALCIUM 40 MILLIGRAM(S): 80 TABLET, FILM COATED ORAL at 22:26

## 2021-01-01 RX ADMIN — INSULIN GLARGINE 15 UNIT(S): 100 INJECTION, SOLUTION SUBCUTANEOUS at 22:19

## 2021-01-01 RX ADMIN — ATORVASTATIN CALCIUM 40 MILLIGRAM(S): 80 TABLET, FILM COATED ORAL at 21:52

## 2021-01-01 RX ADMIN — Medication 325 MILLIGRAM(S): at 12:06

## 2021-01-01 RX ADMIN — CLOPIDOGREL BISULFATE 75 MILLIGRAM(S): 75 TABLET, FILM COATED ORAL at 12:51

## 2021-01-01 RX ADMIN — PANTOPRAZOLE SODIUM 40 MILLIGRAM(S): 20 TABLET, DELAYED RELEASE ORAL at 05:39

## 2021-01-01 RX ADMIN — POLYETHYLENE GLYCOL 3350 17 GRAM(S): 17 POWDER, FOR SOLUTION ORAL at 12:16

## 2021-01-01 RX ADMIN — Medication 25 MILLIGRAM(S): at 23:04

## 2021-01-01 RX ADMIN — Medication 2 UNIT(S): at 08:15

## 2021-01-01 RX ADMIN — Medication 50 MILLIGRAM(S): at 17:17

## 2021-01-01 RX ADMIN — Medication 250 MILLIGRAM(S): at 05:04

## 2021-01-01 RX ADMIN — Medication 500 MILLIGRAM(S): at 12:00

## 2021-01-01 RX ADMIN — Medication 5 MILLIGRAM(S): at 05:35

## 2021-01-01 RX ADMIN — Medication 25 MILLIGRAM(S): at 05:33

## 2021-01-01 RX ADMIN — AMIODARONE HYDROCHLORIDE 200 MILLIGRAM(S): 400 TABLET ORAL at 05:05

## 2021-01-01 RX ADMIN — Medication 25 MILLIGRAM(S): at 17:28

## 2021-01-01 RX ADMIN — Medication 4 UNIT(S): at 17:24

## 2021-01-01 RX ADMIN — Medication 650 MILLIGRAM(S): at 22:24

## 2021-01-01 RX ADMIN — Medication 4 UNIT(S): at 11:41

## 2021-01-01 RX ADMIN — CHLORHEXIDINE GLUCONATE 1 APPLICATION(S): 213 SOLUTION TOPICAL at 06:14

## 2021-01-01 RX ADMIN — NYSTATIN CREAM 1 APPLICATION(S): 100000 CREAM TOPICAL at 17:30

## 2021-01-01 RX ADMIN — CHLORHEXIDINE GLUCONATE 1 APPLICATION(S): 213 SOLUTION TOPICAL at 05:41

## 2021-01-01 RX ADMIN — Medication 650 MILLIGRAM(S): at 14:32

## 2021-01-01 RX ADMIN — Medication 3 UNIT(S): at 11:42

## 2021-01-01 RX ADMIN — Medication 250 MILLIGRAM(S): at 05:58

## 2021-01-01 RX ADMIN — APIXABAN 2.5 MILLIGRAM(S): 2.5 TABLET, FILM COATED ORAL at 17:28

## 2021-01-01 RX ADMIN — NYSTATIN CREAM 1 APPLICATION(S): 100000 CREAM TOPICAL at 06:34

## 2021-01-01 RX ADMIN — APIXABAN 2.5 MILLIGRAM(S): 2.5 TABLET, FILM COATED ORAL at 05:11

## 2021-01-01 RX ADMIN — Medication 1 TABLET(S): at 11:55

## 2021-01-01 RX ADMIN — IMMUNE GLOBULIN (HUMAN) 30 GRAM(S): 10 INJECTION INTRAVENOUS; SUBCUTANEOUS at 18:50

## 2021-01-01 RX ADMIN — Medication 88 MICROGRAM(S): at 05:36

## 2021-01-01 RX ADMIN — SENNA PLUS 2 TABLET(S): 8.6 TABLET ORAL at 21:17

## 2021-01-01 RX ADMIN — INSULIN GLARGINE 7 UNIT(S): 100 INJECTION, SOLUTION SUBCUTANEOUS at 21:53

## 2021-01-01 RX ADMIN — Medication 4 UNIT(S): at 08:06

## 2021-01-01 RX ADMIN — HUMAN INSULIN 15 UNIT(S): 100 INJECTION, SUSPENSION SUBCUTANEOUS at 05:21

## 2021-01-01 RX ADMIN — APIXABAN 2.5 MILLIGRAM(S): 2.5 TABLET, FILM COATED ORAL at 06:08

## 2021-01-01 RX ADMIN — PANTOPRAZOLE SODIUM 40 MILLIGRAM(S): 20 TABLET, DELAYED RELEASE ORAL at 06:25

## 2021-01-01 RX ADMIN — CLOPIDOGREL BISULFATE 75 MILLIGRAM(S): 75 TABLET, FILM COATED ORAL at 14:05

## 2021-01-01 RX ADMIN — AMIODARONE HYDROCHLORIDE 200 MILLIGRAM(S): 400 TABLET ORAL at 05:42

## 2021-01-01 RX ADMIN — Medication 25 MILLIGRAM(S): at 17:07

## 2021-01-01 RX ADMIN — AMIODARONE HYDROCHLORIDE 200 MILLIGRAM(S): 400 TABLET ORAL at 06:41

## 2021-01-01 RX ADMIN — Medication 2 UNIT(S): at 17:48

## 2021-01-01 RX ADMIN — Medication 25 MILLIGRAM(S): at 05:51

## 2021-01-01 RX ADMIN — Medication 50 MILLIGRAM(S): at 18:19

## 2021-01-01 RX ADMIN — Medication 25 MILLIGRAM(S): at 12:25

## 2021-01-01 RX ADMIN — ALBUTEROL 1 PUFF(S): 90 AEROSOL, METERED ORAL at 08:02

## 2021-01-01 RX ADMIN — ALBUTEROL 1 PUFF(S): 90 AEROSOL, METERED ORAL at 08:04

## 2021-01-01 RX ADMIN — CLOPIDOGREL BISULFATE 75 MILLIGRAM(S): 75 TABLET, FILM COATED ORAL at 11:55

## 2021-01-01 RX ADMIN — Medication 1: at 08:16

## 2021-01-01 RX ADMIN — Medication 0.5 MILLIGRAM(S): at 22:32

## 2021-01-01 RX ADMIN — MEROPENEM 100 MILLIGRAM(S): 1 INJECTION INTRAVENOUS at 17:25

## 2021-01-01 RX ADMIN — Medication 2 UNIT(S): at 16:35

## 2021-01-01 RX ADMIN — MEROPENEM 100 MILLIGRAM(S): 1 INJECTION INTRAVENOUS at 06:53

## 2021-01-01 RX ADMIN — Medication 20 MILLIGRAM(S): at 10:43

## 2021-01-01 RX ADMIN — Medication 88 MICROGRAM(S): at 05:49

## 2021-01-01 RX ADMIN — Medication 650 MILLIGRAM(S): at 13:34

## 2021-01-01 RX ADMIN — MEROPENEM 100 MILLIGRAM(S): 1 INJECTION INTRAVENOUS at 17:24

## 2021-01-01 RX ADMIN — INSULIN HUMAN 5.9 UNIT(S)/HR: 100 INJECTION, SOLUTION SUBCUTANEOUS at 16:12

## 2021-01-01 RX ADMIN — Medication 1: at 11:43

## 2021-01-01 RX ADMIN — AMLODIPINE BESYLATE 5 MILLIGRAM(S): 2.5 TABLET ORAL at 05:05

## 2021-01-01 RX ADMIN — LINEZOLID 600 MILLIGRAM(S): 600 INJECTION, SOLUTION INTRAVENOUS at 05:14

## 2021-01-01 RX ADMIN — Medication 3: at 12:01

## 2021-01-01 RX ADMIN — INSULIN GLARGINE 20 UNIT(S): 100 INJECTION, SOLUTION SUBCUTANEOUS at 21:25

## 2021-01-01 RX ADMIN — AMLODIPINE BESYLATE 5 MILLIGRAM(S): 2.5 TABLET ORAL at 05:27

## 2021-01-01 RX ADMIN — Medication 5: at 16:50

## 2021-01-01 RX ADMIN — CLOPIDOGREL BISULFATE 75 MILLIGRAM(S): 75 TABLET, FILM COATED ORAL at 11:32

## 2021-01-01 RX ADMIN — MEROPENEM 100 MILLIGRAM(S): 1 INJECTION INTRAVENOUS at 21:43

## 2021-01-01 RX ADMIN — Medication 25 MILLIGRAM(S): at 17:37

## 2021-01-01 RX ADMIN — CARVEDILOL PHOSPHATE 12.5 MILLIGRAM(S): 80 CAPSULE, EXTENDED RELEASE ORAL at 17:56

## 2021-01-01 RX ADMIN — MEROPENEM 100 MILLIGRAM(S): 1 INJECTION INTRAVENOUS at 05:08

## 2021-01-01 RX ADMIN — Medication 25 MILLIGRAM(S): at 23:50

## 2021-01-01 RX ADMIN — Medication 4 UNIT(S): at 17:16

## 2021-01-01 RX ADMIN — CHLORHEXIDINE GLUCONATE 1 APPLICATION(S): 213 SOLUTION TOPICAL at 05:57

## 2021-01-01 RX ADMIN — Medication 50 MILLIGRAM(S): at 00:57

## 2021-01-01 RX ADMIN — MEROPENEM 100 MILLIGRAM(S): 1 INJECTION INTRAVENOUS at 18:08

## 2021-01-01 RX ADMIN — CHLORHEXIDINE GLUCONATE 1 APPLICATION(S): 213 SOLUTION TOPICAL at 10:58

## 2021-01-01 RX ADMIN — Medication 1: at 16:20

## 2021-01-01 RX ADMIN — Medication 2 UNIT(S): at 17:33

## 2021-01-01 RX ADMIN — Medication 500 MILLIGRAM(S): at 11:02

## 2021-01-01 RX ADMIN — FAMOTIDINE 20 MILLIGRAM(S): 10 INJECTION INTRAVENOUS at 11:13

## 2021-01-01 RX ADMIN — Medication 50 MILLIGRAM(S): at 17:19

## 2021-01-01 RX ADMIN — Medication 650 MILLIGRAM(S): at 14:26

## 2021-01-01 RX ADMIN — Medication 88 MICROGRAM(S): at 06:22

## 2021-01-07 PROBLEM — M60.9 MYOSITIS: Status: ACTIVE | Noted: 2019-05-08

## 2021-01-07 PROBLEM — M51.9 LUMBAR DISC DISEASE: Status: ACTIVE | Noted: 2019-05-08

## 2021-01-07 PROBLEM — I77.9 CAROTID ARTERY DISEASE: Status: ACTIVE | Noted: 2019-08-01

## 2021-01-07 PROBLEM — I25.10 CAD S/P PERCUTANEOUS CORONARY ANGIOPLASTY: Status: ACTIVE | Noted: 2020-06-16

## 2021-01-07 NOTE — PHYSICAL EXAM
[FreeTextEntry1] : A/A/Ox3\par slight flat affect. \par shows full Range of emotions\par Good attention\par CN-- normal\par no diplopia\par good R.O.M of the neck\par 5/5 strength proximal both Upper and -5/5 lower extremities\par \par need assistance to stand up\par wide based gait\par  hesitant slight waddling gait\par decreased DTR

## 2021-01-07 NOTE — HISTORY OF PRESENT ILLNESS
[FreeTextEntry1] : allie is here for the F/U.She is very much at her baseline .going to her regular F/U visits and receiving her IVIG infusions every 5 weeks.I am happy with her response towards the treatment.\par She did have a fall after she slid off the new silk bed sheet that she bought.\par She did not have a head trauma , however came down on her sacral bone and getting an X-Ray the possibility of Sacral bone Fx was raised?\par She has had some pain . now doing Ok\par she is not getting PT now.Her  is in [place] because she cannot take care of him.\par She denies having bladder issues. no difficulty with swallowing.\par she is able to take care of herself. no difficulty with the strength in the UE proximal muscles.\par No falls . uses a cane to walk.\par She did have blood test done by her PMD \par Did not gain weight\par Good appetite\par

## 2021-01-07 NOTE — ASSESSMENT
[FreeTextEntry1] : Inclusion body myositis\par \par A-Fib\par L-spine disease\par DLD\par \par plan \par continue the IV IG

## 2021-03-14 NOTE — H&P ADULT - NSHPPOACENTRALVENOUSCATHETER_GEN_ALL_CORE
Subjective:          Patient ID: Dory Raza is a 40 y.o. male who presents today for a routine clinic visit for MS.  He was last seen in September by Dr. Hernandez. The history has been provided by the patient. Today's visit is a virtual visit.     MS HPI:  · DMT: glatiramer acetate  40mg three times a week   · Side effects from DMT? No  · Taking vitamin D3 as recommended? Yes -  Dose: 5000 units daily  · He denies any new or different symptoms. He denies any signs of relapse. He has rare balance issues, but these are not new. He also intermittent tingling, which is not new.  He denies any falls.   · He has a physical job, and he is also exercising as much as he can.     Medications:  Current Outpatient Medications   Medication Sig    cholecalciferol, vitamin D3, (VITAMIN D3) 2,000 unit Cap Take 1 capsule by mouth once daily. Take 6000 IU daily.    glatiramer (COPAXONE, GLATOPA) 40 mg/mL injection INJECT 40 MG INTO THE SKIN THREE TIMES A WEEK.    lisdexamfetamine (VYVANSE) 30 MG capsule Take 1 capsule (30 mg total) by mouth every morning.    multivitamin capsule Take 1 capsule by mouth once daily.    sildenafil (VIAGRA) 100 MG tablet TAKE 1 TABLET (100 MG TOTAL) BY MOUTH DAILY AS NEEDED FOR ERECTILE DYSFUNCTION.     No current facility-administered medications for this visit.        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     ROS:    REVIEW OF SYMPTOMS 3/22/2020   Do you feel abnormally tired on most days? No   Do you feel you generally sleep well?  Yes   Do you have difficulty controlling your bladder?  No   Do you have difficulty controlling your bowels?  No   Do you have frequent muscle cramps, tightness or spasms in your limbs?  No   Do you have new visual symptoms?  No   Do you have worsening difficulty with your memory or thinking? No--feels like his vocabulary is not as expansive as it used to be; has to think more about the words he uses; has trouble finding words sometimes.    Do you have worsening symptoms of anxiety or depression?  No--situational   For patients who walk, Do you have more difficulty walking?  No ; occasionally feels off balance    Have you fallen since your last visit?  No   For patients who use wheelchairs: Do you have any skin wounds or breakdown? Not Applicable   Do you have difficulty using your hands?  No   Do you have shooting or burning pain? No   Do you have difficulty with sexual function?  Yes   If you are sexually active, are you using birth control? Y/N  N/A Not Applicable   Do you often choke when swallowing liquids or solid food?  No   Do you experience worsening symptoms when overheated? No   Do you need any new equipment such as a wheelchair, walker or shower chair? No   Do you receive co-pay financial assistance for your principal MS medicine? Yes   Would you be interested in participating in an MS research trial in the future? Yes   For patients on Gilenya, Tecfidera, Aubagio, Rituxan, Ocrevus, Tysabri, Lemtrada or Methotrexate, are you aware that you should NOT receive live virus vaccines?  Not Applicable   Do you feel you have adequate family/friend support?  Yes   Do you have health insurance?   Yes   Are you currently employed? Yes   Do you receive SSDI/SSI?  Not Applicable   Do you use marijuana or cannabis products? No   Have you been diagnosed with a urinary tract infection since your last visit here? No   Have you been diagnosed with a respiratory tract infection since your last visit here? No   Have you  been to the emergency room since your last visit here? No   Have you been hospitalized since your last visit here?  No            Objective:        Neurologic Exam     Mental Status   Oriented to person, place, and time.   Attention: normal.   Speech: speech is normal   Level of consciousness: alert  Knowledge: good.   Normal comprehension.     Cranial Nerves     CN III, IV, VI   Nystagmus: none     CN VII   Right facial weakness: none  Left facial weakness: none    CN VIII   Hearing: intact    CN XII   Tongue deviation: none    Motor Exam   He can lift arms overhead and lower them. He can make a fist and wiggle fingers.     Gait, Coordination, and Reflexes     Gait  Gait: normal    Coordination   Finger to nose coordination: normal  Heel to shin coordination: normal    Tremor   Resting tremor: absent  Normal rapid sequential movements in upper and lower extremities. He can hop on each foot ten times.         Imaging:     Results for orders placed during the hospital encounter of 09/05/19   MRI Brain Demyelinating Without Contrast    Impression A white matter focus perpendicular to the right lateral ventricle and the right parietal lobe unchanged since the prior in this patient with a given history of multiple sclerosis.  No convincing detrimental changes are noted since the prior examination.      Electronically signed by: Ivan Su MD  Date:    09/05/2019  Time:    10:38     Results discussed with patient.       Labs:     Lab Results   Component Value Date    [id number] 80 03/27/2019    [id number] 61 04/18/2018    [id number] 51 03/23/2017     Sodium   Date Value Ref Range Status   12/14/2010 140 136 - 145 mMol/l Final     Potassium   Date Value Ref Range Status   12/14/2010 4.0 3.5 - 5.1 mMol/l Final     Chloride   Date Value Ref Range Status   12/14/2010 104 95 - 110 mMol/l Final     CO2   Date Value Ref Range Status   12/14/2010 24 23.0 - 29.0 mEq/L Final     Glucose   Date Value Ref Range Status    12/14/2010 90 70 - 110 mg/dl Final     BUN, Bld   Date Value Ref Range Status   12/14/2010 14 6 - 20 mg/dl Final     Creatinine   Date Value Ref Range Status   12/14/2010 0.9 0.5 - 1.4 mg/dl Final     Calcium   Date Value Ref Range Status   12/14/2010 9.7 8.7 - 10.5 mg/dl Final     Total Protein   Date Value Ref Range Status   01/14/2011 7.0 6.0 - 8.4 g/dL Final     Albumin   Date Value Ref Range Status   01/14/2011 4.4 3.5 - 5.2 g/dl Final     Total Bilirubin   Date Value Ref Range Status   01/14/2011 1.1 (H) 0.1 - 1.0 mg/dl Final     Comment:     For infants and newborns, interpretation of results should be based  on gestational age, weight and in agreement with clinical  observations.  .  Premature Infant recommended reference ranges:  Up to 24 hours.............<8.0 mg/dl  Up to 48 hours............<12.0 mg/dl  3-5 days..................<15.0 mg/dl  6-29 days.................<15.0 mg/dl     Alkaline Phosphatase   Date Value Ref Range Status   01/14/2011 87 55 - 135 U/L Final     AST   Date Value Ref Range Status   01/14/2011 23 10 - 40 U/L Final     ALT   Date Value Ref Range Status   01/14/2011 48 (H) 10 - 44 U/L Final     Anion Gap   Date Value Ref Range Status   12/14/2010 17 10 - 20 mmol/L Final     eGFR if    Date Value Ref Range Status   12/14/2010 >60 >60 mL/min Final     Comment:     Estimated glomerular filtration rate (eGFR) is normalized to an  average body surface area of 1.73 square meters.  The calculation  used to obtain the eGFR is the adjusted MDRD equation, which factors  patient sex, age, race, and creatinine result.  Since race is unknown  in our information system, the eGFR values for -American  and Non--American patients are given for each creatinine  result.     eGFR if non    Date Value Ref Range Status   12/14/2010 >60 >60 mL/min Final           MS Impression and Plan:     NEURO MULTIPLE SCLEROSIS IMPRESSION:   MS Status:     Number of  relapses in the past year?:  0    Clinical Progression:  Clinically Stable    MRI Progression:  Stable  Plan:     DMT:  No change in management    DMT comment:  Continue glatiramer and vitamin D. Will check Vitamin D level in 6 months.     Symptom Management:  No change in symptom management (Vyvanse refilled. )         MRI brain and c-spine in 6 months      Our visit today lasted 20 minutes, and 100% of this time was spent face to face with the patient. Over 50% of this visit included discussion of the treatment plan/symptom management/exam findings/imaging results/coordination of care. The patient agrees with the plan of care. I will see him back in 6 months.       RUSSEL Cowan, CNS     Problem List Items Addressed This Visit        Neurologic Problems    Multiple sclerosis    Relevant Medications    lisdexamfetamine (VYVANSE) 30 MG capsule (Start on 5/23/2020)    lisdexamfetamine (VYVANSE) 30 MG capsule (Start on 4/23/2020)    lisdexamfetamine (VYVANSE) 30 MG capsule    Other Relevant Orders    Vitamin D    MRI Brain Demyelinating Without Contrast    MRI Cervical Spine Demyelinating Without Contrast      Other Visit Diagnoses     Fatigue, unspecified type        Relevant Medications    lisdexamfetamine (VYVANSE) 30 MG capsule (Start on 5/23/2020)    lisdexamfetamine (VYVANSE) 30 MG capsule (Start on 4/23/2020)    lisdexamfetamine (VYVANSE) 30 MG capsule    History of attention deficit disorder        Relevant Medications    lisdexamfetamine (VYVANSE) 30 MG capsule (Start on 5/23/2020)    lisdexamfetamine (VYVANSE) 30 MG capsule (Start on 4/23/2020)    lisdexamfetamine (VYVANSE) 30 MG capsule           no

## 2021-03-14 NOTE — CONSULT NOTE ADULT - SUBJECTIVE AND OBJECTIVE BOX
Patient is a 76y old  Female who presents with a chief complaint of nausea and vomiting on friday with weakness over the weekend associated with right sided back pain.  Pt has not been voiding much stating that she is not drinking or eating.  Pt therefore does not offer any other LUTS.  Pt does have hx of UTI and recently treated here in hospital in december 2020        PAST MEDICAL & SURGICAL HISTORY:  Inclusion body myositis (IBM)  Pacemaker  IDDM   UTI  Hypothyroidism  Afib    Lung nodules    Transfusion history  S/p Hysterectomy    Dry eyes, bilateral    PVD (peripheral vascular disease)  S/p Right Peripheral leg Stent    Seasonal allergies    Skin cancer  Basal Cell Cancer face  (around nose) s/p MOHS procedure x 3    Anemia  Chronic, on Iron supplement    Hypothyroid    SSS (sick sinus syndrome)  S/p PPM    Diabetes  Insulin dependent - (has insulin Pump)    Pacemaker  Sept 2017    Palpitations  occasionally; not for couple yrs    Essential hypertension    Afib  5+ yrs; on Eliquis    S/P ablation of atrial fibrillation    S/P cataract surgery    History of surgery  MOHS procedure (face) x 3    Cardiac pacemaker  9/9/17    History of surgery  Right Peripheral Stent 5 yrs    H/O total hysterectomy  25+ yrs ago        REVIEW OF SYSTEMS:    CONSTITUTIONAL:  fevers or chills  HEENT: No visual changes  ENDO: No sweating  NECK: No pain or stiffness  MUSCULOSKELETAL: No back pain, no joint pain  RESPIRATORY: No shortness of breath  CARDIOVASCULAR: No chest pain  GASTROINTESTINAL: No abdominal or epigastric pain. No nausea, vomiting,  No diarrhea or constipation.   NEUROLOGICAL: No mental status changes  PSYCH: No depression, no mood changes  SKIN: No itching      MEDICATIONS  (STANDING):  vancomycin  IVPB. 1000 milliGRAM(s) IV Intermittent once    MEDICATIONS  (PRN):      Allergies    latex (Pruritus; Rash)  sulfonamides (Unknown)    Intolerances    Pineapple (Unknown)      SOCIAL HISTORY: No illicit drug use    FAMILY HISTORY:  Cancer (Father)  Myeloma    Family history of lung cancer (Mother)        Vital Signs Last 24 Hrs  T(C): 37.1 (14 Mar 2021 20:28), Max: 38.5 (14 Mar 2021 17:43)  T(F): 98.7 (14 Mar 2021 20:28), Max: 101.3 (14 Mar 2021 17:43)  HR: 76 (14 Mar 2021 20:28) (76 - 87)  BP: 101/46 (14 Mar 2021 20:28) (101/46 - 127/61)  BP(mean): --  RR: 20 (14 Mar 2021 20:28) (20 - 22)  SpO2: 100% (14 Mar 2021 20:28) (99% - 100%)    PHYSICAL EXAM:    Constitutional: NAD, well-developed  HEENT: EOMI  Neck: no pain  Back: Slight Right sided CVA tenderness  Respiratory: No accessory respiratory muscle use  Abd: Soft, NT/ND  no organomegally  no hernia  : No suprapubic fullness or tenderness  Extremities: no edema  Neurological: A/O x 3  Psychiatric: Normal mood, normal affect  Skin: No rashes    I&O's Summary      LABS:                        11.7   25.99 )-----------( 187      ( 14 Mar 2021 16:30 )             33.6     03-14    122<L>  |  84<L>  |  54<H>  ----------------------------<  261<H>  5.6<H>   |  21  |  2.2<H>    Ca    8.7      14 Mar 2021 16:30    TPro  7.1  /  Alb  4.0  /  TBili  0.4  /  DBili  x   /  AST  59<H>  /  ALT  50<H>  /  AlkPhos  120<H>  03-14              RADIOLOGY & ADDITIONAL STUDIES:  < from: CT Abdomen and Pelvis No Cont (03.14.21 @ 19:33) >    EXAM:  CT ABDOMEN AND PELVIS            PROCEDURE DATE:  03/14/2021            INTERPRETATION:  CLINICAL STATEMENT: Abdominal pain.    TECHNIQUE: Contiguous axial CT images were obtained from the lower chest to the pubic symphysis without intravenous contrast.  Oral contrast was not administered.  Reformatted images in the coronal and sagittal planes were acquired.    COMPARISON: 8/22/2019.      FINDINGS:  Limited evaluation of solid organs and vascular structures without contrast administration.    LOWER CHEST: Partially imaged cardiac pacing wires.    HEPATOBILIARY: Unremarkable.    SPLEEN: Unremarkable.    PANCREAS: Unremarkable.    ADRENAL GLANDS: Unremarkable.    KIDNEYS: Limited evaluation of renal parenchyma without contrast administration. The right kidney is enlarged with perinephric fat stranding and moderate hydroureteronephrosis to the level of the distal ureter. No radiopaque calculus is identified. The possibility of a recently passed stone is a consideration however no renal calculi are seen and there is no history of renal calculi so direct visualization of the right ureterovesical junction is suggested to exclude an obstructing lesion. No left hydronephrosis or calculus.    ABDOMINOPELVIC NODES: Unremarkable.    PELVIC ORGANS: Unremarkable.    PERITONEUM/MESENTERY/BOWEL: No evidence for bowel obstruction, ascites, or pneumoperitoneum. Normal caliber appendix with adjacent reactive inflammation.    BONES/SOFT TISSUES: Nonspecific presacral edema. Osteopenia. Milddegenerative changes of the spine.    OTHER: Vascular calcifications      IMPRESSION:    The right kidney is enlarged with perinephric fat stranding and moderate hydroureteronephrosis to the level of the distal ureter. No radiopaque calculus is identified. Cystoscopy suggested to evaluate the right ureteral vesicle junction to evaluate for an obstructing lesion. A call back request was submitted    Limited evaluation of renal parenchyma without contrast administration.            ALF ELLIOTT M.D., RESIDENT RADIOLOGIST  This document has been electronically signed.  MARKO GASPAR MD; Attending Radiologist  This document has been electronically signed. Mar 14 2021  8:23PM    < end of copied text >

## 2021-03-14 NOTE — ED PROVIDER NOTE - OBJECTIVE STATEMENT
76 year old female with pmhx of DM presents with n/v, and decrease PO since friday. decrease appetite and weakness. Pt checked her glucose at home and was > 400. Pt denies fever, chills, abd pain, diarrhea, chest pain, or sob.

## 2021-03-14 NOTE — H&P ADULT - NSHPLABSRESULTS_GEN_ALL_CORE
.  LABS:                         11.7   25.99 )-----------( 187      ( 14 Mar 2021 16:30 )             33.6         122<L>  |  84<L>  |  54<H>  ----------------------------<  261<H>  5.6<H>   |  21  |  2.2<H>    Ca    8.7      14 Mar 2021 16:30    TPro  7.1  /  Alb  4.0  /  TBili  0.4  /  DBili  x   /  AST  59<H>  /  ALT  50<H>  /  AlkPhos  120<H>        Urinalysis Basic - ( 14 Mar 2021 22:50 )    Color: Yellow / Appearance: Turbid / S.021 / pH: x  Gluc: x / Ketone: Negative  / Bili: Negative / Urobili: <2 mg/dL   Blood: x / Protein: 100 mg/dL / Nitrite: Negative   Leuk Esterase: Large / RBC: 7 /HPF /  /HPF   Sq Epi: x / Non Sq Epi: 5 /HPF / Bacteria: Moderate            RADIOLOGY, EKG & ADDITIONAL TESTS: Reviewed.     ct< from: CT Abdomen and Pelvis No Cont (21 @ 19:33) >      The right kidney is enlarged with perinephric fat stranding and moderate hydroureteronephrosis to the level of the distal ureter. No radiopaque calculus is identified. Cystoscopy suggested to evaluate the right ureteral vesicle junction to evaluate for an obstructing lesion. A call back request was submitted    Limited evaluation of renal parenchyma without contrast administration.

## 2021-03-14 NOTE — ED PROVIDER NOTE - PROGRESS NOTE DETAILS
ATTENDING NOTE:   75 yo F PMH DM on insulin pump here for nausea, vomiting, decreased PO, weakness and high glucose x2 days. No fever or chills.   On exam: CON: ao x 3, HENMT: clear oropharynx,  neck supple, (+) DMM, CV: rrr, equal pulses b/l, RESP: cta b/l, GI:  soft, nontender, no rebound, no guarding, SKIN: no rash, MSK: no deformities, NEURO: no gross motor or sensory deficit Psychiatric: appropriate mood, appropriate affect  I&P: Elevated blood sugar, nausea and weakness. Labs, IV fluids and reeval. wbc noted, pt febrile rectally  abx added on. trop noted ekg pending s/o Dr. Carlos f/u ct and re-eval

## 2021-03-14 NOTE — CONSULT NOTE ADULT - ASSESSMENT
Right Hydronephrosis without obstructing calculus seen on CTscan  Leukocytosis  Hyponatremia  TEOFILO Right Hydronephrosis without obstructing calculus seen on CTscan  Pyelonephritis Rt Kidney  Leukocytosis  Hyponatremia  TEOFILO

## 2021-03-14 NOTE — ED PROVIDER NOTE - CLINICAL SUMMARY MEDICAL DECISION MAKING FREE TEXT BOX
I personally evaluated the patient. I reviewed the Resident’s or Physician Assistant’s note (as assigned above), and agree with the findings and plan except as documented in my note. Patient evaluated for n/v, decreased po intake. Patient signed out to me by Dr. France pending CT scan, dispo. Labs, ekg, CT abd/pelvis performed in ED. Patient noted to have elevated troponin, no STEMI on EKG while in ED. Given abx and fluids. Admitted to telemetry for further evaluation and treatment.

## 2021-03-14 NOTE — ED PROVIDER NOTE - CARE PLAN
Principal Discharge DX:	Pyelonephritis  Secondary Diagnosis:	Sepsis  Secondary Diagnosis:	Elevated troponin

## 2021-03-14 NOTE — H&P ADULT - ATTENDING COMMENTS
I saw and evaluated the patient. I have reviewed and agree with the findings and plan of care as documented above in the resident’s note (unless indicated differently below). Any necessary changes were made in the body of the text. I saw and evaluated the patient. I have reviewed and agree with the findings and plan of care as documented above in the resident’s note (unless indicated differently below). Any necessary changes were made in the body of the text.    CC: dysuria, weakness, vomiting    HPI:  75 yo F pt w/ a relevant hx of a recent UTI (treated w/ cipro) p/w dysuria assoc w/ nausea, vomiting, anorexia, diffuse abdominal discomfort (burning, w/o radiation, moderate), a decrease in urine output and generalized weakness. Onset of symptoms: 2 days ago. Course: persistent. Intensity: moderate. Precipitating factors: cannot think of any. Alleviating factors: none. Aggravating factors: none.     ROS:  Constitutional: +subjective fevers; no chills; +generalized weakness  Eyes: no acute visual disturbances  ENT: no dysphagia; no odynophagia  CVS: no orthopnea; no chest pain  Resp: no SOB; no coughing  GI: +nausea; +vomiting; no diarrhea; +abd pain; +anorexia  : +dysuria; +decreased urine output  MSK: +mild lower abd discomfort  Skin: no rashes  Neuro: no headache  All other systems reviewed and are negative    PMHx, home medications, SurHx, FHx and Social history as above in the corresponding sections of the note - reviewed and edited where appropriate    Exam:  Vitals: BP = 130/59; P = 80; T = 98.7; RR = 18; SpO2 > 95 on room air  General: appears stated age; cooperative  Eyes: anicteric sclera; moist conjunctiva; PERRL; EOMI  HENT: NC/AT; clear oropharynx w/ moist mucous membranes; nL hard/soft palate  Neck: supple w/ FROM; trachea midline  Lungs: no tachypnea, accessory muscle use, wheezing, rhonci or rales  CVS: RRR; S1 and S2 w/o MRGs  Abd: BS+; soft; non-tender to palpation x 4; no masses or HSM  Ext: no peripheral edema; pulses palpable distally  Skin: normal temp, turgor and texture; no rashes, ulcers or nodules  Neuro: CN II-XII intact; str 5/5 throughout  Psych: appropriate affect; alert and oriented to person, place, time and situation    Labs reviewed and are significant for WBC 25.99, H&H 11.7/33.6, , Na 122, K 5.6, AG 17, bicarb 21, BUN/Cr 54/2.2, , alk phos 120, AST/ALT 59/50, trop 0.56, U/A w/ 435 WBC's moderate bacteria and large LE plus 23 hyaline casts    Imaging reviewed and significant for:  --- Rt kidney is enlarged w/ perinephric fat stranding  --- Moderate rt hydroureteronephrosis to lvl of distal ureter    EKG requested    Assessment:  (1) Sepsis on presentation 2/2 acute rt pyelonephritis  (2) Rt hydroureteronephrosis 2/2 unclear etiology  (3) Type 2 MI 2/2 sepsis  (4) Hx of CAD (s/p LUPE to prox/mid-LAD in 07/2020) / PVD  (5) Stage 3 acute renal failure 2/2 obstructive uropathy  (6) DM w/ hyperglycemia - poor control 2/2 infection  (7) Normocytic anemia likely AOCD  (8) Hyperkalemia & hyponatremia - suspect 2/2 hypovolemia & TEOFILO  (9) Anion gap metabolic acidosis 2/2 renal insufficiency & infection  (10) Hx of hypothyroidism  (11) Hx of SSS (s/p PPM in 09/2017)  (12) HTN - chronic  (13) A-fib - possibly chronic    Plan:  (1) F/u blood and urine cultures  (2) Hydration  (3) C/w meropenem  (4) Urology planning for cysto/ureteroscopy and possible stenting  (5) Urology recommendations appreciated  (6) Serial enzymes and EKG's; continuous rhythm monitoring  (7) Cardiology evaluation requested  (8) C/w meds as dosed - reviewed and edited where appropriate  (9) Supportive care: PRN analgesics, antiemetics, antitussives, antipyretics  (10) Disposition: anticipate need for 48-72 hrs of in-pt care    Full code I saw and evaluated the patient. I have reviewed and agree with the findings and plan of care as documented above in the resident’s note (unless indicated differently below). Any necessary changes were made in the body of the text.    CC: dysuria, weakness, vomiting    HPI:  77 yo F pt w/ a relevant hx of a recent UTI (treated w/ cipro) p/w dysuria assoc w/ nausea, vomiting, anorexia, diffuse abdominal discomfort (burning, w/o radiation, moderate), a decrease in urine output and generalized weakness. Onset of symptoms: 2 days ago. Course: persistent. Intensity: moderate. Precipitating factors: cannot think of any. Alleviating factors: none. Aggravating factors: none.     ROS:  Constitutional: +subjective fevers; no chills; +generalized weakness  Eyes: no acute visual disturbances  ENT: no dysphagia; no odynophagia  CVS: no orthopnea; no chest pain  Resp: no SOB; no coughing  GI: +nausea; +vomiting; no diarrhea; +abd pain; +anorexia  : +dysuria; +decreased urine output  MSK: +mild lower abd discomfort  Skin: no rashes  Neuro: no headache  All other systems reviewed and are negative    PMHx, home medications, SurHx, FHx and Social history as above in the corresponding sections of the note - reviewed and edited where appropriate    Exam:  Vitals: BP = 130/59; P = 80; T = 98.7; RR = 18; SpO2 > 95 on room air  General: appears stated age; cooperative  Eyes: anicteric sclera; moist conjunctiva; PERRL; EOMI  HENT: NC/AT; clear oropharynx w/ moist mucous membranes; nL hard/soft palate  Neck: supple w/ FROM; trachea midline  Lungs: no tachypnea, accessory muscle use, wheezing, rhonci or rales  CVS: RRR; S1 and S2 w/o MRGs  Abd: BS+; soft; non-tender to palpation x 4; no masses or HSM  Ext: no peripheral edema; pulses palpable distally  Skin: normal temp, turgor and texture; no rashes, ulcers or nodules  Neuro: CN II-XII intact; str 5/5 throughout  Psych: appropriate affect; alert and oriented to person, place, time and situation    Labs reviewed and are significant for WBC 25.99, H&H 11.7/33.6, , Na 122, K 5.6, AG 17, bicarb 21, BUN/Cr 54/2.2, , alk phos 120, AST/ALT 59/50, trop 0.56, U/A w/ 435 WBC's moderate bacteria and large LE plus 23 hyaline casts    Imaging reviewed and significant for:  --- Rt kidney is enlarged w/ perinephric fat stranding  --- Moderate rt hydroureteronephrosis to lvl of distal ureter    EKG requested    Assessment:  (1) Sepsis on presentation 2/2 acute rt pyelonephritis  (2) Rt hydroureteronephrosis 2/2 unclear etiology  (3) Type 2 MI 2/2 sepsis  (4) Hx of CAD (s/p LUPE to prox/mid-LAD in 07/2020) / PVD  (5) Stage 3 acute renal failure 2/2 obstructive uropathy  (6) DM w/ hyperglycemia - poor control 2/2 infection  (7) Normocytic anemia likely AOCD  (8) Hyperkalemia & hyponatremia - suspect 2/2 hypovolemia & TEOFILO  (9) Anion gap metabolic acidosis 2/2 renal insufficiency & infection  (10) Hx of hypothyroidism  (11) Hx of SSS (s/p PPM in 09/2017)  (12) HTN - chronic  (13) A-fib paroxysmal ([id number] of 6: age, sex, HTN, DM, CAD)    Plan:  (1) F/u blood and urine cultures  (2) Hydration  (3) C/w meropenem  (4) Urology planning for cysto/ureteroscopy and possible stenting  (5) Urology recommendations appreciated  (6) Serial enzymes and EKG's; continuous rhythm monitoring  (7) Cardiology evaluation requested  (8) C/w meds as dosed - reviewed and edited where appropriate:  --- For unacceptable bleeding risk, NOAC can be held 1-2 days pre-procedure  --- Need input from Urology  (9) Supportive care: PRN analgesics, antiemetics, antitussives, antipyretics  (10) Disposition: anticipate need for 48-72 hrs of in-pt care    Full code I saw and evaluated the patient. I have reviewed and agree with the findings and plan of care as documented above in the resident’s note (unless indicated differently below). Any necessary changes were made in the body of the text.    CC: dysuria, weakness, vomiting    HPI:  75 yo F pt w/ a relevant hx of a recent UTI (treated w/ cipro) p/w dysuria assoc w/ nausea, vomiting, anorexia, diffuse abdominal discomfort (burning, w/o radiation, moderate), a decrease in urine output and generalized weakness. Onset of symptoms: 2 days ago. Course: persistent. Intensity: moderate. Precipitating factors: cannot think of any. Alleviating factors: none. Aggravating factors: none.     ROS:  Constitutional: +subjective fevers; no chills; +generalized weakness  Eyes: no acute visual disturbances  ENT: no dysphagia; no odynophagia  CVS: no orthopnea; no chest pain  Resp: no SOB; no coughing  GI: +nausea; +vomiting; no diarrhea; +abd pain; +anorexia  : +dysuria; +decreased urine output  MSK: +mild lower abd discomfort  Skin: no rashes  Neuro: no headache  All other systems reviewed and are negative    PMHx, home medications, SurHx, FHx and Social history as above in the corresponding sections of the note - reviewed and edited where appropriate    Exam:  Vitals: BP = 130/59; P = 80; T = 98.7; RR = 18; SpO2 > 95 on room air  General: appears stated age; cooperative  Eyes: anicteric sclera; moist conjunctiva; PERRL; EOMI  HENT: NC/AT; clear oropharynx w/ moist mucous membranes; nL hard/soft palate  Neck: supple w/ FROM; trachea midline  Lungs: no tachypnea, accessory muscle use, wheezing, rhonci or rales  CVS: RRR; S1 and S2 w/o MRGs  Abd: BS+; soft; non-tender to palpation x 4; no masses or HSM  Ext: no peripheral edema; pulses palpable distally  Skin: normal temp, turgor and texture; no rashes, ulcers or nodules  Neuro: CN II-XII intact; str 5/5 throughout  Psych: appropriate affect; alert and oriented to person, place, time and situation    Labs reviewed and are significant for WBC 25.99, H&H 11.7/33.6, , Na 122, K 5.6, AG 17, bicarb 21, BUN/Cr 54/2.2, , alk phos 120, AST/ALT 59/50, trop 0.56, U/A w/ 435 WBC's moderate bacteria and large LE plus 23 hyaline casts    Imaging reviewed and significant for:  --- Rt kidney is enlarged w/ perinephric fat stranding  --- Moderate rt hydroureteronephrosis to lvl of distal ureter    EKG requested    Assessment:  (1) Sepsis on presentation 2/2 acute rt pyelonephritis  (2) Rt hydroureteronephrosis 2/2 unclear etiology  (3) Type 2 MI 2/2 sepsis  (4) Hx of CAD (s/p LUPE to prox/mid-LAD in 07/2020) / PVD  (5) Stage 3 acute renal failure 2/2 obstructive uropathy  (6) DM w/ hyperglycemia - poor control 2/2 infection  (7) Normocytic anemia likely AOCD  (8) Hyperkalemia & hyponatremia - suspect 2/2 hypovolemia & TEOFILO  (9) Anion gap metabolic acidosis 2/2 renal insufficiency & infection  (10) Hx of hypothyroidism  (11) Hx of SSS (s/p PPM placement in 09/2017):  --- Working appropriately when checked on 11/17/2020  (12) HTN - chronic  (13) A-fib paroxysmal ([id number] of 6: age, sex, HTN, DM, CAD)    Plan:  (1) F/u blood and urine cultures  (2) Hydration  (3) C/w meropenem  (4) Urology planning for cysto/ureteroscopy and possible stenting  (5) Urology recommendations appreciated  (6) Serial enzymes and EKG's; continuous rhythm monitoring  (7) Cardiology evaluation requested  (8) C/w meds as dosed - reviewed and edited where appropriate:  --- For unacceptable bleeding risk, NOAC can be held 1-2 days pre-procedure  --- Need input from Urology  (9) Supportive care: PRN analgesics, antiemetics, antitussives, antipyretics  (10) Disposition: anticipate need for 48-72 hrs of in-pt care    Full code

## 2021-03-14 NOTE — H&P ADULT - ASSESSMENT
75 yo F PMH DM on insulin pump, CAD s/p stenting in 2020, Lung nodules, Dry eyes, bilateral, PVD (peripheral vascular disease): S/p Right Peripheral leg Stent, Seasonal allergies, Skin cancer: Basal Cell Cancer face  (around nose) s/p MOHS procedure x 3, Anemia: Chronic, on Iron supplement, Hypothyroid, SSS (sick sinus syndrome): S/p PPM, Pacemaker: Sept 2017, Essential hypertension, Afib: 5+ yrs; on Eliquis presented here for nausea, vomiting and decreased PO intake for past 2 days. The symptoms started acutely and she was in her usual state of health before. She also felt weak and her blood sugars were higher. She had a recent hx of UTI which responded well to ciprofloxacin. She denied any chest pain, shortness of breath, focal neurological deficits or altered bowel habits. She takes her meds regularly. For past three days, she is also passing out very less urine. There is no blood in urine noted however. She complained of some diffuse abdominal pain which started now and is burning in character. She is otherwise comfortable and has no other complaints.        Patient received in ER with low grade fevers. Blood work is significant for leukocytosis, hyponatremia, hyperkalemia, elevated Cr and BUN, elevated liver enzymes, troponin and a positive UA for blood. CT abdomen and pelvis revealed right kidney is enlarged with perinephric fat stranding and moderate hydroureteronephrosis to the level of the distal ureter. Patient is being admitted for sepsis secondary to possible urinary tract source. Urology consulted and on board for possible cystoscopy.       #Sepsis secondary to pyelonephritis:  -lactate 1.3, leukocytosis, fever, positive UA, CT scan: right sided hydronephrosis  -f/u blood and urine cultures  -start on volume resuscitation, watch out for fluid overload due to CAD hx, received 2 L bolus in ER, will start NS 100ml/hr, reassess volume status in am  -will start on meropenem for now, de-escalate as cultures become available, low suspicion for MRSA, will not add vancomycin for now    #TEOFILO secondary to hydronephrosis:  -elevated BUN and Cr 2.2, elevated K  -Urology on board for possible stenting and cystoscopy, will not hold eliquis for now until there is a plan for procedure  -avoid nephrotoxic drugs  -Baseline creatinine _ 0.5   -Monitor strict I/O  -Monitor BUN/Cr  -K 5.6 on BMP, repeated 4.5 on VBG, monitor in am  -Send urine creatinine, urea and lytes  -Avoid nephrotoxic agents, renally dose meds  -Holding ACE-I in setting of TEOFILO      #DM:  -On insulin pump, monitor FS, keep between 140-180  -will repeat hba1c    #Chronic atrial fibrillation  continue eliquis      #CAD recent stenting in July 2020:  C/W Plavix and statin     #DLD:  Continue with statin      #Hyponatremia:  poor oral intake at home, continue with NS 100cc/hr  monitor for neurological signs  trend Na, f/u BMP in am    #HTN:  Continue with amlodipine and coreg, holding ACE/ARB due to TEOFILO    FULL CODE  CHG  AIT  DIET  DVT PPX

## 2021-03-14 NOTE — ED ADULT NURSE NOTE - NSIMPLEMENTINTERV_GEN_ALL_ED
Implemented All Fall with Harm Risk Interventions:  [place] to call system. Call bell, personal items and telephone within reach. Instruct patient to call for assistance. Room bathroom lighting operational. Non-slip footwear when patient is off stretcher. Physically safe environment: no spills, clutter or unnecessary equipment. Stretcher in lowest position, wheels locked, appropriate side rails in place. Provide visual cue, wrist band, yellow gown, etc. Monitor gait and stability. Monitor for mental status changes and reorient to person, place, and time. Review medications for side effects contributing to fall risk. Reinforce activity limits and safety measures with patient and family. Provide visual clues: red socks.

## 2021-03-14 NOTE — ED PROVIDER NOTE - IV ALTEPLASE DOOR HIDDEN
[facility] -  Case Progress Note    Date of Service: Nov 20, 2018  Client Name: Herminia Garcia  YOB: 1956  MRN:  [id number]  Treating Provider: Diane Menendez, PhD, [facility]  Type of Session: Individual  Present in Session: Herminia  Number of Minutes:  55    Current Symptoms/Status:  Herminia expresses marked distress over 's cross-dressing and an inability to address it directly with her . Symptoms include: Anhedonia, depressed mood, fatigue, lower self-worth  Progress Toward Treatment Goals:     Goals include adjust to changes related to Don's recent expressed interest in identifying as transgender and marital separation.    Intervention: Modality and Description:  Interpersonal therapy, stress management. Herminia discussed first holidays as  couple, noting that  appeared to assume they would be part of the festivities. Herminia has sought out financial counseling to obtain more info about what she needs to do during this transition time. She noted that many people want them to make a decision about divorce versus reconciliation, but she is not ready to do so until the couple can further discuss what is happening presently. She expressed concerns about both of her adult children and we discussed communication strategies for addressing what is not being talked about in relation to Herminia's adjustment to separation and gender transition of .     Response to Intervention:  Herminia was very open and process oriented.    Assignment:  Journal about self-care and identity. Use safety mantra    Diagnosis:  Visit Diagnosis, Associated Orders, and Comments     ICD-10-CM    1. Adjustment disorder with depressed mood F43.21 Individual Psychotherapy (53+ min) [58044]              Plan / Need for Future Services:  Return for therapy in 2 weeks.     Diane Menendez, ELIJAH       show

## 2021-03-14 NOTE — H&P ADULT - NSHPPHYSICALEXAM_GEN_ALL_CORE
GENERAL: NAD, lying in bed comfortably  HEAD:  Atraumatic, Normocephalic  EYES: , conjunctiva and sclera clear  ENT: Moist mucous membranes  NECK: Supple, No JVD  CHEST/LUNG: Clear to auscultation bilaterally; . Unlabored respirations  HEART: Regular rate and rhythm;   ABDOMEN: Bowel sounds present; Soft, Nontender, Nondistended.   EXTREMITIES:  No clubbing, cyanosis, or edema  NERVOUS SYSTEM:  Alert & Oriented X3, speech clear. No deficits

## 2021-03-14 NOTE — ED PROVIDER NOTE - PHYSICAL EXAMINATION
CONST: Well appearing in NAD  EYES: PERRL, EOMI, Sclera and conjunctiva clear.   ENT: No nasal discharge.  Oropharynx normal appearing, no erythema or exudates. No abscess or swelling. Uvula midline. N  NECK: Non-tender, no meningeal signs. normal ROM. supple   CARD: Normal S1 S2; Normal rate and rhythm  RESP: Equal BS B/L, No wheezes, rhonchi or rales. No distress  GI: Soft, non-tender, non-distended. no cva tenderness. normal BS  MS: Normal ROM in all extremities. No midline spinal tenderness. pulses 2 +. no calf tenderness or swelling  SKIN: Warm, dry, no acute rashes. Good turgor  NEURO: A&Ox3, No focal deficits.

## 2021-03-14 NOTE — ED ADULT TRIAGE NOTE - CHIEF COMPLAINT QUOTE
Pt presents with weakness and vomiting x2 days. Pt also reports dysuria and decreased po intake.  in triage

## 2021-03-14 NOTE — H&P ADULT - HISTORY OF PRESENT ILLNESS
77 yo F PMH DM on insulin pump, CAD s/p stenting in 2020, Lung nodules, Dry eyes, bilateral, PVD (peripheral vascular disease): S/p Right Peripheral leg Stent, Seasonal allergies, Skin cancer: Basal Cell Cancer face  (around nose) s/p MOHS procedure x 3, Anemia: Chronic, on Iron supplement, Hypothyroid, SSS (sick sinus syndrome): S/p PPM, Pacemaker: Sept 2017, Essential hypertension, Afib: 5+ yrs; on Eliquis presented here for nausea, vomiting and decreased PO intake for past 2 days. The symptoms started acutely and she was in her usual state of health before. She also felt weak and her blood sugars were higher. She had a recent hx of UTI which responded well to ciprofloxacin. She denied any chest pain, shortness of breath, focal neurological deficits or altered bowel habits. She takes her meds regularly. For past three days, she is also passing out very less urine. There is no blood in urine noted however. She complained of some diffuse abdominal pain which started now and is burning in character. She is otherwise comfortable and has no other complaints.    Vital Signs (24 Hrs):  T(C): 37.1 (03-14-21 @ 20:28), Max: 38.5 (03-14-21 @ 17:43)  HR: 76 (03-14-21 @ 20:28) (76 - 87)  BP: 101/46 (03-14-21 @ 20:28) (101/46 - 127/61)  RR: 20 (03-14-21 @ 20:28) (20 - 22)  SpO2: 100% (03-14-21 @ 20:28) (99% - 100%)      Patient received in ER with low grade fevers. Blood work is significant for leukocytosis, hyponatremia, hyperkalemia, elevated Cr and BUN, elevated liver enzymes, troponin and a positive UA for blood. CT abdomen and pelvis revealed right kidney is enlarged with perinephric fat stranding and moderate hydroureteronephrosis to the level of the distal ureter. Patient is being admitted for sepsis secondary to possible urinary tract source. Urology consulted and on board for possible cystoscopy.

## 2021-03-14 NOTE — ED ADULT NURSE NOTE - BEFAST ARM SIDE DRIFT
Pt arrives to triage c/o productive cough x 2 days with yellow phlegm. Denies SOB, no fevers. +Facial pain, sinus pressure. Intermittent congestion and runny nose. Pt also reports nausea without vomiting.   
No

## 2021-03-14 NOTE — CONSULT NOTE ADULT - PROBLEM SELECTOR RECOMMENDATION 9
Obtain urine for culture and analysis  IV hydration  Empiric Abx  Medical optimization for possible Cysto ureteroscopy/stent  Will d/w Attending Obtain urine for culture and analysis  IV hydration  Empiric Abx  Flomax 0.4 mg QHS  Spoke w/ Attending Agrees w/ Above  Medical optimization for possible Cysto ureteroscopy/stent  Will d/w Attending

## 2021-03-15 NOTE — CONSULT NOTE ADULT - ASSESSMENT
77 yo F PMH DM on insulin pump, CAD s/p stenting in 2020, Lung nodules, Dry eyes, bilateral, PVD (peripheral vascular disease): S/p Right Peripheral leg Stent, Seasonal allergies, Skin cancer: Basal Cell Cancer face  (around nose) s/p MOHS procedure x 3, Anemia: Chronic, on Iron supplement, Hypothyroid, SSS (sick sinus syndrome): S/p PPM, Pacemaker: Sept 2017, Essential hypertension, Afib: 5+ yrs; on Eliquis presented here for nausea, vomiting and decreased PO intake for past 2 days.    Impression   History of CAD s/p PCI to LAD 6/4/2021 and staged PCI to LCX on 7/23/2021  Severe Sepsis 2/2 to Right Pyelonephritis complicated by right hydronephrosis-->Pending Pre-operative cardiac risk stratification for cystouteroscopy w/stent placement   Patient is a moderate risk for cardiac complication for moderate risk procedure.               * SUMMARY:  77 yo F PMH DM on insulin pump, CAD s/p stenting in 2020, Lung nodules, Dry eyes, bilateral, PVD (peripheral vascular disease): S/p Right Peripheral leg Stent, Seasonal allergies, Skin cancer: Basal Cell Cancer face  (around nose) s/p MOHS procedure x 3, Anemia: Chronic, on Iron supplement, Hypothyroid, SSS (sick sinus syndrome): S/p PPM, Pacemaker: Sept 2017, Essential hypertension, Afib: 5+ yrs; on Eliquis presented here for nausea, vomiting and decreased PO intake for past 2 days.    * Patient-based characteristics (Functional capacity)  Patient is able to achieve more than 4 MET (walk 4 blocks, climb 2 flights of stairs, etc...)          Y [X] / N []    High-risk patient features:  - Recent (<30 days) or active MI          Y [X] / N []  - Unstable or severe angina          Y [] / N [X]  - Decompensated heart failure, or worsening or new-onset heart failure          Y [] / N [X]  - Severe valvular disease          Y [] / N [X]  - Significant arrhythmia (Tachy- or Bradyarrhythmia)          Y [] / N [X]    * Surgery/Procedure-based characteristics (Type of surgery)  - Low-risk procedure (outpatient procedure, elective, endoscopy, etc...)          Y [X] / N []  - Elevated or Moderate-risk procedure (Inpatient)          Y [] / N []  - High-risk procedure (urgent/emergent procedure, Intrathoracic, vascular, etc...)          Y [] / N []    * Revised Cardiac Risk Index (RCRI)  1- History of ischemic heart disease          Y [X] / N []  2- History of congestive heart failure          Y [] / N [X]  3- History of stroke/TIA          Y [] / N [X]  4- History of insulin-dependent diabetes          Y [X] / N []  5- Chronic kidney disease (Cr >2mg/dL)          Y [] / N [X]  6- Undergoing suprainguinal vascular, intraperitoneal, or intrathoracic surgery          Y [] / N [X]    Class I risk (Zero factors) --> 3.9% (30-day risk of death, MI, or cardiac arrest)  Class II risk (One factor) --> 6% risk (30-day risk of death, MI, or cardiac arrest)  Class III risk (Two factors) --> 10.1% risk (30-day risk of death, MI, or cardiac arrest)  Class IV risk (Three factors or more) --> >15% risk (30-day risk of death, MI, or cardiac arrest)    Recommendations:  - No active chest pain, no sob, no arrhythmia, no AS  - Positive cardiac enzymes, negative ECG - likely type II NSTEMI from acute illness   - High-risk patient for a low to moderate risk procedure  - Continue antiplatelets therapy (If need to hold plavix, switch to aspirin)  - Resume AC when cleared by uro  - Keep Hg>8; transfuse as needed  - Keep on telemetry post-op  - Repeat ECG and cardiac enzymes  - Will follow       Cardiologist:  Dr. Paniagua    77 yo F PMH DM on insulin pump, CAD s/p stenting in 2020, PVD, SSS s/p PPM, Pacemaker: Sept 2017, Essential hypertension, Afib on Eliquis presented here for nausea, vomiting and decreased PO intake for past 2 days.    * Patient-based characteristics (Functional capacity)  Patient is able to achieve more than 4 MET (walk 4 blocks, climb 2 flights of stairs, etc...)          Y [X] / N []    High-risk patient features:  - Recent (<30 days) or active MI          Y [X] / N []  - Unstable or severe angina          Y [] / N [X]  - Decompensated heart failure, or worsening or new-onset heart failure          Y [] / N [X]  - Severe valvular disease          Y [] / N [X]  - Significant arrhythmia (Tachy- or Bradyarrhythmia)          Y [] / N [X]    * Surgery/Procedure-based characteristics (Type of surgery)  - Low-risk procedure (outpatient procedure, elective, endoscopy, etc...)          Y [X] / N []    * Revised Cardiac Risk Index (RCRI)  1- History of ischemic heart disease          Y [X] / N []  2- History of congestive heart failure          Y [] / N [X]  3- History of stroke/TIA          Y [] / N [X]  4- History of insulin-dependent diabetes          Y [X] / N []  5- Chronic kidney disease (Cr >2mg/dL)          Y [] / N [X]  6- Undergoing suprainguinal vascular, intraperitoneal, or intrathoracic surgery          Y [] / N [X]    Class III risk (Two factors) --> 10.1% risk (30-day risk of death, MI, or cardiac arrest)    High-risk for perioperative MACE for surgery/procedure under [place]  May discontinue Plavix and switch to Aspirin 81 mg perioperatively if necessary  May hold Eliquis for 48 hrs prior to procedure      Recommendations:  - No active chest pain, no sob, no arrhythmia, no AS  - Positive cardiac enzymes, negative ECG - likely type II NSTEMI from acute illness   - High-risk patient for a low to moderate risk procedure  - Resume AC when cleared by uro  - Keep Hg>8; transfuse as needed  - Telemetry for 24-48 hrs post-op  - Repeat ECG and cardiac enzymes

## 2021-03-15 NOTE — ASU DISCHARGE PLAN (ADULT/PEDIATRIC). - ASU FOLLOWUP
Patient Seen in: [facility] AND [facility] Emergency Department      History   Patient presents with:   Eye Visual Problem    Stated Complaint: trauma to left eye    HPI/Subjective:   25-year-old female presenting with left eye redness which occurred earlier toda 20/20, Corrected    Physical Exam  Vitals and nursing note reviewed. Constitutional:       Appearance: Normal appearance. HENT:      Head: Normocephalic.       Right Ear: External ear normal.      Left Ear: External ear normal.      Nose: Nose normal. encounter diagnosis)    Disposition:  Discharge  3/14/2021  9:28 pm    Follow-up:  Cally Romeo  [address] [place] [address] [address] [address], [facility]   [phone]      As needed          Medications Prescribed:  Discharge Medication List as of 3/14/2021  9: [facility]:  Endoscopy/Ambulatory Surgery [place]...

## 2021-03-15 NOTE — CONSULT NOTE ADULT - SUBJECTIVE AND OBJECTIVE BOX
DATE OF ADMISSION: 03-14-21  HOSPITAL DAY: 1d    REASON FOR CONSULT:  Cystouteroscopy w/stent placement for right sided hydronephrosis. Cardiac preop risk needed.     HISTORY OF PRESENT ILLNESS:   77 yo F PMH DM on insulin pump, CAD s/p stenting in 2020, Lung nodules, Dry eyes, bilateral, PVD (peripheral vascular disease): S/p Right Peripheral leg Stent, Seasonal allergies, Skin cancer: Basal Cell Cancer face  (around nose) s/p MOHS procedure x 3, Anemia: Chronic, on Iron supplement, Hypothyroid, SSS (sick sinus syndrome): S/p PPM, Pacemaker: Sept 2017, Essential hypertension, Afib: 5+ yrs; on Eliquis presented here for nausea, vomiting and decreased PO intake for past 2 days. The symptoms started acutely and she was in her usual state of health before. She also felt weak and her blood sugars were higher. She had a recent hx of UTI which responded well to ciprofloxacin. She denied any chest pain, shortness of breath, focal neurological deficits or altered bowel habits. She takes her meds regularly. For past three days, she is also passing out very less urine. There is no blood in urine noted however. She complained of some diffuse abdominal pain which started now and is burning in character. She is otherwise comfortable and has no other complaints.    Vital Signs (24 Hrs):  T(C): 37.1 (03-14-21 @ 20:28), Max: 38.5 (03-14-21 @ 17:43)  HR: 76 (03-14-21 @ 20:28) (76 - 87)  BP: 101/46 (03-14-21 @ 20:28) (101/46 - 127/61)  RR: 20 (03-14-21 @ 20:28) (20 - 22)  SpO2: 100% (03-14-21 @ 20:28) (99% - 100%)      Patient received in ER with low grade fevers. Blood work is significant for leukocytosis, hyponatremia, hyperkalemia, elevated Cr and BUN, elevated liver enzymes, troponin and a positive UA for blood. CT abdomen and pelvis revealed right kidney is enlarged with perinephric fat stranding and moderate hydroureteronephrosis to the level of the distal ureter. Patient is being admitted for sepsis secondary to possible urinary tract source. Urology consulted and on board for possible cystoscopy.     PAST MEDICAL & SURGICAL HISTORY:  Inclusion body myositis (IBM)    Lung nodules    Transfusion history  S/p Hysterectomy    Dry eyes, bilateral    PVD (peripheral vascular disease)  S/p Right Peripheral leg Stent    Seasonal allergies    Skin cancer  Basal Cell Cancer face  (around nose) s/p MOHS procedure x 3    Anemia  Chronic, on Iron supplement    Hypothyroid    SSS (sick sinus syndrome)  S/p PPM    Diabetes  Insulin dependent - (has insulin Pump)    Pacemaker  Sept 2017    Palpitations  occasionally; not for couple yrs    Essential hypertension    Afib  5+ yrs; on Eliquis    S/P ablation of atrial fibrillation    S/P cataract surgery    History of surgery  MOHS procedure (face) x 3    Cardiac pacemaker  9/9/17    History of surgery  Right Peripheral Stent 5 yrs    H/O total hysterectomy  25+ yrs ago      FAMILY HISTORY:  [  ] No pertinent family history of premature cardiovascular disease in first degree relatives  Mother:   Father:   Siblings:     SOCIAL HISTORY:  [  ] Non-smoker  [  ] Smoker  [  ] Alcohol use:  [  ] Drug use:     ALLERGIES:  latex (Pruritus; Rash)  Pineapple (Unknown)  sulfonamides (Unknown)    MEDICATIONS:  MEDICATIONS  (STANDING):  amLODIPine   Tablet 5 milliGRAM(s) Oral daily  apixaban 2.5 milliGRAM(s) Oral every 12 hours  ascorbic acid  Oral Tab/Cap - Peds 250 milliGRAM(s) Oral two times a day  atorvastatin 40 milliGRAM(s) Oral at bedtime  carvedilol Oral Tab/Cap - Peds 12.5 milliGRAM(s) Oral two times a day  chlorhexidine 4% Liquid 1 Application(s) Topical <User Schedule>  clopidogrel Tablet 75 milliGRAM(s) Oral daily  dextrose 40% Gel 15 Gram(s) Oral once  dextrose 5%. 1000 milliLiter(s) (50 mL/Hr) IV Continuous <Continuous>  dextrose 5%. 1000 milliLiter(s) (100 mL/Hr) IV Continuous <Continuous>  dextrose 50% Injectable 25 Gram(s) IV Push once  dextrose 50% Injectable 12.5 Gram(s) IV Push once  dextrose 50% Injectable 25 Gram(s) IV Push once  glucagon  Injectable 1 milliGRAM(s) IntraMuscular once  levothyroxine 88 MICROGram(s) Oral daily  meropenem  IVPB 500 milliGRAM(s) IV Intermittent every 12 hours  multivitamin 1 Tablet(s) Oral daily  sodium chloride 0.9%. 1000 milliLiter(s) (100 mL/Hr) IV Continuous <Continuous>    MEDICATIONS  (PRN):  aluminum hydroxide/magnesium hydroxide/simethicone Suspension 30 milliLiter(s) Oral every 6 hours PRN Dyspepsia    HOME MEDICATIONS:  Home Medications:  amlodipine-benazepril 5 mg-40 mg oral capsule: 1 cap(s) orally once a day (at bedtime) (23 Jul 2020 08:25)  carvedilol 12.5 mg oral tablet: 1 tab(s) orally 2 times a day (23 Jul 2020 08:25)  Gamunex 10% intravenous solution: every 5 weeks (23 Jul 2020 08:25)  insulin: Insulin Pump (23 Jul 2020 08:25)  levothyroxine 88 mcg (0.088 mg) oral tablet: 1 tab(s) orally once a day (23 Jul 2020 08:25)  Multiple Vitamins oral tablet: 1 tab(s) orally once a day (23 Jul 2020 08:25)  rosuvastatin 10 mg oral tablet: 1 tab(s) orally every other day (23 Jul 2020 08:25)  Vitamin C 250 mg oral tablet: 1 tab(s) orally 2 times a day (23 Jul 2020 08:25)    REVIEW OF SYSTEMS:  CONSTITUTIONAL: No weakness, fevers, chills, or fatigue  HEENT: no visual changes, vertigo, throat pain, or hearing loss  RESPIRATORY: no shortness of breath, cough, or wheezing  CARDIOVASCULAR: no chest pain, palpitations, or syncopal episodes  GASTROINTESTINAL: no abdominal pain, nausea, vomiting, diarrhea, constipation, melena or hematochezia  NEUROLOGICAL: no numbness or weakness  ENDOCRINOLOGICAL: no recent change in diabetic medications  MUSCULOSKELETAL: no joint pain or muscle pain  GENITOURINARY: no dysuria, frequency, or hematuria  SKIN: no itching, rashes, or bruising    VITALS:   T(C): 37.1 (03-15-21 @ 03:52), Max: 38.5 (03-14-21 @ 17:43)  HR: 80 (03-15-21 @ 03:52) (76 - 87)  BP: 130/59 (03-15-21 @ 03:52) (101/46 - 130/59)  RR: 18 (03-15-21 @ 03:52) (18 - 22)  SpO2: 100% (03-14-21 @ 20:28) (99% - 100%)  Wt(kg): --  I&O's Summary    15 Mar 2021 07:01  -  15 Mar 2021 10:18  --------------------------------------------------------  IN: 200 mL / OUT: 150 mL / NET: 50 mL      PHYSICAL EXAM:  CONSTITUTIONAL: no acute distress, well appearing  NEUROLOGICAL: patient is awake, alert, and oriented, no focal deficits  NECK: no thyroid enlargement, no JVD, no carotid bruit  EYES: no xanthomalasia, EOM intact  LUNGS: Clear to auscultation bilaterally  CARDIOVASCULAR: regular rate and rhythm, audible S1/S2, no JVD, no murmurs, no edema  ABDOMEN: soft, non-tender, non-distended, bowel sounds present  EXTREMITIES: able to move all four extremities, no clubbing, cyanosis or edema  VASCULAR: 2+ palpable peripheral pulses bilaterally  NEUROLOGICAL: non-focal  PSYCH: appropriate mood and affect  SKIN: intact    LABS:                        9.9    18.88 )-----------( 127      ( 15 Mar 2021 06:16 )             29.3     03-15    121<L>  |  92<L>  |  55<H>  ----------------------------<  254<H>  4.4   |  17  |  1.9<H>    Ca    7.2<L>      15 Mar 2021 06:16  Mg     1.7     03-15    TPro  5.5<L>  /  Alb  3.0<L>  /  TBili  0.3  /  DBili  x   /  AST  35  /  ALT  32  /  AlkPhos  98  03-15    CARDIAC MARKERS ( 14 Mar 2021 16:30 )  x     / 0.56 ng/mL / x     / x     / x                    CARDIAC MARKERS:  Troponin T, Serum: 0.56 ng/mL (03-14 @ 16:30)          TELEMETRY EVENTS:  ECG:  CHEST X-RAY:  OTHER:    PREVIOUS DIAGNOSTIC TESTING:  [  ] Echocardiogram:  No previous echo in the [place] system   [  ] Catheterization: Patient has had two caths. One on 6/4/2021 which demonstrated 80% stenosis of the LAD and 80% stenosis of prox and distal LCX w/PCI to LAD on 6/4/2021. Staged PCI to the LCX was done on 7/23/2021< from: NM Nuclear Stress Pharmacologic Multiple (07.18.19 @ 14:35) >  [  ] Stress Test:   Stress test 7/19/2019 showed no myocardial reperfusion defects w/normal wall motion thickenign and ejection fraction. >  [  ] Coronary CTA: CAD-RADS 4A; Severe stenosis of the LAD and LCX. on CCTA taken 3/26	    * SUMMARY:    * Patient-based characteristics (Functional capacity)  Patient is able to achieve more than 4 MET (walk 4 blocks, climb 2 flights of stairs, etc...)          Y [X] / N []    High-risk patient features:  - Recent (<30 days) or active MI          Y [] / N [X]  - Unstable or severe angina          Y [] / N [X]  - Decompensated heart failure, or worsening or new-onset heart failure          Y [] / N [X]  - Severe valvular disease          Y [] / N [X]  - Significant arrhythmia (Tachy- or Bradyarrhythmia)          Y [] / N [X]    * Surgery/Procedure-based characteristics (Type of surgery)  - Low-risk procedure (outpatient procedure, elective, endoscopy, etc...)          Y [] / N []  - Elevated or Moderate-risk procedure (Inpatient)          Y [X] / N []  - High-risk procedure (urgent/emergent procedure, Intrathoracic, vascular, etc...)          Y [] / N []    * Revised Cardiac Risk Index (RCRI)  1- History of ischemic heart disease          Y [X] / N []  2- History of congestive heart failure          Y [] / N [X]  3- History of stroke/TIA          Y [] / N [X]  4- History of insulin-dependent diabetes          Y [X] / N []  5- Chronic kidney disease (Cr >2mg/dL)          Y [] / N [X]  6- Undergoing suprainguinal vascular, intraperitoneal, or intrathoracic surgery          Y [] / N [X]    Class I risk (Zero factors) --> 3.9% (30-day risk of death, MI, or cardiac arrest)  Class II risk (One factor) --> 6% risk (30-day risk of death, MI, or cardiac arrest)  Class III risk (Two factors) --> 10.1% risk (30-day risk of death, MI, or cardiac arrest)  Class IV risk (Three factors or more) --> >15% risk (30-day risk of death, MI, or cardiac arrest)   DATE OF ADMISSION: 03-14-21  HOSPITAL DAY: 1d    REASON FOR CONSULT:  Cystouteroscopy w/stent placement for right sided hydronephrosis. Cardiac preop risk needed.     HISTORY OF PRESENT ILLNESS:   77 yo F PMH DM on insulin pump, CAD s/p stenting in 2020, Lung nodules, Dry eyes, bilateral, PVD (peripheral vascular disease): S/p Right Peripheral leg Stent, Seasonal allergies, Skin cancer: Basal Cell Cancer face  (around nose) s/p MOHS procedure x 3, Anemia: Chronic, on Iron supplement, Hypothyroid, SSS (sick sinus syndrome): S/p PPM, Pacemaker: Sept 2017, Essential hypertension, Afib: 5+ yrs; on Eliquis presented here for nausea, vomiting and decreased PO intake for past 2 days. The symptoms started acutely and she was in her usual state of health before. She also felt weak and her blood sugars were higher. She had a recent hx of UTI which responded well to ciprofloxacin. She denied any chest pain, shortness of breath, focal neurological deficits or altered bowel habits. She takes her meds regularly. For past three days, she is also passing out very less urine. There is no blood in urine noted however. She complained of some diffuse abdominal pain which started now and is burning in character. She is otherwise comfortable and has no other complaints.    Vital Signs (24 Hrs):  T(C): 37.1 (03-14-21 @ 20:28), Max: 38.5 (03-14-21 @ 17:43)  HR: 76 (03-14-21 @ 20:28) (76 - 87)  BP: 101/46 (03-14-21 @ 20:28) (101/46 - 127/61)  RR: 20 (03-14-21 @ 20:28) (20 - 22)  SpO2: 100% (03-14-21 @ 20:28) (99% - 100%)      Patient received in ER with low grade fevers. Blood work is significant for leukocytosis, hyponatremia, hyperkalemia, elevated Cr and BUN, elevated liver enzymes, troponin and a positive UA for blood. CT abdomen and pelvis revealed right kidney is enlarged with perinephric fat stranding and moderate hydroureteronephrosis to the level of the distal ureter. Patient is being admitted for sepsis secondary to possible urinary tract source. Urology consulted and on board for possible cystoscopy.         PAST MEDICAL & SURGICAL HISTORY:  Inclusion body myositis (IBM)    Lung nodules    Transfusion history  S/p Hysterectomy    Dry eyes, bilateral    PVD (peripheral vascular disease)  S/p Right Peripheral leg Stent    Seasonal allergies    Skin cancer  Basal Cell Cancer face  (around nose) s/p MOHS procedure x 3    Anemia  Chronic, on Iron supplement    Hypothyroid    SSS (sick sinus syndrome)  S/p PPM    Diabetes  Insulin dependent - (has insulin Pump)    Pacemaker  Sept 2017    Palpitations  occasionally; not for couple yrs    Essential hypertension    Afib  5+ yrs; on Eliquis    S/P ablation of atrial fibrillation    S/P cataract surgery    History of surgery  MOHS procedure (face) x 3    Cardiac pacemaker  9/9/17    History of surgery  Right Peripheral Stent 5 yrs    H/O total hysterectomy  25+ yrs ago      FAMILY HISTORY:  [  ] No pertinent family history of premature cardiovascular disease in first degree relatives  Mother:   Father:   Siblings:     SOCIAL HISTORY:  [  ] Non-smoker  [  ] Smoker  [  ] Alcohol use:  [  ] Drug use:     ALLERGIES:  latex (Pruritus; Rash)  Pineapple (Unknown)  sulfonamides (Unknown)    MEDICATIONS:  MEDICATIONS  (STANDING):  amLODIPine   Tablet 5 milliGRAM(s) Oral daily  apixaban 2.5 milliGRAM(s) Oral every 12 hours  ascorbic acid  Oral Tab/Cap - Peds 250 milliGRAM(s) Oral two times a day  atorvastatin 40 milliGRAM(s) Oral at bedtime  carvedilol Oral Tab/Cap - Peds 12.5 milliGRAM(s) Oral two times a day  chlorhexidine 4% Liquid 1 Application(s) Topical <User Schedule>  clopidogrel Tablet 75 milliGRAM(s) Oral daily  dextrose 40% Gel 15 Gram(s) Oral once  dextrose 5%. 1000 milliLiter(s) (50 mL/Hr) IV Continuous <Continuous>  dextrose 5%. 1000 milliLiter(s) (100 mL/Hr) IV Continuous <Continuous>  dextrose 50% Injectable 25 Gram(s) IV Push once  dextrose 50% Injectable 12.5 Gram(s) IV Push once  dextrose 50% Injectable 25 Gram(s) IV Push once  glucagon  Injectable 1 milliGRAM(s) IntraMuscular once  levothyroxine 88 MICROGram(s) Oral daily  meropenem  IVPB 500 milliGRAM(s) IV Intermittent every 12 hours  multivitamin 1 Tablet(s) Oral daily  sodium chloride 0.9%. 1000 milliLiter(s) (100 mL/Hr) IV Continuous <Continuous>    MEDICATIONS  (PRN):  aluminum hydroxide/magnesium hydroxide/simethicone Suspension 30 milliLiter(s) Oral every 6 hours PRN Dyspepsia    HOME MEDICATIONS:  Home Medications:  amlodipine-benazepril 5 mg-40 mg oral capsule: 1 cap(s) orally once a day (at bedtime) (23 Jul 2020 08:25)  carvedilol 12.5 mg oral tablet: 1 tab(s) orally 2 times a day (23 Jul 2020 08:25)  Gamunex 10% intravenous solution: every 5 weeks (23 Jul 2020 08:25)  insulin: Insulin Pump (23 Jul 2020 08:25)  levothyroxine 88 mcg (0.088 mg) oral tablet: 1 tab(s) orally once a day (23 Jul 2020 08:25)  Multiple Vitamins oral tablet: 1 tab(s) orally once a day (23 Jul 2020 08:25)  rosuvastatin 10 mg oral tablet: 1 tab(s) orally every other day (23 Jul 2020 08:25)  Vitamin C 250 mg oral tablet: 1 tab(s) orally 2 times a day (23 Jul 2020 08:25)    REVIEW OF SYSTEMS:  CONSTITUTIONAL: No weakness, fevers, chills, or fatigue  HEENT: no visual changes, vertigo, throat pain, or hearing loss  RESPIRATORY: no shortness of breath, cough, or wheezing  CARDIOVASCULAR: no chest pain, palpitations, or syncopal episodes  GASTROINTESTINAL: +abdominal pain, nausea, vomiting, diarrhea, constipation, melena or hematochezia  NEUROLOGICAL: no numbness or weakness  ENDOCRINOLOGICAL: no recent change in diabetic medications  MUSCULOSKELETAL: no joint pain or muscle pain  GENITOURINARY: no dysuria, frequency, or hematuria      VITALS:   T(C): 37.1 (03-15-21 @ 03:52), Max: 38.5 (03-14-21 @ 17:43)  HR: 80 (03-15-21 @ 03:52) (76 - 87)  BP: 130/59 (03-15-21 @ 03:52) (101/46 - 130/59)  RR: 18 (03-15-21 @ 03:52) (18 - 22)  SpO2: 100% (03-14-21 @ 20:28) (99% - 100%)  Wt(kg): --  I&O's Summary    15 Mar 2021 07:01  -  15 Mar 2021 10:18  --------------------------------------------------------  IN: 200 mL / OUT: 150 mL / NET: 50 mL      PHYSICAL EXAM:  CONSTITUTIONAL: no acute distress, well appearing  NEUROLOGICAL: patient is awake, alert, and oriented, no focal deficits  NECK: no thyroid enlargement, no JVD, no carotid bruit  LUNGS: Clear to auscultation bilaterally  CARDIOVASCULAR: regular rate and rhythm, audible S1/S2, no JVD, no murmurs, no edema  ABDOMEN: soft, non-tender, non-distended, bowel sounds present  EXTREMITIES: able to move all four extremities, no clubbing, cyanosis or edema  VASCULAR: 2+ palpable peripheral pulses bilaterally  NEUROLOGICAL: non-focal  PSYCH: appropriate mood and affect      LABS:                        9.9    18.88 )-----------( 127      ( 15 Mar 2021 06:16 )             29.3     03-15    121<L>  |  92<L>  |  55<H>  ----------------------------<  254<H>  4.4   |  17  |  1.9<H>    Ca    7.2<L>      15 Mar 2021 06:16  Mg     1.7     03-15    TPro  5.5<L>  /  Alb  3.0<L>  /  TBili  0.3  /  DBili  x   /  AST  35  /  ALT  32  /  AlkPhos  98  03-15    CARDIAC MARKERS ( 14 Mar 2021 16:30 )  x     / 0.56 ng/mL / x     / x     / x                    CARDIAC MARKERS:  Troponin T, Serum: 0.56 ng/mL (03-14 @ 16:30)          TELEMETRY EVENTS:  ECG:  CHEST X-RAY:  OTHER:    PREVIOUS DIAGNOSTIC TESTING:  [  ] Echocardiogram:  No previous echo in the [place] system   [  ] Catheterization: Patient has had two caths. One on 6/4/2021 which demonstrated 80% stenosis of the LAD and 80% stenosis of prox and distal LCX w/PCI to LAD on 6/4/2021. Staged PCI to the LCX was done on 7/23/2021< from: NM Nuclear Stress Pharmacologic Multiple (07.18.19 @ 14:35) >  [  ] Stress Test:   Stress test 7/19/2019 showed no myocardial reperfusion defects w/normal wall motion thickenign and ejection fraction. >  [  ] Coronary CTA: CAD-RADS 4A; Severe stenosis of the LAD and LCX. on CCTA taken 3/26	    * SUMMARY:    * Patient-based characteristics (Functional capacity)  Patient is able to achieve more than 4 MET (walk 4 blocks, climb 2 flights of stairs, etc...)          Y [X] / N []    High-risk patient features:  - Recent (<30 days) or active MI          Y [] / N [X]  - Unstable or severe angina          Y [] / N [X]  - Decompensated heart failure, or worsening or new-onset heart failure          Y [] / N [X]  - Severe valvular disease          Y [] / N [X]  - Significant arrhythmia (Tachy- or Bradyarrhythmia)          Y [] / N [X]    * Surgery/Procedure-based characteristics (Type of surgery)  - Low-risk procedure (outpatient procedure, elective, endoscopy, etc...)          Y [] / N []  - Elevated or Moderate-risk procedure (Inpatient)          Y [X] / N []  - High-risk procedure (urgent/emergent procedure, Intrathoracic, vascular, etc...)          Y [] / N []    * Revised Cardiac Risk Index (RCRI)  1- History of ischemic heart disease          Y [X] / N []  2- History of congestive heart failure          Y [] / N [X]  3- History of stroke/TIA          Y [] / N [X]  4- History of insulin-dependent diabetes          Y [X] / N []  5- Chronic kidney disease (Cr >2mg/dL)          Y [] / N [X]  6- Undergoing suprainguinal vascular, intraperitoneal, or intrathoracic surgery          Y [] / N [X]    Class I risk (Zero factors) --> 3.9% (30-day risk of death, MI, or cardiac arrest)  Class II risk (One factor) --> 6% risk (30-day risk of death, MI, or cardiac arrest)  Class III risk (Two factors) --> 10.1% risk (30-day risk of death, MI, or cardiac arrest)  Class IV risk (Three factors or more) --> >15% risk (30-day risk of death, MI, or cardiac arrest)   DATE OF ADMISSION: 03-14-21  HOSPITAL DAY: 1d    REASON FOR CONSULT:  Cystouteroscopy w/stent placement for right sided hydronephrosis. Cardiac preop risk needed.     HISTORY OF PRESENT ILLNESS:   77 yo F PMH DM on insulin pump, CAD s/p stenting in 2020, Lung nodules, Dry eyes, bilateral, PVD (peripheral vascular disease): S/p Right Peripheral leg Stent, Seasonal allergies, Skin cancer: Basal Cell Cancer face  (around nose) s/p MOHS procedure x 3, Anemia: Chronic, on Iron supplement, Hypothyroid, SSS (sick sinus syndrome): S/p PPM, Pacemaker: Sept 2017, Essential hypertension, Afib: 5+ yrs; on Eliquis presented here for nausea, vomiting and decreased PO intake for past 2 days. The symptoms started acutely and she was in her usual state of health before. She also felt weak and her blood sugars were higher. She had a recent hx of UTI which responded well to ciprofloxacin. She denied any chest pain, shortness of breath, focal neurological deficits or altered bowel habits. She takes her meds regularly. For past three days, she is also passing out very less urine. There is no blood in urine noted however. She complained of some diffuse abdominal pain which started now and is burning in character. She is otherwise comfortable and has no other complaints.    Vital Signs (24 Hrs):  T(C): 37.1 (03-14-21 @ 20:28), Max: 38.5 (03-14-21 @ 17:43)  HR: 76 (03-14-21 @ 20:28) (76 - 87)  BP: 101/46 (03-14-21 @ 20:28) (101/46 - 127/61)  RR: 20 (03-14-21 @ 20:28) (20 - 22)  SpO2: 100% (03-14-21 @ 20:28) (99% - 100%)      Patient received in ER with low grade fevers. Blood work is significant for leukocytosis, hyponatremia, hyperkalemia, elevated Cr and BUN, elevated liver enzymes, troponin and a positive UA for blood. CT abdomen and pelvis revealed right kidney is enlarged with perinephric fat stranding and moderate hydroureteronephrosis to the level of the distal ureter. Patient is being admitted for sepsis secondary to possible urinary tract source. Urology consulted and on board for possible cystoscopy.         PAST MEDICAL & SURGICAL HISTORY:  Inclusion body myositis (IBM)    Lung nodules    Transfusion history  S/p Hysterectomy    Dry eyes, bilateral    PVD (peripheral vascular disease)  S/p Right Peripheral leg Stent    Seasonal allergies    Skin cancer  Basal Cell Cancer face  (around nose) s/p MOHS procedure x 3    Anemia  Chronic, on Iron supplement    Hypothyroid    SSS (sick sinus syndrome)  S/p PPM    Diabetes  Insulin dependent - (has insulin Pump)    Pacemaker  Sept 2017    Palpitations  occasionally; not for couple yrs    Essential hypertension    Afib  5+ yrs; on Eliquis    S/P ablation of atrial fibrillation    S/P cataract surgery    History of surgery  MOHS procedure (face) x 3    Cardiac pacemaker  9/9/17    History of surgery  Right Peripheral Stent 5 yrs    H/O total hysterectomy  25+ yrs ago      FAMILY HISTORY:  [ X ] No pertinent family history of premature cardiovascular disease in first degree relatives  Mother:   Father:   Siblings:     SOCIAL HISTORY:  [ X ] Non-smoker  [  ] Alcohol use: Denies ETOH Abuse  [  ] Drug use: Denies Drug abuse     ALLERGIES:  latex (Pruritus; Rash)  Pineapple (Unknown)  sulfonamides (Unknown)    MEDICATIONS:  MEDICATIONS  (STANDING):  amLODIPine   Tablet 5 milliGRAM(s) Oral daily  apixaban 2.5 milliGRAM(s) Oral every 12 hours  ascorbic acid  Oral Tab/Cap - Peds 250 milliGRAM(s) Oral two times a day  atorvastatin 40 milliGRAM(s) Oral at bedtime  carvedilol Oral Tab/Cap - Peds 12.5 milliGRAM(s) Oral two times a day  chlorhexidine 4% Liquid 1 Application(s) Topical <User Schedule>  clopidogrel Tablet 75 milliGRAM(s) Oral daily  dextrose 40% Gel 15 Gram(s) Oral once  dextrose 5%. 1000 milliLiter(s) (50 mL/Hr) IV Continuous <Continuous>  dextrose 5%. 1000 milliLiter(s) (100 mL/Hr) IV Continuous <Continuous>  dextrose 50% Injectable 25 Gram(s) IV Push once  dextrose 50% Injectable 12.5 Gram(s) IV Push once  dextrose 50% Injectable 25 Gram(s) IV Push once  glucagon  Injectable 1 milliGRAM(s) IntraMuscular once  levothyroxine 88 MICROGram(s) Oral daily  meropenem  IVPB 500 milliGRAM(s) IV Intermittent every 12 hours  multivitamin 1 Tablet(s) Oral daily  sodium chloride 0.9%. 1000 milliLiter(s) (100 mL/Hr) IV Continuous <Continuous>    MEDICATIONS  (PRN):  aluminum hydroxide/magnesium hydroxide/simethicone Suspension 30 milliLiter(s) Oral every 6 hours PRN Dyspepsia    HOME MEDICATIONS:  Home Medications:  amlodipine-benazepril 5 mg-40 mg oral capsule: 1 cap(s) orally once a day (at bedtime) (23 Jul 2020 08:25)  carvedilol 12.5 mg oral tablet: 1 tab(s) orally 2 times a day (23 Jul 2020 08:25)  Gamunex 10% intravenous solution: every 5 weeks (23 Jul 2020 08:25)  insulin: Insulin Pump (23 Jul 2020 08:25)  levothyroxine 88 mcg (0.088 mg) oral tablet: 1 tab(s) orally once a day (23 Jul 2020 08:25)  Multiple Vitamins oral tablet: 1 tab(s) orally once a day (23 Jul 2020 08:25)  rosuvastatin 10 mg oral tablet: 1 tab(s) orally every other day (23 Jul 2020 08:25)  Vitamin C 250 mg oral tablet: 1 tab(s) orally 2 times a day (23 Jul 2020 08:25)    REVIEW OF SYSTEMS:  CONSTITUTIONAL: No weakness, fevers, chills, or fatigue  HEENT: no visual changes, vertigo, throat pain, or hearing loss  RESPIRATORY: no shortness of breath, cough, or wheezing  CARDIOVASCULAR: no chest pain, palpitations, or syncopal episodes  GASTROINTESTINAL: +abdominal pain, nausea, vomiting, diarrhea, constipation, melena or hematochezia  NEUROLOGICAL: no numbness or weakness  ENDOCRINOLOGICAL: no recent change in diabetic medications  MUSCULOSKELETAL: no joint pain or muscle pain  GENITOURINARY: no dysuria, frequency, or hematuria      VITALS:   T(C): 37.1 (03-15-21 @ 03:52), Max: 38.5 (03-14-21 @ 17:43)  HR: 80 (03-15-21 @ 03:52) (76 - 87)  BP: 130/59 (03-15-21 @ 03:52) (101/46 - 130/59)  RR: 18 (03-15-21 @ 03:52) (18 - 22)  SpO2: 100% (03-14-21 @ 20:28) (99% - 100%)  Wt(kg): --  I&O's Summary    15 Mar 2021 07:01  -  15 Mar 2021 10:18  --------------------------------------------------------  IN: 200 mL / OUT: 150 mL / NET: 50 mL      PHYSICAL EXAM:  CONSTITUTIONAL: no acute distress, well appearing  NEUROLOGICAL: patient is awake, alert, and oriented, no focal deficits  NECK: no thyroid enlargement, no JVD, no carotid bruit  LUNGS: Clear to auscultation bilaterally  CARDIOVASCULAR: regular rate and rhythm, audible S1/S2, no JVD, no murmurs, no edema  ABDOMEN: soft, non-tender, non-distended, bowel sounds present  EXTREMITIES: able to move all four extremities, no clubbing, cyanosis or edema  VASCULAR: 2+ palpable peripheral pulses bilaterally  NEUROLOGICAL: non-focal  PSYCH: appropriate mood and affect      LABS:                        9.9    18.88 )-----------( 127      ( 15 Mar 2021 06:16 )             29.3     03-15    121<L>  |  92<L>  |  55<H>  ----------------------------<  254<H>  4.4   |  17  |  1.9<H>    Ca    7.2<L>      15 Mar 2021 06:16  Mg     1.7     03-15    TPro  5.5<L>  /  Alb  3.0<L>  /  TBili  0.3  /  DBili  x   /  AST  35  /  ALT  32  /  AlkPhos  98  03-15    CARDIAC MARKERS ( 14 Mar 2021 16:30 )  x     / 0.56 ng/mL / x     / x     / x                    CARDIAC MARKERS:  Troponin T, Serum: 0.56 ng/mL (03-14 @ 16:30)          TELEMETRY EVENTS:  ECG: NSR  CHEST X-RAY: Unremarkable w/PPM present.   OTHER:    PREVIOUS DIAGNOSTIC TESTING:  [  ] Echocardiogram:  No previous echo in the [facility] system   [  ] Catheterization: Patient has had two caths. One on 6/4/2021 which demonstrated 80% stenosis of the LAD and 80% stenosis of prox and distal LCX w/PCI to LAD on 6/4/2021. Staged PCI to the LCX was done on 7/23/2021< from: NM Nuclear Stress Pharmacologic Multiple (07.18.19 @ 14:35) >  [  ] Stress Test:   Stress test 7/19/2019 showed no myocardial reperfusion defects w/normal wall motion thickenign and ejection fraction. >  [  ] Coronary CTA: CAD-RADS 4A; Severe stenosis of the LAD and LCX. on CCTA taken 3/26	    * SUMMARY:    * Patient-based characteristics (Functional capacity)  Patient is able to achieve more than 4 MET (walk 4 blocks, climb 2 flights of stairs, etc...)          Y [X] / N []    High-risk patient features:  - Recent (<30 days) or active MI          Y [] / N [X]  - Unstable or severe angina          Y [] / N [X]  - Decompensated heart failure, or worsening or new-onset heart failure          Y [] / N [X]  - Severe valvular disease          Y [] / N [X]  - Significant arrhythmia (Tachy- or Bradyarrhythmia)          Y [] / N [X]    * Surgery/Procedure-based characteristics (Type of surgery)  - Low-risk procedure (outpatient procedure, elective, endoscopy, etc...)          Y [] / N []  - Elevated or Moderate-risk procedure (Inpatient)          Y [X] / N []  - High-risk procedure (urgent/emergent procedure, Intrathoracic, vascular, etc...)          Y [] / N []    * Revised Cardiac Risk Index (RCRI)  1- History of ischemic heart disease          Y [X] / N []  2- History of congestive heart failure          Y [] / N [X]  3- History of stroke/TIA          Y [] / N [X]  4- History of insulin-dependent diabetes          Y [X] / N []  5- Chronic kidney disease (Cr >2mg/dL)          Y [] / N [X]  6- Undergoing suprainguinal vascular, intraperitoneal, or intrathoracic surgery          Y [] / N [X]    Class I risk (Zero factors) --> 3.9% (30-day risk of death, MI, or cardiac arrest)  Class II risk (One factor) --> 6% risk (30-day risk of death, MI, or cardiac arrest)  Class III risk (Two factors) --> 10.1% risk (30-day risk of death, MI, or cardiac arrest)  Class IV risk (Three factors or more) --> >15% risk (30-day risk of death, MI, or cardiac arrest)   DATE OF ADMISSION: 03-14-21  HOSPITAL DAY: 1d    REASON FOR CONSULT:  Cystouteroscopy w/stent placement for right sided hydronephrosis. Cardiac preop risk needed.     HISTORY OF PRESENT ILLNESS:   77 yo F PMH DM on insulin pump, CAD s/p stenting in 2020, Lung nodules, Dry eyes, bilateral, PVD (peripheral vascular disease): S/p Right Peripheral leg Stent, Seasonal allergies, Skin cancer: Basal Cell Cancer face  (around nose) s/p MOHS procedure x 3, Anemia: Chronic, on Iron supplement, Hypothyroid, SSS (sick sinus syndrome): S/p PPM, Pacemaker: Sept 2017, Essential hypertension, Afib: 5+ yrs; on Eliquis presented here for nausea, vomiting and decreased PO intake for past 2 days. The symptoms started acutely and she was in her usual state of health before. She also felt weak and her blood sugars were higher. She had a recent hx of UTI which responded well to ciprofloxacin. She denied any chest pain, shortness of breath, focal neurological deficits or altered bowel habits. She takes her meds regularly. For past three days, she is also passing out very less urine. There is no blood in urine noted however. She complained of some diffuse abdominal pain which started now and is burning in character. She is otherwise comfortable and has no other complaints.    Vital Signs (24 Hrs):  T(C): 37.1 (03-14-21 @ 20:28), Max: 38.5 (03-14-21 @ 17:43)  HR: 76 (03-14-21 @ 20:28) (76 - 87)  BP: 101/46 (03-14-21 @ 20:28) (101/46 - 127/61)  RR: 20 (03-14-21 @ 20:28) (20 - 22)  SpO2: 100% (03-14-21 @ 20:28) (99% - 100%)      Patient received in ER with low grade fevers. Blood work is significant for leukocytosis, hyponatremia, hyperkalemia, elevated Cr and BUN, elevated liver enzymes, troponin and a positive UA for blood. CT abdomen and pelvis revealed right kidney is enlarged with perinephric fat stranding and moderate hydroureteronephrosis to the level of the distal ureter. Patient is being admitted for sepsis secondary to possible urinary tract source. Urology consulted and on board for possible cystoscopy.         PAST MEDICAL & SURGICAL HISTORY:  Inclusion body myositis (IBM)    Lung nodules    Transfusion history  S/p Hysterectomy    Dry eyes, bilateral    PVD (peripheral vascular disease)  S/p Right Peripheral leg Stent    Seasonal allergies    Skin cancer  Basal Cell Cancer face  (around nose) s/p MOHS procedure x 3    Anemia  Chronic, on Iron supplement    Hypothyroid    SSS (sick sinus syndrome)  S/p PPM    Diabetes  Insulin dependent - (has insulin Pump)    Pacemaker  Sept 2017    Palpitations  occasionally; not for couple yrs    Essential hypertension    Afib  5+ yrs; on Eliquis    S/P ablation of atrial fibrillation    S/P cataract surgery    History of surgery  MOHS procedure (face) x 3    Cardiac pacemaker  9/9/17    History of surgery  Right Peripheral Stent 5 yrs    H/O total hysterectomy  25+ yrs ago      FAMILY HISTORY:  [ X ] No pertinent family history of premature cardiovascular disease in first degree relatives  Mother:   Father:   Siblings: One sister CAD w/PCI, One sister has a PPM     SOCIAL HISTORY:  Smoker: Smoked intermittently for five years and quit 45 years ago.   [ ] Non-smoker  [  ] Alcohol use: Denies ETOH Abuse  [  ] Drug use: Denies Drug abuse     ALLERGIES:  latex (Pruritus; Rash)  Pineapple (Unknown)  sulfonamides (Unknown)    MEDICATIONS:  MEDICATIONS  (STANDING):  amLODIPine   Tablet 5 milliGRAM(s) Oral daily  apixaban 2.5 milliGRAM(s) Oral every 12 hours  ascorbic acid  Oral Tab/Cap - Peds 250 milliGRAM(s) Oral two times a day  atorvastatin 40 milliGRAM(s) Oral at bedtime  carvedilol Oral Tab/Cap - Peds 12.5 milliGRAM(s) Oral two times a day  chlorhexidine 4% Liquid 1 Application(s) Topical <User Schedule>  clopidogrel Tablet 75 milliGRAM(s) Oral daily  dextrose 40% Gel 15 Gram(s) Oral once  dextrose 5%. 1000 milliLiter(s) (50 mL/Hr) IV Continuous <Continuous>  dextrose 5%. 1000 milliLiter(s) (100 mL/Hr) IV Continuous <Continuous>  dextrose 50% Injectable 25 Gram(s) IV Push once  dextrose 50% Injectable 12.5 Gram(s) IV Push once  dextrose 50% Injectable 25 Gram(s) IV Push once  glucagon  Injectable 1 milliGRAM(s) IntraMuscular once  levothyroxine 88 MICROGram(s) Oral daily  meropenem  IVPB 500 milliGRAM(s) IV Intermittent every 12 hours  multivitamin 1 Tablet(s) Oral daily  sodium chloride 0.9%. 1000 milliLiter(s) (100 mL/Hr) IV Continuous <Continuous>    MEDICATIONS  (PRN):  aluminum hydroxide/magnesium hydroxide/simethicone Suspension 30 milliLiter(s) Oral every 6 hours PRN Dyspepsia    HOME MEDICATIONS:  Home Medications:  amlodipine-benazepril 5 mg-40 mg oral capsule: 1 cap(s) orally once a day (at bedtime) (23 Jul 2020 08:25)  carvedilol 12.5 mg oral tablet: 1 tab(s) orally 2 times a day (23 Jul 2020 08:25)  Gamunex 10% intravenous solution: every 5 weeks (23 Jul 2020 08:25)  insulin: Insulin Pump (23 Jul 2020 08:25)  levothyroxine 88 mcg (0.088 mg) oral tablet: 1 tab(s) orally once a day (23 Jul 2020 08:25)  Multiple Vitamins oral tablet: 1 tab(s) orally once a day (23 Jul 2020 08:25)  rosuvastatin 10 mg oral tablet: 1 tab(s) orally every other day (23 Jul 2020 08:25)  Vitamin C 250 mg oral tablet: 1 tab(s) orally 2 times a day (23 Jul 2020 08:25)    REVIEW OF SYSTEMS:  CONSTITUTIONAL: No weakness, fevers, chills, or fatigue  HEENT: no visual changes, vertigo, throat pain, or hearing loss  RESPIRATORY: no shortness of breath, cough, or wheezing  CARDIOVASCULAR: no chest pain, palpitations, or syncopal episodes  GASTROINTESTINAL: +abdominal pain, nausea, vomiting, diarrhea, constipation, melena or hematochezia  NEUROLOGICAL: no numbness or weakness  ENDOCRINOLOGICAL: no recent change in diabetic medications  MUSCULOSKELETAL: no joint pain or muscle pain  GENITOURINARY: no dysuria, frequency, or hematuria      VITALS:   T(C): 37.1 (03-15-21 @ 03:52), Max: 38.5 (03-14-21 @ 17:43)  HR: 80 (03-15-21 @ 03:52) (76 - 87)  BP: 130/59 (03-15-21 @ 03:52) (101/46 - 130/59)  RR: 18 (03-15-21 @ 03:52) (18 - 22)  SpO2: 100% (03-14-21 @ 20:28) (99% - 100%)  Wt(kg): --  I&O's Summary    15 Mar 2021 07:01  -  15 Mar 2021 10:18  --------------------------------------------------------  IN: 200 mL / OUT: 150 mL / NET: 50 mL      PHYSICAL EXAM:  CONSTITUTIONAL: no acute distress, well appearing  NEUROLOGICAL: patient is awake, alert, and oriented, no focal deficits  NECK: no thyroid enlargement, no JVD, no carotid bruit  LUNGS: Clear to auscultation bilaterally  CARDIOVASCULAR: regular rate and rhythm, audible S1/S2, no JVD, no murmurs, no edema  ABDOMEN: soft, non-tender, non-distended, bowel sounds present  EXTREMITIES: able to move all four extremities, no clubbing, cyanosis or edema  VASCULAR: 2+ palpable peripheral pulses bilaterally  NEUROLOGICAL: non-focal  PSYCH: appropriate mood and affect      LABS:                        9.9    18.88 )-----------( 127      ( 15 Mar 2021 06:16 )             29.3     03-15    121<L>  |  92<L>  |  55<H>  ----------------------------<  254<H>  4.4   |  17  |  1.9<H>    Ca    7.2<L>      15 Mar 2021 06:16  Mg     1.7     03-15    TPro  5.5<L>  /  Alb  3.0<L>  /  TBili  0.3  /  DBili  x   /  AST  35  /  ALT  32  /  AlkPhos  98  03-15    CARDIAC MARKERS ( 14 Mar 2021 16:30 )  x     / 0.56 ng/mL / x     / x     / x                    CARDIAC MARKERS:  Troponin T, Serum: 0.56 ng/mL (03-14 @ 16:30)          TELEMETRY EVENTS:  ECG: NSR  CHEST X-RAY: Unremarkable w/PPM present.   OTHER:    PREVIOUS DIAGNOSTIC TESTING:  [  ] Echocardiogram:  No previous echo in the [facility] system   [  ] Catheterization: Patient has had two caths. One on 6/4/2021 which demonstrated 80% stenosis of the LAD and 80% stenosis of prox and distal LCX w/PCI to LAD on 6/4/2021. Staged PCI to the LCX was done on 7/23/2021< from: NM Nuclear Stress Pharmacologic Multiple (07.18.19 @ 14:35) >  [  ] Stress Test:   Stress test 7/19/2019 showed no myocardial reperfusion defects w/normal wall motion thickenign and ejection fraction. >  [  ] Coronary CTA: CAD-RADS 4A; Severe stenosis of the LAD and LCX. on CCTA taken 3/26	    * SUMMARY:    * Patient-based characteristics (Functional capacity)  Patient is able to achieve more than 4 MET (walk 4 blocks, climb 2 flights of stairs, etc...)          Y [X] / N []    High-risk patient features:  - Recent (<30 days) or active MI          Y [] / N [X]  - Unstable or severe angina          Y [] / N [X]  - Decompensated heart failure, or worsening or new-onset heart failure          Y [] / N [X]  - Severe valvular disease          Y [] / N [X]  - Significant arrhythmia (Tachy- or Bradyarrhythmia)          Y [] / N [X]    * Surgery/Procedure-based characteristics (Type of surgery)  - Low-risk procedure (outpatient procedure, elective, endoscopy, etc...)          Y [] / N []  - Elevated or Moderate-risk procedure (Inpatient)          Y [X] / N []  - High-risk procedure (urgent/emergent procedure, Intrathoracic, vascular, etc...)          Y [] / N []    * Revised Cardiac Risk Index (RCRI)  1- History of ischemic heart disease          Y [X] / N []  2- History of congestive heart failure          Y [] / N [X]  3- History of stroke/TIA          Y [] / N [X]  4- History of insulin-dependent diabetes          Y [X] / N []  5- Chronic kidney disease (Cr >2mg/dL)          Y [] / N [X]  6- Undergoing suprainguinal vascular, intraperitoneal, or intrathoracic surgery          Y [] / N [X]    Class I risk (Zero factors) --> 3.9% (30-day risk of death, MI, or cardiac arrest)  Class II risk (One factor) --> 6% risk (30-day risk of death, MI, or cardiac arrest)  Class III risk (Two factors) --> 10.1% risk (30-day risk of death, MI, or cardiac arrest)  Class IV risk (Three factors or more) --> >15% risk (30-day risk of death, MI, or cardiac arrest)   DATE OF ADMISSION: 03-14-21  HOSPITAL DAY: 1d    REASON FOR CONSULT:  Cystouteroscopy w/stent placement for right sided hydronephrosis. Cardiac preop risk needed.     HISTORY OF PRESENT ILLNESS:   75 yo F PMH DM on insulin pump, CAD s/p stenting in 2020, Lung nodules, Dry eyes, bilateral, PVD (peripheral vascular disease): S/p Right Peripheral leg Stent, Seasonal allergies, Skin cancer: Basal Cell Cancer face  (around nose) s/p MOHS procedure x 3, Anemia: Chronic, on Iron supplement, Hypothyroid, SSS (sick sinus syndrome): S/p PPM, Pacemaker: Sept 2017, Essential hypertension, Afib: 5+ yrs; on Eliquis presented here for nausea, vomiting and decreased PO intake for past 2 days. The symptoms started acutely and she was in her usual state of health before. She also felt weak and her blood sugars were higher. She had a recent hx of UTI which responded well to ciprofloxacin. She denied any chest pain, shortness of breath, focal neurological deficits or altered bowel habits. She takes her meds regularly. For past three days, she is also passing out very less urine. There is no blood in urine noted however. She complained of some diffuse abdominal pain which started now and is burning in character. She is otherwise comfortable and has no other complaints.    Vital Signs (24 Hrs):  T(C): 37.1 (03-14-21 @ 20:28), Max: 38.5 (03-14-21 @ 17:43)  HR: 76 (03-14-21 @ 20:28) (76 - 87)  BP: 101/46 (03-14-21 @ 20:28) (101/46 - 127/61)  RR: 20 (03-14-21 @ 20:28) (20 - 22)  SpO2: 100% (03-14-21 @ 20:28) (99% - 100%)      Patient received in ER with low grade fevers. Blood work is significant for leukocytosis, hyponatremia, hyperkalemia, elevated Cr and BUN, elevated liver enzymes, troponin and a positive UA for blood. CT abdomen and pelvis revealed right kidney is enlarged with perinephric fat stranding and moderate hydroureteronephrosis to the level of the distal ureter. Patient is being admitted for sepsis secondary to possible urinary tract source. Urology consulted and on board for possible cystoscopy.         PAST MEDICAL & SURGICAL HISTORY:  Inclusion body myositis (IBM)    Lung nodules    Transfusion history  S/p Hysterectomy    Dry eyes, bilateral    PVD (peripheral vascular disease)  S/p Right Peripheral leg Stent    Seasonal allergies    Skin cancer  Basal Cell Cancer face  (around nose) s/p MOHS procedure x 3    Anemia  Chronic, on Iron supplement    Hypothyroid    SSS (sick sinus syndrome)  S/p PPM    Diabetes  Insulin dependent - (has insulin Pump)    Pacemaker  Sept 2017    Palpitations  occasionally; not for couple yrs    Essential hypertension    Afib  5+ yrs; on Eliquis    S/P ablation of atrial fibrillation    S/P cataract surgery    History of surgery  MOHS procedure (face) x 3    Cardiac pacemaker  9/9/17    History of surgery  Right Peripheral Stent 5 yrs    H/O total hysterectomy  25+ yrs ago      FAMILY HISTORY:  [ X ] No pertinent family history of premature cardiovascular disease in first degree relatives  Mother:   Father:   Siblings: One sister CAD w/PCI, One sister has a PPM     SOCIAL HISTORY:  Smoker: Smoked intermittently for five years and quit 45 years ago.   [ ] Non-smoker  [  ] Alcohol use: Denies ETOH Abuse  [  ] Drug use: Denies Drug abuse     ALLERGIES:  latex (Pruritus; Rash)  Pineapple (Unknown)  sulfonamides (Unknown)    MEDICATIONS:  MEDICATIONS  (STANDING):  amLODIPine   Tablet 5 milliGRAM(s) Oral daily  apixaban 2.5 milliGRAM(s) Oral every 12 hours  ascorbic acid  Oral Tab/Cap - Peds 250 milliGRAM(s) Oral two times a day  atorvastatin 40 milliGRAM(s) Oral at bedtime  carvedilol Oral Tab/Cap - Peds 12.5 milliGRAM(s) Oral two times a day  chlorhexidine 4% Liquid 1 Application(s) Topical <User Schedule>  clopidogrel Tablet 75 milliGRAM(s) Oral daily  dextrose 40% Gel 15 Gram(s) Oral once  dextrose 5%. 1000 milliLiter(s) (50 mL/Hr) IV Continuous <Continuous>  dextrose 5%. 1000 milliLiter(s) (100 mL/Hr) IV Continuous <Continuous>  dextrose 50% Injectable 25 Gram(s) IV Push once  dextrose 50% Injectable 12.5 Gram(s) IV Push once  dextrose 50% Injectable 25 Gram(s) IV Push once  glucagon  Injectable 1 milliGRAM(s) IntraMuscular once  levothyroxine 88 MICROGram(s) Oral daily  meropenem  IVPB 500 milliGRAM(s) IV Intermittent every 12 hours  multivitamin 1 Tablet(s) Oral daily  sodium chloride 0.9%. 1000 milliLiter(s) (100 mL/Hr) IV Continuous <Continuous>    MEDICATIONS  (PRN):  aluminum hydroxide/magnesium hydroxide/simethicone Suspension 30 milliLiter(s) Oral every 6 hours PRN Dyspepsia    HOME MEDICATIONS:  Home Medications:  amlodipine-benazepril 5 mg-40 mg oral capsule: 1 cap(s) orally once a day (at bedtime) (23 Jul 2020 08:25)  carvedilol 12.5 mg oral tablet: 1 tab(s) orally 2 times a day (23 Jul 2020 08:25)  Gamunex 10% intravenous solution: every 5 weeks (23 Jul 2020 08:25)  insulin: Insulin Pump (23 Jul 2020 08:25)  levothyroxine 88 mcg (0.088 mg) oral tablet: 1 tab(s) orally once a day (23 Jul 2020 08:25)  Multiple Vitamins oral tablet: 1 tab(s) orally once a day (23 Jul 2020 08:25)  rosuvastatin 10 mg oral tablet: 1 tab(s) orally every other day (23 Jul 2020 08:25)  Vitamin C 250 mg oral tablet: 1 tab(s) orally 2 times a day (23 Jul 2020 08:25)    REVIEW OF SYSTEMS:  CONSTITUTIONAL: No weakness, fevers, chills, or fatigue  HEENT: no visual changes, vertigo, throat pain, or hearing loss  RESPIRATORY: no shortness of breath, cough, or wheezing  CARDIOVASCULAR: no chest pain, palpitations, or syncopal episodes  GASTROINTESTINAL: +abdominal pain, nausea, vomiting, diarrhea, constipation, melena or hematochezia  NEUROLOGICAL: no numbness or weakness  ENDOCRINOLOGICAL: no recent change in diabetic medications  MUSCULOSKELETAL: no joint pain or muscle pain  GENITOURINARY: no dysuria, frequency, or hematuria      VITALS:   T(C): 37.1 (03-15-21 @ 03:52), Max: 38.5 (03-14-21 @ 17:43)  HR: 80 (03-15-21 @ 03:52) (76 - 87)  BP: 130/59 (03-15-21 @ 03:52) (101/46 - 130/59)  RR: 18 (03-15-21 @ 03:52) (18 - 22)  SpO2: 100% (03-14-21 @ 20:28) (99% - 100%)  Wt(kg): --  I&O's Summary    15 Mar 2021 07:01  -  15 Mar 2021 10:18  --------------------------------------------------------  IN: 200 mL / OUT: 150 mL / NET: 50 mL      PHYSICAL EXAM:  CONSTITUTIONAL: no acute distress, well appearing  NEUROLOGICAL: patient is awake, alert, and oriented, no focal deficits  NECK: no thyroid enlargement, no JVD, no carotid bruit  LUNGS: Clear to auscultation bilaterally  CARDIOVASCULAR: regular rate and rhythm, audible S1/S2, no JVD, no murmurs, no edema  ABDOMEN: soft, non-tender, non-distended, bowel sounds present  EXTREMITIES: able to move all four extremities, no clubbing, cyanosis or edema  VASCULAR: 2+ palpable peripheral pulses bilaterally  NEUROLOGICAL: non-focal  PSYCH: appropriate mood and affect      LABS:                        9.9    18.88 )-----------( 127      ( 15 Mar 2021 06:16 )             29.3     03-15    121<L>  |  92<L>  |  55<H>  ----------------------------<  254<H>  4.4   |  17  |  1.9<H>    Ca    7.2<L>      15 Mar 2021 06:16  Mg     1.7     03-15    TPro  5.5<L>  /  Alb  3.0<L>  /  TBili  0.3  /  DBili  x   /  AST  35  /  ALT  32  /  AlkPhos  98  03-15    CARDIAC MARKERS ( 14 Mar 2021 16:30 )  x     / 0.56 ng/mL / x     / x     / x                    CARDIAC MARKERS:  Troponin T, Serum: 0.56 ng/mL (03-14 @ 16:30)          TELEMETRY EVENTS:  ECG: NSR  CHEST X-RAY: Unremarkable w/PPM present.   OTHER:    PREVIOUS DIAGNOSTIC TESTING:  [  ] Echocardiogram:  No previous echo in the [facility] system   [  ] Catheterization: Patient has had two caths. One on 6/4/2021 which demonstrated 80% stenosis of the LAD and 80% stenosis of prox and distal LCX w/PCI to LAD on 6/4/2021. Staged PCI to the LCX was done on 7/23/2021< from: NM Nuclear Stress Pharmacologic Multiple (07.18.19 @ 14:35) >  [  ] Stress Test:   Stress test 7/19/2019 showed no myocardial reperfusion defects w/normal wall motion thickenign and ejection fraction. >  [  ] Coronary CTA: CAD-RADS 4A; Severe stenosis of the LAD and LCX. on CCTA taken 3/26	     DATE OF ADMISSION: 03-14-21  HOSPITAL DAY: 1d    REASON FOR CONSULT:  Cystouteroscopy w/stent placement for right sided hydronephrosis. Cardiac preop risk needed.     HISTORY OF PRESENT ILLNESS:   77 yo F PMH DM on insulin pump, CAD s/p stenting in 2020, Lung nodules, Dry eyes, bilateral, PVD (peripheral vascular disease): S/p Right Peripheral leg Stent, Seasonal allergies, Skin cancer: Basal Cell Cancer face  (around nose) s/p MOHS procedure x 3, Anemia: Chronic, on Iron supplement, Hypothyroid, SSS (sick sinus syndrome): S/p PPM, Pacemaker: Sept 2017, Essential hypertension, Afib: 5+ yrs; on Eliquis presented here for nausea, vomiting and decreased PO intake for past 2 days. The symptoms started acutely and she was in her usual state of health before. She also felt weak and her blood sugars were higher. She had a recent hx of UTI which responded well to ciprofloxacin. She denied any chest pain, shortness of breath, focal neurological deficits or altered bowel habits. She takes her meds regularly. For past three days, she is also passing out very less urine. There is no blood in urine noted however. She complained of some diffuse abdominal pain which started now and is burning in character. She is otherwise comfortable and has no other complaints.    Vital Signs (24 Hrs):  T(C): 37.1 (03-14-21 @ 20:28), Max: 38.5 (03-14-21 @ 17:43)  HR: 76 (03-14-21 @ 20:28) (76 - 87)  BP: 101/46 (03-14-21 @ 20:28) (101/46 - 127/61)  RR: 20 (03-14-21 @ 20:28) (20 - 22)  SpO2: 100% (03-14-21 @ 20:28) (99% - 100%)      Patient received in ER with low grade fevers. Blood work is significant for leukocytosis, hyponatremia, hyperkalemia, elevated Cr and BUN, elevated liver enzymes, troponin and a positive UA for blood. CT abdomen and pelvis revealed right kidney is enlarged with perinephric fat stranding and moderate hydroureteronephrosis to the level of the distal ureter. Patient is being admitted for sepsis secondary to possible urinary tract source. Urology consulted and on board for possible cystoscopy.         PAST MEDICAL & SURGICAL HISTORY:  Inclusion body myositis (IBM)    Lung nodules    Transfusion history  S/p Hysterectomy    Dry eyes, bilateral    PVD (peripheral vascular disease)  S/p Right Peripheral leg Stent    Seasonal allergies    Skin cancer  Basal Cell Cancer face  (around nose) s/p MOHS procedure x 3    Anemia  Chronic, on Iron supplement    Hypothyroid    SSS (sick sinus syndrome)  S/p PPM    Diabetes  Insulin dependent - (has insulin Pump)    Pacemaker  Sept 2017    Palpitations  occasionally; not for couple yrs    Essential hypertension    Afib  5+ yrs; on Eliquis    S/P ablation of atrial fibrillation    S/P cataract surgery    History of surgery  MOHS procedure (face) x 3    Cardiac pacemaker  9/9/17    History of surgery  Right Peripheral Stent 5 yrs    H/O total hysterectomy  25+ yrs ago      FAMILY HISTORY:  [ X ] No pertinent family history of premature cardiovascular disease in first degree relatives  Mother:   Father:   Siblings: One sister CAD w/PCI, One sister has a PPM     SOCIAL HISTORY:  Smoker: Smoked intermittently for five years and quit 45 years ago.   [ ] Non-smoker  [  ] Alcohol use: Denies ETOH Abuse  [  ] Drug use: Denies Drug abuse     ALLERGIES:  latex (Pruritus; Rash)  Pineapple (Unknown)  sulfonamides (Unknown)    MEDICATIONS:  MEDICATIONS  (STANDING):  amLODIPine   Tablet 5 milliGRAM(s) Oral daily  apixaban 2.5 milliGRAM(s) Oral every 12 hours  ascorbic acid  Oral Tab/Cap - Peds 250 milliGRAM(s) Oral two times a day  atorvastatin 40 milliGRAM(s) Oral at bedtime  carvedilol Oral Tab/Cap - Peds 12.5 milliGRAM(s) Oral two times a day  chlorhexidine 4% Liquid 1 Application(s) Topical <User Schedule>  clopidogrel Tablet 75 milliGRAM(s) Oral daily  dextrose 40% Gel 15 Gram(s) Oral once  dextrose 5%. 1000 milliLiter(s) (50 mL/Hr) IV Continuous <Continuous>  dextrose 5%. 1000 milliLiter(s) (100 mL/Hr) IV Continuous <Continuous>  dextrose 50% Injectable 25 Gram(s) IV Push once  dextrose 50% Injectable 12.5 Gram(s) IV Push once  dextrose 50% Injectable 25 Gram(s) IV Push once  glucagon  Injectable 1 milliGRAM(s) IntraMuscular once  levothyroxine 88 MICROGram(s) Oral daily  meropenem  IVPB 500 milliGRAM(s) IV Intermittent every 12 hours  multivitamin 1 Tablet(s) Oral daily  sodium chloride 0.9%. 1000 milliLiter(s) (100 mL/Hr) IV Continuous <Continuous>    MEDICATIONS  (PRN):  aluminum hydroxide/magnesium hydroxide/simethicone Suspension 30 milliLiter(s) Oral every 6 hours PRN Dyspepsia    HOME MEDICATIONS:  Home Medications:  amlodipine-benazepril 5 mg-40 mg oral capsule: 1 cap(s) orally once a day (at bedtime) (23 Jul 2020 08:25)  carvedilol 12.5 mg oral tablet: 1 tab(s) orally 2 times a day (23 Jul 2020 08:25)  Gamunex 10% intravenous solution: every 5 weeks (23 Jul 2020 08:25)  insulin: Insulin Pump (23 Jul 2020 08:25)  levothyroxine 88 mcg (0.088 mg) oral tablet: 1 tab(s) orally once a day (23 Jul 2020 08:25)  Multiple Vitamins oral tablet: 1 tab(s) orally once a day (23 Jul 2020 08:25)  rosuvastatin 10 mg oral tablet: 1 tab(s) orally every other day (23 Jul 2020 08:25)  Vitamin C 250 mg oral tablet: 1 tab(s) orally 2 times a day (23 Jul 2020 08:25)    REVIEW OF SYSTEMS:  CONSTITUTIONAL: No weakness, fevers, chills, or fatigue  HEENT: no visual changes, vertigo, throat pain, or hearing loss  RESPIRATORY: no shortness of breath, cough, or wheezing  CARDIOVASCULAR: no chest pain, palpitations, or syncopal episodes  GASTROINTESTINAL: +abdominal pain, nausea, vomiting, diarrhea, constipation, melena or hematochezia  NEUROLOGICAL: no numbness or weakness  ENDOCRINOLOGICAL: no recent change in diabetic medications  MUSCULOSKELETAL: no joint pain or muscle pain  GENITOURINARY: no dysuria, frequency, or hematuria      VITALS:   T(C): 37.1 (03-15-21 @ 03:52), Max: 38.5 (03-14-21 @ 17:43)  HR: 80 (03-15-21 @ 03:52) (76 - 87)  BP: 130/59 (03-15-21 @ 03:52) (101/46 - 130/59)  RR: 18 (03-15-21 @ 03:52) (18 - 22)  SpO2: 100% (03-14-21 @ 20:28) (99% - 100%)  Wt(kg): --  I&O's Summary    15 Mar 2021 07:01  -  15 Mar 2021 10:18  --------------------------------------------------------  IN: 200 mL / OUT: 150 mL / NET: 50 mL      PHYSICAL EXAM:  CONSTITUTIONAL: no acute distress, well appearing  NEUROLOGICAL: patient is awake, alert, and oriented, no focal deficits  NECK: no thyroid enlargement, no JVD, no carotid bruit  LUNGS: Clear to auscultation bilaterally  CARDIOVASCULAR: regular rate and rhythm, audible S1/S2, no JVD, no murmurs, no edema  ABDOMEN: soft, non-tender, non-distended, bowel sounds present  EXTREMITIES: able to move all four extremities, no clubbing, cyanosis or edema  VASCULAR: 2+ palpable peripheral pulses bilaterally  NEUROLOGICAL: non-focal  PSYCH: appropriate mood and affect      LABS:                        9.9    18.88 )-----------( 127      ( 15 Mar 2021 06:16 )             29.3     03-15    121<L>  |  92<L>  |  55<H>  ----------------------------<  254<H>  4.4   |  17  |  1.9<H>    Ca    7.2<L>      15 Mar 2021 06:16  Mg     1.7     03-15    TPro  5.5<L>  /  Alb  3.0<L>  /  TBili  0.3  /  DBili  x   /  AST  35  /  ALT  32  /  AlkPhos  98  03-15      Troponin T, Serum: 0.56 ng/mL (03-14 @ 16:30)      TELEMETRY EVENTS:  ECG: NSR  CHEST X-RAY: Unremarkable w/PPM present.       PREVIOUS DIAGNOSTIC TESTING:  [  ] Echocardiogram:  No previous echo in the [facility]   [  ] Catheterization: Patient has had two caths. One on 6/4/2020 which demonstrated 80% stenosis of the LAD and 80% stenosis of prox and distal LCX w/PCI to LAD on 6/4/2020. Staged PCI to the LCX was done on 7/23/2020  < from: NM Nuclear Stress Pharmacologic Multiple (07.18.19 @ 14:35) >  [  ] Stress Test:   Stress test 7/19/2019 showed no myocardial reperfusion defects w/normal wall motion thickenign and ejection fraction. >  [  ] Coronary CTA: CAD-RADS 4A; Severe stenosis of the LAD and LCX. on CCTA taken 3/26

## 2021-03-15 NOTE — DISCHARGE NOTE NURSING/CASE MANAGEMENT/SOCIAL WORK - PATIENT PORTAL LINK FT
You can access the FollowMyHealth Patient Portal offered by [facility] by registering at the following website: [email or web address]. By joining [facility] FollowMyHealth portal, you will also be able to view your health information using other applications (apps) compatible with our system.

## 2021-03-15 NOTE — PROGRESS NOTE ADULT - SUBJECTIVE AND OBJECTIVE BOX
SUBJECTIVE:    Patient is a 76y old Female who presents with a chief complaint of Sepsis (15 Mar 2021 10:08)      HPI:  77 yo F PMH DM on insulin pump, CAD s/p stenting in , Lung nodules, Dry eyes, bilateral, PVD (peripheral vascular disease): S/p Right Peripheral leg Stent, Seasonal allergies, Skin cancer: Basal Cell Cancer face  (around nose) s/p MOHS procedure x 3, Anemia: Chronic, on Iron supplement, Hypothyroid, SSS (sick sinus syndrome): S/p PPM, Pacemaker: 2017, Essential hypertension, Afib: 5+ yrs; on Eliquis presented here for nausea, vomiting and decreased PO intake for past 2 days. The symptoms started acutely and she was in her usual state of health before. She also felt weak and her blood sugars were higher. She had a recent hx of UTI which responded well to ciprofloxacin. She denied any chest pain, shortness of breath, focal neurological deficits or altered bowel habits. She takes her meds regularly. For past three days, she is also passing out very less urine. There is no blood in urine noted however. She complained of some diffuse abdominal pain which started now and is burning in character. She is otherwise comfortable and has no other complaints.    Vital Signs (24 Hrs):  T(C): 37.1 (21 @ 20:28), Max: 38.5 (21 @ 17:43)  HR: 76 (21 @ 20:28) (76 - 87)  BP: 101/46 (21 @ 20:28) (101/46 - 127/61)  RR: 20 (21 @ 20:28) (20 - 22)  SpO2: 100% (21 @ 20:28) (99% - 100%)      Patient received in ER with low grade fevers. Blood work is significant for leukocytosis, hyponatremia, hyperkalemia, elevated Cr and BUN, elevated liver enzymes, troponin and a positive UA for blood. CT abdomen and pelvis revealed right kidney is enlarged with perinephric fat stranding and moderate hydroureteronephrosis to the level of the distal ureter. Patient is being admitted for sepsis secondary to possible urinary tract source. Urology consulted and on board for possible cystoscopy.    (14 Mar 2021 23:48)      Currently admitted to medicine with the primary diagnosis of Pyelonephritis         Besides the pertinent positives and negatives described above, the ROS was within normal limits.    PAST MEDICAL & SURGICAL HISTORY  Inclusion body myositis (IBM)    Lung nodules    Transfusion history  S/p Hysterectomy    Dry eyes, bilateral    PVD (peripheral vascular disease)  S/p Right Peripheral leg Stent    Seasonal allergies    Skin cancer  Basal Cell Cancer face  (around nose) s/p MOHS procedure x 3    Anemia  Chronic, on Iron supplement    Hypothyroid    SSS (sick sinus syndrome)  S/p PPM    Diabetes  Insulin dependent - (has insulin Pump)    Pacemaker  2017    Palpitations  occasionally; not for couple yrs    Essential hypertension    Afib  5+ yrs; on Eliquis    S/P ablation of atrial fibrillation    S/P cataract surgery    History of surgery  MOHS procedure (face) x 3    Cardiac pacemaker  17    History of surgery  Right Peripheral Stent 5 yrs    H/O total hysterectomy  25+ yrs ago      SOCIAL HISTORY:    ALLERGIES:  latex (Pruritus; Rash)  sulfonamides (Unknown)    MEDICATIONS:  STANDING MEDICATIONS  amLODIPine   Tablet 5 milliGRAM(s) Oral daily  apixaban 2.5 milliGRAM(s) Oral every 12 hours  ascorbic acid  Oral Tab/Cap - Peds 250 milliGRAM(s) Oral two times a day  atorvastatin 40 milliGRAM(s) Oral at bedtime  carvedilol Oral Tab/Cap - Peds 12.5 milliGRAM(s) Oral two times a day  chlorhexidine 4% Liquid 1 Application(s) Topical <User Schedule>  clopidogrel Tablet 75 milliGRAM(s) Oral daily  dextrose 40% Gel 15 Gram(s) Oral once  dextrose 5%. 1000 milliLiter(s) IV Continuous <Continuous>  dextrose 5%. 1000 milliLiter(s) IV Continuous <Continuous>  dextrose 50% Injectable 25 Gram(s) IV Push once  dextrose 50% Injectable 12.5 Gram(s) IV Push once  dextrose 50% Injectable 25 Gram(s) IV Push once  glucagon  Injectable 1 milliGRAM(s) IntraMuscular once  levothyroxine 88 MICROGram(s) Oral daily  meropenem  IVPB 500 milliGRAM(s) IV Intermittent every 12 hours  multivitamin 1 Tablet(s) Oral daily  sodium chloride 0.9%. 1000 milliLiter(s) IV Continuous <Continuous>    PRN MEDICATIONS  aluminum hydroxide/magnesium hydroxide/simethicone Suspension 30 milliLiter(s) Oral every 6 hours PRN    VITALS:   T(F): 98.7  HR: 80  BP: 130/59  RR: 18  SpO2: 100%    LABS:                        9.9    18.88 )-----------( 127      ( 15 Mar 2021 06:16 )             29.3     -15    121<L>  |  92<L>  |  55<H>  ----------------------------<  254<H>  4.4   |  17  |  1.9<H>    Ca    7.2<L>      15 Mar 2021 06:16  Mg     1.7     03-15    TPro  5.5<L>  /  Alb  3.0<L>  /  TBili  0.3  /  DBili  x   /  AST  35  /  ALT  32  /  AlkPhos  98  03-15      Urinalysis Basic - ( 14 Mar 2021 22:50 )    Color: Yellow / Appearance: Turbid / S.021 / pH: x  Gluc: x / Ketone: Negative  / Bili: Negative / Urobili: <2 mg/dL   Blood: x / Protein: 100 mg/dL / Nitrite: Negative   Leuk Esterase: Large / RBC: 7 /HPF /  /HPF   Sq Epi: x / Non Sq Epi: 5 /HPF / Bacteria: Moderate        Troponin T, Serum: 0.56 ng/mL <HH> (21 @ 16:30)      CARDIAC MARKERS ( 14 Mar 2021 16:30 )  x     / 0.56 ng/mL / x     / x     / x          RADIOLOGY:    PHYSICAL EXAM:  GEN: No acute distress  LUNGS: Clear to auscultation bilaterally   HEART: Regular  ABD: Soft, non-tender, non-distended.  EXT: NC/NC/NE/2+PP/LAZARO/Skin Intact.   NEURO: AAOX3    Intravenous access:   NG tube:   Springer Catheter:        Hospital day 1. No acute events overnight. Ptn seen and examined at bedside, reports improved appetitie and PO water intake however admits she has not urinated now since Saturday (almost 2 days ago). Otherwise no complaints. Requesting another few attempts to spontaneously urinate on her own prior to bladder sca/ straight cath      PAST MEDICAL & SURGICAL HISTORY  Inclusion body myositis (IBM)    Lung nodules    Transfusion history  S/p Hysterectomy    Dry eyes, bilateral    PVD (peripheral vascular disease)  S/p Right Peripheral leg Stent    Seasonal allergies    Skin cancer  Basal Cell Cancer face  (around nose) s/p MOHS procedure x 3    Anemia  Chronic, on Iron supplement    Hypothyroid    SSS (sick sinus syndrome)  S/p PPM    Diabetes  Insulin dependent - (has insulin Pump)    Pacemaker  2017    Palpitations  occasionally; not for couple yrs    Essential hypertension    Afib  5+ yrs; on Eliquis    S/P ablation of atrial fibrillation    S/P cataract surgery    History of surgery  MOHS procedure (face) x 3    Cardiac pacemaker  17    History of surgery  Right Peripheral Stent 5 yrs    H/O total hysterectomy  25+ yrs ago      ALLERGIES:  latex (Pruritus; Rash)  sulfonamides (Unknown)    MEDICATIONS:  STANDING MEDICATIONS  amLODIPine   Tablet 5 milliGRAM(s) Oral daily  apixaban 2.5 milliGRAM(s) Oral every 12 hours  ascorbic acid  Oral Tab/Cap - Peds 250 milliGRAM(s) Oral two times a day  atorvastatin 40 milliGRAM(s) Oral at bedtime  carvedilol Oral Tab/Cap - Peds 12.5 milliGRAM(s) Oral two times a day  chlorhexidine 4% Liquid 1 Application(s) Topical <User Schedule>  clopidogrel Tablet 75 milliGRAM(s) Oral daily  dextrose 40% Gel 15 Gram(s) Oral once  dextrose 5%. 1000 milliLiter(s) IV Continuous <Continuous>  dextrose 5%. 1000 milliLiter(s) IV Continuous <Continuous>  dextrose 50% Injectable 25 Gram(s) IV Push once  dextrose 50% Injectable 12.5 Gram(s) IV Push once  dextrose 50% Injectable 25 Gram(s) IV Push once  glucagon  Injectable 1 milliGRAM(s) IntraMuscular once  levothyroxine 88 MICROGram(s) Oral daily  meropenem  IVPB 500 milliGRAM(s) IV Intermittent every 12 hours  multivitamin 1 Tablet(s) Oral daily  sodium chloride 0.9%. 1000 milliLiter(s) IV Continuous <Continuous>    PRN MEDICATIONS  aluminum hydroxide/magnesium hydroxide/simethicone Suspension 30 milliLiter(s) Oral every 6 hours PRN    VITALS:   T(F): 98.7  HR: 80  BP: 130/59  RR: 18  SpO2: 100%    LABS:                        9.9    18.88 )-----------( 127      ( 15 Mar 2021 06:16 )             29.3     03-15    121<L>  |  92<L>  |  55<H>  ----------------------------<  254<H>  4.4   |  17  |  1.9<H>    Ca    7.2<L>      15 Mar 2021 06:16  Mg     1.7     03-15    TPro  5.5<L>  /  Alb  3.0<L>  /  TBili  0.3  /  DBili  x   /  AST  35  /  ALT  32  /  AlkPhos  98  03-15      Urinalysis Basic - ( 14 Mar 2021 22:50 )    Color: Yellow / Appearance: Turbid / S.021 / pH: x  Gluc: x / Ketone: Negative  / Bili: Negative / Urobili: <2 mg/dL   Blood: x / Protein: 100 mg/dL / Nitrite: Negative   Leuk Esterase: Large / RBC: 7 /HPF /  /HPF   Sq Epi: x / Non Sq Epi: 5 /HPF / Bacteria: Moderate        Troponin T, Serum: 0.56 ng/mL <HH> (21 @ 16:30)      CARDIAC MARKERS ( 14 Mar 2021 16:30 )  x     / 0.56 ng/mL / x     / x     / x          RADIOLOGY:c< from: Xray Chest 1 View-PORTABLE IMMEDIATE (Xray Chest 1 View-PORTABLE IMMEDIATE .) (21 @ 16:49) >    EXAM:  XR CHEST PORTABLE IMMED 1V            PROCEDURE DATE:  2021        INTERPRETATION:  CLINICAL HISTORY: Vomiting    COMPARISON: 2019    TECHNIQUE: Portable frontal chest radiograph. Adequate positioning.    FINDINGS:    Support devices: Stable left dual-lead ICD.    Cardiac/mediastinum/hilum: Stable.    Lung parenchyma/Pleura: No focal parenchymal opacities, pleural effusions, or pneumothorax.    Skeleton/soft tissues: Unremarkable.      IMPRESSION:      No radiographic evidence of acute cardiopulmonary disease.          NARESH LEE MD; Attending Radiologist  This document has been electronically signed. Mar 14 2021  5:41PM    < end of copied text >      PHYSICAL EXAM:  GEN: No acute distress on room air  LUNGS: Clear to auscultation bilaterally   HEART: Regular  ABD: Soft, non-tender, non-distended.  EXT: NC/NC/NE/2+PP/LAZARO/Skin Intact.   NEURO: AAOX3       Hospital day 1. No acute events overnight. Ptn seen and examined at bedside, reports improved appetitie and PO water intake however admits she has not urinated now since Saturday (almost 2 days ago). Otherwise no complaints. Requesting another few attempts to spontaneously urinate on her own prior to bladder scan/ straight cath      PAST MEDICAL & SURGICAL HISTORY  Inclusion body myositis (IBM)    Lung nodules    Transfusion history  S/p Hysterectomy    Dry eyes, bilateral    PVD (peripheral vascular disease)  S/p Right Peripheral leg Stent    Seasonal allergies    Skin cancer  Basal Cell Cancer face  (around nose) s/p MOHS procedure x 3    Anemia  Chronic, on Iron supplement    Hypothyroid    SSS (sick sinus syndrome)  S/p PPM    Diabetes  Insulin dependent - (has insulin Pump)    Pacemaker  2017    Palpitations  occasionally; not for couple yrs    Essential hypertension    Afib  5+ yrs; on Eliquis    S/P ablation of atrial fibrillation    S/P cataract surgery    History of surgery  MOHS procedure (face) x 3    Cardiac pacemaker  17    History of surgery  Right Peripheral Stent 5 yrs    H/O total hysterectomy  25+ yrs ago      ALLERGIES:  latex (Pruritus; Rash)  sulfonamides (Unknown)    MEDICATIONS:  STANDING MEDICATIONS  amLODIPine   Tablet 5 milliGRAM(s) Oral daily  apixaban 2.5 milliGRAM(s) Oral every 12 hours  ascorbic acid  Oral Tab/Cap - Peds 250 milliGRAM(s) Oral two times a day  atorvastatin 40 milliGRAM(s) Oral at bedtime  carvedilol Oral Tab/Cap - Peds 12.5 milliGRAM(s) Oral two times a day  chlorhexidine 4% Liquid 1 Application(s) Topical <User Schedule>  clopidogrel Tablet 75 milliGRAM(s) Oral daily  dextrose 40% Gel 15 Gram(s) Oral once  dextrose 5%. 1000 milliLiter(s) IV Continuous <Continuous>  dextrose 5%. 1000 milliLiter(s) IV Continuous <Continuous>  dextrose 50% Injectable 25 Gram(s) IV Push once  dextrose 50% Injectable 12.5 Gram(s) IV Push once  dextrose 50% Injectable 25 Gram(s) IV Push once  glucagon  Injectable 1 milliGRAM(s) IntraMuscular once  levothyroxine 88 MICROGram(s) Oral daily  meropenem  IVPB 500 milliGRAM(s) IV Intermittent every 12 hours  multivitamin 1 Tablet(s) Oral daily  sodium chloride 0.9%. 1000 milliLiter(s) IV Continuous <Continuous>    PRN MEDICATIONS  aluminum hydroxide/magnesium hydroxide/simethicone Suspension 30 milliLiter(s) Oral every 6 hours PRN    VITALS:   T(F): 98.7  HR: 80  BP: 130/59  RR: 18  SpO2: 100%    LABS:                        9.9    18.88 )-----------( 127      ( 15 Mar 2021 06:16 )             29.3     03-15    121<L>  |  92<L>  |  55<H>  ----------------------------<  254<H>  4.4   |  17  |  1.9<H>    Ca    7.2<L>      15 Mar 2021 06:16  Mg     1.7     03-15    TPro  5.5<L>  /  Alb  3.0<L>  /  TBili  0.3  /  DBili  x   /  AST  35  /  ALT  32  /  AlkPhos  98  03-15      Urinalysis Basic - ( 14 Mar 2021 22:50 )    Color: Yellow / Appearance: Turbid / S.021 / pH: x  Gluc: x / Ketone: Negative  / Bili: Negative / Urobili: <2 mg/dL   Blood: x / Protein: 100 mg/dL / Nitrite: Negative   Leuk Esterase: Large / RBC: 7 /HPF /  /HPF   Sq Epi: x / Non Sq Epi: 5 /HPF / Bacteria: Moderate        Troponin T, Serum: 0.56 ng/mL <HH> (21 @ 16:30)      CARDIAC MARKERS ( 14 Mar 2021 16:30 )  x     / 0.56 ng/mL / x     / x     / x          RADIOLOGY:c< from: Xray Chest 1 View-PORTABLE IMMEDIATE (Xray Chest 1 View-PORTABLE IMMEDIATE .) (21 @ 16:49) >    EXAM:  XR CHEST PORTABLE IMMED 1V            PROCEDURE DATE:  2021        INTERPRETATION:  CLINICAL HISTORY: Vomiting    COMPARISON: 2019    TECHNIQUE: Portable frontal chest radiograph. Adequate positioning.    FINDINGS:    Support devices: Stable left dual-lead ICD.    Cardiac/mediastinum/hilum: Stable.    Lung parenchyma/Pleura: No focal parenchymal opacities, pleural effusions, or pneumothorax.    Skeleton/soft tissues: Unremarkable.      IMPRESSION:      No radiographic evidence of acute cardiopulmonary disease.          NARESH LEE MD; Attending Radiologist  This document has been electronically signed. Mar 14 2021  5:41PM    < end of copied text >      PHYSICAL EXAM:  GEN: No acute distress on room air  LUNGS: Clear to auscultation bilaterally   HEART: Regular  ABD: Soft, non-tender, non-distended.  EXT: NC/NC/NE/2+PP/LAZARO/Skin Intact.   NEURO: AAOX3

## 2021-03-15 NOTE — PROGRESS NOTE ADULT - ASSESSMENT
#Sepsis secondary to pyelonephritis:  -On admission:1.3, leukocytosis, fever, positive UA, CT scan: right sided hydronephrosis  -Ptn now afebrile, WBC downtrending 26-->19  -f/u blood and urine cultures  -started on volume resuscitation, s/p 2 L bolus in ER, c/w NS 100ml/hr, ptn not overloaded this am  -c/w meropenem for now, de-escalate as cultures become available (still not resulted today), low suspicion for MRSA, will not add vancomycin for nowc    #TEOFILO secondary to hydronephrosis:  -elevated BUN and Cr 2.2, elevated K  -Urology on board for possible stenting and cystoscopy, will not hold eliquis for now until there is a plan for procedure  -avoid nephrotoxic drugs  -Baseline creatinine _ 0.5   -Monitor strict I/O  -Monitor BUN/Crc--> 2.2 on admission to 1.9 today on 100cc NS   on BMP, repeated 4.5 on VBG, monitor in am  -   -Avoid nephrotoxic agents, renally dose meds  -Holding ACE-I in setting of TEOFILO      #DM:  -On insulin pump, monitor FS, keep between 140-180  -will repeat hba1c    #Chronic atrial fibrillation  continue eliquis    #Hyperkalemia--resolved  -Admission K 5.6 now 4.4  -Ptn dehydrated on admission electrolyte abnormailities likely secondary to prerenal etiology  -c/w daily BMP monitoring    #CAD recent stenting in July 2020:  C/W Plavix and statin     #DLD:  Continue with statin      #Hyponatremia:  poor oral intake at home, continue with NS 100cc/hr  monitor for neurological signs  trend Na, f/u BMP in am    #HTN:  Continue with amlodipine and coreg, holding ACE/ARB due to TEOFILO    FULL CODE  CHG  AIT  DIET  DVT PPX       77 yo F PMH DM on insulin pump, CAD s/p stenting in 2020, Lung nodules, Dry eyes, bilateral, PVD (peripheral vascular disease): S/p Right Peripheral leg Stent, Seasonal allergies, Skin cancer: Basal Cell Cancer face  (around nose) s/p MOHS procedure x 3, Anemia: Chronic, on Iron supplement, Hypothyroid, SSS (sick sinus syndrome): S/p PPM, Pacemaker: Sept 2017, Essential hypertension, Afib: 5+ yrs; on Eliquis admitted for pyelonephritis treatment complicated by TEOFILO and hyponatremia, over admission to also have bacteremia    #Sepsis secondary to pyelonephritis  #Bacteremia  - On admission:1.3, leukocytosis, fever, positive UA, CT scan: right sided hydronephrosis  - Found on 3/15 to be blood culture + for Gram (-) rods aroebic and anaerobic  - Ptn now afebrile, WBC downtrending 26-->19  - f./u urine cultures  - started on volume resuscitation, s/p 2 L bolus in ER, c/w NS 100ml/hr, ptn not overloaded this am  - c/w meropenem for now, de-escalate as urine cultures become available (still not resulted today), possibly switch to Rocephin once urine cultures return    #TEOFILO secondary to hydronephrosis:  -elevated BUN and Cr 2.2, elevated K  -Urology on board for possible stenting and cystoscopy, will not hold Eliquis for now until there is a plan for procedure  -avoid nephrotoxic drugs  -Baseline creatinine _ 0.5   -Monitor strict I/O  -Monitor BUN/Crc--> 2.2 on admission to 1.9 today on 100cc NS   on BMP, repeated 4.5 on VBG, monitor in am  -Avoid nephrotoxic agents, renally dose meds  -Holding ACE-I in setting of TEOFILO    #DM:  -On insulin pump from home,   - FS persistently >240, staring insulin Lispro and Lantus weight-based dosage inpatient coverage  -repeated hba1c-->f/u results    #Chronic atrial fibrillation  continue eliquis    #Hyperkalemia--resolved  -Admission K 5.6 now 4.4  -Ptn dehydrated on admission electrolyte abnormalities likely secondary to prerenal etiology  -c/w daily BMP monitoring    #CAD recent stenting in July 2020:  C/W Plavix and statin   Avoid volume overload    #DLD:  Continue with statin      #Hyponatremia:  poor oral intake at home, Na on admission 121,   Remains hyponatremic today 121, poor PO intake persists  - continue with NS 100cc/hr  - monitor for neurological signs  - trend Na, f/u BMP in am  - f/u urine Na, serum osmo, urine osmo    #HTN:  Continue with amlodipine and coreg, holding ACE/ARB due to TEOFILO    FULL CODE  CHG  AIT  DIET Carb Consistent  DVT PPX Heparin SubQ       77 yo F PMH DM on insulin pump, CAD s/p stenting in 2020, Lung nodules, Dry eyes, bilateral, PVD (peripheral vascular disease): S/p Right Peripheral leg Stent, Seasonal allergies, Skin cancer: Basal Cell Cancer face  (around nose) s/p MOHS procedure x 3, Anemia: Chronic, on Iron supplement, Hypothyroid, SSS (sick sinus syndrome): S/p PPM, Pacemaker: Sept 2017, Essential hypertension, Afib: 5+ yrs; on Eliquis admitted for pyelonephritis treatment complicated by right sided hydronephrosis, TEOFILO and hyponatremia, over admission found to also have bacteremia (likely  origin).     #Sepsis secondary to pyelonephritis  #R-sided Hydronephrosis  #Bacteremia  - On admission:1.3, leukocytosis, fever, positive UA, CT scan: right sided hydronephrosis  - Found on 3/15 to be blood culture + for Gram (-) rods aerobic and anaerobic  - Ptn now afebrile, WBC downtrending 26-->19  - f./u urine cultures  - started on volume resuscitation, s/p 2 L bolus in ER, c/w NS 100ml/hr, ptn not overloaded this am  - c/w meropenem for now, de-escalate as urine cultures become available (still not resulted today), possibly switch to Rocephin once urine cultures return  -Regarding hydronephrosis: Urology recommending cytoureteroscopy with stent placement, however cardiac risk stratification places ptn at high risk for this procedure--discussed with urology team, once ptn more stable will undergo procedure and resume AC post-op per Uro recommendations    #TEOFILO secondary to hydronephrosis:  -elevated BUN and Cr 2.2, elevated K  -Urology on board for possible stenting and cystoscopy, will not hold Eliquis for now until there is a plan for procedure  -avoid nephrotoxic drugs  -Baseline creatinine _ 0.5   -Monitor strict I/O  - BUN/Crc--> Creatine 2.2 on admission to 1.9 today on 100cc NS   on BMP, repeated 4.5 on VBG, monitor in am  -Avoid nephrotoxic agents, renally dose meds  -Holding ACE-I in setting of TEOFILO  - c/w daily CMP to monitor for resolution of hyponatremia--ptn diet and hydration improved today with extensive counseling--> f/u am BMP    #DM:  -On insulin pump from home,   - FS persistently >240, staring insulin Lispro and Lantus weight-based dosage inpatient coverage  - Endocrinology consult placed for calibrating insulin requiring in the setting of insulin pump--> f/u Endo recs  -repeated hba1c-->f/u results    #Chronic atrial fibrillation  continue eliquis    #Hyperkalemia--resolved  -Admission K 5.6 now 4.4  -Ptn dehydrated on admission electrolyte abnormalities likely secondary to prerenal etiology  -c/w daily BMP monitoring    #CAD recent stenting in July 2020:  C/W Plavix and statin   Avoid volume overload    #DLD:  Continue with statin      #Hyponatremia:  poor oral intake at home, Na on admission 121,   Remains hyponatremic today 121, poor PO intake persists  - continue with NS 100cc/hr  - monitor for neurological signs  - trend Na, f/u BMP in am  - f/u urine Na, serum osmo, urine osmo    #HTN:  Continue with amlodipine and coreg, holding ACE/ARB due to TEOFILO    FULL CODE  CHG  AIT  DIET Carb Consistent  DVT PPX Heparin SubQ

## 2021-03-16 NOTE — CONSULT NOTE ADULT - ASSESSMENT
IMPRESSION:    DKA  UTI  Obstructive uropathy  HO Afib s/p PPM    PLAN:    CNS: Avoid CNS depressants    HEENT: Oral care    PULMONARY:  HOB @ 45 degrees    CARDIOVASCULAR: NS @ 100cc/hr after bolus    GI: GI prophylaxis.  Feeding as tolerated    RENAL:  Follow up lytes.  Correct as needed    INFECTIOUS DISEASE: Follow up cultures. Continue meropenem.    HEMATOLOGICAL:  DVT prophylaxis.    ENDOCRINE:  Follow up FS.  Insulin drip until gap closure    MUSCULOSKELETAL: Bedrest    F/U with urology re nephrostomy

## 2021-03-16 NOTE — PROGRESS NOTE ADULT - ASSESSMENT
77yo Female with pmg of recurrent UTIs, Afib on eliquis, PVD, admitted with pyelonephritis & right hydroureteronephrosis without obstructing calculus.     Plan:  ·	case discussed with Dr. Sheriff, at this time pt is will not undergo cysto-ureteroscopy & stent placement; hydronephrosis likely 2/2 to infection   ·	f/u labs  ·	f/u RBUS   ·	continue wiggins care  ·	continue IV abx as per ID

## 2021-03-16 NOTE — PROGRESS NOTE ADULT - ASSESSMENT
77 yo female with PMHx of DM on insulin pump, CAD s/p stenting in 2020, Lung nodules, Dry eyes, bilateral, PVD (peripheral vascular disease): S/p Right Peripheral leg Stent, Seasonal allergies, Skin cancer: Basal Cell Cancer face  (around nose) s/p MOHS procedure x 3, Anemia: Chronic, on Iron supplement, Hypothyroid, SSS (sick sinus syndrome): S/p PPM, Pacemaker: Sept 2017, Essential hypertension, Afib: 5+ yrs; on Eliquis admitted for pyelonephritis treatment complicated by right sided hydronephrosis, TEOFILO and hyponatremia, over admission found to also have bacteremia (likely  origin).     #Sepsis secondary to pyelonephritis  #R-sided Hydronephrosis  #Bacteremia  - On admission:1.3, leukocytosis, fever, positive UA, CT scan: right sided hydronephrosis  - Found on 3/15 to be blood culture + for Gram (-) rods aerobic and anaerobic  - Ptn now afebrile, WBC downtrending 26-->19  - f./u urine cultures  - started on volume resuscitation, s/p 2 L bolus in ER, c/w NS 100ml/hr, ptn not overloaded this am  - c/w meropenem for now, de-escalate as urine cultures become available (still not resulted today), possibly switch to Rocephin once urine cultures return  -Regarding hydronephrosis: Urology recommending cytoureteroscopy with stent placement, however cardiac risk stratification places ptn at high risk for this procedure--discussed with urology team, once ptn more stable will undergo procedure and resume AC post-op per Uro recommendations    #TEOFILO secondary to hydronephrosis:  -elevated BUN and Cr 2.2, elevated K  -Urology on board for possible stenting and cystoscopy, will not hold Eliquis for now until there is a plan for procedure  -avoid nephrotoxic drugs  -Baseline creatinine _ 0.5   -Monitor strict I/O  - BUN/Crc--> Creatine 2.2 on admission to 1.9 today on 100cc NS   on BMP, repeated 4.5 on VBG, monitor in am  -Avoid nephrotoxic agents, renally dose meds  -Holding ACE-I in setting of TEOFILO  - c/w daily CMP to monitor for resolution of hyponatremia--ptn diet and hydration improved today with extensive counseling--> f/u am BMP    #DM  -On insulin pump from home,   - FS persistently >240, staring insulin Lispro and Lantus weight-based dosage inpatient coverage  - Endocrinology consult placed for calibrating insulin requiring in the setting of insulin pump--> f/u Endo recs  -repeated hba1c-->f/u results    #Chronic atrial fibrillation  -continue eliquis    #Hyperkalemia--resolved  -Admission K 5.6 now 4.4  -Ptn dehydrated on admission electrolyte abnormalities likely secondary to prerenal etiology  -c/w daily BMP monitoring    #CAD recent stenting in July 2020:  -C/W Plavix and statin   -Avoid volume overload    #DLD:  -Continue with statin    #Hyponatremia:  -poor oral intake at home, Na on admission 121,   -Remains hyponatremic    - continue with NS 100cc/hr  - monitor for neurological signs  - trend Na, f/u BMP in am  - f/u urine Na, serum osmo, urine osmo    #HTN:  -Continue with amlodipine and coreg, holding ACE/ARB due to TEOFILO    FULL CODE  AIT  DIET Carb Consistent  DVT PPX Heparin SubQ

## 2021-03-16 NOTE — CONSULT NOTE ADULT - ASSESSMENT
Impression:  Type 2 DM on insulin pump at home    PLAN:  - continue with insulin subq regimen while in patient  - Insulin pump checked, settings noted  - Advise starting on Insulin Lispro 4 U with meals and increasing lantus to 18 U at bedtime.

## 2021-03-16 NOTE — PROGRESS NOTE ADULT - SUBJECTIVE AND OBJECTIVE BOX
Progress Note    Patient is a 75yo Female with pmg of recurrent UTIs, Afib on eliquis, PVD, admitted with pyelonephritis & right hydroureteronephrosis without obstructing calculus. Pt seen and examined this morning. RN reports pt was straight cathed overnight with 500cc, then had wiggins placed but with only 100cc urine output within the past 6 hours. Pt admits to improved right flank pain. Pt denies fever, chills, N/V, abdominal pain, or hematuria.       [ x ] a 10 point review of systems was negative except where noted          Vital signs  T(C): , Max: 36.9 (03-16-21 @ 06:55)  HR: 117 (03-16-21 @ 12:41)  BP: 96/52 (03-16-21 @ 12:41)  SpO2: 99% (03-15-21 @ 19:57)      Physical Exam  Gen: NAD, alert & awake, well developed   HEENT: NC/AT  Respiratory: no accessory muscle use   Back: no CVA tenderness  Abd: soft, nontender, nondistended, bladder nonpalpable   : wiggins in place draining clear yellow urine       Labs                        10.0   21.58 )-----------( 84       ( 16 Mar 2021 11:10 )             30.7     16 Mar 2021 11:10    120    |  90     |  57     ----------------------------<  400    4.3     |  x      |  1.9      Ca    6.5        16 Mar 2021 11:10  Mg     2.3       16 Mar 2021 11:10    I&O's Detail    15 Mar 2021 07:01  -  16 Mar 2021 07:00  --------------------------------------------------------  IN:    IV PiggyBack: 50 mL    Oral Fluid: 710 mL    sodium chloride 0.9%: 2300 mL  Total IN: 3060 mL    OUT:    Intermittent Catheterization - Urethral (mL): 500 mL    Voided (mL): 270 mL  Total OUT: 770 mL    Total NET: 2290 mL      16 Mar 2021 07:01  -  16 Mar 2021 14:50  --------------------------------------------------------  IN:    Oral Fluid: 540 mL  Total IN: 540 mL    OUT:    Indwelling Catheter - Urethral (mL): 100 mL  Total OUT: 100 mL    Total NET: 440 mL                    Progress Note    Patient is a 77yo Female with pmg of recurrent UTIs, Afib on eliquis, PVOSIRIS, admitted with pyelonephritis & right hydroureteronephrosis without obstructing calculus. Pt seen and examined this morning. RN reports pt was straight cathed overnight with 500cc, then had wiggins placed but with only 100cc urine output within the past 6 hours. Pt admits to improved right flank pain. Pt denies fever, chills, N/V, abdominal pain, or hematuria.       [ x ] a 10 point review of systems was negative except where noted          Vital signs  T(C): , Max: 36.9 (03-16-21 @ 06:55)  HR: 117 (03-16-21 @ 12:41)  BP: 96/52 (03-16-21 @ 12:41)  SpO2: 99% (03-15-21 @ 19:57)      Physical Exam  Gen: NAD, alert & awake, well developed   HEENT: NC/AT  Respiratory: no accessory muscle use   Back: no CVA tenderness  Abd: soft, nontender, nondistended, bladder nonpalpable   : wiggins in place draining clear yellow urine   Ext: no edema  Neuro: A&O x3  Skin: non diaphoretic       Labs                        10.0   21.58 )-----------( 84       ( 16 Mar 2021 11:10 )             30.7     16 Mar 2021 11:10    120    |  90     |  57     ----------------------------<  400    4.3     |  x      |  1.9      Ca    6.5        16 Mar 2021 11:10  Mg     2.3       16 Mar 2021 11:10    I&O's Detail    15 Mar 2021 07:01  -  16 Mar 2021 07:00  --------------------------------------------------------  IN:    IV PiggyBack: 50 mL    Oral Fluid: 710 mL    sodium chloride 0.9%: 2300 mL  Total IN: 3060 mL    OUT:    Intermittent Catheterization - Urethral (mL): 500 mL    Voided (mL): 270 mL  Total OUT: 770 mL    Total NET: 2290 mL      16 Mar 2021 07:01  -  16 Mar 2021 14:50  --------------------------------------------------------  IN:    Oral Fluid: 540 mL  Total IN: 540 mL    OUT:    Indwelling Catheter - Urethral (mL): 100 mL  Total OUT: 100 mL    Total NET: 440 mL

## 2021-03-16 NOTE — CONSULT NOTE ADULT - ATTENDING COMMENTS
Seen and examined with the pulmonary fellow at the bed side.  Impression and plan as outlined above.
76 year old patient with insulin dependant type 2 DM , uses [employer] insulin pump , found to have pyelonephritis and planned for possible cystoscopy and stent, found to be hyperglycemia. During rounds patient BG >400  BMp checked with high AG likely too due underlying pyelonephritis and inadequate insulin coverage.  recommend Iv hydration+ potassium /insulin drip , DKA protocol   BMp checks Q 4 hrs.  Once AG closed transition to SC inlin
Agree with above

## 2021-03-16 NOTE — PROGRESS NOTE ADULT - SUBJECTIVE AND OBJECTIVE BOX
SUBJECTIVE:    Patient is a 76y old Female who presents with a chief complaint of Sepsis (15 Mar 2021 10:42)    Currently admitted to medicine with the primary diagnosis of Pyelonephritis     Today is hospital day 2d. This morning she is resting in bed. No acute events overnight.       PAST MEDICAL & SURGICAL HISTORY  Inclusion body myositis (IBM)    Lung nodules    Transfusion history  S/p Hysterectomy    Dry eyes, bilateral    PVD (peripheral vascular disease)  S/p Right Peripheral leg Stent    Seasonal allergies    Skin cancer  Basal Cell Cancer face  (around nose) s/p MOHS procedure x 3    Anemia  Chronic, on Iron supplement    Hypothyroid    SSS (sick sinus syndrome)  S/p PPM    Diabetes  Insulin dependent - (has insulin Pump)    Pacemaker  2017    Palpitations  occasionally; not for couple yrs    Essential hypertension    Afib  5+ yrs; on Eliquis    S/P ablation of atrial fibrillation    S/P cataract surgery    History of surgery  MOHS procedure (face) x 3    Cardiac pacemaker  17    History of surgery  Right Peripheral Stent 5 yrs    H/O total hysterectomy  25+ yrs ago      SOCIAL HISTORY:  Negative for smoking/alcohol/drug use.     ALLERGIES:  latex (Pruritus; Rash)  sulfonamides (Unknown)    MEDICATIONS:  STANDING MEDICATIONS  amLODIPine   Tablet 5 milliGRAM(s) Oral daily  apixaban 2.5 milliGRAM(s) Oral every 12 hours  ascorbic acid  Oral Tab/Cap - Peds 250 milliGRAM(s) Oral two times a day  atorvastatin 40 milliGRAM(s) Oral at bedtime  carvedilol Oral Tab/Cap - Peds 12.5 milliGRAM(s) Oral two times a day  chlorhexidine 4% Liquid 1 Application(s) Topical <User Schedule>  clopidogrel Tablet 75 milliGRAM(s) Oral daily  dextrose 40% Gel 15 Gram(s) Oral once  dextrose 5%. 1000 milliLiter(s) IV Continuous <Continuous>  dextrose 5%. 1000 milliLiter(s) IV Continuous <Continuous>  dextrose 50% Injectable 25 Gram(s) IV Push once  dextrose 50% Injectable 12.5 Gram(s) IV Push once  dextrose 50% Injectable 25 Gram(s) IV Push once  glucagon  Injectable 1 milliGRAM(s) IntraMuscular once  insulin glargine Injectable (LANTUS) 10 Unit(s) SubCutaneous at bedtime  insulin lispro Injectable (ADMELOG) 3 Unit(s) SubCutaneous three times a day before meals  levothyroxine 88 MICROGram(s) Oral daily  meropenem  IVPB 500 milliGRAM(s) IV Intermittent every 12 hours  multivitamin 1 Tablet(s) Oral daily  sodium chloride 0.9%. 1000 milliLiter(s) IV Continuous <Continuous>    PRN MEDICATIONS  aluminum hydroxide/magnesium hydroxide/simethicone Suspension 30 milliLiter(s) Oral every 6 hours PRN    VITALS:   T(F): 98.4  HR: 96  BP: 101/61  RR: 18  SpO2: 99%    PHYSICAL EXAM:  GEN: NAD, lying in bed   LUNGS: Bilateral breath sounds   HEART: S1/S2 present  ABD: Soft, non-tender, non-distended.   EXT: No edema.   NEURO: Non focal     Springer present     LABS:                        9.9    18.88 )-----------( 127      ( 15 Mar 2021 06:16 )             29.3     03-15    121<L>  |  92<L>  |  55<H>  ----------------------------<  254<H>  4.4   |  17  |  1.9<H>    Ca    7.2<L>      15 Mar 2021 06:16  Mg     2.3         TPro  5.5<L>  /  Alb  3.0<L>  /  TBili  0.3  /  DBili  x   /  AST  35  /  ALT  32  /  AlkPhos  98  03-15      Urinalysis Basic - ( 14 Mar 2021 22:50 )    Color: Yellow / Appearance: Turbid / S.021 / pH: x  Gluc: x / Ketone: Negative  / Bili: Negative / Urobili: <2 mg/dL   Blood: x / Protein: 100 mg/dL / Nitrite: Negative   Leuk Esterase: Large / RBC: 7 /HPF /  /HPF   Sq Epi: x / Non Sq Epi: 5 /HPF / Bacteria: Moderate    Troponin T, Serum: 0.25 ng/mL <HH> (03-15-21 @ 23:38)  Troponin T, Serum: 0.31 ng/mL <HH> (03-15-21 @ 11:07)      Culture - Blood (collected 14 Mar 2021 18:30)  Source: .Blood Blood  Gram Stain (15 Mar 2021 12:47):    Growth in aerobic bottle: Gram Negative Rods    Growth in anaerobic bottle: Gram Negative Rods  Preliminary Report (16 Mar 2021 09:27):    Growth in aerobic and anaerobic bottles: Escherichia coli    MDRO detected in BCID PCR, resistance marker = CTX-M (ESBL)    ***Blood Panel PCR results on this specimen are available    approximately 3 hours after the Gram stain result.***    Gram stain, PCR, and/or culture results may not always    correspond due to difference in methodologies.    ************************************************************    This PCR assay was performed by multiplex PCR. This    Assay tests for 66 bacterial and resistance gene targets.    Please refer to the [facility] [employer] test directory    at [email or web address] for details.  Organism: Blood Culture PCR (15 Mar 2021 13:11)  Organism: Blood Culture PCR (15 Mar 2021 13:11)    Culture - Blood (collected 14 Mar 2021 18:30)  Source: .Blood Blood  Gram Stain (15 Mar 2021 11:56):    Growth in anaerobic bottle: Gram Negative Rods    Growth in aerobic bottle: Gram Negative Rods  Preliminary Report (15 Mar 2021 11:56):    Growth in anaerobic bottle: Gram Negative Rods    Growth in aerobic bottle: Gram Negative Rods      CARDIAC MARKERS ( 15 Mar 2021 23:38 )  x     / 0.25 ng/mL / x     / x     / x      CARDIAC MARKERS ( 15 Mar 2021 11:07 )  x     / 0.31 ng/mL / x     / x     / 2.5 ng/mL  CARDIAC MARKERS ( 14 Mar 2021 16:30 )  x     / 0.56 ng/mL / x     / x     / x               SUBJECTIVE:    Patient is a 76y old Female who presents with a chief complaint of Sepsis (15 Mar 2021 10:42)    Currently admitted to medicine with the primary diagnosis of Pyelonephritis     Today is hospital day 2d. This morning she is resting in bed. Overnight 3 episodes of NSVT.       PAST MEDICAL & SURGICAL HISTORY  Inclusion body myositis (IBM)    Lung nodules    Transfusion history  S/p Hysterectomy    Dry eyes, bilateral    PVD (peripheral vascular disease)  S/p Right Peripheral leg Stent    Seasonal allergies    Skin cancer  Basal Cell Cancer face  (around nose) s/p MOHS procedure x 3    Anemia  Chronic, on Iron supplement    Hypothyroid    SSS (sick sinus syndrome)  S/p PPM    Diabetes  Insulin dependent - (has insulin Pump)    Pacemaker  2017    Palpitations  occasionally; not for couple yrs    Essential hypertension    Afib  5+ yrs; on Eliquis    S/P ablation of atrial fibrillation    S/P cataract surgery    History of surgery  MOHS procedure (face) x 3    Cardiac pacemaker  17    History of surgery  Right Peripheral Stent 5 yrs    H/O total hysterectomy  25+ yrs ago      SOCIAL HISTORY:  Negative for smoking/alcohol/drug use.     ALLERGIES:  latex (Pruritus; Rash)  sulfonamides (Unknown)    MEDICATIONS:  STANDING MEDICATIONS  amLODIPine   Tablet 5 milliGRAM(s) Oral daily  apixaban 2.5 milliGRAM(s) Oral every 12 hours  ascorbic acid  Oral Tab/Cap - Peds 250 milliGRAM(s) Oral two times a day  atorvastatin 40 milliGRAM(s) Oral at bedtime  carvedilol Oral Tab/Cap - Peds 12.5 milliGRAM(s) Oral two times a day  chlorhexidine 4% Liquid 1 Application(s) Topical <User Schedule>  clopidogrel Tablet 75 milliGRAM(s) Oral daily  dextrose 40% Gel 15 Gram(s) Oral once  dextrose 5%. 1000 milliLiter(s) IV Continuous <Continuous>  dextrose 5%. 1000 milliLiter(s) IV Continuous <Continuous>  dextrose 50% Injectable 25 Gram(s) IV Push once  dextrose 50% Injectable 12.5 Gram(s) IV Push once  dextrose 50% Injectable 25 Gram(s) IV Push once  glucagon  Injectable 1 milliGRAM(s) IntraMuscular once  insulin glargine Injectable (LANTUS) 10 Unit(s) SubCutaneous at bedtime  insulin lispro Injectable (ADMELOG) 3 Unit(s) SubCutaneous three times a day before meals  levothyroxine 88 MICROGram(s) Oral daily  meropenem  IVPB 500 milliGRAM(s) IV Intermittent every 12 hours  multivitamin 1 Tablet(s) Oral daily  sodium chloride 0.9%. 1000 milliLiter(s) IV Continuous <Continuous>    PRN MEDICATIONS  aluminum hydroxide/magnesium hydroxide/simethicone Suspension 30 milliLiter(s) Oral every 6 hours PRN    VITALS:   T(F): 98.4  HR: 96  BP: 101/61  RR: 18  SpO2: 99%    PHYSICAL EXAM:  GEN: NAD, lying in bed   LUNGS: Bilateral breath sounds   HEART: S1/S2 present  ABD: Soft, non-tender, non-distended.   EXT: No edema.   NEURO: Non focal     Springer present     LABS:                        9.9    18.88 )-----------( 127      ( 15 Mar 2021 06:16 )             29.3     03-15    121<L>  |  92<L>  |  55<H>  ----------------------------<  254<H>  4.4   |  17  |  1.9<H>    Ca    7.2<L>      15 Mar 2021 06:16  Mg     2.3         TPro  5.5<L>  /  Alb  3.0<L>  /  TBili  0.3  /  DBili  x   /  AST  35  /  ALT  32  /  AlkPhos  98  03-15      Urinalysis Basic - ( 14 Mar 2021 22:50 )    Color: Yellow / Appearance: Turbid / S.021 / pH: x  Gluc: x / Ketone: Negative  / Bili: Negative / Urobili: <2 mg/dL   Blood: x / Protein: 100 mg/dL / Nitrite: Negative   Leuk Esterase: Large / RBC: 7 /HPF /  /HPF   Sq Epi: x / Non Sq Epi: 5 /HPF / Bacteria: Moderate    Troponin T, Serum: 0.25 ng/mL <HH> (03-15-21 @ 23:38)  Troponin T, Serum: 0.31 ng/mL <HH> (03-15-21 @ 11:07)      Culture - Blood (collected 14 Mar 2021 18:30)  Source: .Blood Blood  Gram Stain (15 Mar 2021 12:47):    Growth in aerobic bottle: Gram Negative Rods    Growth in anaerobic bottle: Gram Negative Rods  Preliminary Report (16 Mar 2021 09:27):    Growth in aerobic and anaerobic bottles: Escherichia coli    MDRO detected in BCID PCR, resistance marker = CTX-M (ESBL)    ***Blood Panel PCR results on this specimen are available    approximately 3 hours after the Gram stain result.***    Gram stain, PCR, and/or culture results may not always    correspond due to difference in methodologies.    ************************************************************    This PCR assay was performed by multiplex PCR. This    Assay tests for 66 bacterial and resistance gene targets.    Please refer to the [facility] [employer] test directory    at [email or web address] for details.  Organism: Blood Culture PCR (15 Mar 2021 13:11)  Organism: Blood Culture PCR (15 Mar 2021 13:11)    Culture - Blood (collected 14 Mar 2021 18:30)  Source: .Blood Blood  Gram Stain (15 Mar 2021 11:56):    Growth in anaerobic bottle: Gram Negative Rods    Growth in aerobic bottle: Gram Negative Rods  Preliminary Report (15 Mar 2021 11:56):    Growth in anaerobic bottle: Gram Negative Rods    Growth in aerobic bottle: Gram Negative Rods      CARDIAC MARKERS ( 15 Mar 2021 23:38 )  x     / 0.25 ng/mL / x     / x     / x      CARDIAC MARKERS ( 15 Mar 2021 11:07 )  x     / 0.31 ng/mL / x     / x     / 2.5 ng/mL  CARDIAC MARKERS ( 14 Mar 2021 16:30 )  x     / 0.56 ng/mL / x     / x     / x

## 2021-03-16 NOTE — CONSULT NOTE ADULT - SUBJECTIVE AND OBJECTIVE BOX
HPI:  75 yo F PMH DM on insulin pump, CAD s/p stenting in 2020, Lung nodules, Dry eyes, bilateral, PVD (peripheral vascular disease): S/p Right Peripheral leg Stent, Seasonal allergies, Skin cancer: Basal Cell Cancer face  (around nose) s/p MOHS procedure x 3, Anemia: Chronic, on Iron supplement, Hypothyroid, SSS (sick sinus syndrome): S/p PPM, Pacemaker: Sept 2017, Essential hypertension, Afib: 5+ yrs; on Eliquis presented here for nausea, vomiting and decreased PO intake for past 2 days. The symptoms started acutely and she was in her usual state of health before. She also felt weak and her blood sugars were higher. She had a recent hx of UTI which responded well to ciprofloxacin. She denied any chest pain, shortness of breath, focal neurological deficits or altered bowel habits. She takes her meds regularly. For past three days, she is also passing out very less urine. There is no blood in urine noted however. She complained of some diffuse abdominal pain which started now and is burning in character. She is otherwise comfortable and has no other complaints.    Vital Signs (24 Hrs):  T(C): 37.1 (03-14-21 @ 20:28), Max: 38.5 (03-14-21 @ 17:43)  HR: 76 (03-14-21 @ 20:28) (76 - 87)  BP: 101/46 (03-14-21 @ 20:28) (101/46 - 127/61)  RR: 20 (03-14-21 @ 20:28) (20 - 22)  SpO2: 100% (03-14-21 @ 20:28) (99% - 100%)      Patient received in ER with low grade fevers. Blood work is significant for leukocytosis, hyponatremia, hyperkalemia, elevated Cr and BUN, elevated liver enzymes, troponin and a positive UA for blood. CT abdomen and pelvis revealed right kidney is enlarged with perinephric fat stranding and moderate hydroureteronephrosis to the level of the distal ureter. Patient is being admitted for sepsis secondary to possible urinary tract source. Urology consulted and on board for possible cystoscopy.    (14 Mar 2021 23:48)      PAST MEDICAL & SURGICAL HISTORY  Inclusion body myositis (IBM)    Lung nodules    Transfusion history  S/p Hysterectomy    Dry eyes, bilateral    PVD (peripheral vascular disease)  S/p Right Peripheral leg Stent    Seasonal allergies    Skin cancer  Basal Cell Cancer face  (around nose) s/p MOHS procedure x 3    Anemia  Chronic, on Iron supplement    Hypothyroid    SSS (sick sinus syndrome)  S/p PPM    Diabetes  Insulin dependent - (has insulin Pump)    Pacemaker  Sept 2017    Palpitations  occasionally; not for couple yrs    Essential hypertension    Afib  5+ yrs; on Eliquis    S/P ablation of atrial fibrillation    S/P cataract surgery    History of surgery  MOHS procedure (face) x 3    Cardiac pacemaker  9/9/17    History of surgery  Right Peripheral Stent 5 yrs    H/O total hysterectomy  25+ yrs ago        FAMILY HISTORY:  FAMILY HISTORY:  Cancer (Father)  Myeloma    Family history of lung cancer (Mother)        SOCIAL HISTORY:  []smoker  []Alcohol  []Drug    ALLERGIES:  latex (Pruritus; Rash)  sulfonamides (Unknown)      MEDICATIONS:  MEDICATIONS  (STANDING):  amLODIPine   Tablet 5 milliGRAM(s) Oral daily  apixaban 2.5 milliGRAM(s) Oral every 12 hours  ascorbic acid  Oral Tab/Cap - Peds 250 milliGRAM(s) Oral two times a day  atorvastatin 40 milliGRAM(s) Oral at bedtime  carvedilol Oral Tab/Cap - Peds 12.5 milliGRAM(s) Oral two times a day  chlorhexidine 4% Liquid 1 Application(s) Topical <User Schedule>  clopidogrel Tablet 75 milliGRAM(s) Oral daily  dextrose 40% Gel 15 Gram(s) Oral once  dextrose 5%. 1000 milliLiter(s) (50 mL/Hr) IV Continuous <Continuous>  dextrose 5%. 1000 milliLiter(s) (100 mL/Hr) IV Continuous <Continuous>  dextrose 50% Injectable 25 Gram(s) IV Push once  dextrose 50% Injectable 12.5 Gram(s) IV Push once  dextrose 50% Injectable 25 Gram(s) IV Push once  glucagon  Injectable 1 milliGRAM(s) IntraMuscular once  insulin glargine Injectable (LANTUS) 10 Unit(s) SubCutaneous at bedtime  insulin lispro Injectable (ADMELOG) 3 Unit(s) SubCutaneous three times a day before meals  levothyroxine 88 MICROGram(s) Oral daily  meropenem  IVPB 500 milliGRAM(s) IV Intermittent every 12 hours  multivitamin 1 Tablet(s) Oral daily  sodium chloride 0.9%. 1000 milliLiter(s) (100 mL/Hr) IV Continuous <Continuous>    MEDICATIONS  (PRN):  aluminum hydroxide/magnesium hydroxide/simethicone Suspension 30 milliLiter(s) Oral every 6 hours PRN Dyspepsia      HOME MEDICATIONS:  Home Medications:  amlodipine-benazepril 5 mg-40 mg oral capsule: 1 cap(s) orally once a day (at bedtime) (23 Jul 2020 08:25)  carvedilol 12.5 mg oral tablet: 1 tab(s) orally 2 times a day (23 Jul 2020 08:25)  Gamunex 10% intravenous solution: every 5 weeks (23 Jul 2020 08:25)  insulin: Insulin Pump (23 Jul 2020 08:25)  levothyroxine 88 mcg (0.088 mg) oral tablet: 1 tab(s) orally once a day (23 Jul 2020 08:25)  Multiple Vitamins oral tablet: 1 tab(s) orally once a day (23 Jul 2020 08:25)  rosuvastatin 10 mg oral tablet: 1 tab(s) orally every other day (23 Jul 2020 08:25)  Vitamin C 250 mg oral tablet: 1 tab(s) orally 2 times a day (23 Jul 2020 08:25)      VITALS:   T(F): 98.4 (03-16 @ 06:55), Max: 101.3 (03-14 @ 17:43)  HR: 96 (03-16 @ 06:55) (76 - 96)  BP: 101/61 (03-16 @ 06:55) (101/46 - 138/60)  BP(mean): --  RR: 18 (03-15 @ 20:37) (18 - 22)  SpO2: 99% (03-15 @ 19:57) (97% - 100%)    I&O's Summary    15 Mar 2021 07:01  -  16 Mar 2021 07:00  --------------------------------------------------------  IN: 3060 mL / OUT: 770 mL / NET: 2290 mL    16 Mar 2021 07:01  -  16 Mar 2021 10:57  --------------------------------------------------------  IN: 330 mL / OUT: 0 mL / NET: 330 mL        REVIEW OF SYSTEMS:  CONSTITUTIONAL: No weakness, fevers or chills  EYES: No visual changes  ENT: No vertigo or throat pain   NECK: No pain or stiffness  RESPIRATORY: No cough, wheezing, hemoptysis; No shortness of breath  CARDIOVASCULAR: No chest pain or palpitations  GASTROINTESTINAL: No abdominal or epigastric pain. No nausea, vomiting, or hematemesis; No diarrhea or constipation. No melena or hematochezia.  GENITOURINARY: No Polyuria  NEUROLOGICAL:  No tremors, no Weakness or numbness  SKIN: No itching, no rashes  MSK: no joint pain    PHYSICAL EXAM:  GENERAL: Patient is awake , alert and oriented,  not in acute distress  EYES: No proptosis, no lid lag  NECK: No thyroid enlargement, no palpable nodules , no bruit  LUNGS: Clear to auscultation bilaterally   CARDIOVASCULAR: S1/S2 present, RRR , no murmurs or rubs  ABD: Soft, non-tender, non-distended, +BS  EXT: No EMILIANA  SKIN: No abdominal striae  NEURO: No tremors, DTR 2+    LABS:                        9.9    18.88 )-----------( 127      ( 15 Mar 2021 06:16 )             29.3     03-15    121<L>  |  92<L>  |  55<H>  ----------------------------<  254<H>  4.4   |  17  |  1.9<H>    Ca    7.2<L>      15 Mar 2021 06:16  Mg     2.3     03-16    TPro  5.5<L>  /  Alb  3.0<L>  /  TBili  0.3  /  DBili  x   /  AST  35  /  ALT  32  /  AlkPhos  98  03-15      Troponin T, Serum: 0.25 ng/mL <HH> (03-15-21 @ 23:38)  Troponin T, Serum: 0.31 ng/mL <HH> (03-15-21 @ 11:07)    CARDIAC MARKERS ( 15 Mar 2021 23:38 )  x     / 0.25 ng/mL / x     / x     / x      CARDIAC MARKERS ( 15 Mar 2021 11:07 )  x     / 0.31 ng/mL / x     / x     / 2.5 ng/mL  CARDIAC MARKERS ( 14 Mar 2021 16:30 )  x     / 0.56 ng/mL / x     / x     / x            POCT Blood Glucose.: 353 mg/dL (03-16-21 @ 07:20)  POCT Blood Glucose.: 281 mg/dL (03-15-21 @ 21:40)  POCT Blood Glucose.: 182 mg/dL (03-15-21 @ 16:21)  POCT Blood Glucose.: 220 mg/dL (03-15-21 @ 11:15)  POCT Blood Glucose.: 245 mg/dL (03-15-21 @ 07:19)  POCT Blood Glucose.: 258 mg/dL (03-15-21 @ 05:18)  POCT Blood Glucose.: 312 mg/dL (03-14-21 @ 15:27)       HPI:  77 yo F PMH DM on insulin pump, CAD s/p stenting in 2020, Lung nodules, Dry eyes, bilateral, PVD (peripheral vascular disease): S/p Right Peripheral leg Stent, Seasonal allergies, Skin cancer: Basal Cell Cancer face  (around nose) s/p MOHS procedure x 3, Anemia: Chronic, on Iron supplement, Hypothyroid, SSS (sick sinus syndrome): S/p PPM, Pacemaker: Sept 2017, Essential hypertension, Afib: 5+ yrs; on Eliquis presented here for nausea, vomiting and decreased PO intake for past 2 days. The symptoms started acutely and she was in her usual state of health before. She also felt weak and her blood sugars were higher. She had a recent hx of UTI which responded well to ciprofloxacin. She denied any chest pain, shortness of breath, focal neurological deficits or altered bowel habits. She takes her meds regularly. For past three days, she is also passing out very less urine. There is no blood in urine noted however. She complained of some diffuse abdominal pain which started now and is burning in character. She is otherwise comfortable and has no other complaints.    Vital Signs (24 Hrs):  T(C): 37.1 (03-14-21 @ 20:28), Max: 38.5 (03-14-21 @ 17:43)  HR: 76 (03-14-21 @ 20:28) (76 - 87)  BP: 101/46 (03-14-21 @ 20:28) (101/46 - 127/61)  RR: 20 (03-14-21 @ 20:28) (20 - 22)  SpO2: 100% (03-14-21 @ 20:28) (99% - 100%)      Patient received in ER with low grade fevers. Blood work is significant for leukocytosis, hyponatremia, hyperkalemia, elevated Cr and BUN, elevated liver enzymes, troponin and a positive UA for blood. CT abdomen and pelvis revealed right kidney is enlarged with perinephric fat stranding and moderate hydroureteronephrosis to the level of the distal ureter. Patient is being admitted for sepsis secondary to possible urinary tract source. Urology consulted and on board for possible cystoscopy.    (14 Mar 2021 23:48)      PAST MEDICAL & SURGICAL HISTORY  Inclusion body myositis (IBM)    Lung nodules    Transfusion history  S/p Hysterectomy    Dry eyes, bilateral    PVD (peripheral vascular disease)  S/p Right Peripheral leg Stent    Seasonal allergies    Skin cancer  Basal Cell Cancer face  (around nose) s/p MOHS procedure x 3    Anemia  Chronic, on Iron supplement    Hypothyroid    SSS (sick sinus syndrome)  S/p PPM    Diabetes  Insulin dependent - (has insulin Pump)    Pacemaker  Sept 2017    Palpitations  occasionally; not for couple yrs    Essential hypertension    Afib  5+ yrs; on Eliquis    S/P ablation of atrial fibrillation    S/P cataract surgery    History of surgery  MOHS procedure (face) x 3    Cardiac pacemaker  9/9/17    History of surgery  Right Peripheral Stent 5 yrs    H/O total hysterectomy  25+ yrs ago        FAMILY HISTORY:  FAMILY HISTORY:  Cancer (Father)  Myeloma    Family history of lung cancer (Mother)        SOCIAL HISTORY:  []smoker  []Alcohol  []Drug    ALLERGIES:  latex (Pruritus; Rash)  sulfonamides (Unknown)      MEDICATIONS:  MEDICATIONS  (STANDING):  amLODIPine   Tablet 5 milliGRAM(s) Oral daily  apixaban 2.5 milliGRAM(s) Oral every 12 hours  ascorbic acid  Oral Tab/Cap - Peds 250 milliGRAM(s) Oral two times a day  atorvastatin 40 milliGRAM(s) Oral at bedtime  carvedilol Oral Tab/Cap - Peds 12.5 milliGRAM(s) Oral two times a day  chlorhexidine 4% Liquid 1 Application(s) Topical <User Schedule>  clopidogrel Tablet 75 milliGRAM(s) Oral daily  dextrose 40% Gel 15 Gram(s) Oral once  dextrose 5%. 1000 milliLiter(s) (50 mL/Hr) IV Continuous <Continuous>  dextrose 5%. 1000 milliLiter(s) (100 mL/Hr) IV Continuous <Continuous>  dextrose 50% Injectable 25 Gram(s) IV Push once  dextrose 50% Injectable 12.5 Gram(s) IV Push once  dextrose 50% Injectable 25 Gram(s) IV Push once  glucagon  Injectable 1 milliGRAM(s) IntraMuscular once  insulin glargine Injectable (LANTUS) 10 Unit(s) SubCutaneous at bedtime  insulin lispro Injectable (ADMELOG) 3 Unit(s) SubCutaneous three times a day before meals  levothyroxine 88 MICROGram(s) Oral daily  meropenem  IVPB 500 milliGRAM(s) IV Intermittent every 12 hours  multivitamin 1 Tablet(s) Oral daily  sodium chloride 0.9%. 1000 milliLiter(s) (100 mL/Hr) IV Continuous <Continuous>    MEDICATIONS  (PRN):  aluminum hydroxide/magnesium hydroxide/simethicone Suspension 30 milliLiter(s) Oral every 6 hours PRN Dyspepsia      HOME MEDICATIONS:  Home Medications:  amlodipine-benazepril 5 mg-40 mg oral capsule: 1 cap(s) orally once a day (at bedtime) (23 Jul 2020 08:25)  carvedilol 12.5 mg oral tablet: 1 tab(s) orally 2 times a day (23 Jul 2020 08:25)  Gamunex 10% intravenous solution: every 5 weeks (23 Jul 2020 08:25)  insulin: Insulin Pump (23 Jul 2020 08:25)  levothyroxine 88 mcg (0.088 mg) oral tablet: 1 tab(s) orally once a day (23 Jul 2020 08:25)  Multiple Vitamins oral tablet: 1 tab(s) orally once a day (23 Jul 2020 08:25)  rosuvastatin 10 mg oral tablet: 1 tab(s) orally every other day (23 Jul 2020 08:25)  Vitamin C 250 mg oral tablet: 1 tab(s) orally 2 times a day (23 Jul 2020 08:25)      VITALS:   T(F): 98.4 (03-16 @ 06:55), Max: 101.3 (03-14 @ 17:43)  HR: 96 (03-16 @ 06:55) (76 - 96)  BP: 101/61 (03-16 @ 06:55) (101/46 - 138/60)  BP(mean): --  RR: 18 (03-15 @ 20:37) (18 - 22)  SpO2: 99% (03-15 @ 19:57) (97% - 100%)    I&O's Summary    15 Mar 2021 07:01  -  16 Mar 2021 07:00  --------------------------------------------------------  IN: 3060 mL / OUT: 770 mL / NET: 2290 mL    16 Mar 2021 07:01  -  16 Mar 2021 10:57  --------------------------------------------------------  IN: 330 mL / OUT: 0 mL / NET: 330 mL        REVIEW OF SYSTEMS:  CONSTITUTIONAL: No weakness, fevers or chills  EYES: No visual changes  ENT: No vertigo or throat pain   NECK: No pain or stiffness  RESPIRATORY: No cough, wheezing, hemoptysis; No shortness of breath  CARDIOVASCULAR: No chest pain or palpitations  GASTROINTESTINAL: No abdominal or epigastric pain. No nausea, vomiting, or hematemesis; No diarrhea or constipation. No melena or hematochezia.  GENITOURINARY: No Polyuria  NEUROLOGICAL:  No tremors, no Weakness or numbness  SKIN: No itching, no rashes  MSK: no joint pain    PHYSICAL EXAM:  GENERAL: Patient is awake , alert and oriented,  not in acute distress  EYES: No proptosis, no lid lag  NECK: No thyroid enlargement, no palpable nodules , no bruit  LUNGS: Clear to auscultation bilaterally   CARDIOVASCULAR: S1/S2 present, RRR , no murmurs or rubs  ABD: Soft, non-tender, non-distended, +BS  EXT: No EMILIANA  SKIN: No abdominal striae  NEURO: No tremors, DTR 2+    LABS:                        9.9    18.88 )-----------( 127      ( 15 Mar 2021 06:16 )             29.3     03-15    121<L>  |  92<L>  |  55<H>  ----------------------------<  254<H>  4.4   |  17  |  1.9<H>    Ca    7.2<L>      15 Mar 2021 06:16  Mg     2.3     03-16    TPro  5.5<L>  /  Alb  3.0<L>  /  TBili  0.3  /  DBili  x   /  AST  35  /  ALT  32  /  AlkPhos  98  03-15      Troponin T, Serum: 0.25 ng/mL <HH> (03-15-21 @ 23:38)  Troponin T, Serum: 0.31 ng/mL <HH> (03-15-21 @ 11:07)    CARDIAC MARKERS ( 15 Mar 2021 23:38 )  x     / 0.25 ng/mL / x     / x     / x      CARDIAC MARKERS ( 15 Mar 2021 11:07 )  x     / 0.31 ng/mL / x     / x     / 2.5 ng/mL  CARDIAC MARKERS ( 14 Mar 2021 16:30 )  x     / 0.56 ng/mL / x     / x     / x            POCT Blood Glucose.: 353 mg/dL (03-16-21 @ 07:20)  POCT Blood Glucose.: 281 mg/dL (03-15-21 @ 21:40)  POCT Blood Glucose.: 182 mg/dL (03-15-21 @ 16:21)  POCT Blood Glucose.: 220 mg/dL (03-15-21 @ 11:15)  POCT Blood Glucose.: 245 mg/dL (03-15-21 @ 07:19)  POCT Blood Glucose.: 258 mg/dL (03-15-21 @ 05:18)  POCT Blood Glucose.: 312 mg/dL (03-14-21 @ 15:27)    A1C with Estimated Average Glucose in AM (03.15.21 @ 06:16)    A1C with Estimated Average Glucose Result: 8.1         HPI:  75 yo F PMH DM on insulin pump, CAD s/p stenting in 2020, Lung nodules, Dry eyes, bilateral, PVD (peripheral vascular disease): S/p Right Peripheral leg Stent, Seasonal allergies, Skin cancer: Basal Cell Cancer face  (around nose) s/p MOHS procedure x 3, Anemia: Chronic, on Iron supplement, Hypothyroid, SSS (sick sinus syndrome): S/p PPM, Pacemaker: Sept 2017, Essential hypertension, Afib: 5+ yrs; on Eliquis presented here for nausea, vomiting and decreased PO intake for past 2 days. The symptoms started acutely and she was in her usual state of health before. She also felt weak and her blood sugars were higher. She had a recent hx of UTI which responded well to ciprofloxacin. She denied any chest pain, shortness of breath, focal neurological deficits or altered bowel habits. She takes her meds regularly. For past three days, she is also passing out very less urine. There is no blood in urine noted however. She complained of some diffuse abdominal pain which started now and is burning in character. She is otherwise comfortable and has no other complaints.    Vital Signs (24 Hrs):  T(C): 37.1 (03-14-21 @ 20:28), Max: 38.5 (03-14-21 @ 17:43)  HR: 76 (03-14-21 @ 20:28) (76 - 87)  BP: 101/46 (03-14-21 @ 20:28) (101/46 - 127/61)  RR: 20 (03-14-21 @ 20:28) (20 - 22)  SpO2: 100% (03-14-21 @ 20:28) (99% - 100%)      Patient received in ER with low grade fevers. Blood work is significant for leukocytosis, hyponatremia, hyperkalemia, elevated Cr and BUN, elevated liver enzymes, troponin and a positive UA for blood. CT abdomen and pelvis revealed right kidney is enlarged with perinephric fat stranding and moderate hydroureteronephrosis to the level of the distal ureter. Patient is being admitted for sepsis secondary to possible urinary tract source. Urology consulted and on board for possible cystoscopy.    (14 Mar 2021 23:48)    diabetes history :   patient diagnosed with type 2 DM insulin dependant at least for 10 years uses insulin pump medtronic with TDB of 18 units , I:C ratio of 1:8-1:10  and ISF 1:35 , denies retinopathy or neuropathy,last saw her [id number] and no changes were made. she reports overall good FS range but oget occasional hypo/hyperglycemia. familiar with carb counting.  pump infusion set had to be changed tomorrow. patient also has hypothyroidism and is on Lt4 88 mcg daily.       PAST MEDICAL & SURGICAL HISTORY  Inclusion body myositis (IBM)    Lung nodules    Transfusion history  S/p Hysterectomy    Dry eyes, bilateral    PVD (peripheral vascular disease)  S/p Right Peripheral leg Stent    Seasonal allergies    Skin cancer  Basal Cell Cancer face  (around nose) s/p MOHS procedure x 3    Anemia  Chronic, on Iron supplement    Hypothyroid    SSS (sick sinus syndrome)  S/p PPM    Diabetes  Insulin dependent - (has insulin Pump)    Pacemaker  Sept 2017    Palpitations  occasionally; not for couple yrs    Essential hypertension    Afib  5+ yrs; on Eliquis    S/P ablation of atrial fibrillation    S/P cataract surgery    History of surgery  MOHS procedure (face) x 3    Cardiac pacemaker  9/9/17    History of surgery  Right Peripheral Stent 5 yrs    H/O total hysterectomy  25+ yrs ago        FAMILY HISTORY:  FAMILY HISTORY:  Cancer (Father)  Myeloma    Family history of lung cancer (Mother)        SOCIAL HISTORY:  []smoker  []Alcohol  []Drug    ALLERGIES:  latex (Pruritus; Rash)  sulfonamides (Unknown)      MEDICATIONS:  MEDICATIONS  (STANDING):  amLODIPine   Tablet 5 milliGRAM(s) Oral daily  apixaban 2.5 milliGRAM(s) Oral every 12 hours  ascorbic acid  Oral Tab/Cap - Peds 250 milliGRAM(s) Oral two times a day  atorvastatin 40 milliGRAM(s) Oral at bedtime  carvedilol Oral Tab/Cap - Peds 12.5 milliGRAM(s) Oral two times a day  chlorhexidine 4% Liquid 1 Application(s) Topical <User Schedule>  clopidogrel Tablet 75 milliGRAM(s) Oral daily  dextrose 40% Gel 15 Gram(s) Oral once  dextrose 5%. 1000 milliLiter(s) (50 mL/Hr) IV Continuous <Continuous>  dextrose 5%. 1000 milliLiter(s) (100 mL/Hr) IV Continuous <Continuous>  dextrose 50% Injectable 25 Gram(s) IV Push once  dextrose 50% Injectable 12.5 Gram(s) IV Push once  dextrose 50% Injectable 25 Gram(s) IV Push once  glucagon  Injectable 1 milliGRAM(s) IntraMuscular once  insulin glargine Injectable (LANTUS) 10 Unit(s) SubCutaneous at bedtime  insulin lispro Injectable (ADMELOG) 3 Unit(s) SubCutaneous three times a day before meals  levothyroxine 88 MICROGram(s) Oral daily  meropenem  IVPB 500 milliGRAM(s) IV Intermittent every 12 hours  multivitamin 1 Tablet(s) Oral daily  sodium chloride 0.9%. 1000 milliLiter(s) (100 mL/Hr) IV Continuous <Continuous>    MEDICATIONS  (PRN):  aluminum hydroxide/magnesium hydroxide/simethicone Suspension 30 milliLiter(s) Oral every 6 hours PRN Dyspepsia      HOME MEDICATIONS:  Home Medications:  amlodipine-benazepril 5 mg-40 mg oral capsule: 1 cap(s) orally once a day (at bedtime) (23 Jul 2020 08:25)  carvedilol 12.5 mg oral tablet: 1 tab(s) orally 2 times a day (23 Jul 2020 08:25)  Gamunex 10% intravenous solution: every 5 weeks (23 Jul 2020 08:25)  insulin: Insulin Pump (23 Jul 2020 08:25)  levothyroxine 88 mcg (0.088 mg) oral tablet: 1 tab(s) orally once a day (23 Jul 2020 08:25)  Multiple Vitamins oral tablet: 1 tab(s) orally once a day (23 Jul 2020 08:25)  rosuvastatin 10 mg oral tablet: 1 tab(s) orally every other day (23 Jul 2020 08:25)  Vitamin C 250 mg oral tablet: 1 tab(s) orally 2 times a day (23 Jul 2020 08:25)      VITALS:   T(F): 98.4 (03-16 @ 06:55), Max: 101.3 (03-14 @ 17:43)  HR: 96 (03-16 @ 06:55) (76 - 96)  BP: 101/61 (03-16 @ 06:55) (101/46 - 138/60)  BP(mean): --  RR: 18 (03-15 @ 20:37) (18 - 22)  SpO2: 99% (03-15 @ 19:57) (97% - 100%)    I&O's Summary    15 Mar 2021 07:01  -  16 Mar 2021 07:00  --------------------------------------------------------  IN: 3060 mL / OUT: 770 mL / NET: 2290 mL    16 Mar 2021 07:01  -  16 Mar 2021 10:57  --------------------------------------------------------  IN: 330 mL / OUT: 0 mL / NET: 330 mL        REVIEW OF SYSTEMS:  CONSTITUTIONAL: No weakness, fevers or chills  EYES: No visual changes  ENT: No vertigo or throat pain   NECK: No pain or stiffness  RESPIRATORY: No cough, wheezing, hemoptysis; No shortness of breath  CARDIOVASCULAR: No chest pain or palpitations  GASTROINTESTINAL: No abdominal or epigastric pain. No nausea, vomiting, or hematemesis; No diarrhea or constipation. No melena or hematochezia.  GENITOURINARY: No Polyuria  NEUROLOGICAL:  No tremors, no Weakness or numbness  SKIN: No itching, no rashes  MSK: no joint pain    PHYSICAL EXAM:  GENERAL: Patient is awake , alert and oriented,  not in acute distress  EYES: No proptosis, no lid lag  NECK: No thyroid enlargement, no palpable nodules , no bruit  LUNGS: Clear to auscultation bilaterally   CARDIOVASCULAR: S1/S2 present, RRR , no murmurs or rubs  ABD: Soft, non-tender, non-distended, +BS  EXT: No EMILIANA      LABS:                        9.9    18.88 )-----------( 127      ( 15 Mar 2021 06:16 )             29.3     03-15    121<L>  |  92<L>  |  55<H>  ----------------------------<  254<H>  4.4   |  17  |  1.9<H>    Ca    7.2<L>      15 Mar 2021 06:16  Mg     2.3     03-16    TPro  5.5<L>  /  Alb  3.0<L>  /  TBili  0.3  /  DBili  x   /  AST  35  /  ALT  32  /  AlkPhos  98  03-15      Troponin T, Serum: 0.25 ng/mL <HH> (03-15-21 @ 23:38)  Troponin T, Serum: 0.31 ng/mL <HH> (03-15-21 @ 11:07)    CARDIAC MARKERS ( 15 Mar 2021 23:38 )  x     / 0.25 ng/mL / x     / x     / x      CARDIAC MARKERS ( 15 Mar 2021 11:07 )  x     / 0.31 ng/mL / x     / x     / 2.5 ng/mL  CARDIAC MARKERS ( 14 Mar 2021 16:30 )  x     / 0.56 ng/mL / x     / x     / x            POCT Blood Glucose.: 353 mg/dL (03-16-21 @ 07:20)  POCT Blood Glucose.: 281 mg/dL (03-15-21 @ 21:40)  POCT Blood Glucose.: 182 mg/dL (03-15-21 @ 16:21)  POCT Blood Glucose.: 220 mg/dL (03-15-21 @ 11:15)  POCT Blood Glucose.: 245 mg/dL (03-15-21 @ 07:19)  POCT Blood Glucose.: 258 mg/dL (03-15-21 @ 05:18)  POCT Blood Glucose.: 312 mg/dL (03-14-21 @ 15:27)    A1C with Estimated Average Glucose in AM (03.15.21 @ 06:16)    A1C with Estimated Average Glucose Result: 8.1

## 2021-03-16 NOTE — CHART NOTE - NSCHARTNOTEFT_GEN_A_CORE
Electrophysiology    Pts device __DC PPM [employer]____ was interrogated on 03-16-21  Device working properly  Events: paroxysmal AF, no other events  Preliminary results d/w with primary team [id number]  Awaiting / Reviewed by attending    Contact EP ACP with any questions [id number]

## 2021-03-16 NOTE — CONSULT NOTE ADULT - SUBJECTIVE AND OBJECTIVE BOX
Patient is a 76y old  Female who presents with a chief complaint of Sepsis (16 Mar 2021 14:37)      HPI:  75 yo F PMH DM on insulin pump, CAD s/p stenting in , Lung nodules, Dry eyes, bilateral, PVD (peripheral vascular disease): S/p Right Peripheral leg Stent, Seasonal allergies, Skin cancer: Basal Cell Cancer face  (around nose) s/p MOHS procedure x 3, Anemia: Chronic, on Iron supplement, Hypothyroid, SSS (sick sinus syndrome): S/p PPM, Pacemaker: 2017, Essential hypertension, Afib: 5+ yrs; on Eliquis presented here for nausea, vomiting and decreased PO intake for past 2 days. The symptoms started acutely and she was in her usual state of health before. She also felt weak and her blood sugars were higher. She had a recent hx of UTI which responded well to ciprofloxacin. She denied any chest pain, shortness of breath, focal neurological deficits or altered bowel habits. She takes her meds regularly. For past three days, she is also passing out very less urine. There is no blood in urine noted however. She complained of some diffuse abdominal pain which started now and is burning in character. She is otherwise comfortable and has no other complaints.    Vital Signs (24 Hrs):  T(C): 37.1 (21 @ 20:28), Max: 38.5 (21 @ 17:43)  HR: 76 (21 @ 20:28) (76 - 87)  BP: 101/46 (21 @ 20:28) (101/46 - 127/61)  RR: 20 (21 @ 20:28) (20 - 22)  SpO2: 100% (21 @ 20:28) (99% - 100%)      Patient received in ER with low grade fevers. Blood work is significant for leukocytosis, hyponatremia, hyperkalemia, elevated Cr and BUN, elevated liver enzymes, troponin and a positive UA for blood. CT abdomen and pelvis revealed right kidney is enlarged with perinephric fat stranding and moderate hydroureteronephrosis to the level of the distal ureter. Patient is being admitted for sepsis secondary to possible urinary tract source. Urology consulted and on board for possible cystoscopy.    (14 Mar 2021 23:48)      PAST MEDICAL & SURGICAL HISTORY:  Inclusion body myositis (IBM)    Lung nodules    Transfusion history  S/p Hysterectomy    Dry eyes, bilateral    PVD (peripheral vascular disease)  S/p Right Peripheral leg Stent    Seasonal allergies    Skin cancer  Basal Cell Cancer face  (around nose) s/p MOHS procedure x 3    Anemia  Chronic, on Iron supplement    Hypothyroid    SSS (sick sinus syndrome)  S/p PPM    Diabetes  Insulin dependent - (has insulin Pump)    Pacemaker  2017    Palpitations  occasionally; not for couple yrs    Essential hypertension    Afib  5+ yrs; on Eliquis    S/P ablation of atrial fibrillation    S/P cataract surgery    History of surgery  MOHS procedure (face) x 3    Cardiac pacemaker  17    History of surgery  Right Peripheral Stent 5 yrs    H/O total hysterectomy  25+ yrs ago        SOCIAL HX:   Smoking                         ETOH                            Other    FAMILY HISTORY:  Cancer (Father)  Myeloma    Family history of lung cancer (Mother)    :  No known cardiovacular family hisotry     Review Of Systems:     CONSTITUTIONAL:     ROS neg except as per HPI      Allergies    latex (Pruritus; Rash)  sulfonamides (Unknown)    Intolerances    Pineapple (Unknown)        PHYSICAL EXAM    ICU Vital Signs Last 24 Hrs  T(C): 35.6 (16 Mar 2021 12:41), Max: 36.9 (16 Mar 2021 06:55)  T(F): 96.1 (16 Mar 2021 12:41), Max: 98.4 (16 Mar 2021 06:55)  HR: 117 (16 Mar 2021 12:41) (78 - 117)  BP: 96/52 (16 Mar 2021 12:41) (96/52 - 138/60)  RR: 20 (16 Mar 2021 12:41) (18 - 20)  SpO2: 99% (15 Mar 2021 19:57) (97% - 99%)      CONSTITUTIONAL:  Well nourished.  NAD    ENT:   + ET   Airway patent,   Mouth with normal mucosa.   No thrush    EYES:   pupils equal,   round and reactive to light.    CARDIAC:   Normal rate,   Regular rhythm.    Heart sounds S1, S2.   No edema      Vascular:   normal systolic impulse  no bruits    RESPIRATORY:   No wheezing   Normal chest expansion  No use of accessory muscles    GASTROINTESTINAL:  Abdomen soft   Non-tender,   No guarding,   + BS    GENITOURINARY  normal genitalia for sex  no edema    MUSCULOSKELETAL:   Range of motion is not limited,  Nno clubbing, cyanosis    NEUROLOGICAL:   Alert and oriented   No motor or sensory deficits.  Pertinent DTRs normal    SKIN:   Skin normal color for race,   Warm and dry  No evidence of rash.                  03-15-21 @ 07:  -  21 @ 07:00  --------------------------------------------------------  IN:    IV PiggyBack: 50 mL    Oral Fluid: 710 mL    sodium chloride 0.9%: 2300 mL  Total IN: 3060 mL    OUT:    Intermittent Catheterization - Urethral (mL): 500 mL    Voided (mL): 270 mL  Total OUT: 770 mL    Total NET: 2290 mL      21 @ 07:01  -  21 @ 17:15  --------------------------------------------------------  IN:    Oral Fluid: 540 mL  Total IN: 540 mL    OUT:    Indwelling Catheter - Urethral (mL): 100 mL  Total OUT: 100 mL    Total NET: 440 mL          LABS:                          10.0   21.58 )-----------( 84       ( 16 Mar 2021 11:10 )             30.7                                                   120<L>  |  90<L>  |  57<H>  ----------------------------<  400<H>  4.3   |  7<LL>  |  1.9<H>    Ca    6.5<L>      16 Mar 2021 11:10  Mg     2.3         TPro  4.8<L>  /  Alb  2.3<L>  /  TBili  0.3  /  DBili  x   /  AST  26  /  ALT  29  /  AlkPhos  143<H>        Urinalysis Basic - ( 14 Mar 2021 22:50 )    Color: Yellow / Appearance: Turbid / S.021 / pH: x  Gluc: x / Ketone: Negative  / Bili: Negative / Urobili: <2 mg/dL   Blood: x / Protein: 100 mg/dL / Nitrite: Negative   Leuk Esterase: Large / RBC: 7 /HPF /  /HPF   Sq Epi: x / Non Sq Epi: 5 /HPF / Bacteria: Moderate        CARDIAC MARKERS ( 15 Mar 2021 23:38 )  x     / 0.25 ng/mL / x     / x     / x      CARDIAC MARKERS ( 15 Mar 2021 11:07 )  x     / 0.31 ng/mL / x     / x     / 2.5 ng/mL    LIVER FUNCTIONS - ( 16 Mar 2021 11:10 )  Alb: 2.3 g/dL / Pro: 4.8 g/dL / ALK PHOS: 143 U/L / ALT: 29 U/L / AST: 26 U/L / GGT: x                                                  Culture - Urine (collected 14 Mar 2021 22:50)  Source: .Urine Clean Catch (Midstream)  Preliminary Report (16 Mar 2021 13:39):    50,000 - 99,000 CFU/mL Escherichia coli    Culture - Blood (collected 14 Mar 2021 18:30)  Source: .Blood Blood  Gram Stain (15 Mar 2021 12:47):    Growth in aerobic bottle: Gram Negative Rods    Growth in anaerobic bottle: Gram Negative Rods  Preliminary Report (16 Mar 2021 09:27):    Growth in aerobic and anaerobic bottles: Escherichia coli    MDRO detected in BCID PCR, resistance marker = CTX-M (ESBL)    ***Blood Panel PCR results on this specimen are available    approximately 3 hours after the Gram stain result.***    Gram stain, PCR, and/or culture results may not always    correspond due to difference in methodologies.    ************************************************************    This PCR assay was performed by multiplex PCR. This    Assay tests for 66 bacterial and resistance gene targets.    Please refer to the [facility] test directory    at [email or web address] for details.  Organism: Blood Culture PCR (15 Mar 2021 13:11)  Organism: Blood Culture PCR (15 Mar 2021 13:11)    Culture - Blood (collected 14 Mar 2021 18:30)  Source: .Blood Blood  Gram Stain (15 Mar 2021 11:56):    Growth in anaerobic bottle: Gram Negative Rods    Growth in aerobic bottle: Gram Negative Rods  Preliminary Report (16 Mar 2021 13:15):    Growth in aerobic and anaerobic bottles: Escherichia coli    See previous culture [id number]                                                                                       AB - ( 16 Mar 2021 15:13 )  pH, Arterial: 7.28  pH, Blood: x     /  pCO2: 20    /  pO2: 92    / HCO3: 9     / Base Excess: -15.5 /  SaO2: 98                  X-Rays reviewed:                                                                                    ECHO    CXR interpreted by me: No acute cardiopulmonary disease    MEDICATIONS  (STANDING):  amLODIPine   Tablet 5 milliGRAM(s) Oral daily  apixaban 2.5 milliGRAM(s) Oral every 12 hours  ascorbic acid  Oral Tab/Cap - Peds 250 milliGRAM(s) Oral two times a day  atorvastatin 40 milliGRAM(s) Oral at bedtime  carvedilol Oral Tab/Cap - Peds 12.5 milliGRAM(s) Oral two times a day  chlorhexidine 4% Liquid 1 Application(s) Topical <User Schedule>  clopidogrel Tablet 75 milliGRAM(s) Oral daily  dextrose 40% Gel 15 Gram(s) Oral once  dextrose 5%. 1000 milliLiter(s) (50 mL/Hr) IV Continuous <Continuous>  dextrose 5%. 1000 milliLiter(s) (100 mL/Hr) IV Continuous <Continuous>  dextrose 50% Injectable 25 Gram(s) IV Push once  dextrose 50% Injectable 12.5 Gram(s) IV Push once  dextrose 50% Injectable 25 Gram(s) IV Push once  glucagon  Injectable 1 milliGRAM(s) IntraMuscular once  insulin regular Infusion 5.9 Unit(s)/Hr (5.9 mL/Hr) IV Continuous <Continuous>  levothyroxine 88 MICROGram(s) Oral daily  meropenem  IVPB 500 milliGRAM(s) IV Intermittent every 12 hours  multivitamin 1 Tablet(s) Oral daily  sodium chloride 0.9% with potassium chloride 20 mEq/L 1000 milliLiter(s) (250 mL/Hr) IV Continuous <Continuous>    MEDICATIONS  (PRN):  aluminum hydroxide/magnesium hydroxide/simethicone Suspension 30 milliLiter(s) Oral every 6 hours PRN Dyspepsia

## 2021-03-16 NOTE — CHART NOTE - NSCHARTNOTEFT_GEN_A_CORE
MICU Transfer Note    Transfer from:  to ICU   77 yo F PMH DM on insulin pump, CAD s/p stenting in 2020, Lung nodules, Dry eyes, bilateral, PVD (peripheral vascular disease): S/p Right Peripheral leg Stent, Seasonal allergies, Skin cancer: Basal Cell Cancer face  (around nose) s/p MOHS procedure x 3, Anemia: Chronic, on Iron supplement, Hypothyroid, SSS (sick sinus syndrome): S/p PPM, Pacemaker: Sept 2017, Essential hypertension, Afib: 5+ yrs; on Eliquis presented here for nausea, vomiting and decreased PO intake for past 2 days. The symptoms started acutely and she was in her usual state of health before. She also felt weak and her blood sugars were higher. She had a recent hx of UTI which responded well to ciprofloxacin. She denied any chest pain, shortness of breath, focal neurological deficits or altered bowel habits. She takes her meds regularly. For past three days, she is also passing out very less urine. There is no blood in urine noted however. She complained of some diffuse abdominal pain which started now and is burning in character. She is otherwise comfortable and has no other complaints.    Patient received in ER with low grade fevers. Blood work is significant for leukocytosis, hyponatremia, hyperkalemia, elevated Cr and BUN, elevated liver enzymes, troponin and a positive UA for blood. CT abdomen and pelvis revealed right kidney is enlarged with perinephric fat stranding and moderate hydroureteronephrosis to the level of the distal ureter. Patient was admitted for sepsis secondary to pyelonephritis. Urology consulted for possible cystoscopy.     Patient seen by cardiology who determined she's s/p NSTEMI type 2; the current cardiology eval places her at high cardiovascular risk for potential urological procedure (stenting in the setting of hydroureteronephrosis).     While on 3C, patient was on her home insulin pump, however, it stopped working on 3/15/21 and she was started on insulin regimen subq on 3/15 for elevated blood glucose. Her fingersticks were uncontrolled and she developed DKA. (FS>400, bicarb serum 7). Patient is being upgraded for insulin drip. Gave 5 IV insulin stat, stat ABG, UA and B-hydroxy buterate performed. Informed critical care team and consult placed.     ASSESSMENT & PLAN:    77 yo female with PMHx of DM on insulin pump, CAD s/p stenting in 2020, Lung nodules, Dry eyes, bilateral, PVD (peripheral vascular disease): S/p Right Peripheral leg Stent, Seasonal allergies, Skin cancer: Basal Cell Cancer face  (around nose) s/p MOHS procedure x 3, Anemia: Chronic, on Iron supplement, Hypothyroid, SSS (sick sinus syndrome): S/p PPM Sept 2017, Essential hypertension, Afib: 5+ yrs; on Eliquis admitted for pyelonephritis treatment complicated by right sided hydronephrosis, obstructive uropathy, TEOFILO and hyponatremia, over admission developed DKA and upgraded to critical care for DKA.     #Sepsis/bateremia secondary to pyelonephritis  #R-sided Hydronephrosis  # Obstructive uropathy  #Bacteremia  - On admission:1.3, leukocytosis, fever, positive UA, CT scan: right sided hydronephrosis  - Found on 3/15 to be blood culture + for Gram (-) rods aerobic and anaerobic  - Ptn now afebrile, WBC downtrending 26-->19  - f./u urine cultures  - started on volume resuscitation, s/p 2 L bolus in ER, c/w NS 100ml/hr, ptn not overloaded this am  - c/w meropenem for now, de-escalate as urine cultures become available (still not resulted today), possibly switch to Rocephin once urine cultures return  -Regarding hydronephrosis: Urology recommending cytoureteroscopy with stent placement, however cardiac risk stratification places ptnt at high risk for this procedure--discussed with urology team, once ptn more stable will undergo procedure and resume AC post-op per Uro recommendations      #DKA  - FS elevated >400's   - s/p IV 5u regular insulin  - f/u beta hydroxybuterate  - Upgrade to critical care for insulin drip   - s/p NS bolus x1 1L NS   - started NS at 250c/hr w/ 20meq K (Target K 4-5)   - ABG w/ pH 7.3, pCO2 20, lactate 1, bicarb 9.3       #Hyponatremia:  -poor oral intake at home, Na on admission 121,   -Remains hyponatremic    - continue with NS IVF   - monitor for neurological signs  - trend Na, f/u BMP BID   - f/u urine Na, serum osmo, urine osmo    #TEOFILO secondary to hydronephrosis:  -elevated BUN and Cr 2.2, elevated K  -Urology on board for possible stenting and cystoscopy, will not hold Eliquis for now until there is a plan for procedure  -avoid nephrotoxic drugs  -Baseline creatinine _ 0.5   -Monitor strict I/O  - BUN/Crc--> Creatine 2.2 on admission to 1.9 today on 100cc NS   on BMP, repeated 4.5 on VBG, monitor in am  -Avoid nephrotoxic agents, renally dose meds  -Holding ACE-I in setting of TEOFILO  - c/w daily CMP to monitor for resolution of hyponatremia--ptn diet and hydration improved today with extensive counseling--> f/u am BMP    #Chronic atrial fibrillation  -continue eliquis    #Hyperkalemia--resolved  -Admission K 5.6 now 4.4  -Ptn dehydrated on admission electrolyte abnormalities likely secondary to prerenal etiology  -c/w daily BMP monitoring    #CAD recent stenting in July 2020:  -C/W Plavix and statin   -Avoid volume overload    #DLD  -Continue with statin    #HTN  -Continue with amlodipine and coreg, holding ACE/ARB due to TEOFILO          For Follow-Up:  - BMP 8pm   - q1hr fingersticks while on insulin drip  - beta hydroxy buterate   - urology fr possible nephrostomy  - renal US   - if renal US worsening and urology not planning procedure, contact IR for stat nephrostomy  - ID consult for bateremia, currently on meropenem         Vital Signs Last 24 Hrs  T(C): 35.6 (16 Mar 2021 12:41), Max: 36.9 (16 Mar 2021 06:55)  T(F): 96.1 (16 Mar 2021 12:41), Max: 98.4 (16 Mar 2021 06:55)  HR: 117 (16 Mar 2021 12:41) (78 - 117)  BP: 96/52 (16 Mar 2021 12:41) (96/52 - 138/60)  BP(mean): --  RR: 20 (16 Mar 2021 12:41) (18 - 20)  SpO2: 99% (15 Mar 2021 19:57) (97% - 99%)  I&O's Summary    15 Mar 2021 07:01  -  16 Mar 2021 07:00  --------------------------------------------------------  IN: 3060 mL / OUT: 770 mL / NET: 2290 mL    16 Mar 2021 07:01  -  16 Mar 2021 15:09  --------------------------------------------------------  IN: 540 mL / OUT: 100 mL / NET: 440 mL          MEDICATIONS  (STANDING):  amLODIPine   Tablet 5 milliGRAM(s) Oral daily  apixaban 2.5 milliGRAM(s) Oral every 12 hours  ascorbic acid  Oral Tab/Cap - Peds 250 milliGRAM(s) Oral two times a day  atorvastatin 40 milliGRAM(s) Oral at bedtime  calcium gluconate IVPB 2 Gram(s) IV Intermittent once  carvedilol Oral Tab/Cap - Peds 12.5 milliGRAM(s) Oral two times a day  chlorhexidine 4% Liquid 1 Application(s) Topical <User Schedule>  clopidogrel Tablet 75 milliGRAM(s) Oral daily  dextrose 40% Gel 15 Gram(s) Oral once  dextrose 5%. 1000 milliLiter(s) (50 mL/Hr) IV Continuous <Continuous>  dextrose 5%. 1000 milliLiter(s) (100 mL/Hr) IV Continuous <Continuous>  dextrose 50% Injectable 25 Gram(s) IV Push once  dextrose 50% Injectable 12.5 Gram(s) IV Push once  dextrose 50% Injectable 25 Gram(s) IV Push once  glucagon  Injectable 1 milliGRAM(s) IntraMuscular once  insulin glargine Injectable (LANTUS) 10 Unit(s) SubCutaneous at bedtime  insulin lispro (ADMELOG) corrective regimen sliding scale   SubCutaneous three times a day before meals  insulin lispro Injectable (ADMELOG) 3 Unit(s) SubCutaneous three times a day before meals  levothyroxine 88 MICROGram(s) Oral daily  meropenem  IVPB 500 milliGRAM(s) IV Intermittent every 12 hours  multivitamin 1 Tablet(s) Oral daily  sodium chloride 0.9% Bolus 1000 milliLiter(s) IV Bolus once  sodium chloride 0.9% with potassium chloride 20 mEq/L 1000 milliLiter(s) (250 mL/Hr) IV Continuous <Continuous>    MEDICATIONS  (PRN):  aluminum hydroxide/magnesium hydroxide/simethicone Suspension 30 milliLiter(s) Oral every 6 hours PRN Dyspepsia        LABS                                            10.0                  Neurophils% (auto):   x      (03-16 @ 11:10):    21.58)-----------(84           Lymphocytes% (auto):  x                                             30.7                   Eosinphils% (auto):   x        Manual%: Neutrophils x    ; Lymphocytes x    ; Eosinophils x    ; Bands%: x    ; Blasts x                                    120    |  90     |  57                  Calcium: 6.5   / iCa: x      (03-16 @ 11:10)    ----------------------------<  400       Magnesium: 2.3                              4.3     |  7      |  1.9              Phosphorous: x        TPro  4.8    /  Alb  2.3    /  TBili  0.3    /  DBili  x      /  AST  26     /  ALT  29     /  AlkPhos  143    16 Mar 2021 11:10 MICU Transfer Note    Transfer from:  to ICU   77 yo F PMH DM on insulin pump, CAD s/p stenting in 2020, Lung nodules, Dry eyes, bilateral, PVD (peripheral vascular disease): S/p Right Peripheral leg Stent, Seasonal allergies, Skin cancer: Basal Cell Cancer face  (around nose) s/p MOHS procedure x 3, Anemia: Chronic, on Iron supplement, Hypothyroid, SSS (sick sinus syndrome): S/p PPM, Pacemaker: Sept 2017, Essential hypertension, Afib: 5+ yrs; on Eliquis presented here for nausea, vomiting and decreased PO intake for past 2 days. The symptoms started acutely and she was in her usual state of health before. She also felt weak and her blood sugars were higher. She had a recent hx of UTI which responded well to ciprofloxacin. She denied any chest pain, shortness of breath, focal neurological deficits or altered bowel habits. She takes her meds regularly. For past three days, she is also passing out very less urine. There is no blood in urine noted however. She complained of some diffuse abdominal pain which started now and is burning in character. She is otherwise comfortable and has no other complaints.    Patient received in ER with low grade fevers. Blood work is significant for leukocytosis, hyponatremia, hyperkalemia, elevated Cr and BUN, elevated liver enzymes, troponin and a positive UA for blood. CT abdomen and pelvis revealed right kidney is enlarged with perinephric fat stranding and moderate hydroureteronephrosis to the level of the distal ureter. Patient was admitted for sepsis secondary to pyelonephritis. Urology consulted for possible cystoscopy.     Patient seen by cardiology who determined she's s/p NSTEMI type 2; the current cardiology eval places her at high cardiovascular risk for potential urological procedure (stenting in the setting of hydroureteronephrosis).     While on 3C, patient was on her home insulin pump, however, it stopped working on 3/15/21 and she was started on insulin regimen subq on 3/15 for elevated blood glucose. Her fingersticks were uncontrolled and she developed DKA. (FS>400, bicarb serum 7). Patient is being upgraded for insulin drip. Gave 5 IV insulin stat, stat ABG, UA and B-hydroxy buterate performed. Informed critical care team and consult placed.     ASSESSMENT & PLAN:    77 yo female with PMHx of DM on insulin pump, CAD s/p stenting in 2020, Lung nodules, Dry eyes, bilateral, PVD (peripheral vascular disease): S/p Right Peripheral leg Stent, Seasonal allergies, Skin cancer: Basal Cell Cancer face  (around nose) s/p MOHS procedure x 3, Anemia: Chronic, on Iron supplement, Hypothyroid, SSS (sick sinus syndrome): S/p PPM Sept 2017, Essential hypertension, Afib: 5+ yrs; on Eliquis admitted for pyelonephritis treatment complicated by right sided hydronephrosis, obstructive uropathy, TEOFILO and hyponatremia, over admission developed DKA and upgraded to critical care for DKA.     #Sepsis/bateremia secondary to pyelonephritis  #R-sided Hydronephrosis  # Obstructive uropathy  #Bacteremia  - On admission:1.3, leukocytosis, fever, positive UA, CT scan: right sided hydronephrosis  - Found on 3/15 to be blood culture + for Gram (-) rods aerobic and anaerobic  - Ptn now afebrile, WBC downtrending 26-->19  - f./u urine cultures  - started on volume resuscitation, s/p 2 L bolus in ER, c/w NS 100ml/hr, ptn not overloaded this am  - c/w meropenem for now, de-escalate as urine cultures become available (still not resulted today), possibly switch to Rocephin once urine cultures return  -Regarding hydronephrosis: Urology recommending cytoureteroscopy with stent placement, however cardiac risk stratification places ptnt at high risk for this procedure--discussed with urology team, once ptn more stable will undergo procedure and resume AC post-op per Uro recommendations    #DKA  - FS elevated >400's   - s/p IV 5u regular insulin  - f/u beta hydroxybuterate  - Upgrade to critical care for insulin drip   - s/p NS bolus x1 1L NS   - started NS at 250c/hr w/ 20meq K (Target K 4-5)   - ABG w/ pH 7.3, pCO2 20, lactate 1, bicarb 9.3   - Started on insulin infusion 0.1u/kg/hr (5.9u/hr)  - recheck FS in 1 hour, if does not decrease by 50-70, double the infusion. Once FS reaches 200, reduce infusion to 0.02 u/kg/hr and change fluids to dextrose 5% w/ 1/2 NS at 250 cc/hr    - f/u critical care recs    #Hyponatremia:  -poor oral intake at home, Na on admission 121,   -Remains hyponatremic    - continue with NS IVF   - monitor for neurological signs  - trend Na, f/u BMP BID   - f/u urine Na, serum osmo, urine osmo    #TEOFILO secondary to hydronephrosis:  -elevated BUN and Cr 2.2, elevated K  -Urology on board for possible stenting and cystoscopy, will not hold Eliquis for now until there is a plan for procedure  -avoid nephrotoxic drugs  -Baseline creatinine _ 0.5   -Monitor strict I/O  - BUN/Crc--> Creatine 2.2 on admission to 1.9 today on 100cc NS   on BMP, repeated 4.5 on VBG, monitor in am  -Avoid nephrotoxic agents, renally dose meds  -Holding ACE-I in setting of TEOFILO  - c/w daily CMP to monitor for resolution of hyponatremia--ptn diet and hydration improved today with extensive counseling--> f/u am BMP    #Chronic atrial fibrillation  -continue eliquis    #Hyperkalemia--resolved  -Admission K 5.6 now 4.4  -Ptn dehydrated on admission electrolyte abnormalities likely secondary to prerenal etiology  -c/w daily BMP monitoring    #CAD recent stenting in July 2020:  -C/W Plavix and statin   -Avoid volume overload    #DLD  -Continue with statin    #HTN  -Continue with amlodipine and coreg, holding ACE/ARB due to TEOFILO        For Follow-Up:  - BMP 8pm   - q1hr fingersticks while on insulin drip  - urology fr possible nephrostomy  - renal US   - if renal US worsening and urology not planning procedure, contact IR for stat nephrostomy  - ID consult for bateremia, currently on meropenem   - Started on insulin infusion 0.1u/kg/hr (5.9u/hr)  - recheck FS in 1 hour, if does not decrease by 50-70, double the infusion. Once FS reaches 200, reduce infusion to 0.02 u/kg/hr and change fluids to dextrose 5% w/ 1/2 NS at 250 cc/hr    - f/u critical care recs        Vital Signs Last 24 Hrs  T(C): 35.6 (16 Mar 2021 12:41), Max: 36.9 (16 Mar 2021 06:55)  T(F): 96.1 (16 Mar 2021 12:41), Max: 98.4 (16 Mar 2021 06:55)  HR: 117 (16 Mar 2021 12:41) (78 - 117)  BP: 96/52 (16 Mar 2021 12:41) (96/52 - 138/60)  BP(mean): --  RR: 20 (16 Mar 2021 12:41) (18 - 20)  SpO2: 99% (15 Mar 2021 19:57) (97% - 99%)  I&O's Summary    15 Mar 2021 07:01  -  16 Mar 2021 07:00  --------------------------------------------------------  IN: 3060 mL / OUT: 770 mL / NET: 2290 mL    16 Mar 2021 07:01  -  16 Mar 2021 15:09  --------------------------------------------------------  IN: 540 mL / OUT: 100 mL / NET: 440 mL          MEDICATIONS  (STANDING):  amLODIPine   Tablet 5 milliGRAM(s) Oral daily  apixaban 2.5 milliGRAM(s) Oral every 12 hours  ascorbic acid  Oral Tab/Cap - Peds 250 milliGRAM(s) Oral two times a day  atorvastatin 40 milliGRAM(s) Oral at bedtime  calcium gluconate IVPB 2 Gram(s) IV Intermittent once  carvedilol Oral Tab/Cap - Peds 12.5 milliGRAM(s) Oral two times a day  chlorhexidine 4% Liquid 1 Application(s) Topical <User Schedule>  clopidogrel Tablet 75 milliGRAM(s) Oral daily  dextrose 40% Gel 15 Gram(s) Oral once  dextrose 5%. 1000 milliLiter(s) (50 mL/Hr) IV Continuous <Continuous>  dextrose 5%. 1000 milliLiter(s) (100 mL/Hr) IV Continuous <Continuous>  dextrose 50% Injectable 25 Gram(s) IV Push once  dextrose 50% Injectable 12.5 Gram(s) IV Push once  dextrose 50% Injectable 25 Gram(s) IV Push once  glucagon  Injectable 1 milliGRAM(s) IntraMuscular once  insulin glargine Injectable (LANTUS) 10 Unit(s) SubCutaneous at bedtime  insulin lispro (ADMELOG) corrective regimen sliding scale   SubCutaneous three times a day before meals  insulin lispro Injectable (ADMELOG) 3 Unit(s) SubCutaneous three times a day before meals  levothyroxine 88 MICROGram(s) Oral daily  meropenem  IVPB 500 milliGRAM(s) IV Intermittent every 12 hours  multivitamin 1 Tablet(s) Oral daily  sodium chloride 0.9% Bolus 1000 milliLiter(s) IV Bolus once  sodium chloride 0.9% with potassium chloride 20 mEq/L 1000 milliLiter(s) (250 mL/Hr) IV Continuous <Continuous>    MEDICATIONS  (PRN):  aluminum hydroxide/magnesium hydroxide/simethicone Suspension 30 milliLiter(s) Oral every 6 hours PRN Dyspepsia        LABS                                            10.0                  Neurophils% (auto):   x      (03-16 @ 11:10):    21.58)-----------(84           Lymphocytes% (auto):  x                                             30.7                   Eosinphils% (auto):   x        Manual%: Neutrophils x    ; Lymphocytes x    ; Eosinophils x    ; Bands%: x    ; Blasts x                                    120    |  90     |  57                  Calcium: 6.5   / iCa: x      (03-16 @ 11:10)    ----------------------------<  400       Magnesium: 2.3                              4.3     |  7      |  1.9              Phosphorous: x        TPro  4.8    /  Alb  2.3    /  TBili  0.3    /  DBili  x      /  AST  26     /  ALT  29     /  AlkPhos  143    16 Mar 2021 11:10 MICU Transfer Note    Transfer from:  to ICU   77 yo F PMH DM on insulin pump, CAD s/p stenting in 2020, Lung nodules, Dry eyes, bilateral, PVD (peripheral vascular disease): S/p Right Peripheral leg Stent, Seasonal allergies, Skin cancer: Basal Cell Cancer face  (around nose) s/p MOHS procedure x 3, Anemia: Chronic, on Iron supplement, Hypothyroid, SSS (sick sinus syndrome): S/p PPM, Pacemaker: Sept 2017, Essential hypertension, Afib: 5+ yrs; on Eliquis presented here for nausea, vomiting and decreased PO intake for past 2 days. The symptoms started acutely and she was in her usual state of health before. She also felt weak and her blood sugars were higher. She had a recent hx of UTI which responded well to ciprofloxacin. She denied any chest pain, shortness of breath, focal neurological deficits or altered bowel habits. She takes her meds regularly. For past three days, she is also passing out very less urine. There is no blood in urine noted however. She complained of some diffuse abdominal pain which started now and is burning in character. She is otherwise comfortable and has no other complaints.    Patient received in ER with low grade fevers. Blood work is significant for leukocytosis, hyponatremia, hyperkalemia, elevated Cr and BUN, elevated liver enzymes, troponin and a positive UA for blood. CT abdomen and pelvis revealed right kidney is enlarged with perinephric fat stranding and moderate hydroureteronephrosis to the level of the distal ureter. Patient was admitted for sepsis secondary to pyelonephritis. Urology consulted for possible cystoscopy.     Patient seen by cardiology who determined she's s/p NSTEMI type 2; the current cardiology eval places her at high cardiovascular risk for potential urological procedure (stenting in the setting of hydroureteronephrosis).     While on 3C, patient was on her home insulin pump, however, it stopped working on 3/15/21 and she was started on insulin regimen subq on 3/15 for elevated blood glucose. Her fingersticks were uncontrolled and she developed DKA. (FS>400, bicarb serum 7, AG 23, lactate 1). Patient is being upgraded for insulin drip. Gave 5 IV insulin stat, stat ABG, UA and B-hydroxy buterate performed. Informed critical care team and consult placed.       ASSESSMENT & PLAN:    77 yo female with PMHx of DM on insulin pump, CAD s/p stenting in 2020, Lung nodules, Dry eyes, bilateral, PVD (peripheral vascular disease): S/p Right Peripheral leg Stent, Seasonal allergies, Skin cancer: Basal Cell Cancer face  (around nose) s/p MOHS procedure x 3, Anemia: Chronic, on Iron supplement, Hypothyroid, SSS (sick sinus syndrome): S/p PPM Sept 2017, Essential hypertension, Afib: 5+ yrs; on Eliquis admitted for pyelonephritis treatment complicated by right sided hydronephrosis, obstructive uropathy, TEOFILO and hyponatremia, over admission developed DKA and upgraded to critical care for DKA.     #Sepsis/bateremia secondary to pyelonephritis  #R-sided Hydronephrosis  # Obstructive uropathy  #Bacteremia  - On admission:1.3, leukocytosis, fever, positive UA, CT scan: right sided hydronephrosis  - Found on 3/15 to be blood culture + for Gram (-) rods aerobic and anaerobic  - Ptn now afebrile, WBC downtrending 26-->19  - f./u urine cultures  - started on volume resuscitation, s/p 2 L bolus in ER, c/w NS 100ml/hr, ptn not overloaded this am  - c/w meropenem for now, de-escalate as urine cultures become available (still not resulted today), possibly switch to Rocephin once urine cultures return  -Regarding hydronephrosis: Urology recommending cytoureteroscopy with stent placement, however cardiac risk stratification places ptnt at high risk for this procedure--discussed with urology team, once ptn more stable will undergo procedure and resume AC post-op per Uro recommendations    #DKA  - FS elevated >400's   - s/p IV 5u regular insulin  - f/u beta hydroxybuterate  - Upgrade to critical care for insulin drip   - s/p NS bolus x1 1L NS   - started NS at 250c/hr w/ 20meq K (Target K 4-5)   - ABG w/ pH 7.3, pCO2 20, lactate 1, bicarb 9.3   - Started on insulin infusion 0.1u/kg/hr (5.9u/hr)  - recheck FS in 1 hour, if does not decrease by 50-70, double the infusion. Once FS reaches 200, reduce infusion to 0.02 u/kg/hr and change fluids to dextrose 5% w/ 1/2 NS at 250 cc/hr    - f/u critical care recs  - f/u BMP for AG, do not discontinue insulin gtt until gap closes, can give dextrose if hypoglycemic     #Hyponatremia:  -poor oral intake at home, Na on admission 121,   -Remains hyponatremic    - continue with NS IVF   - monitor for neurological signs  - trend Na, f/u BMP BID   - f/u urine Na, serum osmo, urine osmo    #TEOFILO secondary to hydronephrosis:  -elevated BUN and Cr 2.2, elevated K  -Urology on board for possible stenting and cystoscopy, will not hold Eliquis for now until there is a plan for procedure  -avoid nephrotoxic drugs  -Baseline creatinine _ 0.5   -Monitor strict I/O  - BUN/Crc--> Creatine 2.2 on admission to 1.9 today on 100cc NS   on BMP, repeated 4.5 on VBG, monitor in am  -Avoid nephrotoxic agents, renally dose meds  -Holding ACE-I in setting of TEOFILO  - c/w daily CMP to monitor for resolution of hyponatremia--ptn diet and hydration improved today with extensive counseling--> f/u am BMP    #Chronic atrial fibrillation  -continue eliquis    #Hyperkalemia--resolved  -Admission K 5.6 now 4.4  -Ptn dehydrated on admission electrolyte abnormalities likely secondary to prerenal etiology  -c/w daily BMP monitoring    #CAD recent stenting in July 2020:  -C/W Plavix and statin   -Avoid volume overload    #DLD  -Continue with statin    #HTN  -Continue with amlodipine and coreg, holding ACE/ARB due to TEOFILO        For Follow-Up:  - BMP 8pm, 11:30 pm   - Started on insulin infusion 0.1u/kg/hr (5.9u/hr), f/u FS in 1 hour and increase drip rate if not reduced   - recheck FS in 1 hour, if does not decrease by 50-70, double the infusion. Once FS reaches 200, reduce infusion to 0.02 u/kg/hr and change fluids to dextrose 5% w/ 1/2 NS at 250 cc/hr    - f/u critical care recs  - do not discontinue drip until gap closure   - q1hr fingersticks while on insulin drip  - urology fr possible nephrostomy  - renal US   - if renal US worsening and urology not planning procedure, contact IR for stat nephrostomy  - ID consult for bacteremia, currently on meropenem       Vital Signs Last 24 Hrs  T(C): 35.6 (16 Mar 2021 12:41), Max: 36.9 (16 Mar 2021 06:55)  T(F): 96.1 (16 Mar 2021 12:41), Max: 98.4 (16 Mar 2021 06:55)  HR: 117 (16 Mar 2021 12:41) (78 - 117)  BP: 96/52 (16 Mar 2021 12:41) (96/52 - 138/60)  BP(mean): --  RR: 20 (16 Mar 2021 12:41) (18 - 20)  SpO2: 99% (15 Mar 2021 19:57) (97% - 99%)  I&O's Summary    15 Mar 2021 07:01  -  16 Mar 2021 07:00  --------------------------------------------------------  IN: 3060 mL / OUT: 770 mL / NET: 2290 mL    16 Mar 2021 07:01  -  16 Mar 2021 15:09  --------------------------------------------------------  IN: 540 mL / OUT: 100 mL / NET: 440 mL          MEDICATIONS  (STANDING):  amLODIPine   Tablet 5 milliGRAM(s) Oral daily  apixaban 2.5 milliGRAM(s) Oral every 12 hours  ascorbic acid  Oral Tab/Cap - Peds 250 milliGRAM(s) Oral two times a day  atorvastatin 40 milliGRAM(s) Oral at bedtime  calcium gluconate IVPB 2 Gram(s) IV Intermittent once  carvedilol Oral Tab/Cap - Peds 12.5 milliGRAM(s) Oral two times a day  chlorhexidine 4% Liquid 1 Application(s) Topical <User Schedule>  clopidogrel Tablet 75 milliGRAM(s) Oral daily  dextrose 40% Gel 15 Gram(s) Oral once  dextrose 5%. 1000 milliLiter(s) (50 mL/Hr) IV Continuous <Continuous>  dextrose 5%. 1000 milliLiter(s) (100 mL/Hr) IV Continuous <Continuous>  dextrose 50% Injectable 25 Gram(s) IV Push once  dextrose 50% Injectable 12.5 Gram(s) IV Push once  dextrose 50% Injectable 25 Gram(s) IV Push once  glucagon  Injectable 1 milliGRAM(s) IntraMuscular once  insulin glargine Injectable (LANTUS) 10 Unit(s) SubCutaneous at bedtime  insulin lispro (ADMELOG) corrective regimen sliding scale   SubCutaneous three times a day before meals  insulin lispro Injectable (ADMELOG) 3 Unit(s) SubCutaneous three times a day before meals  levothyroxine 88 MICROGram(s) Oral daily  meropenem  IVPB 500 milliGRAM(s) IV Intermittent every 12 hours  multivitamin 1 Tablet(s) Oral daily  sodium chloride 0.9% Bolus 1000 milliLiter(s) IV Bolus once  sodium chloride 0.9% with potassium chloride 20 mEq/L 1000 milliLiter(s) (250 mL/Hr) IV Continuous <Continuous>    MEDICATIONS  (PRN):  aluminum hydroxide/magnesium hydroxide/simethicone Suspension 30 milliLiter(s) Oral every 6 hours PRN Dyspepsia        LABS                                            10.0                  Neurophils% (auto):   x      (03-16 @ 11:10):    21.58)-----------(84           Lymphocytes% (auto):  x                                             30.7                   Eosinphils% (auto):   x        Manual%: Neutrophils x    ; Lymphocytes x    ; Eosinophils x    ; Bands%: x    ; Blasts x                                    120    |  90     |  57                  Calcium: 6.5   / iCa: x      (03-16 @ 11:10)    ----------------------------<  400       Magnesium: 2.3                              4.3     |  7      |  1.9              Phosphorous: x        TPro  4.8    /  Alb  2.3    /  TBili  0.3    /  DBili  x      /  AST  26     /  ALT  29     /  AlkPhos  143    16 Mar 2021 11:10

## 2021-03-16 NOTE — PROGRESS NOTE ADULT - SUBJECTIVE AND OBJECTIVE BOX
Patient feels weak.  Tolerating diet.  PAST MEDICAL & SURGICAL HISTORY  Inclusion body myositis (IBM)    Lung nodules    Transfusion history  S/p Hysterectomy    Dry eyes, bilateral    PVD (peripheral vascular disease)  S/p Right Peripheral leg Stent    Seasonal allergies    Skin cancer  Basal Cell Cancer face  (around nose) s/p MOHS procedure x 3    Anemia  Chronic, on Iron supplement    Hypothyroid    SSS (sick sinus syndrome)  S/p PPM    Diabetes  Insulin dependent - (has insulin Pump)    Pacemaker  Sept 2017    Palpitations  occasionally; not for couple yrs    Essential hypertension    Afib  5+ yrs; on Eliquis    S/P ablation of atrial fibrillation    S/P cataract surgery    History of surgery  MOHS procedure (face) x 3    Cardiac pacemaker  9/9/17    History of surgery  Right Peripheral Stent 5 yrs    H/O total hysterectomy  25+ yrs ago      SOCIAL HISTORY:  Negative for smoking/alcohol/drug use.     ALLERGIES:  latex (Pruritus; Rash)  sulfonamides (Unknown)    MEDICATIONS:  STANDING MEDICATIONS  amLODIPine   Tablet 5 milliGRAM(s) Oral daily  apixaban 2.5 milliGRAM(s) Oral every 12 hours  ascorbic acid  Oral Tab/Cap - Peds 250 milliGRAM(s) Oral two times a day  atorvastatin 40 milliGRAM(s) Oral at bedtime  carvedilol Oral Tab/Cap - Peds 12.5 milliGRAM(s) Oral two times a day  chlorhexidine 4% Liquid 1 Application(s) Topical <User Schedule>  clopidogrel Tablet 75 milliGRAM(s) Oral daily  dextrose 40% Gel 15 Gram(s) Oral once  dextrose 5%. 1000 milliLiter(s) IV Continuous <Continuous>  dextrose 5%. 1000 milliLiter(s) IV Continuous <Continuous>  dextrose 50% Injectable 25 Gram(s) IV Push once  dextrose 50% Injectable 12.5 Gram(s) IV Push once  dextrose 50% Injectable 25 Gram(s) IV Push once  glucagon  Injectable 1 milliGRAM(s) IntraMuscular once  insulin glargine Injectable (LANTUS) 10 Unit(s) SubCutaneous at bedtime  insulin lispro Injectable (ADMELOG) 3 Unit(s) SubCutaneous three times a day before meals  levothyroxine 88 MICROGram(s) Oral daily  meropenem  IVPB 500 milliGRAM(s) IV Intermittent every 12 hours  multivitamin 1 Tablet(s) Oral daily  sodium chloride 0.9%. 1000 milliLiter(s) IV Continuous <Continuous>    PRN MEDICATIONS  aluminum hydroxide/magnesium hydroxide/simethicone Suspension 30 milliLiter(s) Oral every 6 hours PRN    VITALS:   T(F): 98.4  HR: 96  BP: 101/61  RR: 18  SpO2: 99%    PHYSICAL EXAM:  GEN: NAD, lying in bed   LUNGS: Bilateral breath sounds   HEART: S1/S2 present  ABD: Soft, non-tender, non-distended.   :  Right CVA tenderness mild, no suprapubic fullness  EXT: No edema.   NEURO: Non focal     Springer present     LABS:                        9.9    18.88 )-----------( 127      ( 15 Mar 2021 06:16 )             29.3     03-15    121<L>  |  92<L>  |  55<H>  ----------------------------<  254<H>  4.4   |  17  |  1.9<H>

## 2021-03-17 NOTE — CONSULT NOTE ADULT - SUBJECTIVE AND OBJECTIVE BOX
SOWMYA AMADO  76y, Female  Allergy: latex (Pruritus; Rash)  Pineapple (Unknown)  sulfonamides (Unknown)      All historical available data reviewed.    HPI:  75 yo F PMH DM on insulin pump, CAD s/p stenting in , Lung nodules, Dry eyes, bilateral, PVD (peripheral vascular disease): S/p Right Peripheral leg Stent, Seasonal allergies, Skin cancer: Basal Cell Cancer face  (around nose) s/p MOHS procedure x 3, Anemia: Chronic, on Iron supplement, Hypothyroid, SSS (sick sinus syndrome): S/p PPM, Pacemaker: 2017, Essential hypertension, Afib: 5+ yrs; on Eliquis presented here for nausea, vomiting and decreased PO intake for past 2 days. The symptoms started acutely and she was in her usual state of health before. She also felt weak and her blood sugars were higher. She had a recent hx of UTI which responded well to ciprofloxacin. She denied any chest pain, shortness of breath, focal neurological deficits or altered bowel habits. She takes her meds regularly. For past three days, she is also passing out very less urine. There is no blood in urine noted however. She complained of some diffuse abdominal pain which started now and is burning in character. She is otherwise comfortable and has no other complaints.    Vital Signs (24 Hrs):  T(C): 37.1 (21 @ 20:28), Max: 38.5 (21 @ 17:43)  HR: 76 (21 @ 20:28) (76 - 87)  BP: 101/46 (21 @ 20:28) (101/46 - 127/61)  RR: 20 (21 @ 20:28) (20 - 22)  SpO2: 100% (21 @ 20:28) (99% - 100%)      Patient received in ER with low grade fevers. Blood work is significant for leukocytosis, hyponatremia, hyperkalemia, elevated Cr and BUN, elevated liver enzymes, troponin and a positive UA for blood. CT abdomen and pelvis revealed right kidney is enlarged with perinephric fat stranding and moderate hydroureteronephrosis to the level of the distal ureter. Patient is being admitted for sepsis secondary to possible urinary tract source. Urology consulted and on board for possible cystoscopy.    (14 Mar 2021 23:48)    FAMILY HISTORY:  Cancer (Father)  Myeloma    Family history of lung cancer (Mother)      PAST MEDICAL & SURGICAL HISTORY:  Inclusion body myositis (IBM)    Lung nodules    Transfusion history  S/p Hysterectomy    Dry eyes, bilateral    PVD (peripheral vascular disease)  S/p Right Peripheral leg Stent    Seasonal allergies    Skin cancer  Basal Cell Cancer face  (around nose) s/p MOHS procedure x 3    Anemia  Chronic, on Iron supplement    Hypothyroid    SSS (sick sinus syndrome)  S/p PPM    Diabetes  Insulin dependent - (has insulin Pump)    Pacemaker  2017    Palpitations  occasionally; not for couple yrs    Essential hypertension    Afib  5+ yrs; on Eliquis    S/P ablation of atrial fibrillation    S/P cataract surgery    History of surgery  MOHS procedure (face) x 3    Cardiac pacemaker  17    History of surgery  Right Peripheral Stent 5 yrs    H/O total hysterectomy  25+ yrs ago          VITALS:  T(F): 97.5, Max: 98.1 (21 @ 00:00)  HR: 112  BP: 128/82  RR: 25Vital Signs Last 24 Hrs  T(C): 36.4 (17 Mar 2021 08:00), Max: 36.7 (17 Mar 2021 00:00)  T(F): 97.5 (17 Mar 2021 08:00), Max: 98.1 (17 Mar 2021 00:00)  HR: 112 (17 Mar 2021 08:00) (90 - 117)  BP: 128/82 (17 Mar 2021 08:00) (83/43 - 142/66)  BP(mean): 102 (17 Mar 2021 08:00) (56 - 102)  RR: 25 (17 Mar 2021 08:00) (14 - 28)  SpO2: 99% (17 Mar 2021 08:00) (98% - 100%)    TESTS & MEASUREMENTS:                        11.2   17.57 )-----------( 62       ( 17 Mar 2021 04:35 )             31.7     -    120<L>  |  98  |  55<H>  ----------------------------<  153<H>  5.1<H>   |  12<L>  |  1.6<H>    Ca    6.6<L>      17 Mar 2021 04:35  Mg     2.3     03-16    TPro  4.2<L>  /  Alb  2.2<L>  /  TBili  0.3  /  DBili  x   /  AST  32  /  ALT  29  /  AlkPhos  137<H>      LIVER FUNCTIONS - ( 17 Mar 2021 04:35 )  Alb: 2.2 g/dL / Pro: 4.2 g/dL / ALK PHOS: 137 U/L / ALT: 29 U/L / AST: 32 U/L / GGT: x             Culture - Urine (collected 21 @ 22:50)  Source: .Urine Clean Catch (Midstream)  Final Report (21 @ 09:26):    50,000 - 99,000 CFU/mL Escherichia coli ESBL  Organism: Escherichia coli ESBL (21 @ 09:26)  Organism: Escherichia coli ESBL (21 @ 09:26)      -  Amikacin: S <=16      -  Amoxicillin/Clavulanic Acid: I 16/8      -  Ampicillin: R >16 These ampicillin results predict results for amoxicillin      -  Ampicillin/Sulbactam: I 16/8 Enterobacter, Citrobacter, and Serratia may develop resistance during prolonged therapy (3-4 days)      -  Aztreonam: R >16      -  Cefazolin: R >16 ([id number]) For uncomplicated UTI with K. pneumoniae, E. coli, or P. mirablis: JONATHAN <=16 is sensitive and JONATHAN >=32 is resistant. This also predicts results for oral agents cefaclor, cefdinir, cefpodoxime, cefprozil, cefuroxime axetil, cephalexin and locarbef for uncomplicated UTI. Note that some isolates may be susceptible to these agents while testing resistant to cefazolin.      -  Cefepime: R >16      -  Cefoxitin: S <=8      -  Ceftriaxone: R >32 Enterobacter, Citrobacter, and Serratia may develop resistance during prolonged therapy      -  Ciprofloxacin: R >2      -  Ertapenem: S <=0.5      -  Gentamicin: R >8      -  Imipenem: S <=1      -  Levofloxacin: R >4      -  Meropenem: S <=1      -  Nitrofurantoin: S <=32 Should not be used to treat pyelonephritis      -  Piperacillin/Tazobactam: S <=8      -  Tigecycline: S <=2      -  Tobramycin: R >8      -  Trimethoprim/Sulfamethoxazole: S <=0.5/9.5      Method Type: JONATHAN    Culture - Blood (collected 21 @ 18:30)  Source: .Blood Blood  Gram Stain (03-15-21 @ 12:47):    Growth in aerobic bottle: Gram Negative Rods    Growth in anaerobic bottle: Gram Negative Rods  Preliminary Report (21 @ 09:27):    Growth in aerobic and anaerobic bottles: Escherichia coli    MDRO detected in BCID PCR, resistance marker = CTX-M (ESBL)    ***Blood Panel PCR results on this specimen are available    approximately 3 hours after the Gram stain result.***    Gram stain, PCR, and/or culture results may not always    correspond due to difference in methodologies.    ************************************************************    This PCR assay was performed by multiplex PCR. This    Assay tests for 66 bacterial and resistance gene targets.    Please refer to the [facility] test directory    at [email or web address] for details.  Organism: Blood Culture PCR (03-15-21 @ 13:11)  Organism: Blood Culture PCR (03-15-21 @ 13:11)      -  ESBL: Detec      -  Escherichia coli: Detec      Method Type: PCR    Culture - Blood (collected 21 @ 18:30)  Source: .Blood Blood  Gram Stain (03-15-21 @ 11:56):    Growth in anaerobic bottle: Gram Negative Rods    Growth in aerobic bottle: Gram Negative Rods  Preliminary Report (21 @ 13:15):    Growth in aerobic and anaerobic bottles: Escherichia coli    See previous culture [id number]      Urinalysis Basic - ( 16 Mar 2021 20:05 )    Color: Yellow / Appearance: Clear / S.018 / pH: x  Gluc: x / Ketone: Small  / Bili: Negative / Urobili: <2 mg/dL   Blood: x / Protein: 100 mg/dL / Nitrite: Negative   Leuk Esterase: Small / RBC: 5 /HPF / WBC 15 /HPF   Sq Epi: x / Non Sq Epi: 2 /HPF / Bacteria: Negative          RADIOLOGY & ADDITIONAL TESTS:  Personal review of radiological diagnostics performed  Echo and EKG results noted when applicable.     MEDICATIONS:  aluminum hydroxide/magnesium hydroxide/simethicone Suspension 30 milliLiter(s) Oral every 6 hours PRN  amLODIPine   Tablet 5 milliGRAM(s) Oral daily  apixaban 2.5 milliGRAM(s) Oral every 12 hours  ascorbic acid  Oral Tab/Cap - Peds 250 milliGRAM(s) Oral two times a day  atorvastatin 40 milliGRAM(s) Oral at bedtime  carvedilol Oral Tab/Cap - Peds 12.5 milliGRAM(s) Oral two times a day  chlorhexidine 4% Liquid 1 Application(s) Topical <User Schedule>  clopidogrel Tablet 75 milliGRAM(s) Oral daily  dextrose 40% Gel 15 Gram(s) Oral once  dextrose 5%. 1000 milliLiter(s) IV Continuous <Continuous>  dextrose 5%. 1000 milliLiter(s) IV Continuous <Continuous>  dextrose 50% Injectable 25 Gram(s) IV Push once  dextrose 50% Injectable 12.5 Gram(s) IV Push once  dextrose 50% Injectable 25 Gram(s) IV Push once  glucagon  Injectable 1 milliGRAM(s) IntraMuscular once  insulin glargine Injectable (LANTUS) 22.5 Unit(s) SubCutaneous at bedtime  insulin lispro Injectable (ADMELOG) 8 Unit(s) SubCutaneous three times a day before meals  levothyroxine 88 MICROGram(s) Oral daily  melatonin 5 milliGRAM(s) Oral at bedtime  meropenem  IVPB 500 milliGRAM(s) IV Intermittent every 12 hours  multivitamin 1 Tablet(s) Oral daily  pantoprazole    Tablet 40 milliGRAM(s) Oral before breakfast  sodium chloride 0.9%. 1000 milliLiter(s) IV Continuous <Continuous>      ANTIBIOTICS:  meropenem  IVPB 500 milliGRAM(s) IV Intermittent every 12 hours

## 2021-03-17 NOTE — PROGRESS NOTE ADULT - ASSESSMENT
76 year old patient with insulin dependant type 2 DM , uses [employer] insulin pump , found to have pyelonephritis and planned for possible cystoscopy and stent, found to be hyperglycemia. During rounds on 3/16  patient BG >400  BMp checked with high AG likely too due underlying pyelonephritis and inadequate insulin coverage, started on insulin drip.  - AG closed but remain acidotic likely due to sepsis, taken off insulin drip.  - switched by primary team from lantus to NPH 15 BID   - suggest Admelog 3 units before meals, hold if NPO   - Continue ISS 1:50 >150   - hypothyroidism : continue with Lt4 88 mcg daily

## 2021-03-17 NOTE — CONSULT NOTE ADULT - ASSESSMENT
IMPRESSION;  Resolved sepsis secondary to acute right pyelonephritis with possibly distal ureteral obstruction with moderate right hydroureteral nephrosis secondary to ESBL E coli  BCx / UCX 3/14 e coli    RECOMMENDATIONS;  F/u with   Meropenem 750 mg iv q8h  On discharge ertapenem 1 gm iv q24h for 14 days  Duration of 14 days from the last  intervention  I shall be away from 3/19-3/29 and will be covered by Dr Downey [phone] and Dr Quezada [phone] for that interval.  f/u with Dr Downey [phone] at [id number] [facility] on tuesday via telehealth on 3/30   between 10-12 am

## 2021-03-17 NOTE — PROGRESS NOTE ADULT - SUBJECTIVE AND OBJECTIVE BOX
Patient is a 76y old  Female who presents with a chief complaint of Sepsis (17 Mar 2021 13:02)        Over Night Events:        ROS:     All ROS are negative except HPI         PHYSICAL EXAM    ICU Vital Signs Last 24 Hrs  T(C): 36.3 (17 Mar 2021 12:00), Max: 36.7 (17 Mar 2021 00:00)  T(F): 97.3 (17 Mar 2021 12:00), Max: 98.1 (17 Mar 2021 00:00)  HR: 94 (17 Mar 2021 12:00) (90 - 115)  BP: 91/46 (17 Mar 2021 12:00) (81/53 - 142/66)  BP(mean): 51 (17 Mar 2021 12:00) (51 - 102)  ABP: --  ABP(mean): --  RR: 23 (17 Mar 2021 12:00) (14 - 28)  SpO2: 98% (17 Mar 2021 12:00) (98% - 100%)      CONSTITUTIONAL:  Pt appears chronically ill in ICU    ENT:   Airway patent,   Mouth with normal mucosa.   No thrush    EYES:   Pupils equal,   Round and reactive to light.    CARDIAC:   Normal rate,   Regular rhythm.    No edema      Vascular:  Normal systolic impulse  No Carotid bruits    RESPIRATORY:   No wheezing  Bilateral BS  Normal chest expansion  Not tachypneic,  No use of accessory muscles    GASTROINTESTINAL:  Abdomen soft,   Non-tender,   No guarding,   + BS    MUSCULOSKELETAL:   Range of motion is not limited,  No clubbing, cyanosis    NEUROLOGICAL:   Alert and oriented   No motor  deficits.    SKIN:   Skin normal color for race,   Warm and dry and intact.   No evidence of rash.    PSYCHIATRIC:   Normal mood and affect.   No apparent risk to self or others.    HEMATOLOGICAL:  No cervical  lymphadenopathy.  no inguinal lymphadenopathy      21 @ 07:01  -  21 @ 07:00  --------------------------------------------------------  IN:    Insulin: 14 mL    Insulin: 43.3 mL    Oral Fluid: 750 mL    sodium chloride 0.9%: 750 mL    sodium chloride 0.9%: 800 mL    sodium chloride 0.9% w/ Additives: 2750 mL  Total IN: 5107.3 mL    OUT:    Indwelling Catheter - Urethral (mL): 505 mL  Total OUT: 505 mL    Total NET: 4602.3 mL      21 @ 07:01  -  21 @ 13:35  --------------------------------------------------------  IN:    dextrose 5% + sodium chloride 0.45% w/ Additives: 300 mL    Oral Fluid: 180 mL    sodium chloride 0.9%: 750 mL  Total IN: 1230 mL    OUT:    Indwelling Catheter - Urethral (mL): 190 mL  Total OUT: 190 mL    Total NET: 1040 mL          LABS:                            11.2   17.57 )-----------( 62       ( 17 Mar 2021 04:35 )             31.7                                               03-17    120<L>  |  98  |  55<H>  ----------------------------<  153<H>  5.1<H>   |  12<L>  |  1.6<H>    Ca    6.6<L>      17 Mar 2021 04:35  Mg     2.3     03-16    TPro  4.2<L>  /  Alb  2.2<L>  /  TBili  0.3  /  DBili  x   /  AST  32  /  ALT  29  /  AlkPhos  137<H>                                               Urinalysis Basic - ( 16 Mar 2021 20:05 )    Color: Yellow / Appearance: Clear / S.018 / pH: x  Gluc: x / Ketone: Small  / Bili: Negative / Urobili: <2 mg/dL   Blood: x / Protein: 100 mg/dL / Nitrite: Negative   Leuk Esterase: Small / RBC: 5 /HPF / WBC 15 /HPF   Sq Epi: x / Non Sq Epi: 2 /HPF / Bacteria: Negative        CARDIAC MARKERS ( 16 Mar 2021 16:19 )  x     / 0.22 ng/mL / x     / x     / x      CARDIAC MARKERS ( 15 Mar 2021 23:38 )  x     / 0.25 ng/mL / x     / x     / x                                                LIVER FUNCTIONS - ( 17 Mar 2021 04:35 )  Alb: 2.2 g/dL / Pro: 4.2 g/dL / ALK PHOS: 137 U/L / ALT: 29 U/L / AST: 32 U/L / GGT: x                                                  Culture - Urine (collected 14 Mar 2021 22:50)  Source: .Urine Clean Catch (Midstream)  Final Report (17 Mar 2021 09:26):    50,000 - 99,000 CFU/mL Escherichia coli ESBL  Organism: Escherichia coli ESBL (17 Mar 2021 09:26)  Organism: Escherichia coli ESBL (17 Mar 2021 09:26)    Culture - Blood (collected 14 Mar 2021 18:30)  Source: .Blood Blood  Gram Stain (15 Mar 2021 12:47):    Growth in aerobic bottle: Gram Negative Rods    Growth in anaerobic bottle: Gram Negative Rods  Final Report (17 Mar 2021 11:02):    Growth in aerobic and anaerobic bottles: Escherichia coli ESBL    MDRO detected in BCID PCR, resistance marker = CTX-M (ESBL)    ***Blood Panel PCR results on this specimen are available    approximately 3 hours after the Gram stain result.***    Gram stain, PCR, and/or culture results may not always    correspond due to difference in methodologies.    ************************************************************    This PCR assay was performed by multiplex PCR. This    Assay tests for 66 bacterial and resistance gene targets.    Please refer to the [facility] test directory    at [email or web address] for details.  Organism: Blood Culture PCR  Escherichia coli ESBL (17 Mar 2021 11:02)  Organism: Escherichia coli ESBL (17 Mar 2021 11:02)  Organism: Blood Culture PCR (17 Mar 2021 11:02)    Culture - Blood (collected 14 Mar 2021 18:30)  Source: .Blood Blood  Gram Stain (15 Mar 2021 11:56):    Growth in anaerobic bottle: Gram Negative Rods    Growth in aerobic bottle: Gram Negative Rods  Final Report (17 Mar 2021 11:03):    Growth in aerobic and anaerobic bottles: Escherichia coli ESBL    See previous culture [id number]                                                                                       ABG - ( 16 Mar 2021 15:13 )  pH, Arterial: 7.28  pH, Blood: x     /  pCO2: 20    /  pO2: 92    / HCO3: 9     / Base Excess: -15.5 /  SaO2: 98                  MEDICATIONS  (STANDING):  ascorbic acid  Oral Tab/Cap - Peds 250 milliGRAM(s) Oral two times a day  atorvastatin 40 milliGRAM(s) Oral at bedtime  chlorhexidine 4% Liquid 1 Application(s) Topical <User Schedule>  clopidogrel Tablet 75 milliGRAM(s) Oral daily  dextrose 40% Gel 15 Gram(s) Oral once  dextrose 5% + sodium chloride 0.45% 1000 milliLiter(s) (150 mL/Hr) IV Continuous <Continuous>  dextrose 5%. 1000 milliLiter(s) (100 mL/Hr) IV Continuous <Continuous>  dextrose 5%. 1000 milliLiter(s) (50 mL/Hr) IV Continuous <Continuous>  dextrose 5%. 1000 milliLiter(s) (100 mL/Hr) IV Continuous <Continuous>  dextrose 50% Injectable 25 Gram(s) IV Push once  dextrose 50% Injectable 12.5 Gram(s) IV Push once  dextrose 50% Injectable 25 Gram(s) IV Push once  glucagon  Injectable 1 milliGRAM(s) IntraMuscular once  insulin lispro (ADMELOG) corrective regimen sliding scale   SubCutaneous three times a day before meals  insulin NPH human recombinant 15 Unit(s) SubCutaneous two times a day  levothyroxine 88 MICROGram(s) Oral daily  meropenem  IVPB 750 milliGRAM(s) IV Intermittent every 8 hours  multivitamin 1 Tablet(s) Oral daily  pantoprazole    Tablet 40 milliGRAM(s) Oral before breakfast    MEDICATIONS  (PRN):  aluminum hydroxide/magnesium hydroxide/simethicone Suspension 30 milliLiter(s) Oral every 6 hours PRN Dyspepsia      New X-rays reviewed:                                                                                  ECHO    CXR interpreted by me:

## 2021-03-17 NOTE — PROGRESS NOTE ADULT - ASSESSMENT
1. Sepsis: secondary Pyelonephritis and bacteremia, continue tx with ABX as per ID, continue supportive care, monitor for now.    2. Right sided Pyelonephritis: secondary to Hydronephrosis, continue tx with empiric abx as per ID, continue supportive care, monitor for now.     3. Bacteremia: growing ECOLI ESBL, continue tx as per ID with meropenum, continue supportive care, monitor for now.    4. TEOFILO: secondary to sepsis induced ATN with right sided hydronephrosis, f/u urology recs.    5. NSTEMI/CAD s/p PCI with LUEP placed LAD: secondary to sepsis induced demand ischemia, less likely true ACS, continue supportive care for now, f/u CE's and ECHO. Tx plavix    6. DM with Hyperglycemia: tx NPH BID, tx sliding scale, monitor for now.    7. Thrombocytopenia: secondary to sepsis induced BM suppression, continue supportive care, monitor for now.    8. PVD s/p right lower ext stent: continue supportive care, monitor for now.    9. Basal cell CA: continue supportive care, monitor for now.    10. Hypothyroidism: tx synthroid, continue supportive care, monitor for now.    11. SSS s/p Pacemaker: continue supportive care, monitor for now.    12. HTN: continue supportive care, monitor for now.    13. AFIB s/p Ablation: continue supportive care, monitor for now.    14. HLD: tx statin, continue supportive care, monitor for now.    15. DVT px: tx hep subcut, check lower ext US    16. NUT: tx IVF's, tx oral diet.

## 2021-03-17 NOTE — PROGRESS NOTE ADULT - SUBJECTIVE AND OBJECTIVE BOX
SOWMYA AMADO    S/O:    No acute events overnight.     Vital Signs  Vital Signs Last 24 Hrs  T(C): 36.6 (17 Mar 2021 04:00), Max: 36.7 (17 Mar 2021 00:00)  T(F): 97.8 (17 Mar 2021 04:00), Max: 98.1 (17 Mar 2021 00:00)  HR: 102 (17 Mar 2021 06:00) (90 - 117)  BP: 101/59 (17 Mar 2021 06:00) (83/43 - 142/66)  BP(mean): 89 (17 Mar 2021 06:00) (56 - 89)  RR: 18 (17 Mar 2021 06:00) (14 - 28)  SpO2: 100% (17 Mar 2021 06:00) (98% - 100%)    I&O's Summary    16 Mar 2021 07:01  -  17 Mar 2021 07:00  --------------------------------------------------------  IN: 5107.3 mL / OUT: 505 mL / NET: 4602.3 mL        Medications and Allergies:  MEDICATIONS  (STANDING):  amLODIPine   Tablet 5 milliGRAM(s) Oral daily  apixaban 2.5 milliGRAM(s) Oral every 12 hours  ascorbic acid  Oral Tab/Cap - Peds 250 milliGRAM(s) Oral two times a day  atorvastatin 40 milliGRAM(s) Oral at bedtime  carvedilol Oral Tab/Cap - Peds 12.5 milliGRAM(s) Oral two times a day  chlorhexidine 4% Liquid 1 Application(s) Topical <User Schedule>  clopidogrel Tablet 75 milliGRAM(s) Oral daily  dextrose 40% Gel 15 Gram(s) Oral once  dextrose 5%. 1000 milliLiter(s) (50 mL/Hr) IV Continuous <Continuous>  dextrose 5%. 1000 milliLiter(s) (100 mL/Hr) IV Continuous <Continuous>  dextrose 50% Injectable 25 Gram(s) IV Push once  dextrose 50% Injectable 12.5 Gram(s) IV Push once  dextrose 50% Injectable 25 Gram(s) IV Push once  glucagon  Injectable 1 milliGRAM(s) IntraMuscular once  insulin glargine Injectable (LANTUS) 22.5 Unit(s) SubCutaneous at bedtime  insulin lispro Injectable (ADMELOG) 8 Unit(s) SubCutaneous three times a day before meals  levothyroxine 88 MICROGram(s) Oral daily  melatonin 5 milliGRAM(s) Oral at bedtime  meropenem  IVPB 500 milliGRAM(s) IV Intermittent every 12 hours  multivitamin 1 Tablet(s) Oral daily  pantoprazole    Tablet 40 milliGRAM(s) Oral before breakfast  sodium chloride 0.9%. 1000 milliLiter(s) (150 mL/Hr) IV Continuous <Continuous>    MEDICATIONS  (PRN):  aluminum hydroxide/magnesium hydroxide/simethicone Suspension 30 milliLiter(s) Oral every 6 hours PRN Dyspepsia    Allergies    latex (Pruritus; Rash)  sulfonamides (Unknown)    Intolerances    Pineapple (Unknown)      Interval Labs:      120<L>  |  98  |  55<H>  ----------------------------<  153<H>  5.1<H>   |  12<L>  |  1.6<H>    Ca    6.6<L>      17 Mar 2021 04:35  Mg     2.3     -    TPro  4.2<L>  /  Alb  2.2<L>  /  TBili  0.3  /  DBili  x   /  AST  32  /  ALT  29  /  AlkPhos  137<H>      CBC Full  -  ( 17 Mar 2021 04:35 )  WBC Count : 17.57 K/uL  RBC Count : 3.91 M/uL  Hemoglobin : 11.2 g/dL  Hematocrit : 31.7 %  Platelet Count - Automated : 62 K/uL  Mean Cell Volume : 81.1 fL  Mean Cell Hemoglobin : 28.6 pg  Mean Cell Hemoglobin Concentration : 35.3 g/dL  Auto Neutrophil # : 15.52 K/uL  Auto Lymphocyte # : 0.79 K/uL  Auto Monocyte # : 1.06 K/uL  Auto Eosinophil # : 0.01 K/uL  Auto Basophil # : 0.03 K/uL  Auto Neutrophil % : 88.3 %  Auto Lymphocyte % : 4.5 %  Auto Monocyte % : 6.0 %  Auto Eosinophil % : 0.1 %  Auto Basophil % : 0.2 %      Urinalysis Basic - ( 16 Mar 2021 20:05 )    Color: Yellow / Appearance: Clear / S.018 / pH: x  Gluc: x / Ketone: Small  / Bili: Negative / Urobili: <2 mg/dL   Blood: x / Protein: 100 mg/dL / Nitrite: Negative   Leuk Esterase: Small / RBC: 5 /HPF / WBC 15 /HPF   Sq Epi: x / Non Sq Epi: 2 /HPF / Bacteria: Negative        Culture - Urine (collected 14 Mar 2021 22:50)  Source: .Urine Clean Catch (Midstream)  Preliminary Report (16 Mar 2021 13:39):    50,000 - 99,000 CFU/mL Escherichia coli    Culture - Blood (collected 14 Mar 2021 18:30)  Source: .Blood Blood  Gram Stain (15 Mar 2021 12:47):    Growth in aerobic bottle: Gram Negative Rods    Growth in anaerobic bottle: Gram Negative Rods  Preliminary Report (16 Mar 2021 09:27):    Growth in aerobic and anaerobic bottles: Escherichia coli    MDRO detected in BCID PCR, resistance marker = CTX-M (ESBL)    ***Blood Panel PCR results on this specimen are available    approximately 3 hours after the Gram stain result.***    Gram stain, PCR, and/or culture results may not always    correspond due to difference in methodologies.    ************************************************************    This PCR assay was performed by multiplex PCR. This    Assay tests for 66 bacterial and resistance gene targets.    Please refer to the [facility] [employer] test directory    at [email or web address] for details.  Organism: Blood Culture PCR (15 Mar 2021 13:11)  Organism: Blood Culture PCR (15 Mar 2021 13:11)    Culture - Blood (collected 14 Mar 2021 18:30)  Source: .Blood Blood  Gram Stain (15 Mar 2021 11:56):    Growth in anaerobic bottle: Gram Negative Rods    Growth in aerobic bottle: Gram Negative Rods  Preliminary Report (16 Mar 2021 13:15):    Growth in aerobic and anaerobic bottles: Escherichia coli    See previous culture [id number]        Imaging:    Physical Exam:  I examined the patient  VS reviewed, stable.  Gen: patient is awake, interactive, well appearing, no acute distress  HEENT: NC/AT, PERRL, no conjunctivitis or scleral icterus; no nasal discharge or congestion, moist mucous membranes  Chest: CTAB, no crackles/wheezes, good air entry, no tachypnea or retractions  CV: regular rate and rhythm, no murmurs   Abd: soft, nontender, nondistended, no HSM appreciated, +BS     SOWMYA AMADO    S/O:    No acute events overnight. Pt is still feeling unwell with diffuse abdominal pain not reproducible on palpation.      Vital Signs  Vital Signs Last 24 Hrs  T(C): 36.6 (17 Mar 2021 04:00), Max: 36.7 (17 Mar 2021 00:00)  T(F): 97.8 (17 Mar 2021 04:00), Max: 98.1 (17 Mar 2021 00:00)  HR: 102 (17 Mar 2021 06:00) (90 - 117)  BP: 101/59 (17 Mar 2021 06:00) (83/43 - 142/66)  BP(mean): 89 (17 Mar 2021 06:00) (56 - 89)  RR: 18 (17 Mar 2021 06:00) (14 - 28)  SpO2: 100% (17 Mar 2021 06:00) (98% - 100%)    I&O's Summary    16 Mar 2021 07:01  -  17 Mar 2021 07:00  --------------------------------------------------------  IN: 5107.3 mL / OUT: 505 mL / NET: 4602.3 mL        Medications and Allergies:  MEDICATIONS  (STANDING):  amLODIPine   Tablet 5 milliGRAM(s) Oral daily  apixaban 2.5 milliGRAM(s) Oral every 12 hours  ascorbic acid  Oral Tab/Cap - Peds 250 milliGRAM(s) Oral two times a day  atorvastatin 40 milliGRAM(s) Oral at bedtime  carvedilol Oral Tab/Cap - Peds 12.5 milliGRAM(s) Oral two times a day  chlorhexidine 4% Liquid 1 Application(s) Topical <User Schedule>  clopidogrel Tablet 75 milliGRAM(s) Oral daily  dextrose 40% Gel 15 Gram(s) Oral once  dextrose 5%. 1000 milliLiter(s) (50 mL/Hr) IV Continuous <Continuous>  dextrose 5%. 1000 milliLiter(s) (100 mL/Hr) IV Continuous <Continuous>  dextrose 50% Injectable 25 Gram(s) IV Push once  dextrose 50% Injectable 12.5 Gram(s) IV Push once  dextrose 50% Injectable 25 Gram(s) IV Push once  glucagon  Injectable 1 milliGRAM(s) IntraMuscular once  insulin glargine Injectable (LANTUS) 22.5 Unit(s) SubCutaneous at bedtime  insulin lispro Injectable (ADMELOG) 8 Unit(s) SubCutaneous three times a day before meals  levothyroxine 88 MICROGram(s) Oral daily  melatonin 5 milliGRAM(s) Oral at bedtime  meropenem  IVPB 500 milliGRAM(s) IV Intermittent every 12 hours  multivitamin 1 Tablet(s) Oral daily  pantoprazole    Tablet 40 milliGRAM(s) Oral before breakfast  sodium chloride 0.9%. 1000 milliLiter(s) (150 mL/Hr) IV Continuous <Continuous>    MEDICATIONS  (PRN):  aluminum hydroxide/magnesium hydroxide/simethicone Suspension 30 milliLiter(s) Oral every 6 hours PRN Dyspepsia    Allergies    latex (Pruritus; Rash)  sulfonamides (Unknown)    Intolerances    Pineapple (Unknown)      Interval Labs:      120<L>  |  98  |  55<H>  ----------------------------<  153<H>  5.1<H>   |  12<L>  |  1.6<H>    Ca    6.6<L>      17 Mar 2021 04:35  Mg     2.3     -    TPro  4.2<L>  /  Alb  2.2<L>  /  TBili  0.3  /  DBili  x   /  AST  32  /  ALT  29  /  AlkPhos  137<H>      CBC Full  -  ( 17 Mar 2021 04:35 )  WBC Count : 17.57 K/uL  RBC Count : 3.91 M/uL  Hemoglobin : 11.2 g/dL  Hematocrit : 31.7 %  Platelet Count - Automated : 62 K/uL  Mean Cell Volume : 81.1 fL  Mean Cell Hemoglobin : 28.6 pg  Mean Cell Hemoglobin Concentration : 35.3 g/dL  Auto Neutrophil # : 15.52 K/uL  Auto Lymphocyte # : 0.79 K/uL  Auto Monocyte # : 1.06 K/uL  Auto Eosinophil # : 0.01 K/uL  Auto Basophil # : 0.03 K/uL  Auto Neutrophil % : 88.3 %  Auto Lymphocyte % : 4.5 %  Auto Monocyte % : 6.0 %  Auto Eosinophil % : 0.1 %  Auto Basophil % : 0.2 %      Urinalysis Basic - ( 16 Mar 2021 20:05 )    Color: Yellow / Appearance: Clear / S.018 / pH: x  Gluc: x / Ketone: Small  / Bili: Negative / Urobili: <2 mg/dL   Blood: x / Protein: 100 mg/dL / Nitrite: Negative   Leuk Esterase: Small / RBC: 5 /HPF / WBC 15 /HPF   Sq Epi: x / Non Sq Epi: 2 /HPF / Bacteria: Negative        Culture - Urine (collected 14 Mar 2021 22:50)  Source: .Urine Clean Catch (Midstream)  Preliminary Report (16 Mar 2021 13:39):    50,000 - 99,000 CFU/mL Escherichia coli    Culture - Blood (collected 14 Mar 2021 18:30)  Source: .Blood Blood  Gram Stain (15 Mar 2021 12:47):    Growth in aerobic bottle: Gram Negative Rods    Growth in anaerobic bottle: Gram Negative Rods  Preliminary Report (16 Mar 2021 09:27):    Growth in aerobic and anaerobic bottles: Escherichia coli    MDRO detected in BCID PCR, resistance marker = CTX-M (ESBL)    ***Blood Panel PCR results on this specimen are available    approximately 3 hours after the Gram stain result.***    Gram stain, PCR, and/or culture results may not always    correspond due to difference in methodologies.    ************************************************************    This PCR assay was performed by multiplex PCR. This    Assay tests for 66 bacterial and resistance gene targets.    Please refer to the [facility] [employer] test directory    at [email or web address] for details.  Organism: Blood Culture PCR (15 Mar 2021 13:11)  Organism: Blood Culture PCR (15 Mar 2021 13:11)    Culture - Blood (collected 14 Mar 2021 18:30)  Source: .Blood Blood  Gram Stain (15 Mar 2021 11:56):    Growth in anaerobic bottle: Gram Negative Rods    Growth in aerobic bottle: Gram Negative Rods  Preliminary Report (16 Mar 2021 13:15):    Growth in aerobic and anaerobic bottles: Escherichia coli    See previous culture [id number]        Imaging:  < from: US Renal (21 @ 21:50) >    EXAM:  US KIDNEY(S)            PROCEDURE DATE:  2021        INTERPRETATION:  CLINICAL INFORMATION: Hydronephrosis. Abdominal pain.    COMPARISON: Abdominal ultrasound May 7, 2020, CT abdomen and pelvis 2021.    PROCEDURE: Retroperitoneal Ultrasound was performed.    FINDINGS:    RIGHT KIDNEY: Normal in echogenicity, size measuring 9.7 cm in length. Unchanged mild to moderate right hydronephrosis, correlating with CT. Vascular flow is demonstrated at the hilum.    LEFT KIDNEY: Normal in echogenicity, size measuring 11.5 cm in length. No evidence of hydronephrosis or calculus. Vascular flow is demonstrated at the hilum.      IMPRESSION:  Unchanged mild to moderate right hydronephrosis, correlating with CT.          ARACELI REYNOLDS MD; Attending Radiologist  This document has been electronically signed. Mar 16 2021 10:01PM    < end of copied text >      Physical Exam:  I examined the patient  VS reviewed, stable.  Gen: patient is awake, no acute distress  Chest: CTAB, no crackles/wheezes, good air entry, no tachypnea or retractions  CV: Regular rate & rhythm, normal S1, S2  Abd: soft, nontender, nondistended, no HSM appreciated, +BS  : Springer in place with clear urine     SOWMYA AMADO    S/O:    No acute events overnight. Pt is still feeling unwell with diffuse abdominal pain not reproducible on palpation.      Vital Signs  Vital Signs Last 24 Hrs  T(C): 36.6 (17 Mar 2021 04:00), Max: 36.7 (17 Mar 2021 00:00)  T(F): 97.8 (17 Mar 2021 04:00), Max: 98.1 (17 Mar 2021 00:00)  HR: 102 (17 Mar 2021 06:00) (90 - 117)  BP: 101/59 (17 Mar 2021 06:00) (83/43 - 142/66)  BP(mean): 89 (17 Mar 2021 06:00) (56 - 89)  RR: 18 (17 Mar 2021 06:00) (14 - 28)  SpO2: 100% (17 Mar 2021 06:00) (98% - 100%)    I&O's Summary    16 Mar 2021 07:01  -  17 Mar 2021 07:00  --------------------------------------------------------  IN: 5107.3 mL / OUT: 505 mL / NET: 4602.3 mL        Medications and Allergies:  MEDICATIONS  (STANDING):  amLODIPine   Tablet 5 milliGRAM(s) Oral daily  apixaban 2.5 milliGRAM(s) Oral every 12 hours  ascorbic acid  Oral Tab/Cap - Peds 250 milliGRAM(s) Oral two times a day  atorvastatin 40 milliGRAM(s) Oral at bedtime  carvedilol Oral Tab/Cap - Peds 12.5 milliGRAM(s) Oral two times a day  chlorhexidine 4% Liquid 1 Application(s) Topical <User Schedule>  clopidogrel Tablet 75 milliGRAM(s) Oral daily  dextrose 40% Gel 15 Gram(s) Oral once  dextrose 5%. 1000 milliLiter(s) (50 mL/Hr) IV Continuous <Continuous>  dextrose 5%. 1000 milliLiter(s) (100 mL/Hr) IV Continuous <Continuous>  dextrose 50% Injectable 25 Gram(s) IV Push once  dextrose 50% Injectable 12.5 Gram(s) IV Push once  dextrose 50% Injectable 25 Gram(s) IV Push once  glucagon  Injectable 1 milliGRAM(s) IntraMuscular once  insulin glargine Injectable (LANTUS) 22.5 Unit(s) SubCutaneous at bedtime  insulin lispro Injectable (ADMELOG) 8 Unit(s) SubCutaneous three times a day before meals  levothyroxine 88 MICROGram(s) Oral daily  melatonin 5 milliGRAM(s) Oral at bedtime  meropenem  IVPB 500 milliGRAM(s) IV Intermittent every 12 hours  multivitamin 1 Tablet(s) Oral daily  pantoprazole    Tablet 40 milliGRAM(s) Oral before breakfast  sodium chloride 0.9%. 1000 milliLiter(s) (150 mL/Hr) IV Continuous <Continuous>    MEDICATIONS  (PRN):  aluminum hydroxide/magnesium hydroxide/simethicone Suspension 30 milliLiter(s) Oral every 6 hours PRN Dyspepsia    Allergies    latex (Pruritus; Rash)  sulfonamides (Unknown)    Intolerances    Pineapple (Unknown)      Interval Labs:      120<L>  |  98  |  55<H>  ----------------------------<  153<H>  5.1<H>   |  12<L>  |  1.6<H>    Ca    6.6<L>      17 Mar 2021 04:35  Mg     2.3     -    TPro  4.2<L>  /  Alb  2.2<L>  /  TBili  0.3  /  DBili  x   /  AST  32  /  ALT  29  /  AlkPhos  137<H>      CBC Full  -  ( 17 Mar 2021 04:35 )  WBC Count : 17.57 K/uL  RBC Count : 3.91 M/uL  Hemoglobin : 11.2 g/dL  Hematocrit : 31.7 %  Platelet Count - Automated : 62 K/uL  Mean Cell Volume : 81.1 fL  Mean Cell Hemoglobin : 28.6 pg  Mean Cell Hemoglobin Concentration : 35.3 g/dL  Auto Neutrophil # : 15.52 K/uL  Auto Lymphocyte # : 0.79 K/uL  Auto Monocyte # : 1.06 K/uL  Auto Eosinophil # : 0.01 K/uL  Auto Basophil # : 0.03 K/uL  Auto Neutrophil % : 88.3 %  Auto Lymphocyte % : 4.5 %  Auto Monocyte % : 6.0 %  Auto Eosinophil % : 0.1 %  Auto Basophil % : 0.2 %      Urinalysis Basic - ( 16 Mar 2021 20:05 )    Color: Yellow / Appearance: Clear / S.018 / pH: x  Gluc: x / Ketone: Small  / Bili: Negative / Urobili: <2 mg/dL   Blood: x / Protein: 100 mg/dL / Nitrite: Negative   Leuk Esterase: Small / RBC: 5 /HPF / WBC 15 /HPF   Sq Epi: x / Non Sq Epi: 2 /HPF / Bacteria: Negative        Culture - Urine (collected 14 Mar 2021 22:50)  Source: .Urine Clean Catch (Midstream)  Preliminary Report (16 Mar 2021 13:39):    50,000 - 99,000 CFU/mL Escherichia coli    Culture - Blood (collected 14 Mar 2021 18:30)  Source: .Blood Blood  Gram Stain (15 Mar 2021 12:47):    Growth in aerobic bottle: Gram Negative Rods    Growth in anaerobic bottle: Gram Negative Rods  Preliminary Report (16 Mar 2021 09:27):    Growth in aerobic and anaerobic bottles: Escherichia coli    MDRO detected in BCID PCR, resistance marker = CTX-M (ESBL)    ***Blood Panel PCR results on this specimen are available    approximately 3 hours after the Gram stain result.***    Gram stain, PCR, and/or culture results may not always    correspond due to difference in methodologies.    ************************************************************    This PCR assay was performed by multiplex PCR. This    Assay tests for 66 bacterial and resistance gene targets.    Please refer to the [facility] [employer] test directory    at [email or web address] for details.  Organism: Blood Culture PCR (15 Mar 2021 13:11)  Organism: Blood Culture PCR (15 Mar 2021 13:11)    Culture - Blood (collected 14 Mar 2021 18:30)  Source: .Blood Blood  Gram Stain (15 Mar 2021 11:56):    Growth in anaerobic bottle: Gram Negative Rods    Growth in aerobic bottle: Gram Negative Rods  Preliminary Report (16 Mar 2021 13:15):    Growth in aerobic and anaerobic bottles: Escherichia coli    See previous culture [id number]        Imaging:  < from: US Renal (21 @ 21:50) >    EXAM:  US KIDNEY(S)            PROCEDURE DATE:  2021        INTERPRETATION:  CLINICAL INFORMATION: Hydronephrosis. Abdominal pain.    COMPARISON: Abdominal ultrasound May 7, 2020, CT abdomen and pelvis 2021.    PROCEDURE: Retroperitoneal Ultrasound was performed.    FINDINGS:    RIGHT KIDNEY: Normal in echogenicity, size measuring 9.7 cm in length. Unchanged mild to moderate right hydronephrosis, correlating with CT. Vascular flow is demonstrated at the hilum.    LEFT KIDNEY: Normal in echogenicity, size measuring 11.5 cm in length. No evidence of hydronephrosis or calculus. Vascular flow is demonstrated at the hilum.      IMPRESSION:  Unchanged mild to moderate right hydronephrosis, correlating with CT.          ARACELI REYNOLDS MD; Attending Radiologist  This document has been electronically signed. Mar 16 2021 10:01PM    < end of copied text >      Physical Exam:  I examined the patient  VS reviewed, stable.  Gen: patient is awake, no acute distress  Chest: CTAB, no crackles/wheezes, good air entry, no tachypnea or retractions  CV: Regular rate & rhythm, normal S1, S2  Abd: soft, nontender, nondistended, no HSM appreciated, +BS  : Srpinger in place with clear urine  Ext: no edema in le b/l

## 2021-03-17 NOTE — PROGRESS NOTE ADULT - ASSESSMENT
Assessment:  IMPRESSION:    DKA  UTI  Obstructive uropathy  HO Afib s/p PPM    PLAN:    CNS: Avoid CNS depressants    HEENT: Oral care    PULMONARY:  HOB @ 45 degrees    CARDIOVASCULAR: NS @ 100cc/hr after bolus    GI: GI prophylaxis.  Feeding as tolerated    RENAL:  Follow up lytes.  Correct as needed    INFECTIOUS DISEASE: Follow up cultures. Continue meropenem.    HEMATOLOGICAL:  DVT prophylaxis.    ENDOCRINE:  Follow up FS.  Insulin drip until gap closure    MUSCULOSKELETAL: Bedrest    F/U with urology re nephrostomy    **** this note is incomplete and pending attending approval*** Assessment and plan:  77 yo female with PMHx of DM on insulin pump, CAD s/p stenting in 2020, Lung nodules, Dry eyes, bilateral, PVD (peripheral vascular disease): S/p Right Peripheral leg Stent, Seasonal allergies, Skin cancer: Basal Cell Cancer face  (around nose) s/p MOHS procedure x 3, Anemia: Chronic, on Iron supplement, Hypothyroid, SSS (sick sinus syndrome): S/p PPM Sept 2017, Essential hypertension, Afib: 5+ yrs; on Eliquis admitted for pyelonephritis treatment complicated by right sided hydronephrosis, obstructive uropathy, TEOFILO and hyponatremia, over admission developed DKA and upgraded to critical care for DKA.     #Sepsis/bateremia secondary to pyelonephritis- resolving   - Blood cx with e. coli ESBL  - WBC improving 17.57<21.58  - ID note appreciated  - Meropenem 750 mg iv q8h  - On discharge ertapenem 1 gm iv q24h for 14 days (Duration of 14 days from the last  intervention)  - Decrease urine output 500mL in the last 24 hours  - D/tanner amlodipine and carvedilol    # TEOFILO with R-sided Hydronephrosis and obstructive uropathy  - U/S (3/16): Unchanged mild to moderate right hydronephrosis.   - Cr 1.6 (2.2 on admission)  - Urology note appreciated   - Renal scan with lasix     #DKA - improved  - -220  - ABG 3/16: pH 7.3, pCO2 20, lactate 1, bicarb 9  - f/u repeat ABG  - D5 + 1/2 NS with 75mg bicarb    #Hyponatremia:  - poor oral intake at home, Na on admission 121   - remains hyponatremic    - continue with IVF   - monitor for neurological signs    # Thrombocytopenia - secondary to sepsis  - Plt 62>84>126  - No acute bleeding  - Will continue to monitor     #Chronic atrial fibrillation  -plt 62>84>126  -d/tanner eliquis    #Hyperkalemia--resolved  -Admission K 5.6 now 5.1  -Ptn dehydrated on admission electrolyte abnormalities likely secondary to prerenal etiology  -c/w daily BMP monitoring    #CAD recent stenting in July 2020:  -C/W Plavix and statin   -Avoid volume overload    #DLD  -Continue with statin    #Hypothyroidism   -c/w levothyroxine 88mcg  -f/u TSH    #HTN  - d/tanner blood pressure medication pt bp at 101/59 with episode of hypotension Assessment and plan:  75 yo female with PMHx of DM on insulin pump, CAD s/p stenting in 2020, Lung nodules, Dry eyes, bilateral, PVD (peripheral vascular disease): S/p Right Peripheral leg Stent, Seasonal allergies, Skin cancer: Basal Cell Cancer face  (around nose) s/p MOHS procedure x 3, Anemia: Chronic, on Iron supplement, Hypothyroid, SSS (sick sinus syndrome): S/p PPM Sept 2017, Essential hypertension, Afib: 5+ yrs; on Eliquis admitted for pyelonephritis treatment complicated by right sided hydronephrosis, obstructive uropathy, TEOFILO and hyponatremia, over admission developed DKA and upgraded to critical care for DKA.     #Sepsis/bateremia secondary to pyelonephritis- resolving   - Blood cx with e. coli ESBL  - WBC improving 17.57<21.58  - ID note appreciated  - Meropenem 750 mg iv q8h  - On discharge ertapenem 1 gm iv q24h for 14 days (Duration of 14 days from the last  intervention)  - Decrease urine output 500mL in the last 24 hours  - D/tanner amlodipine and carvedilol    # TEOFILO with R-sided Hydronephrosis and obstructive uropathy  - U/S (3/16): Unchanged mild to moderate right hydronephrosis.   - Cr 1.6 (2.2 on admission)  - Urology note appreciated   - Renal scan with lasix     #DKA - improved  - -220  - ABG 3/16: pH 7.3, pCO2 20, lactate 1, bicarb 9  - f/u repeat ABG  - D5 + 1/2 NS with 75mg bicarb    #Hyponatremia:  - poor oral intake at home, Na on admission 121   - remains hyponatremic    -Urine osmolality   - continue with IVF   - monitor for neurological signs    # Thrombocytopenia - secondary to sepsis  - Plt 62>84>126  - No acute bleeding  - Will continue to monitor     #Chronic atrial fibrillation  -plt 62>84>126  -d/tanner eliquis    #Hyperkalemia--resolved  -Admission K 5.6 now 5.1  -Pt dehydrated on admission electrolyte abnormalities likely secondary to prerenal etiology  -c/w daily BMP monitoring    #CAD recent stenting in July 2020:  -C/W Plavix and statin   -Avoid volume overload    #DLD  -Continue with statin    #Hypothyroidism   -c/w levothyroxine 88mcg  -f/u TSH    #HTN  - d/tanner blood pressure medication pt bp at 101/59 with episode of hypotension    # Hx of SSS (s/p PPM placement in 09/2017)  - Evolution by EP 3/16: Device working properly, paroxysmal AF, no other events    # NSTEMI/CAD s/p PCI with LUPE placed LAD: secondary to sepsis induced demand ischemia, less likely true ACS,   - continue supportive care for now  - f/u CE's and ECHO  - C/W plavix     Full Code  CHG  AIT  Diet: DASH  DVT PPX: Hep subq, b/l duplex u/s - negative for DVT (3/17) Assessment and plan:  75 yo female with PMHx of DM on insulin pump, CAD s/p stenting in 2020, Lung nodules, Dry eyes, bilateral, PVD (peripheral vascular disease): S/p Right Peripheral leg Stent, Seasonal allergies, Skin cancer: Basal Cell Cancer face  (around nose) s/p MOHS procedure x 3, Anemia: Chronic, on Iron supplement, Hypothyroid, SSS (sick sinus syndrome): S/p PPM Sept 2017, Essential hypertension, Afib: 5+ yrs; on Eliquis admitted for pyelonephritis treatment complicated by right sided hydronephrosis, obstructive uropathy, TEOFILO and hyponatremia, over admission developed DKA and upgraded to critical care for DKA.     #Sepsis/bateremia secondary to pyelonephritis- resolving   - Blood cx with e. coli ESBL  - WBC improving 17.57<21.58  - ID note appreciated  - Meropenem 750 mg iv q8h  - On discharge ertapenem 1 gm iv q24h for 14 days (Duration of 14 days from the last  intervention)  - Decrease urine output 500mL in the last 24 hours  - D/tanner amlodipine and carvedilol    # TEOFILO with R-sided Hydronephrosis and obstructive uropathy  - U/S (3/16): Unchanged mild to moderate right hydronephrosis.   - Cr 1.6 (2.2 on admission)  - Urology note appreciated   - Renal scan with lasix     #DKA - improved  - -220  - ABG 3/16: pH 7.3, pCO2 20, lactate 1, bicarb 9  - f/u repeat ABG  - D5 + 1/2 NS with 75mg bicarb    #Hyponatremia:  - poor oral intake at home, Na on admission 121   - remains hyponatremic    - Serum osmolality 283, Urine osmolality 393  - continue with IVF   - monitor for neurological signs    # Thrombocytopenia - secondary to sepsis  - Plt 62>84>126  - No acute bleeding  - Will continue to monitor     #Chronic atrial fibrillation  -plt 62>84>126  -d/tanner eliquis    #Hyperkalemia--resolved  -Admission K 5.6 now 5.1  -Pt dehydrated on admission electrolyte abnormalities likely secondary to prerenal etiology  -c/w daily BMP monitoring    #CAD recent stenting in July 2020:  -C/W Plavix and statin   -Avoid volume overload    #DLD  -Continue with statin    #Hypothyroidism   -c/w levothyroxine 88mcg  -f/u TSH    #HTN  - d/tanner blood pressure medication pt bp at 101/59 with episode of hypotension    # Hx of SSS (s/p PPM placement in 09/2017)  - Evolution by EP 3/16: Device working properly, paroxysmal AF, no other events    # NSTEMI/CAD s/p PCI with LUPE placed LAD: secondary to sepsis induced demand ischemia, less likely true ACS,   - continue supportive care for now  - f/u CE's and ECHO  - C/W plavix     Full Code  CHG  AIT  Diet: DASH  DVT PPX: Hep subq, b/l duplex u/s - negative for DVT (3/17) Assessment and plan:  75 yo female with PMHx of DM on insulin pump, CAD s/p stenting in 2020, Lung nodules, Dry eyes, bilateral, PVD (peripheral vascular disease): S/p Right Peripheral leg Stent, Seasonal allergies, Skin cancer: Basal Cell Cancer face  (around nose) s/p MOHS procedure x 3, Anemia: Chronic, on Iron supplement, Hypothyroid, SSS (sick sinus syndrome): S/p PPM Sept 2017, Essential hypertension, Afib: 5+ yrs; on Eliquis admitted for pyelonephritis treatment complicated by right sided hydronephrosis, obstructive uropathy, TEOFILO and hyponatremia, over admission developed DKA and upgraded to critical care for DKA.     #Sepsis/bateremia secondary to pyelonephritis- resolving   - Blood cx with e. coli ESBL  - WBC improving 17.57<21.58  - ID note appreciated  - Meropenem 750 mg iv Q12H  - On discharge ertapenem 1 gm iv q24h for 14 days (Duration of 14 days from the last  intervention)  - Decrease urine output 500mL in the last 24 hours  - D/tanner amlodipine and carvedilol    # TEOFILO with R-sided Hydronephrosis and obstructive uropathy  - U/S (3/16): Unchanged mild to moderate right hydronephrosis.   - Cr 1.6 (2.2 on admission)  - Urology note appreciated   - F/u Renal scan with lasix     #DKA - improved  - -220  - ABG 3/16: pH 7.3, pCO2 20, lactate 1, bicarb 9  - f/u repeat ABG  - D5 + 1/2 NS with 75mg bicarb    #Hyponatremia:  - Na 120 <- 120  - poor oral intake at home, Na on admission 121   - remains hyponatremic    - Serum osmolality 283, Urine osmolality 393  - continue with IVF   - monitor for neurological signs    # Thrombocytopenia - secondary to sepsis  - Plt 62>84>126  - No acute bleeding  - Will continue to monitor     #Chronic atrial fibrillation  - HR   -d/tanner eliquis in light of TEOFILO     #Hyperkalemia--resolved  -Admission K 5.6 now 5.1  -Pt dehydrated on admission electrolyte abnormalities likely secondary to prerenal etiology  -c/w daily BMP monitoring    #CAD recent stenting in July 2020:  -C/W Plavix and statin   -Avoid volume overload    #DLD  -Continue with statin    #Hypothyroidism   -c/w levothyroxine 88mcg  -f/u TSH    #HTN  - d/tanner blood pressure medication pt bp at 101/59 with episode of hypotension    # Hx of SSS (s/p PPM placement in 09/2017)  - Evolution by EP 3/16: Device working properly, paroxysmal AF, no other events    # NSTEMI/CAD s/p PCI with LUPE placed LAD: secondary to sepsis induced demand ischemia, less likely true ACS,   - continue supportive care for now  - f/u CE's and ECHO  - C/W plavix     Full Code  CHG  AIT  Diet: DASH  DVT PPX: Hep subq, b/l duplex u/s - negative for DVT (3/17)  GI ppx: Protonix

## 2021-03-17 NOTE — PROGRESS NOTE ADULT - SUBJECTIVE AND OBJECTIVE BOX
S: patient feeling ok , doesn't have to much appetite, taken off insulin drip with Lantus 22 units     O:Vital Signs Last 24 Hrs  T(C): 36.3 (17 Mar 2021 12:00), Max: 36.7 (17 Mar 2021 00:00)  T(F): 97.3 (17 Mar 2021 12:00), Max: 98.1 (17 Mar 2021 00:00)  HR: 94 (17 Mar 2021 12:00) (90 - 115)  BP: 91/46 (17 Mar 2021 12:00) (81/53 - 142/66)  BP(mean): 51 (17 Mar 2021 12:00) (51 - 102)  RR: 23 (17 Mar 2021 12:00) (14 - 28)  SpO2: 98% (17 Mar 2021 12:00) (98% - 100%)    PE: awake , oriented, answering questions   Breathing conformably  Abdomen : soft   LE: no edema     MEDICATIONS  (STANDING):  ascorbic acid  Oral Tab/Cap - Peds 250 milliGRAM(s) Oral two times a day  atorvastatin 40 milliGRAM(s) Oral at bedtime  chlorhexidine 4% Liquid 1 Application(s) Topical <User Schedule>  clopidogrel Tablet 75 milliGRAM(s) Oral daily  dextrose 40% Gel 15 Gram(s) Oral once  dextrose 5% + sodium chloride 0.45% 1000 milliLiter(s) (150 mL/Hr) IV Continuous <Continuous>  dextrose 5%. 1000 milliLiter(s) (100 mL/Hr) IV Continuous <Continuous>  dextrose 5%. 1000 milliLiter(s) (50 mL/Hr) IV Continuous <Continuous>  dextrose 5%. 1000 milliLiter(s) (100 mL/Hr) IV Continuous <Continuous>  dextrose 50% Injectable 25 Gram(s) IV Push once  dextrose 50% Injectable 12.5 Gram(s) IV Push once  dextrose 50% Injectable 25 Gram(s) IV Push once  glucagon  Injectable 1 milliGRAM(s) IntraMuscular once  insulin lispro (ADMELOG) corrective regimen sliding scale   SubCutaneous three times a day before meals  insulin NPH human recombinant 15 Unit(s) SubCutaneous two times a day  levothyroxine 88 MICROGram(s) Oral daily  meropenem  IVPB 500 milliGRAM(s) IV Intermittent every 12 hours  multivitamin 1 Tablet(s) Oral daily  pantoprazole    Tablet 40 milliGRAM(s) Oral before breakfast    MEDICATIONS  (PRN):  aluminum hydroxide/magnesium hydroxide/simethicone Suspension 30 milliLiter(s) Oral every 6 hours PRN Dyspepsia                            11.2   17.57 )-----------( 62       ( 17 Mar 2021 04:35 )             31.7   03-17    120<L>  |  98  |  55<H>  ----------------------------<  153<H>  5.1<H>   |  12<L>  |  1.6<H>    Ca    6.6<L>      17 Mar 2021 04:35  Mg     2.3     03-16    TPro  4.2<L>  /  Alb  2.2<L>  /  TBili  0.3  /  DBili  x   /  AST  32  /  ALT  29  /  AlkPhos  137<H>  03-17

## 2021-03-18 NOTE — PROGRESS NOTE ADULT - SUBJECTIVE AND OBJECTIVE BOX
Patient feels weak.  Denies nausea.  No flank pain.  ROS:  negative except as above.    Allergies    latex (Pruritus; Rash)  sulfonamides (Unknown)    Intolerances    Pineapple (Unknown)      ANTIBIOTICS/RELEVANT:  antimicrobials  meropenem  IVPB 750 milliGRAM(s) IV Intermittent every 12 hours    immunologic:    OTHER:  acetaminophen   Tablet .. 650 milliGRAM(s) Oral every 6 hours PRN  aluminum hydroxide/magnesium hydroxide/simethicone Suspension 30 milliLiter(s) Oral every 6 hours PRN  ascorbic acid Syrup 500 milliGRAM(s) Oral daily  atorvastatin 40 milliGRAM(s) Oral at bedtime  chlorhexidine 4% Liquid 1 Application(s) Topical <User Schedule>  clopidogrel Tablet 75 milliGRAM(s) Oral daily  dextrose 40% Gel 15 Gram(s) Oral once  dextrose 5% + sodium chloride 0.45% 1000 milliLiter(s) IV Continuous <Continuous>  dextrose 5%. 1000 milliLiter(s) IV Continuous <Continuous>  dextrose 5%. 1000 milliLiter(s) IV Continuous <Continuous>  dextrose 5%. 1000 milliLiter(s) IV Continuous <Continuous>  dextrose 50% Injectable 25 Gram(s) IV Push once  dextrose 50% Injectable 12.5 Gram(s) IV Push once  dextrose 50% Injectable 25 Gram(s) IV Push once  glucagon  Injectable 1 milliGRAM(s) IntraMuscular once  heparin   Injectable 5000 Unit(s) SubCutaneous every 8 hours  insulin lispro (ADMELOG) corrective regimen sliding scale   SubCutaneous three times a day before meals  insulin NPH human recombinant 15 Unit(s) SubCutaneous two times a day  levothyroxine 88 MICROGram(s) Oral daily  melatonin 5 milliGRAM(s) Oral at bedtime PRN  multivitamin 1 Tablet(s) Oral daily  pantoprazole    Tablet 40 milliGRAM(s) Oral before breakfast      Objective:  Vital Signs Last 24 Hrs  T(C): 38.1 (18 Mar 2021 04:22), Max: 38.1 (18 Mar 2021 04:22)  T(F): 100.5 (18 Mar 2021 04:22), Max: 100.5 (18 Mar 2021 04:22)  HR: 124 (18 Mar 2021 06:00) (94 - 124)  BP: 97/54 (18 Mar 2021 06:00) (81/53 - 158/80)  BP(mean): 64 (18 Mar 2021 06:00) (51 - 124)  RR: 27 (18 Mar 2021 06:00) (18 - 28)  SpO2: 99% (18 Mar 2021 06:00) (98% - 100%)    PHYSICAL EXAM:  Gen: lethargic but easily aroused, NAD  Heart: Regular rate and rhythm, S1 and S2 no murmurs or rubs  Lungs: Clear to auscultation bilaterally, no crackles  Abd: Soft, non distended   :  No CVA tenderness, no RUQ tenderness, no suprapubic fullness.  Extremities: No edema      LABS:                        11.2   17.57 )-----------( 62       ( 17 Mar 2021 04:35 )             31.7     03-17    118<LL>  |  95<L>  |  49<H>  ----------------------------<  350<H>  4.7   |  14<L>  |  1.4    Ca    6.7<L>      17 Mar 2021 16:00  Mg     2.3     03-16    TPro  4.2<L>  /  Alb  2.2<L>  /  TBili  0.3  /  DBili  x   /  AST  32  /  ALT  29  /  AlkPhos  137<H>  03-17    PT/INR - ( 18 Mar 2021 04:40 )   PT: 13.50 sec;   INR: 1.17 ratio         PTT - ( 18 Mar 2021 04:40 )  PTT:26.8 sec  Urinalysis Basic - ( 16 Mar 2021 20:05 )    Color: Yellow / Appearance: Clear / S.018 / pH: x  Gluc: x / Ketone: Small  / Bili: Negative / Urobili: <2 mg/dL   Blood: x / Protein: 100 mg/dL / Nitrite: Negative   Leuk Esterase: Small / RBC: 5 /HPF / WBC 15 /HPF   Sq Epi: x / Non Sq Epi: 2 /HPF / Bacteria: Negative        MICROBIOLOGY:  RECENT CULTURES:   @ 22:50  .Urine Clean Catch (Midstream)  Escherichia coli ESBL  Escherichia coli ESBL  JONATHAN    50,000 - 99,000 CFU/mL Escherichia coli ESBL  --   @ 18:30  .Blood Blood  Blood Culture PCR  Escherichia coli ESBL  Blood Culture PCR  PCR    Growth in aerobic and anaerobic bottles: Escherichia coli ESBL  See previous culture [id number]  --

## 2021-03-18 NOTE — PROGRESS NOTE ADULT - ASSESSMENT
75 yo female with PMHx of DM on insulin pump, CAD s/p stenting in 2020, Lung nodules, Dry eyes, bilateral, PVD (peripheral vascular disease): S/p Right Peripheral leg Stent, Seasonal allergies, Skin cancer: Basal Cell Cancer face  (around nose) s/p MOHS procedure x 3, Anemia: Chronic, on Iron supplement, Hypothyroid, SSS (sick sinus syndrome): S/p PPM Sept 2017, Essential hypertension, Afib: 5+ yrs; on Eliquis admitted for pyelonephritis treatment complicated by right sided hydronephrosis, obstructive uropathy, TEOFILO and hyponatremia, over admission developed DKA and upgraded to critical care for DKA.     #Sepsis secondary to pyelonephritis  - Temp 100.5, episodes of tachycardia   - Blood cx with e. coli ESBL  - f/u AM labs  - ID note appreciated  - Meropenem 750 mg iv Q12H  - On discharge ertapenem 1 gm iv q24h for 14 days (Duration of 14 days from the last  intervention)  - Decrease urine output 500mL in the last 24 hours  - D/tanner amlodipine and carvedilol    # R-sided Hydronephrosis and obstructive uropathy  - U/S (3/16): Unchanged mild to moderate right hydronephrosis.   - Cr 1.6 (2.2 on admission)  - Urology note appreciated   - F/u Renal scan with lasix     # Bacteremia  - Blood cx E Coli ESBL   - c/w Meropenem     #TEOFILO secondary to sepsis   - Urology note appreciated   - F/u Renal scan with lasix   - Monitor I&O     # NSTEMI/CAD s/p PCI with LUPE placed LAD: secondary to sepsis induced demand ischemia, less likely true ACS   - continue supportive care for now  - f/u CE's and ECHO  - C/W plavix     #DM with DKA - improved  - -220  - ABG 3/16: pH 7.3, pCO2 20, lactate 1, bicarb 9  - f/u repeat ABG  - D5 + 1/2 NS with 75mg bicarb    # Thrombocytopenia - secondary to sepsis  - Plt 62>84>126  - No acute bleeding  - Will continue to monitor     #Hyponatremia:  - Na 120 <- 120  - poor oral intake at home, Na on admission 121   - remains hyponatremic    - Serum osmolality 283, Urine osmolality 393  - continue with IVF   - monitor for neurological signs      #Hyperkalemia--resolved  -Admission K 5.6 now 5.1  -Pt dehydrated on admission electrolyte abnormalities likely secondary to prerenal etiology  -c/w daily BMP monitoring    #CAD recent stenting in July 2020:  -C/W Plavix and statin   -Avoid volume overload    #Hypothyroidism   -c/w levothyroxine 88mcg  -f/u TSH    # Hx of SSS (s/p PPM placement in 09/2017)  - Evolution by EP 3/16: Device working properly, paroxysmal AF, no other events    #HTN  - d/tanner blood pressure medication pt bp at 101/59 with episode of hypotension    #Chronic atrial fibrillation  - HR   -d/tanner eliquis in light of TEOFILO     #DLD  -Continue with statin    Full Code  CHG  AIT  Diet: DASH  DVT PPX: Hep subq, b/l duplex u/s - negative for DVT (3/17)  GI ppx: Protonix 75 yo female with PMHx of DM on insulin pump, CAD s/p stenting in 2020, Lung nodules, Dry eyes, bilateral, PVD (peripheral vascular disease): S/p Right Peripheral leg Stent, Seasonal allergies, Skin cancer: Basal Cell Cancer face  (around nose) s/p MOHS procedure x 3, Anemia: Chronic, on Iron supplement, Hypothyroid, SSS (sick sinus syndrome): S/p PPM Sept 2017, Essential hypertension, Afib: 5+ yrs; on Eliquis (held) admitted for pyelonephritis treatment complicated by right sided hydronephrosis, obstructive uropathy, TEOFILO and hyponatremia, over admission developed DKA and upgraded to critical care for DKA.     #Sepsis secondary to pyelonephritis  - Temp 100.5, episodes of tachycardia   - Blood cx with e. coli ESBL  - f/u AM labs  - ID note appreciated  - Meropenem 750 mg iv Q12H  - On discharge ertapenem 1 gm iv q24h for 14 days (Duration of 14 days from the last  intervention)  - Decrease urine output 500mL in the last 24 hours  - D/tanner amlodipine and carvedilol  - IJ placed this afternoon   - Levophed for hypotension     # R-sided Hydronephrosis and obstructive uropathy  - U/S (3/16): Unchanged mild to moderate right hydronephrosis.   - Cr 1.4 (2.2 on admission)  - Urology note appreciated   - F/U Renal U/S   - Will order renal scan with lasix     # Bacteremia  - Blood cx E Coli ESBL   - c/w Meropenem     #TEOFILO secondary to sepsis   - Urology note appreciated   - F/u Renal scan with lasix   - Monitor I&O     # NSTEMI/CAD s/p PCI with LUPE placed LAD: secondary to sepsis induced demand ischemia, less likely true ACS   - continue supportive care for now  - f/u CE's and ECHO  - d/tanner Plavix in light of thrombocytopenia     #DM with DKA - improved  -  > 260   - ABG 3/16: pH 7.3, pCO2 20, lactate 1, bicarb 9  - f/u repeat ABG  - NPH 20 units subQ    # Thrombocytopenia - secondary to sepsis  - Plt 62>84>126  - No acute bleeding  - Will continue to monitor     #Hyponatremia:  - Na 122 <- 118  - poor oral intake at home, Na on admission 121   - remains hyponatremic    - Serum osmolality 283, Urine osmolality 393, will recheck  - Sodium bicarb 150 mEq in D5  - monitor for neurological signs, patient lethargic this morning, improved this afternoon    #Hyperkalemia--resolved  -Admission K 5.6 now 4.5 hemolyzed, will repeat in the AM  -c/w daily BMP monitoring    #CAD recent stenting in July 2020:  -D/tanner Plavix and statin in light of elevated transaminitis and thrombocytopenia     #Hypothyroidism   - levothyroxine 44mg IV  -f/u TSH    # Hx of SSS (s/p PPM placement in 09/2017)  - Evolution by EP 3/16: Device working properly, paroxysmal AF, no other events    #HTN  - d/tanner blood pressure medication in light of hypotension     #Chronic atrial fibrillation  - HR now 100  -d/tanner eliquis in light of TEOFILO   - Amiodarone infusion     #DLD  -d/tanner statin in light of transaminitis     Full Code  CHG  AIT  Diet: DASH  DVT PPX: Hep subq, b/l duplex u/s - negative for DVT (3/17)  GI ppx: Protonix 75 yo female with PMHx of DM on insulin pump, CAD s/p stenting in 2020, Lung nodules, Dry eyes, bilateral, PVD (peripheral vascular disease): S/p Right Peripheral leg Stent, Seasonal allergies, Skin cancer: Basal Cell Cancer face  (around nose) s/p MOHS procedure x 3, Anemia: Chronic, on Iron supplement, Hypothyroid, SSS (sick sinus syndrome): S/p PPM Sept 2017, Essential hypertension, Afib: 5+ yrs; on Eliquis (held) admitted for pyelonephritis treatment complicated by right sided hydronephrosis, obstructive uropathy, TEOFILO and hyponatremia, over admission developed DKA and upgraded to critical care for DKA.     #Sepsis secondary to pyelonephritis  - Temp 100.5, episodes of tachycardia   - Blood cx with e. coli ESBL  - f/u AM labs  - ID note appreciated  - Meropenem 750 mg iv Q12H  - On discharge ertapenem 1 gm iv q24h for 14 days (Duration of 14 days from the last  intervention)  - Decrease urine output 500mL in the last 24 hours  - D/tanner amlodipine and carvedilol  - IJ placed this afternoon   - Levophed for hypotension     # R-sided Hydronephrosis and obstructive uropathy  - U/S (3/16): Unchanged mild to moderate right hydronephrosis.   - Cr 1.4 (2.2 on admission)  - Urology note appreciated   - F/U Renal U/S   - Will order renal scan with lasix     # Bacteremia  - Blood cx E Coli ESBL   - c/w Meropenem     #TEOFILO secondary to sepsis   - Urology note appreciated   - F/u Renal scan with lasix   - Monitor I&O     # NSTEMI/CAD s/p PCI with LUPE placed LAD: secondary to sepsis induced demand ischemia, less likely true ACS   - continue supportive care for now  - f/u CE's and ECHO  - d/tanner Plavix in light of thrombocytopenia     #DM with DKA - improved  -  > 260   - ABG 3/16: pH 7.3, pCO2 20, lactate 1, bicarb 9  - f/u repeat ABG  - NPH 20 units subQ    # Thrombocytopenia - secondary to sepsis  - Plt 62>84>126  - No acute bleeding  - Will continue to monitor     #Hyponatremia:  - Na 122 <- 118  - poor oral intake at home, Na on admission 121   - remains hyponatremic    - Serum osmolality 283, Urine osmolality 393, will recheck  - Sodium bicarb 150 mEq in D5  - f/u TSH and cortisol   - monitor for neurological signs, patient lethargic this morning, improved this afternoon    #Hyperkalemia--resolved  -Admission K 5.6 now 4.5 hemolyzed, will repeat in the AM  -c/w daily BMP monitoring    #CAD recent stenting in July 2020:  -D/tanner Plavix and statin in light of elevated transaminitis and thrombocytopenia     #Hypothyroidism   - levothyroxine 44mg IV  -f/u TSH    # Hx of SSS (s/p PPM placement in 09/2017)  - Evolution by EP 3/16: Device working properly, paroxysmal AF, no other events    #HTN  - d/tanner blood pressure medication in light of hypotension     #Chronic atrial fibrillation  - HR now 100  -d/tanner eliquis in light of TEOFILO   - Amiodarone infusion     #DLD  -d/tanner statin in light of transaminitis     Full Code  CHG  AIT  Diet: DASH  DVT PPX: Hep subq, b/l duplex u/s - negative for DVT (3/17)  GI ppx: Protonix

## 2021-03-18 NOTE — PROCEDURAL SAFETY CHECKLIST WITH OR WITHOUT SEDATION - NSPREPROCFT_GEN_ALL_CORE
No retinal holes or tears seen on exam. Recommended OBSERVATION. We reviewed the signs and symptoms of retinal tear/retinal detachment and the importance of prompt evaluation should there be increasing floaters, new flashing lights, or decreasing peripheral vision in either eye at any time. Patient understands condition, prognosis and need for follow up care. CENTRAL LINE PLACEMENT

## 2021-03-18 NOTE — PROGRESS NOTE ADULT - SUBJECTIVE AND OBJECTIVE BOX
Patient is a 76y old  Female who presents with a chief complaint of Sepsis (18 Mar 2021 13:04)        Over Night Events:        ROS:     All ROS are negative except HPI         PHYSICAL EXAM    ICU Vital Signs Last 24 Hrs  T(C): 36.3 (18 Mar 2021 08:00), Max: 38.1 (18 Mar 2021 04:22)  T(F): 97.4 (18 Mar 2021 08:00), Max: 100.5 (18 Mar 2021 04:22)  HR: 90 (18 Mar 2021 13:00) (90 - 124)  BP: 92/54 (18 Mar 2021 13:00) (67/40 - 158/80)  BP(mean): 77 (18 Mar 2021 13:00) (53 - 124)  ABP: --  ABP(mean): --  RR: 23 (18 Mar 2021 13:00) (17 - 28)  SpO2: 99% (18 Mar 2021 13:00) (98% - 100%)      Pt appears critically ill in ICU    ENT:   Airway patent,   Mouth with normal mucosa.   No thrush    EYES:   Pupils equal,   Round and reactive to light.    CARDIAC:   Normal rate,   Regular rhythm.    No edema      Vascular:  Normal systolic impulse  No Carotid bruits    RESPIRATORY:   No wheezing  Bilateral BS  Normal chest expansion  Not tachypneic,  No use of accessory muscles    GASTROINTESTINAL:  Abdomen soft,   Non-tender,   No guarding,   + BS    MUSCULOSKELETAL:   Range of motion is not limited,  No clubbing, cyanosis    NEUROLOGICAL:   awake but lethargic    SKIN:   Skin normal color for race,   Warm and dry and intact.   No evidence of rash.    PSYCHIATRIC:   letharghic    HEMATOLOGICAL:  No cervical  lymphadenopathy.  no inguinal lymphadenopathy      21 @ 07:01  -  21 @ 07:00  --------------------------------------------------------  IN:    dextrose 5% + sodium chloride 0.45% w/ Additives: 2325 mL    IV PiggyBack: 50 mL    Oral Fluid: 1000 mL    sodium chloride 0.9%: 750 mL  Total IN: 4125 mL    OUT:    Indwelling Catheter - Urethral (mL): 770 mL  Total OUT: 770 mL    Total NET: 3355 mL      21 @ 07:01  -  21 @ 14:31  --------------------------------------------------------  IN:    dextrose 5% + sodium chloride 0.45% w/ Additives: 300 mL  Total IN: 300 mL    OUT:    Indwelling Catheter - Urethral (mL): 60 mL  Total OUT: 60 mL    Total NET: 240 mL          LABS:                            9.6    20.51 )-----------( 21       ( 18 Mar 2021 12:04 )             26.8                                               0318    122<L>  |  100  |  43<H>  ----------------------------<  198<H>  4.5   |  13<L>  |  1.3    Ca    6.2<L>      18 Mar 2021 12:04  Phos  1.0       Mg     1.7     18    TPro  3.8<L>  /  Alb  1.8<L>  /  TBili  0.4  /  DBili  x   /  AST  84<H>  /  ALT  60<H>  /  AlkPhos  194<H>  18      PT/INR - ( 18 Mar 2021 04:40 )   PT: 13.50 sec;   INR: 1.17 ratio         PTT - ( 18 Mar 2021 04:40 )  PTT:26.8 sec                                       Urinalysis Basic - ( 16 Mar 2021 20:05 )    Color: Yellow / Appearance: Clear / S.018 / pH: x  Gluc: x / Ketone: Small  / Bili: Negative / Urobili: <2 mg/dL   Blood: x / Protein: 100 mg/dL / Nitrite: Negative   Leuk Esterase: Small / RBC: 5 /HPF / WBC 15 /HPF   Sq Epi: x / Non Sq Epi: 2 /HPF / Bacteria: Negative        CARDIAC MARKERS ( 17 Mar 2021 16:00 )  x     / x     / 29 U/L / x     / x      CARDIAC MARKERS ( 16 Mar 2021 16:19 )  x     / 0.22 ng/mL / x     / x     / x                                                LIVER FUNCTIONS - ( 18 Mar 2021 12:04 )  Alb: 1.8 g/dL / Pro: 3.8 g/dL / ALK PHOS: 194 U/L / ALT: 60 U/L / AST: 84 U/L / GGT: x                                                                                                                                   ABG - ( 17 Mar 2021 13:49 )  pH, Arterial: 7.36  pH, Blood: x     /  pCO2: 26    /  pO2: 82    / HCO3: 14    / Base Excess: -9.3  /  SaO2: 96                  MEDICATIONS  (STANDING):  aMIOdarone Infusion 1 mG/Min (33.3 mL/Hr) IV Continuous <Continuous>  chlorhexidine 4% Liquid 1 Application(s) Topical <User Schedule>  dextrose 40% Gel 15 Gram(s) Oral once  dextrose 5%. 1000 milliLiter(s) (100 mL/Hr) IV Continuous <Continuous>  dextrose 5%. 1000 milliLiter(s) (50 mL/Hr) IV Continuous <Continuous>  dextrose 5%. 1000 milliLiter(s) (100 mL/Hr) IV Continuous <Continuous>  dextrose 50% Injectable 25 Gram(s) IV Push once  dextrose 50% Injectable 12.5 Gram(s) IV Push once  dextrose 50% Injectable 25 Gram(s) IV Push once  glucagon  Injectable 1 milliGRAM(s) IntraMuscular once  heparin   Injectable 5000 Unit(s) SubCutaneous every 8 hours  insulin lispro (ADMELOG) corrective regimen sliding scale   SubCutaneous three times a day before meals  insulin NPH human recombinant 20 Unit(s) SubCutaneous two times a day  levothyroxine Injectable 44 MICROGram(s) IV Push at bedtime  magnesium sulfate  IVPB 2 Gram(s) IV Intermittent once  meropenem  IVPB 750 milliGRAM(s) IV Intermittent every 12 hours  multivitamin 1 Tablet(s) Oral daily  norepinephrine Infusion 0.05 MICROgram(s)/kG/Min (3.11 mL/Hr) IV Continuous <Continuous>  pantoprazole  Injectable 40 milliGRAM(s) IV Push daily  sodium bicarbonate  Infusion 0.181 mEq/kG/Hr (80 mL/Hr) IV Continuous <Continuous>  sodium phosphate IVPB 30 milliMole(s) IV Intermittent once    MEDICATIONS  (PRN):  acetaminophen   Tablet .. 650 milliGRAM(s) Oral every 6 hours PRN Temp greater or equal to 38C (100.4F), Mild Pain (1 - 3)  aluminum hydroxide/magnesium hydroxide/simethicone Suspension 30 milliLiter(s) Oral every 6 hours PRN Dyspepsia      New X-rays reviewed:                                                                                  ECHO    CXR interpreted by me:

## 2021-03-18 NOTE — PROGRESS NOTE ADULT - SUBJECTIVE AND OBJECTIVE BOX
75 yo female with PMHx of DM on insulin pump, CAD s/p stenting in , Lung nodules, Dry eyes, bilateral, PVD (peripheral vascular disease): S/p Right Peripheral leg Stent, Seasonal allergies, Skin cancer: Basal Cell Cancer face  (around nose) s/p MOHS procedure x 3, Anemia: Chronic, on Iron supplement, Hypothyroid, SSS (sick sinus syndrome): S/p PPM 2017, Essential hypertension, Afib: 5+ yrs; on Eliquis admitted for pyelonephritis treatment complicated by right sided hydronephrosis, obstructive uropathy, TEOFILO and hyponatremia, over admission developed DKA and upgraded to critical care for DKA.     Overnight events:  Patient temperature 100.5 at 4:30 last night.     ROS:  negative except as above.    Allergies    latex (Pruritus; Rash)  sulfonamides (Unknown)    Intolerances    Pineapple (Unknown)      ANTIBIOTICS/RELEVANT:  antimicrobials  meropenem  IVPB 750 milliGRAM(s) IV Intermittent every 12 hours    immunologic:    OTHER:  acetaminophen   Tablet .. 650 milliGRAM(s) Oral every 6 hours PRN  aluminum hydroxide/magnesium hydroxide/simethicone Suspension 30 milliLiter(s) Oral every 6 hours PRN  ascorbic acid Syrup 500 milliGRAM(s) Oral daily  atorvastatin 40 milliGRAM(s) Oral at bedtime  chlorhexidine 4% Liquid 1 Application(s) Topical <User Schedule>  clopidogrel Tablet 75 milliGRAM(s) Oral daily  dextrose 40% Gel 15 Gram(s) Oral once  dextrose 5% + sodium chloride 0.45% 1000 milliLiter(s) IV Continuous <Continuous>  dextrose 5%. 1000 milliLiter(s) IV Continuous <Continuous>  dextrose 5%. 1000 milliLiter(s) IV Continuous <Continuous>  dextrose 5%. 1000 milliLiter(s) IV Continuous <Continuous>  dextrose 50% Injectable 25 Gram(s) IV Push once  dextrose 50% Injectable 12.5 Gram(s) IV Push once  dextrose 50% Injectable 25 Gram(s) IV Push once  glucagon  Injectable 1 milliGRAM(s) IntraMuscular once  heparin   Injectable 5000 Unit(s) SubCutaneous every 8 hours  insulin lispro (ADMELOG) corrective regimen sliding scale   SubCutaneous three times a day before meals  insulin NPH human recombinant 15 Unit(s) SubCutaneous two times a day  levothyroxine 88 MICROGram(s) Oral daily  melatonin 5 milliGRAM(s) Oral at bedtime PRN  multivitamin 1 Tablet(s) Oral daily  pantoprazole    Tablet 40 milliGRAM(s) Oral before breakfast      Objective:  Vital Signs Last 24 Hrs  T(C): 38.1 (18 Mar 2021 04:22), Max: 38.1 (18 Mar 2021 04:22)  T(F): 100.5 (18 Mar 2021 04:22), Max: 100.5 (18 Mar 2021 04:22)  HR: 124 (18 Mar 2021 06:00) (94 - 124)  BP: 97/54 (18 Mar 2021 06:00) (81/53 - 158/80)  BP(mean): 64 (18 Mar 2021 06:00) (51 - 124)  RR: 27 (18 Mar 2021 06:00) (18 - 28)  SpO2: 99% (18 Mar 2021 06:00) (98% - 100%)    PHYSICAL EXAM:                    LABS:                        11.2   17.57 )-----------( 62       ( 17 Mar 2021 04:35 )             31.7     03-17    118<LL>  |  95<L>  |  49<H>  ----------------------------<  350<H>  4.7   |  14<L>  |  1.4    Ca    6.7<L>      17 Mar 2021 16:00  Mg     2.3     03-16    TPro  4.2<L>  /  Alb  2.2<L>  /  TBili  0.3  /  DBili  x   /  AST  32  /  ALT  29  /  AlkPhos  137<H>  03-17    PT/INR - ( 18 Mar 2021 04:40 )   PT: 13.50 sec;   INR: 1.17 ratio         PTT - ( 18 Mar 2021 04:40 )  PTT:26.8 sec  Urinalysis Basic - ( 16 Mar 2021 20:05 )    Color: Yellow / Appearance: Clear / S.018 / pH: x  Gluc: x / Ketone: Small  / Bili: Negative / Urobili: <2 mg/dL   Blood: x / Protein: 100 mg/dL / Nitrite: Negative   Leuk Esterase: Small / RBC: 5 /HPF / WBC 15 /HPF   Sq Epi: x / Non Sq Epi: 2 /HPF / Bacteria: Negative        MICROBIOLOGY:  RECENT CULTURES:   @ 22:50  .Urine Clean Catch (Midstream)  Escherichia coli ESBL  Escherichia coli ESBL  JONATHAN    50,000 - 99,000 CFU/mL Escherichia coli ESBL  --   @ 18:30  .Blood Blood  Blood Culture PCR  Escherichia coli ESBL  Blood Culture PCR  PCR    Growth in aerobic and anaerobic bottles: Escherichia coli ESBL  See previous culture [id number]  --     77 yo female with PMHx of DM on insulin pump, CAD s/p stenting in , Lung nodules, Dry eyes, bilateral, PVD (peripheral vascular disease): S/p Right Peripheral leg Stent, Seasonal allergies, Skin cancer: Basal Cell Cancer face  (around nose) s/p MOHS procedure x 3, Anemia: Chronic, on Iron supplement, Hypothyroid, SSS (sick sinus syndrome): S/p PPM 2017, Essential hypertension, Afib: 5+ yrs; on Eliquis admitted for pyelonephritis treatment complicated by right sided hydronephrosis, obstructive uropathy, TEOFILO and hyponatremia, over admission developed DKA and upgraded to critical care for DKA.     Overnight events:  Patient temperature 100.5 at 4:30 last night. Pt examined this morning at bedside. States she is feeling worse. Pt is lethargic but easily aroused. Denies abdominal pain or back pain. Had 2 episodes of tachycardia last night with 2 pushes of metoprolol 5mg. Pt weight went up by 6 kg.     ROS:  negative except as above.    Allergies    latex (Pruritus; Rash)  sulfonamides (Unknown)    Intolerances    Pineapple (Unknown)      ANTIBIOTICS/RELEVANT:  antimicrobials  meropenem  IVPB 750 milliGRAM(s) IV Intermittent every 12 hours    immunologic:    OTHER:  acetaminophen   Tablet .. 650 milliGRAM(s) Oral every 6 hours PRN  aluminum hydroxide/magnesium hydroxide/simethicone Suspension 30 milliLiter(s) Oral every 6 hours PRN  ascorbic acid Syrup 500 milliGRAM(s) Oral daily  atorvastatin 40 milliGRAM(s) Oral at bedtime  chlorhexidine 4% Liquid 1 Application(s) Topical <User Schedule>  clopidogrel Tablet 75 milliGRAM(s) Oral daily  dextrose 40% Gel 15 Gram(s) Oral once  dextrose 5% + sodium chloride 0.45% 1000 milliLiter(s) IV Continuous <Continuous>  dextrose 5%. 1000 milliLiter(s) IV Continuous <Continuous>  dextrose 5%. 1000 milliLiter(s) IV Continuous <Continuous>  dextrose 5%. 1000 milliLiter(s) IV Continuous <Continuous>  dextrose 50% Injectable 25 Gram(s) IV Push once  dextrose 50% Injectable 12.5 Gram(s) IV Push once  dextrose 50% Injectable 25 Gram(s) IV Push once  glucagon  Injectable 1 milliGRAM(s) IntraMuscular once  heparin   Injectable 5000 Unit(s) SubCutaneous every 8 hours  insulin lispro (ADMELOG) corrective regimen sliding scale   SubCutaneous three times a day before meals  insulin NPH human recombinant 15 Unit(s) SubCutaneous two times a day  levothyroxine 88 MICROGram(s) Oral daily  melatonin 5 milliGRAM(s) Oral at bedtime PRN  multivitamin 1 Tablet(s) Oral daily  pantoprazole    Tablet 40 milliGRAM(s) Oral before breakfast      Objective:  Vital Signs Last 24 Hrs  T(C): 38.1 (18 Mar 2021 04:22), Max: 38.1 (18 Mar 2021 04:22)  T(F): 100.5 (18 Mar 2021 04:22), Max: 100.5 (18 Mar 2021 04:22)  HR: 124 (18 Mar 2021 06:00) (94 - 124)  BP: 97/54 (18 Mar 2021 06:00) (81/53 - 158/80)  BP(mean): 64 (18 Mar 2021 06:00) (51 - 124)  RR: 27 (18 Mar 2021 06:00) (18 - 28)  SpO2: 99% (18 Mar 2021 06:00) (98% - 100%)    PHYSICAL EXAM:  Gen: lethargic but easily aroused, NAD  Heart: Regular rate and rhythm, S1 and S2 no murmurs or rubs  Lungs: Clear to auscultation bilaterally, no crackles  Abd: Soft, non distended   Extremities: No edema      LABS:                        11.2   17.57 )-----------( 62       ( 17 Mar 2021 04:35 )             31.7     03-17    118<LL>  |  95<L>  |  49<H>  ----------------------------<  350<H>  4.7   |  14<L>  |  1.4    Ca    6.7<L>      17 Mar 2021 16:00  Mg     2.3     03-16    TPro  4.2<L>  /  Alb  2.2<L>  /  TBili  0.3  /  DBili  x   /  AST  32  /  ALT  29  /  AlkPhos  137<H>  03-17    PT/INR - ( 18 Mar 2021 04:40 )   PT: 13.50 sec;   INR: 1.17 ratio         PTT - ( 18 Mar 2021 04:40 )  PTT:26.8 sec  Urinalysis Basic - ( 16 Mar 2021 20:05 )    Color: Yellow / Appearance: Clear / S.018 / pH: x  Gluc: x / Ketone: Small  / Bili: Negative / Urobili: <2 mg/dL   Blood: x / Protein: 100 mg/dL / Nitrite: Negative   Leuk Esterase: Small / RBC: 5 /HPF / WBC 15 /HPF   Sq Epi: x / Non Sq Epi: 2 /HPF / Bacteria: Negative        MICROBIOLOGY:  RECENT CULTURES:   @ 22:50  .Urine Clean Catch (Midstream)  Escherichia coli ESBL  Escherichia coli ESBL  JONATHAN    50,000 - 99,000 CFU/mL Escherichia coli ESBL  --   @ 18:30  .Blood Blood  Blood Culture PCR  Escherichia coli ESBL  Blood Culture PCR  PCR    Growth in aerobic and anaerobic bottles: Escherichia coli ESBL  See previous culture [id number]  --     77 yo female with PMHx of DM on insulin pump, CAD s/p stenting in , Lung nodules, Dry eyes, bilateral, PVD (peripheral vascular disease): S/p Right Peripheral leg Stent, Seasonal allergies, Skin cancer: Basal Cell Cancer face  (around nose) s/p MOHS procedure x 3, Anemia: Chronic, on Iron supplement, Hypothyroid, SSS (sick sinus syndrome): S/p PPM 2017, Essential hypertension, Afib: 5+ yrs; on Eliquis admitted for pyelonephritis treatment complicated by right sided hydronephrosis, obstructive uropathy, TEOFILO and hyponatremia, over admission developed DKA and upgraded to critical care for DKA.     Overnight events:  Patient temperature 100.5 at 4:30 last night. Pt examined this morning at bedside. States she is feeling worse. Pt is lethargic but easily aroused. Denies abdominal pain or back pain. Had 2 episodes of tachycardia last night with 2 pushes of metoprolol 5mg. Pt weight went up by 6 kg. This morning patient with more episodes of tachycardia to 120-135 was placed on amiodarone drip with some BP drop to 67/40 corrected with 1L bolus now / 50-56. IJ placed this afternoon for blood pressure control.     ROS:  negative except as above.    Allergies    latex (Pruritus; Rash)  sulfonamides (Unknown)    Intolerances    Pineapple (Unknown)    ANTIBIOTICS/RELEVANT:  antimicrobials  meropenem  IVPB 750 milliGRAM(s) IV Intermittent every 12 hours      OTHER:  acetaminophen   Tablet .. 650 milliGRAM(s) Oral every 6 hours PRN  aluminum hydroxide/magnesium hydroxide/simethicone Suspension 30 milliLiter(s) Oral every 6 hours PRN  ascorbic acid Syrup 500 milliGRAM(s) Oral daily  atorvastatin 40 milliGRAM(s) Oral at bedtime  chlorhexidine 4% Liquid 1 Application(s) Topical <User Schedule>  clopidogrel Tablet 75 milliGRAM(s) Oral daily  dextrose 40% Gel 15 Gram(s) Oral once  dextrose 5% + sodium chloride 0.45% 1000 milliLiter(s) IV Continuous <Continuous>  dextrose 5%. 1000 milliLiter(s) IV Continuous <Continuous>  dextrose 5%. 1000 milliLiter(s) IV Continuous <Continuous>  dextrose 5%. 1000 milliLiter(s) IV Continuous <Continuous>  dextrose 50% Injectable 25 Gram(s) IV Push once  dextrose 50% Injectable 12.5 Gram(s) IV Push once  dextrose 50% Injectable 25 Gram(s) IV Push once  glucagon  Injectable 1 milliGRAM(s) IntraMuscular once  heparin   Injectable 5000 Unit(s) SubCutaneous every 8 hours  insulin lispro (ADMELOG) corrective regimen sliding scale   SubCutaneous three times a day before meals  insulin NPH human recombinant 15 Unit(s) SubCutaneous two times a day  levothyroxine 88 MICROGram(s) Oral daily  melatonin 5 milliGRAM(s) Oral at bedtime PRN  multivitamin 1 Tablet(s) Oral daily  pantoprazole    Tablet 40 milliGRAM(s) Oral before breakfast    Objective:  Vital Signs Last 24 Hrs  T(C): 38.1 (18 Mar 2021 04:22), Max: 38.1 (18 Mar 2021 04:22)  T(F): 100.5 (18 Mar 2021 04:22), Max: 100.5 (18 Mar 2021 04:22)  HR: 124 (18 Mar 2021 06:00) (94 - 124)  BP: 97/54 (18 Mar 2021 06:00) (81/53 - 158/80)  BP(mean): 64 (18 Mar 2021 06:00) (51 - 124)  RR: 27 (18 Mar 2021 06:00) (18 - 28)  SpO2: 99% (18 Mar 2021 06:00) (98% - 100%)    PHYSICAL EXAM:  Gen: lethargic but easily aroused, NAD  Heart: Regular rate and rhythm, S1 and S2 no murmurs or rubs  Lungs: Clear to auscultation bilaterally, no crackles  Abd: Soft, non distended   Extremities: No edema      LABS:                        11.2   17.57 )-----------( 62       ( 17 Mar 2021 04:35 )             31.7     03-17    118<LL>  |  95<L>  |  49<H>  ----------------------------<  350<H>  4.7   |  14<L>  |  1.4    Ca    6.7<L>      17 Mar 2021 16:00  Mg     2.3     03-16    TPro  4.2<L>  /  Alb  2.2<L>  /  TBili  0.3  /  DBili  x   /  AST  32  /  ALT  29  /  AlkPhos  137<H>      PT/INR - ( 18 Mar 2021 04:40 )   PT: 13.50 sec;   INR: 1.17 ratio         PTT - ( 18 Mar 2021 04:40 )  PTT:26.8 sec  Urinalysis Basic - ( 16 Mar 2021 20:05 )    Color: Yellow / Appearance: Clear / S.018 / pH: x  Gluc: x / Ketone: Small  / Bili: Negative / Urobili: <2 mg/dL   Blood: x / Protein: 100 mg/dL / Nitrite: Negative   Leuk Esterase: Small / RBC: 5 /HPF / WBC 15 /HPF   Sq Epi: x / Non Sq Epi: 2 /HPF / Bacteria: Negative        MICROBIOLOGY:  RECENT CULTURES:   @ 22:50  .Urine Clean Catch (Midstream)  Escherichia coli ESBL  Escherichia coli ESBL  JONATHAN    50,000 - 99,000 CFU/mL Escherichia coli ESBL  --   @ 18:30  .Blood Blood  Blood Culture PCR  Escherichia coli ESBL  Blood Culture PCR  PCR    Growth in aerobic and anaerobic bottles: Escherichia coli ESBL  See previous culture [id number]  --

## 2021-03-18 NOTE — PROGRESS NOTE ADULT - ASSESSMENT
1. Sepsis: secondary Pyelonephritis and bacteremia, continue tx with ABX as per ID, continue supportive care, monitor for now.  Pt is critically ill and high risk for decompnesation and needing to be intubated and started on vasopressors, continue monitor closley for now.    2. Right sided Pyelonephritis: secondary to Hydronephrosis, continue tx with empiric abx as per ID, continue supportive care, monitor for now. F/u Repeat US f/u Urology recs    3. Bacteremia: growing ECOLI ESBL, continue tx as per ID with meropenum, continue supportive care, monitor for now.    4. TEOFILO/ Hyponatremia/ Metabolic acidosis: secondary to sepsis induced ATN with right sided hydronephrosis, f/u urology recs. tx Biacrb gtt.    5. NSTEMI/CAD s/p PCI with LUPE placed LAD: secondary to sepsis induced demand ischemia, less likely true ACS, continue supportive care for now, f/u CE's, Hold Plavix for now    6. DM with Hyperglycemia: tx NPH BID, tx sliding scale, monitor for now.    7. Thrombocytopenia: secondary to sepsis induced BM suppression, continue supportive care, monitor for now. Hold Plavix for now.    8. PVD s/p right lower ext stent: continue supportive care, monitor for now.    9. Basal cell CA: continue supportive care, monitor for now.    10. Hypothyroidism: tx synthroid, continue supportive care, monitor for now.    11. SSS s/p Pacemaker: continue supportive care, monitor for now.    12. HTN: continue supportive care, monitor for now.    13. AFIB s/p Ablation: tx Amio gtt continue supportive care, monitor for now.    14. HLD: Hold oral meds, continue supportive care, monitor for now.    15. DVT px: tx hep subcut ,lower ext US neg for DVT.    16. NUT: tx IVF's, tkeep NPO    Pt is critically ill and high risk for decompnesation and needing to be intubated and started on vasopressors, continue monitor closley for now.  Critical Care Time not including procedures 35 mins

## 2021-03-18 NOTE — PROCEDURE NOTE - SUPERVISORY STATEMENT
right IJ tlc placed via us guidance after consent obtained using sterile technique not including critical care time

## 2021-03-19 NOTE — PROGRESS NOTE ADULT - ASSESSMENT
75 yo Female with pmh of recurrent UTIs, Afib on eliquis (stopped), PVD, admitted with pyelonephritis & right hydroureteronephrosis without obstructing calculus    Plan:  ·	RBUS was requested yesterday to assess b/l ureteral jets, however sono was done but no assessment or comments made on ureteral jets   ·	case discussed with Dr. Velarde, repeat bladder sono (can clamp wiggins catheter) to assess b/l ureteral jets  ·	keep NPO for now   ·	continue PO hydration   ·	irrigate wiggins with sterile water prn   ·	continue IV abx as per ID  77 yo Female with pmh of recurrent UTIs, Afib on eliquis (stopped), PVD, admitted with pyelonephritis & right hydroureteronephrosis without obstructing calculus    Plan:  ·	RBUS was requested yesterday to assess b/l ureteral jets, however sono was done but no assessment or comments made on ureteral jets   ·	case discussed with Dr. Velarde, repeat bladder sono (can clamp wiggins catheter) to assess b/l ureteral jets  ·	keep NPO for now   ·	continue PO hydration   ·	irrigate wiggins with sterile water prn   ·	continue IV abx as per ID     ADDENDUM:  ·	case discussed with Dr. Velarde & Dr. Bazzi, as per Dr. Bazzi unable to get bedside bladder sono while in the CCU. Now recommending to hydrate pt well to minimize ATN, can consider mucomyst to protect the kidneys from contrast for CT urogram.

## 2021-03-19 NOTE — PROGRESS NOTE ADULT - SUBJECTIVE AND OBJECTIVE BOX
Patient is a 76y old  Female who presents with a chief complaint of Sepsis (19 Mar 2021 13:30)      Pt remains in ICU she is awake and orienetd        ROS:     All ROS are negative except HPI         PHYSICAL EXAM    ICU Vital Signs Last 24 Hrs  T(C): 36.7 (19 Mar 2021 12:00), Max: 36.7 (18 Mar 2021 23:00)  T(F): 98.1 (19 Mar 2021 12:00), Max: 98.1 (19 Mar 2021 12:00)  HR: 108 (19 Mar 2021 13:00) (86 - 110)  BP: 121/59 (19 Mar 2021 13:00) (90/54 - 138/64)  BP(mean): 81 (19 Mar 2021 13:00) (55 - 90)  ABP: --  ABP(mean): --  RR: 18 (19 Mar 2021 13:00) (16 - 28)  SpO2: 99% (19 Mar 2021 13:00) (99% - 100%)      CONSTITUTIONAL:  Pt remains chronically ill in ICU    ENT:   Airway patent,   Mouth with normal mucosa.   No thrush    EYES:   Pupils equal,   Round and reactive to light.    CARDIAC:   Normal rate,   Regular rhythm.    No edema      Vascular:  Normal systolic impulse  No Carotid bruits    RESPIRATORY:   No wheezing  Bilateral BS  Normal chest expansion  Not tachypneic,  No use of accessory muscles    GASTROINTESTINAL:  Abdomen soft,   Non-tender,   No guarding,   + BS    MUSCULOSKELETAL:   Range of motion is not limited,  No clubbing, cyanosis    NEUROLOGICAL:   Alert and oriented   No motor  deficits.    SKIN:   Skin normal color for race,   Warm and dry and intact.   No evidence of rash.    PSYCHIATRIC:   Normal mood and affect.   No apparent risk to self or others.    HEMATOLOGICAL:  No cervical  lymphadenopathy.  no inguinal lymphadenopathy      03-18-21 @ 07:01  -  03-19-21 @ 07:00  --------------------------------------------------------  IN:    Amiodarone: 199.8 mL    Amiodarone: 150 mL    Amiodarone: 199.2 mL    dextrose 5% + sodium chloride 0.45% w/ Additives: 300 mL    IV PiggyBack: 650 mL    Oral Fluid: 80 mL    Sodium Bicarbonate: 1600 mL    Sodium Chloride 0.9% Bolus: 1000 mL  Total IN: 4179 mL    OUT:    Indwelling Catheter - Urethral (mL): 775 mL    Norepinephrine: 0 mL  Total OUT: 775 mL    Total NET: 3404 mL      03-19-21 @ 07:01  -  03-19-21 @ 14:04  --------------------------------------------------------  IN:    Amiodarone: 33.4 mL    IV PiggyBack: 200 mL    Sodium Bicarbonate: 250 mL    Sodium Bicarbonate: 80 mL  Total IN: 563.4 mL    OUT:    Indwelling Catheter - Urethral (mL): 545 mL  Total OUT: 545 mL    Total NET: 18.4 mL          LABS:                            9.5    22.79 )-----------( 19       ( 19 Mar 2021 04:50 )             25.8                                               03-19    123<L>  |  92<L>  |  40<H>  ----------------------------<  265<H>  3.6   |  20  |  1.4    Ca    6.8<L>      19 Mar 2021 04:50  Phos  2.8     03-19  Mg     2.2     03-19    TPro  4.1<L>  /  Alb  2.0<L>  /  TBili  0.4  /  DBili  x   /  AST  68<H>  /  ALT  56<H>  /  AlkPhos  190<H>  03-19      PT/INR - ( 18 Mar 2021 04:40 )   PT: 13.50 sec;   INR: 1.17 ratio         PTT - ( 18 Mar 2021 04:40 )  PTT:26.8 sec                                           CARDIAC MARKERS ( 18 Mar 2021 15:55 )  x     / 0.21 ng/mL / x     / x     / x      CARDIAC MARKERS ( 17 Mar 2021 16:00 )  x     / x     / 29 U/L / x     / x                                                LIVER FUNCTIONS - ( 19 Mar 2021 04:50 )  Alb: 2.0 g/dL / Pro: 4.1 g/dL / ALK PHOS: 190 U/L / ALT: 56 U/L / AST: 68 U/L / GGT: x                                                  Culture - Blood (collected 18 Mar 2021 04:30)  Source: .Blood Blood-Peripheral  Gram Stain (19 Mar 2021 02:23):    Growth in anaerobic bottle: Gram Negative Rods  Preliminary Report (19 Mar 2021 02:23):    Growth in anaerobic bottle: Gram Negative Rods                                                                                       ABG - ( 18 Mar 2021 14:45 )  pH, Arterial: 7.42  pH, Blood: x     /  pCO2: 25    /  pO2: 80    / HCO3: 16    / Base Excess: -6.9  /  SaO2: 98                  MEDICATIONS  (STANDING):  ALBUTerol    90 MICROgram(s) HFA Inhaler 1 Puff(s) Inhalation every 6 hours  aMIOdarone    Tablet 200 milliGRAM(s) Oral two times a day  chlorhexidine 4% Liquid 1 Application(s) Topical <User Schedule>  dextrose 40% Gel 15 Gram(s) Oral once  dextrose 5%. 1000 milliLiter(s) (100 mL/Hr) IV Continuous <Continuous>  dextrose 5%. 1000 milliLiter(s) (50 mL/Hr) IV Continuous <Continuous>  dextrose 5%. 1000 milliLiter(s) (100 mL/Hr) IV Continuous <Continuous>  dextrose 50% Injectable 25 Gram(s) IV Push once  dextrose 50% Injectable 12.5 Gram(s) IV Push once  dextrose 50% Injectable 25 Gram(s) IV Push once  glucagon  Injectable 1 milliGRAM(s) IntraMuscular once  insulin lispro (ADMELOG) corrective regimen sliding scale   SubCutaneous three times a day before meals  insulin NPH human recombinant 20 Unit(s) SubCutaneous two times a day  levothyroxine 88 MICROGram(s) Oral daily  meropenem  IVPB 1000 milliGRAM(s) IV Intermittent every 12 hours  multivitamin 1 Tablet(s) Oral daily  pantoprazole  Injectable 40 milliGRAM(s) IV Push daily  sodium bicarbonate  Infusion 0.113 mEq/kG/Hr (50 mL/Hr) IV Continuous <Continuous>    MEDICATIONS  (PRN):  acetaminophen   Tablet .. 650 milliGRAM(s) Oral every 6 hours PRN Temp greater or equal to 38C (100.4F), Mild Pain (1 - 3)  aluminum hydroxide/magnesium hydroxide/simethicone Suspension 30 milliLiter(s) Oral every 6 hours PRN Dyspepsia      New X-rays reviewed:                                                                                  ECHO    CXR interpreted by me:

## 2021-03-19 NOTE — PROGRESS NOTE ADULT - SUBJECTIVE AND OBJECTIVE BOX
Progress Note    Patient is a 77yo Female with pmh of recurrent UTIs, Afib on eliquis (stopped), PVD, admitted with pyelonephritis & right hydroureteronephrosis without obstructing calculus. Pt seen and examined this morning. RN reports pt's urine looks more cloudy and pink colored. Pt developed DKA and was upgraded to CCU on 3/16, last night pt was at risk for decompensation needing to be started on vasopressors. Pt this morning reports feeling better today, PCA at bedside to place bed pan. Pt denies fever, chills, N/V, abdominal pain, or flank pain.       [ x ] a 10 point review of systems was negative except where noted          Vital signs  T(C): , Max: 36.7 (03-18-21 @ 23:00)  HR: 100 (03-19-21 @ 10:00)  BP: 110/60 (03-19-21 @ 10:00)  SpO2: 100% (03-19-21 @ 10:00)    Physical Exam  Gen: NAD, alert & awake, well developed   HEENT: NC/AT  Respiratory: no accessory muscle use   Back: no CVA tenderness   Abd: soft, nontender, nondistened  : +wiggins in place draining pink milky urine, no clots noted  Ext: + third spacing, pitting edema x all 4 extremities   Neuro: A&O x3      Labs                        9.5    22.79 )-----------( 19       ( 19 Mar 2021 04:50 )             25.8     19 Mar 2021 04:50    123    |  92     |  40     ----------------------------<  265    3.6     |  20     |  1.4      Ca    6.8        19 Mar 2021 04:50  Phos  2.8       19 Mar 2021 04:50  Mg     2.2       19 Mar 2021 04:50      I&O's Detail    18 Mar 2021 07:01  -  19 Mar 2021 07:00  --------------------------------------------------------  IN:    Amiodarone: 199.8 mL    Amiodarone: 150 mL    Amiodarone: 199.2 mL    dextrose 5% + sodium chloride 0.45% w/ Additives: 300 mL    IV PiggyBack: 650 mL    Oral Fluid: 80 mL    Sodium Bicarbonate: 1600 mL    Sodium Chloride 0.9% Bolus: 1000 mL  Total IN: 4179 mL    OUT:    Indwelling Catheter - Urethral (mL): 775 mL    Norepinephrine: 0 mL  Total OUT: 775 mL    Total NET: 3404 mL      19 Mar 2021 07:01  -  19 Mar 2021 11:33  --------------------------------------------------------  IN:    Amiodarone: 33.4 mL    IV PiggyBack: 100 mL    Sodium Bicarbonate: 80 mL    Sodium Bicarbonate: 150 mL  Total IN: 363.4 mL    OUT:    Indwelling Catheter - Urethral (mL): 405 mL  Total OUT: 405 mL    Total NET: -41.6 mL        < from: US Kidney and Bladder (03.18.21 @ 22:50) >    EXAM:  US KIDNEYS AND BLADDER            PROCEDURE DATE:  03/18/2021            INTERPRETATION:  CLINICAL INFORMATION: Hydronephrosis. Renal failure.    COMPARISON: Renal ultrasound March 16, 2021, CT abdomen and pelvis March 16, 2021.    TECHNIQUE: Sonography of the kidneys and bladder.    FINDINGS:  RIGHT KIDNEY: Normal in echogenicity, size measuring 10.2 cm in length. Unchanged mild to moderate right hydronephrosis, correlating with CT. Vascular flow is demonstrated at the hilum.    LEFT KIDNEY: Normal in echogenicity, size measuring 10.1 cm in length. No evidence of hydronephrosis or calculus. Vascular flow is demonstrated at the hilum.    Urinary bladder nondistended with a Wiggins catheter. Abdominopelvic ascites noted.      IMPRESSION:  Unchanged mild to moderate right hydronephrosis, correlating with CT.  Urinary bladder nondistended with a Wiggins catheter.  Abdominopelvic ascites.        ARACELI REYNOLDS MD; Attending Radiologist  This document has been electronically signed. Mar 18 2021 10:55PM    < end of copied text >            Progress Note    Patient is a 75yo Female with pmh of recurrent UTIs, Afib on eliquis (stopped), PVD, admitted with pyelonephritis & right hydroureteronephrosis without obstructing calculus. Pt seen and examined this morning. RN reports pt's urine looks more cloudy and pink colored. Pt developed DKA and was upgraded to CCU on 3/16, last night pt was at risk for decompensation needing to be started on vasopressors. Pt this morning reports feeling better today, PCA at bedside to place bed pan. Pt denies fever, chills, N/V, abdominal pain, or flank pain.       [ x ] a 10 point review of systems was negative except where noted          Vital signs  T(C): , Max: 36.7 (03-18-21 @ 23:00)  HR: 100 (03-19-21 @ 10:00)  BP: 110/60 (03-19-21 @ 10:00)  SpO2: 100% (03-19-21 @ 10:00)    Physical Exam  Gen: NAD, alert & awake, well developed   HEENT: NC/AT  Respiratory: no accessory muscle use   Back: no CVA tenderness   Abd: soft, nontender, nondistened  : +wiggins in place draining pink milky urine, no clots noted, no cva tenderness bilaterally, no suprapubic fullness  Ext: + third spacing, pitting edema x all 4 extremities   Neuro: A&O x3      Labs                        9.5    22.79 )-----------( 19       ( 19 Mar 2021 04:50 )             25.8     19 Mar 2021 04:50    123    |  92     |  40     ----------------------------<  265    3.6     |  20     |  1.4      Ca    6.8        19 Mar 2021 04:50  Phos  2.8       19 Mar 2021 04:50  Mg     2.2       19 Mar 2021 04:50      I&O's Detail    18 Mar 2021 07:01  -  19 Mar 2021 07:00  --------------------------------------------------------  IN:    Amiodarone: 199.8 mL    Amiodarone: 150 mL    Amiodarone: 199.2 mL    dextrose 5% + sodium chloride 0.45% w/ Additives: 300 mL    IV PiggyBack: 650 mL    Oral Fluid: 80 mL    Sodium Bicarbonate: 1600 mL    Sodium Chloride 0.9% Bolus: 1000 mL  Total IN: 4179 mL    OUT:    Indwelling Catheter - Urethral (mL): 775 mL    Norepinephrine: 0 mL  Total OUT: 775 mL    Total NET: 3404 mL      19 Mar 2021 07:01  -  19 Mar 2021 11:33  --------------------------------------------------------  IN:    Amiodarone: 33.4 mL    IV PiggyBack: 100 mL    Sodium Bicarbonate: 80 mL    Sodium Bicarbonate: 150 mL  Total IN: 363.4 mL    OUT:    Indwelling Catheter - Urethral (mL): 405 mL  Total OUT: 405 mL    Total NET: -41.6 mL        < from: US Kidney and Bladder (03.18.21 @ 22:50) >    EXAM:  US KIDNEYS AND BLADDER            PROCEDURE DATE:  03/18/2021            INTERPRETATION:  CLINICAL INFORMATION: Hydronephrosis. Renal failure.    COMPARISON: Renal ultrasound March 16, 2021, CT abdomen and pelvis March 16, 2021.    TECHNIQUE: Sonography of the kidneys and bladder.    FINDINGS:  RIGHT KIDNEY: Normal in echogenicity, size measuring 10.2 cm in length. Unchanged mild to moderate right hydronephrosis, correlating with CT. Vascular flow is demonstrated at the hilum.    LEFT KIDNEY: Normal in echogenicity, size measuring 10.1 cm in length. No evidence of hydronephrosis or calculus. Vascular flow is demonstrated at the hilum.    Urinary bladder nondistended with a Wiggins catheter. Abdominopelvic ascites noted.      IMPRESSION:  Unchanged mild to moderate right hydronephrosis, correlating with CT.  Urinary bladder nondistended with a Wiggins catheter.  Abdominopelvic ascites.        ARACELI REYNOLDS MD; Attending Radiologist  This document has been electronically signed. Mar 18 2021 10:55PM    < end of copied text >

## 2021-03-19 NOTE — PROGRESS NOTE ADULT - SUBJECTIVE AND OBJECTIVE BOX
Patient is a 76y old  Female who presents with a chief complaint of Sepsis (19 Mar 2021 11:09)      INTERVAL HPI/OVERNIGHT EVENTS: Pt had one hypoxic episode to the 70s that improved back to the 90s with nonrebreather. Otherwise, no acute events.     ICU Vital Signs Last 24 Hrs  T(C): 36.7 (19 Mar 2021 12:00), Max: 36.7 (18 Mar 2021 23:00)  T(F): 98.1 (19 Mar 2021 12:00), Max: 98.1 (19 Mar 2021 12:00)  HR: 108 (19 Mar 2021 13:00) (86 - 110)  BP: 121/59 (19 Mar 2021 13:00) (90/54 - 138/64)  BP(mean): 81 (19 Mar 2021 13:00) (55 - 90)  ABP: --  ABP(mean): --  RR: 18 (19 Mar 2021 13:00) (16 - 28)  SpO2: 99% (19 Mar 2021 13:00) (98% - 100%)    I&O's Summary    18 Mar 2021 07:01  -  19 Mar 2021 07:00  --------------------------------------------------------  IN: 4179 mL / OUT: 775 mL / NET: 3404 mL    19 Mar 2021 07:01  -  19 Mar 2021 13:34  --------------------------------------------------------  IN: 563.4 mL / OUT: 545 mL / NET: 18.4 mL          LABS:                        9.5    22.79 )-----------( 19       ( 19 Mar 2021 04:50 )             25.8     03-19    123<L>  |  92<L>  |  40<H>  ----------------------------<  265<H>  3.6   |  20  |  1.4    Ca    6.8<L>      19 Mar 2021 04:50  Phos  2.8     03-19  Mg     2.2     03-19    TPro  4.1<L>  /  Alb  2.0<L>  /  TBili  0.4  /  DBili  x   /  AST  68<H>  /  ALT  56<H>  /  AlkPhos  190<H>  03-19    PT/INR - ( 18 Mar 2021 04:40 )   PT: 13.50 sec;   INR: 1.17 ratio         PTT - ( 18 Mar 2021 04:40 )  PTT:26.8 sec    CAPILLARY BLOOD GLUCOSE      POCT Blood Glucose.: 203 mg/dL (19 Mar 2021 11:20)  POCT Blood Glucose.: 246 mg/dL (19 Mar 2021 08:17)  POCT Blood Glucose.: 270 mg/dL (19 Mar 2021 06:13)  POCT Blood Glucose.: 280 mg/dL (18 Mar 2021 22:12)  POCT Blood Glucose.: 183 mg/dL (18 Mar 2021 16:21)  POCT Blood Glucose.: 196 mg/dL (18 Mar 2021 14:51)    ABG - ( 18 Mar 2021 14:45 )  pH, Arterial: 7.42  pH, Blood: x     /  pCO2: 25    /  pO2: 80    / HCO3: 16    / Base Excess: -6.9  /  SaO2: 98                  RADIOLOGY & ADDITIONAL TESTS:    Consultant(s) Notes Reviewed:  [x ] YES  [ ] NO    MEDICATIONS  (STANDING):  ALBUTerol    90 MICROgram(s) HFA Inhaler 1 Puff(s) Inhalation every 6 hours  aMIOdarone    Tablet 200 milliGRAM(s) Oral two times a day  chlorhexidine 4% Liquid 1 Application(s) Topical <User Schedule>  dextrose 40% Gel 15 Gram(s) Oral once  dextrose 5%. 1000 milliLiter(s) (100 mL/Hr) IV Continuous <Continuous>  dextrose 5%. 1000 milliLiter(s) (50 mL/Hr) IV Continuous <Continuous>  dextrose 5%. 1000 milliLiter(s) (100 mL/Hr) IV Continuous <Continuous>  dextrose 50% Injectable 25 Gram(s) IV Push once  dextrose 50% Injectable 12.5 Gram(s) IV Push once  dextrose 50% Injectable 25 Gram(s) IV Push once  glucagon  Injectable 1 milliGRAM(s) IntraMuscular once  insulin lispro (ADMELOG) corrective regimen sliding scale   SubCutaneous three times a day before meals  insulin NPH human recombinant 20 Unit(s) SubCutaneous two times a day  levothyroxine 88 MICROGram(s) Oral daily  meropenem  IVPB 1000 milliGRAM(s) IV Intermittent every 12 hours  multivitamin 1 Tablet(s) Oral daily  pantoprazole  Injectable 40 milliGRAM(s) IV Push daily  sodium bicarbonate  Infusion 0.113 mEq/kG/Hr (50 mL/Hr) IV Continuous <Continuous>    MEDICATIONS  (PRN):  acetaminophen   Tablet .. 650 milliGRAM(s) Oral every 6 hours PRN Temp greater or equal to 38C (100.4F), Mild Pain (1 - 3)  aluminum hydroxide/magnesium hydroxide/simethicone Suspension 30 milliLiter(s) Oral every 6 hours PRN Dyspepsia      PHYSICAL EXAM:  Gen: lethargic but easily aroused, NAD  Heart: Regular rate and rhythm, S1 and S2 no murmurs or rubs  Lungs: Clear to auscultation bilaterally, no crackles  Abd: Distended, nontender   Extremities: No edema        Care Discussed with Consultants/Other Providers [ x] YES  [ ] NO Patient is a 76y old  Female who presents with a chief complaint of Sepsis (19 Mar 2021 11:09)      INTERVAL HPI/OVERNIGHT EVENTS: No acute events over night.   ICU Vital Signs Last 24 Hrs  T(C): 36.7 (19 Mar 2021 12:00), Max: 36.7 (18 Mar 2021 23:00)  T(F): 98.1 (19 Mar 2021 12:00), Max: 98.1 (19 Mar 2021 12:00)  HR: 108 (19 Mar 2021 13:00) (86 - 110)  BP: 121/59 (19 Mar 2021 13:00) (90/54 - 138/64)  BP(mean): 81 (19 Mar 2021 13:00) (55 - 90)  ABP: --  ABP(mean): --  RR: 18 (19 Mar 2021 13:00) (16 - 28)  SpO2: 99% (19 Mar 2021 13:00) (98% - 100%)    I&O's Summary    18 Mar 2021 07:01  -  19 Mar 2021 07:00  --------------------------------------------------------  IN: 4179 mL / OUT: 775 mL / NET: 3404 mL    19 Mar 2021 07:01  -  19 Mar 2021 13:34  --------------------------------------------------------  IN: 563.4 mL / OUT: 545 mL / NET: 18.4 mL          LABS:                        9.5    22.79 )-----------( 19       ( 19 Mar 2021 04:50 )             25.8     03-19    123<L>  |  92<L>  |  40<H>  ----------------------------<  265<H>  3.6   |  20  |  1.4    Ca    6.8<L>      19 Mar 2021 04:50  Phos  2.8     03-19  Mg     2.2     03-19    TPro  4.1<L>  /  Alb  2.0<L>  /  TBili  0.4  /  DBili  x   /  AST  68<H>  /  ALT  56<H>  /  AlkPhos  190<H>  03-19    PT/INR - ( 18 Mar 2021 04:40 )   PT: 13.50 sec;   INR: 1.17 ratio         PTT - ( 18 Mar 2021 04:40 )  PTT:26.8 sec    CAPILLARY BLOOD GLUCOSE      POCT Blood Glucose.: 203 mg/dL (19 Mar 2021 11:20)  POCT Blood Glucose.: 246 mg/dL (19 Mar 2021 08:17)  POCT Blood Glucose.: 270 mg/dL (19 Mar 2021 06:13)  POCT Blood Glucose.: 280 mg/dL (18 Mar 2021 22:12)  POCT Blood Glucose.: 183 mg/dL (18 Mar 2021 16:21)  POCT Blood Glucose.: 196 mg/dL (18 Mar 2021 14:51)    ABG - ( 18 Mar 2021 14:45 )  pH, Arterial: 7.42  pH, Blood: x     /  pCO2: 25    /  pO2: 80    / HCO3: 16    / Base Excess: -6.9  /  SaO2: 98                  RADIOLOGY & ADDITIONAL TESTS:    Consultant(s) Notes Reviewed:  [x ] YES  [ ] NO    MEDICATIONS  (STANDING):  ALBUTerol    90 MICROgram(s) HFA Inhaler 1 Puff(s) Inhalation every 6 hours  aMIOdarone    Tablet 200 milliGRAM(s) Oral two times a day  chlorhexidine 4% Liquid 1 Application(s) Topical <User Schedule>  dextrose 40% Gel 15 Gram(s) Oral once  dextrose 5%. 1000 milliLiter(s) (100 mL/Hr) IV Continuous <Continuous>  dextrose 5%. 1000 milliLiter(s) (50 mL/Hr) IV Continuous <Continuous>  dextrose 5%. 1000 milliLiter(s) (100 mL/Hr) IV Continuous <Continuous>  dextrose 50% Injectable 25 Gram(s) IV Push once  dextrose 50% Injectable 12.5 Gram(s) IV Push once  dextrose 50% Injectable 25 Gram(s) IV Push once  glucagon  Injectable 1 milliGRAM(s) IntraMuscular once  insulin lispro (ADMELOG) corrective regimen sliding scale   SubCutaneous three times a day before meals  insulin NPH human recombinant 20 Unit(s) SubCutaneous two times a day  levothyroxine 88 MICROGram(s) Oral daily  meropenem  IVPB 1000 milliGRAM(s) IV Intermittent every 12 hours  multivitamin 1 Tablet(s) Oral daily  pantoprazole  Injectable 40 milliGRAM(s) IV Push daily  sodium bicarbonate  Infusion 0.113 mEq/kG/Hr (50 mL/Hr) IV Continuous <Continuous>    MEDICATIONS  (PRN):  acetaminophen   Tablet .. 650 milliGRAM(s) Oral every 6 hours PRN Temp greater or equal to 38C (100.4F), Mild Pain (1 - 3)  aluminum hydroxide/magnesium hydroxide/simethicone Suspension 30 milliLiter(s) Oral every 6 hours PRN Dyspepsia      PHYSICAL EXAM:  Gen: lethargic but easily aroused, NAD  Heart: Regular rate and rhythm, S1 and S2 no murmurs or rubs  Lungs: Clear to auscultation bilaterally, no crackles  Abd: nontender  Extremities: No edema        Care Discussed with Consultants/Other Providers [ x] YES  [ ] NO

## 2021-03-19 NOTE — PROGRESS NOTE ADULT - ASSESSMENT
1. Sepsis: secondary Pyelonephritis and bacteremia, continue tx with ABX as per ID, continue supportive care, monitor for now.    2. Right sided Pyelonephritis: secondary to Hydronephrosis, continue tx with empiric abx as per ID, continue supportive care, monitor for now. F/u Repeat US f/u Urology recs    3. Bacteremia: growing ECOLI ESBL, continue tx as per ID with meropenum, continue supportive care, monitor for now.    4. TEOFILO/ Hyponatremia/ Metabolic acidosis: secondary to sepsis induced ATN with right sided hydronephrosis, f/u urology recs. tx Biacrb gtt.    5. NSTEMI/CAD s/p PCI with LUPE placed LAD: secondary to sepsis induced demand ischemia, less likely true ACS, continue supportive care for now, f/u CE's, Hold Plavix for now    6. DM with Hyperglycemia: tx NPH BID, tx sliding scale, monitor for now.    7. Thrombocytopenia: secondary to sepsis induced BM suppression, continue supportive care, monitor for now. Hold Plavix for now.    8. PVD s/p right lower ext stent: continue supportive care, monitor for now.    9. Basal cell CA: continue supportive care, monitor for now.    10. Hypothyroidism: tx synthroid, continue supportive care, monitor for now.    11. SSS s/p Pacemaker: continue supportive care, monitor for now.    12. HTN: continue supportive care, monitor for now.    13. AFIB s/p Ablation: tx Amio BID continue supportive care, monitor for now.    14. HLD: tx statin, continue supportive care, monitor for now.    15. DVT px: Hold AC due to thrombocytopenia, ,lower ext US neg for DVT.    16. NUT: tx IVF's, tx oral diet

## 2021-03-19 NOTE — PROGRESS NOTE ADULT - ASSESSMENT
75 yo female with PMHx of DM on insulin pump, CAD s/p stenting in 2020, Lung nodules, Dry eyes, bilateral, PVD (peripheral vascular disease): S/p Right Peripheral leg Stent, Seasonal allergies, Skin cancer: Basal Cell Cancer face  (around nose) s/p MOHS procedure x 3, Anemia: Chronic, on Iron supplement, Hypothyroid, SSS (sick sinus syndrome): S/p PPM Sept 2017, Essential hypertension, Afib: 5+ yrs; on Eliquis (held) admitted for pyelonephritis treatment complicated by right sided hydronephrosis, obstructive uropathy, TEOFILO and hyponatremia, over admission developed DKA and upgraded to critical care for DKA.     #Sepsis secondary to pyelonephritis  - Temp 100.5, episodes of tachycardia   - Blood cx with e. coli ESBL  - f/u AM labs  - ID note appreciated  - Meropenem 750 mg iv Q12H  - On discharge ertapenem 1 gm iv q24h for 14 days (Duration of 14 days from the last  intervention)  - Decrease urine output 500mL in the last 24 hours  - D/tanner amlodipine and carvedilol  - IJ placed this afternoon   - Levophed for hypotension     # R-sided Hydronephrosis and obstructive uropathy  - U/S (3/16): Unchanged mild to moderate right hydronephrosis.   - Cr 1.4 (2.2 on admission)  - Urology note appreciated   - F/U Renal U/S   - Will order renal scan with lasix     # Bacteremia  - Blood cx E Coli ESBL   - c/w Meropenem     #TEOFILO secondary to sepsis   - Urology note appreciated   - F/u Renal scan with lasix   - Monitor I&O     # NSTEMI/CAD s/p PCI with LUPE placed LAD: secondary to sepsis induced demand ischemia, less likely true ACS   - continue supportive care for now  - f/u CE's and ECHO  - d/tanner Plavix in light of thrombocytopenia     #DM with DKA - improved  -  > 260   - ABG 3/16: pH 7.3, pCO2 20, lactate 1, bicarb 9  - f/u repeat ABG  - NPH 20 units subQ    # Thrombocytopenia - secondary to sepsis  - Plt 62>84>126  - No acute bleeding  - Will continue to monitor     #Hyponatremia:  - Na 122 <- 118  - poor oral intake at home, Na on admission 121   - remains hyponatremic    - Serum osmolality 283, Urine osmolality 393, will recheck  - Sodium bicarb 150 mEq in D5  - f/u TSH and cortisol   - monitor for neurological signs, patient lethargic this morning, improved this afternoon    #Hyperkalemia--resolved  -Admission K 5.6 now 4.5 hemolyzed, will repeat in the AM  -c/w daily BMP monitoring    #CAD recent stenting in July 2020:  -D/tanner Plavix and statin in light of elevated transaminitis and thrombocytopenia     #Hypothyroidism   - levothyroxine 44mg IV  -f/u TSH    # Hx of SSS (s/p PPM placement in 09/2017)  - Evolution by EP 3/16: Device working properly, paroxysmal AF, no other events    #HTN  - d/tanner blood pressure medication in light of hypotension     #Chronic atrial fibrillation  - HR now 100  -d/tanner eliquis in light of TEOFILO   - Amiodarone infusion     #DLD  -d/tanner statin in light of transaminitis     Full Code  CHG  AIT  Diet: DASH  DVT PPX: Hep subq, b/l duplex u/s - negative for DVT (3/17)  GI ppx: Protonix     75 yo female with PMHx of DM on insulin pump, CAD s/p stenting in 2020, Lung nodules, Dry eyes, bilateral, PVD (peripheral vascular disease): S/p Right Peripheral leg Stent, Seasonal allergies, Skin cancer: Basal Cell Cancer face  (around nose) s/p MOHS procedure x 3, Anemia: Chronic, on Iron supplement, Hypothyroid, SSS (sick sinus syndrome): S/p PPM Sept 2017, Essential hypertension, Afib: 5+ yrs; on Eliquis (held) admitted for pyelonephritis treatment complicated by right sided hydronephrosis, obstructive uropathy, TEOFILO and hyponatremia, over admission developed DKA and upgraded to critical care for DKA.     #Sepsis secondary to pyelonephritis  - Temp 100.5, episodes of tachycardia   - Blood cx with e. coli ESBL  - f/u AM labs  - ID note appreciated  - Meropenem 750 mg iv Q12H  - On discharge ertapenem 1 gm iv q24h for 14 days (Duration of 14 days from the last  intervention)  - Decrease urine output 500mL in the last 24 hours  - D/tanner amlodipine and carvedilol  - IJ place  - Monitoring bp. Will start pressor if needed    # R-sided Hydronephrosis and obstructive uropathy  - U/S (3/16): Unchanged mild to moderate right hydronephrosis.   - Cr 1.4 (2.2 on admission)  - Urology note appreciated   - F/U repeat Renal U/S     # Bacteremia  - Blood cx E Coli ESBL   - c/w Meropenem     #TEOFILO secondary to sepsis   - Urology note appreciated   - F/u Renal scan with lasix   - Monitor I&O     # NSTEMI/CAD s/p PCI with LUPE placed LAD: secondary to sepsis induced demand ischemia, less likely true ACS   - continue supportive care for now  - trops trending down  - f/u CE's and ECHO  - d/tanner Plavix in light of thrombocytopenia     #DM with DKA - improved  -  > 260   - ABG 3/16: pH 7.3, pCO2 20, lactate 1, bicarb 9  - f/u repeat ABG  - NPH 20 units subQ    # Thrombocytopenia - secondary to sepsis  - Plt 62>84>126  - No acute bleeding  - Will continue to monitor     #Hyponatremia:  - Na 122 <- 118  - poor oral intake at home, Na on admission 121   - remains hyponatremic    - Serum osmolality 283, Urine osmolality 393, will recheck  - Sodium bicarb 150 mEq in D5  - f/u TSH and cortisol   - monitor for neurological signs, patient lethargic this morning, improved this afternoon    #Hyperkalemia--resolved  -Admission K 5.6 now 4.5 hemolyzed, will repeat in the AM  -c/w daily BMP monitoring    #CAD recent stenting in July 2020:  -D/tanner Plavix and statin in light of elevated transaminitis and thrombocytopenia     #Hypothyroidism   - levothyroxine 44mg IV  -f/u TSH    # Hx of SSS (s/p PPM placement in 09/2017)  - Evolution by EP 3/16: Device working properly, paroxysmal AF, no other events    #HTN  - d/tanner blood pressure medication in light of hypotension     #Chronic atrial fibrillation  - HR now 100  -d/tanner eliquis in light of TEOFILO   - Amiodarone infusion     #DLD  -resume home meds    Full Code  CHG  AIT  Diet: DASH  DVT PPX: Hep subq, b/l duplex u/s - negative for DVT (3/17)  GI ppx: Protonix

## 2021-03-20 NOTE — PROGRESS NOTE ADULT - SUBJECTIVE AND OBJECTIVE BOX
Patient is a 76y old  Female who presents with a chief complaint of Sepsis (19 Mar 2021 14:03)      Over Night Events:  Patient seen and examined.   has b/l wheeze today     ROS:  See HPI    PHYSICAL EXAM    ICU Vital Signs Last 24 Hrs  T(C): 36.1 (20 Mar 2021 08:00), Max: 36.7 (19 Mar 2021 12:00)  T(F): 97 (20 Mar 2021 08:00), Max: 98.1 (19 Mar 2021 12:00)  HR: 126 (20 Mar 2021 08:00) (100 - 126)  BP: 161/83 (20 Mar 2021 06:00) (92/51 - 161/83)  BP(mean): 122 (20 Mar 2021 06:00) (74 - 122)  ABP: --  ABP(mean): --  RR: 20 (19 Mar 2021 18:00) (16 - 25)  SpO2: 99% (20 Mar 2021 08:00) (98% - 100%)      General: AOx3  HEENT:         ngoc       Lymph Nodes: NO cervical LN   Lungs: Bilateral  wheeze / crackles   Cardiovascular: Regular   Abdomen: Soft, Positive BS  Extremities: No clubbing   Skin: warm   Neurological:  no focal   Musculoskeletal: move all ext     I&O's Detail    19 Mar 2021 07:01  -  20 Mar 2021 07:00  --------------------------------------------------------  IN:    Amiodarone: 33.4 mL    IV PiggyBack: 50 mL    IV PiggyBack: 200 mL    Oral Fluid: 300 mL    Sodium Bicarbonate: 1050 mL    Sodium Bicarbonate: 80 mL  Total IN: 1713.4 mL    OUT:    Indwelling Catheter - Urethral (mL): 2780 mL  Total OUT: 2780 mL    Total NET: -1066.6 mL          LABS:                          10.2   25.98 )-----------( 26       ( 20 Mar 2021 04:30 )             27.8         20 Mar 2021 04:30    126    |  91     |  35     ----------------------------<  114    3.5     |  27     |  1.3      Ca    7.7        20 Mar 2021 04:30  Phos  2.9       20 Mar 2021 04:30  Mg     2.2       20 Mar 2021 04:30    TPro  4.3    /  Alb  2.1    /  TBili  0.5    /  DBili  x      /  AST  64     /  ALT  52     /  AlkPhos  196    20 Mar 2021 04:30  Amylase x     lipase x                                                                                            Lactate, Blood: 1.1 mmol/L (03-17-21 @ 16:00)    CARDIAC MARKERS ( 18 Mar 2021 15:55 )  x     / 0.21 ng/mL / x     / x     / x                                                            Culture - Blood (collected 18 Mar 2021 04:30)  Source: .Blood Blood-Peripheral  Gram Stain (19 Mar 2021 02:23):    Growth in anaerobic bottle: Gram Negative Rods  Preliminary Report (19 Mar 2021 21:07):    Growth in anaerobic bottle: Escherichia coli                                                                                       ABG - ( 18 Mar 2021 14:45 )  pH, Arterial: 7.42  pH, Blood: x     /  pCO2: 25    /  pO2: 80    / HCO3: 16    / Base Excess: -6.9  /  SaO2: 98                  MEDICATIONS  (STANDING):  ALBUTerol    90 MICROgram(s) HFA Inhaler 1 Puff(s) Inhalation every 6 hours  aMIOdarone    Tablet 200 milliGRAM(s) Oral two times a day  chlorhexidine 4% Liquid 1 Application(s) Topical <User Schedule>  dextrose 40% Gel 15 Gram(s) Oral once  dextrose 5%. 1000 milliLiter(s) (100 mL/Hr) IV Continuous <Continuous>  dextrose 5%. 1000 milliLiter(s) (50 mL/Hr) IV Continuous <Continuous>  dextrose 5%. 1000 milliLiter(s) (100 mL/Hr) IV Continuous <Continuous>  dextrose 50% Injectable 25 Gram(s) IV Push once  dextrose 50% Injectable 12.5 Gram(s) IV Push once  dextrose 50% Injectable 25 Gram(s) IV Push once  glucagon  Injectable 1 milliGRAM(s) IntraMuscular once  insulin lispro (ADMELOG) corrective regimen sliding scale   SubCutaneous three times a day before meals  insulin NPH human recombinant 20 Unit(s) SubCutaneous two times a day  levothyroxine 88 MICROGram(s) Oral daily  meropenem  IVPB 1000 milliGRAM(s) IV Intermittent every 12 hours  multivitamin 1 Tablet(s) Oral daily  pantoprazole  Injectable 40 milliGRAM(s) IV Push daily  sodium bicarbonate  Infusion 0.113 mEq/kG/Hr (50 mL/Hr) IV Continuous <Continuous>    MEDICATIONS  (PRN):  acetaminophen   Tablet .. 650 milliGRAM(s) Oral every 6 hours PRN Temp greater or equal to 38C (100.4F), Mild Pain (1 - 3)  aluminum hydroxide/magnesium hydroxide/simethicone Suspension 30 milliLiter(s) Oral every 6 hours PRN Dyspepsia          Xrays:  TLC:  OG:  ET tube:                                                                                    b/l effusion    ECHO:  [facility] ICU:

## 2021-03-20 NOTE — PROGRESS NOTE ADULT - SUBJECTIVE AND OBJECTIVE BOX
SOWMYA AMADO  76y, Female  Allergy: latex (Pruritus; Rash)  Pineapple (Unknown)  sulfonamides (Unknown)      LOS  6d    CHIEF COMPLAINT: Sepsis (20 Mar 2021 13:19)      INTERVAL EVENTS/HPI  - No acute events overnight  - T(F): , Max: 97.8 (03-19-21 @ 16:00)  - Denies any worsening symptoms  - Tolerating medication  - WBC Count: 25.98 (03-20-21 @ 04:30)  WBC Count: 26.88 (03-19-21 @ 18:05)     - Creatinine, Serum: 1.3 (03-20-21 @ 04:30)  Creatinine, Serum: 1.4 (03-19-21 @ 04:50)       ROS  General: Denies rigors, nightsweats  HEENT: Denies headache, rhinorrhea, sore throat, eye pain  CV: Denies CP, palpitations  PULM: Denies wheezing, hemoptysis  GI: Denies hematemesis, hematochezia, melena  : Denies discharge, hematuria  MSK: Denies arthralgias, myalgias  SKIN: Denies rash, lesions  NEURO: Denies paresthesias, weakness  PSYCH: Denies depression, anxiety    VITALS:  T(F): 97, Max: 97.8 (03-19-21 @ 16:00)  HR: 116  BP: 106/61  RR: 18Vital Signs Last 24 Hrs  T(C): 36.1 (20 Mar 2021 08:00), Max: 36.6 (19 Mar 2021 16:00)  T(F): 97 (20 Mar 2021 08:00), Max: 97.8 (19 Mar 2021 16:00)  HR: 116 (20 Mar 2021 12:00) (110 - 126)  BP: 106/61 (20 Mar 2021 12:00) (92/51 - 161/83)  BP(mean): 85 (20 Mar 2021 12:00) (63 - 122)  RR: 18 (20 Mar 2021 12:00) (18 - 22)  SpO2: 99% (20 Mar 2021 10:00) (98% - 100%)    PHYSICAL EXAM:  Gen: NAD, resting in bed  HEENT: Normocephalic, atraumatic  Neck: supple, no lymphadenopathy  CV: Regular rate & regular rhythm  Lungs: decreased BS at bases, no fremitus  Abdomen: Soft, BS present  Ext: Warm, well perfused  Neuro: non focal, awake  Skin: no rash, no erythema  Lines: no phlebitis    FH: Non-contributory  Social Hx: Non-contributory    TESTS & MEASUREMENTS:                        10.2   25.98 )-----------( 26       ( 20 Mar 2021 04:30 )             27.8     03-20    126<L>  |  91<L>  |  35<H>  ----------------------------<  114<H>  3.5   |  27  |  1.3    Ca    7.7<L>      20 Mar 2021 04:30  Phos  2.9     03-20  Mg     2.2     03-20    TPro  4.3<L>  /  Alb  2.1<L>  /  TBili  0.5  /  DBili  x   /  AST  64<H>  /  ALT  52<H>  /  AlkPhos  196<H>  03-20    eGFR if Non African American: 39 mL/min/1.73M2 (03-20-21 @ 04:30)  eGFR if African American: 46 mL/min/1.73M2 (03-20-21 @ 04:30)    LIVER FUNCTIONS - ( 20 Mar 2021 04:30 )  Alb: 2.1 g/dL / Pro: 4.3 g/dL / ALK PHOS: 196 U/L / ALT: 52 U/L / AST: 64 U/L / GGT: x               Culture - Blood (collected 03-18-21 @ 04:30)  Source: .Blood Blood-Peripheral  Gram Stain (03-19-21 @ 02:23):    Growth in anaerobic bottle: Gram Negative Rods  Preliminary Report (03-19-21 @ 21:07):    Growth in anaerobic bottle: Escherichia coli    Culture - Urine (collected 03-14-21 @ 22:50)  Source: .Urine Clean Catch (Midstream)  Final Report (03-17-21 @ 09:26):    50,000 - 99,000 CFU/mL Escherichia coli ESBL  Organism: Escherichia coli ESBL (03-17-21 @ 09:26)  Organism: Escherichia coli ESBL (03-17-21 @ 09:26)      -  Amikacin: S <=16      -  Amoxicillin/Clavulanic Acid: I 16/8      -  Ampicillin: R >16 These ampicillin results predict results for amoxicillin      -  Ampicillin/Sulbactam: I 16/8 Enterobacter, Citrobacter, and Serratia may develop resistance during prolonged therapy (3-4 days)      -  Aztreonam: R >16      -  Cefazolin: R >16 ([id number]) For uncomplicated UTI with K. pneumoniae, E. coli, or P. mirablis: JONATHAN <=16 is sensitive and JONATHAN >=32 is resistant. This also predicts results for oral agents cefaclor, cefdinir, cefpodoxime, cefprozil, cefuroxime axetil, cephalexin and locarbef for uncomplicated UTI. Note that some isolates may be susceptible to these agents while testing resistant to cefazolin.      -  Cefepime: R >16      -  Cefoxitin: S <=8      -  Ceftriaxone: R >32 Enterobacter, Citrobacter, and Serratia may develop resistance during prolonged therapy      -  Ciprofloxacin: R >2      -  Ertapenem: S <=0.5      -  Gentamicin: R >8      -  Imipenem: S <=1      -  Levofloxacin: R >4      -  Meropenem: S <=1      -  Nitrofurantoin: S <=32 Should not be used to treat pyelonephritis      -  Piperacillin/Tazobactam: S <=8      -  Tigecycline: S <=2      -  Tobramycin: R >8      -  Trimethoprim/Sulfamethoxazole: S <=0.5/9.5      Method Type: JONATHAN    Culture - Blood (collected 03-14-21 @ 18:30)  Source: .Blood Blood  Gram Stain (03-15-21 @ 12:47):    Growth in aerobic bottle: Gram Negative Rods    Growth in anaerobic bottle: Gram Negative Rods  Final Report (03-17-21 @ 11:02):    Growth in aerobic and anaerobic bottles: Escherichia coli ESBL    MDRO detected in BCID PCR, resistance marker = CTX-M (ESBL)    ***Blood Panel PCR results on this specimen are available    approximately 3 hours after the Gram stain result.***    Gram stain, PCR, and/or culture results may not always    correspond due to difference in methodologies.    ************************************************************    This PCR assay was performed by multiplex PCR. This    Assay tests for 66 bacterial and resistance gene targets.    Please refer to the [facility] test directory    at [email or web address] for details.  Organism: Blood Culture PCR  Escherichia coli ESBL (03-17-21 @ 11:02)  Organism: Escherichia coli ESBL (03-17-21 @ 11:02)      -  Amikacin: S <=16      -  Ampicillin: R >16 These ampicillin results predict results for amoxicillin      -  Ampicillin/Sulbactam: R 16/8 Enterobacter, Citrobacter, and Serratia may develop resistance during prolonged therapy (3-4 days)      -  Aztreonam: R >16      -  Cefazolin: R >16 Enterobacter, Citrobacter, and Serratia may develop resistance during prolonged therapy (3-4 days)      -  Cefepime: R >16      -  Cefoxitin: S <=8      -  Ceftriaxone: R >32 Enterobacter, Citrobacter, and Serratia may develop resistance during prolonged therapy      -  Ciprofloxacin: R >2      -  Ertapenem: S <=0.5      -  Gentamicin: R >8      -  Imipenem: S <=1      -  Levofloxacin: R >4      -  Meropenem: S <=1      -  Piperacillin/Tazobactam: R <=8      -  Tobramycin: R >8      -  Trimethoprim/Sulfamethoxazole: S <=0.5/9.5      Method Type: JONATHAN  Organism: Blood Culture PCR (03-17-21 @ 11:02)      -  ESBL: Detec      -  Escherichia coli: Detec      Method Type: PCR    Culture - Blood (collected 03-14-21 @ 18:30)  Source: .Blood Blood  Gram Stain (03-15-21 @ 11:56):    Growth in anaerobic bottle: Gram Negative Rods    Growth in aerobic bottle: Gram Negative Rods  Final Report (03-17-21 @ 11:03):    Growth in aerobic and anaerobic bottles: Escherichia coli ESBL    See previous culture [id number]        Lactate, Blood: 1.1 mmol/L (03-17-21 @ 16:00)      INFECTIOUS DISEASES TESTING  COVID-19 PCR: NotDetec (03-14-21 @ 20:40)      INFLAMMATORY MARKERS      RADIOLOGY & ADDITIONAL TESTS:  I have personally reviewed the last available Chest xray  CXR  Xray Chest 1 View- PORTABLE-Urgent:   EXAM:  XR CHEST PORTABLE URGENT 1V            PROCEDURE DATE:  03/18/2021            INTERPRETATION:  STUDY INDICATION: Shortness of breath Comparison: XR  3/14/2021.    Technique/Positioning: Frontal view of the chest.    Findings:    Support devices: None.    Cardiac/mediastinum/hilum: Stable appearance of the cardiomediastinal silhouette. Left pacemaker is stable.    Lung parenchyma/Pleura: New bibasal left greater than right pleural-parenchymal opacities. Increased diffuse interstitial prominence. No pneumothorax.    Skeleton/soft tissues: Unchanged.    Impression:    New bibasal left greater than right pleural-parenchymal opacities. Increased diffuse interstitial prominence.              MITA NAVARRO MD; Attending Radiologist  This document has been electronically signed. Mar 18 2021 11:28AM (03-18-21 @ 08:56)      CT      CARDIOLOGY TESTING  12 Lead ECG:   Ventricular Rate 118 BPM    Atrial Rate 109 BPM    QRS Duration 88 ms    Q-T Interval 266 ms    QTC Calculation(Bazett) 372 ms    R Axis 20 degrees    T Axis 226 degrees    Diagnosis Line Atrial fibrillation with rapid ventricular response  Nonspecific ST and T wave abnormality  Abnormal ECG    Confirmed by Carlos Jackson ([id number]) on 3/20/2021 10:12:17 AM (03-20-21 @ 05:37)  12 Lead ECG:   Ventricular Rate 94 BPM    Atrial Rate 147 BPM    QRS Duration 94 ms    Q-T Interval 340 ms    QTC Calculation(Bazett) 425 ms    R Axis 41 degrees    T Axis -3 degrees    Diagnosis Line Atrial fibrillation  Nonspecific T wave abnormality  Abnormal ECG    Confirmed by CRISTOBAL MCCALL MD ([id number]) on 3/19/2021 6:10:50 AM (03-19-21 @ 05:08)      MEDICATIONS  ALBUTerol    90 MICROgram(s) HFA Inhaler 1 Inhalation every 6 hours  aMIOdarone    Tablet 200 Oral two times a day  chlorhexidine 4% Liquid 1 Topical <User Schedule>  dextrose 40% Gel 15 Oral once  dextrose 5%. 1000 IV Continuous <Continuous>  dextrose 5%. 1000 IV Continuous <Continuous>  dextrose 5%. 1000 IV Continuous <Continuous>  dextrose 50% Injectable 25 IV Push once  dextrose 50% Injectable 12.5 IV Push once  dextrose 50% Injectable 25 IV Push once  glucagon  Injectable 1 IntraMuscular once  insulin lispro (ADMELOG) corrective regimen sliding scale  SubCutaneous three times a day before meals  insulin NPH human recombinant 20 SubCutaneous two times a day  levothyroxine 88 Oral daily  meropenem  IVPB 1000 IV Intermittent every 12 hours  multivitamin 1 Oral daily  pantoprazole  Injectable 40 IV Push daily      WEIGHT  Weight (kg): 66.3 (03-16-21 @ 19:53)  Creatinine, Serum: 1.3 mg/dL (03-20-21 @ 04:30)      ANTIBIOTICS:  meropenem  IVPB 1000 milliGRAM(s) IV Intermittent every 12 hours      All available historical records have been reviewed

## 2021-03-20 NOTE — PROGRESS NOTE ADULT - SUBJECTIVE AND OBJECTIVE BOX
Patient is a 76y old Female who presents with a chief complaint of Sepsis (20 Mar 2021 08:52)    No acute events overnight.     PAST MEDICAL & SURGICAL HISTORY:  Inclusion body myositis (IBM)    Lung nodules    Transfusion history  S/p Hysterectomy    Dry eyes, bilateral    PVD (peripheral vascular disease)  S/p Right Peripheral leg Stent    Seasonal allergies    Skin cancer  Basal Cell Cancer face  (around nose) s/p MOHS procedure x 3    Anemia  Chronic, on Iron supplement    Hypothyroid    SSS (sick sinus syndrome)  S/p PPM    Diabetes  Insulin dependent - (has insulin Pump)    Pacemaker  Sept 2017    Palpitations  occasionally; not for couple yrs    Essential hypertension    Afib  5+ yrs; on Eliquis    S/P ablation of atrial fibrillation    S/P cataract surgery    History of surgery  MOHS procedure (face) x 3    Cardiac pacemaker  9/9/17    History of surgery  Right Peripheral Stent 5 yrs    H/O total hysterectomy  25+ yrs ago        PHYSICAL EXAM  Vital Signs Last 24 Hrs  T(C): 36.1 (20 Mar 2021 08:00), Max: 36.7 (19 Mar 2021 12:00)  T(F): 97 (20 Mar 2021 08:00), Max: 98.1 (19 Mar 2021 12:00)  HR: 122 (20 Mar 2021 10:00) (106 - 126)  BP: 151/71 (20 Mar 2021 10:00) (92/51 - 161/83)  BP(mean): 63 (20 Mar 2021 10:00) (63 - 122)  RR: 20 (19 Mar 2021 18:00) (16 - 25)  SpO2: 99% (20 Mar 2021 10:00) (98% - 100%)    I&O's Summary    19 Mar 2021 07:01  -  20 Mar 2021 07:00  --------------------------------------------------------  IN: 1713.4 mL / OUT: 2780 mL / NET: -1066.6 mL      GENERAL: No acute distress, well-developed  HEAD:  Atraumatic, Normocephalic  ENT: PERRL, conjunctiva and sclera clear, neck supple, no JVD, moist mucosa, posterior oropharynx clear  CHEST/LUNG: Clear to auscultation bilaterally; No wheeze, equal breath sounds bilaterally, respirations nonlabored  HEART: Regular rate and rhythm; No murmurs, rubs, or gallops  ABDOMEN: Soft, nontender, nondistended; Bowel sounds present, no organomegaly  BACK: no spinal tenderness, no CVA tenderness  EXTREMITIES:  No clubbing, cyanosis, or edema  PSYCH: Nl behavior, nl affect  NEUROLOGY: AAOx3, non-focal, moves all extremities spontaneously  SKIN: Normal color, No rashes or lesions      LABS                        10.2   25.98 )-----------( 26       ( 20 Mar 2021 04:30 )             27.8     Hemoglobin: 10.2 g/dL (03-20 @ 04:30)  Hemoglobin: 10.5 g/dL (03-19 @ 18:05)  Hemoglobin: 9.5 g/dL (03-19 @ 04:50)  Hemoglobin: 9.5 g/dL (03-18 @ 22:59)  Hemoglobin: 9.6 g/dL (03-18 @ 12:04)    CBC Full  -  ( 20 Mar 2021 04:30 )  WBC Count : 25.98 K/uL  RBC Count : 3.63 M/uL  Hemoglobin : 10.2 g/dL  Hematocrit : 27.8 %  Platelet Count - Automated : 26 K/uL  Mean Cell Volume : 76.6 fL  Mean Cell Hemoglobin : 28.1 pg  Mean Cell Hemoglobin Concentration : 36.7 g/dL  Auto Neutrophil # : 22.54 K/uL  Auto Lymphocyte # : 1.70 K/uL  Auto Monocyte # : 1.15 K/uL  Auto Eosinophil # : 0.28 K/uL  Auto Basophil # : 0.05 K/uL  Auto Neutrophil % : 86.8 %  Auto Lymphocyte % : 6.5 %  Auto Monocyte % : 4.4 %  Auto Eosinophil % : 1.1 %  Auto Basophil % : 0.2 %    03-20    126<L>  |  91<L>  |  35<H>  ----------------------------<  114<H>  3.5   |  27  |  1.3    Ca    7.7<L>      20 Mar 2021 04:30  Phos  2.9     03-20  Mg     2.2     03-20    TPro  4.3<L>  /  Alb  2.1<L>  /  TBili  0.5  /  DBili  x   /  AST  64<H>  /  ALT  52<H>  /  AlkPhos  196<H>  03-20    Creatinine Trend: 1.3<--, 1.4<--, 1.3<--, 1.3<--, 1.4<--, 1.6<--  LIVER FUNCTIONS - ( 20 Mar 2021 04:30 )  Alb: 2.1 g/dL / Pro: 4.3 g/dL / ALK PHOS: 196 U/L / ALT: 52 U/L / AST: 64 U/L / GGT: x             CARDIAC MARKERS ( 18 Mar 2021 15:55 )  x     / 0.21 ng/mL / x     / x     / x          ABG - ( 18 Mar 2021 14:45 )  pH, Arterial: 7.42  pH, Blood: x     /  pCO2: 25    /  pO2: 80    / HCO3: 16    / Base Excess: -6.9  /  SaO2: 98            MICROBIOLOGY:    Culture - Blood (collected 18 Mar 2021 04:30)  Source: .Blood Blood-Peripheral  Gram Stain (19 Mar 2021 02:23):    Growth in anaerobic bottle: Gram Negative Rods  Preliminary Report (19 Mar 2021 21:07):    Growth in anaerobic bottle: Escherichia coli      IMAGING:    NM Renal/Lasix (NM Renal/Lasix .) (03.19.21 @ 22:49)  Impression:    1. Significantly holdup of MAG3 radiotracer within bilateral kidneys with poor emptying bilaterally. No hydronephrosis demonstrated bilaterally. Findings suggestive of poor renal function.    2. Theleft kidney accounts for 67% of total renal function and the right kidney accounts for 33% of total renal function consistent with significant difference.    3. Markedly delayed post-Lasix T1/2 bilaterally without hydronephrosis. Findings again suggestive of overall poor renal function.    < end of copied text >

## 2021-03-20 NOTE — PROGRESS NOTE ADULT - ASSESSMENT
IMPRESSION;  Resolved sepsis secondary to acute right pyelonephritis with possibly distal ureteral obstruction with moderate right hydroureteral nephrosis secondary to ESBL E coli  BCx / UCX 3/14 ESBL Ecoli  - Remains positive on 3/18  - NM Renal/Lasix (NM Renal/Lasix .) (03.19.21 @ 22:49): 1. Significantly holdup of MAG3 radiotracer within bilateral kidneys with poor emptying bilaterally. No hydronephrosis demonstrated bilaterally. Findings suggestive of poor renal function. Theleft kidney accounts for 67% of total renal function and the right kidney accounts for 33% of total renal function consistent with significant difference. Markedly delayed post-Lasix T1/2 bilaterally without hydronephrosis. Findings again suggestive of overall poor renal function.    Creatinine, Serum: 1.3 mg/dL (03.20.21 @ 04:30)  Weight (kg): 66.3 (16 Mar 2021 19:53)  CrCl 37    RECOMMENDATIONS  Meropenem -- change to 1g q 12 hours  Persistent bacteremia from 3/14-3/18 suggests that this may be source control issue  Reviewed MAG3 scan with no hydronephrosis --     This is a preliminary incomplete pended note, all final recommendations to follow after interview and examination of the patient.    Please call or message on [employer] Teams if with any questions.  Spectra [phone]   IMPRESSION;  Resolved sepsis secondary to acute right pyelonephritis with possibly distal ureteral obstruction with moderate right hydroureteral nephrosis secondary to ESBL E coli  BCx / UCX 3/14 ESBL Ecoli  - Remains positive on 3/18  - NM Renal/Lasix (NM Renal/Lasix .) (03.19.21 @ 22:49): 1. Significantly holdup of MAG3 radiotracer within bilateral kidneys with poor emptying bilaterally. No hydronephrosis demonstrated bilaterally. Findings suggestive of poor renal function. Theleft kidney accounts for 67% of total renal function and the right kidney accounts for 33% of total renal function consistent with significant difference. Markedly delayed post-Lasix T1/2 bilaterally without hydronephrosis. Findings again suggestive of overall poor renal function.    Creatinine, Serum: 1.3 mg/dL (03.20.21 @ 04:30)  Weight (kg): 66.3 (16 Mar 2021 19:53)  CrCl 37    RECOMMENDATIONS  - Reconsulted for persistent leukocytosis --Reviewed MAG3 scan with no hydronephrosis   - Persistent bacteremia from 3/14-3/18 suggests that this may be source control issue   - Repeat Blood Cultures  - Ideally, would repeat CT Abd/Pelvis with contrast  to see if any abscess if forming, but may be limited as she is recovering from TEOFILO   - for now, would repeat renal US  - continue Meropenem -- change to 1g q 12 hours    Please call or message on [employer] Teams if with any questions.  Spectra [phone]

## 2021-03-20 NOTE — PROGRESS NOTE ADULT - ASSESSMENT
75y/o F with pyelonephritis    - Nuclear Medicine Renal scan reviewed with Dr. Velarde, plan as per attending  - No acute urologic intervention indicated at this time.  - Continue IV Abx, IV Fluids, PO Fluids

## 2021-03-20 NOTE — PROGRESS NOTE ADULT - ASSESSMENT
- Continue NPH 20 units BID ( by primary team )   - add admelog 4 units TIDAC , hold if NPO   - Continue sliding scale before meals   - prior to discharge patient need to go back on insulin pump.

## 2021-03-20 NOTE — PROGRESS NOTE ADULT - ASSESSMENT
1. Sepsis: secondary Pyelonephritis and bacteremia, continue tx with ABX as per ID, continue supportive care, monitor for now.    2. Right sided Pyelonephritis: secondary to Hydronephrosis, continue tx with empiric abx as per ID, continue supportive care, monitor for now. follow urology     3. Bacteremia: growing ECOLI ESBL, continue tx as per ID with meropenum, continue supportive care, monitor for now.  recall id   4. TEOFILO/ Hyponatremia/ Metabolic acidosis: secondary to sepsis induced ATN with right sided hydronephrosis, f/u urology recs. d/c bicarb drip for now Biacrb gtt.  give lasix 20 mg now     5. NSTEMI/CAD s/p PCI with LUPE placed LAD: secondary to sepsis induced demand ischemia, less likely true ACS, continue supportive care for now, f/u CE's, Hold Plavix for now    6. DM with Hyperglycemia: tx NPH BID, tx sliding scale, monitor for now.    7. Thrombocytopenia: secondary to sepsis induced BM suppression, continue supportive care, monitor for now. off Plavix for now.    8. PVD s/p right lower ext stent: continue supportive care, monitor for now.    9. Basal cell CA: continue supportive care, monitor for now.    10. Hypothyroidism: tx synthroid, continue supportive care, monitor for now.    11. SSS s/p Pacemaker: continue supportive care, monitor for now.    12. HTN: continue supportive care, monitor for now.    13. AFIB s/p Ablation: amiodarone PO continue supportive care, monitor for now.    14. HLD: tx statin, continue supportive care, monitor for now.    15. DVT px: Hold AC due to thrombocytopenia, ,lower ext US neg for DVT.    16. NUT: tx IVF's, tx oral diet

## 2021-03-20 NOTE — PROGRESS NOTE ADULT - SUBJECTIVE AND OBJECTIVE BOX
S: patient feeling better this am , appetite improved   o: Vital Signs Last 24 Hrs  T(C): 36.1 (20 Mar 2021 08:00), Max: 36.6 (19 Mar 2021 16:00)  T(F): 97 (20 Mar 2021 08:00), Max: 97.8 (19 Mar 2021 16:00)  HR: 116 (20 Mar 2021 12:00) (110 - 126)  BP: 106/61 (20 Mar 2021 12:00) (92/51 - 161/83)  BP(mean): 85 (20 Mar 2021 12:00) (63 - 122)  RR: 18 (20 Mar 2021 12:00) (18 - 22)  SpO2: 99% (20 Mar 2021 10:00) (98% - 100%)      PE : awake , alert , oriented   Lungs : getting treatment via nebulizer   abdomen : soft , non tender   LE : non edema                           10.2   25.98 )-----------( 26       ( 20 Mar 2021 04:30 )             27.8     03-20    126<L>  |  91<L>  |  35<H>  ----------------------------<  114<H>  3.5   |  27  |  1.3    Ca    7.7<L>      20 Mar 2021 04:30  Phos  2.9     03-20  Mg     2.2     03-20    TPro  4.3<L>  /  Alb  2.1<L>  /  TBili  0.5  /  DBili  x   /  AST  64<H>  /  ALT  52<H>  /  AlkPhos  196<H>  03-20    CAPILLARY BLOOD GLUCOSE      POCT Blood Glucose.: 134 mg/dL (20 Mar 2021 07:38)  POCT Blood Glucose.: 113 mg/dL (20 Mar 2021 05:44)  POCT Blood Glucose.: 177 mg/dL (19 Mar 2021 17:53)      MEDICATIONS  (STANDING):  ALBUTerol    90 MICROgram(s) HFA Inhaler 1 Puff(s) Inhalation every 6 hours  aMIOdarone    Tablet 200 milliGRAM(s) Oral two times a day  chlorhexidine 4% Liquid 1 Application(s) Topical <User Schedule>  dextrose 40% Gel 15 Gram(s) Oral once  dextrose 5%. 1000 milliLiter(s) (100 mL/Hr) IV Continuous <Continuous>  dextrose 5%. 1000 milliLiter(s) (50 mL/Hr) IV Continuous <Continuous>  dextrose 5%. 1000 milliLiter(s) (100 mL/Hr) IV Continuous <Continuous>  dextrose 50% Injectable 25 Gram(s) IV Push once  dextrose 50% Injectable 12.5 Gram(s) IV Push once  dextrose 50% Injectable 25 Gram(s) IV Push once  glucagon  Injectable 1 milliGRAM(s) IntraMuscular once  insulin lispro (ADMELOG) corrective regimen sliding scale   SubCutaneous three times a day before meals  insulin NPH human recombinant 20 Unit(s) SubCutaneous two times a day  levothyroxine 88 MICROGram(s) Oral daily  meropenem  IVPB 1000 milliGRAM(s) IV Intermittent every 12 hours  multivitamin 1 Tablet(s) Oral daily  pantoprazole  Injectable 40 milliGRAM(s) IV Push daily    MEDICATIONS  (PRN):  acetaminophen   Tablet .. 650 milliGRAM(s) Oral every 6 hours PRN Temp greater or equal to 38C (100.4F), Mild Pain (1 - 3)  aluminum hydroxide/magnesium hydroxide/simethicone Suspension 30 milliLiter(s) Oral every 6 hours PRN Dyspepsia

## 2021-03-20 NOTE — PROGRESS NOTE ADULT - SUBJECTIVE AND OBJECTIVE BOX
UROLOGY: Pt is a 77y/o F a/w pyelonephritis. Pt seen and examined at bedside. Reports improvement in sx this AM. Denies nausea, vomiting, fevers, chills, abd pain, flank pain at this time.     [ x ] A 10 Point Review of Systems was negative except where noted    Vital signs  T(C): , Max: 36.6 (03-19-21 @ 16:00)  HR: 116 (03-20-21 @ 12:00)  BP: 106/61 (03-20-21 @ 12:00)  SpO2: 99% (03-20-21 @ 10:00)    Constitutional: NAD, well-developed  HEENT: EOMI  Neck: no pain  Back: No CVA tenderness  Respiratory: No accessory respiratory muscle use  Abd: Soft, NT/ND  :  Springer catheter in place draining cloudy yellow urine with some sediment, bladder nonpalpable, no sp tenderness.   Extremities: no edema  Neurological: A/O x 3  Psychiatric: Normal mood, normal affect  Skin: No rashes    Labs                  10.2   25.98 )-----------( 26       ( 20 Mar 2021 04:30 )             27.8     20 Mar 2021 04:30    126    |  91     |  35     ----------------------------<  114    3.5     |  27     |  1.3      Ca    7.7        20 Mar 2021 04:30  Phos  2.9       20 Mar 2021 04:30  Mg     2.2       20 Mar 2021 04:30    Imaging  EXAM:  NM KIDNEY IMG  LASIX          PROCEDURE DATE:  03/19/2021    INTERPRETATION:  PROCEDURE: RENAL FLOW; RENAL IMAGES; LASIX RENOGRAPHY; COMPUTER ANALYSIS  Reason: Pyelonephritis with right hydronephrosis.    BUN 35; creatinine 1.3 (GFR 39)  Following the intravenous bolus injection of 7.8 mCi 99m technetium MAG-3, posterior kidney images were obtained at 2-second intervals as an angiographic acquisition.  These images reveal normal symmetric renal arterial flow compared to aortic arterial phase bilaterally.  Then posterior kidney images were obtained at 1-minute intervals for the next 48  minutes. Postvoiding posterior view of the kidney and bladder region was obtained. 40 mg Lasix was injected intravenously at 10  minutes post-injection.  These images reveal the right kidney to be smaller than the left with delayed emptying of bilateral kidneys. No hydronephrosis visualized bilaterally.  Cortex to collecting system transit time is 3 minutes on the left and 3-4minutes on the right.  Ureters: Both ureters are faintly visualized and normal in appearance.  Delayed view demonstrates significantly persistent holdup of radiotracer within bilateral kidneys.  Computer analysis of the 2-3 minute post injection image was performed.  These reveal that the left kidney accounts for 67 %, and the right kidney accounts for 33  % of total renal function.  The renogram curve for the left kidney demonstrates normal downsloping prior to Lasix renal injection withplateauing after Lasix injection.  The renogram curve for the right kidney no significant downsloping before and after Lasix administration with slope demonstrating plateau  Post-lasix T-1/2 for the left kidney is 286 minutes and 101 minutes for the right (normal < 20 minutes).    Impression:  1. Significantly holdup of MAG3 radiotracer within bilateral kidneys with poor emptying bilaterally. No hydronephrosis demonstrated bilaterally. Findings suggestive of poor renal function.  2. Theleft kidney accounts for 67% of total renal function and the right kidney accounts for 33% of total renal function consistent with significant difference.  3. Markedly delayed post-Lasix T1/2 bilaterally without hydronephrosis. Findings again suggestive of overall poor renal function.    ELLIOT LANDAU MD; Attending Radiologist  This document has been electronically signed. Mar 20 2021 10:21AM UROLOGY: Pt is a 75y/o F a/w pyelonephritis. Pt seen and examined at bedside. Reports improvement in sx this AM. Denies nausea, vomiting, fevers, chills, abd pain, flank pain at this time.     [ x ] A 10 Point Review of Systems was negative except where noted    Vital signs  T(C): , Max: 36.6 (03-19-21 @ 16:00)  HR: 116 (03-20-21 @ 12:00)  BP: 106/61 (03-20-21 @ 12:00)  SpO2: 99% (03-20-21 @ 10:00)    Constitutional: NAD, well-developed  HEENT: EOMI  Neck: no pain  Back: No CVA tenderness  Respiratory: No accessory respiratory muscle use  Abd: Soft, NT/ND  :  Springer catheter in place draining cloudy yellow urine with some sediment, bladder nonpalpable, no sp tenderness. No CVA tenderness  Extremities: no edema  Neurological: A/O x 3  Psychiatric: Normal mood, normal affect  Skin: No rashes    Labs                  10.2   25.98 )-----------( 26       ( 20 Mar 2021 04:30 )             27.8     20 Mar 2021 04:30    126    |  91     |  35     ----------------------------<  114    3.5     |  27     |  1.3      Ca    7.7        20 Mar 2021 04:30  Phos  2.9       20 Mar 2021 04:30  Mg     2.2       20 Mar 2021 04:30    Imaging  EXAM:  NM KIDNEY IMG  LASIX          PROCEDURE DATE:  03/19/2021    INTERPRETATION:  PROCEDURE: RENAL FLOW; RENAL IMAGES; LASIX RENOGRAPHY; COMPUTER ANALYSIS  Reason: Pyelonephritis with right hydronephrosis.    BUN 35; creatinine 1.3 (GFR 39)  Following the intravenous bolus injection of 7.8 mCi 99m technetium MAG-3, posterior kidney images were obtained at 2-second intervals as an angiographic acquisition.  These images reveal normal symmetric renal arterial flow compared to aortic arterial phase bilaterally.  Then posterior kidney images were obtained at 1-minute intervals for the next 48  minutes. Postvoiding posterior view of the kidney and bladder region was obtained. 40 mg Lasix was injected intravenously at 10  minutes post-injection.  These images reveal the right kidney to be smaller than the left with delayed emptying of bilateral kidneys. No hydronephrosis visualized bilaterally.  Cortex to collecting system transit time is 3 minutes on the left and 3-4minutes on the right.  Ureters: Both ureters are faintly visualized and normal in appearance.  Delayed view demonstrates significantly persistent holdup of radiotracer within bilateral kidneys.  Computer analysis of the 2-3 minute post injection image was performed.  These reveal that the left kidney accounts for 67 %, and the right kidney accounts for 33  % of total renal function.  The renogram curve for the left kidney demonstrates normal downsloping prior to Lasix renal injection withplateauing after Lasix injection.  The renogram curve for the right kidney no significant downsloping before and after Lasix administration with slope demonstrating plateau  Post-lasix T-1/2 for the left kidney is 286 minutes and 101 minutes for the right (normal < 20 minutes).    Impression:  1. Significantly holdup of MAG3 radiotracer within bilateral kidneys with poor emptying bilaterally. No hydronephrosis demonstrated bilaterally. Findings suggestive of poor renal function.  2. Theleft kidney accounts for 67% of total renal function and the right kidney accounts for 33% of total renal function consistent with significant difference.  3. Markedly delayed post-Lasix T1/2 bilaterally without hydronephrosis. Findings again suggestive of overall poor renal function.    ELLIOT LANDAU MD; Attending Radiologist  This document has been electronically signed. Mar 20 2021 10:21AM

## 2021-03-20 NOTE — PROGRESS NOTE ADULT - ASSESSMENT
Sepsis  - secondary Pyelonephritis and bacteremia  - continue tx with abx as per ID  - continue supportive care  - monitor for now    Right sided Pyelonephritis  - secondary to Hydronephrosis  - continue tx with empiric abx as per ID  - continue supportive care  - NM Renal Lasix Scan (3/19): Significantly holdup of MAG3 radiotracer within bilateral kidneys with poor emptying bilaterally. No hydronephrosis demonstrated bilaterally. Findings suggestive of poor renal function. The left kidney accounts for 67% of total renal function and the right kidney accounts for 33% of total renal function consistent with significant difference. Markedly delayed post-Lasix T1/2 bilaterally without hydronephrosis. Findings again suggestive of overall poor renal function.  - F/u Urology    Bacteremia  - growing ECOLI ESBL  - continue tx as per ID with meropenum  - continue supportive care    TEOFILO/ Hyponatremia/ Metabolic acidosis  - secondary to sepsis induced ATN with right sided hydronephrosis  - f/u urology recs  - d/c Bicarb gtt    NSTEMI/CAD s/p PCI with LUPE placed LAD  - secondary to sepsis induced demand ischemia  - less likely true ACS  - continue supportive care for now  - Hold Plavix for now    DM with Hyperglycemia  - tx NPH BID  - tx sliding scale    Thrombocytopenia  - secondary to sepsis induced BM suppression  - continue supportive care, monitor for now  - Hold Plavix for now    PVD s/p right lower ext stent  - continue supportive care, monitor for now    Basal cell CA  - continue supportive care, monitor for now.    Hypothyroidism  - tx synthroid, continue supportive care, monitor for now.    SSS s/p Pacemaker  - continue supportive care, monitor for now.    HTN  - continue supportive care, monitor for now.    AFIB s/p Ablation  - tx Amio BID continue supportive care, monitor for now.    HLD  - tx statin, continue supportive care, monitor for now.    DVT px  - Hold AC due to thrombocytopenia, ,lower ext US neg for DVT.    NUT  - tx IVF's, tx oral diet

## 2021-03-21 NOTE — DIETITIAN INITIAL EVALUATION ADULT. - PERTINENT LABORATORY DATA
(3/21/21) WBC 24.53, RBC 3.69, H/H 10.4/29.2, Na 126, Cl 90, BUN 35, glucose 104, POCT 120/288, corrected Ca 8.74, elevated LFTs, GFR 39

## 2021-03-21 NOTE — DIETITIAN INITIAL EVALUATION ADULT. - PERSON TAUGHT/METHOD
Pt reports she has been following gluten free, diabetic diet for many years & declined diet ed./verbal instruction/patient instructed

## 2021-03-21 NOTE — DIETITIAN INITIAL EVALUATION ADULT. - PERTINENT MEDS FT
meropenem, humulin, SSI, metoprolol, albuterol, amiodarone, synthroid, protonix, maalox, multivitamin

## 2021-03-21 NOTE — PROGRESS NOTE ADULT - SUBJECTIVE AND OBJECTIVE BOX
Patient is a 76y old  Female who presents with a chief complaint of Sepsis (20 Mar 2021 14:44)      Over Night Events:  Patient seen and examined.   off pressors for 48 hrs   on RA rapid afib       ROS:  See HPI    PHYSICAL EXAM    ICU Vital Signs Last 24 Hrs  T(C): 36.7 (21 Mar 2021 08:00), Max: 36.7 (20 Mar 2021 16:00)  T(F): 98 (21 Mar 2021 08:00), Max: 98 (20 Mar 2021 16:00)  HR: 112 (21 Mar 2021 08:00) (90 - 144)  BP: 111/73 (21 Mar 2021 08:00) (106/61 - 151/71)  BP(mean): 87 (21 Mar 2021 08:00) (63 - 97)  ABP: --  ABP(mean): --  RR: 16 (21 Mar 2021 08:00) (14 - 18)  SpO2: 98% (21 Mar 2021 06:00) (98% - 99%)      General: Aox3  HEENT:    ngoc           Lymph Nodes: NO cervical LN   Lungs: Bilateral BS  Cardiovascular: Regular   Abdomen: Soft, Positive BS  Extremities: No clubbing   Skin: warm   Neurological: no focal   Musculoskeletal: move all ext     I&O's Detail    20 Mar 2021 07:01  -  21 Mar 2021 07:00  --------------------------------------------------------  IN:    Oral Fluid: 660 mL  Total IN: 660 mL    OUT:    Indwelling Catheter - Urethral (mL): 875 mL    Intermittent Catheterization - Urethral (mL): 2700 mL  Total OUT: 3575 mL    Total NET: -2915 mL          LABS:                          10.4   24.53 )-----------( 50       ( 21 Mar 2021 04:30 )             29.2         21 Mar 2021 04:30    126    |  90     |  35     ----------------------------<  104    3.7     |  28     |  1.3      Ca    7.3        21 Mar 2021 04:30  Phos  2.9       20 Mar 2021 04:30  Mg     1.8       21 Mar 2021 04:30    TPro  4.3    /  Alb  2.2    /  TBili  0.7    /  DBili  x      /  AST  49     /  ALT  53     /  AlkPhos  190    21 Mar 2021 04:30  Amylase x     lipase x                                                                                                                                                                                                                                         MEDICATIONS  (STANDING):  ALBUTerol    90 MICROgram(s) HFA Inhaler 1 Puff(s) Inhalation every 6 hours  albuterol/ipratropium for Nebulization 6 milliLiter(s) Nebulizer every 6 hours  aMIOdarone    Tablet 200 milliGRAM(s) Oral two times a day  chlorhexidine 4% Liquid 1 Application(s) Topical <User Schedule>  dextrose 40% Gel 15 Gram(s) Oral once  dextrose 5%. 1000 milliLiter(s) (100 mL/Hr) IV Continuous <Continuous>  dextrose 5%. 1000 milliLiter(s) (50 mL/Hr) IV Continuous <Continuous>  dextrose 5%. 1000 milliLiter(s) (100 mL/Hr) IV Continuous <Continuous>  dextrose 50% Injectable 25 Gram(s) IV Push once  dextrose 50% Injectable 12.5 Gram(s) IV Push once  dextrose 50% Injectable 25 Gram(s) IV Push once  glucagon  Injectable 1 milliGRAM(s) IntraMuscular once  insulin lispro (ADMELOG) corrective regimen sliding scale   SubCutaneous three times a day before meals  insulin NPH human recombinant 20 Unit(s) SubCutaneous two times a day  levothyroxine 88 MICROGram(s) Oral daily  meropenem  IVPB 1000 milliGRAM(s) IV Intermittent every 12 hours  multivitamin 1 Tablet(s) Oral daily  pantoprazole  Injectable 40 milliGRAM(s) IV Push daily    MEDICATIONS  (PRN):  acetaminophen   Tablet .. 650 milliGRAM(s) Oral every 6 hours PRN Temp greater or equal to 38C (100.4F), Mild Pain (1 - 3)  aluminum hydroxide/magnesium hydroxide/simethicone Suspension 30 milliLiter(s) Oral every 6 hours PRN Dyspepsia          Xrays:  TLC:  OG:  ET tube:                                                                                    b/l effusion    ECHO:  [facility] ICU:

## 2021-03-21 NOTE — DIETITIAN INITIAL EVALUATION ADULT. - PHYSCIAL ASSESSMENT
unclear if CBW is accurate as pt states UBW ~135# & no wt changes suspected. edema present could mask true wt. continue to monitor.  skin intact.

## 2021-03-21 NOTE — DIETITIAN INITIAL EVALUATION ADULT. - OTHER INFO
Pt p/w n/v & decreased PO intake x2d. Hospital course complicated by sepsis 2/2 pyelonephritis and bacteremia--abx per ID, supportive care. R sided pyelonephritis 2/2 hydronephrosis. Bacteremia d/t growing E.coli & ESBL. TEOFILO/ hyponatremia/ metabolic acidosis--bicarb gtt d/c. NSTEMI/CAD s/p PCI with LUPE placed LAD. T2DM hyperglycemia--insulin pump stopped working, receiving NPH BID & SSI. Thrombocytopenia 2/2 sepsis induced BM suppression.

## 2021-03-21 NOTE — DIETITIAN INITIAL EVALUATION ADULT. - ENERGY INTAKE
Fair (>50%) Intake has been suboptimal for majority of admit but notes improved since yesterday. Pt ate 100% rice chex, yogurt & pears this morning. [place] delivered to her for lunch during assessment, staff aware of GF dietary needs but will d/w LIP on call to add to EMR. Pt denied supplement at this time in hopes appetite improving, will continue to monitor.

## 2021-03-21 NOTE — DIETITIAN INITIAL EVALUATION ADULT. - ORAL INTAKE PTA/DIET HISTORY
Pt reports adequate appetite at baseline but intake varied daily as pt noted life stressors interfering w. PO intake. Follows Gluten free diet for 20+ years & diabetic diet as pt on insulin pump at home. Confirms allergy to pineapple. Takes daily multivitamin, Vitamins C & D & hair/skin/nail supplement.

## 2021-03-21 NOTE — PROGRESS NOTE ADULT - ASSESSMENT
1. Sepsis: secondary Pyelonephritis and bacteremia, continue tx with ABX as per ID, continue supportive care, monitor for now.  repeat bld cx   2. Right sided Pyelonephritis: secondary to Hydronephrosis, continue tx with empiric abx as per ID, continue supportive care, monitor for now. follow urology   as per urology no intervention     3. Bacteremia: growing ECOLI ESBL, continue tx as per ID with meropenum, continue supportive care, monitor for now.  recall id   4. TEOFILO/ Hyponatremia/ Metabolic acidosis: secondary to sepsis induced ATN with right sided hydronephrosis, f/u urology recs. d/c bicarb drip for now Biacrb gtt.  give lasix 20 mg now     5. NSTEMI/CAD s/p PCI with LUPE placed LAD: secondary to sepsis induced demand ischemia, less likely true ACS, continue supportive care for now, f/u CE's, Hold Plavix for now    6. DM with Hyperglycemia: tx NPH BID, tx sliding scale, monitor for now.    7. Thrombocytopenia: secondary to sepsis induced BM suppression, continue supportive care, monitor for now. off Plavix for now.    8. PVD s/p right lower ext stent: continue supportive care, monitor for now.    9. Basal cell CA: continue supportive care, monitor for now.    10. Hypothyroidism: tx synthroid, continue supportive care, monitor for now.    11. SSS s/p Pacemaker: continue supportive care, monitor for now.    12. HTN: continue supportive care, monitor for now.    13. AFIB s/p Ablation: amiodarone PO continue supportive care  lopressors 25mg Q12 hrs       14. HLD: tx statin, continue supportive care, monitor for now.    15. DVT px: Hold AC due to thrombocytopenia, ,lower ext US neg for DVT.  follow plt if leeann more then 50 start AC     16. NUT: tx IVF's, tx oral diet

## 2021-03-22 NOTE — PROGRESS NOTE ADULT - SUBJECTIVE AND OBJECTIVE BOX
Date of Admission: 3/14/21    HISTORY OF PRESENT ILLNESS:     77 yo F PMH DM on insulin pump, CAD s/p stenting in 2020, Lung nodules, Dry eyes, bilateral, PVD (peripheral vascular disease): S/p Right Peripheral leg Stent, Seasonal allergies, Skin cancer: Basal Cell Cancer face  (around nose) s/p MOHS procedure x 3, Anemia: Chronic, on Iron supplement, Hypothyroid, SSS (sick sinus syndrome): S/p PPM, Pacemaker: Sept 2017, Essential hypertension, Afib: 5+ yrs; on Eliquis presented here for nausea, vomiting and decreased PO intake for past 2 days. The symptoms started acutely and she was in her usual state of health before. She also felt weak and her blood sugars were higher. She had a recent hx of UTI which responded well to ciprofloxacin. She denied any chest pain, shortness of breath, focal neurological deficits or altered bowel habits. She takes her meds regularly. For past three days, she is also passing out very less urine. There is no blood in urine noted however. She complained of some diffuse abdominal pain which started now and is burning in character. She is otherwise comfortable and has no other complaints.      PAST MEDICAL & SURGICAL HISTORY:    Lung nodule  S/p Hysterectomy  PVD (peripheral vascular disease)  S/p Right Peripheral leg Stent  Basal Cell Cancer face  (around nose) s/p MOHS procedure x 3  Anemia  Chronic, on Iron supplement  Hypothyroid  Diabetes  Insulin dependent - (has insulin Pump)  Pacemaker Sept 2017  Essential hypertension  Afib  S/P ablation of atrial fibrillation  History of surgery MOHS procedure (face) x 3  History of surgery Right Peripheral Stent 5 yrs  H/O total hysterectomy 25+ yrs ago    FAMILY HISTORY:    [ ] no pertinent family history of premature cardiovascular disease in first degree relatives.  Mother:   Father:   Siblings:     SOCIAL HISTORY:    [ ] Non-smoker  [ ] Smoker  [ ] Alcohol    Allergies    latex (Pruritus; Rash)  sulfonamides (Unknown)    Intolerances    Pineapple (Unknown)  	    REVIEW OF SYSTEMS:    CONSTITUTIONAL: No fever, weight loss, or fatigue  CARDIOLOGY:  denies chest pain, shortness of breath or syncopal episodes.   RESPIRATORY: denies shortness of breath  NEUROLOGICAL: no weakness, no focal deficits to report.  ENDOCRINOLOGICAL: no recent change in diabetic medications.   GI: no BRBPR, no N,V, diarrhea.    PSYCHIATRY: normal mood and affect  HEENT: no nasal discharge, no ecchymosis  SKIN: no ecchymosis, no breakdown  MUSCULOSKELETAL: Full range of motion x4.     PHYSICAL EXAM:    General Appearance: well appearing, normal for age and gender. 	  Neck: normal JVP, no bruit.   Eyes:  Extra Ocular muscles intact.   Cardiovascular: regular rate and rhythm S1 S2, No JVD, No murmurs, No edema  Respiratory: Lungs clear to auscultation	  Psychiatry: Alert and oriented x 3, Mood & affect appropriate  Gastrointestinal:  Soft, Non-tender  Skin/Integumen: No rashes, No ecchymoses, No cyanosis	  Neurologic: Non-focal  Musculoskeletal/ extremities: Normal range of motion, No clubbing, cyanosis or edema  Vascular: Peripheral pulses palpable 2+ bilaterally      PREVIOUS DIAGNOSTIC TESTING:      TTE 3/18/21    Summary:   1. Basal inferolateral segment is abnormal as described above.   2. LV Ejection Fraction by Moses's Method with a biplane EF of 58 %.   3. Mildly enlarged left atrium.   4. Mildly enlarged right atrium.   5. Mild mitral valve regurgitation.   6. Mild thickening of the anterior and posterior mitral valve leaflets.   7. Moderate tricuspid regurgitation.   8. Sclerotic aortic valve with decreased opening.   9. Estimated pulmonary artery systolic pressure is 38.6 mmHg assuming a right atrial pressure of 10 mmHg, which is consistent with borderline pulmonary hypertension.          Home Medications:  amlodipine-benazepril 5 mg-40 mg oral capsule: 1 cap(s) orally once a day (at bedtime) (23 Jul 2020 08:25)  carvedilol 12.5 mg oral tablet: 1 tab(s) orally 2 times a day (23 Jul 2020 08:25)  Gamunex 10% intravenous solution: every 5 weeks (23 Jul 2020 08:25)  insulin: Insulin Pump (23 Jul 2020 08:25)  levothyroxine 88 mcg (0.088 mg) oral tablet: 1 tab(s) orally once a day (23 Jul 2020 08:25)  Multiple Vitamins oral tablet: 1 tab(s) orally once a day (23 Jul 2020 08:25)  rosuvastatin 10 mg oral tablet: 1 tab(s) orally every other day (23 Jul 2020 08:25)  Vitamin C 250 mg oral tablet: 1 tab(s) orally 2 times a day (23 Jul 2020 08:25)     Date of Admission: 3/14/21    HISTORY OF PRESENT ILLNESS:     77 yo F PMH DM on insulin pump, CAD s/p stenting in , Lung nodules, Dry eyes, bilateral, PVD (peripheral vascular disease): S/p Right Peripheral leg Stent, Seasonal allergies, Skin cancer: Basal Cell Cancer face  (around nose) s/p MOHS procedure x 3, Anemia: Chronic, on Iron supplement, Hypothyroid, SSS (sick sinus syndrome): S/p PPM, Pacemaker: 2017, Essential hypertension, Afib: 5+ yrs; on Eliquis presented here for nausea, vomiting and decreased PO intake for past 2 days. The symptoms started acutely and she was in her usual state of health before. She also felt weak and her blood sugars were higher. She had a recent hx of UTI which responded well to ciprofloxacin. She denied any chest pain, shortness of breath, focal neurological deficits or altered bowel habits. She takes her meds regularly. For past three days, she is also passing out very less urine. There is no blood in urine noted however. She complained of some diffuse abdominal pain which started now and is burning in character. She is otherwise comfortable and has no other complaints.      On further questioning, patient reports she is able to walk at home w/o any significant limitation. She can walk around [email or web address] and [employer] without having to stop. No PND, orthopnea, LOC reported.         PAST MEDICAL & SURGICAL HISTORY:    Lung nodule  S/p Hysterectomy  PVD (peripheral vascular disease)  S/p Right Peripheral leg Stent  Basal Cell Cancer face  (around nose) s/p MOHS procedure x 3  Anemia  Chronic, on Iron supplement  Hypothyroid  Diabetes  Insulin dependent - (has insulin Pump)  Pacemaker 2017  Essential hypertension  Afib  S/P ablation of atrial fibrillation  History of surgery MOHS procedure (face) x 3  History of surgery Right Peripheral Stent 5 yrs  H/O total hysterectomy 25+ yrs ago    FAMILY HISTORY:    Mother was in her 80s and  of lung cancer; she was a heavy smoker and father  on bone cancer in his 60s    SOCIAL HISTORY:  Former smoker, social ETOH, no drugs       Allergies    latex (Pruritus; Rash)  sulfonamides (Unknown)    Intolerances    Pineapple (Unknown)  	    REVIEW OF SYSTEMS:    CONSTITUTIONAL: No fever, weight loss, or fatigue  CARDIOLOGY:  denies chest pain, shortness of breath or syncopal episodes.   RESPIRATORY: denies shortness of breath  NEUROLOGICAL: no weakness, no focal deficits to report.  ENDOCRINOLOGICAL: no recent change in diabetic medications.   GI: no BRBPR, no N,V, diarrhea.    PSYCHIATRY: normal mood and affect  HEENT: no nasal discharge, no ecchymosis  SKIN: no ecchymosis, no breakdown  MUSCULOSKELETAL: Full range of motion x4.     PHYSICAL EXAM:    General Appearance: well appearing, normal for age and gender. 	  Neck: normal JVP, no bruit.   Eyes:  Extra Ocular muscles intact.   Cardiovascular: regular rate and rhythm S1 S2, No JVD, No murmurs, No edema  Respiratory: Lungs clear to auscultation	  Psychiatry: Alert and oriented x 3, Mood & affect appropriate  Gastrointestinal:  Soft, Non-tender  Skin/Integumen: No rashes, No ecchymoses, No cyanosis	  Neurologic: Non-focal  Musculoskeletal/ extremities: Normal range of motion, No clubbing, cyanosis or edema  Vascular: Peripheral pulses palpable 2+ bilaterally      PREVIOUS DIAGNOSTIC TESTING:      TTE 3/18/21    Summary:   1. Basal inferolateral segment is abnormal as described above.   2. LV Ejection Fraction by Moses's Method with a biplane EF of 58 %.   3. Mildly enlarged left atrium.   4. Mildly enlarged right atrium.   5. Mild mitral valve regurgitation.   6. Mild thickening of the anterior and posterior mitral valve leaflets.   7. Moderate tricuspid regurgitation.   8. Sclerotic aortic valve with decreased opening.   9. Estimated pulmonary artery systolic pressure is 38.6 mmHg assuming a right atrial pressure of 10 mmHg, which is consistent with borderline pulmonary hypertension.          Home Medications:  amlodipine-benazepril 5 mg-40 mg oral capsule: 1 cap(s) orally once a day (at bedtime) (2020 08:25)  carvedilol 12.5 mg oral tablet: 1 tab(s) orally 2 times a day (2020 08:25)  Gamunex 10% intravenous solution: every 5 weeks (2020 08:25)  insulin: Insulin Pump (2020 08:)  levothyroxine 88 mcg (0.088 mg) oral tablet: 1 tab(s) orally once a day (2020 08:25)  Multiple Vitamins oral tablet: 1 tab(s) orally once a day (2020 08:25)  rosuvastatin 10 mg oral tablet: 1 tab(s) orally every other day (2020 08:25)  Vitamin C 250 mg oral tablet: 1 tab(s) orally 2 times a day (2020 08:25)

## 2021-03-22 NOTE — PROGRESS NOTE ADULT - NSHPATTENDINGPLANDISCUSS_GEN_ALL_CORE
IRLANDA MARTINEZ
nurse
team
IRLANDA Valentin
IRLANDA MARTINEZ
IRLANDA MARTINEZ
Resident and house staff
IRLANDA MARTINEZ

## 2021-03-22 NOTE — PROGRESS NOTE ADULT - ASSESSMENT
Assessment and Plan:  77 yo female with PMHx of DM on insulin pump, CAD s/p stenting in 2020, Lung nodules, Dry eyes, bilateral, PVD (peripheral vascular disease): S/p Right Peripheral leg Stent, Seasonal allergies, Skin cancer: Basal Cell Cancer face (around nose) s/p MOHS procedure x 3, Anemia: Chronic, on Iron supplement, Hypothyroid, SSS (sick sinus syndrome): S/p PPM Sept 2017, Essential hypertension, Afib: 5+ yrs; on Eliquis (held) admitted for pyelonephritis treatment complicated by right sided hydronephrosis, TEOFILO and hyponatremia, during her admission developed thrombocytopenia and DKA and upgraded to critical care for DKA.    Sepsis secondary to pyelonephritis and bacteremia  - WBC 17.3<24.5, afebrile for >72 hours  - Renal U/S no evidence of abscess   - NO CT abd and pelvis for now in light of improved WBC and decrease renal function   - continue meropenem with abx as per ID  - continue supportive care  - monitor for now    Right sided Pyelonephritis  - secondary to Hydronephrosis  - continue tx with empiric abx as per ID  - continue supportive care  - NM Renal Lasix Scan (3/19): Significantly holdup of MAG3 radiotracer within bilateral kidneys with poor emptying bilaterally. No hydronephrosis demonstrated bilaterally. Findings suggestive of poor renal function. The left kidney accounts for 67% of total renal function and the right kidney accounts for 33% of total renal function consistent with significant difference. Markedly delayed post-Lasix T1/2 bilaterally without hydronephrosis. Findings again suggestive of overall poor renal function.  - Per urology no intervention needed, no sign of obstruction     Bacteremia  - growing ECOLI ESBL  - continue tx as per ID with meropenum  - f/u repeat blood culture   - continue supportive care    TEOFILO/ Hyponatremia/ Metabolic acidosis  - secondary to sepsis induced ATN with right sided hydronephrosis  - Poor renal function based on Renal lasix scan   - d/c Bicarb gtt    NSTEMI/CAD s/p PCI with LUPE placed LAD  - secondary to sepsis induced demand ischemia  - less likely true ACS  - continue supportive care for now  - Hold Plavix for now    DM with Hyperglycemia  - tx 22 units NPH BID  - 2 episodes of hypoglycemia  - tx sliding scale    Thrombocytopenia  - secondary to sepsis induced BM suppression  - Plt 68 > 50  - ecchymosis and oozing from left arm  - continue supportive care, monitor for now  - Hold Plavix for now    Hyponatremia:  - Na 123 <- 126  - poor oral intake at home, Na on admission 121   - remains hyponatremic    - Serum osmolality 283, Urine osmolality 393, will recheck  - TSH 4.17  - Cortisol 22.9  - monitor for neurological signs, improved this morning     PVD s/p right lower ext stent  - continue supportive care, monitor for now    Basal cell CA s/p MOHS procedure x 3  - continue supportive care, monitor for now.    Hypothyroidism  - tx synthroid, continue supportive care, monitor for now.    SSS s/p Pacemaker  - continue supportive care, monitor for now.    HTN  - continue supportive care, monitor for now.    AFIB s/p Ablation  - tx Amio BID continue supportive care, monitor for now.    HLD  - tx statin, continue supportive care, monitor for now.    DVT px  - Heprain Sub Q, Hold AC due to thrombocytopenia, ,lower ext US neg for DVT.    NUT  - tx IVF's, tx oral diet, gluten-free    75 yo female with PMHx of DM on insulin pump, CAD s/p stenting in 2020, Lung nodules, Dry eyes, bilateral, PVD (peripheral vascular disease): S/p Right Peripheral leg Stent, Seasonal allergies, Skin cancer: Basal Cell Cancer face (around nose) s/p MOHS procedure x 3, Anemia: Chronic, on Iron supplement, Hypothyroid, SSS (sick sinus syndrome): S/p PPM Sept 2017, Essential hypertension, Afib: 5+ yrs; on Eliquis (held) admitted for pyelonephritis treatment complicated by right sided hydronephrosis, TEOFILO and hyponatremia, during her admission developed thrombocytopenia and DKA and upgraded to critical care for DKA.    Sepsis secondary to pyelonephritis and bacteremia  - WBC 17.3<24.5, afebrile for >72 hours  - Renal U/S no evidence of abscess   - No CT abd and pelvis for now in light of improved WBC and decrease renal function   - continue meropenem with abx as per ID  - monitor for now    Right sided Pyelonephritis  - secondary to Hydronephrosis  - continue tx with empiric abx as per ID  - continue supportive care  - NM Renal Lasix Scan (3/19): Significantly holdup of MAG3 radiotracer within bilateral kidneys with poor emptying bilaterally. No hydronephrosis demonstrated bilaterally. Findings suggestive of poor renal function. The left kidney accounts for 67% of total renal function and the right kidney accounts for 33% of total renal function consistent with significant difference. Markedly delayed post-Lasix T1/2 bilaterally without hydronephrosis. Findings again suggestive of overall poor renal function.  - Per urology no intervention needed, no sign of obstruction     Bacteremia  - growing ECOLI ESBL  - continue tx as per ID with meropenum  - f/u repeat blood culture   - continue supportive care    TEOFILO/ Hyponatremia/ Metabolic acidosis  - secondary to sepsis induced ATN with right sided hydronephrosis  - Poor renal function based on Renal lasix scan   - d/c Bicarb gtt    NSTEMI/CAD s/p PCI with LUPE placed LAD  - secondary to sepsis induced demand ischemia  - less likely true ACS  - continue supportive care for now  - Hold Plavix for now    DM with Hyperglycemia  - d/c NPH BID  - started on Lantus 15 units at bed time   - 2 episodes of hypoglycemia  - tx sliding scale    Thrombocytopenia  - Secondary to sepsis induced BM suppression  - Plt 68 > 50  - ecchymosis and oozing from left arm  - continue supportive care, monitor for now  - Hold Plavix for now    Hyponatremia:  - Na 123 <- 126  - poor oral intake at home, Na on admission 121   - remains hyponatremic    - Serum osmolality 283, Urine osmolality 393  - TSH 4.17  - Cortisol 22.9  - monitor for neurological signs, improved this morning     PVD s/p right lower ext stent  - continue supportive care, monitor for now    Basal cell CA s/p MOHS procedure x 3  - continue supportive care, monitor for now.    Hypothyroidism  - tx synthroid, continue supportive care, monitor for now.    SSS s/p Pacemaker  - continue supportive care, monitor for now.    HTN  - -120/76-55  - c/w metoprolol 25mg twice daily   - monitor for now.    AFIB s/p Ablation  - tx Amio BID continue supportive care, monitor for now.    HLD  - tx statin, continue supportive care, monitor for now.    DVT px  - Hold AC due to thrombocytopenia, ,lower ext US neg for DVT, b/l SCD     NUT  - tx IVF's, tx oral diet, gluten-free. Nystatin and benzocaine spray for sore mouth.

## 2021-03-22 NOTE — PROGRESS NOTE ADULT - ASSESSMENT
75 y/o F with h/o DM, CAD s/p PCI, HTN, afib admitted with N/V. She was found to have sepsis due to UTI, now with ESBL bacteremia. TTE showed preserved LVEF with mildly elevated pulmonary pressure.       Pulmonary hypertension / HTN/ DM/ Afib     Patient is euvolemic on exam   WHO FC II at home   Pulmonary hypertension is likely group 2 given multiple CV conditions in the context of fluid resuscitation for sepsis   No need for diuretics - Keep fluid balance even  Agree with metoprolol 25 mg q 25 hrs; increase to 50 mg q8 hr if needed   Get iron profile when infection controlled   Counseling provided regarding low sodium diet  at home   Continue management of sepsis/ hyponatremia  Discussed with primary team

## 2021-03-22 NOTE — PROGRESS NOTE ADULT - SUBJECTIVE AND OBJECTIVE BOX
Subjective and objective:     75 yo female with PMHx of DM on insulin pump, CAD s/p stenting in 2020, Lung nodules, Dry eyes, bilateral, PVD (peripheral vascular disease): S/p Right Peripheral leg Stent, Seasonal allergies, Skin cancer: Basal Cell Cancer face  (around nose) s/p MOHS procedure x 3, Anemia: Chronic, on Iron supplement, Hypothyroid, SSS (sick sinus syndrome): S/p PPM Sept 2017, Essential hypertension, Afib: 5+ yrs; on Eliquis admitted for pyelonephritis treatment complicated by right sided hydronephrosis, TEOFILO and hyponatremia, during her admission developed thrombocytopenia and DKA and upgraded to critical care for DKA.    Overnight events:  No acute overnight events. PT is feeling better. Pt complaining of a tongue fissure with mild burning. Pt also continues to have mild oozing from the left arm.     ROS:  negative except as above    Allergies    latex (Pruritus; Rash)  sulfonamides (Unknown)    Intolerances    Pineapple (Unknown)    ANTIBIOTICS/RELEVANT:  antimicrobials  meropenem  IVPB 1000 milliGRAM(s) IV Intermittent every 12 hours      OTHER:  acetaminophen   Tablet .. 650 milliGRAM(s) Oral every 6 hours PRN  ALBUTerol    90 MICROgram(s) HFA Inhaler 1 Puff(s) Inhalation every 6 hours  albuterol/ipratropium for Nebulization 6 milliLiter(s) Nebulizer every 6 hours  aluminum hydroxide/magnesium hydroxide/simethicone Suspension 30 milliLiter(s) Oral every 6 hours PRN  aMIOdarone    Tablet 200 milliGRAM(s) Oral two times a day  chlorhexidine 4% Liquid 1 Application(s) Topical <User Schedule>  glucagon  Injectable 1 milliGRAM(s) IntraMuscular once  heparin   Injectable 5000 Unit(s) SubCutaneous every 8 hours  insulin lispro (ADMELOG) corrective regimen sliding scale   SubCutaneous three times a day before meals  insulin NPH human recombinant 22 Unit(s) SubCutaneous two times a day  levothyroxine 88 MICROGram(s) Oral daily  metoprolol tartrate 25 milliGRAM(s) Oral two times a day  multivitamin 1 Tablet(s) Oral daily  pantoprazole  Injectable 40 milliGRAM(s) IV Push daily      Objective:  Vital Signs Last 24 Hrs  T(C): 36.4 (22 Mar 2021 08:00), Max: 36.8 (21 Mar 2021 16:00)  T(F): 97.5 (22 Mar 2021 08:00), Max: 98.2 (21 Mar 2021 16:00)  HR: 104 (22 Mar 2021 10:13) (98 - 134)  BP: 128/59 (22 Mar 2021 10:13) (119/65 - 155/77)  BP(mean): 79 (22 Mar 2021 10:13) (79 - 114)  RR: 21 (22 Mar 2021 08:00) (15 - 21)  SpO2: 99% (22 Mar 2021 10:13) (97% - 100%)    PHYSICAL EXAM:  GENERAL: No acute distress, well-developed, not lethargic   HEAD:  Atraumatic, Normocephalic  ENT: PERRL, conjunctiva and sclera clear, neck supple, no JVD, moist mucosa, tongue fissure, no ulcers   CHEST/LUNG: respirations non-labored, b/l wheezing in lung fields  HEART: Regular rate and rhythm; No murmurs, rubs, or gallops  ABDOMEN: Soft, nontender, nondistended; Bowel sounds present  EXTREMITIES:  b/l arm edema, b/l ecchymosis on arms, left arm with oozing   PSYCH: Nl behavior, nl affect  NEUROLOGY: AAOx3, non-focal, moves all extremities spontaneously      LABS:                        9.8    17.31 )-----------( 68       ( 22 Mar 2021 04:50 )             28.3     03-22    123<L>  |  89<L>  |  37<H>  ----------------------------<  63<L>  3.9   |  28  |  1.3    Ca    8.0<L>      22 Mar 2021 04:50  Phos  3.0     03-22  Mg     2.2     03-22    TPro  4.6<L>  /  Alb  2.1<L>  /  TBili  0.6  /  DBili  x   /  AST  45<H>  /  ALT  49<H>  /  AlkPhos  193<H>  03-22      RECENT CULTURES:  03-18 @ 04:30  .Blood Blood-Peripheral  Escherichia coli ESBL  Escherichia coli ESBL  JONATHAN    Growth in anaerobic bottle: Escherichia coli ESBL  --      RADIOLOGY & ADDITIONAL STUDIES:  < from: Xray Chest 1 View- PORTABLE-Routine (03.21.21 @ 06:22) >    EXAM:  XR CHEST PORTABLE ROUTINE 1V            PROCEDURE DATE:  03/21/2021            INTERPRETATION:  Clinical History / Reason for exam: Cough    Comparison : Chest radiograph March 20, 2021.    Technique/Positioning: Single AP view of the chest.    Findings:    Support devices: Right IJ central venous catheter is positioned appropriately. Left chest wall pacer, unchanged.    Cardiac/mediastinum/hilum: Unremarkable.    Lung parenchyma/Pleura: Bilateral effusions and interstitial edema. No pneumothorax.    Skeleton/soft tissues: Unchanged.    Impression:    Bilateral effusions and interstitial edema, unchanged.        ELLIOT LANDAU MD; Attending Radiologist  This document has been electronically signed. Mar 21 2021  1:09PM    < end of copied text >  < from: US Renal (03.20.21 @ 22:19) >    EXAM:  US KIDNEY(S)            PROCEDURE DATE:  03/20/2021            INTERPRETATION:  CLINICAL INFORMATION: Persistent leukocytosis, pyelonephritis    COMPARISON: 3/18/2021.    TECHNIQUE: Sonography of the kidneys.    FINDINGS:    Right kidney: 9.6 cm. No renal mass, hydronephrosis or calculi.    Left kidney:  9.4 cm. No renal mass, hydronephrosis or calculi.      IMPRESSION:      Normal renal ultrasound. No sonographic evidence of an abscess        NARESH LEE MD; Attending Radiologist  This document has been electronically signed. Mar 21 2021 12:01AM    < end of copied text >  < from: NM Renal/Lasix (NM Renal/Lasix .) (03.19.21 @ 22:49) >    EXAM:  NM KIDNEY IMG  LASIX            PROCEDURE DATE:  03/19/2021            INTERPRETATION:  PROCEDURE: RENAL FLOW; RENAL IMAGES; LASIX RENOGRAPHY; COMPUTER ANALYSIS    Reason: Pyelonephritis with right hydronephrosis.    BUN 35; creatinine 1.3 (GFR 39)    Following the intravenous bolus injection of 7.8 mCi 99m technetium MAG-3, posterior kidney images were obtained at 2-second intervals as an angiographic acquisition.    These images reveal normal symmetric renal arterial flow compared to aortic arterial phase bilaterally.    Then posterior kidney images were obtained at 1-minute intervals for the next 48  minutes. Postvoiding posterior view of the kidney and bladder region was obtained. 40 mg Lasix was injected intravenously at 10  minutes post-injection.    These images reveal the right kidney to be smaller than the left with delayed emptying of bilateral kidneys. No hydronephrosis visualized bilaterally.    Cortex to collecting system transit time is 3 minutes on the left and 3-4minutes on the right.    Ureters: Both ureters are faintly visualized and normal in appearance.    Delayed view demonstrates significantly persistent holdup of radiotracer within bilateral kidneys.    Computer analysis of the 2-3 minute post injection image was performed.  These reveal that the left kidney accounts for 67 %, and the right kidney accounts for 33  % of total renal function.    The renogram curve for the left kidney demonstrates normal downsloping prior to Lasix renal injection withplateauing after Lasix injection.  .  The renogram curve for the right kidney no significant downsloping before and after Lasix administration with slope demonstrating plateau  .  Post-lasix T-1/2 for the left kidney is 286 minutes and 101 minutes for the right (normal < 20 minutes).    Impression:    1. Significantly holdup of MAG3 radiotracer within bilateral kidneys with poor emptying bilaterally. No hydronephrosis demonstrated bilaterally. Findings suggestive of poor renal function.    2. Theleft kidney accounts for 67% of total renal function and the right kidney accounts for 33% of total renal function consistent with significant difference.    3. Markedly delayed post-Lasix T1/2 bilaterally without hydronephrosis. Findings again suggestive of overall poor renal function.        ELLIOT LANDAU MD; Attending Radiologist  This document has been electronically signed. Mar 20 2021 10:21AM    < end of copied text >

## 2021-03-23 NOTE — CHART NOTE - NSCHARTNOTEFT_GEN_A_CORE
ICU DOWNGRADE NOTE:    76y Female transferred to telemetry floor from ICU    Patient is a 76y old Female who presents with a chief complaint of Sepsis secondary to pyelonephritis (23 Mar 2021 06:45)    The patient is currently admitted for the primary diagnosis of Sepsis secondary to pyelonephritis    The patient was admitted to the unit for (8) Days.    The patient was never intubated, and was on pressors for (days).    Indwelling vascular catheters: Right IJ removed     Urinary Catheter: discontinued     Disposition: Telemetry     Code Status: FULL    ICU COURSE OF EVENTS:  -------------------------------------------------------------------------------------------  Patient was upgraded from the floors to ICU following an episode of DKA (FS>400, bicarb serum 7, AG 23, lactate 1) and was placed on an insulin drip.        -------------------------------------------------------------------------------------------    Current workup in progress:    SIGN OUT AT 03-23-21 @ 10:53 GIVEN TO: ICU DOWNGRADE NOTE:    76y Female transferred to telemetry floor from ICU    Patient is a 76y old Female who presents with a chief complaint of Sepsis secondary to pyelonephritis (23 Mar 2021 06:45)    The patient is currently admitted for the primary diagnosis of Sepsis secondary to pyelonephritis    The patient was admitted to the unit for (8) Days.    The patient was never intubated, and was on pressors for (days).    Indwelling vascular catheters: Right IJ removed     Urinary Catheter: discontinued     Disposition: Telemetry     Code Status: FULL    ICU COURSE OF EVENTS:  -------------------------------------------------------------------------------------------  Patient was upgraded from the floors to ICU following an episode of DKA (FS>400, bicarb serum 7, AG 23, lactate 1) and was placed on an insulin drip for        -------------------------------------------------------------------------------------------    Current workup in progress:    SIGN OUT AT 03-23-21 @ 10:53 GIVEN TO: ICU DOWNGRADE NOTE:    76y Female transferred to telemetry floor from ICU    Patient is a 76y old Female who presents with a chief complaint of Sepsis secondary to pyelonephritis (23 Mar 2021 06:45)    The patient is currently admitted for the primary diagnosis of Sepsis secondary to pyelonephritis    The patient was admitted to the unit for (8) Days.    The patient was never intubated, and was on pressors for 1 day.    Indwelling vascular catheters: Right IJ removed     Urinary Catheter: discontinued     Disposition: Telemetry     Code Status: FULL    ICU COURSE OF EVENTS:  -------------------------------------------------------------------------------------------  75 yo female with PMHx of DM on insulin pump, CAD s/p stenting in 2020, Lung nodules, Dry eyes, bilateral, PVD (peripheral vascular disease): S/p Right Peripheral leg Stent, Seasonal allergies, Skin cancer: Basal Cell Cancer face  (around nose) s/p MOHS procedure x 3, Anemia: Chronic, on Iron supplement, Hypothyroid, SSS (sick sinus syndrome): S/p PPM Sept 2017, Essential hypertension, Afib: 5+ yrs; on Eliquis admitted for pyelonephritis treatment complicated by right sided hydronephrosis, TEOFILO and hyponatremia, during her admission developed thrombocytopenia and DKA and upgraded to critical care for DKA.    Patient was upgraded from the floors to ICU following an episode of DKA (FS>400, bicarb serum 7, AG 23, lactate 1) and was placed on an insulin drip for 1 day with resolution of anion gap. Pt hydronephrosis improved on repeat u/s with no sign of obstruction. Pt developed thrombocytopenia likely due to bone marrow suppression secondary to sepsis which has improved, HIT panel was negative. ECHO showed borderline pulmonary hypertension patient asymptomatic with EF of 58%. Pt on metoprolol and amiodarone and Eliquis for A. fib. Pt developed episodes of hypotension that was responsive to fluids, BP is stable with MAP of . Pt had low urine output which has resolved. Renal scan with Lasix showed poor renal function with the right kidney at 33% of total renal function and left with 67% of total renal function. W/u for hyponatremia suggestive of poor oral intake with decrease renal function, Serum osmolality 283, Urine osmolality of 393, TSH 4.17, Cortisol 22.9.     -------------------------------------------------------------------------------------------  Assessment and Plan:  75 yo female with PMHx of DM on insulin pump, CAD s/p stenting in 2020, Lung nodules, Dry eyes, bilateral, PVD (peripheral vascular disease): S/p Right Peripheral leg Stent, Seasonal allergies, Skin cancer: Basal Cell Cancer face  (around nose) s/p MOHS procedure x 3, Anemia: Chronic, on Iron supplement, Hypothyroid, SSS (sick sinus syndrome): S/p PPM Sept 2017, Essential hypertension, Afib: 5+ yrs; on Eliquis admitted for pyelonephritis treatment complicated by right sided hydronephrosis, TEOFILO and hyponatremia, during her admission developed thrombocytopenia and DKA and upgraded to critical care for DKA.    Sepsis secondary to pyelonephritis and e. coli ESBL bacteremia  - WBC 12.4<17.3, afebrile for >72 hours  - Renal U/S no evidence of abscess   - No CT abd and pelvis for now in light of improved WBC and decrease renal function   - continue meropenem as per ID  - monitor for now    Right sided Pyelonephritis  - secondary to Hydronephrosis  - continue tx with empiric abx as per ID  - continue supportive care  - NM Renal Lasix Scan (3/19): Significantly holdup of MAG3 radiotracer within bilateral kidneys with poor emptying bilaterally. No hydronephrosis demonstrated bilaterally. Findings suggestive of poor renal function. The left kidney accounts for 67% of total renal function and the right kidney accounts for 33% of total renal function consistent with significant difference. Markedly delayed post-Lasix T1/2 bilaterally without hydronephrosis. Findings again suggestive of overall poor renal function.  - Per urology no intervention needed, no sign of obstruction     Bacteremia e. Coli ESBL  - Blood cultures negative on 21/3  - continue tx as per ID with meropenum   - On discharge ertapenem 1 gm iv q24h for 14 days, per ID    TEOFILO/ Hyponatremia/ Metabolic acidosis  - secondary to sepsis induced ATN with right sided hydronephrosis  - Poor renal function based on Renal lasix scan   - Cr stable at 1.3-1.4, baseline 0.5-0.8    NSTEMI/CAD s/p PCI with LUPE placed LAD in July 2020  - secondary to sepsis induced demand ischemia  - less likely true ACS  - continue supportive care for now  - Hold Plavix for now     DM with Hyperglycemia  - d/c NPH BID  - started on Lantus 15 units at bed time   - tx sliding scale  - Has insulin pump at home    Thrombocytopenia  - Secondary to sepsis induced BM suppression, HIT w/u negative  - Plt 88 > 68  - continue supportive care, monitor for now  - Hold Plavix for now    Hyponatremia  - Na 127 <- 123  - poor oral intake at home, Na on admission 121    - Serum osmolality 283, Urine osmolality 393  - TSH 4.17  - Cortisol 22.9  - monitor for neurological signs    Pulmonary hypertension   -< from: TTE Echo Complete w/o Contrast w/ Doppler (03.18.21 @ 13:32) >   Estimated pulmonary artery systolic pressure is 38.6 mmHg assuming a right atrial pressure of 10 mmHg, which is consistent with borderline pulmonary hypertension.  - EF of 58%  - BNP 7641  - O2 98% on RA, asymptomatic  - Consider Lasix if symptoms worsen    PVD s/p right lower ext stent  - continue supportive care, monitor for now    Basal cell CA s/p MOHS procedure x 3  - continue supportive care, monitor for now.    Hypothyroidism  - tx synthroid, continue supportive care, monitor for now.    SSS s/p Pacemaker  - continue supportive care, monitor for now.    HTN  - -120/76-55  - c/w metoprolol 25mg twice daily, increase to 50mg q8hrs if not controlled   - monitor for now    AFIB s/p Ablation  - restarted on 2.5 apixaban, Amio BID continue supportive care, monitor for now.    HLD  - tx statin, continue supportive care, monitor for now.    Anemia  - Hemoglobin stable   - restarted iron supplements    DVT px  - Apixaban, duplex U/S negative for DVT     NUT  - tx IVF's, tx oral diet, gluten-free. Nystatin for sore mouth. Miralax and senna for bowel regimen.     Current workup in progress:    SIGN OUT AT 03-23-21 @ 10:53 GIVEN TO:

## 2021-03-23 NOTE — PROGRESS NOTE ADULT - ASSESSMENT
IMPRESSION;  Resolved sepsis secondary to acute right pyelonephritis with possibly distal ureteral obstruction with moderate right hydroureteral nephrosis secondary to ESBL E coli  BCx / UCX 3/14 ESBL Ecoli  - Remains positive on 3/18  - NM Renal/Lasix (NM Renal/Lasix .) (03.19.21 @ 22:49): 1. Significantly holdup of MAG3 radiotracer within bilateral kidneys with poor emptying bilaterally. No hydronephrosis demonstrated bilaterally. Findings suggestive of poor renal function. Theleft kidney accounts for 67% of total renal function and the right kidney accounts for 33% of total renal function consistent with significant difference. Markedly delayed post-Lasix T1/2 bilaterally without hydronephrosis. Findings again suggestive of overall poor renal function.  - Blood Cx 3/21 NG    Creatinine, Serum: 1.3 mg/dL (03.20.21 @ 04:30)  Weight (kg): 66.3 (16 Mar 2021 19:53)  CrCl 37    RECOMMENDATIONS  - continue Meropenem -- change to 1g q 12 hours  - on discharge, can change to Ertapenem 1g daily for 10 days from culture clearance (3/21-3/31)  - recall PRN    Please call or message on [employer] Teams if with any questions.  Spectra [phone]

## 2021-03-23 NOTE — PROGRESS NOTE ADULT - SUBJECTIVE AND OBJECTIVE BOX
SOWMYA AMADO  76y, Female  Allergy: latex (Pruritus; Rash)  Pineapple (Unknown)  sulfonamides (Unknown)      LOS  9d    CHIEF COMPLAINT: Sepsis (23 Mar 2021 06:45)      INTERVAL EVENTS/HPI  - No acute events overnight  - T(F): , Max: 98.5 (03-23-21 @ 00:00)  - WBC Count: 12.46 (03-23-21 @ 04:40)  WBC Count: 17.31 (03-22-21 @ 04:50)     - Creatinine, Serum: 1.4 (03-23-21 @ 04:40)  Creatinine, Serum: 1.3 (03-22-21 @ 04:50)       ROS  General: Denies rigors, nightsweats  HEENT: Denies headache, rhinorrhea, sore throat, eye pain  CV: Denies CP, palpitations  PULM: Denies wheezing, hemoptysis  GI: Denies hematemesis, hematochezia, melena  : Denies discharge, hematuria  MSK: Denies arthralgias, myalgias  SKIN: Denies rash, lesions  NEURO: Denies paresthesias, weakness  PSYCH: Denies depression, anxiety    VITALS:  T(F): 98.3, Max: 98.5 (03-23-21 @ 00:00)  HR: 92  BP: 148/65  RR: 20Vital Signs Last 24 Hrs  T(C): 36.8 (23 Mar 2021 08:00), Max: 36.9 (23 Mar 2021 00:00)  T(F): 98.3 (23 Mar 2021 08:00), Max: 98.5 (23 Mar 2021 00:00)  HR: 92 (23 Mar 2021 14:00) (92 - 112)  BP: 148/65 (23 Mar 2021 14:00) (112/62 - 149/77)  BP(mean): 92 (23 Mar 2021 14:00) (75 - 116)  RR: 20 (23 Mar 2021 14:00) (17 - 21)  SpO2: 98% (23 Mar 2021 14:00) (98% - 100%)    PHYSICAL EXAM:  Gen: NAD, resting in bed  HEENT: Normocephalic, atraumatic  Neck: supple, no lymphadenopathy  CV: Regular rate & regular rhythm  Lungs: decreased BS at bases, no fremitus  Abdomen: Soft, BS present  Ext: Warm, well perfused  Neuro: non focal, awake  Skin: no rash, no erythema  Lines: no phlebitis    FH: Non-contributory  Social Hx: Non-contributory    TESTS & MEASUREMENTS:                        9.2    12.46 )-----------( 88       ( 23 Mar 2021 04:40 )             26.9     03-23    127<L>  |  90<L>  |  35<H>  ----------------------------<  130<H>  4.5   |  29  |  1.4    Ca    8.0<L>      23 Mar 2021 04:40  Phos  3.5     03-23  Mg     2.0     03-23    TPro  4.6<L>  /  Alb  2.1<L>  /  TBili  0.5  /  DBili  x   /  AST  36  /  ALT  38  /  AlkPhos  169<H>  03-23    eGFR if Non African American: 36 mL/min/1.73M2 (03-23-21 @ 04:40)  eGFR if African American: 42 mL/min/1.73M2 (03-23-21 @ 04:40)    LIVER FUNCTIONS - ( 23 Mar 2021 04:40 )  Alb: 2.1 g/dL / Pro: 4.6 g/dL / ALK PHOS: 169 U/L / ALT: 38 U/L / AST: 36 U/L / GGT: x               Culture - Blood (collected 03-21-21 @ 11:46)  Source: .Blood None  Preliminary Report (03-22-21 @ 19:01):    No growth to date.    Culture - Blood (collected 03-18-21 @ 04:30)  Source: .Blood Blood-Peripheral  Gram Stain (03-19-21 @ 02:23):    Growth in anaerobic bottle: Gram Negative Rods  Final Report (03-20-21 @ 17:09):    Growth in anaerobic bottle: Escherichia coli ESBL  Organism: Escherichia coli ESBL (03-20-21 @ 17:09)  Organism: Escherichia coli ESBL (03-20-21 @ 17:09)      -  Amikacin: S <=16      -  Ampicillin: R >16 These ampicillin results predict results for amoxicillin      -  Ampicillin/Sulbactam: R 8/4 Enterobacter, Citrobacter, and Serratia may develop resistance during prolonged therapy (3-4 days)      -  Aztreonam: R >16      -  Cefazolin: R >16 Enterobacter, Citrobacter, and Serratia may develop resistance during prolonged therapy (3-4 days)      -  Cefepime: R >16      -  Cefoxitin: S <=8      -  Ceftriaxone: R >32 Enterobacter, Citrobacter, and Serratia may develop resistance during prolonged therapy      -  Ciprofloxacin: R >2      -  Ertapenem: S <=0.5      -  Gentamicin: R >8      -  Imipenem: S <=1      -  Levofloxacin: R >4      -  Meropenem: S <=1      -  Piperacillin/Tazobactam: R <=8      -  Tobramycin: R >8      -  Trimethoprim/Sulfamethoxazole: S <=0.5/9.5      Method Type: JONATHAN    Culture - Urine (collected 03-14-21 @ 22:50)  Source: .Urine Clean Catch (Midstream)  Final Report (03-17-21 @ 09:26):    50,000 - 99,000 CFU/mL Escherichia coli ESBL  Organism: Escherichia coli ESBL (03-17-21 @ 09:26)  Organism: Escherichia coli ESBL (03-17-21 @ 09:26)      -  Amikacin: S <=16      -  Amoxicillin/Clavulanic Acid: I 16/8      -  Ampicillin: R >16 These ampicillin results predict results for amoxicillin      -  Ampicillin/Sulbactam: I 16/8 Enterobacter, Citrobacter, and Serratia may develop resistance during prolonged therapy (3-4 days)      -  Aztreonam: R >16      -  Cefazolin: R >16 (MIC_CL_[id number]) For uncomplicated UTI with K. pneumoniae, E. coli, or P. mirablis: JONATHAN <=16 is sensitive and JONATHAN >=32 is resistant. This also predicts results for oral agents cefaclor, cefdinir, cefpodoxime, cefprozil, cefuroxime axetil, cephalexin and locarbef for uncomplicated UTI. Note that some isolates may be susceptible to these agents while testing resistant to cefazolin.      -  Cefepime: R >16      -  Cefoxitin: S <=8      -  Ceftriaxone: R >32 Enterobacter, Citrobacter, and Serratia may develop resistance during prolonged therapy      -  Ciprofloxacin: R >2      -  Ertapenem: S <=0.5      -  Gentamicin: R >8      -  Imipenem: S <=1      -  Levofloxacin: R >4      -  Meropenem: S <=1      -  Nitrofurantoin: S <=32 Should not be used to treat pyelonephritis      -  Piperacillin/Tazobactam: S <=8      -  Tigecycline: S <=2      -  Tobramycin: R >8      -  Trimethoprim/Sulfamethoxazole: S <=0.5/9.5      Method Type: JONATHAN    Culture - Blood (collected 03-14-21 @ 18:30)  Source: .Blood Blood  Gram Stain (03-15-21 @ 12:47):    Growth in aerobic bottle: Gram Negative Rods    Growth in anaerobic bottle: Gram Negative Rods  Final Report (03-17-21 @ 11:02):    Growth in aerobic and anaerobic bottles: Escherichia coli ESBL    MDRO detected in BCID PCR, resistance marker = CTX-M (ESBL)    ***Blood Panel PCR results on this specimen are available    approximately 3 hours after the Gram stain result.***    Gram stain, PCR, and/or culture results may not always    correspond due to difference in methodologies.    ************************************************************    This PCR assay was performed by multiplex PCR. This    Assay tests for 66 bacterial and resistance gene targets.    Please refer to the [facility] test directory    at [email or web address] for details.  Organism: Blood Culture PCR  Escherichia coli ESBL (03-17-21 @ 11:02)  Organism: Escherichia coli ESBL (03-17-21 @ 11:02)      -  Amikacin: S <=16      -  Ampicillin: R >16 These ampicillin results predict results for amoxicillin      -  Ampicillin/Sulbactam: R 16/8 Enterobacter, Citrobacter, and Serratia may develop resistance during prolonged therapy (3-4 days)      -  Aztreonam: R >16      -  Cefazolin: R >16 Enterobacter, Citrobacter, and Serratia may develop resistance during prolonged therapy (3-4 days)      -  Cefepime: R >16      -  Cefoxitin: S <=8      -  Ceftriaxone: R >32 Enterobacter, Citrobacter, and Serratia may develop resistance during prolonged therapy      -  Ciprofloxacin: R >2      -  Ertapenem: S <=0.5      -  Gentamicin: R >8      -  Imipenem: S <=1      -  Levofloxacin: R >4      -  Meropenem: S <=1      -  Piperacillin/Tazobactam: R <=8      -  Tobramycin: R >8      -  Trimethoprim/Sulfamethoxazole: S <=0.5/9.5      Method Type: JONATHAN  Organism: Blood Culture PCR (03-17-21 @ 11:02)      -  ESBL: Detec      -  Escherichia coli: Detec      Method Type: PCR    Culture - Blood (collected 03-14-21 @ 18:30)  Source: .Blood Blood  Gram Stain (03-15-21 @ 11:56):    Growth in anaerobic bottle: Gram Negative Rods    Growth in aerobic bottle: Gram Negative Rods  Final Report (03-17-21 @ 11:03):    Growth in aerobic and anaerobic bottles: Escherichia coli ESBL    See previous culture [id number]            INFECTIOUS DISEASES TESTING  COVID-19 PCR: NotDetec (03-14-21 @ 20:40)      INFLAMMATORY MARKERS      RADIOLOGY & ADDITIONAL TESTS:  I have personally reviewed the last available Chest xray  CXR      CT      CARDIOLOGY TESTING  12 Lead ECG:   Ventricular Rate 110 BPM    Atrial Rate 326 BPM    QRS Duration 84 ms    Q-T Interval 356 ms    QTC Calculation(Bazett) 481 ms    R Axis 66 degrees    T Axis -48 degrees    Diagnosis Line Atrial fibrillation with rapid ventricular response  Nonspecific ST and T wave abnormality  Abnormal ECG    Confirmed by Lis Snyder MD ([id number]) on 3/22/2021 7:55:47 AM (03-22-21 @ 06:10)  12 Lead ECG:   Ventricular Rate 128 BPM    Atrial Rate 127 BPM    QRS Duration 84 ms    Q-T Interval 290 ms    QTC Calculation(Bazett) 423 ms    R Axis 11 degrees    T Axis 224 degrees    Diagnosis Line Atrial fibrillation with rapid ventricular response  Septal infarct , age undetermined  Abnormal ECG    Confirmed by Carlos Jackson ([id number]) on 3/21/2021 9:12:31 AM (03-21-21 @ 05:25)      MEDICATIONS  ALBUTerol    90 MICROgram(s) HFA Inhaler 1 Inhalation every 6 hours  albuterol/ipratropium for Nebulization 6 Nebulizer every 6 hours  aMIOdarone    Tablet 200 Oral two times a day  apixaban 2.5 Oral two times a day  benzocaine 15 mG/menthol 3.6 mG (Sugar-Free) Lozenge 1 Oral daily  chlorhexidine 4% Liquid 1 Topical <User Schedule>  dextrose 40% Gel 15 Oral once  dextrose 5%. 1000 IV Continuous <Continuous>  dextrose 5%. 1000 IV Continuous <Continuous>  dextrose 50% Injectable 25 IV Push once  dextrose 50% Injectable 12.5 IV Push once  dextrose 50% Injectable 25 IV Push once  glucagon  Injectable 1 IntraMuscular once  insulin glargine Injectable (LANTUS) 15 SubCutaneous at bedtime  insulin lispro (ADMELOG) corrective regimen sliding scale  SubCutaneous three times a day before meals  insulin lispro Injectable (ADMELOG) 4 SubCutaneous three times a day before meals  levothyroxine 88 Oral daily  meropenem  IVPB 1000 IV Intermittent every 12 hours  metoprolol tartrate 25 Oral every 6 hours  multivitamin 1 Oral daily  nystatin    Suspension 070224 Oral daily  pantoprazole  Injectable 40 IV Push daily  polyethylene glycol 3350 17 Oral daily  senna 2 Oral at bedtime      WEIGHT  Weight (kg): 66.3 (03-16-21 @ 19:53)  Creatinine, Serum: 1.4 mg/dL (03-23-21 @ 04:40)      ANTIBIOTICS:  meropenem  IVPB 1000 milliGRAM(s) IV Intermittent every 12 hours  nystatin    Suspension 014408 Unit(s) Oral daily      All available historical records have been reviewed

## 2021-03-23 NOTE — PROGRESS NOTE ADULT - ASSESSMENT
*** This note is incomplete and pending attending review****        75 yo female with PMHx of DM on insulin pump, CAD s/p stenting in 2020, Lung nodules, Dry eyes, bilateral, PVD (peripheral vascular disease): S/p Right Peripheral leg Stent, Seasonal allergies, Skin cancer: Basal Cell Cancer face (around nose) s/p MOHS procedure x 3, Anemia: Chronic, on Iron supplement, Hypothyroid, SSS (sick sinus syndrome): S/p PPM Sept 2017, Essential hypertension, Afib: 5+ yrs; on Eliquis (held) admitted for pyelonephritis treatment complicated by right sided hydronephrosis, TEOFILO and hyponatremia, during her admission developed thrombocytopenia and DKA and upgraded to critical care for DKA.    Sepsis secondary to pyelonephritis and bacteremia  - WBC 12.4<17.3, afebrile for >72 hours  - Renal U/S no evidence of abscess   - No CT abd and pelvis for now in light of improved WBC and decrease renal function   - continue meropenem with abx as per ID  - monitor for now    Right sided Pyelonephritis  - secondary to Hydronephrosis  - continue tx with empiric abx as per ID  - continue supportive care  - NM Renal Lasix Scan (3/19): Significantly holdup of MAG3 radiotracer within bilateral kidneys with poor emptying bilaterally. No hydronephrosis demonstrated bilaterally. Findings suggestive of poor renal function. The left kidney accounts for 67% of total renal function and the right kidney accounts for 33% of total renal function consistent with significant difference. Markedly delayed post-Lasix T1/2 bilaterally without hydronephrosis. Findings again suggestive of overall poor renal function.  - Per urology no intervention needed, no sign of obstruction     Bacteremia  - Blood cultures negative  - continue tx as per ID with meropenum   - continue supportive care    TEOFILO/ Hyponatremia/ Metabolic acidosis  - secondary to sepsis induced ATN with right sided hydronephrosis  - Poor renal function based on Renal lasix scan     NSTEMI/CAD s/p PCI with LUPE placed LAD  - secondary to sepsis induced demand ischemia  - less likely true ACS  - continue supportive care for now  - Hold Plavix for now    DM with Hyperglycemia  - d/c NPH BID  - started on Lantus 15 units at bed time   - tx sliding scale    Thrombocytopenia  - Secondary to sepsis induced BM suppression  - Plt 88 > 68  - continue supportive care, monitor for now  - Hold Plavix for now    Hyponatremia:  - Na 127 <- 123  - poor oral intake at home, Na on admission 121    - Serum osmolality 283, Urine osmolality 393  - TSH 4.17  - Cortisol 22.9  - monitor for neurological signs    PVD s/p right lower ext stent  - continue supportive care, monitor for now    Basal cell CA s/p MOHS procedure x 3  - continue supportive care, monitor for now.    Hypothyroidism  - tx synthroid, continue supportive care, monitor for now.    SSS s/p Pacemaker  - continue supportive care, monitor for now.    HTN  - -120/76-55  - c/w metoprolol 25mg twice daily, increase to 50mg q8hrs if not controlled   - monitor for now    AFIB s/p Ablation  - tx Amio BID continue supportive care, monitor for now.    HLD  - tx statin, continue supportive care, monitor for now.    DVT px  - Hold AC due to thrombocytopenia, ,lower ext US neg for DVT, b/l SCD     NUT  - tx IVF's, tx oral diet, gluten-free. Nystatin and benzocaine spray for sore mouth.  *** This note is incomplete and pending attending review****    77 yo female with PMHx of DM on insulin pump, CAD s/p stenting in 2020, Lung nodules, Dry eyes, bilateral, PVD (peripheral vascular disease): S/p Right Peripheral leg Stent, Seasonal allergies, Skin cancer: Basal Cell Cancer face (around nose) s/p MOHS procedure x 3, Anemia: Chronic, on Iron supplement, Hypothyroid, SSS (sick sinus syndrome): S/p PPM Sept 2017, Essential hypertension, Afib: 5+ yrs; on Eliquis (held) admitted for pyelonephritis treatment complicated by right sided hydronephrosis, TEOFILO and hyponatremia, during her admission developed thrombocytopenia and DKA and upgraded to critical care for DKA.    Sepsis secondary to pyelonephritis and bacteremia  - WBC 12.4<17.3, afebrile for >72 hours  - Renal U/S no evidence of abscess   - No CT abd and pelvis for now in light of improved WBC and decrease renal function   - continue meropenem as per ID  - monitor for now    Right sided Pyelonephritis  - secondary to Hydronephrosis  - continue tx with empiric abx as per ID  - continue supportive care  - NM Renal Lasix Scan (3/19): Significantly holdup of MAG3 radiotracer within bilateral kidneys with poor emptying bilaterally. No hydronephrosis demonstrated bilaterally. Findings suggestive of poor renal function. The left kidney accounts for 67% of total renal function and the right kidney accounts for 33% of total renal function consistent with significant difference. Markedly delayed post-Lasix T1/2 bilaterally without hydronephrosis. Findings again suggestive of overall poor renal function.  - Per urology no intervention needed, no sign of obstruction     Bacteremia  - Blood cultures negative  - continue tx as per ID with meropenum   - continue supportive care    TEOFILO/ Hyponatremia/ Metabolic acidosis  - secondary to sepsis induced ATN with right sided hydronephrosis  - Poor renal function based on Renal lasix scan     NSTEMI/CAD s/p PCI with LUPE placed LAD  - secondary to sepsis induced demand ischemia  - less likely true ACS  - continue supportive care for now  - Hold Plavix for now    DM with Hyperglycemia  - d/c NPH BID  - started on Lantus 15 units at bed time   - tx sliding scale    Thrombocytopenia  - Secondary to sepsis induced BM suppression  - Plt 88 > 68  - continue supportive care, monitor for now  - Hold Plavix for now    Hyponatremia:  - Na 127 <- 123  - poor oral intake at home, Na on admission 121    - Serum osmolality 283, Urine osmolality 393  - TSH 4.17  - Cortisol 22.9  - monitor for neurological signs    PVD s/p right lower ext stent  - continue supportive care, monitor for now    Basal cell CA s/p MOHS procedure x 3  - continue supportive care, monitor for now.    Hypothyroidism  - tx synthroid, continue supportive care, monitor for now.    SSS s/p Pacemaker  - continue supportive care, monitor for now.    HTN  - -120/76-55  - c/w metoprolol 25mg twice daily, increase to 50mg q8hrs if not controlled   - monitor for now    AFIB s/p Ablation  - restarted on 2.5 apixaban, Amio BID continue supportive care, monitor for now.    HLD  - tx statin, continue supportive care, monitor for now.    DVT px  - Hold AC due to thrombocytopenia, ,lower ext US neg for DVT, b/l SCD     NUT  - tx IVF's, tx oral diet, gluten-free. Nystatin for sore mouth. 77 yo female with PMHx of DM on insulin pump, CAD s/p stenting in 2020, Lung nodules, Dry eyes, bilateral, PVD (peripheral vascular disease): S/p Right Peripheral leg Stent, Seasonal allergies, Skin cancer: Basal Cell Cancer face (around nose) s/p MOHS procedure x 3, Anemia: Chronic, on Iron supplement, Hypothyroid, SSS (sick sinus syndrome): S/p PPM Sept 2017, Essential hypertension, Afib: 5+ yrs; on Eliquis (held) admitted for pyelonephritis treatment complicated by right sided hydronephrosis, TEOFILO and hyponatremia, during her admission developed thrombocytopenia and DKA and upgraded to critical care for DKA.    Sepsis secondary to pyelonephritis and e. coli ESBL bacteremia  - WBC 12.4<17.3, afebrile for >72 hours  - Renal U/S no evidence of abscess   - No CT abd and pelvis for now in light of improved WBC and decrease renal function   - continue meropenem as per ID  - monitor for now    Right sided Pyelonephritis  - secondary to Hydronephrosis  - continue tx with empiric abx as per ID  - continue supportive care  - NM Renal Lasix Scan (3/19): Significantly holdup of MAG3 radiotracer within bilateral kidneys with poor emptying bilaterally. No hydronephrosis demonstrated bilaterally. Findings suggestive of poor renal function. The left kidney accounts for 67% of total renal function and the right kidney accounts for 33% of total renal function consistent with significant difference. Markedly delayed post-Lasix T1/2 bilaterally without hydronephrosis. Findings again suggestive of overall poor renal function.  - Per urology no intervention needed, no sign of obstruction     Bacteremia e. Coli ESBL  - Blood cultures negative on 21/3  - continue tx as per ID with meropenum   - On discharge ertapenem 1 gm iv q24h for 14 days, per ID    TEOFILO/ Hyponatremia/ Metabolic acidosis  - secondary to sepsis induced ATN with right sided hydronephrosis  - Poor renal function based on Renal lasix scan   - Cr stable at 1.3-1.4, baseline 0.5-0.8    NSTEMI/CAD s/p PCI with LUPE placed LAD in July 2020  - secondary to sepsis induced demand ischemia  - less likely true ACS  - continue supportive care for now  - Hold Plavix for now     DM with Hyperglycemia  - d/c NPH BID  - started on Lantus 15 units at bed time   - tx sliding scale  - Has insulin pump at home    Thrombocytopenia  - Secondary to sepsis induced BM suppression, HIT w/u negative  - Plt 88 > 68  - continue supportive care, monitor for now  - Hold Plavix for now    Hyponatremia  - Na 127 <- 123  - poor oral intake at home, Na on admission 121    - Serum osmolality 283, Urine osmolality 393  - TSH 4.17  - Cortisol 22.9  - monitor for neurological signs    Pulmonary hypertension   -< from: TTE Echo Complete w/o Contrast w/ Doppler (03.18.21 @ 13:32) >   Estimated pulmonary artery systolic pressure is 38.6 mmHg assuming a right atrial pressure of 10 mmHg, which is consistent with borderline pulmonary hypertension.  - EF of 58%  - BNP 7641  - O2 98% on RA, asymptomatic  - Consider Lasix if symptoms worsen    PVD s/p right lower ext stent  - continue supportive care, monitor for now    Basal cell CA s/p MOHS procedure x 3  - continue supportive care, monitor for now.    Hypothyroidism  - tx synthroid, continue supportive care, monitor for now.    SSS s/p Pacemaker  - continue supportive care, monitor for now.    HTN  - -120/76-55  - c/w metoprolol 25mg twice daily, increase to 50mg q8hrs if not controlled   - monitor for now    AFIB s/p Ablation  - restarted on 2.5 apixaban, Amio BID continue supportive care, monitor for now.    HLD  - tx statin, continue supportive care, monitor for now.    DVT px  - Apixaban, duplex U/S negative for DVT     NUT  - tx IVF's, tx oral diet, gluten-free. Nystatin for sore mouth. Miralax and senna for bowel regimen.

## 2021-03-23 NOTE — PROGRESS NOTE ADULT - SUBJECTIVE AND OBJECTIVE BOX
75 yo female with PMHx of DM on insulin pump, CAD s/p stenting in 2020, Lung nodules, Dry eyes, bilateral, PVD (peripheral vascular disease): S/p Right Peripheral leg Stent, Seasonal allergies, Skin cancer: Basal Cell Cancer face  (around nose) s/p MOHS procedure x 3, Anemia: Chronic, on Iron supplement, Hypothyroid, SSS (sick sinus syndrome): S/p PPM Sept 2017, Essential hypertension, Afib: 5+ yrs; on Eliquis admitted for pyelonephritis treatment complicated by right sided hydronephrosis, TEOFILO and hyponatremia, during her admission developed thrombocytopenia and DKA and upgraded to critical care for DKA.    Overnight events:  No acute overnight events.     ROS:  negative except as above    Allergies    latex (Pruritus; Rash)  sulfonamides (Unknown)    Intolerances    Pineapple (Unknown)      ANTIBIOTICS/RELEVANT:  antimicrobials  meropenem  IVPB 1000 milliGRAM(s) IV Intermittent every 12 hours  nystatin    Suspension 694251 Unit(s) Oral daily    immunologic:    OTHER:  acetaminophen   Tablet .. 650 milliGRAM(s) Oral every 6 hours PRN  ALBUTerol    90 MICROgram(s) HFA Inhaler 1 Puff(s) Inhalation every 6 hours  albuterol/ipratropium for Nebulization 6 milliLiter(s) Nebulizer every 6 hours  aluminum hydroxide/magnesium hydroxide/simethicone Suspension 30 milliLiter(s) Oral every 6 hours PRN  aMIOdarone    Tablet 200 milliGRAM(s) Oral two times a day  benzocaine 15 mG/menthol 3.6 mG (Sugar-Free) Lozenge 1 Lozenge Oral daily  chlorhexidine 4% Liquid 1 Application(s) Topical <User Schedule>  dextrose 40% Gel 15 Gram(s) Oral once  dextrose 5%. 1000 milliLiter(s) IV Continuous <Continuous>  dextrose 5%. 1000 milliLiter(s) IV Continuous <Continuous>  dextrose 50% Injectable 25 Gram(s) IV Push once  dextrose 50% Injectable 12.5 Gram(s) IV Push once  dextrose 50% Injectable 25 Gram(s) IV Push once  glucagon  Injectable 1 milliGRAM(s) IntraMuscular once  insulin glargine Injectable (LANTUS) 15 Unit(s) SubCutaneous at bedtime  insulin lispro (ADMELOG) corrective regimen sliding scale   SubCutaneous three times a day before meals  insulin lispro Injectable (ADMELOG) 4 Unit(s) SubCutaneous three times a day before meals  levothyroxine 88 MICROGram(s) Oral daily  metoprolol tartrate 25 milliGRAM(s) Oral every 6 hours  multivitamin 1 Tablet(s) Oral daily  pantoprazole  Injectable 40 milliGRAM(s) IV Push daily      Objective:  Vital Signs Last 24 Hrs  T(C): 36.8 (23 Mar 2021 04:00), Max: 36.9 (22 Mar 2021 16:13)  T(F): 98.3 (23 Mar 2021 04:00), Max: 98.5 (23 Mar 2021 00:00)  HR: 108 (23 Mar 2021 06:00) (98 - 114)  BP: 145/68 (23 Mar 2021 06:00) (111/85 - 149/77)  BP(mean): 86 (23 Mar 2021 06:00) (75 - 116)  RR: 20 (23 Mar 2021 06:00) (16 - 21)  SpO2: 99% (23 Mar 2021 06:00) (98% - 100%)    PHYSICAL EXAM:                    LABS:                        9.2    12.46 )-----------( 88       ( 23 Mar 2021 04:40 )             26.9     03-23    127<L>  |  90<L>  |  35<H>  ----------------------------<  130<H>  4.5   |  29  |  1.4    Ca    8.0<L>      23 Mar 2021 04:40  Phos  3.5     03-23  Mg     2.0     03-23    TPro  4.6<L>  /  Alb  2.1<L>  /  TBili  0.5  /  DBili  x   /  AST  36  /  ALT  38  /  AlkPhos  169<H>  03-23    PT/INR - ( 23 Mar 2021 04:40 )   PT: 11.60 sec;   INR: 1.01 ratio         PTT - ( 23 Mar 2021 04:40 )  PTT:29.1 sec      MICROBIOLOGY:            RECENT CULTURES:  03-21 @ 11:46  .Blood None  --  --  --    No growth to date.  --  03-18 @ 04:30  .Blood Blood-Peripheral  Escherichia coli ESBL  Escherichia coli ESBL  JONATHAN    Growth in anaerobic bottle: Escherichia coli ESBL  --      RADIOLOGY & ADDITIONAL STUDIES: 77 yo female with PMHx of DM on insulin pump, CAD s/p stenting in 2020, Lung nodules, Dry eyes, bilateral, PVD (peripheral vascular disease): S/p Right Peripheral leg Stent, Seasonal allergies, Skin cancer: Basal Cell Cancer face  (around nose) s/p MOHS procedure x 3, Anemia: Chronic, on Iron supplement, Hypothyroid, SSS (sick sinus syndrome): S/p PPM Sept 2017, Essential hypertension, Afib: 5+ yrs; on Eliquis admitted for pyelonephritis treatment complicated by right sided hydronephrosis, TEOFILO and hyponatremia, during her admission developed thrombocytopenia and DKA and upgraded to critical care for DKA.    Overnight events:  No acute overnight events. Pt is feeling much better. Is afebrile and her mouth is less sore with the nystatin wash. Does not like the hurricane spray as it makes her mouth numb. Pt has not had a bm in a few days.     ROS:  negative except as above    Allergies    latex (Pruritus; Rash)  sulfonamides (Unknown)    Intolerances    Pineapple (Unknown)      ANTIBIOTICS/RELEVANT:  antimicrobials  meropenem  IVPB 1000 milliGRAM(s) IV Intermittent every 12 hours  nystatin    Suspension 764091 Unit(s) Oral daily    immunologic:    OTHER:  acetaminophen   Tablet .. 650 milliGRAM(s) Oral every 6 hours PRN  ALBUTerol    90 MICROgram(s) HFA Inhaler 1 Puff(s) Inhalation every 6 hours  albuterol/ipratropium for Nebulization 6 milliLiter(s) Nebulizer every 6 hours  aluminum hydroxide/magnesium hydroxide/simethicone Suspension 30 milliLiter(s) Oral every 6 hours PRN  aMIOdarone    Tablet 200 milliGRAM(s) Oral two times a day  benzocaine 15 mG/menthol 3.6 mG (Sugar-Free) Lozenge 1 Lozenge Oral daily  chlorhexidine 4% Liquid 1 Application(s) Topical <User Schedule>  dextrose 40% Gel 15 Gram(s) Oral once  dextrose 5%. 1000 milliLiter(s) IV Continuous <Continuous>  dextrose 5%. 1000 milliLiter(s) IV Continuous <Continuous>  dextrose 50% Injectable 25 Gram(s) IV Push once  dextrose 50% Injectable 12.5 Gram(s) IV Push once  dextrose 50% Injectable 25 Gram(s) IV Push once  glucagon  Injectable 1 milliGRAM(s) IntraMuscular once  insulin glargine Injectable (LANTUS) 15 Unit(s) SubCutaneous at bedtime  insulin lispro (ADMELOG) corrective regimen sliding scale   SubCutaneous three times a day before meals  insulin lispro Injectable (ADMELOG) 4 Unit(s) SubCutaneous three times a day before meals  levothyroxine 88 MICROGram(s) Oral daily  metoprolol tartrate 25 milliGRAM(s) Oral every 6 hours  multivitamin 1 Tablet(s) Oral daily  pantoprazole  Injectable 40 milliGRAM(s) IV Push daily      Objective:  Vital Signs Last 24 Hrs  T(C): 36.8 (23 Mar 2021 04:00), Max: 36.9 (22 Mar 2021 16:13)  T(F): 98.3 (23 Mar 2021 04:00), Max: 98.5 (23 Mar 2021 00:00)  HR: 108 (23 Mar 2021 06:00) (98 - 114)  BP: 145/68 (23 Mar 2021 06:00) (111/85 - 149/77)  BP(mean): 86 (23 Mar 2021 06:00) (75 - 116)  RR: 20 (23 Mar 2021 06:00) (16 - 21)  SpO2: 99% (23 Mar 2021 06:00) (98% - 100%)    PHYSICAL EXAM:  Gen: NAD, well appearing  HEENT: PERRLA, mucus membrane pink and moist, no ulcers, some dryness on the tongue   Cardio: irregularly irregular rate and rhythm, S1 and S2, no murmurs or rubs  Lungs: Clear to ausculation bilaterally  Extremities b/l upper extremities ecchymosis,  Left arm weeping improved, no erythema or tenderness in Lower extremities        LABS:                        9.2    12.46 )-----------( 88       ( 23 Mar 2021 04:40 )             26.9     03-23    127<L>  |  90<L>  |  35<H>  ----------------------------<  130<H>  4.5   |  29  |  1.4    Ca    8.0<L>      23 Mar 2021 04:40  Phos  3.5     03-23  Mg     2.0     03-23    TPro  4.6<L>  /  Alb  2.1<L>  /  TBili  0.5  /  DBili  x   /  AST  36  /  ALT  38  /  AlkPhos  169<H>  03-23    PT/INR - ( 23 Mar 2021 04:40 )   PT: 11.60 sec;   INR: 1.01 ratio         PTT - ( 23 Mar 2021 04:40 )  PTT:29.1 sec      MICROBIOLOGY:    RECENT CULTURES:  03-21 @ 11:46  .Blood None  --  --  --    No growth to date.  --  03-18 @ 04:30  .Blood Blood-Peripheral  Escherichia coli ESBL  Escherichia coli ESBL  JONATHAN    Growth in anaerobic bottle: Escherichia coli ESBL  --      RADIOLOGY & ADDITIONAL STUDIES:  < from: Xray Chest 1 View- PORTABLE-Routine (03.21.21 @ 06:22) >    EXAM:  XR CHEST PORTABLE ROUTINE 1V            PROCEDURE DATE:  03/21/2021        INTERPRETATION:  Clinical History / Reason for exam: Cough    Comparison : Chest radiograph March 20, 2021.    Technique/Positioning: Single AP view of the chest.    Findings:    Support devices: Right IJ central venous catheter is positioned appropriately. Left chest wall pacer, unchanged.    Cardiac/mediastinum/hilum: Unremarkable.    Lung parenchyma/Pleura: Bilateral effusions and interstitial edema. No pneumothorax.    Skeleton/soft tissues: Unchanged.    Impression:    Bilateral effusions and interstitial edema, unchanged.        ELLIOT LANDAU MD; Attending Radiologist  This document has been electronically signed. Mar 21 2021  1:09PM    < end of copied text >   77 yo female with PMHx of DM on insulin pump, CAD s/p stenting in 2020, Lung nodules, Dry eyes, bilateral, PVD (peripheral vascular disease): S/p Right Peripheral leg Stent, Seasonal allergies, Skin cancer: Basal Cell Cancer face  (around nose) s/p MOHS procedure x 3, Anemia: Chronic, on Iron supplement, Hypothyroid, SSS (sick sinus syndrome): S/p PPM Sept 2017, Essential hypertension, Afib: 5+ yrs; on Eliquis admitted for pyelonephritis treatment complicated by right sided hydronephrosis, TEOFILO and hyponatremia, during her admission developed thrombocytopenia and DKA and upgraded to critical care for DKA.    Overnight events:  No acute overnight events. Pt is feeling much better. Is afebrile and her mouth is less sore with the nystatin wash. Does not like the hurricane spray as it makes her mouth numb. Pt has not had a bm in a few days.     ROS:  negative except as above    Allergies    latex (Pruritus; Rash)  sulfonamides (Unknown)    Intolerances    Pineapple (Unknown)      ANTIBIOTICS/RELEVANT:  antimicrobials  meropenem  IVPB 1000 milliGRAM(s) IV Intermittent every 12 hours  nystatin    Suspension 084619 Unit(s) Oral daily    immunologic:    OTHER:  acetaminophen   Tablet .. 650 milliGRAM(s) Oral every 6 hours PRN  ALBUTerol    90 MICROgram(s) HFA Inhaler 1 Puff(s) Inhalation every 6 hours  albuterol/ipratropium for Nebulization 6 milliLiter(s) Nebulizer every 6 hours  aluminum hydroxide/magnesium hydroxide/simethicone Suspension 30 milliLiter(s) Oral every 6 hours PRN  aMIOdarone    Tablet 200 milliGRAM(s) Oral two times a day  benzocaine 15 mG/menthol 3.6 mG (Sugar-Free) Lozenge 1 Lozenge Oral daily  chlorhexidine 4% Liquid 1 Application(s) Topical <User Schedule>  dextrose 40% Gel 15 Gram(s) Oral once  dextrose 5%. 1000 milliLiter(s) IV Continuous <Continuous>  dextrose 5%. 1000 milliLiter(s) IV Continuous <Continuous>  dextrose 50% Injectable 25 Gram(s) IV Push once  dextrose 50% Injectable 12.5 Gram(s) IV Push once  dextrose 50% Injectable 25 Gram(s) IV Push once  glucagon  Injectable 1 milliGRAM(s) IntraMuscular once  insulin glargine Injectable (LANTUS) 15 Unit(s) SubCutaneous at bedtime  insulin lispro (ADMELOG) corrective regimen sliding scale   SubCutaneous three times a day before meals  insulin lispro Injectable (ADMELOG) 4 Unit(s) SubCutaneous three times a day before meals  levothyroxine 88 MICROGram(s) Oral daily  metoprolol tartrate 25 milliGRAM(s) Oral every 6 hours  multivitamin 1 Tablet(s) Oral daily  pantoprazole  Injectable 40 milliGRAM(s) IV Push daily      Objective:  Vital Signs Last 24 Hrs  T(C): 36.8 (23 Mar 2021 04:00), Max: 36.9 (22 Mar 2021 16:13)  T(F): 98.3 (23 Mar 2021 04:00), Max: 98.5 (23 Mar 2021 00:00)  HR: 108 (23 Mar 2021 06:00) (98 - 114)  BP: 145/68 (23 Mar 2021 06:00) (111/85 - 149/77)  BP(mean): 86 (23 Mar 2021 06:00) (75 - 116)  RR: 20 (23 Mar 2021 06:00) (16 - 21)  SpO2: 99% (23 Mar 2021 06:00) (98% - 100%)    PHYSICAL EXAM:  Gen: NAD, well appearing  HEENT: PERRLA, mucus membrane pink and moist, no ulcers, some dryness on the tongue   Cardio: irregularly irregular rate and rhythm, S1 and S2, no murmurs or rubs  Lungs: Clear to ausculation bilaterally  Extremities b/l upper extremities ecchymosis,  Left arm weeping improved, no erythema or tenderness in Lower extremities        LABS:                        9.2    12.46 )-----------( 88       ( 23 Mar 2021 04:40 )             26.9     03-23    127<L>  |  90<L>  |  35<H>  ----------------------------<  130<H>  4.5   |  29  |  1.4    Ca    8.0<L>      23 Mar 2021 04:40  Phos  3.5     03-23  Mg     2.0     03-23    TPro  4.6<L>  /  Alb  2.1<L>  /  TBili  0.5  /  DBili  x   /  AST  36  /  ALT  38  /  AlkPhos  169<H>  03-23    PT/INR - ( 23 Mar 2021 04:40 )   PT: 11.60 sec;   INR: 1.01 ratio         PTT - ( 23 Mar 2021 04:40 )  PTT:29.1 sec      MICROBIOLOGY:    RECENT CULTURES:  03-21 @ 11:46  .Blood None  --  --  --    No growth to date.  --  03-18 @ 04:30  .Blood Blood-Peripheral  Escherichia coli ESBL  Escherichia coli ESBL  JONATHAN    Growth in anaerobic bottle: Escherichia coli ESBL  --      RADIOLOGY & ADDITIONAL STUDIES:=    < from: Xray Chest 1 View- PORTABLE-Routine (03.21.21 @ 06:22) >    EXAM:  XR CHEST PORTABLE ROUTINE 1V            PROCEDURE DATE:  03/21/2021        INTERPRETATION:  Clinical History / Reason for exam: Cough    Comparison : Chest radiograph March 20, 2021.    Technique/Positioning: Single AP view of the chest.    Findings:    Support devices: Right IJ central venous catheter is positioned appropriately. Left chest wall pacer, unchanged.    Cardiac/mediastinum/hilum: Unremarkable.    Lung parenchyma/Pleura: Bilateral effusions and interstitial edema. No pneumothorax.    Skeleton/soft tissues: Unchanged.    Impression:    Bilateral effusions and interstitial edema, unchanged.        ELLIOT LANDAU MD; Attending Radiologist  This document has been electronically signed. Mar 21 2021  1:09PM    < end of copied text >

## 2021-03-24 NOTE — PHYSICAL THERAPY INITIAL EVALUATION ADULT - SPECIFY REASON(S)
Attempted to see pt for b/s PT, pt requesting to use bedpan prior to attempting OOB.  Marina payton.

## 2021-03-24 NOTE — DISCHARGE NOTE PROVIDER - CARE PROVIDERS DIRECT ADDRESSES
,[email or web address],DirectAddress_Unknown,DirectAddress_Unknown,DirectAddress_Unknown

## 2021-03-24 NOTE — PROGRESS NOTE ADULT - PROBLEM SELECTOR PLAN 1
Awaiting renal scan with lasix.  If there is obstruction, may need ureteral stent placement
No hydronephrosis or obstruction on Renal Scan  Continue antibiotics as per ID  No  intervention needed at this time.  May remove the urethral catheter when not medically needed to monitor i/os
Abx as per ID  F/up with me as outpatient.
Patient could not be transported earlier and therefore RBUS was recommended to assess for ureteral jets.  The bladder sono however wasn't performed.  I asked for Radiology to perform this again but Dr. Farnsworth believes that a portal sonogram will not be able to assess for jets.  He therefore recommended a CT Urogram but in light of TEOFILO, I think there is a risk for ATN.  I discussed this with CCU attending who believes that the patient can be transported for Renal scan.  She therefore will get it tonight.
No evidence of obstruction  Continue abx  NO need for stent placement
Renal Scan with Lasix

## 2021-03-24 NOTE — DISCHARGE NOTE PROVIDER - NSDCFUSCHEDAPPT_GEN_ALL_CORE_FT
SOWMYA AMADO ; 03/25/2021 ; [facility] [address] SOWMYA Jung ; 04/06/2021 ; [facility] [address] [facility] SOWMYA Jung ; 05/20/2021 ; [facility] Card[facility] [address] [facility] SOWMYA AMADO ; 04/06/2021 ; [facility] [address] [facility] SOWMYA Jung ; 05/20/2021 ; P Cardio [address] [facility] SOWMYA Jung ; 06/25/2021 ; [facility] [address] [facility] [address]

## 2021-03-24 NOTE — DISCHARGE NOTE PROVIDER - CARE PROVIDER_API CALL
Rico Liz)  [address]  [address] [place] [address]  Phone: [phone]  Fax: [phone]  Established Patient  Follow Up Time: 1 week   Rcio Liz)  [address]  [address] [place] [address]  Phone: [phone]  Fax: [phone]  Established Patient  Follow Up Time: 1 week    Dinorah Phelps  Endocrinology  Phone: (   )    -  Fax: (   )    -  Follow Up Time: 1 month    Jose Velarde)  Urology  [address] [place] [address]  Phone: [phone]  Fax: [phone]  Follow Up Time: 2 weeks    Melinda Kessler)  Internal Medicine  [address] [place] [address]  Phone: [phone]  Fax: [phone]  Follow Up Time: 1 week

## 2021-03-24 NOTE — CHART NOTE - NSCHARTNOTEFT_GEN_A_CORE
Registered Dietitian Follow-Up     Patient Profile Reviewed                           Yes [x]   No []     Nutrition History Previously Obtained        Yes [x]  No []       Pertinent Subjective Information: Pt. downgraded from CCU.      Pertinent Medical Interventions: Sepsis secondary to pyelonephritis secondary to hydronephrosis and e. coli ESBL bacteremia. ID following, abx. TEOFILO/ Hyponatremia/ Metabolic acidosis: creat stable. DM with Hyperglycemia: insulin regimen. Pulmonary hypertension. AFIB s/p Ablation.        Diet order: consistent carb w/snack, DASH/TLC, gluten restricted     Anthropometrics:  - Ht. 154.9cm  - Wt. 96.5kg on 3/24 vs.   - %wt change  - BMI 27.6  - IBW 47.6kg     Pertinent Lab Data: (3/24) WBC 12.46, RBC 3.29, Hg 9.2, Hct 26.9, Na 127, BUN 35, glu 130, alk phos 169, eGFR 36     Pertinent Meds: Maalox, MVI, Miralax, Metoprolol, Lispro, Glargine,      Physical Findings:  - Appearance: alert&oriented, 2+ generalized, 3+ L, R foot edema  - GI function: abd noted soft/nontender per flow sheets, last BM 3/18  - Tubes: none noted  - Oral/Mouth cavity: no symptoms noted  - Skin: ecchymosis      Nutrition Requirements (from RD note on 3/21)   Weight Used: 47.6kg ideal     Estimated Energy Needs    Continue [x]  Adjust [] 1190-1428kcal (25-30kcal/kg)   Adjusted Energy Recommendations:   kcal/day        Estimated Protein Needs    Continue [x]  Adjust [] 52-61g (1.1-1.3g/kg)   Adjusted Protein Recommendations:   gm/day        Estimated Fluid Needs        Continue []  Adjust [x] 1ml/kcal or per LIP  Adjusted Fluid Recommendations:   mL/day     Nutrient Intake:        [] Previous Nutrition Diagnosis:  inadequate protein/energy intake.        Nutrition Intervention: meals and snacks    Rec: Continue consistent carb w/snack, DASH/TLC, gluten restricted     Goal/Expected Outcome:      Indicator/Monitoring: diet order, energy intake, nutrition related labs, body composition, NFPF Registered Dietitian Follow-Up     Patient Profile Reviewed                           Yes [x]   No []     Nutrition History Previously Obtained        Yes [x]  No []       Pertinent Subjective Information: Pt. downgraded from CCU. PO intake adequate at this time.      Pertinent Medical Interventions: Sepsis secondary to pyelonephritis secondary to hydronephrosis and e. coli ESBL bacteremia. ID following, abx. TEOFILO/ Hyponatremia/ Metabolic acidosis: creat stable. DM with Hyperglycemia: insulin regimen. Pulmonary hypertension. AFIB s/p Ablation.        Diet order: consistent carb w/snack, DASH/TLC, gluten restricted     Anthropometrics:  - Ht. 154.9cm  - Wt. 96.5kg on 3/24 vs.   - %wt change  - BMI 27.6  - IBW 47.6kg     Pertinent Lab Data: (3/24) WBC 12.46, RBC 3.29, Hg 9.2, Hct 26.9, Na 127, BUN 35, glu 130, alk phos 169, eGFR 36     Pertinent Meds: Maalox, MVI, Miralax, Metoprolol, Lispro, Glargine,      Physical Findings:  - Appearance: alert&oriented, 2+ generalized, 3+ L, R foot edema  - GI function: abd noted soft/nontender per flow sheets, last BM 3/18  - Tubes: none noted  - Oral/Mouth cavity: no symptoms noted  - Skin: ecchymosis      Nutrition Requirements (from RD note on 3/21)   Weight Used: 47.6kg ideal     Estimated Energy Needs    Continue [x]  Adjust [] 1190-1428kcal (25-30kcal/kg)   Adjusted Energy Recommendations:   kcal/day        Estimated Protein Needs    Continue [x]  Adjust [] 52-61g (1.1-1.3g/kg)   Adjusted Protein Recommendations:   gm/day        Estimated Fluid Needs        Continue []  Adjust [x] 1ml/kcal or per LIP  Adjusted Fluid Recommendations:   mL/day     Nutrient Intake: ~75% PO at meals        [] Previous Nutrition Diagnosis:  inadequate protein/energy intake- resolved.       Nutrition Intervention: meals and snacks    Rec: Continue consistent carb w/snack, DASH/TLC, gluten restricted diet order as tolerated.     Goal/Expected Outcome: In 7 days pt. to continue to consume at least ~75% PO at meals      Indicator/Monitoring: diet order, energy intake, nutrition related labs, body composition, NFPF

## 2021-03-24 NOTE — PROGRESS NOTE ADULT - SUBJECTIVE AND OBJECTIVE BOX
`SOWMYA AMADO 76y Female  MRN#: [id number]     SUBJECTIVE        OBJECTIVE  PAST MEDICAL & SURGICAL HISTORY  Inclusion body myositis (IBM)    Lung nodules    Transfusion history  S/p Hysterectomy    Dry eyes, bilateral    PVD (peripheral vascular disease)  S/p Right Peripheral leg Stent    Seasonal allergies    Skin cancer  Basal Cell Cancer face  (around nose) s/p MOHS procedure x 3    Anemia  Chronic, on Iron supplement    Hypothyroid    SSS (sick sinus syndrome)  S/p PPM    Diabetes  Insulin dependent - (has insulin Pump)    Pacemaker  Sept 2017    Palpitations  occasionally; not for couple yrs    Essential hypertension    Afib  5+ yrs; on Eliquis    S/P ablation of atrial fibrillation    S/P cataract surgery    History of surgery  MOHS procedure (face) x 3    Cardiac pacemaker  9/9/17    History of surgery  Right Peripheral Stent 5 yrs    H/O total hysterectomy  25+ yrs ago      ALLERGIES:  latex (Pruritus; Rash)  sulfonamides (Unknown)    MEDICATIONS:  STANDING MEDICATIONS  ALBUTerol    90 MICROgram(s) HFA Inhaler 1 Puff(s) Inhalation every 6 hours  albuterol/ipratropium for Nebulization 6 milliLiter(s) Nebulizer every 6 hours  aMIOdarone    Tablet 200 milliGRAM(s) Oral two times a day  apixaban 2.5 milliGRAM(s) Oral two times a day  benzocaine 15 mG/menthol 3.6 mG (Sugar-Free) Lozenge 1 Lozenge Oral daily  chlorhexidine 4% Liquid 1 Application(s) Topical <User Schedule>  dextrose 40% Gel 15 Gram(s) Oral once  dextrose 5%. 1000 milliLiter(s) IV Continuous <Continuous>  dextrose 5%. 1000 milliLiter(s) IV Continuous <Continuous>  dextrose 50% Injectable 25 Gram(s) IV Push once  dextrose 50% Injectable 12.5 Gram(s) IV Push once  dextrose 50% Injectable 25 Gram(s) IV Push once  glucagon  Injectable 1 milliGRAM(s) IntraMuscular once  insulin glargine Injectable (LANTUS) 15 Unit(s) SubCutaneous at bedtime  insulin lispro (ADMELOG) corrective regimen sliding scale   SubCutaneous three times a day before meals  insulin lispro Injectable (ADMELOG) 4 Unit(s) SubCutaneous three times a day before meals  levothyroxine 88 MICROGram(s) Oral daily  meropenem  IVPB 1000 milliGRAM(s) IV Intermittent every 12 hours  metoprolol tartrate 25 milliGRAM(s) Oral every 6 hours  multivitamin 1 Tablet(s) Oral daily  nystatin    Suspension 814667 Unit(s) Oral daily  pantoprazole  Injectable 40 milliGRAM(s) IV Push daily  polyethylene glycol 3350 17 Gram(s) Oral daily  senna 2 Tablet(s) Oral at bedtime    PRN MEDICATIONS  acetaminophen   Tablet .. 650 milliGRAM(s) Oral every 6 hours PRN  aluminum hydroxide/magnesium hydroxide/simethicone Suspension 30 milliLiter(s) Oral every 6 hours PRN      VITAL SIGNS: Last 24 Hours  T(C): 35.9 (24 Mar 2021 05:03), Max: 36.9 (23 Mar 2021 20:00)  T(F): 96.6 (24 Mar 2021 05:03), Max: 98.5 (23 Mar 2021 20:00)  HR: 93 (24 Mar 2021 05:03) (92 - 117)  BP: 143/64 (24 Mar 2021 05:03) (123/64 - 157/82)  BP(mean): 92 (23 Mar 2021 14:00) (81 - 96)  RR: 19 (24 Mar 2021 05:03) (18 - 20)  SpO2: 98% (23 Mar 2021 14:00) (98% - 98%)    LABS:                        9.2    12.46 )-----------( 88       ( 23 Mar 2021 04:40 )             26.9     03-23    127<L>  |  90<L>  |  35<H>  ----------------------------<  130<H>  4.5   |  29  |  1.4    Ca    8.0<L>      23 Mar 2021 04:40  Phos  3.5     03-23  Mg     2.0     03-23    TPro  4.6<L>  /  Alb  2.1<L>  /  TBili  0.5  /  DBili  x   /  AST  36  /  ALT  38  /  AlkPhos  169<H>  03-23    PT/INR - ( 23 Mar 2021 04:40 )   PT: 11.60 sec;   INR: 1.01 ratio         PTT - ( 23 Mar 2021 04:40 )  PTT:29.1 sec          Culture - Blood (collected 21 Mar 2021 11:46)  Source: .Blood None  Preliminary Report (22 Mar 2021 19:01):    No growth to date.          RADIOLOGY:      PHYSICAL EXAM:     `SOWMYA AMADO 76y Female  MRN#: [id number]     SUBJECTIVE  patient examined at bedside, NAD , no overnight events       OBJECTIVE  PAST MEDICAL & SURGICAL HISTORY  Inclusion body myositis (IBM)    Lung nodules    Transfusion history  S/p Hysterectomy    Dry eyes, bilateral    PVD (peripheral vascular disease)  S/p Right Peripheral leg Stent    Seasonal allergies    Skin cancer  Basal Cell Cancer face  (around nose) s/p MOHS procedure x 3    Anemia  Chronic, on Iron supplement    Hypothyroid    SSS (sick sinus syndrome)  S/p PPM    Diabetes  Insulin dependent - (has insulin Pump)    Pacemaker  Sept 2017    Palpitations  occasionally; not for couple yrs    Essential hypertension    Afib  5+ yrs; on Eliquis    S/P ablation of atrial fibrillation    S/P cataract surgery    History of surgery  MOHS procedure (face) x 3    Cardiac pacemaker  9/9/17    History of surgery  Right Peripheral Stent 5 yrs    H/O total hysterectomy  25+ yrs ago      ALLERGIES:  latex (Pruritus; Rash)  sulfonamides (Unknown)    MEDICATIONS:  STANDING MEDICATIONS  ALBUTerol    90 MICROgram(s) HFA Inhaler 1 Puff(s) Inhalation every 6 hours  albuterol/ipratropium for Nebulization 6 milliLiter(s) Nebulizer every 6 hours  aMIOdarone    Tablet 200 milliGRAM(s) Oral two times a day  apixaban 2.5 milliGRAM(s) Oral two times a day  benzocaine 15 mG/menthol 3.6 mG (Sugar-Free) Lozenge 1 Lozenge Oral daily  chlorhexidine 4% Liquid 1 Application(s) Topical <User Schedule>  dextrose 40% Gel 15 Gram(s) Oral once  dextrose 5%. 1000 milliLiter(s) IV Continuous <Continuous>  dextrose 5%. 1000 milliLiter(s) IV Continuous <Continuous>  dextrose 50% Injectable 25 Gram(s) IV Push once  dextrose 50% Injectable 12.5 Gram(s) IV Push once  dextrose 50% Injectable 25 Gram(s) IV Push once  glucagon  Injectable 1 milliGRAM(s) IntraMuscular once  insulin glargine Injectable (LANTUS) 15 Unit(s) SubCutaneous at bedtime  insulin lispro (ADMELOG) corrective regimen sliding scale   SubCutaneous three times a day before meals  insulin lispro Injectable (ADMELOG) 4 Unit(s) SubCutaneous three times a day before meals  levothyroxine 88 MICROGram(s) Oral daily  meropenem  IVPB 1000 milliGRAM(s) IV Intermittent every 12 hours  metoprolol tartrate 25 milliGRAM(s) Oral every 6 hours  multivitamin 1 Tablet(s) Oral daily  nystatin    Suspension 616923 Unit(s) Oral daily  pantoprazole  Injectable 40 milliGRAM(s) IV Push daily  polyethylene glycol 3350 17 Gram(s) Oral daily  senna 2 Tablet(s) Oral at bedtime    PRN MEDICATIONS  acetaminophen   Tablet .. 650 milliGRAM(s) Oral every 6 hours PRN  aluminum hydroxide/magnesium hydroxide/simethicone Suspension 30 milliLiter(s) Oral every 6 hours PRN      VITAL SIGNS: Last 24 Hours  T(C): 35.9 (24 Mar 2021 05:03), Max: 36.9 (23 Mar 2021 20:00)  T(F): 96.6 (24 Mar 2021 05:03), Max: 98.5 (23 Mar 2021 20:00)  HR: 93 (24 Mar 2021 05:03) (92 - 117)  BP: 143/64 (24 Mar 2021 05:03) (123/64 - 157/82)  BP(mean): 92 (23 Mar 2021 14:00) (81 - 96)  RR: 19 (24 Mar 2021 05:03) (18 - 20)  SpO2: 98% (23 Mar 2021 14:00) (98% - 98%)    LABS:                        9.2    12.46 )-----------( 88       ( 23 Mar 2021 04:40 )             26.9     03-23    127<L>  |  90<L>  |  35<H>  ----------------------------<  130<H>  4.5   |  29  |  1.4    Ca    8.0<L>      23 Mar 2021 04:40  Phos  3.5     03-23  Mg     2.0     03-23    TPro  4.6<L>  /  Alb  2.1<L>  /  TBili  0.5  /  DBili  x   /  AST  36  /  ALT  38  /  AlkPhos  169<H>  03-23    PT/INR - ( 23 Mar 2021 04:40 )   PT: 11.60 sec;   INR: 1.01 ratio         PTT - ( 23 Mar 2021 04:40 )  PTT:29.1 sec          Culture - Blood (collected 21 Mar 2021 11:46)  Source: .Blood None  Preliminary Report (22 Mar 2021 19:01):    No growth to date.          RADIOLOGY:        RADIOLOGY & ADDITIONAL STUDIES:=    < from: Xray Chest 1 View- PORTABLE-Routine (03.21.21 @ 06:22) >    EXAM:  XR CHEST PORTABLE ROUTINE 1V            PROCEDURE DATE:  03/21/2021        INTERPRETATION:  Clinical History / Reason for exam: Cough    Comparison : Chest radiograph March 20, 2021.    Technique/Positioning: Single AP view of the chest.    Findings:    Support devices: Right IJ central venous catheter is positioned appropriately. Left chest wall pacer, unchanged.    Cardiac/mediastinum/hilum: Unremarkable.    Lung parenchyma/Pleura: Bilateral effusions and interstitial edema. No pneumothorax.    Skeleton/soft tissues: Unchanged.    Impression:    Bilateral effusions and interstitial edema, unchanged.        ELLIOT LANDAU MD; Attending Radiologist  This document has been electronically signed. Mar 21 2021  1:09PM    < end of copied text >  PHYSICAL EXAM:  Gen: NAD, well appearing  HEENT: PERRLA, mucus membrane pink and moist, no ulcers, some dryness on the tongue   Cardio: irregularly irregular rate and rhythm, S1 and S2, no murmurs or rubs  Lungs: Clear to ausculation bilaterally  Extremities b/l upper extremities ecchymosis,  Left arm weeping improved, no erythema or tenderness in Lower extremities   `SOWMYA AMADO 76y Female  MRN#: [id number]     SUBJECTIVE  patient examined at bedside, NAD , no overnight events       OBJECTIVE  PAST MEDICAL & SURGICAL HISTORY  Inclusion body myositis (IBM)    Lung nodules    Transfusion history  S/p Hysterectomy    Dry eyes, bilateral    PVD (peripheral vascular disease)  S/p Right Peripheral leg Stent    Seasonal allergies    Skin cancer  Basal Cell Cancer face  (around nose) s/p MOHS procedure x 3    Anemia  Chronic, on Iron supplement    Hypothyroid    SSS (sick sinus syndrome)  S/p PPM    Diabetes  Insulin dependent - (has insulin Pump)    Pacemaker  Sept 2017    Palpitations  occasionally; not for couple yrs    Essential hypertension    Afib  5+ yrs; on Eliquis    S/P ablation of atrial fibrillation    S/P cataract surgery    History of surgery  MOHS procedure (face) x 3    Cardiac pacemaker  9/9/17    History of surgery  Right Peripheral Stent 5 yrs    H/O total hysterectomy  25+ yrs ago      ALLERGIES:  latex (Pruritus; Rash)  sulfonamides (Unknown)    MEDICATIONS:  STANDING MEDICATIONS  ALBUTerol    90 MICROgram(s) HFA Inhaler 1 Puff(s) Inhalation every 6 hours  albuterol/ipratropium for Nebulization 6 milliLiter(s) Nebulizer every 6 hours  aMIOdarone    Tablet 200 milliGRAM(s) Oral two times a day  apixaban 2.5 milliGRAM(s) Oral two times a day  benzocaine 15 mG/menthol 3.6 mG (Sugar-Free) Lozenge 1 Lozenge Oral daily  chlorhexidine 4% Liquid 1 Application(s) Topical <User Schedule>  dextrose 40% Gel 15 Gram(s) Oral once  dextrose 5%. 1000 milliLiter(s) IV Continuous <Continuous>  dextrose 5%. 1000 milliLiter(s) IV Continuous <Continuous>  dextrose 50% Injectable 25 Gram(s) IV Push once  dextrose 50% Injectable 12.5 Gram(s) IV Push once  dextrose 50% Injectable 25 Gram(s) IV Push once  glucagon  Injectable 1 milliGRAM(s) IntraMuscular once  insulin glargine Injectable (LANTUS) 15 Unit(s) SubCutaneous at bedtime  insulin lispro (ADMELOG) corrective regimen sliding scale   SubCutaneous three times a day before meals  insulin lispro Injectable (ADMELOG) 4 Unit(s) SubCutaneous three times a day before meals  levothyroxine 88 MICROGram(s) Oral daily  meropenem  IVPB 1000 milliGRAM(s) IV Intermittent every 12 hours  metoprolol tartrate 25 milliGRAM(s) Oral every 6 hours  multivitamin 1 Tablet(s) Oral daily  nystatin    Suspension 781032 Unit(s) Oral daily  pantoprazole  Injectable 40 milliGRAM(s) IV Push daily  polyethylene glycol 3350 17 Gram(s) Oral daily  senna 2 Tablet(s) Oral at bedtime    PRN MEDICATIONS  acetaminophen   Tablet .. 650 milliGRAM(s) Oral every 6 hours PRN  aluminum hydroxide/magnesium hydroxide/simethicone Suspension 30 milliLiter(s) Oral every 6 hours PRN      VITAL SIGNS: Last 24 Hours  T(C): 35.9 (24 Mar 2021 05:03), Max: 36.9 (23 Mar 2021 20:00)  T(F): 96.6 (24 Mar 2021 05:03), Max: 98.5 (23 Mar 2021 20:00)  HR: 93 (24 Mar 2021 05:03) (92 - 117)  BP: 143/64 (24 Mar 2021 05:03) (123/64 - 157/82)  BP(mean): 92 (23 Mar 2021 14:00) (81 - 96)  RR: 19 (24 Mar 2021 05:03) (18 - 20)  SpO2: 98% (23 Mar 2021 14:00) (98% - 98%)    LABS:                        9.2    12.46 )-----------( 88       ( 23 Mar 2021 04:40 )             26.9     03-23    127<L>  |  90<L>  |  35<H>  ----------------------------<  130<H>  4.5   |  29  |  1.4    Ca    8.0<L>      23 Mar 2021 04:40  Phos  3.5     03-23  Mg     2.0     03-23    TPro  4.6<L>  /  Alb  2.1<L>  /  TBili  0.5  /  DBili  x   /  AST  36  /  ALT  38  /  AlkPhos  169<H>  03-23    PT/INR - ( 23 Mar 2021 04:40 )   PT: 11.60 sec;   INR: 1.01 ratio         PTT - ( 23 Mar 2021 04:40 )  PTT:29.1 sec          Culture - Blood (collected 21 Mar 2021 11:46)  Source: .Blood None  Preliminary Report (22 Mar 2021 19:01):    No growth to date.          RADIOLOGY:        RADIOLOGY & ADDITIONAL STUDIES:=    < from: Xray Chest 1 View- PORTABLE-Routine (03.21.21 @ 06:22) >    EXAM:  XR CHEST PORTABLE ROUTINE 1V            PROCEDURE DATE:  03/21/2021        INTERPRETATION:  Clinical History / Reason for exam: Cough    Comparison : Chest radiograph March 20, 2021.    Technique/Positioning: Single AP view of the chest.    Findings:    Support devices: Right IJ central venous catheter is positioned appropriately. Left chest wall pacer, unchanged.    Cardiac/mediastinum/hilum: Unremarkable.    Lung parenchyma/Pleura: Bilateral effusions and interstitial edema. No pneumothorax.    Skeleton/soft tissues: Unchanged.    Impression:    Bilateral effusions and interstitial edema, unchanged.        ELLIOT LANDAU MD; Attending Radiologist  This document has been electronically signed. Mar 21 2021  1:09PM    < end of copied text >    PHYSICAL EXAM:  Gen: NAD, well appearing  HEENT: PERRLA, mucus membrane pink and moist, no ulcers, some dryness on the tongue   Cardio: irregularly irregular rate and rhythm, S1 and S2, no murmurs or rubs  Lungs: Clear to ausculation bilaterally  Extremities ** + 3 nonpitting edema in BL LE  (pt states that this is chronic since december after her pcp took her off of amlodipine.  b/l upper extremities ecchymosis,  Left arm weeping improved, no erythema or tenderness in Lower extremities

## 2021-03-24 NOTE — PHYSICAL THERAPY INITIAL EVALUATION ADULT - ADDITIONAL COMMENTS
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
pt drives car, has shower with grab bars, needs to step over tub

## 2021-03-24 NOTE — DISCHARGE NOTE PROVIDER - NSDCMRMEDTOKEN_GEN_ALL_CORE_FT
amiodarone 200 mg oral tablet: 1 tab(s) orally 2 times a day  amlodipine-benazepril 5 mg-40 mg oral capsule: 1 cap(s) orally once a day (at bedtime)  Aspirin Enteric Coated 81 mg oral delayed release tablet: 1 tab(s) orally once a day MDD:1  STOP AFTER 1 WEEK AFTER CATH  clopidogrel 75 mg oral tablet: 1 tab(s) orally once a day MDD:1  Eliquis 2.5 mg oral tablet: 1 tab(s) orally 2 times a day MDD:2  START TAKING MEDICATION ON 7/25/2020  ezetimibe 10 mg oral tablet: 1 tab(s) orally once a day (at bedtime) MDD:1  Gamunex 10% intravenous solution: every 5 weeks  insulin: Insulin Pump  levothyroxine 88 mcg (0.088 mg) oral tablet: 1 tab(s) orally once a day  metoprolol tartrate 25 mg oral tablet: 1 tab(s) orally every 6 hours  Multiple Vitamins oral tablet: 1 tab(s) orally once a day  nystatin 100,000 units/mL oral suspension: 5 milliliter(s) orally once a day  rosuvastatin 10 mg oral tablet: 1 tab(s) orally every other day  Vitamin C 250 mg oral tablet: 1 tab(s) orally 2 times a day   albuterol 90 mcg/inh inhalation aerosol: 1 puff(s) inhaled every 6 hours  amiodarone 200 mg oral tablet: 1 tab(s) orally 2 times a day  amlodipine-benazepril 5 mg-40 mg oral capsule: 1 cap(s) orally once a day (at bedtime)  clopidogrel 75 mg oral tablet: 1 tab(s) orally once a day MDD:1  Eliquis 2.5 mg oral tablet: 1 tab(s) orally 2 times a day MDD:2  START TAKING MEDICATION ON 7/25/2020  ezetimibe 10 mg oral tablet: 1 tab(s) orally once a day (at bedtime) MDD:1  Gamunex 10% intravenous solution: every 5 weeks  insulin: Insulin Pump  levothyroxine 88 mcg (0.088 mg) oral tablet: 1 tab(s) orally once a day  metoprolol tartrate 25 mg oral tablet: 1 tab(s) orally every 6 hours  Multiple Vitamins oral tablet: 1 tab(s) orally once a day  nystatin 100,000 units/mL oral suspension: 5 milliliter(s) orally once a day  rosuvastatin 10 mg oral tablet: 1 tab(s) orally every other day  Vitamin C 250 mg oral tablet: 1 tab(s) orally 2 times a day   albuterol 90 mcg/inh inhalation aerosol: 1 puff(s) inhaled every 6 hours  amiodarone 200 mg oral tablet: 1 tab(s) orally 2 times a day  amlodipine-benazepril 5 mg-40 mg oral capsule: 1 cap(s) orally once a day (at bedtime)  clopidogrel 75 mg oral tablet: 1 tab(s) orally once a day MDD:1  Eliquis 2.5 mg oral tablet: 1 tab(s) orally 2 times a day MDD:2  START TAKING MEDICATION ON 7/25/2020  ezetimibe 10 mg oral tablet: 1 tab(s) orally once a day (at bedtime) MDD:1  fluconazole 100 mg oral tablet: 1 tab(s) orally once a day  Gamunex 10% intravenous solution: every 5 weeks  insulin: Insulin Pump  levothyroxine 88 mcg (0.088 mg) oral tablet: 1 tab(s) orally once a day  metoprolol tartrate 25 mg oral tablet: 1 tab(s) orally every 6 hours  Multiple Vitamins oral tablet: 1 tab(s) orally once a day  pantoprazole 40 mg oral delayed release tablet: 1 tab(s) orally once a day (before a meal)  predniSONE 20 mg oral tablet: 2 tab(s) orally once a day  rosuvastatin 10 mg oral tablet: 1 tab(s) orally every other day  senna oral tablet: 2 tab(s) orally once a day (at bedtime)  Vitamin C 250 mg oral tablet: 1 tab(s) orally 2 times a day   albuterol 90 mcg/inh inhalation aerosol: 1 puff(s) inhaled every 6 hours  amiodarone 200 mg oral tablet: 1 tab(s) orally 2 times a day  amLODIPine 5 mg oral tablet: 1 tab(s) orally once a day   clopidogrel 75 mg oral tablet: 1 tab(s) orally once a day MDD:1  diphenhydrAMINE 25 mg oral capsule: 1 cap(s) orally every 6 hours, As Needed -Rash and/or Itching - for itching   Eliquis 2.5 mg oral tablet: 1 tab(s) orally 2 times a day MDD:2  START TAKING MEDICATION ON 7/25/2020  ezetimibe 10 mg oral tablet: 1 tab(s) orally once a day (at bedtime) MDD:1  fluconazole 100 mg oral tablet: 1 tab(s) orally once a day  Gamunex 10% intravenous solution: every 5 weeks  insulin glargine: 10 unit(s) subcutaneous once a day (at bedtime)  insulin lispro 100 units/mL injectable solution:  injectable low dose sliding scale  levothyroxine 88 mcg (0.088 mg) oral tablet: 1 tab(s) orally once a day  metoprolol tartrate 25 mg oral tablet: 1 tab(s) orally every 6 hours  Multiple Vitamins oral tablet: 1 tab(s) orally once a day  nystatin 100,000 units/g topical ointment: 1 application topically 2 times a day  nystatin 100,000 units/mL oral suspension: 5 milliliter(s) orally 4 times a day   pantoprazole 40 mg oral delayed release tablet: 1 tab(s) orally once a day (before a meal)  predniSONE 20 mg oral tablet: 2 tab(s) orally once a day  rosuvastatin 10 mg oral tablet: 1 tab(s) orally every other day  senna oral tablet: 2 tab(s) orally once a day (at bedtime)  Vitamin C 250 mg oral tablet: 1 tab(s) orally 2 times a day   albuterol 90 mcg/inh inhalation aerosol: 1 puff(s) inhaled every 6 hours  amiodarone 200 mg oral tablet: 1 tab(s) orally 2 times a day  clopidogrel 75 mg oral tablet: 1 tab(s) orally once a day MDD:1  diphenhydrAMINE 25 mg oral capsule: 1 cap(s) orally every 6 hours, As Needed -Rash and/or Itching - for itching   Eliquis 2.5 mg oral tablet: 1 tab(s) orally 2 times a day MDD:2    ezetimibe 10 mg oral tablet: 1 tab(s) orally once a day (at bedtime) MDD:1  fluconazole 100 mg oral tablet: 1 tab(s) orally once a day  Gamunex 10% intravenous solution: every 5 weeks; Contiune to hold. Needs to start after follow up with neurologist  insulin glargine: 10 unit(s) subcutaneous once a day (at bedtime)  insulin lispro 100 units/mL injectable solution:  injectable low dose sliding scale  levothyroxine 88 mcg (0.088 mg) oral tablet: 1 tab(s) orally once a day  metoprolol tartrate 25 mg oral tablet: 1 tab(s) orally every 6 hours  Multiple Vitamins oral tablet: 1 tab(s) orally once a day  nystatin 100,000 units/g topical ointment: 1 application topically 2 times a day  nystatin 100,000 units/mL oral suspension: 5 milliliter(s) orally 4 times a day   pantoprazole 40 mg oral delayed release tablet: 1 tab(s) orally once a day (before a meal)  predniSONE 20 mg oral tablet: 2 tab(s) orally once a day  rosuvastatin 10 mg oral tablet: 1 tab(s) orally every other day  senna oral tablet: 2 tab(s) orally once a day (at bedtime)  Vitamin C 250 mg oral tablet: 1 tab(s) orally 2 times a day

## 2021-03-24 NOTE — PROGRESS NOTE ADULT - SUBJECTIVE AND OBJECTIVE BOX
Patient feels a lot better.    OBJECTIVE  PAST MEDICAL & SURGICAL HISTORY  Inclusion body myositis (IBM)    Lung nodules    Transfusion history  S/p Hysterectomy    Dry eyes, bilateral    PVD (peripheral vascular disease)  S/p Right Peripheral leg Stent    Seasonal allergies    Skin cancer  Basal Cell Cancer face  (around nose) s/p MOHS procedure x 3    Anemia  Chronic, on Iron supplement    Hypothyroid    SSS (sick sinus syndrome)  S/p PPM    Diabetes  Insulin dependent - (has insulin Pump)    Pacemaker  Sept 2017    Palpitations  occasionally; not for couple yrs    Essential hypertension    Afib  5+ yrs; on Eliquis    S/P ablation of atrial fibrillation    S/P cataract surgery    History of surgery  MOHS procedure (face) x 3    Cardiac pacemaker  9/9/17    History of surgery  Right Peripheral Stent 5 yrs    H/O total hysterectomy  25+ yrs ago      ALLERGIES:  latex (Pruritus; Rash)  sulfonamides (Unknown)    MEDICATIONS:  STANDING MEDICATIONS  ALBUTerol    90 MICROgram(s) HFA Inhaler 1 Puff(s) Inhalation every 6 hours  albuterol/ipratropium for Nebulization 6 milliLiter(s) Nebulizer every 6 hours  aMIOdarone    Tablet 200 milliGRAM(s) Oral two times a day  apixaban 2.5 milliGRAM(s) Oral two times a day  benzocaine 15 mG/menthol 3.6 mG (Sugar-Free) Lozenge 1 Lozenge Oral daily  chlorhexidine 4% Liquid 1 Application(s) Topical <User Schedule>  dextrose 40% Gel 15 Gram(s) Oral once  dextrose 5%. 1000 milliLiter(s) IV Continuous <Continuous>  dextrose 5%. 1000 milliLiter(s) IV Continuous <Continuous>  dextrose 50% Injectable 25 Gram(s) IV Push once  dextrose 50% Injectable 12.5 Gram(s) IV Push once  dextrose 50% Injectable 25 Gram(s) IV Push once  glucagon  Injectable 1 milliGRAM(s) IntraMuscular once  insulin glargine Injectable (LANTUS) 15 Unit(s) SubCutaneous at bedtime  insulin lispro (ADMELOG) corrective regimen sliding scale   SubCutaneous three times a day before meals  insulin lispro Injectable (ADMELOG) 4 Unit(s) SubCutaneous three times a day before meals  levothyroxine 88 MICROGram(s) Oral daily  meropenem  IVPB 1000 milliGRAM(s) IV Intermittent every 12 hours  metoprolol tartrate 25 milliGRAM(s) Oral every 6 hours  multivitamin 1 Tablet(s) Oral daily  nystatin    Suspension 335542 Unit(s) Oral daily  pantoprazole  Injectable 40 milliGRAM(s) IV Push daily  polyethylene glycol 3350 17 Gram(s) Oral daily  senna 2 Tablet(s) Oral at bedtime    PRN MEDICATIONS  acetaminophen   Tablet .. 650 milliGRAM(s) Oral every 6 hours PRN  aluminum hydroxide/magnesium hydroxide/simethicone Suspension 30 milliLiter(s) Oral every 6 hours PRN      VITAL SIGNS: Last 24 Hours  T(C): 35.9 (24 Mar 2021 05:03), Max: 36.9 (23 Mar 2021 20:00)  T(F): 96.6 (24 Mar 2021 05:03), Max: 98.5 (23 Mar 2021 20:00)  HR: 93 (24 Mar 2021 05:03) (92 - 117)  BP: 143/64 (24 Mar 2021 05:03) (123/64 - 157/82)  BP(mean): 92 (23 Mar 2021 14:00) (81 - 96)  RR: 19 (24 Mar 2021 05:03) (18 - 20)  SpO2: 98% (23 Mar 2021 14:00) (98% - 98%)    LABS:                        9.2    12.46 )-----------( 88       ( 23 Mar 2021 04:40 )             26.9     03-23    127<L>  |  90<L>  |  35<H>  ----------------------------<  130<H>  4.5   |  29  |  1.4    Ca    8.0<L>      23 Mar 2021 04:40  Phos  3.5     03-23  Mg     2.0     03-23    TPro  4.6<L>  /  Alb  2.1<L>  /  TBili  0.5  /  DBili  x   /  AST  36  /  ALT  38  /  AlkPhos  169<H>  03-23    PT/INR - ( 23 Mar 2021 04:40 )   PT: 11.60 sec;   INR: 1.01 ratio         PTT - ( 23 Mar 2021 04:40 )  PTT:29.1 sec          Culture - Blood (collected 21 Mar 2021 11:46)  Source: .Blood None  Preliminary Report (22 Mar 2021 19:01):    No growth to date.          RADIOLOGY:        RADIOLOGY & ADDITIONAL STUDIES:=    < from: Xray Chest 1 View- PORTABLE-Routine (03.21.21 @ 06:22) >    EXAM:  XR CHEST PORTABLE ROUTINE 1V            PROCEDURE DATE:  03/21/2021        INTERPRETATION:  Clinical History / Reason for exam: Cough    Comparison : Chest radiograph March 20, 2021.    Technique/Positioning: Single AP view of the chest.    Findings:    Support devices: Right IJ central venous catheter is positioned appropriately. Left chest wall pacer, unchanged.    Cardiac/mediastinum/hilum: Unremarkable.    Lung parenchyma/Pleura: Bilateral effusions and interstitial edema. No pneumothorax.    Skeleton/soft tissues: Unchanged.            ELLIOT LANDAU MD; Attending Radiologist  This document has been electronically signed. Mar 21 2021  1:09PM    < end of copied text >    PHYSICAL EXAM:  Gen: NAD, well appearing  HEENT: PERRLA, mucus membrane pink and moist, no ulcers, some dryness on the tongue   Cardio: irregularly irregular rate and rhythm, S1 and S2, no murmurs or rubs  Lungs: Clear to ausculation bilaterally  Abdomen:  soft, no suprapubic fullness.  No CVA tenderness  Extremities ** + 3 nonpitting edema in BL LE  b/l upper extremities ecchymosis from IV's,  Left arm weeping improved, no erythema or tenderness in Lower extremities

## 2021-03-24 NOTE — PHYSICAL THERAPY INITIAL EVALUATION ADULT - LIVES WITH, PROFILE
alone
Pt lives in an apt - bedrooms/bathroom are upstairs, pt uses elevator from 4th floor to 5th floor - does not use stairs in apt./alone

## 2021-03-24 NOTE — PROGRESS NOTE ADULT - ASSESSMENT
75 yo female originally admitted to the  admitted for pyelonephritis treatment complicated by right sided hydronephrosis, TEOFILO and hyponatremia, during her admission developed thrombocytopenia and DKA and upgraded to critical care where she was placedon an insulin drip  for DKA which resolved after 1 day.   Pt hydronephrosis improved on repeat u/s with no sign of obstruction. Pt developed thrombocytopenia likely due to bone marrow suppression secondary to sepsis which has improved, HIT panel was negative. ECHO showed borderline pulmonary hypertension patient asymptomatic with EF of 58%. Pt on metoprolol and amiodarone and Eliquis for A. fib. Pt developed episodes of hypotension that was responsive to fluids, BP is stable with MAP of . Pt had low urine output which has resolved. Renal scan with Lasix showed poor renal function with the right kidney at 33% of total renal function and left with 67% of total renal function. W/u for hyponatremia suggestive of poor oral intake with decrease renal function, Serum osmolality 283, Urine osmolality of 393, TSH 4.17, Cortisol 22.9.         Extensive PMHx includes DM on insulin pump, CAD s/p stenting in 2020, Lung nodules, Dry eyes, bilateral, PVD (peripheral vascular disease): S/p Right Peripheral leg Stent, Seasonal allergies, Skin cancer: Basal Cell Cancer face  (around nose) s/p MOHS procedure x 3, Anemia: Chronic, on Iron supplement, Hypothyroid, SSS (sick sinus syndrome): S/p PPM Sept 2017, Essential hypertension, Afib: 5+ yrs; on Eliquis       Sepsis secondary to pyelonephritis secondary to hydronephrosis and e. coli ESBL bacteremia  - WBC 12.4<17.3, afebrile for >72 hours  - Renal U/S no evidence of abscess   - No CT abd and pelvis for now in light of improved WBC and decrease renal function   - continue meropenem as per ID  - NM Renal Lasix Scan (3/19): Significantly holdup of MAG3 radiotracer within bilateral kidneys with poor emptying bilaterally. No hydronephrosis demonstrated bilaterally. Findings suggestive of poor renal function. The left kidney accounts for 67% of total renal function and the right kidney accounts for 33% of total renal function consistent with significant difference. Markedly delayed post-Lasix T1/2 bilaterally without hydronephrosis. Findings again suggestive of overall poor renal function.  - Per urology no intervention needed, no sign of obstruction     Bacteremia e. Coli ESBL  - Blood cultures negative on 21/3  - continue tx as per ID with meropenum   - On discharge ertapenem 1 gm iv q24h for 14 days, per ID    TEOFILO/ Hyponatremia/ Metabolic acidosis  - secondary to sepsis induced ATN with right sided hydronephrosis  - Poor renal function based on Renal lasix scan   - Cr stable at 1.3-1.4, baseline 0.5-0.8    NSTEMI/CAD s/p PCI with LUPE placed LAD in July 2020  - secondary to sepsis induced demand ischemia  - less likely true ACS  - continue supportive care for now  - Hold Plavix for now     DM with Hyperglycemia  - d/c NPH BID  - started on Lantus 15 units at bed time   - tx sliding scale  - Has insulin pump at home    Thrombocytopenia  - Secondary to sepsis induced BM suppression, HIT w/u negative  - Plt 88 > 68  - continue supportive care, monitor for now  - Hold Plavix for now    Hyponatremia  - Na 127 <- 123  - poor oral intake at home, Na on admission 121    - Serum osmolality 283, Urine osmolality 393  - TSH 4.17  - Cortisol 22.9  - monitor for neurological signs    Pulmonary hypertension   -< from: TTE Echo Complete w/o Contrast w/ Doppler (03.18.21 @ 13:32) >   Estimated pulmonary artery systolic pressure is 38.6 mmHg assuming a right atrial pressure of 10 mmHg, which is consistent with borderline pulmonary hypertension.  - EF of 58%  - BNP 7641  - O2 98% on RA, asymptomatic  - Consider Lasix if symptoms worsen    PVD s/p right lower ext stent  - continue supportive care, monitor for now    Basal cell CA s/p MOHS procedure x 3  - continue supportive care, monitor for now.    Hypothyroidism  - tx synthroid, continue supportive care, monitor for now.    SSS s/p Pacemaker  - continue supportive care, monitor for now.    HTN  - -120/76-55  - c/w metoprolol 25mg twice daily, increase to 50mg q8hrs if not controlled   - monitor for now    AFIB s/p Ablation  - restarted on 2.5 apixaban, Amio BID continue supportive care, monitor for now.    HLD  - tx statin, continue supportive care, monitor for now.    Anemia  - Hemoglobin stable   - restarted iron supplements    DVT px  - Apixaban, duplex U/S negative for DVT     NUT  - tx IVF's, tx oral diet, gluten-free. Nystatin for sore mouth. Miralax and senna for bowel regimen.    77 yo female originally admitted to the  admitted for pyelonephritis treatment complicated by right sided hydronephrosis, TEOFILO and hyponatremia, during her admission developed thrombocytopenia and DKA and upgraded to critical care where she was placedon an insulin drip  for DKA which resolved after 1 day.   Pt hydronephrosis improved on repeat u/s with no sign of obstruction. Pt developed thrombocytopenia likely due to bone marrow suppression secondary to sepsis which has improved, HIT panel was negative. ECHO showed borderline pulmonary hypertension patient asymptomatic with EF of 58%. Pt on metoprolol and amiodarone and Eliquis for A. fib. Pt developed episodes of hypotension that was responsive to fluids, BP is stable with MAP of . Pt had low urine output which has resolved. Renal scan with Lasix showed poor renal function with the right kidney at 33% of total renal function and left with 67% of total renal function. W/u for hyponatremia suggestive of poor oral intake with decrease renal function, Serum osmolality 283, Urine osmolality of 393, TSH 4.17, Cortisol 22.9.         Extensive PMHx includes DM on insulin pump, CAD s/p stenting in 2020, Lung nodules, Dry eyes, bilateral, PVD (peripheral vascular disease): S/p Right Peripheral leg Stent, Seasonal allergies, Skin cancer: Basal Cell Cancer face  (around nose) s/p MOHS procedure x 3, Anemia: Chronic, on Iron supplement, Hypothyroid, SSS (sick sinus syndrome): S/p PPM Sept 2017, Essential hypertension, Afib: 5+ yrs; on Eliquis       Sepsis secondary to pyelonephritis secondary to hydronephrosis and e. coli ESBL bacteremia  - WBC 12.4<17.3, afebrile for >72 hours  - Renal U/S no evidence of abscess   - No CT abd and pelvis for now in light of improved WBC and decrease renal function   - continue meropenem as per ID  - NM Renal Lasix Scan (3/19): Significantly holdup of MAG3 radiotracer within bilateral kidneys with poor emptying bilaterally. No hydronephrosis demonstrated bilaterally. Findings suggestive of poor renal function. The left kidney accounts for 67% of total renal function and the right kidney accounts for 33% of total renal function consistent with significant difference. Markedly delayed post-Lasix T1/2 bilaterally without hydronephrosis. Findings again suggestive of overall poor renal function.  - Per urology no intervention needed, no sign of obstruction     Bacteremia e. Coli ESBL  - Blood cultures negative on 21/3  - continue tx as per ID with meropenum   - On discharge ertapenem 1 gm iv q24h for 14 days, per ID    TEOFILO/ Hyponatremia/ Metabolic acidosis  - secondary to sepsis induced ATN with right sided hydronephrosis  - Poor renal function based on Renal lasix scan   - Cr stable at 1.3-1.4, baseline 0.5-0.8    NSTEMI/CAD s/p PCI with LUPE placed LAD in July 2020  - secondary to sepsis induced demand ischemia  - less likely true ACS  - continue supportive care for now  - Hold Plavix for now   - will DC tele after 24 hours     DM with Hyperglycemia  - d/c NPH BID  - started on Lantus 15 units at bed time   - tx sliding scale  - Has insulin pump at home    Thrombocytopenia  - Secondary to sepsis induced BM suppression, HIT w/u negative  - Plt 88 > 68  - continue supportive care, monitor for now  - Hold Plavix for now    Hyponatremia  - Na 127 <- 123  - poor oral intake at home, Na on admission 121    - Serum osmolality 283, Urine osmolality 393  - TSH 4.17  - Cortisol 22.9  - monitor for neurological signs    Pulmonary hypertension   -< from: TTE Echo Complete w/o Contrast w/ Doppler (03.18.21 @ 13:32) >   Estimated pulmonary artery systolic pressure is 38.6 mmHg assuming a right atrial pressure of 10 mmHg, which is consistent with borderline pulmonary hypertension.  - EF of 58%  - BNP 7641  - O2 98% on RA, asymptomatic  - Consider Lasix if symptoms worsen    PVD s/p right lower ext stent  - continue supportive care, monitor for now    Basal cell CA s/p MOHS procedure x 3  - continue supportive care, monitor for now.    Hypothyroidism  - tx synthroid, continue supportive care, monitor for now.    SSS s/p Pacemaker  - continue supportive care, monitor for now.    HTN  - -120/76-55  - c/w metoprolol 25mg twice daily, increase to 50mg q8hrs if not controlled   - monitor for now    AFIB s/p Ablation  - restarted on 2.5 apixaban, Amio BID continue supportive care, monitor for now.    HLD  - tx statin, continue supportive care, monitor for now.    Anemia  - Hemoglobin stable   - restarted iron supplements    DVT px  - Apixaban, duplex U/S negative for DVT     NUT  - tx IVF's, tx oral diet, gluten-free. Nystatin for sore mouth. Miralax and senna for bowel regimen.     Pending : PT , dc tele tomorrow , possible dc in 24 hrs.

## 2021-03-24 NOTE — PROGRESS NOTE ADULT - ASSESSMENT
77 yo F PMHx DM II (has insulin pump), CAD s/p stenting in 2020, Lung nodules, Dry eyes, bilateral, PVD (peripheral vascular disease): S/p Right Peripheral leg Stent, Seasonal allergies, Skin cancer: Basal Cell Cancer face  (around nose) s/p MOHS procedure x 3, Anemia: Chronic, on Iron supplement, Hypothyroid, SSS (sick sinus syndrome): S/p PPM Sept 2017, Essential hypertension, Afib: 5+ yrs; on Eliquis presented initially for abdominal pain. She was found to have pyelonephritis. Her course was complicated by right sided hydronephrosis, TEOFILO, hyponatremia. Pt developed DKA and septic shock.     Sepsis shock secondary to pyelonephritis secondary to hydronephrosis and e. coli ESBL bacteremia  - sepsis not present on admission  - c/w meropenem    Hydronephrosis/TEOFILO  - no acute intervention done  - suspected resolve obstruction  - NM Renal Lasix Scan (3/19): Significantly holdup of MAG3 radiotracer within bilateral kidneys with poor emptying bilaterally. No hydronephrosis demonstrated bilaterally. Findings suggestive of poor renal function. The left kidney accounts for 67% of total renal function and the right kidney accounts for 33% of total renal function consistent with significant difference. Markedly delayed post-Lasix T1/2 bilaterally without hydronephrosis. Findings again suggestive of overall poor renal function.  - outpt urology follow up    NSTEMI  - has history CAD s/p PCI with LUPE placed LAD in July 2020  - secondary to sepsis induced demand ischemia  - pt not on aspirin or plavix--unclear why not--follow up cardiology    DM II  - complicated by DKA  - endocrine eval appreciated:  - Continue NPH 20 units BID ( by primary team )   - add admelog 4 units TIDAC , hold if NPO   - Continue sliding scale before meals   - prior to discharge patient need to go back on insulin pump.      Thrombocytopenia  - likely suppressed due to sepsis  -  cbc unavailable today  - follow up    Hyponatremia  - Na 123 today, when corrected 126  - osm >27=80 suggesting pseudohyponatremia vs osmotically active substance present, however pt has anasarca so perhaps this is due to volume overloading  - trend sodium  - poor oral intake at home, Na on admission 121    - Serum osmolality 283, Urine osmolality 393  - TSH 4.17  - Cortisol 22.9    Hyperkalemia  - 5.1 today  - if elevated in AM, lokelma      Anasarca  - normal EF  - trial ethacrynic acid (pt has allergy to sulfonamide)    Pulmonary hypertension   - Estimated pulmonary artery systolic pressure is 38.6 mmHg assuming a right atrial pressure of 10 mmHg, which is consistent with borderline pulmonary hypertension.      Hypothyroidism  -c/w synthroid    SSS  - s/p Pacemaker    HTN  - controlled, c/w metoprolol    Afib s/p Ablation  - c/w Eliquis    HLD  - c/w statin    Anemia  - Hemoglobin stable   - restarted iron supplements    DVT px  - Apixaban,  Full Code  From home    #Progress Note Handoff:  Pending (specify):  Diuresis, placement  Family discussion: discussed trial diuretics for anasarca  Disposition: Home___/SNF___/Other________/Unknown at this time___x_____

## 2021-03-24 NOTE — PHYSICAL THERAPY INITIAL EVALUATION ADULT - AMBULATION SKILLS, REHAB EVAL
RW to get OOB, then uses cane indoors and outside/needs device
pt uses RW for supine to sit, then st.cane intermittently in house, outdoors/needs device

## 2021-03-24 NOTE — PHYSICAL THERAPY INITIAL EVALUATION ADULT - GAIT DEVIATIONS NOTED, PT EVAL
decreased clarible/increased time in double stance/decreased step length/decreased weight-shifting ability

## 2021-03-24 NOTE — DISCHARGE NOTE PROVIDER - NSDCCPCAREPLAN_GEN_ALL_CORE_FT
PRINCIPAL DISCHARGE DIAGNOSIS  Diagnosis: Pyelonephritis  Assessment and Plan of Treatment: You had a urinary tract infection that extended into your kidneys causing them to function abnormally. For this , your are being treated with IV antibiotics and this will be set up for you to recieve at home as well .      SECONDARY DISCHARGE DIAGNOSES  Diagnosis: Elevated troponin  Assessment and Plan of Treatment: Your cardiac enzymes were elevated which is most likely due to your sepsis on admission / demand ischemia . Please follow up with your primary care doctor within 1 week of discharge.    Diagnosis: Sepsis  Assessment and Plan of Treatment:      PRINCIPAL DISCHARGE DIAGNOSIS  Diagnosis: Pyelonephritis  Assessment and Plan of Treatment: You had a urinary tract infection that extended into your kidneys causing them to function abnormally. It was complicated with infection in the blood stream as well.  You were treated with IV antibiotics. Your infection resolved.  .      SECONDARY DISCHARGE DIAGNOSES  Diagnosis: Sepsis  Assessment and Plan of Treatment: related pyelonephritis    Diagnosis: Elevated troponin  Assessment and Plan of Treatment: Your cardiac enzymes were elevated which is most likely due to your sepsis on admission / demand ischemia . Please follow up with your primary care doctor within 1 week of discharge.  .    Diagnosis: Diabetes  Assessment and Plan of Treatment: you are off from insulin pump. You need to coninue night time long acting insulin. Chekc your blood sugar before meals and at night time. Please use insulin lispro sliding scale before meals(low dose).   you had episode of hypoglycemia on 4/1/21.  So your night time insulin dose was decreased. It needs readjustment based on blood sugar level. You are also started on steroids for rash. So you need close monitoring of blood sugar  .    Diagnosis: Skin rash  Assessment and Plan of Treatment: possibly related to allergic reaction from  Take benadryl as needed for itching. you are also started on prednisone to contiune for 5 more days  .    Diagnosis: Oral candidiasis  Assessment and Plan of Treatment: continue fluconazole for 1 more day to finish 7 day course. Also on nystatin swish and swallow. continue for 5 more days  .    Diagnosis: Hyponatremia  Assessment and Plan of Treatment: continue fluid restriction to 1 Litre. monitor sodium levels.  .    Diagnosis: Hyperkalemia  Assessment and Plan of Treatment: Improved. Monitor BMP in next 3--4 days  .

## 2021-03-24 NOTE — PROGRESS NOTE ADULT - PROBLEM SELECTOR PROBLEM 1
Other hydronephrosis
Pyelonephritis
Other hydronephrosis
Pyelonephritis

## 2021-03-24 NOTE — DISCHARGE NOTE PROVIDER - HOSPITAL COURSE
75 yo female with PMHx of DM on insulin pump, CAD s/p stenting in 2020, Lung nodules, Dry eyes, bilateral, PVD (peripheral vascular disease): S/p Right Peripheral leg Stent, Seasonal allergies, Skin cancer: Basal Cell Cancer face  (around nose) s/p MOHS procedure x 3, Anemia: Chronic, on Iron supplement, Hypothyroid, SSS (sick sinus syndrome): S/p PPM Sept 2017, Essential hypertension, Afib: 5+ yrs; on Eliquis admitted for pyelonephritis treatment complicated by right sided hydronephrosis, TEOFILO and hyponatremia, during her admission developed thrombocytopenia and DKA and upgraded to critical care for DKA.    Patient was upgraded from the floors to ICU following an episode of DKA (FS>400, bicarb serum 7, AG 23, lactate 1) and was placed on an insulin drip for 1 day with resolution of anion gap. Pt hydronephrosis improved on repeat u/s with no sign of obstruction. Pt developed thrombocytopenia likely due to bone marrow suppression secondary to sepsis which has improved, HIT panel was negative. ECHO showed borderline pulmonary hypertension patient asymptomatic with EF of 58%. Pt on metoprolol and amiodarone and Eliquis for A. fib. Pt developed episodes of hypotension that was responsive to fluids, BP is stable with MAP of . Pt had low urine output which has resolved. Renal scan with Lasix showed poor renal function with the right kidney at 33% of total renal function and left with 67% of total renal function. W/u for hyponatremia suggestive of poor oral intake with decrease renal function, Serum osmolality 283, Urine osmolality of 393, TSH 4.17, Cortisol 22.9. Hyponatremia resolving . No events on tele for 24 hours . Pt medically stable and clear for DC . 75 yo female with PMHx of DM on insulin pump, CAD s/p stenting in 2020, Lung nodules, Dry eyes, bilateral, PVD (peripheral vascular disease): S/p Right Peripheral leg Stent, Seasonal allergies, Skin cancer: Basal Cell Cancer face  (around nose) s/p MOHS procedure x 3, Anemia: Chronic, on Iron supplement, Hypothyroid, SSS (sick sinus syndrome): S/p PPM Sept 2017, Essential hypertension, Afib: 5+ yrs; on Eliquis admitted for pyelonephritis treatment complicated by right sided hydronephrosis, TEOFILO and hyponatremia, during her admission developed thrombocytopenia and DKA and upgraded to critical care for DKA.    Patient was upgraded from the floors to ICU following an episode of DKA (FS>400, bicarb serum 7, AG 23, lactate 1) and was placed on an insulin drip for 1 day with resolution of anion gap. Pt hydronephrosis improved on repeat u/s with no sign of obstruction. Pt developed thrombocytopenia likely due to bone marrow suppression secondary to sepsis which has improved, HIT panel was negative. ECHO showed borderline pulmonary hypertension patient asymptomatic with EF of 58%. Pt on metoprolol and amiodarone and Eliquis for A. fib. Pt developed episodes of hypotension that was responsive to fluids, BP is stable with MAP of . Pt had low urine output which has resolved. Renal scan with Lasix showed poor renal function with the right kidney at 33% of total renal function and left with 67% of total renal function. W/u for hyponatremia suggestive of poor oral intake with decrease renal function, Serum osmolality 283, Urine osmolality of 393, TSH 4.17, Cortisol 22.9. Hyponatremia resolving . No events on tele for 24 hours . Pt medically stable and clear for DC .       Hospital course complicted by hyperkalemia, 5.6 for which she received kayexalate and lokelma, and diet changed to no concentrated potassium. Repeat K+ --------->>>>>    COVId 19 PCR negative 3/30. Recheck 4/1 --------->  ----------Incomplete-----------     77 yo female with PMHx of DM on insulin pump, CAD s/p stenting in 2020, Lung nodules, Dry eyes, bilateral, PVD (peripheral vascular disease): S/p Right Peripheral leg Stent,   Seasonal allergies, Skin cancer: Basal Cell Cancer face  (around nose) s/p MOHS procedure x 3, Anemia: Chronic, on Iron supplement, Hypothyroid,   SSS (sick sinus syndrome): S/p PPM Sept 2017, Essential hypertension, Afib: 5+ yrs; on Eliquis admitted for pyelonephritis treatment complicated by right sided hydronephrosis,   TEOFILO and hyponatremia, during her admission developed thrombocytopenia and DKA and upgraded to critical care for DKA and for sepsis and bacteremia    Patient was upgraded from the floors to ICU following an episode of DKA (FS>400, bicarb serum 7, AG 23, lactate 1) and was placed on an insulin drip for 1 day with resolution of   anion gap. Pt hydronephrosis improved on repeat u/s with no sign of obstruction. Patient sepsis was treated with  IV fluids and antibiotics and she improved.  Pt developed thrombocytopenia likely due to bone marrow suppression secondary to sepsis   which has improved, HIT panel was negative. ECHO showed borderline pulmonary hypertension patient asymptomatic with EF of 58%. Pt on metoprolol and amiodarone and   Eliquis for A. fib. Pt developed episodes of hypotension that was responsive to fluids, BP is stable with MAP of . Pt had low urine output which has resolved.   Renal scan with Lasix showed poor renal function with the right kidney at 33% of total renal function and left with 67% of total renal function.   W/u for hyponatremia suggestive of poor oral intake with decrease renal function, Serum osmolality 283, Urine osmolality of 393, TSH 4.17, Cortisol 22.9. Hyponatremia resolving .   Pt medically stable and clear for DC .       COVId 19 PCR negative 3/30. Recheck 4/1 - not detected    Imaging  CT Abdomen and Pelvis No Cont (03.14.21 @ 19:33) >The right kidney is enlarged with perinephric fat stranding and moderate hydroureteronephrosis to the level of the distal ureter. No radiopaque calculus is identified. Cystoscopy suggested to evaluate the right ureteral vesicle junction to evaluate for an obstructing lesion. A call back request was submitted  US Renal (03.20.21 @ 22:19) >Normal renal ultrasound. No sonographic evidence of an abscess   Duplex Upper Ext Vein Scan, Bilat (03.28.21 @ 17:56) >No evidence of deep or superficial thrombosis in the bilateral upper extremities.   TTE Echo Complete w/o Contrast w/ Doppler (03.18.21 @ 13:32) >Basal inferolateral segment is abnormal EF of 58 %.Moderate tricuspid regurgitation. borderline pulmonary hypertension.  NM Renal/Lasix (NM Renal/Lasix (03.19.21 @ 22:49) >Significantly holdup of MAG3 radiotracer within bilateral kidneys with poor emptying bilaterally. No hydronephrosis demonstrated bilaterally. Findings suggestive of poor renal function.  VA Duplex Lower Ext Vein Scan, Bilat (03.17.21 @ 13:59) >No evidence of deep venous thrombosis or superficial thrombophlebitis in the bilateral lower extremities.    Diagnosis    # Sepsis POA secondary to complicated UTI with acute right pyelonephritis   # moderate right hydroureteronephrosis secondary to infection  # ESBL E coli UTI complicated with bacteremia    - blood Culture Results: Growth in aerobic and anaerobic bottles: Escherichia coli ESBL (03.14.21 @ 18:30)  - blood Culture Results: No Growth Final (03.23.21 @ 04:37)  - urine culture Organism: Escherichia coli ESBL (03.14.21 @ 22:50)    -Infectious disease team was following; patient was treated with Meropenem  patient was treated until 3/31 to finish antibiotic course  - Urology follow up    # Generalized rash probabably drug allergy- stable and improving  - meropenem dced  - benadryl PRN; start on prednisone 40 mg for 5 days    # oral candidiasis - c/w flucanozole for 1 more day    # Chronic  Hyponatremia -stable  - Fluid restriction  - follow up with nephrology    # Acute kidney injury resolved  # CKD stage3     # hyperkalemia- continue lokelma    # Chronic afib S/p PPM- c/w  amiodarone, metoprolol, eliquis    # DM type 2 - A1C with Estimated Average Glucose Result: 8.1% (03.15.21 @ 06:16)    # Elevated troponin sec type2 MI sec to sepsis; patient with H/o CAD s/p stentc/w  plavix, metoprolol    # Hypothyroidism- c/w synthroid    # PVD (peripheral vascular disease): S/p Right Peripheral leg Stent- c/w plavix 77 yo female with PMHx of DM on insulin pump, CAD s/p stenting in 2020, Lung nodules, Dry eyes, bilateral, PVD (peripheral vascular disease): S/p Right Peripheral leg Stent,   Seasonal allergies, Skin cancer: Basal Cell Cancer face  (around nose) s/p MOHS procedure x 3, Anemia: Chronic, on Iron supplement, Hypothyroid,   SSS (sick sinus syndrome): S/p PPM Sept 2017, Essential hypertension, Afib: 5+ yrs; on Eliquis admitted for pyelonephritis treatment complicated by right sided hydronephrosis,   TEOFILO and hyponatremia, during her admission developed thrombocytopenia and DKA and upgraded to critical care for DKA and for sepsis and bacteremia    Patient was upgraded from the floors to ICU following an episode of DKA (FS>400, bicarb serum 7, AG 23, lactate 1) and was placed on an insulin drip for 1 day with resolution of   anion gap. Pt hydronephrosis improved on repeat u/s with no sign of obstruction. Patient sepsis was treated with  IV fluids and antibiotics and she improved.  Pt developed thrombocytopenia likely due to bone marrow suppression secondary to sepsis   which has improved, HIT panel was negative. ECHO showed borderline pulmonary hypertension patient asymptomatic with EF of 58%. Pt on metoprolol and amiodarone and   Eliquis for A. fib. Pt developed episodes of hypotension that was responsive to fluids, BP is stable with MAP of . Pt had low urine output which has resolved.   Renal scan with Lasix showed poor renal function with the right kidney at 33% of total renal function and left with 67% of total renal function.   W/u for hyponatremia suggestive of poor oral intake with decrease renal function, Serum osmolality 283, Urine osmolality of 393, TSH 4.17, Cortisol 22.9. Hyponatremia resolving .   Pt medically stable and clear for DC .     Imaging  CT Abdomen and Pelvis No Cont (03.14.21 @ 19:33) >The right kidney is enlarged with perinephric fat stranding and moderate hydroureteronephrosis to the level of the distal ureter. No radiopaque calculus is identified. Cystoscopy suggested to evaluate the right ureteral vesicle junction to evaluate for an obstructing lesion. A call back request was submitted  US Renal (03.20.21 @ 22:19) >Normal renal ultrasound. No sonographic evidence of an abscess   Duplex Upper Ext Vein Scan, Bilat (03.28.21 @ 17:56) >No evidence of deep or superficial thrombosis in the bilateral upper extremities.   TTE Echo Complete w/o Contrast w/ Doppler (03.18.21 @ 13:32) >Basal inferolateral segment is abnormal EF of 58 %.Moderate tricuspid regurgitation. borderline pulmonary hypertension.  NM Renal/Lasix (NM Renal/Lasix (03.19.21 @ 22:49) >Significantly holdup of MAG3 radiotracer within bilateral kidneys with poor emptying bilaterally. No hydronephrosis demonstrated bilaterally. Findings suggestive of poor renal function.  VA Duplex Lower Ext Vein Scan, Bilat (03.17.21 @ 13:59) >No evidence of deep venous thrombosis or superficial thrombophlebitis in the bilateral lower extremities.    Diagnosis    # Sepsis POA secondary to complicated UTI with acute right pyelonephritis   # moderate right hydroureteronephrosis secondary to infection  # ESBL E coli UTI complicated with bacteremia    - blood Culture Results: Growth in aerobic and anaerobic bottles: Escherichia coli ESBL (03.14.21 @ 18:30)  - blood Culture Results: No Growth Final (03.23.21 @ 04:37)  - urine culture Organism: Escherichia coli ESBL (03.14.21 @ 22:50)    -Infectious disease team was following; patient was treated with Meropenem  patient was treated until 3/31 to finish antibiotic course  - Urology follow up    # Generalized rash probabably drug allergy- stable and improving  - meropenem dced  - benadryl PRN; start on prednisone 40 mg for 5 days    # oral candidiasis - c/w flucanozole for 1 more day    # Chronic  Hyponatremia -stable  - Fluid restriction  - follow up with nephrology    # Acute kidney injury resolved  # CKD stage3     # hyperkalemia- continue lokelma    # Chronic afib S/p PPM- c/w  amiodarone, metoprolol, eliquis    # DM type 2 - A1C with Estimated Average Glucose Result: 8.1% (03.15.21 @ 06:16)    # Elevated troponin sec type2 MI sec to sepsis; patient with H/o CAD s/p stentc/w  plavix, metoprolol    # Hypothyroidism- c/w synthroid    # PVD (peripheral vascular disease): S/p Right Peripheral leg Stent- c/w plavix 75 yo female with PMHx of DM on insulin pump, CAD s/p stenting in 2020, Lung nodules, Dry eyes, bilateral, PVD (peripheral vascular disease): S/p Right Peripheral leg Stent,   Seasonal allergies, Skin cancer: Basal Cell Cancer face  (around nose) s/p MOHS procedure x 3, Anemia: Chronic, on Iron supplement, Hypothyroid,   SSS (sick sinus syndrome): S/p PPM Sept 2017, Essential hypertension, Afib: 5+ yrs; on Eliquis admitted for pyelonephritis treatment complicated by right sided hydronephrosis,   TEOFILO and hyponatremia, during her admission developed thrombocytopenia and DKA and upgraded to critical care for DKA and for sepsis and bacteremia    Patient was upgraded from the floors to ICU following an episode of DKA (FS>400, bicarb serum 7, AG 23, lactate 1) and was placed on an insulin drip for 1 day with resolution of   anion gap. Pt hydronephrosis improved on repeat u/s with no sign of obstruction. Patient sepsis was treated with  IV fluids and antibiotics and she improved.  Pt developed thrombocytopenia likely due to bone marrow suppression secondary to sepsis   which has improved, HIT panel was negative. ECHO showed borderline pulmonary hypertension patient asymptomatic with EF of 58%. Pt on metoprolol and amiodarone and   Eliquis for A. fib. Pt developed episodes of hypotension that was responsive to fluids, BP is stable with MAP of . Pt had low urine output which has resolved.   Renal scan with Lasix showed poor renal function with the right kidney at 33% of total renal function and left with 67% of total renal function.   W/u for hyponatremia suggestive of poor oral intake with decrease renal function, Serum osmolality 283, Urine osmolality of 393, TSH 4.17, Cortisol 22.9. Hyponatremia resolving .   Pt medically stable and clear for DC .     Imaging  CT Abdomen and Pelvis No Cont (03.14.21 @ 19:33) >The right kidney is enlarged with perinephric fat stranding and moderate hydroureteronephrosis to the level of the distal ureter. No radiopaque calculus is identified. Cystoscopy suggested to evaluate the right ureteral vesicle junction to evaluate for an obstructing lesion. A call back request was submitted  US Renal (03.20.21 @ 22:19) >Normal renal ultrasound. No sonographic evidence of an abscess   Duplex Upper Ext Vein Scan, Bilat (03.28.21 @ 17:56) >No evidence of deep or superficial thrombosis in the bilateral upper extremities.   TTE Echo Complete w/o Contrast w/ Doppler (03.18.21 @ 13:32) >Basal inferolateral segment is abnormal EF of 58 %.Moderate tricuspid regurgitation. borderline pulmonary hypertension.  NM Renal/Lasix (NM Renal/Lasix (03.19.21 @ 22:49) >Significantly holdup of MAG3 radiotracer within bilateral kidneys with poor emptying bilaterally. No hydronephrosis demonstrated bilaterally. Findings suggestive of poor renal function.  VA Duplex Lower Ext Vein Scan, Bilat (03.17.21 @ 13:59) >No evidence of deep venous thrombosis or superficial thrombophlebitis in the bilateral lower extremities.    Diagnosis    # Sepsis POA secondary to complicated UTI with acute right pyelonephritis   # moderate right hydroureteronephrosis secondary to infection  # ESBL E coli UTI complicated with bacteremia    - blood Culture Results: Growth in aerobic and anaerobic bottles: Escherichia coli ESBL (03.14.21 @ 18:30)  - blood Culture Results: No Growth Final (03.23.21 @ 04:37)  - urine culture Organism: Escherichia coli ESBL (03.14.21 @ 22:50)    -Infectious disease team was following; patient was treated with Meropenem  patient was treated until 3/31 to finish antibiotic course  - Urology follow up    # Generalized rash probabably drug allergy- stable and improving  - meropenem dced  - benadryl PRN; start on prednisone 40 mg for 5 days    # oral candidiasis - c/w flucanozole for 1 more day    # Chronic  Hyponatremia -stable  - Fluid restriction  - follow up with nephrology    # Acute kidney injury resolved  # CKD stage3     # hyperkalemia- resolved    # Chronic afib S/p PPM- c/w  amiodarone, metoprolol, eliquis    # DM type 2 - A1C with Estimated Average Glucose Result: 8.1% (03.15.21 @ 06:16)    # Elevated troponin sec type2 MI sec to sepsis; patient with H/o CAD s/p stentc/w  plavix, metoprolol    # Hypothyroidism- c/w synthroid    # PVD (peripheral vascular disease): S/p Right Peripheral leg Stent- c/w plavix

## 2021-03-24 NOTE — PROGRESS NOTE ADULT - SUBJECTIVE AND OBJECTIVE BOX
CHIEF COMPLAINT:    Patient is a 76y old  Female who presents with a chief complaint of Sepsis     INTERVAL HPI/OVERNIGHT EVENTS:    Patient seen and examined at bedside. No acute overnight events occurred.    ROS: All other systems are negative.    Vital Signs:    T(F): 96.5 (21 @ 14:00), Max: 98.5 (21 @ 20:00)  HR: 107 (21 @ 17:25) (71 - 107)  BP: 142/67 (21 @ 17:25) (137/66 - 151/65)  RR: 20 (21 @ 17:25) (18 - 20)  SpO2: 97% (21 @ 08:00) (97% - 97%)  I&O's Summary    23 Mar 2021 07:01  -  24 Mar 2021 07:00  --------------------------------------------------------  IN: 640 mL / OUT: 200 mL / NET: 440 mL    24 Mar 2021 07:01  -  24 Mar 2021 17:35  --------------------------------------------------------  IN: 400 mL / OUT: 700 mL / NET: -300 mL      Daily     Daily Weight in k.5 (24 Mar 2021 05:03)  CAPILLARY BLOOD GLUCOSE      POCT Blood Glucose.: 254 mg/dL (24 Mar 2021 16:53)  POCT Blood Glucose.: 220 mg/dL (24 Mar 2021 12:10)  POCT Blood Glucose.: 187 mg/dL (24 Mar 2021 07:14)  POCT Blood Glucose.: 187 mg/dL (23 Mar 2021 21:50)      PHYSICAL EXAM:  GENERAL:  NAD  SKIN: Bruising on right arm  HEENT: Atraumatic. Normocephalic. Anicteric  NECK:  No JVD.   PULMONARY: Clear to ausculation bilaterally. No wheezing. No rales  CVS: Normal S1, S2. Regular rate and rhythm. No murmurs.  ABDOMEN/GI: Soft, Nontender, Nondistended; Bowel sounds are present  EXTREMITIES:  Anasarca  NEUROLOGIC:  No motor deficit.  PSYCH: Alert & oriented x 3, normal affect    Consultant(s) Notes Reviewed:  [x ] YES  [ ] NO  Care Discussed with Consultants/Other Providers [ x] YES  [ ] NO    LABS:                        9.2    12.46 )-----------( 88       ( 23 Mar 2021 04:40 )             26.9     03-24    123<L>  |  91<L>  |  34<H>  ----------------------------<  211<H>  5.1<H>   |  22  |  1.5    Ca    7.7<L>      24 Mar 2021 11:52  Phos  3.5     03-  Mg     2.0     -    TPro  5.1<L>  /  Alb  2.2<L>  /  TBili  0.5  /  DBili  x   /  AST  38  /  ALT  34  /  AlkPhos  177<H>  03-24    PT/INR - ( 23 Mar 2021 04:40 )   PT: 11.60 sec;   INR: 1.01 ratio         PTT - ( 23 Mar 2021 04:40 )  PTT:29.1 sec  Serum Pro-Brain Natriuretic Peptide: 7641 pg/mL (21 @ 08:21)        Culture - Blood (collected 23 Mar 2021 04:37)  Source: .Blood None  Preliminary Report (24 Mar 2021 13:02):    No growth to date.        RADIOLOGY & ADDITIONAL TESTS:  Imaging or report Personally Reviewed:  [ ] YES  [ ] NO    Telemetry reviewed independently - not available    Medications:  Standing  ALBUTerol    90 MICROgram(s) HFA Inhaler 1 Puff(s) Inhalation every 6 hours  albuterol/ipratropium for Nebulization 6 milliLiter(s) Nebulizer every 6 hours  aMIOdarone    Tablet 200 milliGRAM(s) Oral two times a day  apixaban 2.5 milliGRAM(s) Oral two times a day  chlorhexidine 4% Liquid 1 Application(s) Topical <User Schedule>  dextrose 40% Gel 15 Gram(s) Oral once  dextrose 5%. 1000 milliLiter(s) IV Continuous <Continuous>  dextrose 5%. 1000 milliLiter(s) IV Continuous <Continuous>  dextrose 50% Injectable 25 Gram(s) IV Push once  dextrose 50% Injectable 12.5 Gram(s) IV Push once  dextrose 50% Injectable 25 Gram(s) IV Push once  glucagon  Injectable 1 milliGRAM(s) IntraMuscular once  insulin glargine Injectable (LANTUS) 15 Unit(s) SubCutaneous at bedtime  insulin lispro (ADMELOG) corrective regimen sliding scale   SubCutaneous three times a day before meals  insulin lispro Injectable (ADMELOG) 4 Unit(s) SubCutaneous three times a day before meals  levothyroxine 88 MICROGram(s) Oral daily  meropenem  IVPB 1000 milliGRAM(s) IV Intermittent every 12 hours  metoprolol tartrate 25 milliGRAM(s) Oral every 6 hours  multivitamin 1 Tablet(s) Oral daily  nystatin    Suspension 489747 Unit(s) Oral daily  pantoprazole    Tablet 40 milliGRAM(s) Oral before breakfast  polyethylene glycol 3350 17 Gram(s) Oral daily  senna 2 Tablet(s) Oral at bedtime    PRN Meds  acetaminophen   Tablet .. 650 milliGRAM(s) Oral every 6 hours PRN  aluminum hydroxide/magnesium hydroxide/simethicone Suspension 30 milliLiter(s) Oral every 6 hours PRN      Case discussed with resident  Care discussed with pt

## 2021-03-24 NOTE — PHYSICAL THERAPY INITIAL EVALUATION ADULT - PERTINENT HX OF CURRENT PROBLEM, REHAB EVAL
pt is a 75 y/o female presented here for nausea, vomiting and decreased PO intake for past 2 days. The symptoms started acutely and she was in her usual state of health before. She also felt weak and her blood sugars were higher. She had a recent hx of UTI which responded well to ciprofloxacin.

## 2021-03-24 NOTE — DISCHARGE NOTE PROVIDER - PROVIDER TOKENS
PROVIDER:[TOKEN:[[id number]],FOLLOWUP:[1 week],ESTABLISHEDPATIENT:[T]] PROVIDER:[TOKEN:[[id number]],FOLLOWUP:[1 week],ESTABLISHEDPATIENT:[T]],FREE:[LAST:[Jayyina],FIRST:[Dinorah],PHONE:[(   )    -],FAX:[(   )    -],ADDRESS:[Endocrinology],FOLLOWUP:[1 month]],PROVIDER:[TOKEN:[[id number]],FOLLOWUP:[2 weeks]],PROVIDER:[TOKEN:[[id number]],FOLLOWUP:[1 week]]

## 2021-03-24 NOTE — CHART NOTE - NSCHARTNOTEFT_GEN_A_CORE
Patient missed a dose of her immunoglobulin Gamunex on Monday . We called neurology to set up administration of the medication , IRLANDA tate notified me that she will speak to her attending about the matter. I am awaiting her call back .

## 2021-03-24 NOTE — CONSULT NOTE ADULT - SUBJECTIVE AND OBJECTIVE BOX
HPI:  75 yo F PMH DM on insulin pump, CAD s/p stenting in 2020, Lung nodules, Dry eyes, bilateral, PVD (peripheral vascular disease): S/p Right Peripheral leg Stent, Seasonal allergies, Skin cancer: Basal Cell Cancer face  (around nose) s/p MOHS procedure x 3, Anemia: Chronic, on Iron supplement, Hypothyroid, SSS (sick sinus syndrome): S/p PPM, Pacemaker: Sept 2017, Essential hypertension, Afib: 5+ yrs; on Eliquis presented here for nausea, vomiting and decreased PO intake for past 2 days. The symptoms started acutely and she was in her usual state of health before. She also felt weak and her blood sugars were higher. She had a recent hx of UTI which responded well to ciprofloxacin. She denied any chest pain, shortness of breath, focal neurological deficits or altered bowel habits. She takes her meds regularly. For past three days, she is also passing out very less urine. There is no blood in urine noted however. She complained of some diffuse abdominal pain which started now and is burning in character. She is otherwise comfortable and has no other complaints.    Vital Signs (24 Hrs):  T(C): 37.1 (03-14-21 @ 20:28), Max: 38.5 (03-14-21 @ 17:43)  HR: 76 (03-14-21 @ 20:28) (76 - 87)  BP: 101/46 (03-14-21 @ 20:28) (101/46 - 127/61)  RR: 20 (03-14-21 @ 20:28) (20 - 22)  SpO2: 100% (03-14-21 @ 20:28) (99% - 100%)      Patient received in ER with low grade fevers. Blood work is significant for leukocytosis, hyponatremia, hyperkalemia, elevated Cr and BUN, elevated liver enzymes, troponin and a positive UA for blood. CT abdomen and pelvis revealed right kidney is enlarged with perinephric fat stranding and moderate hydroureteronephrosis to the level of the distal ureter. Patient is being admitted for sepsis secondary to possible urinary tract source. Urology consulted and on board for possible cystoscopy.         PAST MEDICAL & SURGICAL HISTORY:  Inclusion body myositis (IBM)    Lung nodules    Transfusion history  S/p Hysterectomy    Dry eyes, bilateral    PVD (peripheral vascular disease)  S/p Right Peripheral leg Stent    Seasonal allergies    Skin cancer  Basal Cell Cancer face  (around nose) s/p MOHS procedure x 3    Anemia  Chronic, on Iron supplement    Hypothyroid    SSS (sick sinus syndrome)  S/p PPM    Diabetes  Insulin dependent - (has insulin Pump)    Pacemaker  Sept 2017    Palpitations  occasionally; not for couple yrs    Essential hypertension    Afib  5+ yrs; on Eliquis    S/P ablation of atrial fibrillation    S/P cataract surgery    History of surgery  MOHS procedure (face) x 3    Cardiac pacemaker  9/9/17    History of surgery  Right Peripheral Stent 5 yrs    H/O total hysterectomy  25+ yrs ago        Hospital Course:    TODAY'S SUBJECTIVE & REVIEW OF SYMPTOMS:     Constitutional WNL   Cardio WNL   Resp WNL   GI WNL  Heme WNL  Endo WNL  Skin WNL  MSK Weakness  Neuro WNL  Cognitive WNL  Psych WNL      MEDICATIONS  (STANDING):  ALBUTerol    90 MICROgram(s) HFA Inhaler 1 Puff(s) Inhalation every 6 hours  albuterol/ipratropium for Nebulization 6 milliLiter(s) Nebulizer every 6 hours  aMIOdarone    Tablet 200 milliGRAM(s) Oral two times a day  apixaban 2.5 milliGRAM(s) Oral two times a day  chlorhexidine 4% Liquid 1 Application(s) Topical <User Schedule>  dextrose 40% Gel 15 Gram(s) Oral once  dextrose 5%. 1000 milliLiter(s) (50 mL/Hr) IV Continuous <Continuous>  dextrose 5%. 1000 milliLiter(s) (100 mL/Hr) IV Continuous <Continuous>  dextrose 50% Injectable 25 Gram(s) IV Push once  dextrose 50% Injectable 12.5 Gram(s) IV Push once  dextrose 50% Injectable 25 Gram(s) IV Push once  glucagon  Injectable 1 milliGRAM(s) IntraMuscular once  insulin glargine Injectable (LANTUS) 15 Unit(s) SubCutaneous at bedtime  insulin lispro (ADMELOG) corrective regimen sliding scale   SubCutaneous three times a day before meals  insulin lispro Injectable (ADMELOG) 4 Unit(s) SubCutaneous three times a day before meals  levothyroxine 88 MICROGram(s) Oral daily  meropenem  IVPB 1000 milliGRAM(s) IV Intermittent every 12 hours  metoprolol tartrate 25 milliGRAM(s) Oral every 6 hours  multivitamin 1 Tablet(s) Oral daily  nystatin    Suspension 931631 Unit(s) Oral daily  pantoprazole    Tablet 40 milliGRAM(s) Oral before breakfast  polyethylene glycol 3350 17 Gram(s) Oral daily  senna 2 Tablet(s) Oral at bedtime    MEDICATIONS  (PRN):  acetaminophen   Tablet .. 650 milliGRAM(s) Oral every 6 hours PRN Temp greater or equal to 38C (100.4F), Mild Pain (1 - 3)  aluminum hydroxide/magnesium hydroxide/simethicone Suspension 30 milliLiter(s) Oral every 6 hours PRN Dyspepsia      FAMILY HISTORY:  Cancer (Father)  Myeloma    Family history of lung cancer (Mother)        Allergies    latex (Pruritus; Rash)  sulfonamides (Unknown)    Intolerances    Pineapple (Unknown)      SOCIAL HISTORY:    [  ] Etoh  [  ] Smoking  [  ] Substance abuse     Home Environment:  [ x ] Home Alone  [  ] Lives with Family  [  ] Home Health Aid    Dwelling:  [x  ] Apartment  [  ] Private House  [  ] Adult Home  [  ] Skilled Nursing Facility      [  ] Short Term  [  ] Long Term  [x  ] Stairs       Elevator [ x ]    FUNCTIONAL STATUS PTA: (Check all that apply)  Ambulation: [ x  ]Independent    [  ] Dependent     [  ] Non-Ambulatory  Assistive Device: [ x ] SA Cane  [  ]  Q Cane  [  ] Walker  [  ]  Wheelchair  ADL : [ x ] Independent  [  ]  Dependent       Vital Signs Last 24 Hrs  T(C): 35.8 (24 Mar 2021 14:00), Max: 36.9 (23 Mar 2021 20:00)  T(F): 96.5 (24 Mar 2021 14:00), Max: 98.5 (23 Mar 2021 20:00)  HR: 91 (24 Mar 2021 14:00) (71 - 117)  BP: 151/65 (24 Mar 2021 14:00) (137/66 - 157/82)  BP(mean): 96 (24 Mar 2021 12:26) (96 - 96)  RR: 18 (24 Mar 2021 14:00) (18 - 20)  SpO2: 97% (24 Mar 2021 08:00) (97% - 97%)      PHYSICAL EXAM: Awake & Alert  GENERAL: NAD  HEAD:  Normocephalic  CHEST/LUNG: Clear   HEART: S1S2+  ABDOMEN: Soft, Nontender  EXTREMITIES:  no calf tenderness    NERVOUS SYSTEM:  Cranial Nerves 2-12 intact [  ] Abnormal  [  ]  ROM: WFL all extremities [ x ]  Abnormal [  ]  Motor Strength: WFL all extremities  [  ]  Abnormal [x  ]4/5 all ext  Sensation: intact to light touch [ x ] Abnormal [  ]    FUNCTIONAL STATUS:  Bed Mobility: Independent [  ]  Supervision [  ]  Needs Assistance [x  ]  N/A [  ]  Transfers: Independent [  ]  Supervision [  ]  Needs Assistance [ x ]  N/A [  ]   Ambulation: Independent [  ]  Supervision [  ]  Needs Assistance [  ]  N/A [  ]  ADL: Independent [  ] Requires Assistance [  ] N/A [  ]      LABS:                        9.2    12.46 )-----------( 88       ( 23 Mar 2021 04:40 )             26.9     03-24    123<L>  |  91<L>  |  34<H>  ----------------------------<  211<H>  5.1<H>   |  22  |  1.5    Ca    7.7<L>      24 Mar 2021 11:52  Phos  3.5     03-23  Mg     2.0     03-23    TPro  5.1<L>  /  Alb  2.2<L>  /  TBili  0.5  /  DBili  x   /  AST  38  /  ALT  34  /  AlkPhos  177<H>  03-24    PT/INR - ( 23 Mar 2021 04:40 )   PT: 11.60 sec;   INR: 1.01 ratio         PTT - ( 23 Mar 2021 04:40 )  PTT:29.1 sec      RADIOLOGY & ADDITIONAL STUDIES:

## 2021-03-25 NOTE — PROGRESS NOTE ADULT - ASSESSMENT
Pt examined at the bedside today , no complaints , denies  pain or SOB . No overnight events.   Spoke with doctor who gives her Gamunex - c . she stated that the paritn can follow up outpatient or the infusion.       77 yo female originally admitted to the  admitted for pyelonephritis treatment complicated by right sided hydronephrosis, TEOFILO and hyponatremia, during her admission developed thrombocytopenia and DKA and upgraded to critical care where she was placedon an insulin drip  for DKA which resolved after 1 day.   Pt hydronephrosis improved on repeat u/s with no sign of obstruction. Pt developed thrombocytopenia likely due to bone marrow suppression secondary to sepsis which has improved, HIT panel was negative. ECHO showed borderline pulmonary hypertension patient asymptomatic with EF of 58%. Pt on metoprolol and amiodarone and Eliquis for A. fib. Pt developed episodes of hypotension that was responsive to fluids, BP is stable with MAP of . Pt had low urine output which has resolved. Renal scan with Lasix showed poor renal function with the right kidney at 33% of total renal function and left with 67% of total renal function. W/u for hyponatremia suggestive of poor oral intake with decrease renal function, Serum osmolality 283, Urine osmolality of 393, TSH 4.17, Cortisol 22.9.         Extensive PMHx includes DM on insulin pump, CAD s/p stenting in 2020, Lung nodules, Dry eyes, bilateral, PVD (peripheral vascular disease): S/p Right Peripheral leg Stent, Seasonal allergies, Skin cancer: Basal Cell Cancer face  (around nose) s/p MOHS procedure x 3, Anemia: Chronic, on Iron supplement, Hypothyroid, SSS (sick sinus syndrome): S/p PPM Sept 2017, Essential hypertension, Afib: 5+ yrs; on Eliquis       Sepsis secondary to pyelonephritis secondary to hydronephrosis and e. coli ESBL bacteremia  - WBC 12.4<17.3, afebrile for >72 hours  - Renal U/S no evidence of abscess   - No CT abd and pelvis for now in light of improved WBC and decrease renal function   - continue meropenem as per ID  - NM Renal Lasix Scan (3/19): Significantly holdup of MAG3 radiotracer within bilateral kidneys with poor emptying bilaterally. No hydronephrosis demonstrated bilaterally. Findings suggestive of poor renal function. The left kidney accounts for 67% of total renal function and the right kidney accounts for 33% of total renal function consistent with significant difference. Markedly delayed post-Lasix T1/2 bilaterally without hydronephrosis. Findings again suggestive of overall poor renal function.  - Per urology no intervention needed, no sign of obstruction     Bacteremia e. Coli ESBL  - Blood cultures negative on 21/3  - continue tx as per ID with meropenum   - On discharge ertapenem 1 gm iv q24h for 14 days, per ID    TEOFILO/ Hyponatremia/ Metabolic acidosis  - secondary to sepsis induced ATN with right sided hydronephrosis  - Poor renal function based on Renal lasix scan   - Cr stable at 1.3-1.4, baseline 0.5-0.8    NSTEMI/CAD s/p PCI with LUPE placed LAD in July 2020  - secondary to sepsis induced demand ischemia  - less likely true ACS  - continue supportive care for now  - Hold Plavix for now   - will DC tele after 24 hours     H/o CCIDP   - missed Monday's dose of Gamunex - c . Spoke with Dr. Bueno @ [phone], who stated that it is best for patient to get her infusion outpatient upon discharge.     DM with Hyperglycemia  - d/c NPH BID  - started on Lantus 15 units at bed time   - tx sliding scale  - Has insulin pump at home    Thrombocytopenia  - Secondary to sepsis induced BM suppression, HIT w/u negative  - Plt 88 > 68  - continue supportive care, monitor for now  - Hold Plavix for now    Hyponatremia  - Na 127 <- 123  - poor oral intake at home, Na on admission 121    - Serum osmolality 283, Urine osmolality 393  - TSH 4.17  - Cortisol 22.9  - monitor for neurological signs    Pulmonary hypertension   -< from: TTE Echo Complete w/o Contrast w/ Doppler (03.18.21 @ 13:32) >   Estimated pulmonary artery systolic pressure is 38.6 mmHg assuming a right atrial pressure of 10 mmHg, which is consistent with borderline pulmonary hypertension.  - EF of 58%  - BNP 7641  - O2 98% on RA, asymptomatic  - Consider Lasix if symptoms worsen    PVD s/p right lower ext stent  - continue supportive care, monitor for now    Basal cell CA s/p MOHS procedure x 3  - continue supportive care, monitor for now.    Hypothyroidism  - tx synthroid, continue supportive care, monitor for now.    SSS s/p Pacemaker  - continue supportive care, monitor for now.    HTN  - -120/76-55  - c/w metoprolol 25mg twice daily, increase to 50mg q8hrs if not controlled   - monitor for now    AFIB s/p Ablation  - CW  2.5 apixaban, Amio BID continue supportive care, monitor for now.    HLD  - tx statin, continue supportive care, monitor for now.    Anemia  - Hemoglobin stable   - restarted iron supplements    DVT px  - Apixaban, duplex U/S negative for DVT     NUT  - tx IVF's, tx oral diet, gluten-free. Nystatin for sore mouth. Miralax and senna for bowel regimen.     Pending : DC today if CM can work out STR placement.

## 2021-03-25 NOTE — CHART NOTE - NSCHARTNOTEFT_GEN_A_CORE
Spoke to Dr. Bueno (patient's Neurologist) who gives Gamunex c 20g x3 days every 5 weeks. This week was supposed to be her infusion but hospital does not carry this instead, Gammagard is available but  does no think its a good choice given her multiple allergies. She will be able to get it once she is discharged to SNF.

## 2021-03-25 NOTE — PROGRESS NOTE ADULT - ASSESSMENT
77 yo F PMHx DM II (has insulin pump), CAD s/p stenting in 2020, Lung nodules, Dry eyes, bilateral, PVD (peripheral vascular disease): S/p Right Peripheral leg Stent, Seasonal allergies, Skin cancer: Basal Cell Cancer face  (around nose) s/p MOHS procedure x 3, Anemia: Chronic, on Iron supplement, Hypothyroid, SSS (sick sinus syndrome): S/p PPM Sept 2017, Essential hypertension, Afib: 5+ yrs; on Eliquis presented initially for abdominal pain. She was found to have pyelonephritis. Her course was complicated by right sided hydronephrosis, TEOFILO, hyponatremia. Pt developed DKA and septic shock.     Sepsis shock secondary to pyelonephritis secondary to hydronephrosis and e. coli ESBL bacteremia  - sepsis not present on admission  - c/w meropenem  - upon dc pt will need ertapenem until 3/31    Hydronephrosis/TEOFILO  - no acute intervention done  - suspected resolve obstruction  - NM Renal Lasix Scan (3/19): Significantly holdup of MAG3 radiotracer within bilateral kidneys with poor emptying bilaterally. No hydronephrosis demonstrated bilaterally. Findings suggestive of poor renal function. The left kidney accounts for 67% of total renal function and the right kidney accounts for 33% of total renal function consistent with significant difference. Markedly delayed post-Lasix T1/2 bilaterally without hydronephrosis. Findings again suggestive of overall poor renal function.  - outpt urology follow up  - pt has protein in her urine as well as anasarca  - repeat UA, check protein: creatinine ratio to exclude nephritis syndrome    NSTEMI  - has history CAD s/p PCI with LUPE placed LAD in July 2020  - secondary to sepsis induced demand ischemia  - pt not on aspirin or plavix--unclear why not--follow up cardiology    DM II  - complicated by DKA  - endocrine eval appreciated:  - Continue NPH 20 units BID ( by primary team )   - add admelog 4 units TIDAC , hold if NPO   - Continue sliding scale before meals   - prior to discharge patient need to go back on insulin pump.      Thrombocytopenia  - likely suppressed due to sepsis  - uptrending plts    Hyponatremia  - improved today to 127  - recheck serum osm  - osm >27=80 suggesting pseudohyponatremia vs osmotically active substance present, however pt has anasarca so perhaps this is due to volume overloading  - trend sodium  - poor oral intake at home, Na on admission 121    - Serum osmolality 283, Urine osmolality 393  - TSH 4.17  - Cortisol 22.9    Hyperkalemia  - 5.1 today  - doubt adrenal insuffiency       Anasarca  - normal EF  - trial ethacrynic acid (pt has allergy to sulfonamide)    Pulmonary hypertension   - Estimated pulmonary artery systolic pressure is 38.6 mmHg assuming a right atrial pressure of 10 mmHg, which is consistent with borderline pulmonary hypertension.      Hypothyroidism  -c/w synthroid    SSS  - s/p Pacemaker    HTN  - controlled, c/w metoprolol    Afib s/p Ablation  - c/w Eliquis    HLD  - c/w statin    Anemia  - Hemoglobin stable   - restarted iron supplements    DVT px  - Apixaban,  Full Code  From home    #Progress Note Handoff:  Pending (specify):  Diuresis, placement  Family discussion: discussed trial diuretics for anasarca  Disposition: Home___/SNF___/Other________/Unknown at this time___x_____ 75 yo F PMHx DM II (has insulin pump), CAD s/p stenting in 2020, Lung nodules, Dry eyes, bilateral, PVD (peripheral vascular disease): S/p Right Peripheral leg Stent, Seasonal allergies, Skin cancer: Basal Cell Cancer face  (around nose) s/p MOHS procedure x 3, Anemia: Chronic, on Iron supplement, Hypothyroid, SSS (sick sinus syndrome): S/p PPM Sept 2017, Essential hypertension, Afib: 5+ yrs; on Eliquis presented initially for abdominal pain. She was found to have pyelonephritis. Her course was complicated by right sided hydronephrosis, TEOFILO, hyponatremia. Pt developed DKA and septic shock.     Sepsis shock secondary to pyelonephritis secondary to hydronephrosis and e. coli ESBL bacteremia  - sepsis not present on admission  - c/w meropenem  - upon dc pt will need ertapenem until 3/31    Hydronephrosis/TEOFILO  - no acute intervention done  - suspected resolve obstruction  - NM Renal Lasix Scan (3/19): Significantly holdup of MAG3 radiotracer within bilateral kidneys with poor emptying bilaterally. No hydronephrosis demonstrated bilaterally. Findings suggestive of poor renal function. The left kidney accounts for 67% of total renal function and the right kidney accounts for 33% of total renal function consistent with significant difference. Markedly delayed post-Lasix T1/2 bilaterally without hydronephrosis. Findings again suggestive of overall poor renal function.  - outpt urology follow up  - pt has protein in her urine as well as anasarca  - repeat UA, check protein: creatinine ratio to exclude nephritis syndrome    NSTEMI  - has history CAD s/p PCI with LUPE placed LAD in July 2020  - secondary to sepsis induced demand ischemia  - pt not on aspirin or plavix--unclear why not--follow up cardiology    DM II  - complicated by DKA  - endocrine eval appreciated:  - Continue NPH 20 units BID ( by primary team )   - add admelog 4 units TIDAC , hold if NPO   - Continue sliding scale before meals   - prior to discharge patient need to go back on insulin pump.      Thrombocytopenia  - likely suppressed due to sepsis  - uptrending plts    Hyponatremia  - improved today to 127  - recheck serum osm  - osm >27=80 suggesting pseudohyponatremia vs osmotically active substance present, however pt has anasarca so perhaps this is due to volume overloading  - trend sodium  - poor oral intake at home, Na on admission 121    - Serum osmolality 283, Urine osmolality 393  - TSH 4.17  - Cortisol 22.9    Hyperkalemia  - 5.1 today  - doubt adrenal insuffiency as high cortisol but will recheck for am      Anasarca  - normal EF  - trial ethacrynic acid (pt has allergy to sulfonamide)    Pulmonary hypertension   - Estimated pulmonary artery systolic pressure is 38.6 mmHg assuming a right atrial pressure of 10 mmHg, which is consistent with borderline pulmonary hypertension.      Hypothyroidism  -c/w synthroid    SSS  - s/p Pacemaker    HTN  - controlled, c/w metoprolol    Afib s/p Ablation  - c/w Eliquis    HLD  - c/w statin    Anemia  - Hemoglobin stable   - restarted iron supplements    DVT px  - Apixaban,  Full Code  From home    #Progress Note Handoff:  Pending (specify):  Diuresis, urine studies placement  Family discussion: discussed continued diuretics for anasarca as well as urine studies  Disposition: Home___/SNF___/Other________/Unknown at this time___x_____

## 2021-03-25 NOTE — PROGRESS NOTE ADULT - SUBJECTIVE AND OBJECTIVE BOX
CHIEF COMPLAINT:    Patient is a 76y old  Female who presents with a chief complaint of Sepsis     INTERVAL HPI/OVERNIGHT EVENTS:    Patient seen and examined at bedside. No acute overnight events occurred.    ROS: Denies SOB, chest pain. Reports diffuse edema. All other systems are negative.    Vital Signs:    T(F): 98.1 (21 @ 13:02), Max: 98.1 (21 @ 13:02)  HR: 81 (21 @ 13:02) (81 - 107)  BP: 156/71 (21 @ 13:02) (114/64 - 159/78)  RR: 187 (21 @ 13:02) (18 - 187)  SpO2: 99% (21 @ 07:30) (99% - 99%)  I&O's Summary    24 Mar 2021 07:01  -  25 Mar 2021 07:00  --------------------------------------------------------  IN: 1050 mL / OUT: 1300 mL / NET: -250 mL    25 Mar 2021 07:01  -  25 Mar 2021 15:19  --------------------------------------------------------  IN: 450 mL / OUT: 1600 mL / NET: -1150 mL      Daily     Daily Weight in k.4 (25 Mar 2021 04:59)  CAPILLARY BLOOD GLUCOSE      POCT Blood Glucose.: 178 mg/dL (25 Mar 2021 11:25)  POCT Blood Glucose.: 137 mg/dL (25 Mar 2021 07:25)  POCT Blood Glucose.: 247 mg/dL (24 Mar 2021 21:44)  POCT Blood Glucose.: 254 mg/dL (24 Mar 2021 16:53)      PHYSICAL EXAM:  GENERAL:  NAD  SKIN: Bruising on chest, right arm  HEENT: Atraumatic. Normocephalic. Anicteric  NECK:  No JVD.   PULMONARY: Clear to ausculation bilaterally. No wheezing. No rales  CVS: Normal S1, S2. Regular rate and rhythm. No murmurs.  ABDOMEN/GI: Soft, Nontender, Nondistended; Bowel sounds are present  EXTREMITIES:  anasarca  NEUROLOGIC:  No motor deficit.  PSYCH: Alert & oriented x 3, normal affect      LABS:                        9.3    9.73  )-----------( 172      ( 25 Mar 2021 05:33 )             27.1     -    126<L>  |  92<L>  |  35<H>  ----------------------------<  149<H>  5.1<H>   |  23  |  1.5    Ca    7.6<L>      25 Mar 2021 05:33  Phos  3.6       Mg     1.7         TPro  5.0<L>  /  Alb  2.2<L>  /  TBili  0.5  /  DBili  x   /  AST  35  /  ALT  30  /  AlkPhos  172<H>        Serum Pro-Brain Natriuretic Peptide: 7641 pg/mL (21 @ 08:21)        Culture - Blood (collected 23 Mar 2021 04:37)  Source: .Blood None  Preliminary Report (24 Mar 2021 13:02):    No growth to date.        RADIOLOGY & ADDITIONAL TESTS:  Imaging or report Personally Reviewed:  [ ] YES  [ ] NO    Telemetry reviewed independently - no acute events    Medications:  Standing  ALBUTerol    90 MICROgram(s) HFA Inhaler 1 Puff(s) Inhalation every 6 hours  albuterol/ipratropium for Nebulization 6 milliLiter(s) Nebulizer every 6 hours  aMIOdarone    Tablet 200 milliGRAM(s) Oral two times a day  apixaban 2.5 milliGRAM(s) Oral two times a day  chlorhexidine 4% Liquid 1 Application(s) Topical <User Schedule>  dextrose 40% Gel 15 Gram(s) Oral once  dextrose 5%. 1000 milliLiter(s) IV Continuous <Continuous>  dextrose 5%. 1000 milliLiter(s) IV Continuous <Continuous>  dextrose 50% Injectable 25 Gram(s) IV Push once  dextrose 50% Injectable 12.5 Gram(s) IV Push once  dextrose 50% Injectable 25 Gram(s) IV Push once  furosemide   Injectable 40 milliGRAM(s) IV Push two times a day  glucagon  Injectable 1 milliGRAM(s) IntraMuscular once  insulin lispro (ADMELOG) corrective regimen sliding scale   SubCutaneous three times a day before meals  insulin lispro Injectable (ADMELOG) 4 Unit(s) SubCutaneous three times a day before meals  levothyroxine 88 MICROGram(s) Oral daily  meropenem  IVPB 1000 milliGRAM(s) IV Intermittent every 12 hours  metoprolol tartrate 25 milliGRAM(s) Oral every 6 hours  multivitamin 1 Tablet(s) Oral daily  nystatin    Suspension 190654 Unit(s) Oral daily  pantoprazole    Tablet 40 milliGRAM(s) Oral before breakfast  polyethylene glycol 3350 17 Gram(s) Oral daily  senna 2 Tablet(s) Oral at bedtime    PRN Meds  acetaminophen   Tablet .. 650 milliGRAM(s) Oral every 6 hours PRN  aluminum hydroxide/magnesium hydroxide/simethicone Suspension 30 milliLiter(s) Oral every 6 hours PRN      Case discussed with resident  Care discussed with pt

## 2021-03-25 NOTE — PROGRESS NOTE ADULT - SUBJECTIVE AND OBJECTIVE BOX
SOWMYA AMADO 76y Female  MRN#: [id number]       SUBJECTIVE  Pt examined at the bedside today , no complaints , denies  pain or SOB . No overnight events.   Spoke with doctor who gives her Gamunex - c . she stated that the paritn can follow up outpatient or the infusion.     OBJECTIVE  PAST MEDICAL & SURGICAL HISTORY  Inclusion body myositis (IBM)    Lung nodules    Transfusion history  S/p Hysterectomy    Dry eyes, bilateral    PVD (peripheral vascular disease)  S/p Right Peripheral leg Stent    Seasonal allergies    Skin cancer  Basal Cell Cancer face  (around nose) s/p MOHS procedure x 3    Anemia  Chronic, on Iron supplement    Hypothyroid    SSS (sick sinus syndrome)  S/p PPM    Diabetes  Insulin dependent - (has insulin Pump)    Pacemaker  Sept 2017    Palpitations  occasionally; not for couple yrs    Essential hypertension    Afib  5+ yrs; on Eliquis    S/P ablation of atrial fibrillation    S/P cataract surgery    History of surgery  MOHS procedure (face) x 3    Cardiac pacemaker  9/9/17    History of surgery  Right Peripheral Stent 5 yrs    H/O total hysterectomy  25+ yrs ago      ALLERGIES:  latex (Pruritus; Rash)  sulfonamides (Unknown)    MEDICATIONS:  STANDING MEDICATIONS  ALBUTerol    90 MICROgram(s) HFA Inhaler 1 Puff(s) Inhalation every 6 hours  albuterol/ipratropium for Nebulization 6 milliLiter(s) Nebulizer every 6 hours  aMIOdarone    Tablet 200 milliGRAM(s) Oral two times a day  apixaban 2.5 milliGRAM(s) Oral two times a day  chlorhexidine 4% Liquid 1 Application(s) Topical <User Schedule>  dextrose 40% Gel 15 Gram(s) Oral once  dextrose 5%. 1000 milliLiter(s) IV Continuous <Continuous>  dextrose 5%. 1000 milliLiter(s) IV Continuous <Continuous>  dextrose 50% Injectable 25 Gram(s) IV Push once  dextrose 50% Injectable 12.5 Gram(s) IV Push once  dextrose 50% Injectable 25 Gram(s) IV Push once  glucagon  Injectable 1 milliGRAM(s) IntraMuscular once  insulin lispro (ADMELOG) corrective regimen sliding scale   SubCutaneous three times a day before meals  insulin lispro Injectable (ADMELOG) 4 Unit(s) SubCutaneous three times a day before meals  insulin NPH human recombinant 20 Unit(s) SubCutaneous before breakfast  insulin NPH human recombinant 20 Unit(s) SubCutaneous at bedtime  levothyroxine 88 MICROGram(s) Oral daily  meropenem  IVPB 1000 milliGRAM(s) IV Intermittent every 12 hours  metoprolol tartrate 25 milliGRAM(s) Oral every 6 hours  multivitamin 1 Tablet(s) Oral daily  nystatin    Suspension 580188 Unit(s) Oral daily  pantoprazole    Tablet 40 milliGRAM(s) Oral before breakfast  polyethylene glycol 3350 17 Gram(s) Oral daily  senna 2 Tablet(s) Oral at bedtime    PRN MEDICATIONS  acetaminophen   Tablet .. 650 milliGRAM(s) Oral every 6 hours PRN  aluminum hydroxide/magnesium hydroxide/simethicone Suspension 30 milliLiter(s) Oral every 6 hours PRN      VITAL SIGNS: Last 24 Hours  T(C): 35.8 (25 Mar 2021 04:59), Max: 36 (24 Mar 2021 20:07)  T(F): 96.5 (25 Mar 2021 04:59), Max: 96.8 (24 Mar 2021 20:07)  HR: 88 (25 Mar 2021 07:30) (87 - 107)  BP: 130/87 (25 Mar 2021 04:59) (130/87 - 159/78)  BP(mean): 96 (24 Mar 2021 17:25) (96 - 96)  RR: 19 (25 Mar 2021 07:30) (18 - 20)  SpO2: 99% (25 Mar 2021 07:30) (99% - 99%)    LABS:                        9.3    9.73  )-----------( 172      ( 25 Mar 2021 05:33 )             27.1     03-25    126<L>  |  92<L>  |  35<H>  ----------------------------<  149<H>  5.1<H>   |  23  |  1.5    Ca    7.6<L>      25 Mar 2021 05:33  Phos  3.6     03-25  Mg     1.7     03-25    TPro  5.0<L>  /  Alb  2.2<L>  /  TBili  0.5  /  DBili  x   /  AST  35  /  ALT  30  /  AlkPhos  172<H>  03-25              Culture - Blood (collected 23 Mar 2021 04:37)  Source: .Blood None  Preliminary Report (24 Mar 2021 13:02):    No growth to date.          RADIOLOGY:        RADIOLOGY & ADDITIONAL STUDIES:=    < from: Xray Chest 1 View- PORTABLE-Routine (03.21.21 @ 06:22) >    EXAM:  XR CHEST PORTABLE ROUTINE 1V            PROCEDURE DATE:  03/21/2021        INTERPRETATION:  Clinical History / Reason for exam: Cough    Comparison : Chest radiograph March 20, 2021.    Technique/Positioning: Single AP view of the chest.    Findings:    Support devices: Right IJ central venous catheter is positioned appropriately. Left chest wall pacer, unchanged.    Cardiac/mediastinum/hilum: Unremarkable.    Lung parenchyma/Pleura: Bilateral effusions and interstitial edema. No pneumothorax.    Skeleton/soft tissues: Unchanged.    Impression:    Bilateral effusions and interstitial edema, unchanged.        ELLIOT LANDAU MD; Attending Radiologist  This document has been electronically signed. Mar 21 2021  1:09PM    < end of copied text >    PHYSICAL EXAM:  Gen: NAD, well appearing  HEENT: PERRLA, mucus membrane pink and moist, no ulcers, some dryness on the tongue   Cardio: irregularly irregular rate and rhythm, S1 and S2, no murmurs or rubs  Lungs: Clear to ausculation bilaterally  Extremities ** + 3 nonpitting edema in BL LE  (pt states that this is chronic since december after her pcp took her off of amlodipine.  b/l upper extremities ecchymosis,  Left arm weeping improved, no erythema or tenderness in Lower extremities   SOWMYA AMADO 76y Female  MRN#: [id number]       SUBJECTIVE  Pt examined at the bedside today , no complaints , denies  pain or SOB . No overnight events.   Spoke with doctor who gives her Gamunex - c . she stated that the patient can follow up outpatient or the infusion.   Anasarca + protein in urine , working up for nephropathy . If urine labs abnormal , will consult nephro . Pt started on 40 lasix IV BID on 3/25 , 3pm .    OBJECTIVE  PAST MEDICAL & SURGICAL HISTORY  Inclusion body myositis (IBM)    Lung nodules    Transfusion history  S/p Hysterectomy    Dry eyes, bilateral    PVD (peripheral vascular disease)  S/p Right Peripheral leg Stent    Seasonal allergies    Skin cancer  Basal Cell Cancer face  (around nose) s/p MOHS procedure x 3    Anemia  Chronic, on Iron supplement    Hypothyroid    SSS (sick sinus syndrome)  S/p PPM    Diabetes  Insulin dependent - (has insulin Pump)    Pacemaker  Sept 2017    Palpitations  occasionally; not for couple yrs    Essential hypertension    Afib  5+ yrs; on Eliquis    S/P ablation of atrial fibrillation    S/P cataract surgery    History of surgery  MOHS procedure (face) x 3    Cardiac pacemaker  9/9/17    History of surgery  Right Peripheral Stent 5 yrs    H/O total hysterectomy  25+ yrs ago      ALLERGIES:  latex (Pruritus; Rash)  sulfonamides (Unknown)    MEDICATIONS:  STANDING MEDICATIONS  ALBUTerol    90 MICROgram(s) HFA Inhaler 1 Puff(s) Inhalation every 6 hours  albuterol/ipratropium for Nebulization 6 milliLiter(s) Nebulizer every 6 hours  aMIOdarone    Tablet 200 milliGRAM(s) Oral two times a day  apixaban 2.5 milliGRAM(s) Oral two times a day  chlorhexidine 4% Liquid 1 Application(s) Topical <User Schedule>  dextrose 40% Gel 15 Gram(s) Oral once  dextrose 5%. 1000 milliLiter(s) IV Continuous <Continuous>  dextrose 5%. 1000 milliLiter(s) IV Continuous <Continuous>  dextrose 50% Injectable 25 Gram(s) IV Push once  dextrose 50% Injectable 12.5 Gram(s) IV Push once  dextrose 50% Injectable 25 Gram(s) IV Push once  glucagon  Injectable 1 milliGRAM(s) IntraMuscular once  insulin lispro (ADMELOG) corrective regimen sliding scale   SubCutaneous three times a day before meals  insulin lispro Injectable (ADMELOG) 4 Unit(s) SubCutaneous three times a day before meals  insulin NPH human recombinant 20 Unit(s) SubCutaneous before breakfast  insulin NPH human recombinant 20 Unit(s) SubCutaneous at bedtime  levothyroxine 88 MICROGram(s) Oral daily  meropenem  IVPB 1000 milliGRAM(s) IV Intermittent every 12 hours  metoprolol tartrate 25 milliGRAM(s) Oral every 6 hours  multivitamin 1 Tablet(s) Oral daily  nystatin    Suspension 524116 Unit(s) Oral daily  pantoprazole    Tablet 40 milliGRAM(s) Oral before breakfast  polyethylene glycol 3350 17 Gram(s) Oral daily  senna 2 Tablet(s) Oral at bedtime    PRN MEDICATIONS  acetaminophen   Tablet .. 650 milliGRAM(s) Oral every 6 hours PRN  aluminum hydroxide/magnesium hydroxide/simethicone Suspension 30 milliLiter(s) Oral every 6 hours PRN      VITAL SIGNS: Last 24 Hours  T(C): 35.8 (25 Mar 2021 04:59), Max: 36 (24 Mar 2021 20:07)  T(F): 96.5 (25 Mar 2021 04:59), Max: 96.8 (24 Mar 2021 20:07)  HR: 88 (25 Mar 2021 07:30) (87 - 107)  BP: 130/87 (25 Mar 2021 04:59) (130/87 - 159/78)  BP(mean): 96 (24 Mar 2021 17:25) (96 - 96)  RR: 19 (25 Mar 2021 07:30) (18 - 20)  SpO2: 99% (25 Mar 2021 07:30) (99% - 99%)    LABS:                        9.3    9.73  )-----------( 172      ( 25 Mar 2021 05:33 )             27.1     03-25    126<L>  |  92<L>  |  35<H>  ----------------------------<  149<H>  5.1<H>   |  23  |  1.5    Ca    7.6<L>      25 Mar 2021 05:33  Phos  3.6     03-25  Mg     1.7     03-25    TPro  5.0<L>  /  Alb  2.2<L>  /  TBili  0.5  /  DBili  x   /  AST  35  /  ALT  30  /  AlkPhos  172<H>  03-25              Culture - Blood (collected 23 Mar 2021 04:37)  Source: .Blood None  Preliminary Report (24 Mar 2021 13:02):    No growth to date.          RADIOLOGY:        RADIOLOGY & ADDITIONAL STUDIES:=    < from: Xray Chest 1 View- PORTABLE-Routine (03.21.21 @ 06:22) >    EXAM:  XR CHEST PORTABLE ROUTINE 1V            PROCEDURE DATE:  03/21/2021        INTERPRETATION:  Clinical History / Reason for exam: Cough    Comparison : Chest radiograph March 20, 2021.    Technique/Positioning: Single AP view of the chest.    Findings:    Support devices: Right IJ central venous catheter is positioned appropriately. Left chest wall pacer, unchanged.    Cardiac/mediastinum/hilum: Unremarkable.    Lung parenchyma/Pleura: Bilateral effusions and interstitial edema. No pneumothorax.    Skeleton/soft tissues: Unchanged.    Impression:    Bilateral effusions and interstitial edema, unchanged.        ELLIOT LANDAU MD; Attending Radiologist  This document has been electronically signed. Mar 21 2021  1:09PM    < end of copied text >    PHYSICAL EXAM:  Gen: NAD, well appearing  HEENT: PERRLA, mucus membrane pink and moist, no ulcers, some dryness on the tongue   Cardio: irregularly irregular rate and rhythm, S1 and S2, no murmurs or rubs  Lungs: Clear to ausculation bilaterally  Extremities ** + 3 nonpitting edema in BL LE  (pt states that this is chronic since december after her pcp took her off of amlodipine.  b/l upper extremities ecchymosis,  Left arm weeping improved, no erythema or tenderness in Lower extremities   SOWMYA AMADO 76y Female  MRN#: [id number]       SUBJECTIVE  Pt examined at the bedside today , no complaints , denies  pain or SOB . No overnight events.   Spoke with doctor who gives her Gamunex - c . she stated that the patient can follow up outpatient or the infusion.   Anasarca + protein in urine , working up for nephropathy . If urine labs abnormal , will consult nephro . Pt started on 40 lasix IV BID on 3/25 , 3pm .dc tele today     OBJECTIVE  PAST MEDICAL & SURGICAL HISTORY  Inclusion body myositis (IBM)    Lung nodules    Transfusion history  S/p Hysterectomy    Dry eyes, bilateral    PVD (peripheral vascular disease)  S/p Right Peripheral leg Stent    Seasonal allergies    Skin cancer  Basal Cell Cancer face  (around nose) s/p MOHS procedure x 3    Anemia  Chronic, on Iron supplement    Hypothyroid    SSS (sick sinus syndrome)  S/p PPM    Diabetes  Insulin dependent - (has insulin Pump)    Pacemaker  Sept 2017    Palpitations  occasionally; not for couple yrs    Essential hypertension    Afib  5+ yrs; on Eliquis    S/P ablation of atrial fibrillation    S/P cataract surgery    History of surgery  MOHS procedure (face) x 3    Cardiac pacemaker  9/9/17    History of surgery  Right Peripheral Stent 5 yrs    H/O total hysterectomy  25+ yrs ago      ALLERGIES:  latex (Pruritus; Rash)  sulfonamides (Unknown)    MEDICATIONS:  STANDING MEDICATIONS  ALBUTerol    90 MICROgram(s) HFA Inhaler 1 Puff(s) Inhalation every 6 hours  albuterol/ipratropium for Nebulization 6 milliLiter(s) Nebulizer every 6 hours  aMIOdarone    Tablet 200 milliGRAM(s) Oral two times a day  apixaban 2.5 milliGRAM(s) Oral two times a day  chlorhexidine 4% Liquid 1 Application(s) Topical <User Schedule>  dextrose 40% Gel 15 Gram(s) Oral once  dextrose 5%. 1000 milliLiter(s) IV Continuous <Continuous>  dextrose 5%. 1000 milliLiter(s) IV Continuous <Continuous>  dextrose 50% Injectable 25 Gram(s) IV Push once  dextrose 50% Injectable 12.5 Gram(s) IV Push once  dextrose 50% Injectable 25 Gram(s) IV Push once  glucagon  Injectable 1 milliGRAM(s) IntraMuscular once  insulin lispro (ADMELOG) corrective regimen sliding scale   SubCutaneous three times a day before meals  insulin lispro Injectable (ADMELOG) 4 Unit(s) SubCutaneous three times a day before meals  insulin NPH human recombinant 20 Unit(s) SubCutaneous before breakfast  insulin NPH human recombinant 20 Unit(s) SubCutaneous at bedtime  levothyroxine 88 MICROGram(s) Oral daily  meropenem  IVPB 1000 milliGRAM(s) IV Intermittent every 12 hours  metoprolol tartrate 25 milliGRAM(s) Oral every 6 hours  multivitamin 1 Tablet(s) Oral daily  nystatin    Suspension 185052 Unit(s) Oral daily  pantoprazole    Tablet 40 milliGRAM(s) Oral before breakfast  polyethylene glycol 3350 17 Gram(s) Oral daily  senna 2 Tablet(s) Oral at bedtime    PRN MEDICATIONS  acetaminophen   Tablet .. 650 milliGRAM(s) Oral every 6 hours PRN  aluminum hydroxide/magnesium hydroxide/simethicone Suspension 30 milliLiter(s) Oral every 6 hours PRN      VITAL SIGNS: Last 24 Hours  T(C): 35.8 (25 Mar 2021 04:59), Max: 36 (24 Mar 2021 20:07)  T(F): 96.5 (25 Mar 2021 04:59), Max: 96.8 (24 Mar 2021 20:07)  HR: 88 (25 Mar 2021 07:30) (87 - 107)  BP: 130/87 (25 Mar 2021 04:59) (130/87 - 159/78)  BP(mean): 96 (24 Mar 2021 17:25) (96 - 96)  RR: 19 (25 Mar 2021 07:30) (18 - 20)  SpO2: 99% (25 Mar 2021 07:30) (99% - 99%)    LABS:                        9.3    9.73  )-----------( 172      ( 25 Mar 2021 05:33 )             27.1     03-25    126<L>  |  92<L>  |  35<H>  ----------------------------<  149<H>  5.1<H>   |  23  |  1.5    Ca    7.6<L>      25 Mar 2021 05:33  Phos  3.6     03-25  Mg     1.7     03-25    TPro  5.0<L>  /  Alb  2.2<L>  /  TBili  0.5  /  DBili  x   /  AST  35  /  ALT  30  /  AlkPhos  172<H>  03-25              Culture - Blood (collected 23 Mar 2021 04:37)  Source: .Blood None  Preliminary Report (24 Mar 2021 13:02):    No growth to date.          RADIOLOGY:        RADIOLOGY & ADDITIONAL STUDIES:=    < from: Xray Chest 1 View- PORTABLE-Routine (03.21.21 @ 06:22) >    EXAM:  XR CHEST PORTABLE ROUTINE 1V            PROCEDURE DATE:  03/21/2021        INTERPRETATION:  Clinical History / Reason for exam: Cough    Comparison : Chest radiograph March 20, 2021.    Technique/Positioning: Single AP view of the chest.    Findings:    Support devices: Right IJ central venous catheter is positioned appropriately. Left chest wall pacer, unchanged.    Cardiac/mediastinum/hilum: Unremarkable.    Lung parenchyma/Pleura: Bilateral effusions and interstitial edema. No pneumothorax.    Skeleton/soft tissues: Unchanged.    Impression:    Bilateral effusions and interstitial edema, unchanged.        ELLIOT LANDAU MD; Attending Radiologist  This document has been electronically signed. Mar 21 2021  1:09PM    < end of copied text >    PHYSICAL EXAM:  Gen: NAD, well appearing  HEENT: PERRLA, mucus membrane pink and moist, no ulcers, some dryness on the tongue   Cardio: irregularly irregular rate and rhythm, S1 and S2, no murmurs or rubs  Lungs: Clear to ausculation bilaterally  Extremities ** + 3 nonpitting edema in BL LE  (pt states that this is chronic since december after her pcp took her off of amlodipine.  b/l upper extremities ecchymosis,  Left arm weeping improved, no erythema or tenderness in Lower extremities

## 2021-03-26 NOTE — PROGRESS NOTE ADULT - SUBJECTIVE AND OBJECTIVE BOX
JESSECHANELL SOWMYA  76y  Female      Patient is a 76y old  Female who presents with a chief complaint of Sepsis (26 Mar 2021 14:53)      INTERVAL HPI/OVERNIGHT EVENTS:  no event overnight  patient complaint of lower abdominal mild pain and constipation for 7 days, denied N/V, able to eat regular diet but it was uncomfortable in her mouth given she has oral thrush    latex (Pruritus; Rash)  Pineapple (Unknown)  sulfonamides (Unknown)        REVIEW OF SYSTEMS:  CONSTITUTIONAL: No fever, weight loss, or fatigue  ENMT:  No difficulty hearing, tinnitus, vertigo; see above  NECK: No pain or stiffness  RESPIRATORY: No cough, wheezing, chills or hemoptysis; No shortness of breath  CARDIOVASCULAR: No chest pain, palpitations, dizziness, or leg swelling  GASTROINTESTINAL: No abdominal or epigastric pain. No nausea, vomiting, or hematemesis; No diarrhea or constipation. No melena or hematochezia.  GENITOURINARY: No dysuria, frequency, hematuria, or incontinence  NEUROLOGICAL: No headaches, memory loss, loss of strength, numbness, or tremors  SKIN: No itching, burning, rashes, or lesions   LYMPH NODES: No enlarged glands  ENDOCRINE: No heat or cold intolerance; No hair loss  MUSCULOSKELETAL: No joint pain or swelling; No muscle, back, or extremity pain  HEME/LYMPH: No easy bruising, or bleeding gums  ALLERY AND IMMUNOLOGIC: No hives or eczema    FAMILY HISTORY:  Cancer (Father)  Myeloma    Family history of lung cancer (Mother)        T(C): 35.8 (03-26-21 @ 13:00), Max: 36.8 (03-25-21 @ 21:18)  HR: 75 (03-26-21 @ 13:00) (75 - 111)  BP: 114/55 (03-26-21 @ 13:00) (114/55 - 144/73)  RR: 18 (03-26-21 @ 13:00) (18 - 19)  SpO2: 96% (03-26-21 @ 13:00) (96% - 100%)    PHYSICAL EXAM:  GENERAL: NAD, well-groomed, well-developed  HEAD:  Atraumatic, Normocephalic  EYES: EOMI, PERRLA, conjunctiva and sclera clear  ENMT: No tonsillar erythema, exudates, or enlargement; Moist mucous membranes, Good dentition, whitish longitudinal plaques on tongue  NECK: Supple, No JVD, Normal thyroid  NERVOUS SYSTEM:  Alert & Oriented X3, Good concentration; Motor Strength 5/5 B/L upper and lower extremities; DTRs 2+ intact and symmetric  CHEST/LUNG: Clear to percussion bilaterally; No rales, rhonchi, wheezing, or rubs  HEART: Regular rate and rhythm; No murmurs, rubs, or gallops  ABDOMEN: Soft, tender + suprapubic area, Nondistended; Bowel sounds present  EXTREMITIES:  2+ Peripheral Pulses, No clubbing, cyanosis, or edema, mid line + LUE  LYMPH: No lymphadenopathy noted  SKIN: No rashes or lesions    Consultant(s) Notes Reviewed:  [x ] YES  [ ] NO  Care Discussed with Consultants/Other Providers [ x] YES  [ ] NO    LABS:                        8.9    10.41 )-----------( 218      ( 26 Mar 2021 05:52 )             26.4     03-26    123<L>  |  88<L>  |  37<H>  ----------------------------<  238<H>  4.6   |  22  |  1.4    Ca    7.5<L>      26 Mar 2021 05:52  Phos  4.1     03-26  Mg     1.6     03-26    TPro  4.6<L>  /  Alb  2.2<L>  /  TBili  0.4  /  DBili  x   /  AST  28  /  ALT  26  /  AlkPhos  148<H>  03-26    LIVER FUNCTIONS - ( 26 Mar 2021 05:52 )  Alb: 2.2 g/dL / Pro: 4.6 g/dL / ALK PHOS: 148 U/L / ALT: 26 U/L / AST: 28 U/L / GGT: x                   RADIOLOGY & ADDITIONAL TESTS:    Imaging Personally Reviewed:  [x] YES  [ ] NO    HEALTH ISSUES - PROBLEM Dx:  Pyelonephritis  Pyelonephritis    Other hydronephrosis  Other hydronephrosis    Hydronephrosis, unspecified hydronephrosis type  Hydronephrosis, unspecified hydronephrosis type            MEDS:  acetaminophen   Tablet .. 650 milliGRAM(s) Oral every 6 hours PRN  ALBUTerol    90 MICROgram(s) HFA Inhaler 1 Puff(s) Inhalation every 6 hours  albuterol/ipratropium for Nebulization 6 milliLiter(s) Nebulizer every 6 hours  aluminum hydroxide/magnesium hydroxide/simethicone Suspension 30 milliLiter(s) Oral every 6 hours PRN  aMIOdarone    Tablet 200 milliGRAM(s) Oral two times a day  apixaban 2.5 milliGRAM(s) Oral two times a day  bisacodyl Suppository 10 milliGRAM(s) Rectal daily PRN  chlorhexidine 4% Liquid 1 Application(s) Topical <User Schedule>  dextrose 40% Gel 15 Gram(s) Oral once  dextrose 5%. 1000 milliLiter(s) IV Continuous <Continuous>  dextrose 5%. 1000 milliLiter(s) IV Continuous <Continuous>  dextrose 50% Injectable 25 Gram(s) IV Push once  dextrose 50% Injectable 12.5 Gram(s) IV Push once  dextrose 50% Injectable 25 Gram(s) IV Push once  furosemide   Injectable 40 milliGRAM(s) IV Push two times a day  glucagon  Injectable 1 milliGRAM(s) IntraMuscular once  insulin glargine Injectable (LANTUS) 10 Unit(s) SubCutaneous at bedtime  insulin lispro (ADMELOG) corrective regimen sliding scale   SubCutaneous three times a day before meals  insulin lispro Injectable (ADMELOG) 4 Unit(s) SubCutaneous three times a day before meals  levothyroxine 88 MICROGram(s) Oral daily  meropenem  IVPB 1000 milliGRAM(s) IV Intermittent every 12 hours  metoprolol tartrate 25 milliGRAM(s) Oral every 6 hours  multivitamin 1 Tablet(s) Oral daily  nystatin    Suspension 182613 Unit(s) Oral daily  nystatin    Suspension 765697 Unit(s) Oral five times a day  pantoprazole    Tablet 40 milliGRAM(s) Oral before breakfast  polyethylene glycol 3350 17 Gram(s) Oral every 12 hours  polyethylene glycol 3350 17 Gram(s) Oral daily  senna 2 Tablet(s) Oral at bedtime

## 2021-03-26 NOTE — CONSULT NOTE ADULT - SUBJECTIVE AND OBJECTIVE BOX
NEPHROLOGY CONSULTATION NOTE    THIS CONSULT IS INCOMPLETE / FULL CONSULT TO FOLLOW    Patient is a 76y Female whom presented to the hospital with     PAST MEDICAL & SURGICAL HISTORY:  Inclusion body myositis (IBM)    Lung nodules    Transfusion history  S/p Hysterectomy    Dry eyes, bilateral    PVD (peripheral vascular disease)  S/p Right Peripheral leg Stent    Seasonal allergies    Skin cancer  Basal Cell Cancer face  (around nose) s/p MOHS procedure x 3    Anemia  Chronic, on Iron supplement    Hypothyroid    SSS (sick sinus syndrome)  S/p PPM    Diabetes  Insulin dependent - (has insulin Pump)    Pacemaker  2017    Palpitations  occasionally; not for couple yrs    Essential hypertension    Afib  5+ yrs; on Eliquis    S/P ablation of atrial fibrillation    S/P cataract surgery    History of surgery  MOHS procedure (face) x 3    Cardiac pacemaker  17    History of surgery  Right Peripheral Stent 5 yrs    H/O total hysterectomy  25+ yrs ago      Allergies:  latex (Pruritus; Rash)  Pineapple (Unknown)  sulfonamides (Unknown)    Home Medications Reviewed  Hospital Medications:   MEDICATIONS  (STANDING):  ALBUTerol    90 MICROgram(s) HFA Inhaler 1 Puff(s) Inhalation every 6 hours  albuterol/ipratropium for Nebulization 6 milliLiter(s) Nebulizer every 6 hours  aMIOdarone    Tablet 200 milliGRAM(s) Oral two times a day  apixaban 2.5 milliGRAM(s) Oral two times a day  chlorhexidine 4% Liquid 1 Application(s) Topical <User Schedule>  dextrose 40% Gel 15 Gram(s) Oral once  dextrose 5%. 1000 milliLiter(s) (50 mL/Hr) IV Continuous <Continuous>  dextrose 5%. 1000 milliLiter(s) (100 mL/Hr) IV Continuous <Continuous>  dextrose 50% Injectable 25 Gram(s) IV Push once  dextrose 50% Injectable 12.5 Gram(s) IV Push once  dextrose 50% Injectable 25 Gram(s) IV Push once  furosemide   Injectable 40 milliGRAM(s) IV Push two times a day  glucagon  Injectable 1 milliGRAM(s) IntraMuscular once  insulin glargine Injectable (LANTUS) 10 Unit(s) SubCutaneous at bedtime  insulin lispro (ADMELOG) corrective regimen sliding scale   SubCutaneous three times a day before meals  insulin lispro Injectable (ADMELOG) 4 Unit(s) SubCutaneous three times a day before meals  levothyroxine 88 MICROGram(s) Oral daily  meropenem  IVPB 1000 milliGRAM(s) IV Intermittent every 12 hours  metoprolol tartrate 25 milliGRAM(s) Oral every 6 hours  multivitamin 1 Tablet(s) Oral daily  nystatin    Suspension 274247 Unit(s) Oral daily  nystatin    Suspension 610350 Unit(s) Oral five times a day  pantoprazole    Tablet 40 milliGRAM(s) Oral before breakfast  polyethylene glycol 3350 17 Gram(s) Oral every 12 hours  polyethylene glycol 3350 17 Gram(s) Oral daily  senna 2 Tablet(s) Oral at bedtime      SOCIAL HISTORY:  Denies ETOH,Smoking,   FAMILY HISTORY:  Cancer (Father)  Myeloma    Family history of lung cancer (Mother)          REVIEW OF SYSTEMS:  CONSTITUTIONAL: No weakness, fevers or chills  EYES/ENT: No visual changes;  No vertigo or throat pain   NECK: No pain or stiffness  RESPIRATORY: No cough, wheezing, hemoptysis; No shortness of breath  CARDIOVASCULAR: No chest pain or palpitations.  GASTROINTESTINAL: No abdominal or epigastric pain. No nausea, vomiting, or hematemesis; No diarrhea or constipation. No melena or hematochezia.  GENITOURINARY: No dysuria, frequency, foamy urine, urinary urgency, incontinence or hematuria  NEUROLOGICAL: No numbness or weakness  SKIN: No itching, burning, rashes, or lesions   VASCULAR: No bilateral lower extremity edema.   All other review of systems is negative unless indicated above.    VITALS:  T(F): 96.4 (21 @ 13:00), Max: 98.2 (21 @ 21:18)  HR: 75 (21 @ 13:00)  BP: 114/55 (21 @ 13:00)  RR: 18 (21 @ 13:00)  SpO2: 96% (21 @ 13:00)     @ 07:01  -   @ 07:00  --------------------------------------------------------  IN: 650 mL / OUT: 1600 mL / NET: -950 mL      Height (cm): 154.9 ( 21:49)  Weight (kg): 65.3 (03-25 @ 21:49)  BMI (kg/m2): 27.2 ( @ 21:49)  BSA (m2): 1.64 ( @ 21:49)      I&O's Detail    25 Mar 2021 07:01  -  26 Mar 2021 07:00  --------------------------------------------------------  IN:    IV PiggyBack: 50 mL    Oral Fluid: 600 mL  Total IN: 650 mL    OUT:    Voided (mL): 1600 mL  Total OUT: 1600 mL    Total NET: -950 mL            PHYSICAL EXAM:  Constitutional: NAD  HEENT: anicteric sclera, oropharynx clear, MMM  Neck: No JVD  Respiratory: CTAB, no wheezes, rales or rhonchi  Cardiovascular: S1, S2, RRR  Gastrointestinal: BS+, soft, NT/ND  Extremities: No cyanosis or clubbing. No peripheral edema  Neurological: A/O x 3, no focal deficits  Psychiatric: Normal mood, normal affect  : No CVA tenderness. No wiggins.   Skin: No rashes  Vascular Access:    LABS:      123<L>  |  88<L>  |  37<H>  ----------------------------<  238<H>  4.6   |  22  |  1.4    Ca    7.5<L>      26 Mar 2021 05:52  Phos  4.1       Mg     1.6         TPro  4.6<L>  /  Alb  2.2<L>  /  TBili  0.4  /  DBili      /  AST  28  /  ALT  26  /  AlkPhos  148<H>      Creatinine Trend: 1.4 <--, 1.5 <--, 1.5 <--, 1.4 <--, 1.3 <--, 1.3 <--, 1.3 <--, 1.4 <--, 1.3 <--, 1.3 <--, 1.4 <--, 1.6 <--, 1.6 <--, 1.9 <--, 1.9 <--, 2.2 <--                        8.9    10.41 )-----------( 218      ( 26 Mar 2021 05:52 )             26.4     Urine Studies:  Urinalysis Basic - ( 25 Mar 2021 15:05 )    Color: Light Yellow / Appearance: Clear / S.008 / pH:   Gluc:  / Ketone: Negative  / Bili: Negative / Urobili: <2 mg/dL   Blood:  / Protein: Trace / Nitrite: Negative   Leuk Esterase: Moderate / RBC: 3 /HPF / WBC 7 /HPF   Sq Epi:  / Non Sq Epi: 1 /HPF / Bacteria: Negative      Creatinine, Random Urine: 12 mg/dL ( @ 15:05)  Protein/Creatinine Ratio Calculation: 1.4 Ratio ( @ 15:05)       @ 18:05  --  76  --        RADIOLOGY & ADDITIONAL STUDIES:                 NEPHROLOGY CONSULTATION NOTE    77 yo F PMHx DM II (has insulin pump), CAD s/p stenting in , Lung nodules, Dry eyes, bilateral, PVD (peripheral vascular disease): S/p Right Peripheral leg Stent, Seasonal allergies, Skin cancer: Basal Cell Cancer face  (around nose) s/p MOHS procedure x 3, Anemia: Chronic, on Iron supplement, Hypothyroid, SSS (sick sinus syndrome): S/p PPM 2017, Essential hypertension, Afib: 5+ yrs; on Eliquis presented initially for abdominal pain on 3/14/2021  She was found to have pyelonephritis. Patient was found to have  right sided hydronephrosis, TEOFILO, hyponatremia. Pt developed DKA and septic shock.   Renal consult called for hyponatremia   Seen at bedside today denied any dysuria no abdominal pain said she had poor po intake no nausea no vomiting no cough no sputum production     PAST MEDICAL & SURGICAL HISTORY:  Inclusion body myositis (IBM)  Lung nodules  Transfusion history  S/p Hysterectomy  Dry eyes, bilateral  PVD (peripheral vascular disease)  S/p Right Peripheral leg Stent  Seasonal allergies  Basal Cell Cancer face  (around nose) s/p MOHS procedure x 3  Hypothyroid  SSS (sick sinus syndrome)  S/p PPM  Diabetes  Insulin dependent - (has insulin Pump)  Pacemaker 2017  Palpitations occasionally; not for couple yrs  Essential hypertension  Afib 5+ yrs; on Eliquis S/P ablation of atrial fibrillation  S/P cataract surgery  History of surgery MOHS procedure (face) x 3  Cardiac pacemaker 17  History of surgery Right Peripheral Stent 5 yrs  H/O total hysterectomy 25+ yrs ago      Allergies:  latex (Pruritus; Rash)  Pineapple (Unknown)  sulfonamides (Unknown)    Home Medications Reviewed  Hospital Medications:   MEDICATIONS  (STANDING):  ALBUTerol    90 MICROgram(s) HFA Inhaler 1 Puff(s) Inhalation every 6 hours  albuterol/ipratropium for Nebulization 6 milliLiter(s) Nebulizer every 6 hours  aMIOdarone    Tablet 200 milliGRAM(s) Oral two times a day  apixaban 2.5 milliGRAM(s) Oral two times a day  chlorhexidine 4% Liquid 1 Application(s) Topical <User Schedule>  furosemide   Injectable 40 milliGRAM(s) IV Push two times a day  glucagon  Injectable 1 milliGRAM(s) IntraMuscular once  insulin glargine Injectable (LANTUS) 10 Unit(s) SubCutaneous at bedtime  insulin lispro (ADMELOG) corrective regimen sliding scale   SubCutaneous three times a day before meals  insulin lispro Injectable (ADMELOG) 4 Unit(s) SubCutaneous three times a day before meals  levothyroxine 88 MICROGram(s) Oral daily  meropenem  IVPB 1000 milliGRAM(s) IV Intermittent every 12 hours  metoprolol tartrate 25 milliGRAM(s) Oral every 6 hours  nystatin    Suspension 519412 Unit(s) Oral daily  nystatin    Suspension 227909 Unit(s) Oral five times a day  pantoprazole    Tablet 40 milliGRAM(s) Oral before breakfast  polyethylene glycol 3350 17 Gram(s) Oral every 12 hours  polyethylene glycol 3350 17 Gram(s) Oral daily  senna 2 Tablet(s) Oral at bedtime      SOCIAL HISTORY:  Denies ETOH,Smoking,   FAMILY HISTORY:  Cancer (Father)  Myeloma    Family history of lung cancer (Mother)          REVIEW OF SYSTEMS:  All other review of systems is negative unless indicated above.    VITALS:  T(F): 96.4 (21 @ 13:00), Max: 98.2 (21 @ 21:18)  HR: 75 (21 @ 13:00)  BP: 114/55 (21 @ 13:00)  RR: 18 (21 @ 13:00)  SpO2: 96% (21 @ 13:00)     @ 07:01  -   @ 07:00  --------------------------------------------------------  IN: 650 mL / OUT: 1600 mL / NET: -950 mL      Height (cm): 154.9 ( 21:49)  Weight (kg): 65.3 ( 21:49)  BMI (kg/m2): 27.2 ( 21:49)  BSA (m2): 1.64 ( 21:49)      I&O's Detail    25 Mar 2021 07:01  -  26 Mar 2021 07:00  --------------------------------------------------------  IN:    IV PiggyBack: 50 mL    Oral Fluid: 600 mL  Total IN: 650 mL    OUT:    Voided (mL): 1600 mL  Total OUT: 1600 mL    Total NET: -950 mL            PHYSICAL EXAM:  Constitutional: NAD  Neck: No JVD  Respiratory: CTAB  Cardiovascular: S1, S2, RRR  Gastrointestinal: BS+, soft, NT/ND  Extremities No peripheral edema  : No CVA tenderness. No wiggins.   Skin: No rashes  Vascular Access:    LABS:      123<L>  |  88<L>  |  37<H>  ----------------------------<  238<H>  4.6   |  22  |  1.4    SODIUM TREND:  Sodium 123 [ @ 05:52]  Sodium 126 [ @ 05:33]  Sodium 123 [ @ 11:52]  Sodium 127 [ @ 04:40]  Sodium 123 [ @ 04:50]  Sodium 126 [ @ 04:30]  Sodium 126 [ @ 04:30]  Sodium 123 [ @ 04:50]  Sodium 122 [ @ 15:55]  Sodium 122 [ @ 12:04]    Ca    7.5<L>      26 Mar 2021 05:52  Phos  4.1       Mg     1.6         TPro  4.6<L>  /  Alb  2.2<L>  /  TBili  0.4  /  DBili      /  AST  28  /  ALT  26  /  AlkPhos  148<H>      Creatinine Trend: 1.4 <--, 1.5 <--, 1.5 <--, 1.4 <--, 1.3 <--, 1.3 <--, 1.3 <--, 1.4 <--, 1.3 <--, 1.3 <--, 1.4 <--, 1.6 <--, 1.6 <--, 1.9 <--, 1.9 <--, 2.2 <--                        8.9    10.41 )-----------( 218      ( 26 Mar 2021 05:52 )             26.4     Urine Studies:  Urinalysis Basic - ( 25 Mar 2021 15:05 )    Color: Light Yellow / Appearance: Clear / S.008 / pH:   Gluc:  / Ketone: Negative  / Bili: Negative / Urobili: <2 mg/dL   Blood:  / Protein: Trace / Nitrite: Negative   Leuk Esterase: Moderate / RBC: 3 /HPF / WBC 7 /HPF   Sq Epi:  / Non Sq Epi: 1 /HPF / Bacteria: Negative      Creatinine, Random Urine: 12 mg/dL ( @ 15:05)  Protein/Creatinine Ratio Calculation: 1.4 Ratio ( @ 15:05)       @ 18:05  --  76  --        RADIOLOGY & ADDITIONAL STUDIES:    < from: TTE Echo Complete w/o Contrast w/ Doppler (21 @ 13:32) >   1. Basal inferolateral segment is abnormal as described above.   2. LV Ejection Fraction by Moses's Method with a biplane EF of 58 %.   3. Mildly enlarged left atrium.   4. Mildly enlarged right atrium.   5. Mild mitral valve regurgitation.   6. Mild thickening of the anterior and posterior mitral valve leaflets.   7. Moderate tricuspid regurgitation.   8. Sclerotic aortic valve with decreased opening.   9. Estimated pulmonary artery systolic pressure is 38.6 mmHg assuming a right atrial pressure of 10 mmHg, which is consistent with borderline pulmonary hypertension.    < end of copied text >

## 2021-03-26 NOTE — PROGRESS NOTE ADULT - ASSESSMENT
77 yo F PMHx DM II (has insulin pump), CAD s/p stenting in 2020, Lung nodules, Dry eyes, bilateral, PVD (peripheral vascular disease): S/p Right Peripheral leg Stent, Seasonal allergies, Skin cancer: Basal Cell Cancer face  (around nose) s/p MOHS procedure x 3, Anemia: Chronic, on Iron supplement, Hypothyroid, SSS (sick sinus syndrome): S/p PPM Sept 2017, Essential hypertension, Afib: 5+ yrs; on Eliquis presented initially for abdominal pain. She was found to have pyelonephritis. Her course was complicated by right sided hydronephrosis, TEOFILO, hyponatremia. Pt developed DKA and septic shock.     Hyponatremia  Hypomagensemia  - replete with IV Mg 2g today  - recheck serum osm, urine osmo, urine electrolytes  - nephrology consult    Constipation   - cw     Sepsis shock secondary to pyelonephritis secondary to hydronephrosis and e. coli ESBL bacteremia  - sepsis not present on admission  - c/w meropenem  - upon dc pt will need ertapenem until 3/31    Hydronephrosis/TEOFILO  - no acute intervention done  - suspected resolve obstruction  - NM Renal Lasix Scan (3/19): Significantly holdup of MAG3 radiotracer within bilateral kidneys with poor emptying bilaterally. No hydronephrosis demonstrated bilaterally. Findings suggestive of poor renal function. The left kidney accounts for 67% of total renal function and the right kidney accounts for 33% of total renal function consistent with significant difference. Markedly delayed post-Lasix T1/2 bilaterally without hydronephrosis. Findings again suggestive of overall poor renal function.  - outpt urology follow up  - pt has protein in her urine as well as anasarca  - repeat UA, check protein: creatinine ratio to exclude nephritis syndrome    NSTEMI  - has history CAD s/p PCI with LUPE placed LAD in July 2020  - secondary to sepsis induced demand ischemia  - pt not on aspirin or plavix--unclear why not--follow up cardiology    DM II, complicated by DKA this admission  - start lantus 10 u qhs  - cw humalog 4 u tid with meals  - cw correctional scale    Thrombocytopenia likely d/t sepsis: improved     Hyperkalemia: improved    Anasarca  - normal EF  - trial ethacrynic acid (pt has allergy to sulfonamide)    Hypothyroidism  -c/w synthroid    SSS - s/p Pacemaker    HTN  - controlled, c/w metoprolol    Afib s/p Ablation  - c/w Eliquis    HLD  - c/w statin    Anemia  - Hemoglobin stable   - restarted iron supplements    DVT prox Apixaban  GI prox  Full Code   77 yo F PMHx DM II (has insulin pump), CAD s/p stenting in 2020, Lung nodules, Dry eyes, bilateral, PVD (peripheral vascular disease): S/p Right Peripheral leg Stent, Seasonal allergies, Skin cancer: Basal Cell Cancer face  (around nose) s/p MOHS procedure x 3, Anemia: Chronic, on Iron supplement, Hypothyroid, SSS (sick sinus syndrome): S/p PPM Sept 2017, Essential hypertension, Afib: 5+ yrs; on Eliquis presented initially for abdominal pain. She was found to have pyelonephritis. Her course was complicated by right sided hydronephrosis, TEOFILO, hyponatremia. Pt developed DKA and septic shock.     Hyponatremia  Hypomagensemia  - replete with IV Mg 2g today  - recheck serum osm, urine osmo, urine electrolytes  - nephrology consult    Constipation with abdominal discomfort   - cw senna and miralax  - cw dulcolax suppository (patient has not received it, notified RN)       Sepsis shock secondary to pyelonephritis secondary to hydronephrosis and e. coli ESBL bacteremia  - sepsis not present on admission  - c/w meropenem  - upon dc pt will need ertapenem until 3/31    Hydronephrosis/TEOFILO  - no acute intervention done  - suspected resolve obstruction  - NM Renal Lasix Scan (3/19): Significantly holdup of MAG3 radiotracer within bilateral kidneys with poor emptying bilaterally. No hydronephrosis demonstrated bilaterally. Findings suggestive of poor renal function. The left kidney accounts for 67% of total renal function and the right kidney accounts for 33% of total renal function consistent with significant difference. Markedly delayed post-Lasix T1/2 bilaterally without hydronephrosis. Findings again suggestive of overall poor renal function.  - outpt urology follow up  - pt has protein in her urine as well as anasarca  - repeat UA, check protein: creatinine ratio to exclude nephritis syndrome    NSTEMI  - has history CAD s/p PCI with LUPE placed LAD in July 2020  - secondary to sepsis induced demand ischemia  - pt not on aspirin or plavix--unclear why not--follow up cardiology    DM II, complicated by DKA this admission  - start lantus 10 u qhs  - cw humalog 4 u tid with meals  - cw correctional scale    Thrombocytopenia likely d/t sepsis: improved     Hyperkalemia: improved    Anasarca  - normal EF, dc IV lasix today     Hypothyroidism  -c/w synthroid    SSS - s/p Pacemaker    HTN  - controlled, c/w metoprolol    Afib s/p Ablation  - c/w Eliquis    HLD  - c/w statin    Anemia  - Hemoglobin stable   - restarted iron supplements    DVT prox Apixaban  GI prox  Full Code  PT   OOB to chair 77 yo F PMHx DM II (has insulin pump), CAD s/p stenting in 2020, Lung nodules, Dry eyes, bilateral, PVD (peripheral vascular disease): S/p Right Peripheral leg Stent, Seasonal allergies, Skin cancer: Basal Cell Cancer face  (around nose) s/p MOHS procedure x 3, Anemia: Chronic, on Iron supplement, Hypothyroid, SSS (sick sinus syndrome): S/p PPM Sept 2017, Essential hypertension, Afib: 5+ yrs; on Eliquis presented initially for abdominal pain. She was found to have pyelonephritis. Her course was complicated by right sided hydronephrosis, TEOFILO, hyponatremia. Pt developed DKA and septic shock.     Hyponatremia  Hypomagensemia  - replete with IV Mg 2g today  - recheck serum osm, urine osmo, urine electrolytes  - nephrology consult    Constipation with abdominal discomfort   - cw senna and miralax  - cw dulcolax suppository (patient has not received it, notified RN)     oral candidiasis  - cw nystatin swish    Sepsis shock secondary to pyelonephritis secondary to hydronephrosis and e. coli ESBL bacteremia  - sepsis not present on admission  - c/w meropenem  - upon dc pt will need ertapenem until 3/31    Hydronephrosis/TEOFILO  - no acute intervention done  - suspected resolve obstruction  - NM Renal Lasix Scan (3/19): Significantly holdup of MAG3 radiotracer within bilateral kidneys with poor emptying bilaterally. No hydronephrosis demonstrated bilaterally. Findings suggestive of poor renal function. The left kidney accounts for 67% of total renal function and the right kidney accounts for 33% of total renal function consistent with significant difference. Markedly delayed post-Lasix T1/2 bilaterally without hydronephrosis. Findings again suggestive of overall poor renal function.  - outpt urology follow up  - pt has protein in her urine as well as anasarca  - repeat UA, check protein: creatinine ratio to exclude nephritis syndrome    NSTEMI  - has history CAD s/p PCI with LUPE placed LAD in July 2020  - secondary to sepsis induced demand ischemia  - pt not on aspirin or plavix--unclear why not--follow up cardiology    DM II, complicated by DKA this admission  - start lantus 10 u qhs  - cw humalog 4 u tid with meals  - cw correctional scale    Thrombocytopenia likely d/t sepsis: improved     Hyperkalemia: improved    Anasarca  - normal EF, dc IV lasix today     Hypothyroidism  -c/w synthroid    SSS - s/p Pacemaker    HTN  - controlled, c/w metoprolol    Afib s/p Ablation  - c/w amiodarone, metoprolol and Eliquis    HLD  - c/w statin    Anemia  - Hemoglobin stable   - restarted iron supplements    DVT prox Apixaban  GI prox PPI  Full Code  PT   OOB to chair

## 2021-03-26 NOTE — CONSULT NOTE ADULT - CONSULT REASON
hyponatremia TEOFILO
gait dysfunction
Cardiac pre-operative risk stratificaiton
DKA
Elevated FS
Hydronephrosis
sepsis

## 2021-03-26 NOTE — CONSULT NOTE ADULT - ASSESSMENT
77 yo F PMHx DM II (has insulin pump), CAD s/p stenting in 2020, Lung nodules, Dry eyes, bilateral, PVD (peripheral vascular disease): S/p Right Peripheral leg Stent, Seasonal allergies, Skin cancer: Basal Cell Cancer face  (around nose) s/p MOHS procedure x 3, Anemia: Chronic, on Iron supplement, Hypothyroid, SSS (sick sinus syndrome): S/p PPM Sept 2017, Essential hypertension, Afib: 5+ yrs; on Eliquis presenting sp pyelo    ·	Hyponatremia chronic / hypovolemic on exam / hypochloremic   ·	check serum osm U osm Urine electrolytes   ·	check TSH / check serum uric acid cortisol   ·	hold furosemide for 24h / consider NS at 75 cc per hour   ·	no need for 3 % saline     will follow

## 2021-03-27 NOTE — PROGRESS NOTE ADULT - SUBJECTIVE AND OBJECTIVE BOX
JESSECHANELL SOWMYA  76y  Female      Patient is a 76y old  Female who presents with a chief complaint of Sepsis (27 Mar 2021 06:14)      INTERVAL HPI/OVERNIGHT EVENTS:  no event overnight  patient had one BM last night    complaint of LUE swelling, denied pain      latex (Pruritus; Rash)  Pineapple (Unknown)  sulfonamides (Unknown)        REVIEW OF SYSTEMS:  CONSTITUTIONAL: No fever, weight loss, or fatigue  ENMT:  No difficulty hearing, tinnitus, vertigo; see above  NECK: No pain or stiffness  RESPIRATORY: No cough, wheezing, chills or hemoptysis; No shortness of breath  CARDIOVASCULAR: No chest pain, palpitations, dizziness, or leg swelling  GASTROINTESTINAL: No abdominal or epigastric pain. No nausea, vomiting, or hematemesis; No diarrhea or constipation. No melena or hematochezia.  GENITOURINARY: No dysuria, frequency, hematuria, or incontinence  NEUROLOGICAL: No headaches, memory loss, loss of strength, numbness, or tremors  SKIN: No itching, burning, rashes, or lesions   LYMPH NODES: No enlarged glands  ENDOCRINE: No heat or cold intolerance; No hair loss  MUSCULOSKELETAL: No joint pain or swelling; No muscle, back, or extremity pain  HEME/LYMPH: No easy bruising, or bleeding gums  ALLERY AND IMMUNOLOGIC: No hives or eczema    FAMILY HISTORY:  Cancer (Father)  Myeloma    Family history of lung cancer (Mother)        T(C): 36.2 (03-27-21 @ 12:10), Max: 37.6 (03-26-21 @ 21:10)  HR: 77 (03-27-21 @ 12:10) (77 - 110)  BP: 110/57 (03-27-21 @ 12:10) (110/57 - 116/66)  RR: 18 (03-27-21 @ 12:10) (18 - 18)  SpO2: 100% (03-27-21 @ 05:27) (100% - 100%)    PHYSICAL EXAM:  GENERAL: NAD, well-groomed, well-developed  HEAD:  Atraumatic, Normocephalic  EYES: EOMI, PERRLA, conjunctiva and sclera clear  ENMT: No tonsillar erythema, exudates, or enlargement; Moist mucous membranes, Good dentition, whitish longitudinal plaques on tongue  NECK: Supple, No JVD, Normal thyroid  NERVOUS SYSTEM:  Alert & Oriented X3, Good concentration; Motor Strength 5/5 B/L upper and lower extremities; DTRs 2+ intact and symmetric  CHEST/LUNG: Clear to percussion bilaterally; No rales, rhonchi, wheezing, or rubs  HEART: Regular rate and rhythm; No murmurs, rubs, or gallops  ABDOMEN: Soft, tender + suprapubic area, Nondistended; Bowel sounds present  EXTREMITIES:  2+ Peripheral Pulses, No clubbing, cyanosis, mid line + LUE, swelling of left arm and forearm (pitting edema+)  LYMPH: No lymphadenopathy noted  SKIN: No rashes or lesions    Consultant(s) Notes Reviewed:  [x ] YES  [ ] NO  Care Discussed with Consultants/Other Providers [ x] YES  [ ] NO    LABS:                        9.9    13.32 )-----------( 264      ( 27 Mar 2021 07:30 )             29.2     03-26    123<L>  |  88<L>  |  37<H>  ----------------------------<  238<H>  4.6   |  22  |  1.4    Ca    7.5<L>      26 Mar 2021 05:52  Phos  4.1     03-26  Mg     1.6     03-26    TPro  4.6<L>  /  Alb  2.2<L>  /  TBili  0.4  /  DBili  x   /  AST  28  /  ALT  26  /  AlkPhos  148<H>  03-26    LIVER FUNCTIONS - ( 26 Mar 2021 05:52 )  Alb: 2.2 g/dL / Pro: 4.6 g/dL / ALK PHOS: 148 U/L / ALT: 26 U/L / AST: 28 U/L / GGT: x                   RADIOLOGY & ADDITIONAL TESTS:    Imaging Personally Reviewed:  [x] YES  [ ] NO    HEALTH ISSUES - PROBLEM Dx:  Pyelonephritis  Pyelonephritis    Other hydronephrosis  Other hydronephrosis    Hydronephrosis, unspecified hydronephrosis type  Hydronephrosis, unspecified hydronephrosis type            MEDS:  acetaminophen   Tablet .. 650 milliGRAM(s) Oral every 6 hours PRN  ALBUTerol    90 MICROgram(s) HFA Inhaler 1 Puff(s) Inhalation every 6 hours  albuterol/ipratropium for Nebulization 3 milliLiter(s) Nebulizer every 6 hours PRN  aluminum hydroxide/magnesium hydroxide/simethicone Suspension 30 milliLiter(s) Oral every 6 hours PRN  aMIOdarone    Tablet 200 milliGRAM(s) Oral two times a day  apixaban 2.5 milliGRAM(s) Oral two times a day  bisacodyl Suppository 10 milliGRAM(s) Rectal daily PRN  chlorhexidine 4% Liquid 1 Application(s) Topical <User Schedule>  dextrose 40% Gel 15 Gram(s) Oral once  dextrose 5%. 1000 milliLiter(s) IV Continuous <Continuous>  dextrose 5%. 1000 milliLiter(s) IV Continuous <Continuous>  dextrose 50% Injectable 25 Gram(s) IV Push once  dextrose 50% Injectable 12.5 Gram(s) IV Push once  dextrose 50% Injectable 25 Gram(s) IV Push once  fluconAZOLE   Tablet 200 milliGRAM(s) Oral once  glucagon  Injectable 1 milliGRAM(s) IntraMuscular once  insulin glargine Injectable (LANTUS) 10 Unit(s) SubCutaneous at bedtime  insulin lispro (ADMELOG) corrective regimen sliding scale   SubCutaneous three times a day before meals  insulin lispro Injectable (ADMELOG) 4 Unit(s) SubCutaneous three times a day before meals  levothyroxine 88 MICROGram(s) Oral daily  meropenem  IVPB 1000 milliGRAM(s) IV Intermittent every 12 hours  metoprolol tartrate 25 milliGRAM(s) Oral every 6 hours  multivitamin 1 Tablet(s) Oral daily  nystatin Ointment 1 Application(s) Topical two times a day  pantoprazole    Tablet 40 milliGRAM(s) Oral before breakfast  polyethylene glycol 3350 17 Gram(s) Oral every 12 hours  senna 2 Tablet(s) Oral at bedtime  sodium chloride 0.9%. 1000 milliLiter(s) IV Continuous <Continuous>

## 2021-03-27 NOTE — PROGRESS NOTE ADULT - ASSESSMENT
77 yo F PMHx DM II (has insulin pump), CAD s/p stenting in 2020, Lung nodules, Dry eyes, bilateral, PVD (peripheral vascular disease): S/p Right Peripheral leg Stent, Seasonal allergies, Skin cancer: Basal Cell Cancer face  (around nose) s/p MOHS procedure x 3, Anemia: Chronic, on Iron supplement, Hypothyroid, SSS (sick sinus syndrome): S/p PPM Sept 2017, Essential hypertension, Afib: 5+ yrs; on Eliquis presented initially for abdominal pain. She was found to have pyelonephritis. Her course was complicated by right sided hydronephrosis, TEOFILO, hyponatremia. Pt developed DKA and septic shock.     Hyponatremia  Hypomagensemia  NO BMP was drawn in the morning, reorder for 4pm labs drawn  RN was notified for urine studies to be collected  - send serum osm, urine osmo, urine electrolytes  - nephrology  rec appreciated     Constipation: improved  - cw senna and miralax  - cw dulcolax suppository    Oral candidiasis: not improving with nystatin  - dc nystatin switch  - start fluconazole 200mg x1 dose today, f/b 100mg daily x 7 days    LUE swelling likely the residual from anasarca  - obtain LUE venous duplex  - mid line is fucntioning  - arm elevation     Sepsis shock secondary to pyelonephritis secondary to hydronephrosis and e. coli ESBL bacteremia  - sepsis not present on admission  - c/w meropenem  - upon dc pt will need ertapenem until 3/31    Hydronephrosis/TEOFILO  - no acute intervention done  - suspected resolve obstruction  - NM Renal Lasix Scan (3/19): Significantly holdup of MAG3 radiotracer within bilateral kidneys with poor emptying bilaterally. No hydronephrosis demonstrated bilaterally. Findings suggestive of poor renal function. The left kidney accounts for 67% of total renal function and the right kidney accounts for 33% of total renal function consistent with significant difference. Markedly delayed post-Lasix T1/2 bilaterally without hydronephrosis. Findings again suggestive of overall poor renal function.  - outpt urology follow up  - pt has protein in her urine as well as anasarca  - repeat UA, check protein: creatinine ratio to exclude nephritis syndrome    NSTEMI  CAD s/p PCI with LUPE placed LAD in July 2020  - secondary to sepsis induced demand ischemia  - pt not on aspirin or plavix-unclear why not--follow up cardiology    DM II, complicated by DKA this admission  - cw lantus 10 u qhs  - cw humalog 4 u tid with meals  - cw correctional scale    Thrombocytopenia likely d/t sepsis: improved     Hyperkalemia: improved    Anasarca  - normal EF, dc IV lasix today     Hypothyroidism  -c/w synthroid    SSS - s/p Pacemaker    HTN  - controlled, c/w metoprolol    Afib s/p Ablation  - c/w amiodarone, metoprolol and Eliquis    HLD  - c/w statin    Anemia  - Hemoglobin stable   - restarted iron supplements    DVT prox Apixaban  GI prox PPI  Full Code  PT   OOB to chair 77 yo F PMHx DM II (has insulin pump), CAD s/p stenting in 2020, Lung nodules, Dry eyes, bilateral, PVD (peripheral vascular disease): S/p Right Peripheral leg Stent, Seasonal allergies, Skin cancer: Basal Cell Cancer face  (around nose) s/p MOHS procedure x 3, Anemia: Chronic, on Iron supplement, Hypothyroid, SSS (sick sinus syndrome): S/p PPM Sept 2017, Essential hypertension, Afib: 5+ yrs; on Eliquis presented initially for abdominal pain. She was found to have pyelonephritis. Her course was complicated by right sided hydronephrosis, TEOFILO, hyponatremia. Pt developed DKA and septic shock.     Hyponatremia  Hypomagensemia  NO BMP was drawn in the morning, reorder for 4pm labs drawn  RN was notified for urine studies to be collected  - send serum osm, urine osmo, urine electrolytes  - nephrology  rec appreciated     Constipation: improved  - cw senna and miralax  - cw dulcolax suppository    Oral candidiasis: not improving with nystatin  - dc nystatin switch  - start fluconazole 200mg x1 dose today, f/b 100mg daily x 7 days    LUE swelling likely the residual from anasarca  - obtain LUE venous duplex  - mid line is fucntioning  - arm elevation     Sepsis shock secondary to pyelonephritis secondary to hydronephrosis and e. coli ESBL bacteremia  - sepsis not present on admission  - c/w meropenem  - upon dc pt will need ertapenem until 3/31    Hydronephrosis/TEOFILO  - no acute intervention done  - suspected resolve obstruction  - NM Renal Lasix Scan (3/19): Significantly holdup of MAG3 radiotracer within bilateral kidneys with poor emptying bilaterally. No hydronephrosis demonstrated bilaterally. Findings suggestive of poor renal function. The left kidney accounts for 67% of total renal function and the right kidney accounts for 33% of total renal function consistent with significant difference. Markedly delayed post-Lasix T1/2 bilaterally without hydronephrosis. Findings again suggestive of overall poor renal function.  - outpt urology follow up  - pt has protein in her urine as well as anasarca  - repeat UA, check protein: creatinine ratio to exclude nephritis syndrome    NSTEMI  CAD s/p PCI with LUPE placed LAD in July 2020  - secondary to sepsis induced demand ischemia  - pt was on plavix at home and held while in CCU for thrombocytopenia, now PLT improved, resume plavix today    DM II, complicated by DKA this admission  - cw lantus 10 u qhs  - cw humalog 4 u tid with meals  - cw correctional scale    Thrombocytopenia likely d/t sepsis: improved     Hyperkalemia: improved    Anasarca  - normal EF, dc IV lasix today     Hypothyroidism  -c/w synthroid    SSS - s/p Pacemaker    HTN  - controlled, c/w metoprolol    Afib s/p Ablation  - c/w amiodarone, metoprolol and Eliquis    HLD  - c/w statin    Anemia  - Hemoglobin stable   - restarted iron supplements    DVT prox Apixaban  GI prox PPI  Full Code  PT   OOB to chair

## 2021-03-27 NOTE — CHART NOTE - NSCHARTNOTEFT_GEN_A_CORE
Per nursing, concern for patency of midline; pt with BUE edema from elbow to GH joint. Pt denies pain at line site, fever chills HA n/v chest pain SOB. Per nursing line flushes, discussed with Dr. Kaba and chrissie to use midline. Ordered BUE doppler and will elevate BLE. Will continue to monitor.

## 2021-03-27 NOTE — PROGRESS NOTE ADULT - SUBJECTIVE AND OBJECTIVE BOX
seen and examined   24 h events noted         PAST HISTORY  --------------------------------------------------------------------------------  No significant changes to PMH, PSH, FHx, SHx, unless otherwise noted    ALLERGIES & MEDICATIONS  --------------------------------------------------------------------------------  Allergies    latex (Pruritus; Rash)  sulfonamides (Unknown)    Intolerances    Pineapple (Unknown)    Standing Inpatient Medications  ALBUTerol    90 MICROgram(s) HFA Inhaler 1 Puff(s) Inhalation every 6 hours  albuterol/ipratropium for Nebulization 6 milliLiter(s) Nebulizer every 6 hours  aMIOdarone    Tablet 200 milliGRAM(s) Oral two times a day  apixaban 2.5 milliGRAM(s) Oral two times a day  chlorhexidine 4% Liquid 1 Application(s) Topical <User Schedule>  dextrose 40% Gel 15 Gram(s) Oral once  dextrose 5%. 1000 milliLiter(s) IV Continuous <Continuous>  dextrose 5%. 1000 milliLiter(s) IV Continuous <Continuous>  dextrose 50% Injectable 25 Gram(s) IV Push once  dextrose 50% Injectable 12.5 Gram(s) IV Push once  dextrose 50% Injectable 25 Gram(s) IV Push once  glucagon  Injectable 1 milliGRAM(s) IntraMuscular once  insulin glargine Injectable (LANTUS) 10 Unit(s) SubCutaneous at bedtime  insulin lispro (ADMELOG) corrective regimen sliding scale   SubCutaneous three times a day before meals  insulin lispro Injectable (ADMELOG) 4 Unit(s) SubCutaneous three times a day before meals  levothyroxine 88 MICROGram(s) Oral daily  meropenem  IVPB 1000 milliGRAM(s) IV Intermittent every 12 hours  metoprolol tartrate 25 milliGRAM(s) Oral every 6 hours  multivitamin 1 Tablet(s) Oral daily  nystatin    Suspension 172547 Unit(s) Oral five times a day  nystatin    Suspension 576454 Unit(s) Oral daily  nystatin Ointment 1 Application(s) Topical two times a day  pantoprazole    Tablet 40 milliGRAM(s) Oral before breakfast  polyethylene glycol 3350 17 Gram(s) Oral every 12 hours  senna 2 Tablet(s) Oral at bedtime  sodium chloride 0.9%. 1000 milliLiter(s) IV Continuous <Continuous>    PRN Inpatient Medications  acetaminophen   Tablet .. 650 milliGRAM(s) Oral every 6 hours PRN  aluminum hydroxide/magnesium hydroxide/simethicone Suspension 30 milliLiter(s) Oral every 6 hours PRN  bisacodyl Suppository 10 milliGRAM(s) Rectal daily PRN          VITALS/PHYSICAL EXAM  --------------------------------------------------------------------------------  T(C): 35.6 (03-27-21 @ 05:27), Max: 37.6 (03-26-21 @ 21:10)  HR: 85 (03-27-21 @ 05:27) (75 - 110)  BP: 116/66 (03-27-21 @ 05:27) (114/55 - 116/66)  RR: 18 (03-27-21 @ 05:27) (18 - 18)  SpO2: 100% (03-27-21 @ 05:27) (96% - 100%)  Wt(kg): --  Height (cm): 154.9 (03-25-21 @ 21:49)  Weight (kg): 65.3 (03-25-21 @ 21:49)  BMI (kg/m2): 27.2 (03-25-21 @ 21:49)  BSA (m2): 1.64 (03-25-21 @ 21:49)      03-25-21 @ 07:01  -  03-26-21 @ 07:00  --------------------------------------------------------  IN: 650 mL / OUT: 1600 mL / NET: -950 mL      Physical Exam:  	Gen: NAD  	Pulm: CTA B/L  	CV:  S1S2; no rub  	Abd:  soft, nontender/nondistended        LABS/STUDIES  --------------------------------------------------------------------------------              8.9    10.41 >-----------<  218      [03-26-21 @ 05:52]              26.4     123  |  88  |  37  ----------------------------<  238      [03-26-21 @ 05:52]  4.6   |  22  |  1.4        Ca     7.5     [03-26-21 @ 05:52]      Mg     1.6     [03-26-21 @ 05:52]      Phos  4.1     [03-26-21 @ 05:52]    TPro  4.6  /  Alb  2.2  /  TBili  0.4  /  DBili  x   /  AST  28  /  ALT  26  /  AlkPhos  148  [03-26-21 @ 05:52]      Creatinine Trend:  SCr 1.4 [03-26 @ 05:52]  SCr 1.5 [03-25 @ 05:33]  SCr 1.5 [03-24 @ 11:52]  SCr 1.4 [03-23 @ 04:40]  SCr 1.3 [03-22 @ 04:50]    Urinalysis - [03-25-21 @ 15:05]      Color Light Yellow / Appearance Clear / SG 1.008 / pH 8.0      Gluc Negative / Ketone Negative  / Bili Negative / Urobili <2 mg/dL       Blood Negative / Protein Trace / Leuk Est Moderate / Nitrite Negative      RBC 3 / WBC 7 / Hyaline 0 / Gran  / Sq Epi  / Non Sq Epi 1 / Bacteria Negative    Urine Creatinine 12      [03-25-21 @ 15:05]  Urine Protein 17      [03-25-21 @ 15:05]    PTH -- (Ca --)      [03-19-21 @ 18:05]   76  TSH 4.17      [03-17-21 @ 16:00]

## 2021-03-27 NOTE — PROGRESS NOTE ADULT - ASSESSMENT
PMHx DM II (has insulin pump), CAD s/p stenting in 2020, Lung nodules, Dry eyes, bilateral, PVD (peripheral vascular disease): S/p Right Peripheral leg Stent, Seasonal allergies, Skin cancer: Basal Cell Cancer face  (around nose) s/p MOHS procedure x 3, Anemia: Chronic, on Iron supplement, Hypothyroid, SSS (sick sinus syndrome): S/p PPM Sept 2017, Essential hypertension, Afib: 5+ yrs; on Eliquis presenting sp pyelo    ·	Hyponatremia chronic / hypovolemic on exam / hypochloremic   ·	check serum osm U osm Urine electrolytes as per yesterday notes   ·	sodium level stable / check repeat today   ·	last TSH at goal   ·	no need for 3 % saline   ·	will follow

## 2021-03-27 NOTE — PROGRESS NOTE ADULT - ATTENDING COMMENTS
Case discussed with PA STaff
#Sepsis secondary to pyelonephritis  #R-sided Hydronephrosis  #ESBL Bacteremia  #TEOFILO on CKD  # Acute anion gap metabolic acidosis secondary to DKA  #Chronic atrial fibrillation  # NSTEMI posisbly secondary to demand ischemia    - Upgrade to ICU for DKA  - Aggressive IV hydration and insulin drip  - Monitor electrolytes closely  - EP interogation of AICD shows Afib only  - Urology recommended no stenting at this time. Requested renal scan with lasix  - Cont abx and follow up Blood and urine culture  - F/U ID  - Management per ICU
Agree with above.  Repeat imaging study to evaluate  system when pt improves
I have personally seen and examined this patient.  I have fully participated in the care of this patient.  I have reviewed pertinent clinical information, including history, physical exam, plan and note.   I have reviewed relevant imaging and diagnostic studies personally. I agree with resident note above and plan of care, edited and corrected where applicable.     Progress Note Handoff    Please refer to today's Attending H&P and cardiology consult for full information.   In summary, patient clinically improving on current therapy (complaining still of fatigue, but less pain). Currently with the diagnosis of Bacteremia secondary to pyelonephritis. She's s/p NSTEMI type 2; the current cardiology eval places her at high cardiovascular risk for potential urological procedure (stenting in the setting of hydroureteronephrosis).     Case discussed with medical team
please see above
see above

## 2021-03-28 NOTE — CHART NOTE - NSCHARTNOTEFT_GEN_A_CORE
Per nephsaeid schulte, gordon IVF, ordered SPEP and UPEP. pt to go for BLE doppler today, will f/u results.

## 2021-03-28 NOTE — PROGRESS NOTE ADULT - ASSESSMENT
77 yo F PMHx DM II (has insulin pump), CAD s/p stenting in 2020, Lung nodules, Dry eyes, bilateral, PVD (peripheral vascular disease): S/p Right Peripheral leg Stent, Seasonal allergies, Skin cancer: Basal Cell Cancer face  (around nose) s/p MOHS procedure x 3, Anemia: Chronic, on Iron supplement, Hypothyroid, SSS (sick sinus syndrome): S/p PPM Sept 2017, Essential hypertension, Afib: 5+ yrs; on Eliquis presented initially for abdominal pain. She was found to have pyelonephritis. Her course was complicated by right sided hydronephrosis, TEOFILO, hyponatremia. Pt developed DKA and septic shock.     # Hyponatremia  Na not improving with IVF, dc iVF   free water limit 1.2L /day   SPEP / UPEP   - nephrology  rec appreciated     Constipation: improved  - cw senna and miralax  - cw dulcolax suppository    Oral candidiasis: not improving with nystatin  - dc nystatin switch  - fluconazole 200mg x1 dose  f/b 100mg daily x 7 days    LUE swelling likely the residual from anasarca  - obtain LUE venous duplex  - mid line is fucntioning  - arm elevation     Sepsis shock secondary to pyelonephritis secondary to hydronephrosis and e. coli ESBL bacteremia  - sepsis not present on admission  - c/w meropenem  - upon dc pt will need ertapenem until 3/31    Hydronephrosis/TEOFILO  - no acute intervention done  - suspected resolve obstruction  - NM Renal Lasix Scan (3/19): Significantly holdup of MAG3 radiotracer within bilateral kidneys with poor emptying bilaterally. No hydronephrosis demonstrated bilaterally. Findings suggestive of poor renal function. The left kidney accounts for 67% of total renal function and the right kidney accounts for 33% of total renal function consistent with significant difference. Markedly delayed post-Lasix T1/2 bilaterally without hydronephrosis. Findings again suggestive of overall poor renal function.  - outpt urology follow up  - pt has protein in her urine as well as anasarca      NSTEMI  CAD s/p PCI with LUPE placed LAD in July 2020  - secondary to sepsis induced demand ischemia  - pt was on plavix at home and held while in CCU for thrombocytopenia, now PLT improved, resume plavix    DM II, complicated by DKA this admission  - increase Lantus to 20 Uqhs  - cw humalog 4 u tid with meals  - cw correctional scale    Thrombocytopenia likely d/t sepsis: improved     Hyperkalemia: improved    Anasarca  - normal EF, dc IV lasix today     Hypothyroidism  -c/w synthroid    SSS - s/p Pacemaker    HTN  - controlled, c/w metoprolol    Afib s/p Ablation  - c/w amiodarone, metoprolol and Eliquis    HLD  - c/w statin    Anemia  - Hemoglobin stable   - restarted iron supplements    DVT prox Apixaban  GI prox PPI  Full Code  PT   OOB to chair    Progress Note Handoff  Pending Consults:  Pending Tests: Venous duplex LUE  Pending Results:  Family Discussion:  Disposition: may benefit for rehab, need PT follow up

## 2021-03-28 NOTE — PROGRESS NOTE ADULT - ASSESSMENT
PMHx DM II (has insulin pump), CAD s/p stenting in 2020, Lung nodules, Dry eyes, bilateral, PVD (peripheral vascular disease): S/p Right Peripheral leg Stent, Seasonal allergies, Skin cancer: Basal Cell Cancer face  (around nose) s/p MOHS procedure x 3, Anemia: Chronic, on Iron supplement, Hypothyroid, SSS (sick sinus syndrome): S/p PPM Sept 2017, Essential hypertension, Afib: 5+ yrs; on Eliquis presenting sp pyelo    ·	Hyponatremia chronic / hypovolemic on exam / hypochloremic   ·	serum osm wnl / High U osm c/w ADH / TSH within target / check SPEP / UPEP SFLC rule out dysproteinemia / DC IVF / limit free water to 1.2 l per day   ·	no need for 3 % saline   ·	will follow

## 2021-03-28 NOTE — PROGRESS NOTE ADULT - SUBJECTIVE AND OBJECTIVE BOX
HELLENSALUDBALAJICHANELL SOWMYA  76y  Female      Patient is a 76y old  Female who presents with a chief complaint of Sepsis (28 Mar 2021 09:08)      INTERVAL HPI/OVERNIGHT EVENTS:  no event overnight   patient is seen and examined   report that mouth pain has improved and today she has some appetite, LUE swelling improving      latex (Pruritus; Rash)  Pineapple (Unknown)  sulfonamides (Unknown)        REVIEW OF SYSTEMS:  CONSTITUTIONAL: No fever, weight loss, or fatigue  ENMT:  No difficulty hearing, tinnitus, vertigo; see above  NECK: No pain or stiffness  RESPIRATORY: No cough, wheezing, chills or hemoptysis; No shortness of breath  CARDIOVASCULAR: No chest pain, palpitations, dizziness, or leg swelling  GASTROINTESTINAL: No abdominal or epigastric pain. No nausea, vomiting, or hematemesis; No diarrhea or constipation. No melena or hematochezia.  GENITOURINARY: No dysuria, frequency, hematuria, or incontinence  NEUROLOGICAL: No headaches, memory loss, loss of strength, numbness, or tremors  SKIN: No itching, burning, rashes, or lesions   LYMPH NODES: No enlarged glands  ENDOCRINE: No heat or cold intolerance; No hair loss  MUSCULOSKELETAL: see above No muscle, back, or extremity pain  HEME/LYMPH: No easy bruising, or bleeding gums  ALLERY AND IMMUNOLOGIC: No hives or eczema    FAMILY HISTORY:  Cancer (Father)  Myeloma    Family history of lung cancer (Mother)        T(C): 37.1 (03-28-21 @ 14:41), Max: 37.1 (03-28-21 @ 14:41)  HR: 60 (03-28-21 @ 14:41) (60 - 113)  BP: 155/66 (03-28-21 @ 14:41) (127/62 - 155/69)  RR: 18 (03-28-21 @ 14:41) (18 - 18)  SpO2: 100% (03-28-21 @ 12:23) (97% - 100%)    PHYSICAL EXAM:  GENERAL: NAD, well-groomed, well-developed  HEAD:  Atraumatic, Normocephalic  EYES: EOMI, PERRLA, conjunctiva and sclera clear  ENMT: No tonsillar erythema, exudates, or enlargement; Moist mucous membranes, Good dentition, whitish longitudinal plaques on tongue  NECK: Supple, No JVD, Normal thyroid  NERVOUS SYSTEM:  Alert & Oriented X3, Good concentration; Motor Strength 5/5 B/L upper and lower extremities; DTRs 2+ intact and symmetric  CHEST/LUNG: Clear to percussion bilaterally; No rales, rhonchi, wheezing, or rubs  HEART: Regular rate and rhythm; No murmurs, rubs, or gallops  ABDOMEN: Soft, tender + suprapubic area, Nondistended; Bowel sounds present  EXTREMITIES:  2+ Peripheral Pulses, No clubbing, cyanosis, mid line + LUE, swelling of left arm and forearm (pitting edema+)  LYMPH: No lymphadenopathy noted  SKIN: No rashes or lesions    Consultant(s) Notes Reviewed:  [x ] YES  [ ] NO  Care Discussed with Consultants/Other Providers [ x] YES  [ ] NO    LABS:                        8.4    9.82  )-----------( 306      ( 28 Mar 2021 06:30 )             25.3     03-28    125<L>  |  95<L>  |  30<H>  ----------------------------<  228<H>  4.9   |  19  |  1.5    Ca    7.4<L>      28 Mar 2021 06:30  Phos  4.0     03-28  Mg     2.0     03-28    TPro  4.9<L>  /  Alb  2.3<L>  /  TBili  0.4  /  DBili  x   /  AST  24  /  ALT  19  /  AlkPhos  128<H>  03-28    LIVER FUNCTIONS - ( 28 Mar 2021 06:30 )  Alb: 2.3 g/dL / Pro: 4.9 g/dL / ALK PHOS: 128 U/L / ALT: 19 U/L / AST: 24 U/L / GGT: x                   RADIOLOGY & ADDITIONAL TESTS:    Imaging Personally Reviewed:  [x] YES  [ ] NO    HEALTH ISSUES - PROBLEM Dx:  Pyelonephritis  Pyelonephritis    Other hydronephrosis  Other hydronephrosis    Hydronephrosis, unspecified hydronephrosis type  Hydronephrosis, unspecified hydronephrosis type            MEDS:  acetaminophen   Tablet .. 650 milliGRAM(s) Oral every 6 hours PRN  ALBUTerol    90 MICROgram(s) HFA Inhaler 1 Puff(s) Inhalation every 6 hours  albuterol/ipratropium for Nebulization 3 milliLiter(s) Nebulizer every 6 hours PRN  aluminum hydroxide/magnesium hydroxide/simethicone Suspension 30 milliLiter(s) Oral every 6 hours PRN  aMIOdarone    Tablet 200 milliGRAM(s) Oral two times a day  apixaban 2.5 milliGRAM(s) Oral two times a day  bisacodyl Suppository 10 milliGRAM(s) Rectal daily PRN  chlorhexidine 4% Liquid 1 Application(s) Topical <User Schedule>  clopidogrel Tablet 75 milliGRAM(s) Oral daily  dextrose 40% Gel 15 Gram(s) Oral once  dextrose 5%. 1000 milliLiter(s) IV Continuous <Continuous>  dextrose 5%. 1000 milliLiter(s) IV Continuous <Continuous>  dextrose 50% Injectable 25 Gram(s) IV Push once  dextrose 50% Injectable 12.5 Gram(s) IV Push once  dextrose 50% Injectable 25 Gram(s) IV Push once  fluconAZOLE   Tablet 100 milliGRAM(s) Oral daily  glucagon  Injectable 1 milliGRAM(s) IntraMuscular once  insulin glargine Injectable (LANTUS) 10 Unit(s) SubCutaneous at bedtime  insulin lispro (ADMELOG) corrective regimen sliding scale   SubCutaneous three times a day before meals  insulin lispro Injectable (ADMELOG) 4 Unit(s) SubCutaneous three times a day before meals  levothyroxine 88 MICROGram(s) Oral daily  meropenem  IVPB 1000 milliGRAM(s) IV Intermittent every 12 hours  metoprolol tartrate 25 milliGRAM(s) Oral every 6 hours  multivitamin 1 Tablet(s) Oral daily  nystatin Ointment 1 Application(s) Topical two times a day  pantoprazole    Tablet 40 milliGRAM(s) Oral before breakfast  petrolatum white Ointment 1 Application(s) Topical four times a day PRN  polyethylene glycol 3350 17 Gram(s) Oral every 12 hours  senna 2 Tablet(s) Oral at bedtime

## 2021-03-28 NOTE — PROGRESS NOTE ADULT - SUBJECTIVE AND OBJECTIVE BOX
Nephrology progress note    THIS IS AN INCOMPLETE NOTE . FULL NOTE TO FOLLOW SHORTLY    Patient is seen and examined, events over the last 24 h noted .    Allergies:  latex (Pruritus; Rash)  Pineapple (Unknown)  sulfonamides (Unknown)    Hospital Medications:   MEDICATIONS  (STANDING):  ALBUTerol    90 MICROgram(s) HFA Inhaler 1 Puff(s) Inhalation every 6 hours  aMIOdarone    Tablet 200 milliGRAM(s) Oral two times a day  apixaban 2.5 milliGRAM(s) Oral two times a day  chlorhexidine 4% Liquid 1 Application(s) Topical <User Schedule>  clopidogrel Tablet 75 milliGRAM(s) Oral daily  dextrose 40% Gel 15 Gram(s) Oral once  dextrose 5%. 1000 milliLiter(s) (50 mL/Hr) IV Continuous <Continuous>  dextrose 5%. 1000 milliLiter(s) (100 mL/Hr) IV Continuous <Continuous>  dextrose 50% Injectable 25 Gram(s) IV Push once  dextrose 50% Injectable 25 Gram(s) IV Push once  dextrose 50% Injectable 12.5 Gram(s) IV Push once  fluconAZOLE   Tablet 100 milliGRAM(s) Oral daily  glucagon  Injectable 1 milliGRAM(s) IntraMuscular once  insulin glargine Injectable (LANTUS) 10 Unit(s) SubCutaneous at bedtime  insulin lispro (ADMELOG) corrective regimen sliding scale   SubCutaneous three times a day before meals  insulin lispro Injectable (ADMELOG) 4 Unit(s) SubCutaneous three times a day before meals  levothyroxine 88 MICROGram(s) Oral daily  meropenem  IVPB 1000 milliGRAM(s) IV Intermittent every 12 hours  metoprolol tartrate 25 milliGRAM(s) Oral every 6 hours  multivitamin 1 Tablet(s) Oral daily  nystatin Ointment 1 Application(s) Topical two times a day  pantoprazole    Tablet 40 milliGRAM(s) Oral before breakfast  polyethylene glycol 3350 17 Gram(s) Oral every 12 hours  senna 2 Tablet(s) Oral at bedtime  sodium chloride 0.9%. 1000 milliLiter(s) (75 mL/Hr) IV Continuous <Continuous>        VITALS:  T(F): 97.2 (21 @ 05:10), Max: 97.9 (21 @ 21:23)  HR: 66 (21 @ 05:10)  BP: 155/69 (21 @ 05:10)  RR: 18 (21 @ 05:10)  SpO2: 97% (21 @ 05:10)  Wt(kg): --     @ 07:01  -   @ 07:00  --------------------------------------------------------  IN: 900 mL / OUT: 0 mL / NET: 900 mL     @ 07:01  -   @ 07:00  --------------------------------------------------------  IN: 1050 mL / OUT: 0 mL / NET: 1050 mL     @ 07:01  -   @ 09:08  --------------------------------------------------------  IN: 240 mL / OUT: 0 mL / NET: 240 mL          PHYSICAL EXAM:  Constitutional: NAD  HEENT: anicteric sclera, oropharynx clear, MMM  Neck: No JVD  Respiratory: CTAB, no wheezes, rales or rhonchi  Cardiovascular: S1, S2, RRR  Gastrointestinal: BS+, soft, NT/ND  Extremities: No cyanosis or clubbing. No peripheral edema  :  No wiggins.   Skin: No rashes    LABS:      125<L>  |  95<L>  |  30<H>  ----------------------------<  228<H>  4.9   |  19  |  1.5    Ca    7.4<L>      28 Mar 2021 06:30  Phos  4.0       Mg     2.0         TPro  4.9<L>  /  Alb  2.3<L>  /  TBili  0.4  /  DBili      /  AST  24  /  ALT  19  /  AlkPhos  128<H>                            8.4    9.82  )-----------( 306      ( 28 Mar 2021 06:30 )             25.3       Urine Studies:  Urinalysis Basic - ( 25 Mar 2021 15:05 )    Color: Light Yellow / Appearance: Clear / S.008 / pH:   Gluc:  / Ketone: Negative  / Bili: Negative / Urobili: <2 mg/dL   Blood:  / Protein: Trace / Nitrite: Negative   Leuk Esterase: Moderate / RBC: 3 /HPF / WBC 7 /HPF   Sq Epi:  / Non Sq Epi: 1 /HPF / Bacteria: Negative      Potassium, Random Urine: 13 mmol/L ( @ 00:50)  Chloride, Random Urine: 41 ( @ 00:50)  Sodium, Random Urine: 59.0 mmoL/L ( @ 00:50)  Osmolality, Random Urine: 212 mos/kg ( @ 00:50)  Creatinine, Random Urine: 12 mg/dL ( @ 15:05)  Protein/Creatinine Ratio Calculation: 1.4 Ratio ( @ 15:05)    RADIOLOGY & ADDITIONAL STUDIES:   Nephrology progress note  Patient is seen and examined, events over the last 24 h noted .  lying in bed comfortable     Allergies:  latex (Pruritus; Rash)  Pineapple (Unknown)  sulfonamides (Unknown)    Hospital Medications:   MEDICATIONS  (STANDING):    ALBUTerol    90 MICROgram(s) HFA Inhaler 1 Puff(s) Inhalation every 6 hours  aMIOdarone    Tablet 200 milliGRAM(s) Oral two times a day  apixaban 2.5 milliGRAM(s) Oral two times a day  clopidogrel Tablet 75 milliGRAM(s) Oral daily  fluconAZOLE   Tablet 100 milliGRAM(s) Oral daily  glucagon  Injectable 1 milliGRAM(s) IntraMuscular once  insulin glargine Injectable (LANTUS) 10 Unit(s) SubCutaneous at bedtime  insulin lispro (ADMELOG) corrective regimen sliding scale   SubCutaneous three times a day before meals  insulin lispro Injectable (ADMELOG) 4 Unit(s) SubCutaneous three times a day before meals  levothyroxine 88 MICROGram(s) Oral daily  meropenem  IVPB 1000 milliGRAM(s) IV Intermittent every 12 hours  metoprolol tartrate 25 milliGRAM(s) Oral every 6 hours  multivitamin 1 Tablet(s) Oral daily  nystatin Ointment 1 Application(s) Topical two times a day  pantoprazole    Tablet 40 milliGRAM(s) Oral before breakfast  polyethylene glycol 3350 17 Gram(s) Oral every 12 hours  senna 2 Tablet(s) Oral at bedtime  sodium chloride 0.9%. 1000 milliLiter(s) (75 mL/Hr) IV Continuous <Continuous>        VITALS:  T(F): 97.2 (21 @ 05:10), Max: 97.9 (21 @ 21:23)  HR: 66 (21 @ 05:10)  BP: 155/69 (21 @ 05:10)  RR: 18 (21 @ 05:10)  SpO2: 97% (21 @ 05:10)       @ 07:01  -   @ 07:00  --------------------------------------------------------  IN: 900 mL / OUT: 0 mL / NET: 900 mL     @ 07:01  -   @ 07:00  --------------------------------------------------------  IN: 1050 mL / OUT: 0 mL / NET: 1050 mL     @ 07:01  -   @ 09:08  --------------------------------------------------------  IN: 240 mL / OUT: 0 mL / NET: 240 mL          PHYSICAL EXAM:  Respiratory: CTAB,  Cardiovascular: S1, S2, RRR  Gastrointestinal: BS+, soft, NT/ND  Extremities: No cyanosis or clubbing. No peripheral edema  :  No wiggins.   Skin: No rashes    LABS:      125<L>  |  95<L>  |  30<H>  ----------------------------<  228<H>  4.9   |  19  |  1.5  SODIUM TREND:  Sodium 125 [ @ 06:30]  Sodium 126 [ @ 17:44]  Sodium 123 [ @ 05:52]  Sodium 126 [ @ 05:33]  Sodium 123 [ @ 11:52]  Sodium 127 [ @ 04:40]  Sodium 123 [ @ 04:50]  Sodium 126 [ @ 04:30]  Sodium 126 [ @ 04:30]  Sodium 123 [ @ 04:50]    Osmolality, Serum: 283 mos/kg (. @ 16:30)      Ca    7.4<L>      28 Mar 2021 06:30  Phos  4.0       Mg     2.0         TPro  4.9<L>  /  Alb  2.3<L>  /  TBili  0.4  /  DBili      /  AST  24  /  ALT  19  /  AlkPhos  128<H>                            8.4    9.82  )-----------( 306      ( 28 Mar 2021 06:30 )             25.3     Thyroid Stimulating Hormone, Serum: 4.17 uIU/mL (21 @ 16:00)        Urine Studies:  Urinalysis Basic - ( 25 Mar 2021 15:05 )    Color: Light Yellow / Appearance: Clear / S.008 / pH:   Gluc:  / Ketone: Negative  / Bili: Negative / Urobili: <2 mg/dL   Blood:  / Protein: Trace / Nitrite: Negative   Leuk Esterase: Moderate / RBC: 3 /HPF / WBC 7 /HPF   Sq Epi:  / Non Sq Epi: 1 /HPF / Bacteria: Negative      Potassium, Random Urine: 13 mmol/L ( @ 00:50)  Chloride, Random Urine: 41 ( @ 00:50)  Sodium, Random Urine: 59.0 mmoL/L ( @ 00:50)  Osmolality, Random Urine: 212 mos/kg ( @ 00:50)  Creatinine, Random Urine: 12 mg/dL ( @ 15:05)  Protein/Creatinine Ratio Calculation: 1.4 Ratio ( @ 15:05)    RADIOLOGY & ADDITIONAL STUDIES:

## 2021-03-29 NOTE — PROGRESS NOTE ADULT - ASSESSMENT
PMHx DM II (has insulin pump), CAD s/p stenting in 2020, Lung nodules, Dry eyes, bilateral, PVD (peripheral vascular disease): S/p Right Peripheral leg Stent, Seasonal allergies, Skin cancer: Basal Cell Cancer face  (around nose) s/p MOHS procedure x 3, Anemia: Chronic, on Iron supplement, Hypothyroid, SSS (sick sinus syndrome): S/p PPM Sept 2017, Essential hypertension, Afib: 5+ yrs; on Eliquis presenting sp pyelo    ·	Hyponatremia chronic / hypovolemic on exam / hypochloremic   ·	serum osm wnl / High U osm c/w ADH / TSH within target / check  IF / UPEP SFLC rule out dysproteinemia, check TG level   ·	off iv fluids   ·	sodium level trending down , give 1 dose of lasix 20  ·	proteinuria around 1.2 g    ·	no need for 3 % saline   ·	will follow

## 2021-03-29 NOTE — PROGRESS NOTE ADULT - ASSESSMENT
75 yo F PMH DM on insulin pump, CAD s/p stenting in 2020, Lung nodules, Dry eyes, bilateral, PVD (peripheral vascular disease): S/p Right Peripheral leg Stent, Seasonal allergies, Skin cancer: Basal Cell Cancer face  (around nose) s/p MOHS procedure x 3, Anemia: Chronic, on Iron supplement, Hypothyroid, SSS (sick sinus syndrome): S/p PPM, Pacemaker: Sept 2017, Essential hypertension, Afib: 5+ yrs; on Eliquis presented here for nausea, vomiting and decreased PO intake for past 2 days. The symptoms started acutely and she was in her usual state of health before. She also felt weak and her blood sugars were higher. She had a recent hx of UTI which responded well to ciprofloxacin.     # Sepsis POA secondary to acute right pyelonephritis with possibly distal ureteral obstruction with moderate right hydrouretero nephrosis secondary to ESBL E coli  - bloodCulture Results: Growth in aerobic and anaerobic bottles: Escherichia coli ESBL (03.14.21 @ 18:30)  - blood Culture Results: No Growth Final (03.23.21 @ 04:37)  - urine cultureOrganism: Escherichia coli ESBL (03.14.21 @ 22:50)  - CT Abdomen and Pelvis No Cont (03.14.21 @ 19:33) >The right kidney is enlarged with perinephric fat stranding and moderate hydroureteronephrosis to the level of the distal ureter. No radiopaque calculus is identified. Cystoscopy suggested to evaluate the right ureteral vesicle junction to evaluate for an obstructing lesion. A call back request was submitted  - NM Renal/Lasix (NM Renal/Lasix .) (03.19.21 @ 22:49): 1. Significantly holdup of MAG3 radiotracer within bilateral kidneys with poor emptying bilaterally. No hydronephrosis demonstrated bilaterally. Findings suggestive of poor renal function. The left kidney accounts for 67% of total renal function and the right kidney accounts for 33% of total renal function consistent with significant difference. Markedly delayed post-Lasix T1/2 bilaterally without hydronephrosis. Findings again suggestive of overall poor renal function.  - c/w  Meropenem  1g q 12 hours, on discharge, can change to Ertapenem 1g daily for 10 days from culture clearance (3/21-3/31)  - urology recommends outpt F/u    # oral candidiasis  - c/w flucanozole    # Chronic  Hyponatremia   - Osmolality, Random Urine: 212 mos/kg (03.26.21 @ 00:50)  - Osmolality, Serum: 283 mos/kg (03.14.21 @ 16:30)  - Sodium, Random Urine: 59.0: mmoL/L (03.26.21 @ 00:50)  - Thyroid Stimulating Hormone, Serum: 4.17 uIU/mL (03.17.21 @ 16:00)  - Creatinine, Random Urine: 12:mg/dL (03.25.21 @ 15:05)  - Protein/Creatinine Ratio Calculation: 1.4 Ratio (03.25.21 @ 15:05)  - F/u IF, UPEP  - lasix 20mg IV x1 now  - Fluid restriction    # Acute kidney injury resolved  # CKD stage3     # Chronic afib S/p PPM  - c/w  amiodarone, metoprolol, eliquis    # DM type 2 with hyperglycemia  - A1C with Estimated Average Glucose Result: 8.1% (03.15.21 @ 06:16)  - monitor FS  - c/w lantus,  increase lispro    # Elevated troponin sec type2 MI sec to sepsis  # H/o CAD s/p stent  -  TTE Echo Complete w/o Contrast w/ Doppler (03.18.21 @ 13:32) >Basal inferolateral segment is abnorma EF of 58 %.Moderate tricuspid regurgitation. borderline pulmonary hypertension.  - c/w  plavix, metoprolol    # Hypothyroidism  - c/w synthroid    # PVD (peripheral vascular disease): S/p Right Peripheral leg Stent  - c/w plavix    # Full code    #Pending: F/u IF, UPEP, Monitor BMP  # Discussed plan of care with patient  # Disposition: [facility]

## 2021-03-29 NOTE — PROGRESS NOTE ADULT - SUBJECTIVE AND OBJECTIVE BOX
Patient is a 76y old  Female who presents with a chief complaint of Sepsis (29 Mar 2021 07:45)    Patient was seen and examined.  no new events    PAST MEDICAL & SURGICAL HISTORY:  Inclusion body myositis (IBM)  Lung nodules  Transfusion history  S/p Hysterectomy  Dry eyes, bilateral  PVD (peripheral vascular disease), S/p Right Peripheral leg Stent  Seasonal allergies  Skin cancer- Basal Cell Cancer face  (around nose) s/p MOHS procedure x 3  Anemia Chronic, on Iron supplement  Hypothyroid  SSS (sick sinus syndrome),S/p PPM  Diabetes-Insulin dependent - (has insulin Pump)  Pacemaker- Sept 2017  Palpitations-occasionally; not for couple yrs  Essential hypertension  Afib-5+ yrs; on Eliquis  S/P ablation of atrial fibrillation  S/P cataract surgery    History of surgery  Right Peripheral Stent 5 yrs  H/O total hysterectomy, 25+ yrs ago      Allergies  latex (Pruritus; Rash)  sulfonamides (Unknown)    Intolerances  Pineapple (Unknown)    MEDICATIONS  (STANDING):  ALBUTerol    90 MICROgram(s) HFA Inhaler 1 Puff(s) Inhalation every 6 hours  aMIOdarone    Tablet 200 milliGRAM(s) Oral two times a day  apixaban 2.5 milliGRAM(s) Oral two times a day  chlorhexidine 4% Liquid 1 Application(s) Topical <User Schedule>  clopidogrel Tablet 75 milliGRAM(s) Oral daily  fluconAZOLE   Tablet 100 milliGRAM(s) Oral daily  furosemide   Injectable 20 milliGRAM(s) IV Push once  glucagon  Injectable 1 milliGRAM(s) IntraMuscular once  insulin glargine Injectable (LANTUS) 20 Unit(s) SubCutaneous at bedtime  insulin lispro (ADMELOG) corrective regimen sliding scale   SubCutaneous three times a day before meals  insulin lispro Injectable (ADMELOG) 4 Unit(s) SubCutaneous three times a day before meals  levothyroxine 88 MICROGram(s) Oral daily  meropenem  IVPB 1000 milliGRAM(s) IV Intermittent every 12 hours  metoprolol tartrate 25 milliGRAM(s) Oral every 6 hours  multivitamin 1 Tablet(s) Oral daily  nystatin Ointment 1 Application(s) Topical two times a day  pantoprazole    Tablet 40 milliGRAM(s) Oral before breakfast  polyethylene glycol 3350 17 Gram(s) Oral every 12 hours  senna 2 Tablet(s) Oral at bedtime    MEDICATIONS  (PRN):  acetaminophen   Tablet .. 650 milliGRAM(s) Oral every 6 hours PRN Temp greater or equal to 38C (100.4F), Mild Pain (1 - 3)  albuterol/ipratropium for Nebulization 3 milliLiter(s) Nebulizer every 6 hours PRN Shortness of Breath and/or Wheezing  aluminum hydroxide/magnesium hydroxide/simethicone Suspension 30 milliLiter(s) Oral every 6 hours PRN Dyspepsia  bisacodyl Suppository 10 milliGRAM(s) Rectal daily PRN Constipation  petrolatum white Ointment 1 Application(s) Topical four times a day PRN dry lips    Vital Signs Last 24 Hrs  T(C): 35.4  T(F): 95.7  HR: 60  BP: 143/64  BP(mean): --  RR: 18  SpO2: 100%    O/E:  Awake, alert, not in distress.  HEENT: atraumatic, EOMI. oral candidiasis+  Chest: decreased breath sounds at bases  CVS: SIS2 +,irregular  no murmur.  P/A: Soft, BS+  CNS: non focal.  Ext:  lower ext edema+  All systems reviewed positive findings as above.      POCT Blood Glucose.: 203 mg/dL (29 Mar 2021 12:09)  POCT Blood Glucose.: 90 mg/dL (29 Mar 2021 07:45)  POCT Blood Glucose.: 203 mg/dL (28 Mar 2021 21:19)  POCT Blood Glucose.: 360 mg/dL (28 Mar 2021 16:04)                        7.7<L>  7.35  )-----------( 321      ( 29 Mar 2021 06:15 )             22.9<L>                        8.4<L>  9.82  )-----------( 306      ( 28 Mar 2021 06:30 )             25.3<L>    03-29    123<L>  |  93<L>  |  30<H>  ----------------------------<  95  4.7   |  23  |  1.5  03-28    125<L>  |  95<L>  |  30<H>  ----------------------------<  228<H>  4.9   |  19  |  1.5    Ca    7.9<L>      29 Mar 2021 06:15  Ca    7.4<L>      28 Mar 2021 06:30  Ca    7.5<L>      27 Mar 2021 17:44  Phos  4.0     03-28  Mg     1.9     03-29    TPro  5.1<L>  /  Alb  2.3<L>  /  TBili  0.3  /  DBili  x   /  AST  26  /  ALT  20  /  AlkPhos  133<H>  03-29  TPro  4.9<L>  /  Alb  2.3<L>  /  TBili  0.4  /  DBili  x   /  AST  24  /  ALT  19  /  AlkPhos  128<H>  03-28

## 2021-03-29 NOTE — PROGRESS NOTE ADULT - SUBJECTIVE AND OBJECTIVE BOX
seen and examined  no distress   lying comfortable       PAST HISTORY  --------------------------------------------------------------------------------  No significant changes to PMH, PSH, FHx, SHx, unless otherwise noted    ALLERGIES & MEDICATIONS  --------------------------------------------------------------------------------  Allergies    latex (Pruritus; Rash)  sulfonamides (Unknown)    Intolerances    Pineapple (Unknown)    Standing Inpatient Medications  ALBUTerol    90 MICROgram(s) HFA Inhaler 1 Puff(s) Inhalation every 6 hours  aMIOdarone    Tablet 200 milliGRAM(s) Oral two times a day  apixaban 2.5 milliGRAM(s) Oral two times a day  chlorhexidine 4% Liquid 1 Application(s) Topical <User Schedule>  clopidogrel Tablet 75 milliGRAM(s) Oral daily  dextrose 40% Gel 15 Gram(s) Oral once  dextrose 5%. 1000 milliLiter(s) IV Continuous <Continuous>  dextrose 5%. 1000 milliLiter(s) IV Continuous <Continuous>  dextrose 50% Injectable 25 Gram(s) IV Push once  dextrose 50% Injectable 12.5 Gram(s) IV Push once  dextrose 50% Injectable 25 Gram(s) IV Push once  fluconAZOLE   Tablet 100 milliGRAM(s) Oral daily  glucagon  Injectable 1 milliGRAM(s) IntraMuscular once  insulin glargine Injectable (LANTUS) 20 Unit(s) SubCutaneous at bedtime  insulin lispro (ADMELOG) corrective regimen sliding scale   SubCutaneous three times a day before meals  insulin lispro Injectable (ADMELOG) 4 Unit(s) SubCutaneous three times a day before meals  levothyroxine 88 MICROGram(s) Oral daily  meropenem  IVPB 1000 milliGRAM(s) IV Intermittent every 12 hours  metoprolol tartrate 25 milliGRAM(s) Oral every 6 hours  multivitamin 1 Tablet(s) Oral daily  nystatin Ointment 1 Application(s) Topical two times a day  pantoprazole    Tablet 40 milliGRAM(s) Oral before breakfast  polyethylene glycol 3350 17 Gram(s) Oral every 12 hours  senna 2 Tablet(s) Oral at bedtime    PRN Inpatient Medications  acetaminophen   Tablet .. 650 milliGRAM(s) Oral every 6 hours PRN  albuterol/ipratropium for Nebulization 3 milliLiter(s) Nebulizer every 6 hours PRN  aluminum hydroxide/magnesium hydroxide/simethicone Suspension 30 milliLiter(s) Oral every 6 hours PRN  bisacodyl Suppository 10 milliGRAM(s) Rectal daily PRN  petrolatum white Ointment 1 Application(s) Topical four times a day PRN          VITALS/PHYSICAL EXAM  --------------------------------------------------------------------------------  T(C): 35.4 (03-29-21 @ 05:10), Max: 37.3 (03-28-21 @ 21:43)  HR: 60 (03-29-21 @ 05:10) (60 - 61)  BP: 143/64 (03-29-21 @ 05:10) (141/66 - 155/66)  RR: 18 (03-29-21 @ 05:10) (18 - 18)  SpO2: 99% (03-29-21 @ 05:10) (97% - 100%)  Wt(kg): --        03-28-21 @ 07:01  -  03-29-21 @ 07:00  --------------------------------------------------------  IN: 1140 mL / OUT: 0 mL / NET: 1140 mL      Physical Exam:  	Gen: NAD, well-appearing  	Pulm: CTA B/L  	CV: S1S2; no rub  	Abd: + soft, nontender/nondistended    LABS/STUDIES  --------------------------------------------------------------------------------              7.7    7.35  >-----------<  321      [03-29-21 @ 06:15]              22.9     123  |  93  |  30  ----------------------------<  95      [03-29-21 @ 06:15]  4.7   |  23  |  1.5        Ca     7.9     [03-29-21 @ 06:15]      Mg     1.9     [03-29-21 @ 06:15]      Phos  4.0     [03-28-21 @ 06:30]    TPro  5.1  /  Alb  2.3  /  TBili  0.3  /  DBili  x   /  AST  26  /  ALT  20  /  AlkPhos  133  [03-29-21 @ 06:15]    Creatinine Trend:  SCr 1.5 [03-29 @ 06:15]  SCr 1.5 [03-28 @ 06:30]  SCr 1.5 [03-27 @ 17:44]  SCr 1.4 [03-26 @ 05:52]  SCr 1.5 [03-25 @ 05:33]    Urinalysis - [03-25-21 @ 15:05]      Color Light Yellow / Appearance Clear / SG 1.008 / pH 8.0      Gluc Negative / Ketone Negative  / Bili Negative / Urobili <2 mg/dL       Blood Negative / Protein Trace / Leuk Est Moderate / Nitrite Negative      RBC 3 / WBC 7 / Hyaline 0 / Gran  / Sq Epi  / Non Sq Epi 1 / Bacteria Negative    Urine Creatinine 12      [03-25-21 @ 15:05]  Urine Protein 17      [03-25-21 @ 15:05]  Urine Sodium 59.0      [03-26-21 @ 00:50]  Urine Potassium 13      [03-27-21 @ 00:50]  Urine Chloride 41      [03-26-21 @ 00:50]  Urine Osmolality 212      [03-26-21 @ 00:50]    PTH -- (Ca --)      [03-19-21 @ 18:05]   76  TSH 4.17      [03-17-21 @ 16:00]

## 2021-03-30 NOTE — PROGRESS NOTE ADULT - ASSESSMENT
PMHx DM II (has insulin pump), CAD s/p stenting in 2020, Lung nodules, Dry eyes, bilateral, PVD (peripheral vascular disease): S/p Right Peripheral leg Stent, Seasonal allergies, Skin cancer: Basal Cell Cancer face  (around nose) s/p MOHS procedure x 3, Anemia: Chronic, on Iron supplement, Hypothyroid, SSS (sick sinus syndrome): S/p PPM Sept 2017, Essential hypertension, Afib: 5+ yrs; on Eliquis presenting sp pyelo    ·	Hyponatremia chronic / hypovolemic on exam / hypochloremic   ·	serum osm wnl / High U osm c/w ADH / TSH within target / check  IF / UPEP SFLC rule out dysproteinemia, check TG level   ·	off iv fluids   ·	sodium lstable check repeat today  , give 1 dose of lasix 20 of sodium decreasing   ·	proteinuria around 1.2 g    ·	no need for 3 % saline   ·	on isiah, check  f/up   ·	will follow

## 2021-03-30 NOTE — PROGRESS NOTE ADULT - SUBJECTIVE AND OBJECTIVE BOX
Patient is a 76y old  Female who presents with a chief complaint of Sepsis (29 Mar 2021 07:45)    Patient was seen and examined.  no new events    PAST MEDICAL & SURGICAL HISTORY:  Inclusion body myositis (IBM)  Lung nodules  Transfusion history  S/p Hysterectomy  Dry eyes, bilateral  PVD (peripheral vascular disease), S/p Right Peripheral leg Stent  Seasonal allergies  Skin cancer- Basal Cell Cancer face  (around nose) s/p MOHS procedure x 3  Anemia Chronic, on Iron supplement  Hypothyroid  SSS (sick sinus syndrome),S/p PPM  Diabetes-Insulin dependent - (has insulin Pump)  Pacemaker- Sept 2017  Palpitations-occasionally; not for couple yrs  Essential hypertension  Afib-5+ yrs; on Eliquis  S/P ablation of atrial fibrillation  S/P cataract surgery    History of surgery  Right Peripheral Stent 5 yrs  H/O total hysterectomy, 25+ yrs ago      Allergies  latex (Pruritus; Rash)  sulfonamides (Unknown)    Intolerances  Pineapple (Unknown)    MEDICATIONS  (STANDING):  ALBUTerol    90 MICROgram(s) HFA Inhaler 1 Puff(s) Inhalation every 6 hours  aMIOdarone    Tablet 200 milliGRAM(s) Oral two times a day  apixaban 2.5 milliGRAM(s) Oral two times a day  chlorhexidine 4% Liquid 1 Application(s) Topical <User Schedule>  clopidogrel Tablet 75 milliGRAM(s) Oral daily  fluconAZOLE   Tablet 100 milliGRAM(s) Oral daily  glucagon  Injectable 1 milliGRAM(s) IntraMuscular once  insulin glargine Injectable (LANTUS) 20 Unit(s) SubCutaneous at bedtime  insulin lispro (ADMELOG) corrective regimen sliding scale   SubCutaneous three times a day before meals  insulin lispro Injectable (ADMELOG) 4 Unit(s) SubCutaneous three times a day before meals  levothyroxine 88 MICROGram(s) Oral daily  meropenem  IVPB 1000 milliGRAM(s) IV Intermittent every 12 hours  metoprolol tartrate 25 milliGRAM(s) Oral every 6 hours  multivitamin 1 Tablet(s) Oral daily  nystatin Ointment 1 Application(s) Topical two times a day  pantoprazole    Tablet 40 milliGRAM(s) Oral before breakfast  polyethylene glycol 3350 17 Gram(s) Oral every 12 hours  senna 2 Tablet(s) Oral at bedtime    MEDICATIONS  (PRN):  acetaminophen   Tablet .. 650 milliGRAM(s) Oral every 6 hours PRN Temp greater or equal to 38C (100.4F), Mild Pain (1 - 3)  albuterol/ipratropium for Nebulization 3 milliLiter(s) Nebulizer every 6 hours PRN Shortness of Breath and/or Wheezing  aluminum hydroxide/magnesium hydroxide/simethicone Suspension 30 milliLiter(s) Oral every 6 hours PRN Dyspepsia  bisacodyl Suppository 10 milliGRAM(s) Rectal daily PRN Constipation  petrolatum white Ointment 1 Application(s) Topical four times a day PRN dry lips    T(C): 36.1 (03-30-21 @ 06:28), Max: 36.5 (03-29-21 @ 21:43)  HR: 62 (03-30-21 @ 06:28) (60 - 72)  BP: 156/68 (03-30-21 @ 06:28) (150/69 - 172/72)  RR: 18 (03-30-21 @ 06:28) (18 - 18)  SpO2: --    O/E:  Awake, alert, not in distress.  HEENT: atraumatic, EOMI. oral candidiasis+  Chest: decreased breath sounds at bases  CVS: SIS2 +,irregular  no murmur.  P/A: Soft, BS+  CNS: non focal.  Ext:  lower ext edema+  All systems reviewed positive findings as above.                          7.7<L>  7.35  )-----------( 321      ( 29 Mar 2021 06:15 )             22.9<L>  03-29    123<L>  |  93<L>  |  30<H>  ----------------------------<  95  4.7   |  23  |  1.5    Ca    7.9<L>      29 Mar 2021 06:15  Mg     1.9     03-29    TPro  5.0<L>  /  Alb  x   /  TBili  x   /  DBili  x   /  AST  x   /  ALT  x   /  AlkPhos  x   03-29

## 2021-03-30 NOTE — PROGRESS NOTE ADULT - SUBJECTIVE AND OBJECTIVE BOX
seen and examined  no distress   lying comfortable         PAST HISTORY  --------------------------------------------------------------------------------  No significant changes to PMH, PSH, FHx, SHx, unless otherwise noted    ALLERGIES & MEDICATIONS  --------------------------------------------------------------------------------  Allergies    latex (Pruritus; Rash)  sulfonamides (Unknown)    Intolerances    Pineapple (Unknown)    Standing Inpatient Medications  ALBUTerol    90 MICROgram(s) HFA Inhaler 1 Puff(s) Inhalation every 6 hours  aMIOdarone    Tablet 200 milliGRAM(s) Oral two times a day  apixaban 2.5 milliGRAM(s) Oral two times a day  chlorhexidine 4% Liquid 1 Application(s) Topical <User Schedule>  clopidogrel Tablet 75 milliGRAM(s) Oral daily  dextrose 40% Gel 15 Gram(s) Oral once  dextrose 5%. 1000 milliLiter(s) IV Continuous <Continuous>  dextrose 5%. 1000 milliLiter(s) IV Continuous <Continuous>  dextrose 50% Injectable 25 Gram(s) IV Push once  dextrose 50% Injectable 12.5 Gram(s) IV Push once  dextrose 50% Injectable 25 Gram(s) IV Push once  fluconAZOLE   Tablet 100 milliGRAM(s) Oral daily  glucagon  Injectable 1 milliGRAM(s) IntraMuscular once  insulin glargine Injectable (LANTUS) 20 Unit(s) SubCutaneous at bedtime  insulin lispro (ADMELOG) corrective regimen sliding scale   SubCutaneous three times a day before meals  insulin lispro Injectable (ADMELOG) 4 Unit(s) SubCutaneous three times a day before meals  levothyroxine 88 MICROGram(s) Oral daily  meropenem  IVPB 1000 milliGRAM(s) IV Intermittent every 12 hours  metoprolol tartrate 25 milliGRAM(s) Oral every 6 hours  multivitamin 1 Tablet(s) Oral daily  nystatin Ointment 1 Application(s) Topical two times a day  pantoprazole    Tablet 40 milliGRAM(s) Oral before breakfast  polyethylene glycol 3350 17 Gram(s) Oral every 12 hours  senna 2 Tablet(s) Oral at bedtime    PRN Inpatient Medications  acetaminophen   Tablet .. 650 milliGRAM(s) Oral every 6 hours PRN  albuterol/ipratropium for Nebulization 3 milliLiter(s) Nebulizer every 6 hours PRN  aluminum hydroxide/magnesium hydroxide/simethicone Suspension 30 milliLiter(s) Oral every 6 hours PRN  bisacodyl Suppository 10 milliGRAM(s) Rectal daily PRN  petrolatum white Ointment 1 Application(s) Topical four times a day PRN      VITALS/PHYSICAL EXAM  --------------------------------------------------------------------------------  T(C): 36.1 (03-30-21 @ 06:28), Max: 36.5 (03-29-21 @ 21:43)  HR: 62 (03-30-21 @ 06:28) (60 - 72)  BP: 156/68 (03-30-21 @ 06:28) (150/69 - 172/72)  RR: 18 (03-30-21 @ 06:28) (18 - 18)  SpO2: 100% (03-29-21 @ 08:00) (100% - 100%)  Wt(kg): --        Physical Exam:  	Gen: NAD  	Pulm: CTA B/L  	CV:  S1S2; no rub  	Abd: distended        LABS/STUDIES  --------------------------------------------------------------------------------              7.7    7.35  >-----------<  321      [03-29-21 @ 06:15]              22.9     123  |  93  |  30  ----------------------------<  95      [03-29-21 @ 06:15]  4.7   |  23  |  1.5        Ca     7.9     [03-29-21 @ 06:15]      Mg     1.9     [03-29-21 @ 06:15]    TPro  5.0  /  Alb  x   /  TBili  x   /  DBili  x   /  AST  x   /  ALT  x   /  AlkPhos  x   [03-29-21 @ 09:30]      Creatinine Trend:  SCr 1.5 [03-29 @ 06:15]  SCr 1.5 [03-28 @ 06:30]  SCr 1.5 [03-27 @ 17:44]  SCr 1.4 [03-26 @ 05:52]  SCr 1.5 [03-25 @ 05:33]    Urinalysis - [03-25-21 @ 15:05]      Color Light Yellow / Appearance Clear / SG 1.008 / pH 8.0      Gluc Negative / Ketone Negative  / Bili Negative / Urobili <2 mg/dL       Blood Negative / Protein Trace / Leuk Est Moderate / Nitrite Negative      RBC 3 / WBC 7 / Hyaline 0 / Gran  / Sq Epi  / Non Sq Epi 1 / Bacteria Negative    Urine Creatinine 12      [03-25-21 @ 15:05]  Urine Protein 17      [03-25-21 @ 15:05]  Urine Sodium 59.0      [03-26-21 @ 00:50]  Urine Potassium 13      [03-27-21 @ 00:50]  Urine Chloride 41      [03-26-21 @ 00:50]  Urine Osmolality 212      [03-26-21 @ 00:50]    PTH -- (Ca --)      [03-19-21 @ 18:05]   76  TSH 4.17      [03-17-21 @ 16:00]

## 2021-03-30 NOTE — PROGRESS NOTE ADULT - ASSESSMENT
75 yo F PMH DM on insulin pump, CAD s/p stenting in 2020, Lung nodules, Dry eyes, bilateral, PVD (peripheral vascular disease): S/p Right Peripheral leg Stent, Seasonal allergies, Skin cancer: Basal Cell Cancer face  (around nose) s/p MOHS procedure x 3, Anemia: Chronic, on Iron supplement, Hypothyroid, SSS (sick sinus syndrome): S/p PPM, Pacemaker: Sept 2017, Essential hypertension, Afib: 5+ yrs; on Eliquis presented here for nausea, vomiting and decreased PO intake for past 2 days. The symptoms started acutely and she was in her usual state of health before. She also felt weak and her blood sugars were higher. She had a recent hx of UTI which responded well to ciprofloxacin.     # Sepsis POA secondary to acute right pyelonephritis with possibly distal ureteral obstruction with moderate right hydrouretero nephrosis secondary to ESBL E coli  - bloodCulture Results: Growth in aerobic and anaerobic bottles: Escherichia coli ESBL (03.14.21 @ 18:30)  - blood Culture Results: No Growth Final (03.23.21 @ 04:37)  - urine cultureOrganism: Escherichia coli ESBL (03.14.21 @ 22:50)  - CT Abdomen and Pelvis No Cont (03.14.21 @ 19:33) >The right kidney is enlarged with perinephric fat stranding and moderate hydroureteronephrosis to the level of the distal ureter. No radiopaque calculus is identified. Cystoscopy suggested to evaluate the right ureteral vesicle junction to evaluate for an obstructing lesion. A call back request was submitted  - NM Renal/Lasix (NM Renal/Lasix .) (03.19.21 @ 22:49): 1. Significantly holdup of MAG3 radiotracer within bilateral kidneys with poor emptying bilaterally. No hydronephrosis demonstrated bilaterally. Findings suggestive of poor renal function. The left kidney accounts for 67% of total renal function and the right kidney accounts for 33% of total renal function consistent with significant difference. Markedly delayed post-Lasix T1/2 bilaterally without hydronephrosis. Findings again suggestive of overall poor renal function.  - c/w  Meropenem  1g q 12 hours, on discharge, can change to Ertapenem 1g daily for 10 days from culture clearance (3/21-3/31)  - urology recommends outpt F/u    # oral candidiasis  - c/w flucanozole    # Chronic  Hyponatremia   - Osmolality, Random Urine: 212 mos/kg (03.26.21 @ 00:50)  - Osmolality, Serum: 283 mos/kg (03.14.21 @ 16:30)  - Sodium, Random Urine: 59.0: mmoL/L (03.26.21 @ 00:50)  - Thyroid Stimulating Hormone, Serum: 4.17 uIU/mL (03.17.21 @ 16:00)  - Creatinine, Random Urine: 12:mg/dL (03.25.21 @ 15:05)  - Protein/Creatinine Ratio Calculation: 1.4 Ratio (03.25.21 @ 15:05)  - F/u IF, UPEP  -S/p  lasix 20mg IV x1  on 3/29/21  - Fluid restriction  - F/u BMP    # Acute kidney injury resolved  # CKD stage3     # Chronic afib S/p PPM  - c/w  amiodarone, metoprolol, eliquis    # DM type 2 with hyperglycemia  - A1C with Estimated Average Glucose Result: 8.1% (03.15.21 @ 06:16)  - monitor FS  - c/w lantus,  lispro    # Elevated troponin sec type2 MI sec to sepsis  # H/o CAD s/p stent  -  TTE Echo Complete w/o Contrast w/ Doppler (03.18.21 @ 13:32) >Basal inferolateral segment is abnorma EF of 58 %.Moderate tricuspid regurgitation. borderline pulmonary hypertension.  - c/w  plavix, metoprolol    # Hypothyroidism  - c/w synthroid    # PVD (peripheral vascular disease): S/p Right Peripheral leg Stent  - c/w plavix    # Full code    #Pending: F/u IF, UPEP,  F/u  BMP  # Discussed plan of care with patient  # Disposition: [facility]

## 2021-03-31 NOTE — CHART NOTE - NSCHARTNOTEFT_GEN_A_CORE
pt noted to have rash neck down all throughout the body.  pt examined by Dr. Anali mae down time - unable to dc or place the order in once sunrise is up.  will dc Meropenem , ID notified. spoke with Dr. Josue  pt given Benadryl 25mg Stat and c/w benadryl 25mg q6h PRN  will continue to monitor

## 2021-03-31 NOTE — PROGRESS NOTE ADULT - ASSESSMENT
76F with PMH of DMII (has insulin pump), CAD s/p stenting in 2020, Lung nodules, Dry eyes, bilateral, PVD s/p RLE stent, BCC s/p MOHS procedure x 3, chronic anemia, hypothyroidism, SSS s/p PPM 9/2017, HTN, Afib on eliquis, admitted for pyelonephritis/bacteremia.  Nephrology consulted for hyponatremia.      ·	Hyponatremia chronic / euvolemic    ·	TEOFILO iso pyelonephritis/bacteremia  ·	serum osm wnl / High urine osm/urine Na c/w high ADH state / TSH ok  ·	serum immunofixation noted with weak IgM lambda band identified,  f/u serum flc ratio, hem/onc eval   ·	Na level improved, repeat lasix 20mg if Na level decreasing  ·	fluid restriction 1L daily   ·	urine pr/cr ratio 1.4, creatinine above baseline last year, last renal ultrasound noted w/o right hydronephrosis (resolved since previous), appreciate  f/u   ·	on meropenem, appreciate ID f/u     Recall nephrology as needed

## 2021-03-31 NOTE — PROGRESS NOTE ADULT - SUBJECTIVE AND OBJECTIVE BOX
Nephrology Progress Note    SOWMYA AMADO  MRN-[id number]  76y  Female    S:  Patient is seen and examined, events over the last 24h noted.    O:  Allergies:  latex (Pruritus; Rash)  Pineapple (Unknown)  sulfonamides (Unknown)    Hospital Medications:   MEDICATIONS  (STANDING):  ALBUTerol    90 MICROgram(s) HFA Inhaler 1 Puff(s) Inhalation every 6 hours  aMIOdarone    Tablet 200 milliGRAM(s) Oral two times a day  apixaban 2.5 milliGRAM(s) Oral two times a day  chlorhexidine 4% Liquid 1 Application(s) Topical <User Schedule>  clopidogrel Tablet 75 milliGRAM(s) Oral daily  dextrose 40% Gel 15 Gram(s) Oral once  dextrose 5%. 1000 milliLiter(s) (50 mL/Hr) IV Continuous <Continuous>  dextrose 5%. 1000 milliLiter(s) (100 mL/Hr) IV Continuous <Continuous>  dextrose 50% Injectable 25 Gram(s) IV Push once  dextrose 50% Injectable 12.5 Gram(s) IV Push once  dextrose 50% Injectable 25 Gram(s) IV Push once  fluconAZOLE   Tablet 100 milliGRAM(s) Oral daily  glucagon  Injectable 1 milliGRAM(s) IntraMuscular once  insulin glargine Injectable (LANTUS) 20 Unit(s) SubCutaneous at bedtime  insulin lispro (ADMELOG) corrective regimen sliding scale   SubCutaneous three times a day before meals  insulin lispro Injectable (ADMELOG) 4 Unit(s) SubCutaneous three times a day before meals  levothyroxine 88 MICROGram(s) Oral daily  meropenem  IVPB 1000 milliGRAM(s) IV Intermittent every 12 hours  metoprolol tartrate 25 milliGRAM(s) Oral every 6 hours  multivitamin 1 Tablet(s) Oral daily  nystatin Ointment 1 Application(s) Topical two times a day  pantoprazole    Tablet 40 milliGRAM(s) Oral before breakfast  polyethylene glycol 3350 17 Gram(s) Oral every 12 hours  senna 2 Tablet(s) Oral at bedtime    MEDICATIONS  (PRN):  acetaminophen   Tablet .. 650 milliGRAM(s) Oral every 6 hours PRN Temp greater or equal to 38C (100.4F), Mild Pain (1 - 3)  albuterol/ipratropium for Nebulization 3 milliLiter(s) Nebulizer every 6 hours PRN Shortness of Breath and/or Wheezing  aluminum hydroxide/magnesium hydroxide/simethicone Suspension 30 milliLiter(s) Oral every 6 hours PRN Dyspepsia  bisacodyl Suppository 10 milliGRAM(s) Rectal daily PRN Constipation  petrolatum white Ointment 1 Application(s) Topical four times a day PRN dry lips    Home Medications:  Home Medications:  albuterol 90 mcg/inh inhalation aerosol: 1 puff(s) inhaled every 6 hours (24 Mar 2021 11:25)  amiodarone 200 mg oral tablet: 1 tab(s) orally 2 times a day (24 Mar 2021 10:48)  amlodipine-benazepril 5 mg-40 mg oral capsule: 1 cap(s) orally once a day (at bedtime) (2020 08:25)  Gamunex 10% intravenous solution: every 5 weeks (2020 08:25)  insulin: Insulin Pump (2020 08:25)  levothyroxine 88 mcg (0.088 mg) oral tablet: 1 tab(s) orally once a day (2020 08:25)  metoprolol tartrate 25 mg oral tablet: 1 tab(s) orally every 6 hours (24 Mar 2021 10:48)  Multiple Vitamins oral tablet: 1 tab(s) orally once a day (2020 08:25)  rosuvastatin 10 mg oral tablet: 1 tab(s) orally every other day (2020 08:25)  Vitamin C 250 mg oral tablet: 1 tab(s) orally 2 times a day (2020 08:25)      VITALS:  Daily     Daily   T(F): 95.6 (21 @ 06:49), Max: 98.1 (21 @ 20:00)  HR: 60 (21 @ 06:49)  BP: 136/63 (21 @ 06:49)  RR: 18 (21 @ 06:49)  SpO2: 99% (21 @ 20:00)  Wt(kg): --  I&O's Detail    30 Mar 2021 07:01  -  31 Mar 2021 07:00  --------------------------------------------------------  IN:    IV PiggyBack: 50 mL    Oral Fluid: 120 mL  Total IN: 170 mL    OUT:  Total OUT: 0 mL    Total NET: 170 mL        I&O's Summary    30 Mar 2021 07:01  -  31 Mar 2021 07:00  --------------------------------------------------------  IN: 170 mL / OUT: 0 mL / NET: 170 mL          PHYSICAL EXAM:  Gen: NAD  Resp: decreased bs at the bases  Card: S1/S2  Abd: soft  Extremities: edema  Vascular access:       LABS:        129<L>  |  97<L>  |  28<H>  ----------------------------<  68<L>  5.1<H>   |  23  |  1.7<H>    Ca    8.2<L>      31 Mar 2021 07:53    TPro  5.4<L>  /  Alb  2.5<L>  /  TBili  0.2  /  DBili      /  AST  31  /  ALT  19  /  AlkPhos  133<H>      eGFR if Non African American: 28 mL/min/1.73M2 (21 @ 07:53)  eGFR if African American: 33 mL/min/1.73M2 (21 @ 07:53)    Phosphorus Level, Serum: 4.0 mg/dL (21 @ 06:30)  Phosphorus Level, Serum: 4.1 mg/dL (21 @ 05:52)    Intact PTH: 76 pg/mL (21 @ 18:05)  Osmolality, Serum: 283 mos/kg (21 @ 16:30)                          8.6    8.15  )-----------( 376      ( 31 Mar 2021 07:53 )             26.1     Mean Cell Volume: 85.6 fL (21 @ 07:53)        Urine Studies:  Urinalysis Basic - ( 25 Mar 2021 15:05 )    Color: Light Yellow / Appearance: Clear / S.008 / pH:   Gluc:  / Ketone: Negative  / Bili: Negative / Urobili: <2 mg/dL   Blood:  / Protein: Trace / Nitrite: Negative   Leuk Esterase: Moderate / RBC: 3 /HPF / WBC 7 /HPF   Sq Epi:  / Non Sq Epi: 1 /HPF / Bacteria: Negative          Potassium, Random Urine: 13 mmol/L ( @ 00:50)  Chloride, Random Urine: 41 ( @ 00:50)    Creatinine trend:  Creatinine, Serum: 1.7 mg/dL (21 @ 07:53)  Creatinine, Serum: 1.6 mg/dL (21 @ 11:31)  Creatinine, Serum: 1.5 mg/dL (21 @ 06:15)  Creatinine, Serum: 1.5 mg/dL (21 @ 06:30)  Creatinine, Serum: 1.5 mg/dL (21 @ 17:44)  Creatinine, Serum: 1.4 mg/dL (21 @ 05:52)  Creatinine, Serum: 1.5 mg/dL (21 @ 05:33)  Creatinine, Serum: 1.5 mg/dL (21 @ 11:52)  Creatinine, Serum: 1.4 mg/dL (21 @ 04:40)  Creatinine, Serum: 1.3 mg/dL (21 @ 04:50)  Creatinine, Serum: 1.3 mg/dL (21 @ 04:30)  Creatinine, Serum: 1.3 mg/dL (21 @ 04:30)  Creatinine, Serum: 1.4 mg/dL (21 @ 04:50)  Creatinine, Serum: 1.3 mg/dL (21 @ 15:55)  Creatinine, Serum: 1.3 mg/dL (21 @ 12:04)  Creatinine, Serum: 1.4 mg/dL (21 @ 16:00)  Creatinine, Serum: 1.6 mg/dL (21 @ 04:35)  Creatinine, Serum: 1.6 mg/dL (21 @ 00:00)  Creatinine, Serum: 1.9 mg/dL (21 @ 11:10)  Creatinine, Serum: 1.9 mg/dL (03-15-21 @ 06:16)    Serum Protein Electrophoresis Interp: Weak Gamma-Migrating Paraprotein Identified (21 @ 09:30)  Immunofixation, Serum:   Weak IgM Lambda Band Identified    Reference Range: None Detected (21 @ 09:30)    US Renal:   EXAM:  US KIDNEY(S)            PROCEDURE DATE:  2021            INTERPRETATION:  CLINICAL INFORMATION: Persistent leukocytosis, pyelonephritis    COMPARISON: 3/18/2021.    TECHNIQUE: Sonography of the kidneys.    FINDINGS:    Right kidney: 9.6 cm. No renal mass, hydronephrosis or calculi.    Left kidney:  9.4 cm. No renal mass, hydronephrosis or calculi.          IMPRESSION:      Normal renal ultrasound. No sonographic evidence of an abscess          NARSEH LEE MD; Attending Radiologist  This document has been electronically signed. Mar 21 2021 12:01AM (21 @ 22:19)    US Kidney and Bladder:   EXAM:  US KIDNEYS AND BLADDER            PROCEDURE DATE:  2021            INTERPRETATION:  CLINICAL INFORMATION: Hydronephrosis. Renal failure.    COMPARISON: Renal ultrasound 2021, CT abdomen and pelvis 2021.    TECHNIQUE: Sonography of the kidneys and bladder.    FINDINGS:  RIGHT KIDNEY: Normal in echogenicity, size measuring 10.2 cm in length. Unchanged mild to moderate right hydronephrosis, correlating with CT. Vascular flow is demonstrated at the hilum.    LEFT KIDNEY: Normal in echogenicity, size measuring 10.1 cm in length. No evidence of hydronephrosis or calculus. Vascular flow is demonstrated at the hilum.    Urinary bladder nondistended with a Springer catheter. Abdominopelvic ascites noted.      IMPRESSION:  Unchanged mild to moderate right hydronephrosis, correlating with CT.  Urinary bladder nondistended with a Springer catheter.  Abdominopelvic ascites.              ARACELI REYNOLDS MD; Attending Radiologist  This document has been electronically signed. Mar 18 2021 10:55PM (21 @ 22:50)     Nephrology Progress Note    SOWMYA AMADO  MRN-[id number]  76y  Female    S:  Patient is seen and examined, events over the last 24h noted.    O:  Allergies:  latex (Pruritus; Rash)  Pineapple (Unknown)  sulfonamides (Unknown)    Hospital Medications:   MEDICATIONS  (STANDING):  ALBUTerol    90 MICROgram(s) HFA Inhaler 1 Puff(s) Inhalation every 6 hours  aMIOdarone    Tablet 200 milliGRAM(s) Oral two times a day  apixaban 2.5 milliGRAM(s) Oral two times a day  chlorhexidine 4% Liquid 1 Application(s) Topical <User Schedule>  clopidogrel Tablet 75 milliGRAM(s) Oral daily  fluconAZOLE   Tablet 100 milliGRAM(s) Oral daily  glucagon  Injectable 1 milliGRAM(s) IntraMuscular once  insulin glargine Injectable (LANTUS) 20 Unit(s) SubCutaneous at bedtime  insulin lispro (ADMELOG) corrective regimen sliding scale   SubCutaneous three times a day before meals  insulin lispro Injectable (ADMELOG) 4 Unit(s) SubCutaneous three times a day before meals  levothyroxine 88 MICROGram(s) Oral daily  meropenem  IVPB 1000 milliGRAM(s) IV Intermittent every 12 hours  metoprolol tartrate 25 milliGRAM(s) Oral every 6 hours  multivitamin 1 Tablet(s) Oral daily  nystatin Ointment 1 Application(s) Topical two times a day  pantoprazole    Tablet 40 milliGRAM(s) Oral before breakfast  polyethylene glycol 3350 17 Gram(s) Oral every 12 hours  senna 2 Tablet(s) Oral at bedtime    MEDICATIONS  (PRN):  acetaminophen   Tablet .. 650 milliGRAM(s) Oral every 6 hours PRN Temp greater or equal to 38C (100.4F), Mild Pain (1 - 3)  albuterol/ipratropium for Nebulization 3 milliLiter(s) Nebulizer every 6 hours PRN Shortness of Breath and/or Wheezing  aluminum hydroxide/magnesium hydroxide/simethicone Suspension 30 milliLiter(s) Oral every 6 hours PRN Dyspepsia  bisacodyl Suppository 10 milliGRAM(s) Rectal daily PRN Constipation  petrolatum white Ointment 1 Application(s) Topical four times a day PRN dry lips    Home Medications:  Home Medications:  albuterol 90 mcg/inh inhalation aerosol: 1 puff(s) inhaled every 6 hours (24 Mar 2021 11:25)  amiodarone 200 mg oral tablet: 1 tab(s) orally 2 times a day (24 Mar 2021 10:48)  amlodipine-benazepril 5 mg-40 mg oral capsule: 1 cap(s) orally once a day (at bedtime) (2020 08:25)  Gamunex 10% intravenous solution: every 5 weeks (2020 08:25)  insulin: Insulin Pump (2020 08:25)  levothyroxine 88 mcg (0.088 mg) oral tablet: 1 tab(s) orally once a day (2020 08:25)  metoprolol tartrate 25 mg oral tablet: 1 tab(s) orally every 6 hours (24 Mar 2021 10:48)  Multiple Vitamins oral tablet: 1 tab(s) orally once a day (2020 08:25)  rosuvastatin 10 mg oral tablet: 1 tab(s) orally every other day (2020 08:25)  Vitamin C 250 mg oral tablet: 1 tab(s) orally 2 times a day (2020 08:25)      VITALS:  Daily     Daily   T(F): 95.6 (21 @ 06:49), Max: 98.1 (21 @ 20:00)  HR: 60 (21 @ 06:49)  BP: 136/63 (21 @ 06:49)  RR: 18 (21 @ 06:49)  SpO2: 99% (21 @ 20:00)  Wt(kg): --  I&O's Detail    30 Mar 2021 07:  -  31 Mar 2021 07:00  --------------------------------------------------------  IN:    IV PiggyBack: 50 mL    Oral Fluid: 120 mL  Total IN: 170 mL    OUT:  Total OUT: 0 mL    Total NET: 170 mL        I&O's Summary    30 Mar 2021 07:  -  31 Mar 2021 07:00  --------------------------------------------------------  IN: 170 mL / OUT: 0 mL / NET: 170 mL          PHYSICAL EXAM:  Gen: NAD  Resp: CTAB  Card: S1/S2  Abd: soft      LABS:        129<L>  |  97<L>  |  28<H>  ----------------------------<  68<L>  5.1<H>   |  23  |  1.7<H>    Ca    8.2<L>      31 Mar 2021 07:53    Sodium, Serum: 129 mmol/L (21 @ 07:53)  Sodium, Serum: 123 mmol/L (21 @ 11:31)  Sodium, Serum: 123 mmol/L (21 @ 06:15)  Sodium, Serum: 125 mmol/L (21 @ 06:30)  Sodium, Serum: 126 mmol/L (21 @ 17:44)  Sodium, Serum: 123 mmol/L (21 @ 05:52)  Sodium, Serum: 126 mmol/L (21 @ 05:33)      TPro  5.4<L>  /  Alb  2.5<L>  /  TBili  0.2  /  DBili      /  AST  31  /  ALT  19  /  AlkPhos  133<H>      eGFR if Non African American: 28 mL/min/1.73M2 (21 @ 07:53)  eGFR if African American: 33 mL/min/1.73M2 (21 @ 07:53)    Phosphorus Level, Serum: 4.0 mg/dL (21 @ 06:30)  Phosphorus Level, Serum: 4.1 mg/dL (21 @ 05:52)    Intact PTH: 76 pg/mL (21 @ 18:05)  Osmolality, Serum: 283 mos/kg (21 @ 16:30)                          8.6    8.15  )-----------( 376      ( 31 Mar 2021 07:53 )             26.1     Mean Cell Volume: 85.6 fL (21 @ 07:53)        Urine Studies:  Urinalysis Basic - ( 25 Mar 2021 15:05 )    Color: Light Yellow / Appearance: Clear / S.008 / pH:   Gluc:  / Ketone: Negative  / Bili: Negative / Urobili: <2 mg/dL   Blood:  / Protein: Trace / Nitrite: Negative   Leuk Esterase: Moderate / RBC: 3 /HPF / WBC 7 /HPF   Sq Epi:  / Non Sq Epi: 1 /HPF / Bacteria: Negative    Protein/Creatinine Ratio Calculation: 1.4 Ratio (21 @ 15:05)    Osmolality, Random Urine: 212 mos/kg (21 @ 00:50)  Osmolality, Random Urine: 393 mos/kg (03.15.21 @ 17:25)  Sodium, Random Urine: 59.0 mmoL/L (21 @ 00:50)    Creatinine trend:  Creatinine, Serum: 1.7 mg/dL (21 @ 07:53)  Creatinine, Serum: 1.6 mg/dL (21 @ 11:31)  Creatinine, Serum: 1.5 mg/dL (21 @ 06:15)  Creatinine, Serum: 1.5 mg/dL (21 @ 06:30)  Creatinine, Serum: 1.5 mg/dL (21 @ 17:44)  Creatinine, Serum: 1.4 mg/dL (21 @ 05:52)  Creatinine, Serum: 1.5 mg/dL (21 @ 05:33)  Creatinine, Serum: 1.5 mg/dL (21 @ 11:52)  Creatinine, Serum: 1.4 mg/dL (21 @ 04:40)    Serum Protein Electrophoresis Interp: Weak Gamma-Migrating Paraprotein Identified (21 @ 09:30)  Immunofixation, Serum:   Weak IgM Lambda Band Identified    Protein Electrophoresis, Serum (21 @ 09:30)    Protein Total, Serum: 5.0 g/dL    Total Protein, Serum: 5.0 g/dL    Albumin, Serum: 2.2 g/dL    Alpha 1: 0.4 g/dL    Alpha 2: 0.8 g/dL    Beta Globulin: 0.6 g/dL    Gamma Globulin: 1.0 g/dL    M-Joe: Unable to Quantitate    % Albumin: 43.5 %    % Alpha 1: 8.5 %    % Alpha 2: 15.5 %    % Beta: 12.2 %    % Gamma: 20.3 %    % M Joe: Unable to Quantitate    Albumin/Globulin Ratio: 0.8 Ratio    Serum Protein Electrophoresis Interp: Weak Gamma-Migrating Paraprotein Identified        Reference Range: None Detected (21 @ 09:30)    US Renal:   EXAM:  US KIDNEY(S)            PROCEDURE DATE:  2021            INTERPRETATION:  CLINICAL INFORMATION: Persistent leukocytosis, pyelonephritis    COMPARISON: 3/18/2021.    TECHNIQUE: Sonography of the kidneys.    FINDINGS:    Right kidney: 9.6 cm. No renal mass, hydronephrosis or calculi.    Left kidney:  9.4 cm. No renal mass, hydronephrosis or calculi.          IMPRESSION:      Normal renal ultrasound. No sonographic evidence of an abscess          NARESH LEE MD; Attending Radiologist  This document has been electronically signed. Mar 21 2021 12:01AM (21 @ 22:19)    US Kidney and Bladder:   EXAM:  US KIDNEYS AND BLADDER            PROCEDURE DATE:  2021            INTERPRETATION:  CLINICAL INFORMATION: Hydronephrosis. Renal failure.    COMPARISON: Renal ultrasound 2021, CT abdomen and pelvis 2021.    TECHNIQUE: Sonography of the kidneys and bladder.    FINDINGS:  RIGHT KIDNEY: Normal in echogenicity, size measuring 10.2 cm in length. Unchanged mild to moderate right hydronephrosis, correlating with CT. Vascular flow is demonstrated at the hilum.    LEFT KIDNEY: Normal in echogenicity, size measuring 10.1 cm in length. No evidence of hydronephrosis or calculus. Vascular flow is demonstrated at the hilum.    Urinary bladder nondistended with a Springer catheter. Abdominopelvic ascites noted.      IMPRESSION:  Unchanged mild to moderate right hydronephrosis, correlating with CT.  Urinary bladder nondistended with a Springer catheter.  Abdominopelvic ascites.              ARACELI REYNOLDS MD; Attending Radiologist  This document has been electronically signed. Mar 18 2021 10:55PM (21 @ 22:50)

## 2021-03-31 NOTE — PROGRESS NOTE ADULT - SUBJECTIVE AND OBJECTIVE BOX
Patient is a 76y old  Female who presents with a chief complaint of Sepsis (29 Mar 2021 07:45)    Patient was seen and examined.  C/o generalized rash on entire body sparing face  no new events    PAST MEDICAL & SURGICAL HISTORY:  Inclusion body myositis (IBM)  Lung nodules  Transfusion history  S/p Hysterectomy  Dry eyes, bilateral  PVD (peripheral vascular disease), S/p Right Peripheral leg Stent  Seasonal allergies  Skin cancer- Basal Cell Cancer face  (around nose) s/p MOHS procedure x 3  Anemia Chronic, on Iron supplement  Hypothyroid  SSS (sick sinus syndrome),S/p PPM  Diabetes-Insulin dependent - (has insulin Pump)  Pacemaker- Sept 2017  Palpitations-occasionally; not for couple yrs  Essential hypertension  Afib-5+ yrs; on Eliquis  S/P ablation of atrial fibrillation  S/P cataract surgery    History of surgery  Right Peripheral Stent 5 yrs  H/O total hysterectomy, 25+ yrs ago      Allergies  latex (Pruritus; Rash)  sulfonamides (Unknown)    Intolerances  Pineapple (Unknown)    MEDICATIONS  (STANDING):  ALBUTerol    90 MICROgram(s) HFA Inhaler 1 Puff(s) Inhalation every 6 hours  aMIOdarone    Tablet 200 milliGRAM(s) Oral two times a day  apixaban 2.5 milliGRAM(s) Oral two times a day  chlorhexidine 4% Liquid 1 Application(s) Topical <User Schedule>  clopidogrel Tablet 75 milliGRAM(s) Oral daily  fluconAZOLE   Tablet 100 milliGRAM(s) Oral daily  glucagon  Injectable 1 milliGRAM(s) IntraMuscular once  insulin glargine Injectable (LANTUS) 20 Unit(s) SubCutaneous at bedtime  insulin lispro (ADMELOG) corrective regimen sliding scale   SubCutaneous three times a day before meals  insulin lispro Injectable (ADMELOG) 4 Unit(s) SubCutaneous three times a day before meals  levothyroxine 88 MICROGram(s) Oral daily  metoprolol tartrate 25 milliGRAM(s) Oral every 6 hours  multivitamin 1 Tablet(s) Oral daily  nystatin Ointment 1 Application(s) Topical two times a day  pantoprazole    Tablet 40 milliGRAM(s) Oral before breakfast  polyethylene glycol 3350 17 Gram(s) Oral every 12 hours  senna 2 Tablet(s) Oral at bedtime    MEDICATIONS  (PRN):  acetaminophen   Tablet .. 650 milliGRAM(s) Oral every 6 hours PRN Temp greater or equal to 38C (100.4F), Mild Pain (1 - 3)  albuterol/ipratropium for Nebulization 3 milliLiter(s) Nebulizer every 6 hours PRN Shortness of Breath and/or Wheezing  aluminum hydroxide/magnesium hydroxide/simethicone Suspension 30 milliLiter(s) Oral every 6 hours PRN Dyspepsia  bisacodyl Suppository 10 milliGRAM(s) Rectal daily PRN Constipation  diphenhydrAMINE 25 milliGRAM(s) Oral every 6 hours PRN Rash and/or Itching  petrolatum white Ointment 1 Application(s) Topical four times a day PRN dry lips    T(C): 35.3 (03-31-21 @ 06:49), Max: 36.7 (03-30-21 @ 20:00)  HR: 60 (03-31-21 @ 06:49) (60 - 72)  BP: 136/63 (03-31-21 @ 06:49) (136/63 - 168/77)  RR: 18 (03-31-21 @ 06:49) (18 - 18)  SpO2: 99% (03-30-21 @ 20:00) (99% - 99%)    O/E:  Awake, alert, not in distress.  HEENT: atraumatic, EOMI. oral candidiasis+  Chest: decreased breath sounds at bases  CVS: SIS2 +,irregular  no murmur.  P/A: Soft, BS+  CNS: non focal.  Ext:  lower ext edema+  Skin generalized macuolo papular erythematous rash  All systems reviewed positive findings as above.                                           8.6<L>  8.15  )-----------( 376      ( 31 Mar 2021 07:53 )             26.1<L>  03-31    129<L>  |  97<L>  |  28<H>  ----------------------------<  68<L>  5.1<H>   |  23  |  1.7<H>    Ca    8.2<L>      31 Mar 2021 07:53    TPro  5.4<L>  /  Alb  2.5<L>  /  TBili  0.2  /  DBili  x   /  AST  31  /  ALT  19  /  AlkPhos  133<H>  03-31

## 2021-03-31 NOTE — CHART NOTE - NSCHARTNOTEFT_GEN_A_CORE
# Sepsis POA secondary to acute right pyelonephritis with possibly distal ureteral obstruction with moderate right hydrouretero nephrosis secondary to ESBL E coli  # Chronic  Hyponatremia - Fluid restriction  # Acute kidney injury resolved -- CKD stage3    Pt reports >75% PO intake/appetite. no chewing/swallowing difficulty. no food preferences mentioned. No GI discomfort, just some gas, LBM 3/30 per pt. AAOX4; No NFPF, skin: WDL, rash; 2+ L hand/forearm edema noted per RN flowsheets. slight elevation in renal/glucose labs noted -- will continue to monitor for adjustment of needs; meds reviewed. Wt's noted fluctuating throughout admit -- possibly d/t edema.     1. Continue with current DASH/TLC/ Consistent carb diet order.   2. Obtain daily wts     [phone]  pt not @ risk, RD to f/u in 7 days.   RD remains available: Yessica Hooks [phone]

## 2021-03-31 NOTE — PROGRESS NOTE ADULT - ASSESSMENT
77 yo F PMH DM on insulin pump, CAD s/p stenting in 2020, Lung nodules, Dry eyes, bilateral, PVD (peripheral vascular disease): S/p Right Peripheral leg Stent, Seasonal allergies, Skin cancer: Basal Cell Cancer face  (around nose) s/p MOHS procedure x 3, Anemia: Chronic, on Iron supplement, Hypothyroid, SSS (sick sinus syndrome): S/p PPM, Pacemaker: Sept 2017, Essential hypertension, Afib: 5+ yrs; on Eliquis presented here for nausea, vomiting and decreased PO intake for past 2 days. The symptoms started acutely and she was in her usual state of health before. She also felt weak and her blood sugars were higher. She had a recent hx of UTI which responded well to ciprofloxacin.     # Sepsis POA secondary to acute right pyelonephritis with possibly distal ureteral obstruction with moderate right hydrouretero nephrosis secondary to ESBL E coli  - bloodCulture Results: Growth in aerobic and anaerobic bottles: Escherichia coli ESBL (03.14.21 @ 18:30)  - blood Culture Results: No Growth Final (03.23.21 @ 04:37)  - urine cultureOrganism: Escherichia coli ESBL (03.14.21 @ 22:50)  - CT Abdomen and Pelvis No Cont (03.14.21 @ 19:33) >The right kidney is enlarged with perinephric fat stranding and moderate hydroureteronephrosis to the level of the distal ureter. No radiopaque calculus is identified. Cystoscopy suggested to evaluate the right ureteral vesicle junction to evaluate for an obstructing lesion. A call back request was submitted  - NM Renal/Lasix (NM Renal/Lasix .) (03.19.21 @ 22:49): 1. Significantly holdup of MAG3 radiotracer within bilateral kidneys with poor emptying bilaterally. No hydronephrosis demonstrated bilaterally. Findings suggestive of poor renal function. The left kidney accounts for 67% of total renal function and the right kidney accounts for 33% of total renal function consistent with significant difference. Markedly delayed post-Lasix T1/2 bilaterally without hydronephrosis. Findings again suggestive of overall poor renal function.  - S/p  Meropenem    - urology recommends outpt F/u    # Generalized rash probabably drug allergy  - meropenem dced  - Start benadryl  - Prednisone 40 mg daily    # oral candidiasis  - c/w flucanozole    # Chronic  Hyponatremia   - Osmolality, Random Urine: 212 mos/kg (03.26.21 @ 00:50)  - Osmolality, Serum: 283 mos/kg (03.14.21 @ 16:30)  - Sodium, Random Urine: 59.0: mmoL/L (03.26.21 @ 00:50)  - Thyroid Stimulating Hormone, Serum: 4.17 uIU/mL (03.17.21 @ 16:00)  - Creatinine, Random Urine: 12:mg/dL (03.25.21 @ 15:05)  - Protein/Creatinine Ratio Calculation: 1.4 Ratio (03.25.21 @ 15:05)  - F/u IF, UPEP  -S/p  lasix 20mg IV x1  on 3/29/21  - Fluid restriction  - monitor Na    # Acute kidney injury resolved  # CKD stage3     # Chronic afib S/p PPM  - c/w  amiodarone, metoprolol, eliquis    # DM type 2   - A1C with Estimated Average Glucose Result: 8.1% (03.15.21 @ 06:16)  - monitor FS  - decrase lantus,  lispro    # Elevated troponin sec type2 MI sec to sepsis  # H/o CAD s/p stent  -  TTE Echo Complete w/o Contrast w/ Doppler (03.18.21 @ 13:32) >Basal inferolateral segment is abnorma EF of 58 %.Moderate tricuspid regurgitation. borderline pulmonary hypertension.  - c/w  plavix, metoprolol    # Hypothyroidism  - c/w synthroid    # PVD (peripheral vascular disease): S/p Right Peripheral leg Stent  - c/w plavix    # Full code    # Pending: clinical improvement  # Discussed plan of care with patient  # Disposition: [facility]

## 2021-03-31 NOTE — PROGRESS NOTE ADULT - TIME BILLING
Discussed with primary team
Discussed on Rounds with Housestaff
Discussed on rounds with Irais
Discussed on Rounds with Housestaff

## 2021-04-01 NOTE — PROGRESS NOTE ADULT - SUBJECTIVE AND OBJECTIVE BOX
HPI  Patient is a 76y old Female who presents with a chief complaint of Sepsis (31 Mar 2021 13:24)    Currently admitted to medicine with the primary diagnosis of Pyelonephritis       Today is hospital day 18d.     INTERVAL HPI / OVERNIGHT EVENTS:  Patient was examined and seen at bedside.   patient had multiple bowel movements        PAST MEDICAL & SURGICAL HISTORY  Afib  5+ yrs; on Eliquis    Essential hypertension    Palpitations  occasionally; not for couple yrs    Pacemaker  Sept 2017    Diabetes  Insulin dependent - (has insulin Pump)    SSS (sick sinus syndrome)  S/p PPM    Hypothyroid    Anemia  Chronic, on Iron supplement    Skin cancer  Basal Cell Cancer face  (around nose) s/p MOHS procedure x 3    Seasonal allergies    PVD (peripheral vascular disease)  S/p Right Peripheral leg Stent    Dry eyes, bilateral    Transfusion history  S/p Hysterectomy    Lung nodules    Inclusion body myositis (IBM)    H/O total hysterectomy  25+ yrs ago    History of surgery  Right Peripheral Stent 5 yrs    Cardiac pacemaker  9/9/17    History of surgery  MOHS procedure (face) x 3    S/P cataract surgery    S/P ablation of atrial fibrillation      ALLERGIES  latex (Pruritus; Rash)  sulfonamides (Unknown)    MEDICATIONS  STANDING MEDICATIONS  ALBUTerol    90 MICROgram(s) HFA Inhaler 1 Puff(s) Inhalation every 6 hours  aMIOdarone    Tablet 200 milliGRAM(s) Oral two times a day  apixaban 2.5 milliGRAM(s) Oral two times a day  chlorhexidine 4% Liquid 1 Application(s) Topical <User Schedule>  clopidogrel Tablet 75 milliGRAM(s) Oral daily  dextrose 40% Gel 15 Gram(s) Oral once  dextrose 5%. 1000 milliLiter(s) IV Continuous <Continuous>  dextrose 5%. 1000 milliLiter(s) IV Continuous <Continuous>  dextrose 50% Injectable 25 Gram(s) IV Push once  dextrose 50% Injectable 12.5 Gram(s) IV Push once  dextrose 50% Injectable 25 Gram(s) IV Push once  fluconAZOLE   Tablet 100 milliGRAM(s) Oral daily  glucagon  Injectable 1 milliGRAM(s) IntraMuscular once  insulin glargine Injectable (LANTUS) 16 Unit(s) SubCutaneous at bedtime  insulin lispro (ADMELOG) corrective regimen sliding scale   SubCutaneous three times a day before meals  insulin lispro Injectable (ADMELOG) 4 Unit(s) SubCutaneous three times a day before meals  levothyroxine 88 MICROGram(s) Oral daily  metoprolol tartrate 25 milliGRAM(s) Oral every 6 hours  multivitamin 1 Tablet(s) Oral daily  nystatin Ointment 1 Application(s) Topical two times a day  pantoprazole    Tablet 40 milliGRAM(s) Oral before breakfast  polyethylene glycol 3350 17 Gram(s) Oral every 12 hours  senna 2 Tablet(s) Oral at bedtime  sodium zirconium cyclosilicate 5 Gram(s) Oral once    PRN MEDICATIONS  acetaminophen   Tablet .. 650 milliGRAM(s) Oral every 6 hours PRN  albuterol/ipratropium for Nebulization 3 milliLiter(s) Nebulizer every 6 hours PRN  aluminum hydroxide/magnesium hydroxide/simethicone Suspension 30 milliLiter(s) Oral every 6 hours PRN  bisacodyl Suppository 10 milliGRAM(s) Rectal daily PRN  calamine/zinc oxide Lotion 1 Application(s) Topical three times a day PRN  diphenhydrAMINE 25 milliGRAM(s) Oral every 6 hours PRN  petrolatum white Ointment 1 Application(s) Topical four times a day PRN    VITALS:  T(F): 97.3  HR: 60  BP: 153/68  RR: 18  SpO2: 98%    PHYSICAL EXAM  GEN: NAD, Resting comfortably in bed  PULM: Clear to auscultation bilaterally, No wheezes  CVS: Regular rate and rhythm, S1-S2, no murmurs  ABD: Soft, non-tender, non-distended, no guarding  EXT: No edema  NEURO: A&Ox3, generalized weakness present  LABS                        8.6    8.15  )-----------( 376      ( 31 Mar 2021 07:53 )             26.1     04-01    131<L>  |  100  |  30<H>  ----------------------------<  154<H>  5.6<H>   |  18  |  1.6<H>    Ca    8.1<L>      01 Apr 2021 05:45    TPro  5.4<L>  /  Alb  2.5<L>  /  TBili  0.2  /  DBili  x   /  AST  31  /  ALT  19  /  AlkPhos  133<H>  03-31                  RADIOLOGY

## 2021-04-01 NOTE — PROGRESS NOTE ADULT - ASSESSMENT
75 yo F PMH DM on insulin pump, CAD s/p stenting in 2020, Lung nodules, Dry eyes, bilateral, PVD (peripheral vascular disease): S/p Right Peripheral leg Stent, Seasonal allergies, Skin cancer: Basal Cell Cancer face  (around nose) s/p MOHS procedure x 3, Anemia: Chronic, on Iron supplement, Hypothyroid, SSS (sick sinus syndrome): S/p PPM, Pacemaker: Sept 2017, Essential hypertension, Afib: 5+ yrs; on Eliquis presented here for nausea, vomiting and decreased PO intake for past 2 days. The symptoms started acutely and she was in her usual state of health before. She also felt weak and her blood sugars were higher. She had a recent hx of UTI which responded well to ciprofloxacin.     # Sepsis POA secondary to acute right pyelonephritis with possibly distal ureteral obstruction with moderate right hydrouretero nephrosis secondary to ESBL E coli  - bloodCulture Results: Growth in aerobic and anaerobic bottles: Escherichia coli ESBL (03.14.21 @ 18:30)  - blood Culture Results: No Growth Final (03.23.21 @ 04:37)  - urine cultureOrganism: Escherichia coli ESBL (03.14.21 @ 22:50)  - CT Abdomen and Pelvis No Cont (03.14.21 @ 19:33) >The right kidney is enlarged with perinephric fat stranding and moderate hydroureteronephrosis to the level of the distal ureter. No radiopaque calculus is identified. Cystoscopy suggested to evaluate the right ureteral vesicle junction to evaluate for an obstructing lesion. A call back request was submitted  - NM Renal/Lasix (NM Renal/Lasix .) (03.19.21 @ 22:49): 1. Significantly holdup of MAG3 radiotracer within bilateral kidneys with poor emptying bilaterally. No hydronephrosis demonstrated bilaterally. Findings suggestive of poor renal function. The left kidney accounts for 67% of total renal function and the right kidney accounts for 33% of total renal function consistent with significant difference. Markedly delayed post-Lasix T1/2 bilaterally without hydronephrosis. Findings again suggestive of overall poor renal function.  - S/p  Meropenem    - urology recommends outpt F/u    # Generalized rash probabably drug allergy- stbale  - meropenem dced  - benadryl PRN    # oral candidiasis  - c/w flucanozole    # Chronic  Hyponatremia -stable  - Osmolality, Random Urine: 212 mos/kg (03.26.21 @ 00:50)  - Osmolality, Serum: 283 mos/kg (03.14.21 @ 16:30)  - Sodium, Random Urine: 59.0: mmoL/L (03.26.21 @ 00:50)  - Thyroid Stimulating Hormone, Serum: 4.17 uIU/mL (03.17.21 @ 16:00)  - Creatinine, Random Urine: 12:mg/dL (03.25.21 @ 15:05)  - Protein/Creatinine Ratio Calculation: 1.4 Ratio (03.25.21 @ 15:05)  - F/u IF, UPEP  -S/p  lasix 20mg IV x1  on 3/29/21  - Fluid restriction  - monitor Na    # Acute kidney injury resolved  # CKD stage3     # hyperkalemia- s/p kayexalate  monitor levels    # Chronic afib S/p PPM  - c/w  amiodarone, metoprolol, eliquis    # DM type 2   - A1C with Estimated Average Glucose Result: 8.1% (03.15.21 @ 06:16)  - monitor FS  - decrase lantus,  lispro    # Elevated troponin sec type2 MI sec to sepsis  # H/o CAD s/p stent  -  TTE Echo Complete w/o Contrast w/ Doppler (03.18.21 @ 13:32) >Basal inferolateral segment is abnormal EF of 58 %.Moderate tricuspid regurgitation. borderline pulmonary hypertension.  - c/w  plavix, metoprolol    # Hypothyroidism  - c/w synthroid    # PVD (peripheral vascular disease): S/p Right Peripheral leg Stent  - c/w plavix    # Full code    # Pending: insurance auth approved; COVID swab done on 3/31  # Discussed plan of care with patient  # Disposition: [facility]  prepare for discharge

## 2021-04-01 NOTE — CHART NOTE - NSCHARTNOTEFT_GEN_A_CORE
NP Note     Patient is a 76y old  Female who presents with a chief complaint of Sepsis (31 Mar 2021 13:24)    HPI    75 yo F PMH DM on insulin pump, CAD s/p stenting in 2020, Lung nodules, Dry eyes, bilateral, PVD (peripheral vascular disease): S/p Right Peripheral leg Stent, Seasonal allergies, Skin cancer: Basal Cell Cancer face  (around nose) s/p MOHS procedure x 3, Anemia: Chronic, on Iron supplement, Hypothyroid, SSS (sick sinus syndrome): S/p PPM, Pacemaker: Sept 2017, Essential hypertension, Afib: 5+ yrs; on Eliquis who presented to hospital  for nausea, vomiting and decreased PO intake for  2 days.     Pt admitted for Sepsis POA secondary to acute right pyelonephritis with possibly distal ureteral obstruction with moderate right hydrouretero nephrosis secondary to ESBL E coli. S/p treatment with Meropenem. Elevated troponin 2/2 demand ischemia 2/2 sepsis.   Hospital course complicated by generalized rash likely due to Meropenem. TEOFILO on CKD 3, chronic hyponatremia on fluid restriction.   Now with hyperkalemia , K+5.6      INTERVAL HPI/OVERNIGHT EVENTS: Pt seen and examined this morning. Pt awake, reports no SOB, no chest discomfort, (+) itching, (+) constipation x 2-3 days for which she just took some prune juice. Pt instructed avoid potassium rich foods/beverages, such as prune juice.     MEDICATIONS  (STANDING):  ALBUTerol    90 MICROgram(s) HFA Inhaler 1 Puff(s) Inhalation every 6 hours  aMIOdarone    Tablet 200 milliGRAM(s) Oral two times a day  apixaban 2.5 milliGRAM(s) Oral two times a day  chlorhexidine 4% Liquid 1 Application(s) Topical <User Schedule>  clopidogrel Tablet 75 milliGRAM(s) Oral daily  dextrose 40% Gel 15 Gram(s) Oral once  dextrose 5%. 1000 milliLiter(s) (100 mL/Hr) IV Continuous <Continuous>  dextrose 5%. 1000 milliLiter(s) (50 mL/Hr) IV Continuous <Continuous>  dextrose 50% Injectable 25 Gram(s) IV Push once  dextrose 50% Injectable 12.5 Gram(s) IV Push once  dextrose 50% Injectable 25 Gram(s) IV Push once  fluconAZOLE   Tablet 100 milliGRAM(s) Oral daily  glucagon  Injectable 1 milliGRAM(s) IntraMuscular once  insulin glargine Injectable (LANTUS) 16 Unit(s) SubCutaneous at bedtime  insulin lispro (ADMELOG) corrective regimen sliding scale   SubCutaneous three times a day before meals  insulin lispro Injectable (ADMELOG) 4 Unit(s) SubCutaneous three times a day before meals  levothyroxine 88 MICROGram(s) Oral daily  metoprolol tartrate 25 milliGRAM(s) Oral every 6 hours  multivitamin 1 Tablet(s) Oral daily  nystatin Ointment 1 Application(s) Topical two times a day  pantoprazole    Tablet 40 milliGRAM(s) Oral before breakfast  polyethylene glycol 3350 17 Gram(s) Oral every 12 hours  senna 2 Tablet(s) Oral at bedtime  sodium polystyrene sulfonate Suspension 15 Gram(s) Oral once  sodium zirconium cyclosilicate 5 Gram(s) Oral once    MEDICATIONS  (PRN):  acetaminophen   Tablet .. 650 milliGRAM(s) Oral every 6 hours PRN Temp greater or equal to 38C (100.4F), Mild Pain (1 - 3)  albuterol/ipratropium for Nebulization 3 milliLiter(s) Nebulizer every 6 hours PRN Shortness of Breath and/or Wheezing  aluminum hydroxide/magnesium hydroxide/simethicone Suspension 30 milliLiter(s) Oral every 6 hours PRN Dyspepsia  bisacodyl Suppository 10 milliGRAM(s) Rectal daily PRN Constipation  calamine/zinc oxide Lotion 1 Application(s) Topical three times a day PRN Rash and/or Itching  diphenhydrAMINE 25 milliGRAM(s) Oral every 6 hours PRN Rash and/or Itching  petrolatum white Ointment 1 Application(s) Topical four times a day PRN dry lips      __________________________________________________  REVIEW OF SYSTEMS:    CONSTITUTIONAL: itching , rash, No fever,   EYES: no acute visual disturbances  NECK: No pain or stiffness  RESPIRATORY: No cough; No shortness of breath  CARDIOVASCULAR: No chest pain, no palpitations  GASTROINTESTINAL: (+) constipation. No pain. No nausea or vomiting;   NEUROLOGICAL: No headache or numbness, no tremors  MUSCULOSKELETAL: No joint pain, no muscle pain  GENITOURINARY: no dysuria, no frequency, no hesitancy  PSYCHIATRY: no depression , no anxiety  ALL OTHER  ROS negative        Vital Signs Last 24 Hrs  T(C): 35.9 (01 Apr 2021 06:45), Max: 36.8 (31 Mar 2021 21:05)  T(F): 96.6 (01 Apr 2021 06:45), Max: 98.2 (31 Mar 2021 21:05)  HR: 61 (01 Apr 2021 06:45) (60 - 68)  BP: 155/70 (01 Apr 2021 06:45) (155/70 - 173/68)  BP(mean): --  RR: 18 (01 Apr 2021 06:45) (18 - 18)  SpO2: 97% (31 Mar 2021 21:05) (94% - 97%)    ________________________________________________  PHYSICAL EXAM:  GENERAL: NAD  HEENT: Normocephalic;  conjunctivae and sclerae clear; moist mucous membranes;   NECK : supple  CHEST/LUNG: Clear to auscultation bilaterally with good air entry   HEART: S1 S2  regular; no murmurs, gallops or rubs  ABDOMEN: Soft, Nontender, Nondistended; Bowel sounds present  EXTREMITIES: no cyanosis; no edema; no calf tenderness  SKIN: rash to trunk /perineal/inner thighs. warm and dry;  NERVOUS SYSTEM:  Awake and alert; Oriented  to place, person and time ; no new deficits    _________________________________________________  LABS:                        8.6    8.15  )-----------( 376      ( 31 Mar 2021 07:53 )             26.1     04-01    131<L>  |  100  |  30<H>  ----------------------------<  154<H>  5.6<H>   |  18  |  1.6<H>    Ca    8.1<L>      01 Apr 2021 05:45    TPro  5.4<L>  /  Alb  2.5<L>  /  TBili  0.2  /  DBili  x   /  AST  31  /  ALT  19  /  AlkPhos  133<H>  03-31        CAPILLARY BLOOD GLUCOSE      POCT Blood Glucose.: 172 mg/dL (01 Apr 2021 07:37)  POCT Blood Glucose.: 98 mg/dL (31 Mar 2021 16:33)  POCT Blood Glucose.: 174 mg/dL (31 Mar 2021 11:28)      --------------  ASSESSMENT/PLAN:        Hyperkalemia: K+5.6      -Kayexalate 15 gm po x 1       -Lokelma 5mg po x 1      -EKG      -No concentrated potassium        -f/u with Nephro on board         Generalized Rash 2/2 drug allergy      -c/w benadryl, prednisone        -Add Calamine lotion    Constipation       -c/w Senna /Miralax       -Kayexalate x 1 now    TEOFILO on CKD 3      Serum Creat stable       -Avoid nephrotoxic agents, NSAIDs      -Monitor BMP      -Nephro on board    Sepsis POA secondary to acute right pyelonephritis with possibly distal ureteral obstruction with moderate right hydrouretero nephrosis secondary to ESBL E coli       -afebrile, leukocytosis resolved       -completed abx     oral candidiasis  - c/w flucanozole, Day 5/7    chronic hyponatremia  -Improving  -c/w fluid restriction       Chronic afib S/p PPM  - c/w  amiodarone, metoprolol, eliquis    PVD (peripheral vascular disease): S/p Right Peripheral leg Stent  - c/w plavix      Plan of care discussed with anthony Carter, NP Medicine

## 2021-04-02 NOTE — PROGRESS NOTE ADULT - ASSESSMENT
Diagnosis    # hypertension- uocontrolled in the morning- better controlled with current medications itself  restarting on amlodipine ; patient home medication on discharge    # episode of hypoglycemia- resolve  lantus dose decreased to 10 units at night time; meal time insulin discontinued   cover with sliding scale    # Sepsis and bacteremia POA secondary to complicated UTI with pyelonephritis  # Generalized rash probably drug allergy- stable  # oral candidiasis  # Chronic  Hyponatremia -stable  # Acute kidney injury resolved  # CKD stage3   # hyperkalemia  # Chronic afib S/p PPM  # DM type 2   # Elevated troponin sec type2 MI sec to sepsis  # H/o CAD s/p stent  # Hypothyroidism  # PVD    Please see updated discharge summary done for 4/2/21.  plan of care discussed with patient, also with daughter at bedside. Plan discussed with nursing and care management team.  patient is stable

## 2021-04-02 NOTE — ADVANCED PRACTICE NURSE CONSULT - RECOMMEDATIONS
1. Left heel-Continue w/ current treatment for Allevyn dressing- Apply silicone foam Allevyn dressing to cushion area, decrease friction and shear, and prevent further  breakdown. Change dressing q3d and prn for soiling.  -Assess skin qshift, report changes to primary provider.    PI Prevention:  -Continue to instruct/encourage & assist patient as needed w/ turning/positioning from side-to-side q2h while in bed, q1h when/if OOB chair, or in accordance w/ pt's plan of care. Utilize pillows as needed assist w/ turning/positioning in bed. When/if OOB chair, utilize pillows or chair cushion to offload pressure.   -Offload heels from bed surface with soft pillow under calfs.   -Continue applying Coloplast Eliana Protect moisture barrier cream to buttock and perineal area daily and prn after each incontinent episode.    -Continue utilizing one underpad underneath patient to contain incontinence episodes; change pad when saturated/soiled.     Plan of Care: Primary RN Nick made aware of above recs. Discussed above recs with MD team during morning rounds. Signing off in patient, no further needs/recs from [facility] service at this time. Staff RN to perform routine skin assessment and manage skin care. Questions or concerns or if reconsult needed, please contact [facility], [phone].

## 2021-04-02 NOTE — ADVANCED PRACTICE NURSE CONSULT - ASSESSMENT
77 yo F PMH DM on insulin pump, CAD s/p stenting in 2020, Lung nodules, Dry eyes, bilateral, PVD (peripheral vascular disease): S/p Right Peripheral leg Stent, Seasonal allergies, Skin cancer: Basal Cell Cancer face  (around nose) s/p MOHS procedure x 3, Anemia: Chronic, on Iron supplement, Hypothyroid, SSS (sick sinus syndrome): S/p PPM, Pacemaker: Sept 2017, Essential hypertension, Afib: 5+ yrs; on Eliquis presented here (3/14) for nausea, vomiting and decreased PO intake for past 2 days; symptoms started acutely and she was in her usual state of health before. She also felt weak and her blood sugars were higher. She had a recent hx of UTI which responded well to ciprofloxacin.     Patient currently admitted to medicine with the primary diagnosis of Pyelonephritis, on 4A, being managed for  Sepsis POA secondary to acute right pyelonephritis with possibly distal ureteral obstruction with moderate right hydrouretero nephrosis secondary to ESBL E coli; # Generalized rash probably drug allergy- stable;  oral candidiasis; Chronic  Hyponatremia -stable; Acute kidney injury resolved; hyperkalemia- s/p kayexalate; Chronic afib S/p PPM; DM2; Elevated troponin sec type2 MI sec to sepsis;  H/o CAD s/p stent; Hypothyroidism; PVD (peripheral vascular disease): S/p Right Peripheral leg Stent.    Received patient on 4A,  laying supine in bed,  HOB elevated 30 degrees. Pt awake, A&Ox3, made aware of purpose of WOCN visit, agreeable to consult. Foam Allevyn dressings to left heel in place, dressing removed.     Resolved/reabsorbed blister w/ blanchable erythema to left heel, right heel intact.     Patient able to turn to left side for sacral skin assessment. Sacral, coccyx & buttock skin intact.     Patient reports she is mostly bedbound, OOB w/ max. assistance, able to turn/position in bed independently. Patient reports continent of urine and stool with some urine leakage/incontinence.  Ordered for diabetic diet, adequate intake as per reported Jose score.

## 2021-04-02 NOTE — CHART NOTE - NSCHARTNOTEFT_GEN_A_CORE
3-day calorie count results (3.30-4.1)    Day 1: 160 kcals, 2 gms protein (lunch and dinner not documented)    Day 2: 1125 kcals, 27 gms protein    Day 3: 900 kcals, 38 gms protein (dinner not documented)     unable to calculate average with missing documentation. However, pt is likely meeting lower end of needs. Pt is not at risk f/u in 7 days

## 2021-04-02 NOTE — PROGRESS NOTE ADULT - REASON FOR ADMISSION
Sepsis

## 2021-04-02 NOTE — PROGRESS NOTE ADULT - PROVIDER SPECIALTY LIST ADULT
Endocrinology
Hospitalist
Internal Medicine
Internal Medicine
Nephrology
Urology
CCU
Heart Failure
Hospitalist
Infectious Disease
Internal Medicine
Nephrology
Urology
CCU
Critical Care
Endocrinology
Hospitalist
Hospitalist
Internal Medicine
Nephrology
Pulmonology
Urology
CCU
Critical Care
Critical Care
Infectious Disease
Internal Medicine
Nephrology
Pulmonology
Urology
Hospitalist
Hospitalist
Nephrology
Urology

## 2021-04-02 NOTE — CHART NOTE - NSCHARTNOTEFT_GEN_A_CORE
Notified by RN, pt hypoglycemic. Blood glucose found to be 49, and now 97 after intervention. Pt seen a bedside, reports "feeling sweaty while asleep). Denies palpitations, anxiety, headache, change in vision, confusion, or n/v.     T(C): 35.4 (04-02-21 @ 06:00), Max: 36.3 (04-01-21 @ 14:11)  HR: 62 (04-02-21 @ 06:00) (60 - 89)  BP: 176/89 (04-02-21 @ 06:00) (145/70 - 176/89)  RR: 18 (04-02-21 @ 06:00) (18 - 19)  SpO2: 100% (04-01-21 @ 21:52) (97% - 100%)      -Please follow hypoglycemic protocol  -Consider decreasing bedtime Lantus  -Encourage adequate PO intake, including nighttime snack  -If recurrent hypoglycemic episodes,  consider endocrine consult    Lisa Carlos  [phone]

## 2021-04-02 NOTE — PROGRESS NOTE ADULT - SUBJECTIVE AND OBJECTIVE BOX
HPI  Patient is a 76y old Female who presents with a chief complaint of Sepsis (01 Apr 2021 18:39)    Currently admitted to medicine with the primary diagnosis of Pyelonephritis       Today is hospital day 19d.     INTERVAL HPI / OVERNIGHT EVENTS:  Patient was examined and seen at bedside. This morning she is resting comfortably in bed and reports no new issues or overnight events.   denies any complaints        PAST MEDICAL & SURGICAL HISTORY  Afib  5+ yrs; on Eliquis    Essential hypertension    Palpitations  occasionally; not for couple yrs    Pacemaker  Sept 2017    Diabetes  Insulin dependent - (has insulin Pump)    SSS (sick sinus syndrome)  S/p PPM    Hypothyroid    Anemia  Chronic, on Iron supplement    Skin cancer  Basal Cell Cancer face  (around nose) s/p MOHS procedure x 3    Seasonal allergies    PVD (peripheral vascular disease)  S/p Right Peripheral leg Stent    Dry eyes, bilateral    Transfusion history  S/p Hysterectomy    Lung nodules    Inclusion body myositis (IBM)    H/O total hysterectomy  25+ yrs ago    History of surgery  Right Peripheral Stent 5 yrs    Cardiac pacemaker  9/9/17    History of surgery  MOHS procedure (face) x 3    S/P cataract surgery    S/P ablation of atrial fibrillation      ALLERGIES  latex (Pruritus; Rash)  sulfonamides (Unknown)    MEDICATIONS  STANDING MEDICATIONS  ALBUTerol    90 MICROgram(s) HFA Inhaler 1 Puff(s) Inhalation every 6 hours  aMIOdarone    Tablet 200 milliGRAM(s) Oral two times a day  apixaban 2.5 milliGRAM(s) Oral two times a day  chlorhexidine 4% Liquid 1 Application(s) Topical <User Schedule>  clopidogrel Tablet 75 milliGRAM(s) Oral daily  dextrose 40% Gel 15 Gram(s) Oral once  dextrose 5%. 1000 milliLiter(s) IV Continuous <Continuous>  dextrose 5%. 1000 milliLiter(s) IV Continuous <Continuous>  dextrose 50% Injectable 25 Gram(s) IV Push once  dextrose 50% Injectable 12.5 Gram(s) IV Push once  dextrose 50% Injectable 25 Gram(s) IV Push once  fluconAZOLE   Tablet 100 milliGRAM(s) Oral daily  glucagon  Injectable 1 milliGRAM(s) IntraMuscular once  insulin glargine Injectable (LANTUS) 10 Unit(s) SubCutaneous at bedtime  insulin lispro (ADMELOG) corrective regimen sliding scale   SubCutaneous three times a day before meals  levothyroxine 88 MICROGram(s) Oral daily  metoprolol tartrate 25 milliGRAM(s) Oral every 6 hours  multivitamin 1 Tablet(s) Oral daily  nystatin Ointment 1 Application(s) Topical two times a day  pantoprazole    Tablet 40 milliGRAM(s) Oral before breakfast  polyethylene glycol 3350 17 Gram(s) Oral every 12 hours  predniSONE   Tablet 40 milliGRAM(s) Oral daily  senna 2 Tablet(s) Oral at bedtime    PRN MEDICATIONS  acetaminophen   Tablet .. 650 milliGRAM(s) Oral every 6 hours PRN  albuterol/ipratropium for Nebulization 3 milliLiter(s) Nebulizer every 6 hours PRN  aluminum hydroxide/magnesium hydroxide/simethicone Suspension 30 milliLiter(s) Oral every 6 hours PRN  bisacodyl Suppository 10 milliGRAM(s) Rectal daily PRN  calamine/zinc oxide Lotion 1 Application(s) Topical three times a day PRN  diphenhydrAMINE 25 milliGRAM(s) Oral every 6 hours PRN  petrolatum white Ointment 1 Application(s) Topical four times a day PRN    VITALS:  T(F): 96.3  HR: 60  BP: 159/70  RR: 18  SpO2: 100%    PHYSICAL EXAM  GEN: NAD, Resting comfortably in bed  PULM: Clear to auscultation bilaterally, No wheezes  CVS: Regular rate and rhythm, S1-S2, no murmurs  ABD: Soft, non-tender, non-distended, no guarding  EXT: No edema  skin: erythematous blanchable macular rash present in torso and proximal lower extremitiy- stable  NEURO: A&Ox3, no focal deficits    LABS                        8.4    8.43  )-----------( 399      ( 02 Apr 2021 05:59 )             25.4     04-02    132<L>  |  100  |  34<H>  ----------------------------<  97  4.6   |  19  |  1.6<H>    Ca    8.6      02 Apr 2021 05:59                    RADIOLOGY

## 2021-04-02 NOTE — CHART NOTE - NSCHARTNOTESELECT_GEN_ALL_CORE
CC/Event Note
Electrophysiology PA Note
Hyperkalemia/Event Note
hypoglycemia/Event Note
CIDP/Event Note
Calorie Count/Event Note
Event Note
RD Limited/Event Note
Transfer Note
Upgrade/Event Note

## 2021-04-16 NOTE — ED ADULT NURSE NOTE - ISOLATION TYPE:
Airborne precautions... no loss of consciousness, no gait abnormality, no headache, no sensory deficits, and no weakness.

## 2021-04-16 NOTE — ED ADULT NURSE NOTE - INTERVENTIONS DEFINITIONS
[place] to call system/Call bell, personal items and telephone within reach/Stretcher in lowest position, wheels locked, appropriate side rails in place/Monitor gait and stability

## 2021-04-16 NOTE — ED ADULT NURSE NOTE - CHIEF COMPLAINT QUOTE
Progress Notes signed by Apolinar Sexton DO at 11/06/17 12:32 PM      Author:  Apolinar Sexton DO Service:  (none) Author Type:  Physician     Filed:  11/06/17 12:32 PM Encounter Date:  11/3/2017 Status:  Signed     :  Apolinar Sexton DO (Physician)               This is a patient who was admitted from [facility].  On 10/26, he was admitted to the hospital.    CODE STATUS:    He is a full code.    HISTORY OF PRESENT ILLNESS:    He is a 49-year-old male with a history of nonischemic cardiomyopathy with an ejection fraction of 15% to 20% with AFib on warfarin and history of PE, history of TIA, and several medical problems, most recently admitted with syncope.  Cardiology was consulted due to concern of arrhythmia and a CRT-D was interrogated and fired for v-fib on the date of the admission.  Amiodarone was increased to 400 mg daily and an echo showed worsening EF from 20, now down to 10% to 15%.  He had a cath on 10/30 that showed nonobstructive disease as well and he is not a candidate for further EP studies or ablation per the EP cardiologist.  Cardiologist started mexiletine and he will likely need to be referred for outpatient transplant evaluation at one point.  He was diagnosed with right lower extremity cellulitis due to wound sustained about 6 weeks prior to admission and he has a superficial culture with polymicrobial growth seen by ID and he has 9 more days of Bactrim.  The culture showed Pseudomonas sensitive to FQ and he has improved significantly without treatment and it is thought that it is a contaminant and does not need to be treated.  He should follow up with ID in 2 weeks.  Neurology saw the patient due to an abnormal CT of the head, which showed right temporal lobe encephalomalacia and an MRI could not be obtained due to CRT-D, but repeat CT head stable, not the likely cause of syncope.  He had nausea and vomiting on 10/31 and his KUB shows gastric distention.  He had a CT that  did not show gastric outlet obstruction.  His symptoms are resolved.  He is tolerating p.o. now.  He came to [facility] with bridging Lovenox and he was given his first dose before he left and was on a heparin drip.  We will continue to follow this and his most latest INR was 1.5 there.  He is standby assist for transfer, bed mobility, he needs some assistance and he is going to likely make progress back to his baseline, but he does have a lower ejection fraction and he is largely at risk for further decline and I would recommend when he leaves that he consider hospice program.  At this point, I would start a palliative care program for him however and have him seen.    PAST MEDICAL HISTORY:    CHF, history of VT in 2015 and placement of [employer] CRT-D, permanent AFib.  He has hypertension, hypomagnesemia, dyslipidemia, diabetes, morbid obesity, obstructive sleep apnea, pulmonary embolism, recurrent UTI, venous insufficiency, hyperlipidemia, hypothyroidism, history of TIA, and tonsillectomy.    CURRENT MEDICATIONS:    As follows, medication list which is current here at [facility]; gabapentin 100 mg 1 capsule by mouth twice a day, metformin 1000 mg by mouth twice a day, Norco 10/325 b.i.d., glipizide 10 mg by mouth twice a day, enoxaparin bridge, polyethylene glycol 17 g daily, allopurinol 100 mg by mouth daily, amiloride 5 mg by mouth daily, aspirin 81 mg a day, losartan 25 daily, warfarin 5 mg by mouth daily, simvastatin 10 by mouth at bedtime, Proventil inhaler 90 mcg, Bactrim /160 one tablet by mouth every 12 hours, bumetanide 2 mg by mouth once a day, lansoprazole 30 mg delayed release once daily, levothyroxine 0.075 mg by mouth daily, alprazolam 0.25 mg by mouth twice a day, Lovenox bridge, magnesium 400 mg by mouth 3 times a day, mexiletine 200 mg by mouth every 8 hours, NovoLog sliding scale, Flonase allergy suspension, amiodarone 200 mg 2 tablets by mouth daily, and metoprolol 50 by  mouth once a day.    ALLERGIES:    He has drug allergies to amoxicillin that causes swelling.     Denies any tobacco use.  The patient is interested in pursuing and do not resuscitate, however, this needs to be further thought about and discussed.  I did talk to his physical therapist.    FAMILY HISTORY:    Mother had cervical cancer.  Father has dementia.    REVIEW OF SYSTEMS:    HEENT:  The patient denies any problems with his ears, nose, and throat.   Pulmonary:  He does have some inspiratory chest tightness on the right when he inspires.   Cardiovascular:  He does not have any palpitations, any shortness of breath at rest.   Gastrointestinal:  No diarrhea.  He does have constipation.   Extremities:  He has lower extremity wrapped and cellulitis with swelling.   Psychiatric:  Somewhat anxious.  Mildly depressed.   Endocrine:  He is diabetic and he has hypothyroidism.     All other 12-point review of systems are negative.    PHYSICAL EXAMINATION:    Vital Signs:  As follows; 117/68, 97.1, pulse of 80, respirations 22, and 94% on room air.   HEENT:  He has hematoma on right ears, turning purple now.  Extraocular muscle intact.  Oral mucosa moist without thrush.   Heart:  Regular rate and rhythm.  No murmur appreciated.   Lungs:  Clear to auscultation bilaterally.   Abdomen:  Soft, nontender, and nondistended.   Extremities:  No clubbing, cyanosis, or edema.   Neurologic:  Cranial nerves 2 through 12 grossly intact.    DIAGNOSTICS:    Reviewed for medical decision making.  His white count was 19.  I ordered labs for him to follow here.  INR in the hospital had been 1.5 when he left, but it had been 4.3 prior to that, he is on a Lovenox bridge.  Imaging in the hospital showed enlarged cardiac silhouette with pacemaker in place.  He does have a history of congestive heart failure.  No focal consolidation.    ASSESSMENT AND PLAN:    1. Syncopal episode, this was monitored in the hospital.  Troponins were negative.   Likely musculoskeletal discomfort in the chest is also addressed in the hospital.  Unable to obtain an MRI.  He did have an EEG.  Seizure precautions are followed.  2. Cellulitis of the right posterior calves and ulcers and he has been seen by wound care.  He will be seen by wound care doctor, Dr. Haddad.  Consult placed.  I talked to ID nurse.  He was treated with antibiotic in the hospital already with Levaquin.  3. Chronic systolic congestive heart failure due to nonischemic cardiomyopathy.  His ejection fraction has dropped to 10% to 15%, which is in the hospice range now with further decline.  He is in the range of being appropriate for hospice when he leaves.  At this point, I would suggest a palliative care consults and I have placed an order.  4. Hypothyroidism.  Continue Synthroid.  5. Chronic pain.  Norco and gabapentin on board as above.  6. Peripheral vascular disease.  Continue to assess the need for any intervention and discuss goals of care with this patient given his decline with palliative care program.  7. Morbidly obese.  8. He has obstructive sleep apnea.  9. History of pulmonary embolism.     He is largely at risk for death in the next 6 months given his rodrigo situation and severity of medical issues.  He is at risk for sepsis, aspiration pneumonia, and sudden death.  He wishes to be a DNR.  This needs to be further discussed and signed and I will talk with the  to have this addressed by the palliative care team.        ______________________________  Apolinar Sexton DO  EXTENDED CARE TEAM    ALEXA/modl  D:  11/03/2017 12:18:46  T:  11/03/2017 13:13:06  Job #:  [id number]    cc: Shawn Interiano       [KK1.1M]    Revision History        User Key Date/Time User Provider Type Action    > KK1.1 11/06/17 12:32 PM Apolinar Sexton DO Physician Sign     [N/A] 11/03/17 02:22 PM Apolinar Sexton DO Physician Edit    M - Manual             Pt [facility] from [facility] for fever/lethargy since this morning.

## 2021-04-17 NOTE — ED PROVIDER NOTE - PROGRESS NOTE DETAILS
I endorsed to NEIL Ron Pt with abx resistant to Cefepime from previous urine cx. Meropenem order, which pt is sensitive to. Spoke with ICU fellow Dr. Brunson and pt accepted to StepDown

## 2021-04-17 NOTE — ED PROVIDER NOTE - NS ED ROS FT
Review of Systems    Constitutional: (+) fever  Cardiovascular: (-) chest pain, (-) syncope  Respiratory: (-) cough, (-) shortness of breath  Gastrointestinal: (-) vomiting, (-) diarrhea, (-) abdominal pain  Musculoskeletal: (-) neck pain, (-) back pain, (-) joint pain  Integumentary: (-) rash, (-) edema  Neurological: (-) headache, (-) altered mental status    Except as documented in the HPI, all other systems are negative.

## 2021-04-17 NOTE — H&P ADULT - NSHPLABSRESULTS_GEN_ALL_CORE
LABS:                          8.4    4.73  )-----------( 265      ( 16 Apr 2021 21:23 )             25.5     04-16    119<LL>  |  85<L>  |  38<H>  ----------------------------<  527<HH>  5.7<H>   |  18  |  2.1<H>    Ca    7.6<L>      16 Apr 2021 21:23    TPro  5.5<L>  /  Alb  2.6<L>  /  TBili  0.3  /  DBili  x   /  AST  62<H>  /  ALT  72<H>  /  AlkPhos  128<H>  04-16    PT/INR - ( 16 Apr 2021 21:23 )   PT: 20.70 sec;   INR: 1.80 ratio         PTT - ( 16 Apr 2021 21:23 )  PTT:37.0 sec        Urinalysis (04.16.21 @ 21:23)    Glucose Qualitative, Urine: 500 mg/dL    Blood, Urine: Moderate    pH Urine: 6.0    Color: Yellow    Urine Appearance: Turbid    Bilirubin: Negative    Ketone - Urine: Trace    Specific Gravity: 1.013    Protein, Urine: 100 mg/dL    Urobilinogen: <2 mg/dL    Nitrite: Negative    Leukocyte Esterase Concentration: Large    Beta Hydroxy-Butyrate (04.16.21 @ 21:23)    Beta Hydroxy-Butyrate: 2.8 mmoL/L

## 2021-04-17 NOTE — H&P ADULT - ATTENDING COMMENTS
patient seen and examined, agree with above, sepsis present on admision/ UTI/ DKA, IVF, ABX. pancx, serial CMP. MICU

## 2021-04-17 NOTE — ED PROVIDER NOTE - CLINICAL SUMMARY MEDICAL DECISION MAKING FREE TEXT BOX
Pt presented for fever. Required ekg, labs, monitor, imaging. Was found to have hyponatremia, hyperglycemia, deja. Spoke with ICU and pt accepted to stepdown.

## 2021-04-17 NOTE — H&P ADULT - NSHPSOCIALHISTORY_GEN_ALL_CORE
non smoker  no ETOH or drug misuse  from NH , walks with cane non smoker  no ETOH or drug misuse  from [facility] , walks with cane

## 2021-04-17 NOTE — H&P ADULT - HISTORY OF PRESENT ILLNESS
77 yo Female with a PMHx of DM on insulin pump, HTN, CKD3, Afib s/p ablation on Eliquis, CAD s/p stenting in 2020, sick sinus syndrome S/p PPM, PVD s/p right peripheral leg stent, Inclusion body myositis, basal cell carcinoma on the face (around nose) s/p MOHS procedure x 3, chronic anemia, hypothyroidism, presented for 1 day history of fever.      Of note patient was recently admitted to ICU in March, found to have Pyelonephritis ( ESBL) with DKA and TEOFILO, treated with IV Abx and insulin drip .     In the ED Vital Signs T 99, , /56, Spo2 100 on RA  Labs were significant for WBC 4.7k, corrected sodium 126, glucose 527, Cr 2.1, K 5.7 and Beta hydroxy: 2.8  UA: ++  CXR: WNL    75 yo Female with a PMHx of DM on insulin pump, HTN, CKD3, Afib s/p ablation on Eliquis, CAD s/p stenting in 2020, sick sinus syndrome s/p PPM, PVD s/p right peripheral leg stent, 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    patient reported having fever of 102F 1 day prior to presentation, associated with mild left flank pain . Also endorses dribbling of urine since last admission and unchanged chronic non productive cough . Denies any frequency, urgency, dysuria, nausea, vomiting or diarrhea,  abdominal pain, palpitation, sob, cp, no other complaints.      Of note patient was recently admitted to ICU in March, found to have Pyelonephritis ( ESBL) with DKA and TEOFILO, treated with IV Abx and insulin drip .     In the ED Vital Signs T 99, , /56, Spo2 100 on RA.  s/p 2 liters of LR and meropenem   Labs were significant for WBC 4.7k, corrected sodium 126, glucose 527, Cr 2.1, K 5.7 and Beta hydroxy: 2.8  UA: ++  CXR: WNL    77 yo Female with a PMHx of DM on insulin pump, HTN, CKD3, Afib s/p ablation on Eliquis, CAD s/p stenting in 2020, sick sinus syndrome s/p PPM, PVD s/p right peripheral leg stent, 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    patient reported having fever of 102F 1 day prior to presentation, associated with mild left flank pain . Also endorses dribbling of urine since last admission and unchanged chronic non productive cough . Denies any frequency, urgency, dysuria, nausea, vomiting or diarrhea,  abdominal pain, palpitation, sob, cp, no other complaints.      Of note patient was recently admitted to ICU in March, found to have Pyelonephritis ( ESBL) with DKA and TEOFILO, treated with IV Abx and insulin drip .     In the ED Vital Signs T 99, , /56, Spo2 100 on RA.  s/p 2 liters of LR and meropenem   Labs were significant for WBC 4.7k, corrected sodium 126, glucose 527, Cr 2.1, K 5.7 and Beta hydroxy: 2.8  UA: ++  CXR: WNL   called to evaluate

## 2021-04-17 NOTE — PHYSICAL THERAPY INITIAL EVALUATION ADULT - SPECIFY REASON(S)
Awaiting updated orders for PT eval , spoke to MD at 1680 stated he would update , will f/u
Chart reviewed. Pt. currently on bedrest. PT evaluation on hold pending OOB orders as appropriate. Will follow.
hold PT as per INES raya patients sugar is currently low and patient is actively being given glucose to regulate . will f/u as appropriate

## 2021-04-17 NOTE — H&P ADULT - ASSESSMENT
75 yo Female with a PMHx of DM on insulin pump, HTN, CKD3, Afib s/p ablation on Eliquis, CAD s/p stenting in 2020, sick sinus syndrome S/p PPM, PVD s/p right peripheral leg stent, Inclusion body myositis, basal cell carcinoma on the face (around nose) s/p MOHS procedure x 3, chronic anemia, hypothyroidism, presented for 1 day history of fever.      IMPRESSION:   Sepsis complicated by DKA 2/2 acute pyelonephritis   TEOFILO ( ATN 2/2 sepsis)   Chronic hyponatremia  Hyperkalemia   Afib       PLAN:    CNS: Avoid CNS depressants.    HEENT:  Oral care.     PULMONARY:  HOB @ 45 degree.      CARDIOVASCULAR: Rate control. c/w amiodarone, hold metoprolol if HR<60 or SBP<90. c/w eliquis     GI: GI prophylaxis: PPI . NPO for now                           RENAL: NS 200cc/hr,  F/u  lytes.  Correct as needed. Chronic hyponatremia ( corrected Na 126), f/u CMP . do not overcorrect NA >8 meq /24 hrs     INFECTIOUS DISEASE: f/u ucx, bcx, mrsa swab , procal. Start meropenem renally dosed      HEMATOLOGICAL:  DVT prophylaxis: eliquis.     ENDOCRINE: start insulin drip, IVF NS 200c/hr, f/u K+ and Ph,     DISPOSITION: ICU     77 yo Female with a PMHx of DM on insulin pump, HTN, CKD3, Afib s/p ablation on Eliquis, CAD s/p stenting in 2020, sick sinus syndrome s/p PPM, PVD s/p right peripheral leg stent, 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    IMPRESSION:   Sepsis complicated by DKA 2/2 acute pyelonephritis   TEOFILO ( ATN 2/2 sepsis)   Chronic hyponatremia  Hyperkalemia   Afib     PLAN:    CNS: Avoid CNS depressants.    HEENT:  Oral care.     PULMONARY:  HOB @ 45 degree. aspiration precaution       CARDIOVASCULAR: Rate control. c/w amiodarone, hold metoprolol if HR<60 or SBP<90. c/w eliquis     GI: GI prophylaxis: PPI. NPO for now                           RENAL: NS 200cc/hr,  F/u  lytes.  Correct as needed. Chronic hyponatremia ( corrected Na 126, work up done last admission ), f/u CMP . do not overcorrect Na>8 meq /24 hrs     INFECTIOUS DISEASE: f/u ucx, bcx, procal. c/w meropenem 500 q12 renally dosed ( Last admission UCX + for ESBL)    HEMATOLOGICAL:  DVT prophylaxis: Eliquis     ENDOCRINE: start insulin drip, FU FS and BMP , IVF NS 0.9%  200c/hr, f/u K+ and Ph.     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    IMPRESSION:   Sepsis complicated by DKA 2/2 acute pyelonephritis   TEOFILO ( ATN 2/2 sepsis)   Chronic hyponatremia  Hyperkalemia   Afib (Hba1c 8.1  03/15)        PLAN:    CNS: Avoid CNS depressants.    HEENT:  Oral care.     PULMONARY:  HOB @ 45 degree. aspiration precaution       CARDIOVASCULAR: Rate control. c/w amiodarone, hold metoprolol if HR<60 or SBP<90. c/w eliquis     GI: GI prophylaxis: PPI. NPO for now                           RENAL: NS 200cc/hr,  F/u  lytes.  Correct as needed. Chronic hyponatremia ( corrected Na 126, work up done last admission ), f/u CMP . do not overcorrect Na>8 meq /24 hrs     INFECTIOUS DISEASE: f/u ucx, bcx, procal. c/w meropenem 500 q12 renally dosed ( Last admission UCX + for ESBL)    HEMATOLOGICAL:  DVT prophylaxis: Eliquis     ENDOCRINE: start insulin drip, FU FS and BMP , IVF NS 0.9%  200c/hr, f/u K+ and Ph.     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    IMPRESSION:   Sepsis complicated by DKA 2/2 acute pyelonephritis   TEOFILO on CKD3 ( ATN 2/2 sepsis)   Chronic hyponatremia  Hyperkalemia   Afib (Hba1c 8.1  03/15)        PLAN:    CNS: Avoid CNS depressants.    HEENT:  Oral care.     PULMONARY:  HOB @ 45 degree. aspiration precaution       CARDIOVASCULAR: Rate control. c/w plavix, amiodarone,  metoprolol and eliquis     GI: GI prophylaxis: PPI. NPO for now                           RENAL: NS 200cc/hr,  F/u  lytes.  Correct as needed. Chronic hyponatremia ( corrected Na 126, work up done last admission ), f/u CMP . do not overcorrect Na>8 meq /24 hrs     INFECTIOUS DISEASE: f/u ucx, bcx, procal. c/w meropenem 500 q12 renally dosed ( Last admission UCX + for ESBL, pt had a rash maybe due to meropenem)    HEMATOLOGICAL:  DVT prophylaxis: Eliquis     ENDOCRINE: start insulin drip, FU FS and BMP , IVF NS 0.9%  200c/hr, f/u K+ and Ph. 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    IMPRESSION:   Sepsis present on admission complicated by DKA 2/2 acute pyelonephritis   TEOFILO on CKD3 ( ATN 2/2 sepsis)   hyponatremia  Hyperkalemia   Afib (Hba1c 8.1  03/15)        PLAN:    CNS: Avoid CNS depressants.    HEENT:  Oral care.     PULMONARY:  HOB @ 45 degree. aspiration precaution       CARDIOVASCULAR: Rate control. c/w plavix, amiodarone,  metoprolol and eliquis     GI: GI prophylaxis: PPI. clear liquid                     RENAL: NS 100cc/hr,  F/u  lytes.  Correct as needed. trend CMP    INFECTIOUS DISEASE: f/u ucx, bcx, procal. c/w meropenem 500 q12 renally dosed ( Last admission UCX + for ESBL, pt had a rash maybe due to meropenem)    HEMATOLOGICAL:  DVT prophylaxis: Eliquis     ENDOCRINE: start insulin drip, FU FS and BMP , IVF NS 0.9%  200c/hr, f/u K+ and Ph. c/w levothyroxine     DISPOSITION: ICU

## 2021-04-17 NOTE — H&P ADULT - NSICDXPASTMEDICALHX_GEN_ALL_CORE_FT
PAST MEDICAL HISTORY:  Afib on Eliquis, amio and toprol    Anemia Chronic    Diabetes Insulin dependent - (has insulin Pump)    Dry eyes, bilateral     Essential hypertension     Hypothyroid     Inclusion body myositis (IBM)     Lung nodules tree in bud    Pacemaker Sept 2017    Palpitations occasionally; not for couple yrs    PVD (peripheral vascular disease) S/p Right Peripheral leg Stent    Seasonal allergies     Skin cancer Basal Cell Cancer face  (around nose) s/p MOHS procedure x 3    SSS (sick sinus syndrome) S/p PPM    Transfusion history S/p Hysterectomy

## 2021-04-17 NOTE — ED PROVIDER NOTE - OBJECTIVE STATEMENT
76 F from [facility] with hx of afib on Eliquis, CAD s/p stent, pacemaker, PVD with right leg stent, anemia, hypothyroidism, HTN, DM, basal cell carcinoma of the face was sent in from the NH for evaluation of fever with onset this am about 12 hours ago. Pt was recently admitted for pyelonephritis and was also treated for hyponatremia and deja at that time. Pt denies coughing, rhinorrhea, CP, SOB. No n/v/d.

## 2021-04-17 NOTE — H&P ADULT - NSHPPHYSICALEXAM_GEN_ALL_CORE
GENERAL: NAD, speaks in full sentences, no signs of respiratory distress  HEAD:  Atraumatic, Normocephalic  EYES: EOMI, PERRLA, non-icteric, no injected sclera  NECK: Supple, No JVD  CHEST/LUNG: Clear to auscultation bilaterally; No wheeze; No crackles; No accessory muscles used  HEART: Regular rate and rhythm; No murmurs;   ABDOMEN: Soft, Nontender, Nondistended; Bowel sounds present; No guarding  EXTREMITIES:  2+ Peripheral Pulses, No cyanosis or edema  PSYCH: AAOx3  NEUROLOGY: non-focal  SKIN: No rashes or lesions GENERAL: NAD, speaks in full sentences, no signs of respiratory distress  HEAD:  Atraumatic, Normocephalic  EYES: EOMI, non-icteric, no injected sclera  NECK: Supple, No JVD  CHEST/LUNG: Clear to auscultation bilaterally; No wheeze; No crackles; No accessory muscles used  HEART: Irregular s1s2 ; No murmurs;   ABDOMEN: Soft, lower abdominal tenderness, Nondistended; Bowel sounds present; No guarding. Mild L CVA tenderness   EXTREMITIES:  2+ Peripheral Pulses, No cyanosis or edema  PSYCH: AAOx3  NEUROLOGY: non-focal

## 2021-04-18 NOTE — PROGRESS NOTE ADULT - ASSESSMENT
IMPRESSION:     Sepsis present on admission complicated by DKA 2/2 acute pyelonephritis   TEOFILO on CKD3 ( ATN 2/2 sepsis)   hyponatremia  Hyperkalemia   Afib (Hba1c 8.1  03/15)        PLAN:    CNS: Avoid CNS depressants.    HEENT:  Oral care.     PULMONARY:  HOB @ 45 degree. aspiration precaution       CARDIOVASCULAR: Rate control. c/w plavix, amiodarone,  metoprolol and eliquis , IVF    GI: GI prophylaxis: PPI. clear liquid, advance diet                   RENAL: NS 100cc/hr,  F/u  lytes.  Correct as needed. trend CMP    INFECTIOUS DISEASE: f/u ucx, bcx, procal. c/w meropenem 500 q12    HEMATOLOGICAL:  DVT prophylaxis: Eliquis     ENDOCRINE: insulin SQ, Endo eval    possible downgrade to floor in pm

## 2021-04-18 NOTE — PHYSICAL THERAPY INITIAL EVALUATION ADULT - ADDITIONAL COMMENTS
As per pt, she was IND c all ADL's PTA. Amb s AD. Lives in [facility] on 4th floor c + elevator and no other LISANDRO. Has a SC and a RW at home. Pt was also driving, cooking, cleaning, shopping PTA.

## 2021-04-18 NOTE — PHYSICAL THERAPY INITIAL EVALUATION ADULT - PERTINENT HX OF CURRENT PROBLEM, REHAB EVAL
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
Pt presents to [facility] c sepsis, complicated by DKA 2/2 acute pyelonephritis.

## 2021-04-18 NOTE — PHYSICAL THERAPY INITIAL EVALUATION ADULT - IMPAIRMENTS FOUND, PT EVAL
aerobic capacity/endurance/ergonomics and body mechanics/gait, locomotion, and balance/gross motor/muscle strength/posture/ROM

## 2021-04-18 NOTE — PROGRESS NOTE ADULT - SUBJECTIVE AND OBJECTIVE BOX
OVERNIGHT EVENTS: events noted, feels better, off insulin drip    Vital Signs Last 24 Hrs  T(C): 36.9 (2021 18:21), Max: 38.3 (2021 16:20)  T(F): 98.4 (2021 18:21), Max: 100.9 (2021 16:20)  HR: 80 (2021 04:42) (66 - 131)  BP: 92/67 (2021 04:42) (92/67 - 132/68)  BP(mean): 65 (2021 22:19) (65 - 70)  RR: 17 (2021 04:42) (17 - 18)  SpO2: 99% (2021 04:42) (99% - 100%)    PHYSICAL EXAMINATION:    GENERAL: comfortable    HEENT: Head is normocephalic and atraumatic.     NECK: Supple.    LUNGS: Dec BS both bases    HEART: irregular, CORINA 3/6     ABDOMEN: Soft, nontender, and nondistended.      EXTREMITIES: Without any cyanosis, clubbing, rash, lesions or edema.    NEUROLOGIC: Grossly intact.    SKIN: No ulceration or induration present.      LABS:                        8.4    4.73  )-----------( 265      ( 2021 21:23 )             25.5     04-17    125<L>  |  97<L>  |  30<H>  ----------------------------<  63<L>  4.0   |  20  |  1.6<H>    Ca    7.1<L>      2021 20:00  Phos  1.9     04-17  Mg     2.2     04-17    TPro  5.5<L>  /  Alb  2.6<L>  /  TBili  0.3  /  DBili  x   /  AST  62<H>  /  ALT  72<H>  /  AlkPhos  128<H>  04-16    PT/INR - ( 2021 21:23 )   PT: 20.70 sec;   INR: 1.80 ratio         PTT - ( :23 )  PTT:37.0 sec  Urinalysis Basic - ( :23 )    Color: Yellow / Appearance: Turbid / S.013 / pH: x  Gluc: x / Ketone: Trace  / Bili: Negative / Urobili: <2 mg/dL   Blood: x / Protein: 100 mg/dL / Nitrite: Negative   Leuk Esterase: Large / RBC: 2 /HPF / WBC >720 /HPF   Sq Epi: x / Non Sq Epi: 1 /HPF / Bacteria: Many                          MICROBIOLOGY:      MEDICATIONS  (STANDING):  ALBUTerol    90 MICROgram(s) HFA Inhaler 1 Puff(s) Inhalation every 6 hours  aMIOdarone    Tablet 200 milliGRAM(s) Oral two times a day  apixaban 2.5 milliGRAM(s) Oral two times a day  atorvastatin 40 milliGRAM(s) Oral at bedtime  chlorhexidine 4% Liquid 1 Application(s) Topical <User Schedule>  clopidogrel Tablet 75 milliGRAM(s) Oral daily  dronabinol 2.5 milliGRAM(s) Oral three times a day before meals  famotidine  Oral Tab/Cap - Peds 20 milliGRAM(s) Oral at bedtime  insulin glargine Injectable (LANTUS) 8 Unit(s) SubCutaneous at bedtime  insulin lispro (ADMELOG) corrective regimen sliding scale   SubCutaneous three times a day before meals  levothyroxine 88 MICROGram(s) Oral daily  meropenem  IVPB 500 milliGRAM(s) IV Intermittent every 12 hours  metoprolol tartrate 25 milliGRAM(s) Oral every 6 hours  nystatin    Suspension 097201 Unit(s) Oral four times a day  pantoprazole    Tablet 40 milliGRAM(s) Oral before breakfast  senna 2 Tablet(s) Oral at bedtime  sodium chloride 0.9%. 1000 milliLiter(s) (200 mL/Hr) IV Continuous <Continuous>    MEDICATIONS  (PRN):      RADIOLOGY & ADDITIONAL STUDIES:

## 2021-04-19 NOTE — PROGRESS NOTE ADULT - SUBJECTIVE AND OBJECTIVE BOX
SUBJECTIVE:    Patient is a 76y old Female who presents with a chief complaint of fever (18 Apr 2021 06:32)    Currently admitted to medicine with the primary diagnosis of UTI (urinary tract infection)       Today is hospital day 2d. This morning she is resting comfortably in bed and reports no new issues or overnight events.     PAST MEDICAL & SURGICAL HISTORY  Afib  on Eliquis, amio and toprol    Essential hypertension    Palpitations  occasionally; not for couple yrs    Pacemaker  Sept 2017    Diabetes  Insulin dependent - (has insulin Pump)    SSS (sick sinus syndrome)  S/p PPM    Hypothyroid    Anemia  Chronic    Skin cancer  Basal Cell Cancer face  (around nose) s/p MOHS procedure x 3    Seasonal allergies    PVD (peripheral vascular disease)  S/p Right Peripheral leg Stent    Dry eyes, bilateral    Transfusion history  S/p Hysterectomy    Lung nodules  tree in bud    Inclusion body myositis (IBM)    H/O total hysterectomy  25+ yrs ago    History of surgery  Right Peripheral Stent 5 yrs    Cardiac pacemaker  9/9/17    History of surgery  MOHS procedure (face) x 3    S/P cataract surgery    S/P ablation of atrial fibrillation      SOCIAL HISTORY:  Negative for smoking/alcohol/drug use.     ALLERGIES:  latex (Pruritus; Rash)  sulfonamides (Unknown)    MEDICATIONS:  STANDING MEDICATIONS  ALBUTerol    90 MICROgram(s) HFA Inhaler 1 Puff(s) Inhalation every 6 hours  aMIOdarone    Tablet 200 milliGRAM(s) Oral two times a day  apixaban 2.5 milliGRAM(s) Oral two times a day  atorvastatin 40 milliGRAM(s) Oral at bedtime  chlorhexidine 4% Liquid 1 Application(s) Topical <User Schedule>  clopidogrel Tablet 75 milliGRAM(s) Oral daily  dextrose 5%. 1000 milliLiter(s) IV Continuous <Continuous>  dronabinol 2.5 milliGRAM(s) Oral three times a day before meals  famotidine  Oral Tab/Cap - Peds 20 milliGRAM(s) Oral at bedtime  glucagon  Injectable 1 milliGRAM(s) IntraMuscular once  insulin lispro (ADMELOG) corrective regimen sliding scale   SubCutaneous three times a day before meals  levothyroxine 88 MICROGram(s) Oral daily  meropenem  IVPB 500 milliGRAM(s) IV Intermittent every 12 hours  metoprolol tartrate 50 milliGRAM(s) Oral every 6 hours  nystatin    Suspension 013903 Unit(s) Oral four times a day  pantoprazole    Tablet 40 milliGRAM(s) Oral before breakfast  senna 2 Tablet(s) Oral at bedtime  sodium bicarbonate 650 milliGRAM(s) Oral every 8 hours  sodium chloride 0.9%. 1000 milliLiter(s) IV Continuous <Continuous>    PRN MEDICATIONS    VITALS:   T(F): 99.1  HR: 69  BP: 107/58  RR: 18  SpO2: 99%    LABS:                        8.2    6.02  )-----------( 273      ( 19 Apr 2021 06:58 )             25.3     04-19    127<L>  |  100  |  26<H>  ----------------------------<  87  4.3   |  15<L>  |  1.6<H>    Ca    7.1<L>      19 Apr 2021 06:58  Phos  2.8     04-18  Mg     2.2     04-19    TPro  4.4<L>  /  Alb  2.2<L>  /  TBili  <0.2  /  DBili  x   /  AST  56<H>  /  ALT  59<H>  /  AlkPhos  129<H>  04-19              Culture - Blood (collected 16 Apr 2021 21:23)  Source: .Blood Blood-Peripheral  Preliminary Report (18 Apr 2021 07:01):    No growth to date.    Culture - Blood (collected 16 Apr 2021 21:23)  Source: .Blood Blood-Peripheral  Preliminary Report (18 Apr 2021 07:00):    No growth to date.          RADIOLOGY:    PHYSICAL EXAM:  GEN: No acute distress  LUNGS: Clear to auscultation bilaterally   HEART: S1/S2 present. RRR.   ABD: Soft, non-tender, non-distended. Bowel sounds present  EXT: NC/NC/NE/2+PP/LAZARO/Skin Intact.   NEURO: AAOX3    Intravenous access:   NG tube:   Springer Catheter:

## 2021-04-19 NOTE — CONSULT NOTE ADULT - ASSESSMENT
#Insulin dependent T2DM on insulin pump  #HHS on admission with sepsis/pyelonephritis and TEOFILO on admission -treated  #Episodes of hypoglycemia   -        #Hypothyroidism  -c/w Levothyroxine 88mcg daily #Insulin dependent T2DM on insulin pump  #HHS on admission with sepsis/pyelonephritis and TEOFILO on admission -treated  #Episodes of hypoglycemia   -        #Hypothyroidism  -c/w Levothyroxine 88mcg daily      ATTNG:  H/o insulin-requiring type 2 diabetes-previously on pump therapy but more recently has been on basal/bolus therapy-now admitted with pyelonephritis.  BS low today, but received more Lantus yesterday than usually requires.  Suggest Lantus 10 units qHs and Admelog 3 units tid ac (plus supplement for hyperglycemia). #Insulin dependent T2DM on insulin pump, but recently on basal bolus regimen at NH  #HHS on admission with sepsis/pyelonephritis and TEOFILO on admission  #Episodes of hypoglycemia -d/t more Lantus receipt than required.   -Start Lantus 10U qHS with Admelog 3U TID AC with correctional insulin sliding scale.     #Hypothyroidism  -c/w Levothyroxine 88mcg daily  -Check TSH and free T4      ATTNG:  H/o insulin-requiring type 2 diabetes-previously on pump therapy but more recently has been on basal/bolus therapy-now admitted with pyelonephritis.  BS low today, but received more Lantus yesterday than usually requires.  Suggest Lantus 10 units qHs and Admelog 3 units tid ac (plus supplement for hyperglycemia).

## 2021-04-19 NOTE — PROGRESS NOTE ADULT - SUBJECTIVE AND OBJECTIVE BOX
Patient is a 76y old  Female who presents with a chief complaint of fever (19 Apr 2021 10:16)    Patient was seen and examined.  Pt is a downgrade from ICU  C/o nausea  Denies abd pain , constipation  Denies chest pain , sob, dizziness  Denies any other complaints.  All systems reviewed positive history as mentioned above.    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  Inclusion body myositis (IBM)    History of surgery  Right Peripheral Stent 5 yrs  Cardiac pacemaker 9/9/17  MOHS procedure (face) x 3  S/P cataract surgery  S/P ablation of atrial fibrillation    Allergies  latex (Pruritus; Rash)  sulfonamides (Unknown)    Intolerances  Gluten (Stomach Upset; Vomiting; Nausea; Diarrhea)  Pineapple (Unknown)    MEDICATIONS  (STANDING):  ALBUTerol    90 MICROgram(s) HFA Inhaler 1 Puff(s) Inhalation every 6 hours  aMIOdarone    Tablet 200 milliGRAM(s) Oral two times a day  apixaban 2.5 milliGRAM(s) Oral two times a day  atorvastatin 40 milliGRAM(s) Oral at bedtime  chlorhexidine 4% Liquid 1 Application(s) Topical <User Schedule>  clopidogrel Tablet 75 milliGRAM(s) Oral daily  dextrose 5%. 1000 milliLiter(s) (100 mL/Hr) IV Continuous <Continuous>  dronabinol 2.5 milliGRAM(s) Oral three times a day before meals  famotidine  Oral Tab/Cap - Peds 20 milliGRAM(s) Oral at bedtime  glucagon  Injectable 1 milliGRAM(s) IntraMuscular once  insulin lispro (ADMELOG) corrective regimen sliding scale   SubCutaneous three times a day before meals  levothyroxine 88 MICROGram(s) Oral daily  meropenem  IVPB 500 milliGRAM(s) IV Intermittent every 12 hours  metoprolol tartrate 50 milliGRAM(s) Oral every 6 hours  nystatin    Suspension 407016 Unit(s) Oral four times a day  pantoprazole    Tablet 40 milliGRAM(s) Oral before breakfast  senna 2 Tablet(s) Oral at bedtime  sodium bicarbonate 650 milliGRAM(s) Oral every 8 hours  sodium chloride 0.9%. 1000 milliLiter(s) (100 mL/Hr) IV Continuous <Continuous>    MEDICATIONS  (PRN):    Vital Signs Last 24 Hrs  T(C): 37.3  T(F): 99.1  HR: 69  BP: 107/58  BP(mean): 86  RR: 18  SpO2: 99%    O/E:  Awake, alert, not in distress.  HEENT: atraumatic, EOMI.  Chest: clear.  CVS: SIS2 +,irregular, systolic  murmur+  P/A: Soft, BS+  CNS: non focal.  Ext: no edema feet.  Skin: no rash, no ulcers.  All systems reviewed positive findings as above.    POCT Blood Glucose.: 120 mg/dL (19 Apr 2021 07:38)  POCT Blood Glucose.: 79 mg/dL (19 Apr 2021 06:10)  POCT Blood Glucose.: 54 mg/dL (19 Apr 2021 05:24)  POCT Blood Glucose.: 120 mg/dL (18 Apr 2021 21:58)  POCT Blood Glucose.: 240 mg/dL (18 Apr 2021 17:47)  POCT Blood Glucose.: 318 mg/dL (18 Apr 2021 13:15)                          8.2<L>  6.02  )-----------( 273      ( 19 Apr 2021 06:58 )             25.3<L>                        8.7<L>  6.63  )-----------( 266      ( 18 Apr 2021 06:57 )             27.4<L>    04-19    127<L>  |  100  |  26<H>  ----------------------------<  87  4.3   |  15<L>  |  1.6<H>  04-18    127<L>  |  98  |  28<H>  ----------------------------<  310<H>  5.3<H>   |  16<L>  |  1.7<H>    Ca    7.1<L>      19 Apr 2021 06:58  Ca    7.4<L>      18 Apr 2021 16:41  Ca    7.0<L>      18 Apr 2021 06:57  Ca    7.1<L>      17 Apr 2021 20:00  Ca    7.4<L>      17 Apr 2021 16:00  Phos  2.8     04-18  Mg     2.2     04-19    TPro  4.4<L>  /  Alb  2.2<L>  /  TBili  <0.2  /  DBili  x   /  AST  56<H>  /  ALT  59<H>  /  AlkPhos  129<H>  04-19                Culture - Blood (collected 16 Apr 2021 21:23)  Source: .Blood Blood-Peripheral  Preliminary Report (18 Apr 2021 07:01):    No growth to date.    Culture - Blood (collected 16 Apr 2021 21:23)  Source: .Blood Blood-Peripheral  Preliminary Report (18 Apr 2021 07:00):    No growth to date.

## 2021-04-19 NOTE — PROGRESS NOTE ADULT - ASSESSMENT
#sepsis  due to APN   blood cx negative to date   pending urine cx  on meropenem due to hx of ESBL   vitals are stable   no more in sepsis     #DKA  was on insulin drip in the ICU   no on insulin sq  gap closed  the acidosis is due to CKD -> started on bicarb   she had episodes of hypoglycemia , she recieved a total on 28 units within 6 hours yesterday in addition to 10 units this morning , will monitor closely  cs endo   f/u         #TEOFILO  prerenal vs ATN   the patient improved with hydration   cr back to baseline now     DVT prophylaxis: Eliquis   pending urine cx   gi prophylaxis: ppi

## 2021-04-19 NOTE — PROGRESS NOTE ADULT - ASSESSMENT
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  Pt was admitted to ICU diagnosed  and treated in ICU for  Sepsis, Pyelonephritis and DKA. Pt was transferd to Medical floor on 4/18/21    # sepsis POA sec to pyelonephritis -resolved  # Pyelonephritis  - blood Culture Results: No growth to date. (04.16.21 @ 21:23)  - F/u urine culture  - c/w meropenem  - renal us    # Diabetic ketoacidosis resolved  # IDDM with hypoglycemia  - A1C with Estimated Average Glucose Result: 8.1 % (03.15.21 @ 06:16)  - S/p insulin drip  - decrease lantus dose to 18units HS, c/w lispro SSC  - Endocrine eval    # Acute kidney injury on CKD stage 3, prerenal-resolving  # Metabolic acidosis  # Hyponatremia  # Hyperkalemia-reolved  - start Bicarb  - monitor BMP    # H/o chronic  afib-rate controlled  # H/o SSS S/p PPM  - c/w amiodarone, metoprolol  - c/w eliquis    # H/o CAD S/p PCI  - c/w plavix, eliquis, statin, metoprolol    # H/o PVD S/p stent  - c/w plavix, statin    # Hypothyroidism  - c/w synthroid    # H/o Inclusion body myositis  -  on Gamunex every 5 weeks    # Full code    #Pending: F/u urine culture, Endocrine eval  # Discussed plan of care with patient  # Disposition: Home when stable

## 2021-04-19 NOTE — CONSULT NOTE ADULT - SUBJECTIVE AND OBJECTIVE BOX
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  Patient reported having fever of 102F 1 day prior to presentation, associated with mild left flank pain . Also endorses dribbling of urine since last admission and unchanged chronic non productive cough . Denies any frequency, urgency, dysuria, nausea, vomiting or diarrhea,  abdominal pain, palpitation, sob, cp, no other complaints.    Of note patient was recently admitted to ICU in March, found to have Pyelonephritis ( ESBL) with DKA and TEOFILO, treated with IV Abx and insulin drip .   In the ED Vital Signs T 99, , /56, Spo2 100 on RA.  s/p 2 liters of LR and meropenem   Labs were significant for WBC 4.7k, corrected sodium 126, glucose 527, Cr 2.1, K 5.7 and Beta hydroxy: 2.8  UA: ++  CXR: WNL   called to evaluate   (17 Apr 2021 05:50)    INTERVAL HISTORY:     ENDOCRINE HISTORY:  Patient diagnosed with type 2 DM insulin dependent at least for 10 years uses Medtronic insulin pump with TDB of 18 units , I:C ratio of 1:8-1:10 and ISF 1:35. Pt enies retinopathy or neuropathy, last saw her endocrinologis and no changes were made. she reports overall good FS range but  occasional hypo/hyperglycemia. familiar with carb counting.   patient also has hypothyroidism and is on Lt4 88 mcg daily.         FAMILY HISTORY:  Lung cancer (Mother)  Cancer (Father)  Myeloma    PAST MEDICAL & SURGICAL HISTORY:  Afib on Eliquis, amio and toprol  Essential hypertension  Diabetes: Insulin dependent - (has insulin Pump)  SSS (sick sinus syndrome)  Hypothyroid  Anemia Chronic  Basal Cell Cancer face  (around nose)   PVD (peripheral vascular disease)S/p Right Peripheral leg Stent  Dry eyes, bilateral  Lung nodules tree in bud  Inclusion body myositis (IBM)    H/O total hysterectomy 25+ yrs ago  Right Peripheral Stent 5 yrs  Cardiac pacemaker 9/9/17  MOHS procedure (face) x 3  S/P cataract surgery  S/P ablation of atrial fibrillation      REVIEW OF SYSTEMS  CONSTITUTIONAL: No weakness, fevers or chills  RESPIRATORY: No cough, wheezing, hemoptysis; No shortness of breath  CARDIOVASCULAR: No chest pain or palpitations  GASTROINTESTINAL: No abdominal or epigastric pain. No nausea, vomiting, or hematemesis; No diarrhea or constipation. No melena or hematochezia.  GENITOURINARY: No dysuria, frequency or hematuria  NEUROLOGICAL: No numbness or weakness  SKIN: No itching, rashes    Allergies:  latex (Pruritus; Rash)  sulfonamides (Unknown)    Intolerances:  Gluten (Stomach Upset; Vomiting; Nausea; Diarrhea)  Pineapple (Unknown)    MEDICATIONS:  ALBUTerol    90 MICROgram(s) HFA Inhaler 1 Puff(s) Inhalation every 6 hours  aMIOdarone    Tablet 200 milliGRAM(s) Oral two times a day  apixaban 2.5 milliGRAM(s) Oral two times a day  atorvastatin 40 milliGRAM(s) Oral at bedtime  chlorhexidine 4% Liquid 1 Application(s) Topical <User Schedule>  clopidogrel Tablet 75 milliGRAM(s) Oral daily  dextrose 5%. 1000 milliLiter(s) (100 mL/Hr) IV Continuous <Continuous>  dronabinol 2.5 milliGRAM(s) Oral three times a day before meals  famotidine  Oral Tab/Cap - Peds 20 milliGRAM(s) Oral at bedtime  glucagon  Injectable 1 milliGRAM(s) IntraMuscular once  insulin lispro (ADMELOG) corrective regimen sliding scale   SubCutaneous three times a day before meals  levothyroxine 88 MICROGram(s) Oral daily  meropenem  IVPB 500 milliGRAM(s) IV Intermittent every 12 hours  metoprolol tartrate 50 milliGRAM(s) Oral every 6 hours  nystatin    Suspension 601647 Unit(s) Oral four times a day  pantoprazole    Tablet 40 milliGRAM(s) Oral before breakfast  senna 2 Tablet(s) Oral at bedtime  sodium bicarbonate 650 milliGRAM(s) Oral every 8 hours  sodium chloride 0.9%. 1000 milliLiter(s) (100 mL/Hr) IV Continuous <Continuous>    Vital Signs Last 24 Hrs  T(C): 37.3 (19 Apr 2021 07:35), Max: 37.4 (18 Apr 2021 20:00)  T(F): 99.1 (19 Apr 2021 07:35), Max: 99.4 (18 Apr 2021 20:00)  HR: 69 (19 Apr 2021 07:35) (69 - 121)  BP: 107/58 (19 Apr 2021 07:35) (102/58 - 135/81)  BP(mean): 86 (18 Apr 2021 20:00) (86 - 86)  RR: 18 (19 Apr 2021 07:35) (16 - 18)  SpO2: 99% (19 Apr 2021 07:35) (97% - 100%)  Height (cm): 154.9 (04-19 @ 00:00)  Weight (kg): 55.4 (04-19 @ 00:00)  BMI (kg/m2): 23.1 (04-19 @ 00:00)    PHYSICAL EXAM:  GENERAL: NAD, speaks in full sentences, no signs of respiratory distress  HEAD:  Atraumatic, Normocephalic  EYES: EOMI, PERRLA, conjunctiva and sclera clear  NECK: Supple, No JVD  CHEST/LUNG: Clear to auscultation bilaterally; No wheeze; No crackles; No accessory muscles used  HEART: Regular rate and rhythm; No murmurs;   ABDOMEN: Soft, Nontender, Nondistended; Bowel sounds present; No guarding  EXTREMITIES:  2+ Peripheral Pulses, No cyanosis or edema  PSYCH: AAOx3  NEUROLOGY: non-focal  SKIN: No rashes or lesions    LABS                          8.2    6.02  )-----------( 273      ( 19 Apr 2021 06:58 )             25.3     04-19    127<L>  |  100  |  26<H>  ----------------------------<  87  4.3   |  15<L>  |  1.6<H>    Ca    7.1<L>      19 Apr 2021 06:58  Phos  2.8     04-18  Mg     2.2     04-19    TPro  4.4<L>  /  Alb  2.2<L>  /  TBili  <0.2  /  DBili  x   /  AST  56<H>  /  ALT  59<H>  /  AlkPhos  129<H>  04-19    A1C with Estimated Average Glucose Result: 8.1 (03.15.21 @ 06:16)  A1C with Estimated Average Glucose Result: 7.4(07.15.20 @ 18:30)  A1C with Estimated Average Glucose Result: 7.9 (05.22.20 @ 17:52)    CAPILLARY BLOOD GLUCOSE  POCT Blood Glucose.: 69 mg/dL (19 Apr 2021 11:34)  POCT Blood Glucose.: 120 mg/dL (19 Apr 2021 07:38)  POCT Blood Glucose.: 79 mg/dL (19 Apr 2021 06:10)  POCT Blood Glucose.: 54 mg/dL (19 Apr 2021 05:24)  POCT Blood Glucose.: 120 mg/dL (18 Apr 2021 21:58)  POCT Blood Glucose.: 240 mg/dL (18 Apr 2021 17:47)  POCT Blood Glucose.: 318 mg/dL (18 Apr 2021 13:15)   Request for consultation:  Requested by:    Patient is a 76y old  Female who presents with a chief complaint of fever (19 Apr 2021 10:36)    HPI:  75 yo Female with a PMHx of DM on insulin pump, HTN, CKD3, Afib s/p ablation on Eliquis, CAD s/p stenting in 2020, sick sinus syndrome s/p PPM, PVD s/p right peripheral leg stent, 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  Patient reported having fever of 102F 1 day prior to presentation, associated with mild left flank pain . Also endorses dribbling of urine since last admission and unchanged chronic non productive cough . Denies any frequency, urgency, dysuria, nausea, vomiting or diarrhea,  abdominal pain, palpitation, sob, cp, no other complaints.    Of note patient was recently admitted to ICU in March, found to have Pyelonephritis ( ESBL) with DKA and TEOFILO, treated with IV Abx and insulin drip .   In the ED Vital Signs T 99, , /56, Spo2 100 on RA.  s/p 2 liters of LR and meropenem   Labs were significant for WBC 4.7k, corrected sodium 126, glucose 527, Cr 2.1, K 5.7 and Beta hydroxy: 2.8  UA: ++  CXR: WNL   called to evaluate   (17 Apr 2021 05:50)    INTERVAL HISTORY: Pt was admitted for Sepsis 2/2 pyelonephritis, with HHS on admission and FS >500, AG 16, trace urine ketone, Ph 7.35 and HCO3 18,  which was treated with Insulin drip and IVF. Pt currently also on Meropenem IV and had TEOFILO on CKD3. Pt also had episodes of hypoglycemia and received 16U of Insulin regular on 4/17, 10U of Insulin Lantus on 4/18 and 8U of Admelog on 4/18. Insulin drip was discontinued on 4/18.    ENDOCRINE HISTORY:  Patient diagnosed with type 2 DM insulin dependent at least for 10 years uses Medtronic insulin pump with TDB of 18 units , I:C ratio of 1:8-1:10 and ISF 1:35. Pt was on the similar settings during last admission and was discharged on the same. Pt could not start her pump in front of me today to confirm the setting. Pt denies retinopathy or neuropathy, Pt last saw her endocrinologist in Dec 2020 and no changes were made. She reports overall good FS range but  occasional hypo/hyperglycemia. Pt is familiar with carb counting. Patient also has hypothyroidism and is on Levothyroxine 88 mcg daily.       FAMILY HISTORY:  Lung cancer (Mother)  Cancer (Father)  Myeloma    PAST MEDICAL & SURGICAL HISTORY:  Afib on Eliquis, amio and toprol  Essential hypertension  Diabetes: Insulin dependent - (has insulin Pump)  SSS (sick sinus syndrome)  Hypothyroid  Anemia Chronic  Basal Cell Cancer face  (around nose)   PVD (peripheral vascular disease)S/p Right Peripheral leg Stent  Dry eyes, bilateral  Lung nodules tree in bud  Inclusion body myositis (IBM)    H/O total hysterectomy 25+ yrs ago  Right Peripheral Stent 5 yrs  Cardiac pacemaker 9/9/17  MOHS procedure (face) x 3  S/P cataract surgery  S/P ablation of atrial fibrillation      REVIEW OF SYSTEMS  CONSTITUTIONAL: No weakness, fevers or chills  RESPIRATORY: No cough, wheezing, hemoptysis; No shortness of breath  CARDIOVASCULAR: No chest pain or palpitations  GASTROINTESTINAL: No abdominal or epigastric pain. No nausea, vomiting, or hematemesis; No diarrhea or constipation. No melena or hematochezia.  GENITOURINARY: No dysuria, frequency or hematuria  NEUROLOGICAL: No numbness or weakness  SKIN: No itching, rashes    Allergies:  latex (Pruritus; Rash)  sulfonamides (Unknown)    Intolerances:  Gluten (Stomach Upset; Vomiting; Nausea; Diarrhea)  Pineapple (Unknown)    MEDICATIONS:  ALBUTerol    90 MICROgram(s) HFA Inhaler 1 Puff(s) Inhalation every 6 hours  aMIOdarone    Tablet 200 milliGRAM(s) Oral two times a day  apixaban 2.5 milliGRAM(s) Oral two times a day  atorvastatin 40 milliGRAM(s) Oral at bedtime  chlorhexidine 4% Liquid 1 Application(s) Topical <User Schedule>  clopidogrel Tablet 75 milliGRAM(s) Oral daily  dextrose 5%. 1000 milliLiter(s) (100 mL/Hr) IV Continuous <Continuous>  dronabinol 2.5 milliGRAM(s) Oral three times a day before meals  famotidine  Oral Tab/Cap - Peds 20 milliGRAM(s) Oral at bedtime  glucagon  Injectable 1 milliGRAM(s) IntraMuscular once  insulin lispro (ADMELOG) corrective regimen sliding scale   SubCutaneous three times a day before meals  levothyroxine 88 MICROGram(s) Oral daily  meropenem  IVPB 500 milliGRAM(s) IV Intermittent every 12 hours  metoprolol tartrate 50 milliGRAM(s) Oral every 6 hours  nystatin    Suspension [id number] Unit(s) Oral four times a day  pantoprazole    Tablet 40 milliGRAM(s) Oral before breakfast  senna 2 Tablet(s) Oral at bedtime  sodium bicarbonate 650 milliGRAM(s) Oral every 8 hours  sodium chloride 0.9%. 1000 milliLiter(s) (100 mL/Hr) IV Continuous <Continuous>    Vital Signs Last 24 Hrs  T(C): 37.3 (19 Apr 2021 07:35), Max: 37.4 (18 Apr 2021 20:00)  T(F): 99.1 (19 Apr 2021 07:35), Max: 99.4 (18 Apr 2021 20:00)  HR: 69 (19 Apr 2021 07:35) (69 - 121)  BP: 107/58 (19 Apr 2021 07:35) (102/58 - 135/81)  BP(mean): 86 (18 Apr 2021 20:00) (86 - 86)  RR: 18 (19 Apr 2021 07:35) (16 - 18)  SpO2: 99% (19 Apr 2021 07:35) (97% - 100%)  Height (cm): 154.9 (04-19 @ 00:00)  Weight (kg): 55.4 (04-19 @ 00:00)  BMI (kg/m2): 23.1 (04-19 @ 00:00)    PHYSICAL EXAM:  GENERAL: NAD, speaks in full sentences, no signs of respiratory distress  HEAD:  Atraumatic, Normocephalic  EYES: EOMI, PERRLA, conjunctiva and sclera clear  NECK: Supple, No JVD  CHEST/LUNG: Clear to auscultation bilaterally; No wheeze; No crackles; No accessory muscles used  HEART: Regular rate and rhythm; No murmurs;   ABDOMEN: Soft, Nontender, Nondistended; Bowel sounds present; No guarding  EXTREMITIES:  2+ Peripheral Pulses, No cyanosis or edema  PSYCH: AAOx3  NEUROLOGY: non-focal  SKIN: No rashes or lesions    LABS                          8.2    6.02  )-----------( 273      ( 19 Apr 2021 06:58 )             25.3     04-19    127<L>  |  100  |  26<H>  ----------------------------<  87  4.3   |  15<L>  |  1.6<H>    Ca    7.1<L>      19 Apr 2021 06:58  Phos  2.8     04-18  Mg     2.2     04-19    TPro  4.4<L>  /  Alb  2.2<L>  /  TBili  <0.2  /  DBili  x   /  AST  56<H>  /  ALT  59<H>  /  AlkPhos  129<H>  04-19    A1C with Estimated Average Glucose Result: 8.1 (03.15.21 @ 06:16)  A1C with Estimated Average Glucose Result: 7.4(07.15.20 @ 18:30)  A1C with Estimated Average Glucose Result: 7.9 (05.22.20 @ 17:52)    CAPILLARY BLOOD GLUCOSE  POCT Blood Glucose.: 69 mg/dL (19 Apr 2021 11:34)  POCT Blood Glucose.: 120 mg/dL (19 Apr 2021 07:38)  POCT Blood Glucose.: 79 mg/dL (19 Apr 2021 06:10)  POCT Blood Glucose.: 54 mg/dL (19 Apr 2021 05:24)  POCT Blood Glucose.: 120 mg/dL (18 Apr 2021 21:58)  POCT Blood Glucose.: 240 mg/dL (18 Apr 2021 17:47)  POCT Blood Glucose.: 318 mg/dL (18 Apr 2021 13:15)   Request for consultation:  Requested by:    Patient is a 76y old  Female who presents with a chief complaint of fever (19 Apr 2021 10:36)    HPI:  77 yo Female with a PMHx of DM on insulin pump, HTN, CKD3, Afib s/p ablation on Eliquis, CAD s/p stenting in 2020, sick sinus syndrome s/p PPM, PVD s/p right peripheral leg stent, 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  Patient reported having fever of 102F 1 day prior to presentation, associated with mild left flank pain . Also endorses dribbling of urine since last admission and unchanged chronic non productive cough . Denies any frequency, urgency, dysuria, nausea, vomiting or diarrhea,  abdominal pain, palpitation, sob, cp, no other complaints.    Of note patient was recently admitted to ICU in March, found to have Pyelonephritis ( ESBL) with DKA and TEOFILO, treated with IV Abx and insulin drip .   In the ED Vital Signs T 99, , /56, Spo2 100 on RA.  s/p 2 liters of LR and meropenem   Labs were significant for WBC 4.7k, corrected sodium 126, glucose 527, Cr 2.1, K 5.7 and Beta hydroxy: 2.8  UA: ++  CXR: WNL   called to evaluate   (17 Apr 2021 05:50)    INTERVAL HISTORY: Pt was admitted for Sepsis 2/2 pyelonephritis, with HHS on admission and FS >500, AG 16, trace urine ketone, Ph 7.35 and HCO3 18,  which was treated with Insulin drip and IVF. Pt currently also on Meropenem IV and had TEOFILO on CKD3. Pt also had episodes of hypoglycemia and received 16U of Insulin regular on 4/17, 10U of Insulin Lantus on 4/18 and 8U of Admelog on 4/18. Insulin drip was discontinued on 4/18.    ENDOCRINE HISTORY:  Patient diagnosed with type 2 DM insulin dependent at least for 10 years uses Medtronic insulin pump with TDB of 18 units , I:C ratio of 1:8-1:10 and ISF 1:35. Pt was on the similar settings during last admission and was discharged on the same. Pt could not start her pump in front of me today to confirm the setting. Pt denies retinopathy or neuropathy, Pt last saw her endocrinologist in Dec 2020 and no changes were made. She reports overall good FS range but  occasional hypo/hyperglycemia. Pt is familiar with carb counting. Patient also has hypothyroidism and is on Levothyroxine 88 mcg daily.  Pt was not on Insulin Pump in the short term rehab\ that she was admitted from.       FAMILY HISTORY:  Lung cancer (Mother)  Cancer (Father)  Myeloma    PAST MEDICAL & SURGICAL HISTORY:  Afib on Eliquis, amio and toprol  Essential hypertension  Diabetes: Insulin dependent - (has insulin Pump)  SSS (sick sinus syndrome)  Hypothyroid  Anemia Chronic  Basal Cell Cancer face  (around nose)   PVD (peripheral vascular disease)S/p Right Peripheral leg Stent  Dry eyes, bilateral  Lung nodules tree in bud  Inclusion body myositis (IBM)    H/O total hysterectomy 25+ yrs ago  Right Peripheral Stent 5 yrs  Cardiac pacemaker 9/9/17  MOHS procedure (face) x 3  S/P cataract surgery  S/P ablation of atrial fibrillation      REVIEW OF SYSTEMS  CONSTITUTIONAL: No weakness, fevers or chills  RESPIRATORY: No cough, wheezing, hemoptysis; No shortness of breath  CARDIOVASCULAR: No chest pain or palpitations  GASTROINTESTINAL: No abdominal or epigastric pain. No nausea, vomiting, or hematemesis; No diarrhea or constipation. No melena or hematochezia.  GENITOURINARY: No dysuria, frequency or hematuria  NEUROLOGICAL: No numbness or weakness  SKIN: No itching, rashes    Allergies:  latex (Pruritus; Rash)  sulfonamides (Unknown)    Intolerances:  Gluten (Stomach Upset; Vomiting; Nausea; Diarrhea)  Pineapple (Unknown)    MEDICATIONS:  ALBUTerol    90 MICROgram(s) HFA Inhaler 1 Puff(s) Inhalation every 6 hours  aMIOdarone    Tablet 200 milliGRAM(s) Oral two times a day  apixaban 2.5 milliGRAM(s) Oral two times a day  atorvastatin 40 milliGRAM(s) Oral at bedtime  chlorhexidine 4% Liquid 1 Application(s) Topical <User Schedule>  clopidogrel Tablet 75 milliGRAM(s) Oral daily  dextrose 5%. 1000 milliLiter(s) (100 mL/Hr) IV Continuous <Continuous>  dronabinol 2.5 milliGRAM(s) Oral three times a day before meals  famotidine  Oral Tab/Cap - Peds 20 milliGRAM(s) Oral at bedtime  glucagon  Injectable 1 milliGRAM(s) IntraMuscular once  insulin lispro (ADMELOG) corrective regimen sliding scale   SubCutaneous three times a day before meals  levothyroxine 88 MICROGram(s) Oral daily  meropenem  IVPB 500 milliGRAM(s) IV Intermittent every 12 hours  metoprolol tartrate 50 milliGRAM(s) Oral every 6 hours  nystatin    Suspension 467214 Unit(s) Oral four times a day  pantoprazole    Tablet 40 milliGRAM(s) Oral before breakfast  senna 2 Tablet(s) Oral at bedtime  sodium bicarbonate 650 milliGRAM(s) Oral every 8 hours  sodium chloride 0.9%. 1000 milliLiter(s) (100 mL/Hr) IV Continuous <Continuous>    Vital Signs Last 24 Hrs  T(C): 37.3 (19 Apr 2021 07:35), Max: 37.4 (18 Apr 2021 20:00)  T(F): 99.1 (19 Apr 2021 07:35), Max: 99.4 (18 Apr 2021 20:00)  HR: 69 (19 Apr 2021 07:35) (69 - 121)  BP: 107/58 (19 Apr 2021 07:35) (102/58 - 135/81)  BP(mean): 86 (18 Apr 2021 20:00) (86 - 86)  RR: 18 (19 Apr 2021 07:35) (16 - 18)  SpO2: 99% (19 Apr 2021 07:35) (97% - 100%)  Height (cm): 154.9 (04-19 @ 00:00)  Weight (kg): 55.4 (04-19 @ 00:00)  BMI (kg/m2): 23.1 (04-19 @ 00:00)    PHYSICAL EXAM:  GENERAL: NAD, speaks in full sentences, no signs of respiratory distress  HEAD:  Atraumatic, Normocephalic  EYES: EOMI, PERRLA, conjunctiva and sclera clear  NECK: Supple, No JVD  CHEST/LUNG: Clear to auscultation bilaterally; No wheeze; No crackles; No accessory muscles used  HEART: Regular rate and rhythm; No murmurs;   ABDOMEN: Soft, Nontender, Nondistended; Bowel sounds present; No guarding  EXTREMITIES:  2+ Peripheral Pulses, No cyanosis or edema  PSYCH: AAOx3  NEUROLOGY: non-focal  SKIN: No rashes or lesions    LABS                          8.2    6.02  )-----------( 273      ( 19 Apr 2021 06:58 )             25.3     04-19    127<L>  |  100  |  26<H>  ----------------------------<  87  4.3   |  15<L>  |  1.6<H>    Ca    7.1<L>      19 Apr 2021 06:58  Phos  2.8     04-18  Mg     2.2     04-19    TPro  4.4<L>  /  Alb  2.2<L>  /  TBili  <0.2  /  DBili  x   /  AST  56<H>  /  ALT  59<H>  /  AlkPhos  129<H>  04-19    A1C with Estimated Average Glucose Result: 8.1 (03.15.21 @ 06:16)  A1C with Estimated Average Glucose Result: 7.4(07.15.20 @ 18:30)  A1C with Estimated Average Glucose Result: 7.9 (05.22.20 @ 17:52)    CAPILLARY BLOOD GLUCOSE  POCT Blood Glucose.: 69 mg/dL (19 Apr 2021 11:34)  POCT Blood Glucose.: 120 mg/dL (19 Apr 2021 07:38)  POCT Blood Glucose.: 79 mg/dL (19 Apr 2021 06:10)  POCT Blood Glucose.: 54 mg/dL (19 Apr 2021 05:24)  POCT Blood Glucose.: 120 mg/dL (18 Apr 2021 21:58)  POCT Blood Glucose.: 240 mg/dL (18 Apr 2021 17:47)  POCT Blood Glucose.: 318 mg/dL (18 Apr 2021 13:15)

## 2021-04-20 NOTE — PROGRESS NOTE ADULT - SUBJECTIVE AND OBJECTIVE BOX
Patient is a 76y old  Female who presents with a chief complaint of fever (19 Apr 2021 10:16)    Patient was seen and examined.  Denies abd pain , constipation  Denies chest pain , sob, dizziness  Denies any other complaints.  All systems reviewed positive history as mentioned above.    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  Inclusion body myositis (IBM)    History of surgery  Right Peripheral Stent 5 yrs  Cardiac pacemaker 9/9/17  MOHS procedure (face) x 3  S/P cataract surgery  S/P ablation of atrial fibrillation    Allergies  latex (Pruritus; Rash)  sulfonamides (Unknown)    Intolerances  Gluten (Stomach Upset; Vomiting; Nausea; Diarrhea)  Pineapple (Unknown)    MEDICATIONS  (STANDING):  ALBUTerol    90 MICROgram(s) HFA Inhaler 1 Puff(s) Inhalation every 6 hours  aMIOdarone    Tablet 200 milliGRAM(s) Oral two times a day  apixaban 2.5 milliGRAM(s) Oral two times a day  atorvastatin 40 milliGRAM(s) Oral at bedtime  chlorhexidine 4% Liquid 1 Application(s) Topical <User Schedule>  clopidogrel Tablet 75 milliGRAM(s) Oral daily  famotidine  Oral Tab/Cap - Peds 20 milliGRAM(s) Oral at bedtime  glucagon  Injectable 1 milliGRAM(s) IntraMuscular once  insulin glargine Injectable (LANTUS) 7 Unit(s) SubCutaneous at bedtime  insulin lispro (ADMELOG) corrective regimen sliding scale   SubCutaneous three times a day before meals  insulin lispro (ADMELOG) corrective regimen sliding scale   SubCutaneous three times a day before meals  insulin lispro Injectable (ADMELOG) 3 Unit(s) SubCutaneous before breakfast  insulin lispro Injectable (ADMELOG) 3 Unit(s) SubCutaneous before lunch  insulin lispro Injectable (ADMELOG) 3 Unit(s) SubCutaneous before dinner  levothyroxine 88 MICROGram(s) Oral daily  meropenem  IVPB 500 milliGRAM(s) IV Intermittent every 12 hours  metoprolol tartrate 50 milliGRAM(s) Oral every 6 hours  nystatin    Suspension 980411 Unit(s) Oral four times a day  pantoprazole    Tablet 40 milliGRAM(s) Oral before breakfast  senna 2 Tablet(s) Oral at bedtime  sodium bicarbonate 650 milliGRAM(s) Oral every 8 hours  sodium chloride 0.9%. 1000 milliLiter(s) (100 mL/Hr) IV Continuous <Continuous>    MEDICATIONS  (PRN):    T(C): 35.9 (04-20-21 @ 07:43), Max: 36.7 (04-19-21 @ 15:37)  HR: 70 (04-20-21 @ 07:43) (70 - 78)  BP: 127/58 (04-20-21 @ 07:43) (127/58 - 158/68)  RR: 18 (04-20-21 @ 07:43) (18 - 19)  SpO2: --    O/E:  Awake, alert, not in distress.  HEENT: atraumatic, EOMI.  Chest: clear.  CVS: SIS2 +,irregular, systolic  murmur+  P/A: Soft, BS+  CNS: non focal.  Ext: no edema feet.  Skin: no rash, no ulcers.  All systems reviewed positive findings as above.                          7.6<L>  5.65  )-----------( 261      ( 20 Apr 2021 07:18 )             23.2<L>                        8.2<L>  6.02  )-----------( 273      ( 19 Apr 2021 06:58 )             25.3<L>  04-20    134<L>  |  107  |  25<H>  ----------------------------<  51<LL>  4.1   |  19  |  1.5    Ca    7.1<L>      20 Apr 2021 07:18  Phos  2.6     04-20  Mg     2.0     04-20    TPro  4.2<L>  /  Alb  2.1<L>  /  TBili  <0.2  /  DBili  x   /  AST  71<H>  /  ALT  61<H>  /  AlkPhos  144<H>  04-20

## 2021-04-20 NOTE — PROGRESS NOTE ADULT - ASSESSMENT
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  Pt was admitted to ICU diagnosed  and treated in ICU for  Sepsis, Pyelonephritis and DKA. Pt was transferd to Medical floor on 4/18/21    # sepsis POA sec to recurrent  pyelonephritis -resolved  # Pyelonephritis  # urinary retention with left hydronephrosis  - blood Culture Results: No growth to date. (04.16.21 @ 21:23)  - urine culture: [email or web address]  -  US Renal (04.20.21 @ 11:12) >Urinary bladder volume is 1573 cc. Patient reported she urinated prior to test and had no urge to void. Urinary bladder debris is present. Moderate right hydronephrosis. Mild left hydronephrosis.  - c/w meropenem  - foleys catheter    # Diabetic ketoacidosis resolved  # IDDM with hypoglycemia  - A1C with Estimated Average Glucose Result: 8.1 % (03.15.21 @ 06:16)  - S/p insulin drip  - decrease lantus dose to 7 units HS, c/w lispro SSC  - evaluated by Endocrine.     # Acute kidney injury on CKD stage 3, prerenal ans sec tourinary retention-resolving  # Metabolic acidosis  # Hyponatremia  # Hyperkalemia-resolved  - c/w Bicarb  - monitor BMP    # H/o chronic  afib-rate controlled  # H/o SSS S/p PPM  - c/w amiodarone, metoprolol  - c/w eliquis    # H/o CAD S/p PCI  - c/w plavix, eliquis, statin, metoprolol    # H/o PVD S/p stent  - c/w plavix, statin    # Hypothyroidism  - c/w synthroid    # H/o Inclusion body myositis  -  on Gamunex every 5 weeks    # Anemia  - monitor HB/HCT    # Full code    #Pending: clinical improvement, PT eval  # Discussed plan of care with patient  # Disposition: Home when stable

## 2021-04-20 NOTE — PROGRESS NOTE ADULT - ASSESSMENT
· Assessment	    #sepsis  resolved  due to APN   blood cx negative to date   pending urine cx  on meropenem due to hx of ESBL   bcx are negative   urine cx ecoli waiting for sensitivities   vitals are stable   renal US   cs id tomorrow after cx sensitivities     #DKA  was on insulin drip in the ICU   no on insulin sq  gap closed  the acidosis is due to CKD -> started on bicarb   she had episodes of hypoglycemia yesterday , she recieved a total on 28 units within 6 hours yesterday in addition to 10 units this morning , will monitor closely  hypoglycemia this morning   decrease lantus to 7 units at bedtime   cs endo   f/u         #TEOFILO  prerenal vs ATN   the patient improved with hydration   cr back to baseline now     DVT prophylaxis: Eliquis   pending urine cx   gi prophylaxis: ppi

## 2021-04-20 NOTE — PROGRESS NOTE ADULT - SUBJECTIVE AND OBJECTIVE BOX
SUBJECTIVE:    Patient is a 76y old Female who presents with a chief complaint of fever (19 Apr 2021 13:03)    Currently admitted to medicine with the primary diagnosis of UTI (urinary tract infection)       Today is hospital day 3d. This morning she is resting comfortably in bed and reports no new issues or overnight events.     PAST MEDICAL & SURGICAL HISTORY  Afib  on Eliquis, amio and toprol    Essential hypertension    Palpitations  occasionally; not for couple yrs    Pacemaker  Sept 2017    Diabetes  Insulin dependent - (has insulin Pump)    SSS (sick sinus syndrome)  S/p PPM    Hypothyroid    Anemia  Chronic    Skin cancer  Basal Cell Cancer face  (around nose) s/p MOHS procedure x 3    Seasonal allergies    PVD (peripheral vascular disease)  S/p Right Peripheral leg Stent    Dry eyes, bilateral    Transfusion history  S/p Hysterectomy    Lung nodules  tree in bud    Inclusion body myositis (IBM)    H/O total hysterectomy  25+ yrs ago    History of surgery  Right Peripheral Stent 5 yrs    Cardiac pacemaker  9/9/17    History of surgery  MOHS procedure (face) x 3    S/P cataract surgery    S/P ablation of atrial fibrillation      SOCIAL HISTORY:  Negative for smoking/alcohol/drug use.     ALLERGIES:  latex (Pruritus; Rash)  sulfonamides (Unknown)    MEDICATIONS:  STANDING MEDICATIONS  ALBUTerol    90 MICROgram(s) HFA Inhaler 1 Puff(s) Inhalation every 6 hours  aMIOdarone    Tablet 200 milliGRAM(s) Oral two times a day  apixaban 2.5 milliGRAM(s) Oral two times a day  atorvastatin 40 milliGRAM(s) Oral at bedtime  chlorhexidine 4% Liquid 1 Application(s) Topical <User Schedule>  clopidogrel Tablet 75 milliGRAM(s) Oral daily  dextrose 40% Gel 15 Gram(s) Oral once  dextrose 5%. 1000 milliLiter(s) IV Continuous <Continuous>  dextrose 50% Injectable 25 Gram(s) IV Push once  dextrose 50% Injectable 12.5 Gram(s) IV Push once  dextrose 50% Injectable 25 Gram(s) IV Push once  famotidine  Oral Tab/Cap - Peds 20 milliGRAM(s) Oral at bedtime  glucagon  Injectable 1 milliGRAM(s) IntraMuscular once  insulin glargine Injectable (LANTUS) 7 Unit(s) SubCutaneous at bedtime  insulin lispro (ADMELOG) corrective regimen sliding scale   SubCutaneous three times a day before meals  insulin lispro (ADMELOG) corrective regimen sliding scale   SubCutaneous three times a day before meals  insulin lispro Injectable (ADMELOG) 3 Unit(s) SubCutaneous before breakfast  insulin lispro Injectable (ADMELOG) 3 Unit(s) SubCutaneous before lunch  insulin lispro Injectable (ADMELOG) 3 Unit(s) SubCutaneous before dinner  levothyroxine 88 MICROGram(s) Oral daily  meropenem  IVPB 500 milliGRAM(s) IV Intermittent every 12 hours  metoprolol tartrate 50 milliGRAM(s) Oral every 6 hours  nystatin    Suspension [id number] Unit(s) Oral four times a day  pantoprazole    Tablet 40 milliGRAM(s) Oral before breakfast  senna 2 Tablet(s) Oral at bedtime  sodium bicarbonate 650 milliGRAM(s) Oral every 8 hours  sodium chloride 0.9%. 1000 milliLiter(s) IV Continuous <Continuous>    PRN MEDICATIONS    VITALS:   T(F): 96.6  HR: 70  BP: 127/58  RR: 18  SpO2: --    LABS:                        7.6    5.65  )-----------( 261      ( 20 Apr 2021 07:18 )             23.2     04-20    134<L>  |  107  |  25<H>  ----------------------------<  x   4.1   |  19  |  1.5    Ca    7.1<L>      20 Apr 2021 07:18  Phos  2.6     04-20  Mg     2.0     04-20    TPro  4.2<L>  /  Alb  2.1<L>  /  TBili  <0.2  /  DBili  x   /  AST  71<H>  /  ALT  61<H>  /  AlkPhos  144<H>  04-20                  RADIOLOGY:    PHYSICAL EXAM:  GEN: No acute distress  LUNGS: Clear to auscultation bilaterally   HEART: S1/S2 present. RRR.   ABD: Soft, non-tender, non-distended. Bowel sounds present  EXT: NC/NC/NE/2+PP/LAZARO/Skin Intact.   NEURO: AAOX3    Intravenous access:   NG tube:   Springer Catheter:

## 2021-04-21 NOTE — PROGRESS NOTE ADULT - ASSESSMENT
· Assessment	    #sepsis  resolved  due to APN   blood cx negative to date   pending urine cx  on meropenem due to hx of ESBL   bcx are negative   urine cx ecoli waiting for sensitivities   vitals are stable   renal US--> retention , folwy placed  vre and esbl i  urine   cs id     #DKA  was on insulin drip in the ICU   no on insulin sq  gap closed  the acidosis is due to CKD -> started on bicarb   she had episodes of hypoglycemia yesterday , she recieved a total on 28 units within 6 hours yesterday in addition to 10 units this morning , will monitor closely  hypoglycemia this morning   decrease lantus to 7 units at bedtime   cs endo   f/u         #TEOFILO  prerenal vs ATN   the patient improved with hydration   cr back to baseline now     DVT prophylaxis: Eliquis   pending urine cx   gi prophylaxis: ppi

## 2021-04-21 NOTE — DISCHARGE NOTE PROVIDER - PROVIDER TOKENS
PROVIDER:[TOKEN:[[id number]],FOLLOWUP:[2 weeks]] PROVIDER:[TOKEN:[[id number]],FOLLOWUP:[2 weeks]],PROVIDER:[TOKEN:[[id number]],FOLLOWUP:[2 weeks]]

## 2021-04-21 NOTE — CONSULT NOTE ADULT - SUBJECTIVE AND OBJECTIVE BOX
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 Reported Tmax of 102. Pt also complained of mild L flank pain. Also reported dribbling of urine.     Infectious Diseases History:  Old Micro Data/Cultures:     Culture - Blood in AM (03.18.21 @ 04:30)   - Piperacillin/Tazobactam: R <=8   - Tobramycin: R >8   - Trimethoprim/Sulfamethoxazole: S <=0.5/9.5   - Meropenem: S <=1   - Levofloxacin: R >4   - Ertapenem: S <=0.5   - Gentamicin: R >8   - Imipenem: S <=1   Gram Stain:   Growth in anaerobic bottle: Gram Negative Rods   - Amikacin: S <=16   - Ceftriaxone: R >32 Enterobacter, Citrobacter, and Serratia may develop resistance during prolonged therapy   - Ciprofloxacin: R >2   - Cefepime: R >16   - Cefoxitin: S <=8   - Ampicillin: R >16 These ampicillin results predict results for amoxicillin   - Ampicillin/Sulbactam: R 8/4 Enterobacter, Citrobacter, and Serratia may develop resistance during prolonged therapy (3-4 days)   - Aztreonam: R >16   - Cefazolin: R >16 Enterobacter, Citrobacter, and Serratia may develop resistance during prolonged therapy (3-4 days)   Specimen Source: .Blood Blood-Peripheral   Organism: Escherichia coli ESBL   Culture Results:   Growth in anaerobic bottle: Escherichia coli ESBL   Organism Identification: Escherichia coli ESBL   Method Type: JONATHAN       Culture - Urine (03.14.21 @ 22:50)   - Gentamicin: R >8   - Levofloxacin: R >4   - Piperacillin/Tazobactam: S <=8   - Meropenem: S <=1   - Nitrofurantoin: S <=32 Should not be used to treat pyelonephritis   - Tigecycline: S <=2   - Tobramycin: R >8   - Trimethoprim/Sulfamethoxazole: S <=0.5/9.5   - Amoxicillin/Clavulanic Acid: I 16/8   - Amikacin: S <=16   - Ampicillin: R >16 These ampicillin results predict results for amoxicillin   - Aztreonam: R >16   - Ampicillin/Sulbactam: I 16/8 Enterobacter, Citrobacter, and Serratia may develop resistance during prolonged therapy (3-4 days)   - Cefazolin: R >16 (MIC_CL_COM_ENTERIC_CEFAZU) For uncomplicated UTI with K. pneumoniae, E. coli, or P. mirablis: JONATHAN <=16 is sensitive and JONATHAN >=32 is resistant. This also predicts results for oral agents cefaclor, cefdinir, cefpodoxime, cefprozil, cefuroxime axetil, cephalexin and locarbef for uncomplicated UTI. Note that some isolates may be susceptible to these agents while testing resistant to cefazolin.   - Cefepime: R >16   - Cefoxitin: S <=8   - Ciprofloxacin: R >2   - Ceftriaxone: R >32 Enterobacter, Citrobacter, and Serratia may develop resistance during prolonged therapy   - Imipenem: S <=1   - Ertapenem: S <=0.5   Specimen Source: .Urine Clean Catch (Midstream)   Culture Results:   50,000 - 99,000 CFU/mL Escherichia coli ESBL   Organism Identification: Escherichia coli ESBL   Organism: Escherichia coli ESBL   Method Type: JONATHAN       FAMILY HISTORY:  Family history of lung cancer (Mother)  Cancer (Father) Myeloma      PAST MEDICAL & SURGICAL HISTORY:  Afib on Eliquis, amio and toprol  Essential hypertension  Palpitations occasionally; not for couple yrs  Pacemaker Sept 2017  Diabetes Insulin dependent - (has insulin Pump)  SSS (sick sinus syndrome) S/p PPM  Hypothyroid  Anemia Chronic  Skin cancer Basal Cell Cancer face  (around nose) s/p MOHS procedure x 3  Seasonal allergies  PVD (peripheral vascular disease) S/p Right Peripheral leg Stent  Dry eyes, bilateral  Transfusion history S/p Hysterectomy  Lung nodules tree in bud  Inclusion body myositis (IBM)    H/O total hysterectomy 25+ yrs ago  Right Peripheral Stent 5 yrs  Cardiac pacemaker 9/9/17  History of surgery MOHS procedure (face) x 3  S/P cataract surgery  S/P ablation of atrial fibrillation      SOCIAL HISTORY  non smoker  no ETOH or drug misuse  from [facility] , walks with cane (17 Apr 2021 05:50)    Recent Travel:  Other Exposures:     ROS  General: Denies rigors, nightsweats  HEENT: Denies headache, rhinorrhea, sore throat, eye pain  CV: Denies CP, palpitations  PULM: Denies wheezing, hemoptysis  GI: Denies hematemesis, hematochezia, melena  : Denies discharge, hematuria  MSK: Denies arthralgias, myalgias  SKIN: Denies rash, lesions  NEURO: Denies paresthesias, weakness  PSYCH: Denies depression, anxiety    VITALS:  T(F): 97.3, Max: 97.5 (04-20-21 @ 16:00)  HR: 79  BP: 153/68  RR: 18Vital Signs Last 24 Hrs  T(C): 36.3 (21 Apr 2021 08:23), Max: 36.4 (20 Apr 2021 16:00)  T(F): 97.3 (21 Apr 2021 08:23), Max: 97.5 (20 Apr 2021 16:00)  HR: 79 (21 Apr 2021 08:23) (71 - 79)  BP: 153/68 (21 Apr 2021 08:23) (123/56 - 153/68)  BP(mean): 80 (21 Apr 2021 05:21) (80 - 80)  RR: 18 (21 Apr 2021 08:23) (18 - 18)  SpO2: 100% (21 Apr 2021 08:23) (100% - 100%)    PHYSICAL EXAM:  Gen: NAD, resting in bed  HEENT: Normocephalic, atraumatic  Neck: supple, no lymphadenopathy  CV: Regular rate & regular rhythm  Lungs: CTAB  Abdomen: Soft, BS present  Ext: Warm, well perfused  Neuro: non focal, awake  Skin: no rash, no lesions  Lines: no phlebitis    TESTS & MEASUREMENTS:                        8.1    5.93  )-----------( 251      ( 21 Apr 2021 07:03 )             24.9     04-21    134<L>  |  109  |  21<H>  ----------------------------<  88  4.7   |  19  |  1.3    Ca    7.2<L>      21 Apr 2021 07:03  Phos  2.6     04-20  Mg     2.0     04-20    TPro  4.1<L>  /  Alb  2.0<L>  /  TBili  <0.2  /  DBili  x   /  AST  80<H>  /  ALT  62<H>  /  AlkPhos  216<H>  04-21    eGFR if Non African American: 39 mL/min/1.73M2 (04-21-21 @ 07:03)  eGFR if African American: 46 mL/min/1.73M2 (04-21-21 @ 07:03)    LIVER FUNCTIONS - ( 21 Apr 2021 09:34 )  Alb: x     / Pro: x     / ALK PHOS: x     / ALT: x     / AST: x     / GGT: 63 U/L         Culture - Urine (collected 04-16-21 @ 21:23)  Source: .Urine Clean Catch (Midstream)  Final Report (04-20-21 @ 10:50):    50,000 - 99,000 CFU/mL Escherichia coli ESBL    10,000 - 49,000 CFU/mL Enterococcus faecium (vancomycin resistant)  Organism: Escherichia coli ESBL  Enterococcus faecium (vancomycin resistant) (04-20-21 @ 10:50)  Organism: Enterococcus faecium (vancomycin resistant) (04-20-21 @ 10:50)      -  Ampicillin: R >8 Predicts results to ampicillin/sulbactam, amoxacillin-clavulanate and  piperacillin-tazobactam.      -  Ciprofloxacin: R >2      -  Daptomycin: SDD 4 The breakpoint for SDD (sensitive dose dependent)is based on a dosage regimen of 8-12 mg/kg administered every 24 h in adults and is intended for serious infections due to E. faecium. Consultation with an infectious diseases specialist is recommended.      -  Levofloxacin: R >4      -  Linezolid: S <=1      -  Nitrofurantoin: I 64 Should not be used to treat pyelonephritis.      -  Tetra/Doxy: R >8      -  Vancomycin: R >16      Method Type: JONATHAN  Organism: Escherichia coli ESBL (04-20-21 @ 10:50)      -  Amikacin: I 32      -  Amoxicillin/Clavulanic Acid: S <=8/4      -  Ampicillin: R >16 These ampicillin results predict results for amoxicillin      -  Ampicillin/Sulbactam: I 16/8 Enterobacter, Citrobacter, and Serratia may develop resistance during prolonged therapy (3-4 days)      -  Aztreonam: R >16      -  Cefazolin: R >16 (MIC_CL_COM_ENTERIC_CEFAZU) For uncomplicated UTI with K. pneumoniae, E. coli, or P. mirablis: JONATHAN <=16 is sensitive and JONATHAN >=32 is resistant. This also predicts results for oral agents cefaclor, cefdinir, cefpodoxime, cefprozil, cefuroxime axetil, cephalexin and locarbef for uncomplicated UTI. Note that some isolates may be susceptible to these agents while testing resistant to cefazolin.      -  Cefepime: R >16      -  Cefoxitin: S <=8      -  Ceftriaxone: R >32 Enterobacter, Citrobacter, and Serratia may develop resistance during prolonged therapy      -  Ciprofloxacin: R >2      -  Ertapenem: S <=0.5      -  Gentamicin: R >8      -  Imipenem: S <=1      -  Levofloxacin: R >4      -  Meropenem: S <=1      -  Nitrofurantoin: S <=32 Should not be used to treat pyelonephritis      -  Piperacillin/Tazobactam: S <=8      -  Tigecycline: S <=2      -  Tobramycin: R >8      -  Trimethoprim/Sulfamethoxazole: S <=0.5/9.5      Method Type: JONATHAN    Culture - Blood (collected 04-16-21 @ 21:23)  Source: .Blood Blood-Peripheral  Preliminary Report (04-18-21 @ 07:01):    No growth to date.    Culture - Blood (collected 04-16-21 @ 21:23)  Source: .Blood Blood-Peripheral  Preliminary Report (04-18-21 @ 07:00):    No growth to date.      Blood Gas Venous - Lactate: 1.6 mmoL/L (04-16-21 @ 23:29)      INFECTIOUS DISEASES TESTING      RADIOLOGY & ADDITIONAL TESTS:  I have personally reviewed the last available Chest xray  US Renal (04.20.21 @ 11:12)  IMPRESSION:  Urinary bladder volume is 1573 cc. Patient reported she urinated prior to test and had no urge to void. Urinary bladder debris is present.  Moderate right hydronephrosis. Mild left hydronephrosis.    Xray Chest 1 View- PORTABLE-Urgent (04.16.21 @ 22:47)   Impression:  No evidence of acute cardiopulmonary disease.    CT Abdomen and Pelvis No Cont (03.14.21 @ 19:33)   IMPRESSION:  -The right kidney is enlarged with perinephric fat stranding and moderate hydroureteronephrosis to the level of the distal ureter. No radiopaque calculus is identified. Cystoscopy suggested to evaluate the right ureteral vesicle junction to evaluate for an obstructing lesion. A call back request was submitted  -Limited evaluation of renal parenchyma without contrast administration.      CARDIOLOGY TESTING  12 Lead ECG:   Ventricular Rate 131 BPM  QRS Duration 78 ms  Q-T Interval 304 ms  QTC Calculation(Bazett) 448 ms  R Axis 37 degrees  T Axis 243 degrees    Diagnosis Line Atrial fibrillation with rapid ventricular response  ST & T wave abnormality, consider inferior ischemia  ST & T wave abnormality, consider anterolateral ischemia  Abnormal ECG    Confirmed by Kenyon Gonzalez ([id number]) on 4/17/2021 1:39:17 PM (04-17-21 @ 13:03)  12 Lead ECG:   Ventricular Rate 104 BPM  Atrial Rate 111 BPM  QRS Duration 88 ms  Q-T Interval 296 ms  QTC Calculation(Bazett) 389 ms  R Axis 2 degrees  T Axis 269 degrees    Diagnosis Line Atrial fibrillation with rapid ventricular response  Voltage criteria for left ventricular hypertrophy  Cannot rule out Inferior infarct , age undetermined  Abnormal ECG    Confirmed by Kenyon Gonzalez ([id number]) on 4/17/2021 7:32:20 AM (04-16-21 @ 22:00)    All available historical records have been reviewed    MEDICATIONS  ALBUTerol    90 MICROgram(s) HFA Inhaler 1  aMIOdarone    Tablet 200  apixaban 2.5  atorvastatin 40  chlorhexidine 4% Liquid 1  clopidogrel Tablet 75  dextrose 40% Gel 15  dextrose 5%. 1000  dextrose 50% Injectable 25  dextrose 50% Injectable 12.5  dextrose 50% Injectable 25  famotidine  Oral Tab/Cap - Peds 20  glucagon  Injectable 1  insulin glargine Injectable (LANTUS) 7  insulin lispro (ADMELOG) corrective regimen sliding scale   insulin lispro Injectable (ADMELOG) 3  insulin lispro Injectable (ADMELOG) 3  insulin lispro Injectable (ADMELOG) 3  levothyroxine 88  meropenem  IVPB 500  metoprolol tartrate 50  nystatin    Suspension [id number]  pantoprazole    Tablet 40  senna 2  sodium bicarbonate 650  sodium chloride 0.9%. 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 Reported Tmax of 102. Pt also complained of mild L flank pain.    IMPRESSION  # (?) Sepsis on admission (2 or more of the following T<96.8F, T>101F, Pulse>90, Resp Rate>20, WBC>12, wbc<4, Bands>10%), PLUS (+) lactic acidosis, OR metabolic encephalopathy, OR TEOFILO, OR bacteremia . Otherwise just SIRS on admission, sepsis ruled out  #  #    RECOMMENDATIONS  -   -     This is a pended note. 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 Reported Tmax of 102. Pt also complained of mild L flank pain. Also reported dribbling of urine.     Infectious Diseases History:  Old Micro Data/Cultures:     Culture - Blood in AM (03.18.21 @ 04:30)   - Piperacillin/Tazobactam: R <=8   - Tobramycin: R >8   - Trimethoprim/Sulfamethoxazole: S <=0.5/9.5   - Meropenem: S <=1   - Levofloxacin: R >4   - Ertapenem: S <=0.5   - Gentamicin: R >8   - Imipenem: S <=1   Gram Stain:   Growth in anaerobic bottle: Gram Negative Rods   - Amikacin: S <=16   - Ceftriaxone: R >32 Enterobacter, Citrobacter, and Serratia may develop resistance during prolonged therapy   - Ciprofloxacin: R >2   - Cefepime: R >16   - Cefoxitin: S <=8   - Ampicillin: R >16 These ampicillin results predict results for amoxicillin   - Ampicillin/Sulbactam: R 8/4 Enterobacter, Citrobacter, and Serratia may develop resistance during prolonged therapy (3-4 days)   - Aztreonam: R >16   - Cefazolin: R >16 Enterobacter, Citrobacter, and Serratia may develop resistance during prolonged therapy (3-4 days)   Specimen Source: .Blood Blood-Peripheral   Organism: Escherichia coli ESBL   Culture Results:   Growth in anaerobic bottle: Escherichia coli ESBL   Organism Identification: Escherichia coli ESBL   Method Type: JONATHAN       Culture - Urine (03.14.21 @ 22:50)   - Gentamicin: R >8   - Levofloxacin: R >4   - Piperacillin/Tazobactam: S <=8   - Meropenem: S <=1   - Nitrofurantoin: S <=32 Should not be used to treat pyelonephritis   - Tigecycline: S <=2   - Tobramycin: R >8   - Trimethoprim/Sulfamethoxazole: S <=0.5/9.5   - Amoxicillin/Clavulanic Acid: I 16/8   - Amikacin: S <=16   - Ampicillin: R >16 These ampicillin results predict results for amoxicillin   - Aztreonam: R >16   - Ampicillin/Sulbactam: I 16/8 Enterobacter, Citrobacter, and Serratia may develop resistance during prolonged therapy (3-4 days)   - Cefazolin: R >16 (MIC_CL_COM_ENTERIC_CEFAZU) For uncomplicated UTI with K. pneumoniae, E. coli, or P. mirablis: JONATHAN <=16 is sensitive and JONATHAN >=32 is resistant. This also predicts results for oral agents cefaclor, cefdinir, cefpodoxime, cefprozil, cefuroxime axetil, cephalexin and locarbef for uncomplicated UTI. Note that some isolates may be susceptible to these agents while testing resistant to cefazolin.   - Cefepime: R >16   - Cefoxitin: S <=8   - Ciprofloxacin: R >2   - Ceftriaxone: R >32 Enterobacter, Citrobacter, and Serratia may develop resistance during prolonged therapy   - Imipenem: S <=1   - Ertapenem: S <=0.5   Specimen Source: .Urine Clean Catch (Midstream)   Culture Results:   50,000 - 99,000 CFU/mL Escherichia coli ESBL   Organism Identification: Escherichia coli ESBL   Organism: Escherichia coli ESBL   Method Type: JONATHAN       FAMILY HISTORY:  Family history of lung cancer (Mother)  Cancer (Father) Myeloma      PAST MEDICAL & SURGICAL HISTORY:  Afib on Eliquis, amio and toprol  Essential hypertension  Palpitations occasionally; not for couple yrs  Pacemaker Sept 2017  Diabetes Insulin dependent - (has insulin Pump)  SSS (sick sinus syndrome) S/p PPM  Hypothyroid  Anemia Chronic  Skin cancer Basal Cell Cancer face  (around nose) s/p MOHS procedure x 3  Seasonal allergies  PVD (peripheral vascular disease) S/p Right Peripheral leg Stent  Dry eyes, bilateral  Transfusion history S/p Hysterectomy  Lung nodules tree in bud  Inclusion body myositis (IBM)    H/O total hysterectomy 25+ yrs ago  Right Peripheral Stent 5 yrs  Cardiac pacemaker 9/9/17  History of surgery MOHS procedure (face) x 3  S/P cataract surgery  S/P ablation of atrial fibrillation      SOCIAL HISTORY  non smoker  no ETOH or drug misuse  from [facility] , walks with cane (17 Apr 2021 05:50)    Recent Travel:  Other Exposures:     ROS  General: Denies rigors, nightsweats  HEENT: Denies headache, rhinorrhea, sore throat, eye pain  CV: Denies CP, palpitations  PULM: Denies wheezing, hemoptysis  GI: Denies hematemesis, hematochezia, melena  : Denies discharge, hematuria  MSK: Denies arthralgias, myalgias  SKIN: Denies rash, lesions  NEURO: Denies paresthesias, weakness  PSYCH: Denies depression, anxiety    VITALS:  T(F): 97.3, Max: 97.5 (04-20-21 @ 16:00)  HR: 79  BP: 153/68  RR: 18Vital Signs Last 24 Hrs  T(C): 36.3 (21 Apr 2021 08:23), Max: 36.4 (20 Apr 2021 16:00)  T(F): 97.3 (21 Apr 2021 08:23), Max: 97.5 (20 Apr 2021 16:00)  HR: 79 (21 Apr 2021 08:23) (71 - 79)  BP: 153/68 (21 Apr 2021 08:23) (123/56 - 153/68)  BP(mean): 80 (21 Apr 2021 05:21) (80 - 80)  RR: 18 (21 Apr 2021 08:23) (18 - 18)  SpO2: 100% (21 Apr 2021 08:23) (100% - 100%)    PHYSICAL EXAM:  Gen: NAD, resting in bed  HEENT: Normocephalic, atraumatic  Neck: supple, no lymphadenopathy  CV: Regular rate & regular rhythm  Lungs: CTAB  Abdomen: Soft, BS present  Ext: Warm, well perfused  Neuro: non focal, awake  Skin: no rash, no lesions  Lines: no phlebitis    TESTS & MEASUREMENTS:                        8.1    5.93  )-----------( 251      ( 21 Apr 2021 07:03 )             24.9     04-21    134<L>  |  109  |  21<H>  ----------------------------<  88  4.7   |  19  |  1.3    Ca    7.2<L>      21 Apr 2021 07:03  Phos  2.6     04-20  Mg     2.0     04-20    TPro  4.1<L>  /  Alb  2.0<L>  /  TBili  <0.2  /  DBili  x   /  AST  80<H>  /  ALT  62<H>  /  AlkPhos  216<H>  04-21    eGFR if Non African American: 39 mL/min/1.73M2 (04-21-21 @ 07:03)  eGFR if African American: 46 mL/min/1.73M2 (04-21-21 @ 07:03)    LIVER FUNCTIONS - ( 21 Apr 2021 09:34 )  Alb: x     / Pro: x     / ALK PHOS: x     / ALT: x     / AST: x     / GGT: 63 U/L       Procalcitonin, Serum: 0.74:      Urinalysis (04.16.21 @ 21:23)   Blood, Urine: Moderate   pH Urine: 6.0   Glucose Qualitative, Urine: 500 mg/dL   Color: Yellow   Urine Appearance: Turbid   Bilirubin: Negative   Ketone - Urine: Trace   Specific Gravity: 1.013   Protein, Urine: 100 mg/dL   Urobilinogen: <2 mg/dL   Nitrite: Negative   Leukocyte Esterase Concentration: Large         Culture - Urine (collected 04-16-21 @ 21:23)  Source: .Urine Clean Catch (Midstream)  Final Report (04-20-21 @ 10:50):    50,000 - 99,000 CFU/mL Escherichia coli ESBL    10,000 - 49,000 CFU/mL Enterococcus faecium (vancomycin resistant)  Organism: Escherichia coli ESBL  Enterococcus faecium (vancomycin resistant) (04-20-21 @ 10:50)  Organism: Enterococcus faecium (vancomycin resistant) (04-20-21 @ 10:50)      -  Ampicillin: R >8 Predicts results to ampicillin/sulbactam, amoxacillin-clavulanate and  piperacillin-tazobactam.      -  Ciprofloxacin: R >2      -  Daptomycin: SDD 4 The breakpoint for SDD (sensitive dose dependent)is based on a dosage regimen of 8-12 mg/kg administered every 24 h in adults and is intended for serious infections due to E. faecium. Consultation with an infectious diseases specialist is recommended.      -  Levofloxacin: R >4      -  Linezolid: S <=1      -  Nitrofurantoin: I 64 Should not be used to treat pyelonephritis.      -  Tetra/Doxy: R >8      -  Vancomycin: R >16      Method Type: JONATHAN  Organism: Escherichia coli ESBL (04-20-21 @ 10:50)      -  Amikacin: I 32      -  Amoxicillin/Clavulanic Acid: S <=8/4      -  Ampicillin: R >16 These ampicillin results predict results for amoxicillin      -  Ampicillin/Sulbactam: I 16/8 Enterobacter, Citrobacter, and Serratia may develop resistance during prolonged therapy (3-4 days)      -  Aztreonam: R >16      -  Cefazolin: R >16 (MIC_CL_COM_ENTERIC_CEFAZU) For uncomplicated UTI with K. pneumoniae, E. coli, or P. mirablis: JONATHAN <=16 is sensitive and JONATHAN >=32 is resistant. This also predicts results for oral agents cefaclor, cefdinir, cefpodoxime, cefprozil, cefuroxime axetil, cephalexin and locarbef for uncomplicated UTI. Note that some isolates may be susceptible to these agents while testing resistant to cefazolin.      -  Cefepime: R >16      -  Cefoxitin: S <=8      -  Ceftriaxone: R >32 Enterobacter, Citrobacter, and Serratia may develop resistance during prolonged therapy      -  Ciprofloxacin: R >2      -  Ertapenem: S <=0.5      -  Gentamicin: R >8      -  Imipenem: S <=1      -  Levofloxacin: R >4      -  Meropenem: S <=1      -  Nitrofurantoin: S <=32 Should not be used to treat pyelonephritis      -  Piperacillin/Tazobactam: S <=8      -  Tigecycline: S <=2      -  Tobramycin: R >8      -  Trimethoprim/Sulfamethoxazole: S <=0.5/9.5      Method Type: JONATHAN    Culture - Blood (collected 04-16-21 @ 21:23)  Source: .Blood Blood-Peripheral  Preliminary Report (04-18-21 @ 07:01):    No growth to date.    Culture - Blood (collected 04-16-21 @ 21:23)  Source: .Blood Blood-Peripheral  Preliminary Report (04-18-21 @ 07:00):    No growth to date.      Blood Gas Venous - Lactate: 1.6 mmoL/L (04-16-21 @ 23:29)      INFECTIOUS DISEASES TESTING      RADIOLOGY & ADDITIONAL TESTS:  I have personally reviewed the last available Chest xray  US Renal (04.20.21 @ 11:12)  IMPRESSION:  Urinary bladder volume is 1573 cc. Patient reported she urinated prior to test and had no urge to void. Urinary bladder debris is present.  Moderate right hydronephrosis. Mild left hydronephrosis.    Xray Chest 1 View- PORTABLE-Urgent (04.16.21 @ 22:47)   Impression:  No evidence of acute cardiopulmonary disease.    CT Abdomen and Pelvis No Cont (03.14.21 @ 19:33)   IMPRESSION:  -The right kidney is enlarged with perinephric fat stranding and moderate hydroureteronephrosis to the level of the distal ureter. No radiopaque calculus is identified. Cystoscopy suggested to evaluate the right ureteral vesicle junction to evaluate for an obstructing lesion. A call back request was submitted  -Limited evaluation of renal parenchyma without contrast administration.      CARDIOLOGY TESTING  12 Lead ECG:   Ventricular Rate 131 BPM  QRS Duration 78 ms  Q-T Interval 304 ms  QTC Calculation(Bazett) 448 ms  R Axis 37 degrees  T Axis 243 degrees    Diagnosis Line Atrial fibrillation with rapid ventricular response  ST & T wave abnormality, consider inferior ischemia  ST & T wave abnormality, consider anterolateral ischemia  Abnormal ECG    Confirmed by Kenyon Gonzalez ([id number]) on 4/17/2021 1:39:17 PM (04-17-21 @ 13:03)  12 Lead ECG:   Ventricular Rate 104 BPM  Atrial Rate 111 BPM  QRS Duration 88 ms  Q-T Interval 296 ms  QTC Calculation(Bazett) 389 ms  R Axis 2 degrees  T Axis 269 degrees    Diagnosis Line Atrial fibrillation with rapid ventricular response  Voltage criteria for left ventricular hypertrophy  Cannot rule out Inferior infarct , age undetermined  Abnormal ECG    Confirmed by Kenyon Gonzalez ([id number]) on 4/17/2021 7:32:20 AM (04-16-21 @ 22:00)    All available historical records have been reviewed    MEDICATIONS  ALBUTerol    90 MICROgram(s) HFA Inhaler 1  aMIOdarone    Tablet 200  apixaban 2.5  atorvastatin 40  chlorhexidine 4% Liquid 1  clopidogrel Tablet 75  dextrose 40% Gel 15  dextrose 5%. 1000  dextrose 50% Injectable 25  dextrose 50% Injectable 12.5  dextrose 50% Injectable 25  famotidine  Oral Tab/Cap - Peds 20  glucagon  Injectable 1  insulin glargine Injectable (LANTUS) 7  insulin lispro (ADMELOG) corrective regimen sliding scale   insulin lispro Injectable (ADMELOG) 3  insulin lispro Injectable (ADMELOG) 3  insulin lispro Injectable (ADMELOG) 3  levothyroxine 88  meropenem  IVPB 500  metoprolol tartrate 50  nystatin    Suspension 853318  pantoprazole    Tablet 40  senna 2  sodium bicarbonate 650  sodium chloride 0.9%. 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 Reported Tmax of 102. Pt also complained of mild L flank pain.    IMPRESSION  # (?) Sepsis on admission (2 or more of the following T<96.8F, T>101F, Pulse>90, Resp Rate>20, WBC>12, wbc<4, Bands>10%), PLUS (+) lactic acidosis, OR metabolic encephalopathy, OR TEOFILO, OR bacteremia . Otherwise just SIRS on admission, sepsis ruled out  #  #    RECOMMENDATIONS  -   -     This is a pended note. 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 Reported Tmax of 102. Pt complains of lower back pain not localized to one side. Also reported dribbling of urine. Pt reports no dysuria, frequency, urgency, hematuria. Reported 18 pound weight loss from previous hospital admission to now. Pt denies N/V, headache, chest pain, palpitations, abdominal pain, calf pain, melena, hematochezia, changes in bowel habits. Pt reports having 1 episode of hematuria in Nov 2020 where she was treated for UTI outpatient by her urologist.     Infectious Diseases History:  Old Micro Data/Cultures:     Culture - Blood in AM (03.18.21 @ 04:30)   - Piperacillin/Tazobactam: R <=8   - Tobramycin: R >8   - Trimethoprim/Sulfamethoxazole: S <=0.5/9.5   - Meropenem: S <=1   - Levofloxacin: R >4   - Ertapenem: S <=0.5   - Gentamicin: R >8   - Imipenem: S <=1   Gram Stain:   Growth in anaerobic bottle: Gram Negative Rods   - Amikacin: S <=16   - Ceftriaxone: R >32 Enterobacter, Citrobacter, and Serratia may develop resistance during prolonged therapy   - Ciprofloxacin: R >2   - Cefepime: R >16   - Cefoxitin: S <=8   - Ampicillin: R >16 These ampicillin results predict results for amoxicillin   - Ampicillin/Sulbactam: R 8/4 Enterobacter, Citrobacter, and Serratia may develop resistance during prolonged therapy (3-4 days)   - Aztreonam: R >16   - Cefazolin: R >16 Enterobacter, Citrobacter, and Serratia may develop resistance during prolonged therapy (3-4 days)   Specimen Source: .Blood Blood-Peripheral   Organism: Escherichia coli ESBL   Culture Results:   Growth in anaerobic bottle: Escherichia coli ESBL   Organism Identification: Escherichia coli ESBL   Method Type: JONATHAN       Culture - Urine (03.14.21 @ 22:50)   - Gentamicin: R >8   - Levofloxacin: R >4   - Piperacillin/Tazobactam: S <=8   - Meropenem: S <=1   - Nitrofurantoin: S <=32 Should not be used to treat pyelonephritis   - Tigecycline: S <=2   - Tobramycin: R >8   - Trimethoprim/Sulfamethoxazole: S <=0.5/9.5   - Amoxicillin/Clavulanic Acid: I 16/8   - Amikacin: S <=16   - Ampicillin: R >16 These ampicillin results predict results for amoxicillin   - Aztreonam: R >16   - Ampicillin/Sulbactam: I 16/8 Enterobacter, Citrobacter, and Serratia may develop resistance during prolonged therapy (3-4 days)   - Cefazolin: R >16 (MIC_CL_COM_ENTERIC_CEFAZU) For uncomplicated UTI with K. pneumoniae, E. coli, or P. mirablis: JONATHAN <=16 is sensitive and JONATHAN >=32 is resistant. This also predicts results for oral agents cefaclor, cefdinir, cefpodoxime, cefprozil, cefuroxime axetil, cephalexin and locarbef for uncomplicated UTI. Note that some isolates may be susceptible to these agents while testing resistant to cefazolin.   - Cefepime: R >16   - Cefoxitin: S <=8   - Ciprofloxacin: R >2   - Ceftriaxone: R >32 Enterobacter, Citrobacter, and Serratia may develop resistance during prolonged therapy   - Imipenem: S <=1   - Ertapenem: S <=0.5   Specimen Source: .Urine Clean Catch (Midstream)   Culture Results:   50,000 - 99,000 CFU/mL Escherichia coli ESBL   Organism Identification: Escherichia coli ESBL   Organism: Escherichia coli ESBL   Method Type: JONATHAN       FAMILY HISTORY:  Family history of lung cancer (Mother)  Cancer (Father) Myeloma    PAST MEDICAL & SURGICAL HISTORY:  Afib on Eliquis, amio and toprol  Essential hypertension  Palpitations occasionally; not for couple yrs  Pacemaker Sept 2017  Diabetes Insulin dependent - (has insulin Pump)  SSS (sick sinus syndrome) S/p PPM  Hypothyroid  Anemia Chronic  Skin cancer Basal Cell Cancer face  (around nose) s/p MOHS procedure x 3  Seasonal allergies  PVD (peripheral vascular disease) S/p Right Peripheral leg Stent  Dry eyes, bilateral  Transfusion history S/p Hysterectomy  Lung nodules tree in bud  Inclusion body myositis (IBM)    H/O total hysterectomy 25+ yrs ago  Right Peripheral Stent 5 yrs  Cardiac pacemaker 9/9/17  History of surgery MOHS procedure (face) x 3  S/P cataract surgery  S/P ablation of atrial fibrillation      SOCIAL HISTORY  non smoker  no ETOH or drug misuse  from [facility] , walks with cane was receiving physical therapy prior to admission.     Recent Travel: denies  Other Exposures: denies    ROS  General: Denies rigors, nightsweats  HEENT: Denies headache, rhinorrhea, sore throat, eye pain  CV: Denies CP, palpitations  PULM: Denies wheezing, hemoptysis  GI: Denies hematemesis, hematochezia, melena  : Denies discharge, hematuria  MSK: Lower back pain non localized, Denies arthralgias, myalgias  SKIN: Denies rash, lesions  NEURO: Denies paresthesias, weakness  PSYCH: Denies depression, anxiety    VITALS:  T(F): 97.3, Max: 97.5 (04-20-21 @ 16:00)  HR: 79  BP: 153/68  RR: 18Vital Signs Last 24 Hrs  T(C): 36.3 (21 Apr 2021 08:23), Max: 36.4 (20 Apr 2021 16:00)  T(F): 97.3 (21 Apr 2021 08:23), Max: 97.5 (20 Apr 2021 16:00)  HR: 79 (21 Apr 2021 08:23) (71 - 79)  BP: 153/68 (21 Apr 2021 08:23) (123/56 - 153/68)  BP(mean): 80 (21 Apr 2021 05:21) (80 - 80)  RR: 18 (21 Apr 2021 08:23) (18 - 18)  SpO2: 100% (21 Apr 2021 08:23) (100% - 100%)    PHYSICAL EXAM:  Gen: NAD, resting in bed. Seen and examined at bedside. Pt has R upper arm midline in place. Springer draining pale, slightly turbid urine.   HEENT: Normocephalic, atraumatic  Neck: supple, no lymphadenopathy  CV: Regular rate & regular rhythm  Lungs: CTAB  Abdomen: Soft, BS present, no abdominal distention, non-tender  Ext: Warm, well perfused, scatter bruising at new and old IV sites.  Neuro: non focal, awake. A&Ox3  Skin: no rash, no lesions  Lines: no phlebitis    TESTS & MEASUREMENTS:                        8.1    5.93  )-----------( 251      ( 21 Apr 2021 07:03 )             24.9     04-21    134<L>  |  109  |  21<H>  ----------------------------<  88  4.7   |  19  |  1.3    Ca    7.2<L>      21 Apr 2021 07:03  Phos  2.6     04-20  Mg     2.0     04-20    TPro  4.1<L>  /  Alb  2.0<L>  /  TBili  <0.2  /  DBili  x   /  AST  80<H>  /  ALT  62<H>  /  AlkPhos  216<H>  04-21    eGFR if Non African American: 39 mL/min/1.73M2 (04-21-21 @ 07:03)  eGFR if African American: 46 mL/min/1.73M2 (04-21-21 @ 07:03)    LIVER FUNCTIONS - ( 21 Apr 2021 09:34 )  Alb: x     / Pro: x     / ALK PHOS: x     / ALT: x     / AST: x     / GGT: 63 U/L       Procalcitonin, Serum: 0.74:      Urinalysis (04.16.21 @ 21:23)   Blood, Urine: Moderate   pH Urine: 6.0   Glucose Qualitative, Urine: 500 mg/dL   Color: Yellow   Urine Appearance: Turbid   Bilirubin: Negative   Ketone - Urine: Trace   Specific Gravity: 1.013   Protein, Urine: 100 mg/dL   Urobilinogen: <2 mg/dL   Nitrite: Negative   Leukocyte Esterase Concentration: Large         Culture - Urine (collected 04-16-21 @ 21:23)  Source: .Urine Clean Catch (Midstream)  Final Report (04-20-21 @ 10:50):    50,000 - 99,000 CFU/mL Escherichia coli ESBL    10,000 - 49,000 CFU/mL Enterococcus faecium (vancomycin resistant)  Organism: Escherichia coli ESBL  Enterococcus faecium (vancomycin resistant) (04-20-21 @ 10:50)  Organism: Enterococcus faecium (vancomycin resistant) (04-20-21 @ 10:50)      -  Ampicillin: R >8 Predicts results to ampicillin/sulbactam, amoxacillin-clavulanate and  piperacillin-tazobactam.      -  Ciprofloxacin: R >2      -  Daptomycin: SDD 4 The breakpoint for SDD (sensitive dose dependent)is based on a dosage regimen of 8-12 mg/kg administered every 24 h in adults and is intended for serious infections due to E. faecium. Consultation with an infectious diseases specialist is recommended.      -  Levofloxacin: R >4      -  Linezolid: S <=1      -  Nitrofurantoin: I 64 Should not be used to treat pyelonephritis.      -  Tetra/Doxy: R >8      -  Vancomycin: R >16      Method Type: JONATHAN  Organism: Escherichia coli ESBL (04-20-21 @ 10:50)      -  Amikacin: I 32      -  Amoxicillin/Clavulanic Acid: S <=8/4      -  Ampicillin: R >16 These ampicillin results predict results for amoxicillin      -  Ampicillin/Sulbactam: I 16/8 Enterobacter, Citrobacter, and Serratia may develop resistance during prolonged therapy (3-4 days)      -  Aztreonam: R >16      -  Cefazolin: R >16 (MIC_CL_COM_ENTERIC_CEFAZU) For uncomplicated UTI with K. pneumoniae, E. coli, or P. mirablis: JONATHAN <=16 is sensitive and JONATHAN >=32 is resistant. This also predicts results for oral agents cefaclor, cefdinir, cefpodoxime, cefprozil, cefuroxime axetil, cephalexin and locarbef for uncomplicated UTI. Note that some isolates may be susceptible to these agents while testing resistant to cefazolin.      -  Cefepime: R >16      -  Cefoxitin: S <=8      -  Ceftriaxone: R >32 Enterobacter, Citrobacter, and Serratia may develop resistance during prolonged therapy      -  Ciprofloxacin: R >2      -  Ertapenem: S <=0.5      -  Gentamicin: R >8      -  Imipenem: S <=1      -  Levofloxacin: R >4      -  Meropenem: S <=1      -  Nitrofurantoin: S <=32 Should not be used to treat pyelonephritis      -  Piperacillin/Tazobactam: S <=8      -  Tigecycline: S <=2      -  Tobramycin: R >8      -  Trimethoprim/Sulfamethoxazole: S <=0.5/9.5      Method Type: JONATHAN    Culture - Blood (collected 04-16-21 @ 21:23)  Source: .Blood Blood-Peripheral  Preliminary Report (04-18-21 @ 07:01):    No growth to date.    Culture - Blood (collected 04-16-21 @ 21:23)  Source: .Blood Blood-Peripheral  Preliminary Report (04-18-21 @ 07:00):    No growth to date.    Blood Gas Venous - Lactate: 1.6 mmoL/L (04-16-21 @ 23:29)      RADIOLOGY & ADDITIONAL TESTS:  I have personally reviewed the last available Chest xray  US Renal (04.20.21 @ 11:12)  IMPRESSION:  Urinary bladder volume is 1573 cc. Patient reported she urinated prior to test and had no urge to void. Urinary bladder debris is present.  Moderate right hydronephrosis. Mild left hydronephrosis.    Xray Chest 1 View- PORTABLE-Urgent (04.16.21 @ 22:47)   Impression:  No evidence of acute cardiopulmonary disease.    CT Abdomen and Pelvis No Cont (03.14.21 @ 19:33)   IMPRESSION:  -The right kidney is enlarged with perinephric fat stranding and moderate hydroureteronephrosis to the level of the distal ureter. No radiopaque calculus is identified. Cystoscopy suggested to evaluate the right ureteral vesicle junction to evaluate for an obstructing lesion. A call back request was submitted  -Limited evaluation of renal parenchyma without contrast administration.      CARDIOLOGY TESTING  12 Lead ECG:   Ventricular Rate 131 BPM  QRS Duration 78 ms  Q-T Interval 304 ms  QTC Calculation(Bazett) 448 ms  R Axis 37 degrees  T Axis 243 degrees    Diagnosis Line Atrial fibrillation with rapid ventricular response  ST & T wave abnormality, consider inferior ischemia  ST & T wave abnormality, consider anterolateral ischemia  Abnormal ECG    Confirmed by Kenyon Gonzalez ([id number]) on 4/17/2021 1:39:17 PM (04-17-21 @ 13:03)  12 Lead ECG:   Ventricular Rate 104 BPM  Atrial Rate 111 BPM  QRS Duration 88 ms  Q-T Interval 296 ms  QTC Calculation(Bazett) 389 ms  R Axis 2 degrees  T Axis 269 degrees    Diagnosis Line Atrial fibrillation with rapid ventricular response  Voltage criteria for left ventricular hypertrophy  Cannot rule out Inferior infarct , age undetermined  Abnormal ECG    Confirmed by Kenyon Gonzalez ([id number]) on 4/17/2021 7:32:20 AM (04-16-21 @ 22:00)    All available historical records have been reviewed    MEDICATIONS  ALBUTerol    90 MICROgram(s) HFA Inhaler 1  aMIOdarone    Tablet 200  apixaban 2.5  atorvastatin 40  chlorhexidine 4% Liquid 1  clopidogrel Tablet 75  dextrose 40% Gel 15  dextrose 5%. 1000  dextrose 50% Injectable 25  dextrose 50% Injectable 12.5  dextrose 50% Injectable 25  famotidine  Oral Tab/Cap - Peds 20  glucagon  Injectable 1  insulin glargine Injectable (LANTUS) 7  insulin lispro (ADMELOG) corrective regimen sliding scale   insulin lispro Injectable (ADMELOG) 3  insulin lispro Injectable (ADMELOG) 3  insulin lispro Injectable (ADMELOG) 3  levothyroxine 88  meropenem  IVPB 500  metoprolol tartrate 50  nystatin    Suspension [id number]  pantoprazole    Tablet 40  senna 2  sodium bicarbonate 650  sodium chloride 0.9%. 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 Reported Tmax of 102. Pt also complained of mild L flank pain.    IMPRESSION  #Pyelonephritis, recurrent  -Fevers, back pain, UCx + for E. coli ESBL and VRE. BCx negative x 2.  -Recent admission for pyelonephritis complicated by sepsis (Mar2021)    #DKA resolved  #TEOFILO, resolved  #DM  #HTN  #CKD3  #afib on eliquis  #CAD s/p stent on clopidogrel  #PVD s/p stent  #hypothyroid  #inclusion body myositis     RECOMMENDATIONS  - Discontinue meropenem  - Start linezolid 600mg PO BID x 7 days  - Start ertapenem 1g IV q24 x 7 days  - Recommend topical vaginal estrogen cream    This is a pended note. 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 Reported Tmax of 102. Pt complains of lower back pain not localized to one side. Also reported dribbling of urine. Pt reports no dysuria, frequency, urgency, hematuria. Reported 18 pound weight loss from previous hospital admission to now. Pt denies N/V, headache, chest pain, palpitations, abdominal pain, calf pain, melena, hematochezia, changes in bowel habits. Pt reports having 1 episode of hematuria in Nov 2020 where she was treated for UTI outpatient by her urologist.     Infectious Diseases History:  Old Micro Data/Cultures:     Culture - Blood in AM (03.18.21 @ 04:30)   - Piperacillin/Tazobactam: R <=8   - Tobramycin: R >8   - Trimethoprim/Sulfamethoxazole: S <=0.5/9.5   - Meropenem: S <=1   - Levofloxacin: R >4   - Ertapenem: S <=0.5   - Gentamicin: R >8   - Imipenem: S <=1   Gram Stain:   Growth in anaerobic bottle: Gram Negative Rods   - Amikacin: S <=16   - Ceftriaxone: R >32 Enterobacter, Citrobacter, and Serratia may develop resistance during prolonged therapy   - Ciprofloxacin: R >2   - Cefepime: R >16   - Cefoxitin: S <=8   - Ampicillin: R >16 These ampicillin results predict results for amoxicillin   - Ampicillin/Sulbactam: R 8/4 Enterobacter, Citrobacter, and Serratia may develop resistance during prolonged therapy (3-4 days)   - Aztreonam: R >16   - Cefazolin: R >16 Enterobacter, Citrobacter, and Serratia may develop resistance during prolonged therapy (3-4 days)   Specimen Source: .Blood Blood-Peripheral   Organism: Escherichia coli ESBL   Culture Results:   Growth in anaerobic bottle: Escherichia coli ESBL   Organism Identification: Escherichia coli ESBL   Method Type: JONATHAN       Culture - Urine (03.14.21 @ 22:50)   - Gentamicin: R >8   - Levofloxacin: R >4   - Piperacillin/Tazobactam: S <=8   - Meropenem: S <=1   - Nitrofurantoin: S <=32 Should not be used to treat pyelonephritis   - Tigecycline: S <=2   - Tobramycin: R >8   - Trimethoprim/Sulfamethoxazole: S <=0.5/9.5   - Amoxicillin/Clavulanic Acid: I 16/8   - Amikacin: S <=16   - Ampicillin: R >16 These ampicillin results predict results for amoxicillin   - Aztreonam: R >16   - Ampicillin/Sulbactam: I 16/8 Enterobacter, Citrobacter, and Serratia may develop resistance during prolonged therapy (3-4 days)   - Cefazolin: R >16 (MIC_CL_COM_ENTERIC_CEFAZU) For uncomplicated UTI with K. pneumoniae, E. coli, or P. mirablis: JONATHAN <=16 is sensitive and JONATHAN >=32 is resistant. This also predicts results for oral agents cefaclor, cefdinir, cefpodoxime, cefprozil, cefuroxime axetil, cephalexin and locarbef for uncomplicated UTI. Note that some isolates may be susceptible to these agents while testing resistant to cefazolin.   - Cefepime: R >16   - Cefoxitin: S <=8   - Ciprofloxacin: R >2   - Ceftriaxone: R >32 Enterobacter, Citrobacter, and Serratia may develop resistance during prolonged therapy   - Imipenem: S <=1   - Ertapenem: S <=0.5   Specimen Source: .Urine Clean Catch (Midstream)   Culture Results:   50,000 - 99,000 CFU/mL Escherichia coli ESBL   Organism Identification: Escherichia coli ESBL   Organism: Escherichia coli ESBL   Method Type: JONATHAN       FAMILY HISTORY:  Family history of lung cancer (Mother)  Cancer (Father) Myeloma    PAST MEDICAL & SURGICAL HISTORY:  Afib on Eliquis, amio and toprol  Essential hypertension  Palpitations occasionally; not for couple yrs  Pacemaker Sept 2017  Diabetes Insulin dependent - (has insulin Pump)  SSS (sick sinus syndrome) S/p PPM  Hypothyroid  Anemia Chronic  Skin cancer Basal Cell Cancer face  (around nose) s/p MOHS procedure x 3  Seasonal allergies  PVD (peripheral vascular disease) S/p Right Peripheral leg Stent  Dry eyes, bilateral  Transfusion history S/p Hysterectomy  Lung nodules tree in bud  Inclusion body myositis (IBM)    H/O total hysterectomy 25+ yrs ago  Right Peripheral Stent 5 yrs  Cardiac pacemaker 9/9/17  History of surgery MOHS procedure (face) x 3  S/P cataract surgery  S/P ablation of atrial fibrillation      SOCIAL HISTORY  non smoker  no ETOH or drug misuse  from [facility] , walks with cane was receiving physical therapy prior to admission.     Recent Travel: denies  Other Exposures: denies    ROS  General: Denies rigors, nightsweats  HEENT: Denies headache, rhinorrhea, sore throat, eye pain  CV: Denies CP, palpitations  PULM: Denies wheezing, hemoptysis  GI: Denies hematemesis, hematochezia, melena  : Denies discharge, hematuria  MSK: Lower back pain non localized, Denies arthralgias, myalgias  SKIN: Denies rash, lesions  NEURO: Denies paresthesias, weakness  PSYCH: Denies depression, anxiety    VITALS:  T(F): 97.3, Max: 97.5 (04-20-21 @ 16:00)  HR: 79  BP: 153/68  RR: 18Vital Signs Last 24 Hrs  T(C): 36.3 (21 Apr 2021 08:23), Max: 36.4 (20 Apr 2021 16:00)  T(F): 97.3 (21 Apr 2021 08:23), Max: 97.5 (20 Apr 2021 16:00)  HR: 79 (21 Apr 2021 08:23) (71 - 79)  BP: 153/68 (21 Apr 2021 08:23) (123/56 - 153/68)  BP(mean): 80 (21 Apr 2021 05:21) (80 - 80)  RR: 18 (21 Apr 2021 08:23) (18 - 18)  SpO2: 100% (21 Apr 2021 08:23) (100% - 100%)    PHYSICAL EXAM:  Gen: NAD, resting in bed. Seen and examined at bedside. Pt has R upper arm midline in place. Springer draining pale, slightly turbid urine.   HEENT: Normocephalic, atraumatic  Neck: supple, no lymphadenopathy  CV: Regular rate & regular rhythm  Lungs: CTAB  Abdomen: Soft, BS present, no abdominal distention, non-tender  Ext: Warm, well perfused, scatter bruising at new and old IV sites.  Neuro: non focal, awake. A&Ox3  Skin: no rash, no lesions  Lines: no phlebitis    TESTS & MEASUREMENTS:                        8.1    5.93  )-----------( 251      ( 21 Apr 2021 07:03 )             24.9     04-21    134<L>  |  109  |  21<H>  ----------------------------<  88  4.7   |  19  |  1.3    Ca    7.2<L>      21 Apr 2021 07:03  Phos  2.6     04-20  Mg     2.0     04-20    TPro  4.1<L>  /  Alb  2.0<L>  /  TBili  <0.2  /  DBili  x   /  AST  80<H>  /  ALT  62<H>  /  AlkPhos  216<H>  04-21    eGFR if Non African American: 39 mL/min/1.73M2 (04-21-21 @ 07:03)  eGFR if African American: 46 mL/min/1.73M2 (04-21-21 @ 07:03)    LIVER FUNCTIONS - ( 21 Apr 2021 09:34 )  Alb: x     / Pro: x     / ALK PHOS: x     / ALT: x     / AST: x     / GGT: 63 U/L       Procalcitonin, Serum: 0.74:      Urinalysis (04.16.21 @ 21:23)   Blood, Urine: Moderate   pH Urine: 6.0   Glucose Qualitative, Urine: 500 mg/dL   Color: Yellow   Urine Appearance: Turbid   Bilirubin: Negative   Ketone - Urine: Trace   Specific Gravity: 1.013   Protein, Urine: 100 mg/dL   Urobilinogen: <2 mg/dL   Nitrite: Negative   Leukocyte Esterase Concentration: Large         Culture - Urine (collected 04-16-21 @ 21:23)  Source: .Urine Clean Catch (Midstream)  Final Report (04-20-21 @ 10:50):    50,000 - 99,000 CFU/mL Escherichia coli ESBL    10,000 - 49,000 CFU/mL Enterococcus faecium (vancomycin resistant)  Organism: Escherichia coli ESBL  Enterococcus faecium (vancomycin resistant) (04-20-21 @ 10:50)  Organism: Enterococcus faecium (vancomycin resistant) (04-20-21 @ 10:50)      -  Ampicillin: R >8 Predicts results to ampicillin/sulbactam, amoxacillin-clavulanate and  piperacillin-tazobactam.      -  Ciprofloxacin: R >2      -  Daptomycin: SDD 4 The breakpoint for SDD (sensitive dose dependent)is based on a dosage regimen of 8-12 mg/kg administered every 24 h in adults and is intended for serious infections due to E. faecium. Consultation with an infectious diseases specialist is recommended.      -  Levofloxacin: R >4      -  Linezolid: S <=1      -  Nitrofurantoin: I 64 Should not be used to treat pyelonephritis.      -  Tetra/Doxy: R >8      -  Vancomycin: R >16      Method Type: JONATHAN  Organism: Escherichia coli ESBL (04-20-21 @ 10:50)      -  Amikacin: I 32      -  Amoxicillin/Clavulanic Acid: S <=8/4      -  Ampicillin: R >16 These ampicillin results predict results for amoxicillin      -  Ampicillin/Sulbactam: I 16/8 Enterobacter, Citrobacter, and Serratia may develop resistance during prolonged therapy (3-4 days)      -  Aztreonam: R >16      -  Cefazolin: R >16 (MIC_CL_COM_ENTERIC_CEFAZU) For uncomplicated UTI with K. pneumoniae, E. coli, or P. mirablis: JONATHAN <=16 is sensitive and JONATHAN >=32 is resistant. This also predicts results for oral agents cefaclor, cefdinir, cefpodoxime, cefprozil, cefuroxime axetil, cephalexin and locarbef for uncomplicated UTI. Note that some isolates may be susceptible to these agents while testing resistant to cefazolin.      -  Cefepime: R >16      -  Cefoxitin: S <=8      -  Ceftriaxone: R >32 Enterobacter, Citrobacter, and Serratia may develop resistance during prolonged therapy      -  Ciprofloxacin: R >2      -  Ertapenem: S <=0.5      -  Gentamicin: R >8      -  Imipenem: S <=1      -  Levofloxacin: R >4      -  Meropenem: S <=1      -  Nitrofurantoin: S <=32 Should not be used to treat pyelonephritis      -  Piperacillin/Tazobactam: S <=8      -  Tigecycline: S <=2      -  Tobramycin: R >8      -  Trimethoprim/Sulfamethoxazole: S <=0.5/9.5      Method Type: JONATHAN    Culture - Blood (collected 04-16-21 @ 21:23)  Source: .Blood Blood-Peripheral  Preliminary Report (04-18-21 @ 07:01):    No growth to date.    Culture - Blood (collected 04-16-21 @ 21:23)  Source: .Blood Blood-Peripheral  Preliminary Report (04-18-21 @ 07:00):    No growth to date.    Blood Gas Venous - Lactate: 1.6 mmoL/L (04-16-21 @ 23:29)      RADIOLOGY & ADDITIONAL TESTS:  I have personally reviewed the last available Chest xray  US Renal (04.20.21 @ 11:12)  IMPRESSION:  Urinary bladder volume is 1573 cc. Patient reported she urinated prior to test and had no urge to void. Urinary bladder debris is present.  Moderate right hydronephrosis. Mild left hydronephrosis.    Xray Chest 1 View- PORTABLE-Urgent (04.16.21 @ 22:47)   Impression:  No evidence of acute cardiopulmonary disease.    CT Abdomen and Pelvis No Cont (03.14.21 @ 19:33)   IMPRESSION:  -The right kidney is enlarged with perinephric fat stranding and moderate hydroureteronephrosis to the level of the distal ureter. No radiopaque calculus is identified. Cystoscopy suggested to evaluate the right ureteral vesicle junction to evaluate for an obstructing lesion. A call back request was submitted  -Limited evaluation of renal parenchyma without contrast administration.      CARDIOLOGY TESTING  12 Lead ECG:   Ventricular Rate 131 BPM  QRS Duration 78 ms  Q-T Interval 304 ms  QTC Calculation(Bazett) 448 ms  R Axis 37 degrees  T Axis 243 degrees    Diagnosis Line Atrial fibrillation with rapid ventricular response  ST & T wave abnormality, consider inferior ischemia  ST & T wave abnormality, consider anterolateral ischemia  Abnormal ECG    Confirmed by Kenyon Gonzalez ([id number]) on 4/17/2021 1:39:17 PM (04-17-21 @ 13:03)  12 Lead ECG:   Ventricular Rate 104 BPM  Atrial Rate 111 BPM  QRS Duration 88 ms  Q-T Interval 296 ms  QTC Calculation(Bazett) 389 ms  R Axis 2 degrees  T Axis 269 degrees    Diagnosis Line Atrial fibrillation with rapid ventricular response  Voltage criteria for left ventricular hypertrophy  Cannot rule out Inferior infarct , age undetermined  Abnormal ECG    Confirmed by Kenyon Gonzalez ([id number]) on 4/17/2021 7:32:20 AM (04-16-21 @ 22:00)    All available historical records have been reviewed    MEDICATIONS  ALBUTerol    90 MICROgram(s) HFA Inhaler 1  aMIOdarone    Tablet 200  apixaban 2.5  atorvastatin 40  chlorhexidine 4% Liquid 1  clopidogrel Tablet 75  dextrose 40% Gel 15  dextrose 5%. 1000  dextrose 50% Injectable 25  dextrose 50% Injectable 12.5  dextrose 50% Injectable 25  famotidine  Oral Tab/Cap - Peds 20  glucagon  Injectable 1  insulin glargine Injectable (LANTUS) 7  insulin lispro (ADMELOG) corrective regimen sliding scale   insulin lispro Injectable (ADMELOG) 3  insulin lispro Injectable (ADMELOG) 3  insulin lispro Injectable (ADMELOG) 3  levothyroxine 88  meropenem  IVPB 500  metoprolol tartrate 50  nystatin    Suspension 067190  pantoprazole    Tablet 40  senna 2  sodium bicarbonate 650  sodium chloride 0.9%. 1000      ANTIBIOTICS:  meropenem  IVPB 500 milliGRAM(s) IV Intermittent every 12 hours  nystatin    Suspension 587861 Unit(s) Oral four times a day    All available historical data has been reviewed      ASSESSMENT

## 2021-04-21 NOTE — DISCHARGE NOTE PROVIDER - CARE PROVIDERS DIRECT ADDRESSES
[email or web address]

## 2021-04-21 NOTE — CONSULT NOTE ADULT - ASSESSMENT
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 Reported Tmax of 102. Pt also complained of mild L flank pain.    IMPRESSION  #Pyelonephritis, recurrent  -Fevers, back pain, UCx + for E. coli ESBL and VRE. BCx negative x 2.  -Recent admission for pyelonephritis complicated by sepsis (Mar2021)    #DKA resolved  #TEOFILO, resolved  #DM  #HTN  #CKD3  #afib on eliquis  #CAD s/p stent on clopidogrel  #PVD s/p stent  #hypothyroid  #inclusion body myositis     RECOMMENDATIONS  - Discontinue meropenem  - Start linezolid 600mg PO BID x 7 days  - Start ertapenem 1g IV q24 x 7 days via midline  - Recommend topical vaginal estrogen cream if no contraindication to decrease recurrent UTI

## 2021-04-21 NOTE — DISCHARGE NOTE PROVIDER - HOSPITAL COURSE
incomplete note   Patient is a 76y old Female who presents with a chief complaint of fever  she was found to have uti complicated by a dka  she is downgraded from icu and she is stable  ucx grew esbl and vre  she will be dc on ertapenem and linezolide    Patient is a 76y old Female who presents with a chief complaint of fever  she was found to have uti complicated by a dka  she is downgraded from icu and she is stable  ucx grew esbl and vre  she will be dc on ertapenem and linezolide    Patient is a 76y old Female who presents with a chief complaint of fever  she was found to have DKA due to sepsis, acute recurrent pyelonephritis.   she is downgraded from icu and she is stable  ucx grew esbl and vre  she will be dc on ertapenem and linezolide   hospital course prolonged due to fluid overload, improved after IV diuresis. Urinary retention, TEOFILO, resolved after wiggins insertion. Passed TOV prior to discharge.       Attending addendum: Patient seen and examined. Agree with above. Med rec reviewed.    Patient is a 76y old Female who presents with a chief complaint of fever  she was found to have DKA due to sepsis, acute recurrent pyelonephritis.   she is downgraded from icu and she is stable  ucx grew esbl and vre  she will be dc on ertapenem and linezolide   hospital course prolonged due to fluid overload, improved after IV diuresis. Urinary retention, TEOFILO, resolved after wiggins insertion. Passed TOV prior to discharge.   Transaminitis, on amiodarone and hep c ab positive, will resume w/u as outpatient in GI clinic.       Attending addendum: Patient seen and examined. Agree with above. Med rec reviewed.

## 2021-04-21 NOTE — PROGRESS NOTE ADULT - SUBJECTIVE AND OBJECTIVE BOX
SUBJECTIVE:    Patient is a 76y old Female who presents with a chief complaint of fever (20 Apr 2021 11:42)    Currently admitted to medicine with the primary diagnosis of UTI (urinary tract infection)       Today is hospital day 4d. This morning she is resting comfortably in bed and reports no new issues or overnight events.     PAST MEDICAL & SURGICAL HISTORY  Afib  on Eliquis, amio and toprol    Essential hypertension    Palpitations  occasionally; not for couple yrs    Pacemaker  Sept 2017    Diabetes  Insulin dependent - (has insulin Pump)    SSS (sick sinus syndrome)  S/p PPM    Hypothyroid    Anemia  Chronic    Skin cancer  Basal Cell Cancer face  (around nose) s/p MOHS procedure x 3    Seasonal allergies    PVD (peripheral vascular disease)  S/p Right Peripheral leg Stent    Dry eyes, bilateral    Transfusion history  S/p Hysterectomy    Lung nodules  tree in bud    Inclusion body myositis (IBM)    H/O total hysterectomy  25+ yrs ago    History of surgery  Right Peripheral Stent 5 yrs    Cardiac pacemaker  9/9/17    History of surgery  MOHS procedure (face) x 3    S/P cataract surgery    S/P ablation of atrial fibrillation      SOCIAL HISTORY:  Negative for smoking/alcohol/drug use.     ALLERGIES:  latex (Pruritus; Rash)  sulfonamides (Unknown)    MEDICATIONS:  STANDING MEDICATIONS  ALBUTerol    90 MICROgram(s) HFA Inhaler 1 Puff(s) Inhalation every 6 hours  aMIOdarone    Tablet 200 milliGRAM(s) Oral two times a day  apixaban 2.5 milliGRAM(s) Oral two times a day  atorvastatin 40 milliGRAM(s) Oral at bedtime  chlorhexidine 4% Liquid 1 Application(s) Topical <User Schedule>  clopidogrel Tablet 75 milliGRAM(s) Oral daily  dextrose 40% Gel 15 Gram(s) Oral once  dextrose 5%. 1000 milliLiter(s) IV Continuous <Continuous>  dextrose 50% Injectable 25 Gram(s) IV Push once  dextrose 50% Injectable 12.5 Gram(s) IV Push once  dextrose 50% Injectable 25 Gram(s) IV Push once  famotidine  Oral Tab/Cap - Peds 20 milliGRAM(s) Oral at bedtime  glucagon  Injectable 1 milliGRAM(s) IntraMuscular once  insulin glargine Injectable (LANTUS) 7 Unit(s) SubCutaneous at bedtime  insulin lispro (ADMELOG) corrective regimen sliding scale   SubCutaneous three times a day before meals  insulin lispro Injectable (ADMELOG) 3 Unit(s) SubCutaneous before breakfast  insulin lispro Injectable (ADMELOG) 3 Unit(s) SubCutaneous before lunch  insulin lispro Injectable (ADMELOG) 3 Unit(s) SubCutaneous before dinner  levothyroxine 88 MICROGram(s) Oral daily  meropenem  IVPB 500 milliGRAM(s) IV Intermittent every 12 hours  metoprolol tartrate 50 milliGRAM(s) Oral every 6 hours  nystatin    Suspension [id number] Unit(s) Oral four times a day  pantoprazole    Tablet 40 milliGRAM(s) Oral before breakfast  senna 2 Tablet(s) Oral at bedtime  sodium bicarbonate 650 milliGRAM(s) Oral every 8 hours  sodium chloride 0.9%. 1000 milliLiter(s) IV Continuous <Continuous>    PRN MEDICATIONS    VITALS:   T(F): 97.3  HR: 72  BP: 123/56  RR: 18  SpO2: --    LABS:                        8.1    5.93  )-----------( 251      ( 21 Apr 2021 07:03 )             24.9     04-21    134<L>  |  109  |  21<H>  ----------------------------<  88  4.7   |  19  |  1.3    Ca    7.2<L>      21 Apr 2021 07:03  Phos  2.6     04-20  Mg     2.0     04-20    TPro  4.1<L>  /  Alb  2.0<L>  /  TBili  <0.2  /  DBili  x   /  AST  80<H>  /  ALT  62<H>  /  AlkPhos  216<H>  04-21                  RADIOLOGY:    PHYSICAL EXAM:  GEN: No acute distress  LUNGS: Clear to auscultation bilaterally   HEART: S1/S2 present. RRR.   ABD: Soft, non-tender, non-distended. Bowel sounds present  EXT: NC/NC/NE/2+PP/LAZARO/Skin Intact.   NEURO: AAOX3    Intravenous access:   NG tube:   Springer Catheter:   Indwelling Urethral Catheter:     Connect To:  Straight Drainage/[place]    Indication:  Urinary Retention / Obstruction (04-20-21 @ 13:25) (not performed) [Active]

## 2021-04-21 NOTE — PROCEDURE NOTE - NSPOSTCAREGUIDE_GEN_A_CORE
upon discharged further ninstructions will be given to sign/Verbal/written post procedure instructions were given to patient/caregiver

## 2021-04-21 NOTE — DISCHARGE NOTE PROVIDER - NSDCCPCAREPLAN_GEN_ALL_CORE_FT
PRINCIPAL DISCHARGE DIAGNOSIS  Diagnosis: UTI (urinary tract infection)  Assessment and Plan of Treatment: You presented to the emergency depatment for fever. Yuo were admitted to the ICU. YOU WERE DIAGNOSED WITH A URINARY TRAT INFECTION. Urine cultures grew E cli ESBL and VRE . You are prescribed antibioics for the UTI. Please take them as prescribed      SECONDARY DISCHARGE DIAGNOSES  Diagnosis: DKA (diabetic ketoacidosis)  Assessment and Plan of Treatment: You also had been diagnosed with a diabetic ketoacidosis, which is high lvels of acids in the blood due to lack of insulin in your body. You was in the ICU and you have been treated with insulin. Please floow a low carbohydrates diet and take your insulin as prescribed and measure you blood glucose.

## 2021-04-21 NOTE — DISCHARGE NOTE PROVIDER - NSDCMRMEDTOKEN_GEN_ALL_CORE_FT
albuterol 90 mcg/inh inhalation aerosol: 1 puff(s) inhaled every 6 hours  amiodarone 200 mg oral tablet: 1 tab(s) orally 2 times a day  atorvastatin 40 mg oral tablet: 1 tab(s) orally once a day (at bedtime)  Basaglar KwikPen 100 units/mL subcutaneous solution: 7 unit(s) subcutaneous once a day (at bedtime)  clopidogrel 75 mg oral tablet: 1 tab(s) orally once a day MDD:1  Eliquis 2.5 mg oral tablet: 1 tab(s) orally 2 times a day MDD:2    ertapenem 1 g injection: 1 gram(s) intravenously once a day   Gamunex 10% intravenous solution: every 5 weeks; Contiune to hold. Needs to start after follow up with neurologist  insulin lispro 100 units/mL injectable solution: 2 unit(s) subcutaneous 3 times a day (with meals)  levothyroxine 88 mcg (0.088 mg) oral tablet: 1 tab(s) orally once a day  linezolid 600 mg oral tablet: 1 tab(s) orally every 12 hours  metoprolol tartrate 25 mg oral tablet: 1 tab(s) orally every 6 hours  Multiple Vitamins oral tablet: 1 tab(s) orally once a day  nystatin 100,000 units/mL oral suspension: 5 milliliter(s) orally 4 times a day  pantoprazole 40 mg oral delayed release tablet: 1 tab(s) orally once a day (before a meal)  senna oral tablet: 2 tab(s) orally once a day (at bedtime)  sodium bicarbonate 650 mg oral tablet: 1 tab(s) orally every 8 hours  Vitamin C 250 mg oral tablet: 1 tab(s) orally 2 times a day   albuterol 90 mcg/inh inhalation aerosol: 1 puff(s) inhaled every 6 hours  amiodarone 200 mg oral tablet: 1 tab(s) orally 2 times a day  atorvastatin 40 mg oral tablet: 1 tab(s) orally once a day (at bedtime)  Basaglar KwikPen 100 units/mL subcutaneous solution: 7 unit(s) subcutaneous once a day (at bedtime)  clopidogrel 75 mg oral tablet: 1 tab(s) orally once a day MDD:1  Eliquis 2.5 mg oral tablet: 1 tab(s) orally 2 times a day MDD:2    Gamunex 10% intravenous solution: every 5 weeks; Contiune to hold. Needs to start after follow up with neurologist  insulin lispro 100 units/mL injectable solution: 2 unit(s) subcutaneous 3 times a day (with meals)  levothyroxine 88 mcg (0.088 mg) oral tablet: 1 tab(s) orally once a day  linezolid 600 mg oral tablet: 1 tab(s) orally every 12 hours  linezolid 600 mg oral tablet: 1 tab(s) orally every 12 hours  metoprolol tartrate 25 mg oral tablet: 1 tab(s) orally every 6 hours  Multiple Vitamins oral tablet: 1 tab(s) orally once a day  nystatin 100,000 units/mL oral suspension: 5 milliliter(s) orally 4 times a day  pantoprazole 40 mg oral delayed release tablet: 1 tab(s) orally once a day (before a meal)  senna oral tablet: 2 tab(s) orally once a day (at bedtime)  sodium bicarbonate 650 mg oral tablet: 1 tab(s) orally every 8 hours  Vitamin C 250 mg oral tablet: 1 tab(s) orally 2 times a day   albuterol 90 mcg/inh inhalation aerosol: 1 puff(s) inhaled every 6 hours  amiodarone 200 mg oral tablet: 1 tab(s) orally 2 times a day  atorvastatin 40 mg oral tablet: 1 tab(s) orally once a day (at bedtime)  Basaglar KwikPen 100 units/mL subcutaneous solution: 7 unit(s) subcutaneous once a day (at bedtime)  clopidogrel 75 mg oral tablet: 1 tab(s) orally once a day MDD:1  Eliquis 2.5 mg oral tablet: 1 tab(s) orally 2 times a day MDD:2    ertapenem 1 g injection: 1 gram(s) intravenously once a day   Gamunex 10% intravenous solution: every 5 weeks; Contiune to hold. Needs to start after follow up with neurologist  insulin lispro 100 units/mL injectable solution: 2 unit(s) subcutaneous 3 times a day (with meals)  Lasix 40 mg oral tablet: 1 tab(s) orally once a day  levothyroxine 88 mcg (0.088 mg) oral tablet: 1 tab(s) orally once a day  linezolid 600 mg oral tablet: 1 tab(s) orally every 12 hours  metoprolol tartrate 25 mg oral tablet: 1 tab(s) orally every 6 hours  Multiple Vitamins oral tablet: 1 tab(s) orally once a day  nystatin 100,000 units/mL oral suspension: 5 milliliter(s) orally 4 times a day  pantoprazole 40 mg oral delayed release tablet: 1 tab(s) orally once a day (before a meal)  senna oral tablet: 2 tab(s) orally once a day (at bedtime)  sodium bicarbonate 650 mg oral tablet: 1 tab(s) orally every 8 hours  Vitamin C 250 mg oral tablet: 1 tab(s) orally 2 times a day

## 2021-04-21 NOTE — DISCHARGE NOTE PROVIDER - NSDCFUSCHEDAPPT_GEN_ALL_CORE_FT
SOWMYA AMADO ; 05/20/2021 ; NPP Cardio [address] [facility] SOWMYA Jung ; 06/25/2021 ; NPP Cardio [address] [facility] SOWMYA Jung ; 07/13/2021 ; NPP Neuro [address]

## 2021-04-21 NOTE — DISCHARGE NOTE PROVIDER - CARE PROVIDER_API CALL
Lewis [place])  EndocrinologyMetabDiabetes; Internal Medicine  [address] [facility] [place] [address]  Phone: [phone]  Fax: [phone]  Follow Up Time: 2 weeks   Lewis Madison)  EndocrinologyMetabDiabetes; Internal Medicine  [address] [place] [address]  Phone: [phone]  Fax: [phone]  Follow Up Time: 2 weeks    Denise Jean-Baptiste)  Gastroenterology  [address] [place] [address]  Phone: [phone]  Fax: [phone]  Follow Up Time: 2 weeks

## 2021-04-22 NOTE — PROGRESS NOTE ADULT - SUBJECTIVE AND OBJECTIVE BOX
Patient is a 76y old  Female who presents with a chief complaint of fever (19 Apr 2021 10:16)    Patient was seen and examined.  Denies abd pain , constipation  Denies chest pain , sob, dizziness  Denies any other complaints.  All systems reviewed positive history as mentioned above.    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  Inclusion body myositis (IBM)    History of surgery  Right Peripheral Stent 5 yrs  Cardiac pacemaker 9/9/17  MOHS procedure (face) x 3  S/P cataract surgery  S/P ablation of atrial fibrillation    Allergies  latex (Pruritus; Rash)  sulfonamides (Unknown)    Intolerances  Gluten (Stomach Upset; Vomiting; Nausea; Diarrhea)  Pineapple (Unknown)    MEDICATIONS  (STANDING):  ALBUTerol    90 MICROgram(s) HFA Inhaler 1 Puff(s) Inhalation every 6 hours  aMIOdarone    Tablet 200 milliGRAM(s) Oral two times a day  apixaban 2.5 milliGRAM(s) Oral two times a day  atorvastatin 40 milliGRAM(s) Oral at bedtime  chlorhexidine 4% Liquid 1 Application(s) Topical <User Schedule>  clopidogrel Tablet 75 milliGRAM(s) Oral daily  famotidine  Oral Tab/Cap - Peds 20 milliGRAM(s) Oral at bedtime  glucagon  Injectable 1 milliGRAM(s) IntraMuscular once  insulin glargine Injectable (LANTUS) 7 Unit(s) SubCutaneous at bedtime  insulin lispro (ADMELOG) corrective regimen sliding scale   SubCutaneous three times a day before meals  insulin lispro Injectable (ADMELOG) 2 Unit(s) SubCutaneous three times a day before meals  levothyroxine 88 MICROGram(s) Oral daily  linezolid    Tablet 600 milliGRAM(s) Oral every 12 hours  meropenem  IVPB 500 milliGRAM(s) IV Intermittent every 12 hours  metoprolol tartrate 50 milliGRAM(s) Oral every 6 hours  nystatin    Suspension [id number] Unit(s) Oral four times a day  pantoprazole    Tablet 40 milliGRAM(s) Oral before breakfast  povidone iodine 10% Ointment 1 Application(s) Topical every 12 hours  senna 2 Tablet(s) Oral at bedtime  sodium bicarbonate 650 milliGRAM(s) Oral every 8 hours  sodium chloride 0.9%. 1000 milliLiter(s) (100 mL/Hr) IV Continuous <Continuous>    MEDICATIONS  (PRN):    T(C): 37.7 (04-22-21 @ 07:48), Max: 37.7 (04-22-21 @ 07:48)  HR: 105 (04-22-21 @ 07:48) (84 - 105)  BP: 148/75 (04-22-21 @ 07:48) (148/75 - 159/70)  RR: 18 (04-22-21 @ 07:48) (18 - 18)  SpO2: --    O/E:  Awake, alert, not in distress.  HEENT: atraumatic, EOMI.  Chest: clear.  CVS: SIS2 +,irregular, systolic  murmur+  P/A: Soft, BS+  CNS: non focal.  Ext: no edema feet.  Skin: no rash, no ulcers.  All systems reviewed positive findings as above.                          8.5<L>  7.08  )-----------( 265      ( 22 Apr 2021 04:30 )             25.8<L>                        8.1<L>  5.93  )-----------( 251      ( 21 Apr 2021 07:03 )             24.9<L>  04-22    133<L>  |  108  |  18  ----------------------------<  108<H>  4.2   |  17  |  1.3    Ca    7.2<L>      22 Apr 2021 04:30    TPro  4.5<L>  /  Alb  2.1<L>  /  TBili  0.2  /  DBili  x   /  AST  96<H>  /  ALT  70<H>  /  AlkPhos  232<H>  04-22

## 2021-04-22 NOTE — PROGRESS NOTE ADULT - ASSESSMENT
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 Pt was admitted to ICU for Sepsis, Pyelonephritis and DKA    # Sepsis secondary to recurrent  pyelonephritis -resolved  # Urinary retention with bilateral hydronephrosis  - Blood Cx NGTD   - Urine culture: ESBLE.coli and VRE   - US Renal w/ b/l hydronephrosis   - d/c meropenem   - appreciate ID recs, unclear if we need to start Linezolid PO for VRE and ertapenem 1g IV q24 x 7 days via midline  - wiggins catheter in place for retention     # Diabetic ketoacidosis- resolved  # IDDM with hypoglycemia  - A1C with Estimated Average Glucose Result: 8.1 % (03.15.21 @ 06:16)  - S/p insulin drip  - decrease lantus dose to 7 units HS and Lispro 2u tid per Endocrine     # Acute kidney injury on CKD stage 3- improved   # Metabolic acidosis  # Cheonic hyponatremia  # Hyperkalemia-resolved  - c/w Bicarb  - monitor BMP    # H/o chronic  afib-rate controlled  # H/o SSS S/p PPM  - c/w amiodarone, metoprolol  - c/w eliquis    # H/o CAD S/p PCI  # H/o PVD S/p stent  - c/w plavix, eliquis, statin, metoprolol    # Hypothyroidism  - c/w synthroid    # H/o Inclusion body myositis  -  on Gamunex every 5 weeks    # Anemia- Hgb stable  - monitor HB/HCT    # Full code 75 yo Female with a PMHx of DM on insulin pump, HTN, CKD3, Afib s/p ablation on Eliquis, CAD s/p stenting in 2020, sick sinus syndrome s/p PPM, PVD s/p right peripheral leg stent, 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 Pt was admitted to ICU for Sepsis, Pyelonephritis and DKA.    # Sepsis secondary to recurrent  pyelonephritis -resolved  # Urinary retention with bilateral hydronephrosis  - Blood Cx NGTD   - Urine culture: ESBL E.coli and VRE   - US Renal w/ b/l hydronephrosis   - d/c meropenem   - appreciate ID recs, c/w Linezolid PO for VRE and ertapenem 1g IV q24 x 7 days via midline, Estrogen cream contraindicated in the setting of known H/O CAD and PVD   - Springer catheter was placed for retention   - Trial of void today    # Diabetic ketoacidosis- resolved  # IDDM with episodes of hypoglycemia  - A1C with Estimated Average Glucose Result: 8.1 % (03.15.21 @ 06:16)  - S/p insulin drip  - c/w lantus dose to 7 units HS and Lispro 2u tid per Endocrine     # Acute kidney injury on CKD stage 3- improved   # Metabolic acidosis  # Chronic hyponatremia  # Hyperkalemia-resolved  - c/w Bicarb  - monitor BMP    # H/o chronic  afib-rate controlled  # H/o SSS S/p PPM  - c/w amiodarone, metoprolol  - c/w eliquis    # H/o CAD S/p PCI  # H/o PVD S/p stent  - c/w plavix, eliquis, statin, metoprolol    # Hypothyroidism  - c/w synthroid    # H/o Inclusion body myositis  - on Gamunex every 5 weeks  - f/u outpatient    # Anemia- Hgb stable  - monitor HB/HCT    # Full code

## 2021-04-22 NOTE — PROGRESS NOTE ADULT - SUBJECTIVE AND OBJECTIVE BOX
SOWMYA AMADO  76y  Female      Patient is a 76y old  Female who presents with a chief complaint of fever (21 Apr 2021 15:10)      INTERVAL HPI/OVERNIGHT EVENTS:      REVIEW OF SYSTEMS:  CONSTITUTIONAL: No fever, weight loss, or fatigue  EYES: No eye pain, visual disturbances, or discharge  ENMT:  No difficulty hearing, tinnitus, vertigo; No sinus or throat pain  NECK: No pain or stiffness  BREASTS: No pain, masses, or nipple discharge  RESPIRATORY: No cough, wheezing, chills or hemoptysis; No shortness of breath  CARDIOVASCULAR: No chest pain, palpitations, dizziness, or leg swelling  GASTROINTESTINAL: No abdominal or epigastric pain. No nausea, vomiting, or hematemesis; No diarrhea or constipation. No melena or hematochezia.  GENITOURINARY: No dysuria, frequency, hematuria, or incontinence  NEUROLOGICAL: No headaches, memory loss, loss of strength, numbness, or tremors  SKIN: No itching, burning, rashes, or lesions   LYMPH NODES: No enlarged glands  ENDOCRINE: No heat or cold intolerance; No hair loss  MUSCULOSKELETAL: No joint pain or swelling; No muscle, back, or extremity pain  PSYCHIATRIC: No depression, anxiety, mood swings, or difficulty sleeping  HEME/LYMPH: No easy bruising, or bleeding gums  ALLERY AND IMMUNOLOGIC: No hives or eczema  FAMILY HISTORY:  Family history of lung cancer (Mother)    Cancer (Father)  Myeloma      T(C): 37.3 (04-21-21 @ 23:51), Max: 37.3 (04-21-21 @ 23:51)  HR: 90 (04-21-21 @ 23:51) (79 - 90)  BP: 159/70 (04-21-21 @ 23:51) (153/68 - 159/70)  RR: 18 (04-21-21 @ 23:51) (18 - 18)  SpO2: 100% (04-21-21 @ 08:23) (100% - 100%)  Wt(kg): --Vital Signs Last 24 Hrs  T(C): 37.3 (21 Apr 2021 23:51), Max: 37.3 (21 Apr 2021 23:51)  T(F): 99.1 (21 Apr 2021 23:51), Max: 99.1 (21 Apr 2021 23:51)  HR: 90 (21 Apr 2021 23:51) (79 - 90)  BP: 159/70 (21 Apr 2021 23:51) (153/68 - 159/70)  BP(mean): --  RR: 18 (21 Apr 2021 23:51) (18 - 18)  SpO2: 100% (21 Apr 2021 08:23) (100% - 100%)  Gluten (Stomach Upset; Vomiting; Nausea; Diarrhea)  latex (Pruritus; Rash)  Pineapple (Unknown)  sulfonamides (Unknown)      PHYSICAL EXAM:  GENERAL: NAD, well-groomed, well-developed  HEAD:  Atraumatic, Normocephalic  EYES: EOMI, PERRLA, conjunctiva and sclera clear  ENMT: No tonsillar erythema, exudates, or enlargement; Moist mucous membranes, Good dentition, No lesions  NECK: Supple, No JVD, Normal thyroid  NERVOUS SYSTEM:  Alert & Oriented X3, Good concentration; Motor Strength 5/5 B/L upper and lower extremities; DTRs 2+ intact and symmetric  CHEST/LUNG: Clear to percussion bilaterally; No rales, rhonchi, wheezing, or rubs  HEART: Regular rate and rhythm; No murmurs, rubs, or gallops  ABDOMEN: Soft, Nontender, Nondistended; Bowel sounds present  EXTREMITIES:  2+ Peripheral Pulses, No clubbing, cyanosis, or edema  LYMPH: No lymphadenopathy noted  SKIN: No rashes or lesions    Consultant(s) Notes Reviewed:  [x ] YES  [ ] NO  Care Discussed with Consultants/Other Providers [ x] YES  [ ] NO    LABS:      RADIOLOGY & ADDITIONAL TESTS:    Imaging Personally Reviewed:  [ ] YES  [ ] NO  ALBUTerol    90 MICROgram(s) HFA Inhaler 1 Puff(s) Inhalation every 6 hours  aMIOdarone    Tablet 200 milliGRAM(s) Oral two times a day  apixaban 2.5 milliGRAM(s) Oral two times a day  atorvastatin 40 milliGRAM(s) Oral at bedtime  chlorhexidine 4% Liquid 1 Application(s) Topical <User Schedule>  clopidogrel Tablet 75 milliGRAM(s) Oral daily  dextrose 40% Gel 15 Gram(s) Oral once  dextrose 5%. 1000 milliLiter(s) IV Continuous <Continuous>  dextrose 50% Injectable 25 Gram(s) IV Push once  dextrose 50% Injectable 12.5 Gram(s) IV Push once  dextrose 50% Injectable 25 Gram(s) IV Push once  famotidine  Oral Tab/Cap - Peds 20 milliGRAM(s) Oral at bedtime  glucagon  Injectable 1 milliGRAM(s) IntraMuscular once  insulin glargine Injectable (LANTUS) 7 Unit(s) SubCutaneous at bedtime  insulin lispro (ADMELOG) corrective regimen sliding scale   SubCutaneous three times a day before meals  insulin lispro Injectable (ADMELOG) 2 Unit(s) SubCutaneous three times a day before meals  levothyroxine 88 MICROGram(s) Oral daily  linezolid    Tablet 600 milliGRAM(s) Oral every 12 hours  meropenem  IVPB 500 milliGRAM(s) IV Intermittent every 12 hours  metoprolol tartrate 50 milliGRAM(s) Oral every 6 hours  nystatin    Suspension [id number] Unit(s) Oral four times a day  pantoprazole    Tablet 40 milliGRAM(s) Oral before breakfast  povidone iodine 10% Ointment 1 Application(s) Topical every 12 hours  senna 2 Tablet(s) Oral at bedtime  sodium bicarbonate 650 milliGRAM(s) Oral every 8 hours  sodium chloride 0.9%. 1000 milliLiter(s) IV Continuous <Continuous>      HEALTH ISSUES - PROBLEM Dx:         SOWMYA AMADO  76y  Female      Patient is a 76y old  Female who presents with a chief complaint of fever (21 Apr 2021 15:10)      INTERVAL HPI/OVERNIGHT EVENTS: No acute overnight events. Pt is resting comfortably in bed. Slept well. Feeling better. Denies pain. Last BM a few days ago. Denies dysuria.                REVIEW OF SYSTEMS:  None unless otherwise noted.     FAMILY HISTORY:  Family history of lung cancer (Mother)    Cancer (Father)  Myeloma      T(C): 37.3 (04-21-21 @ 23:51), Max: 37.3 (04-21-21 @ 23:51)  HR: 90 (04-21-21 @ 23:51) (79 - 90)  BP: 159/70 (04-21-21 @ 23:51) (153/68 - 159/70)  RR: 18 (04-21-21 @ 23:51) (18 - 18)  SpO2: 100% (04-21-21 @ 08:23) (100% - 100%)  Wt(kg): --Vital Signs Last 24 Hrs  T(C): 37.3 (21 Apr 2021 23:51), Max: 37.3 (21 Apr 2021 23:51)  T(F): 99.1 (21 Apr 2021 23:51), Max: 99.1 (21 Apr 2021 23:51)  HR: 90 (21 Apr 2021 23:51) (79 - 90)  BP: 159/70 (21 Apr 2021 23:51) (153/68 - 159/70)  BP(mean): --  RR: 18 (21 Apr 2021 23:51) (18 - 18)  SpO2: 100% (21 Apr 2021 08:23) (100% - 100%)  Gluten (Stomach Upset; Vomiting; Nausea; Diarrhea)  latex (Pruritus; Rash)  Pineapple (Unknown)  sulfonamides (Unknown)      PHYSICAL EXAM:  GEN: No acute distress  LUNGS: Clear to auscultation bilaterally   HEART: S1/S2 present. RRR.   ABD: Soft, non-tender, non-distended. Bowel sounds present  EXT: NC/NC/NE/2+PP/LAZARO/Skin Intact.   NEURO: AAOX3  : Springer in place    Consultant(s) Notes Reviewed:  [x ] YES  [ ] NO  Care Discussed with Consultants/Other Providers [ x] YES  [ ] NO    LABS:                        8.5    7.08  )-----------( 265      ( 22 Apr 2021 04:30 )             25.8       04-21    134<L>  |  109  |  21<H>  ----------------------------<  88  4.7   |  19  |  1.3    Ca    7.2<L>      21 Apr 2021 07:03    TPro  4.1<L>  /  Alb  2.0<L>  /  TBili  <0.2  /  DBili  x   /  AST  80<H>  /  ALT  62<H>  /  AlkPhos  216<H>  04-21            CAPILLARY BLOOD GLUCOSE      POCT Blood Glucose.: 108 mg/dL (22 Apr 2021 07:28)      ALBUTerol    90 MICROgram(s) HFA Inhaler 1 Puff(s) Inhalation every 6 hours  aMIOdarone    Tablet 200 milliGRAM(s) Oral two times a day  apixaban 2.5 milliGRAM(s) Oral two times a day  atorvastatin 40 milliGRAM(s) Oral at bedtime  chlorhexidine 4% Liquid 1 Application(s) Topical <User Schedule>  clopidogrel Tablet 75 milliGRAM(s) Oral daily  dextrose 40% Gel 15 Gram(s) Oral once  dextrose 5%. 1000 milliLiter(s) IV Continuous <Continuous>  dextrose 50% Injectable 25 Gram(s) IV Push once  dextrose 50% Injectable 12.5 Gram(s) IV Push once  dextrose 50% Injectable 25 Gram(s) IV Push once  famotidine  Oral Tab/Cap - Peds 20 milliGRAM(s) Oral at bedtime  glucagon  Injectable 1 milliGRAM(s) IntraMuscular once  insulin glargine Injectable (LANTUS) 7 Unit(s) SubCutaneous at bedtime  insulin lispro (ADMELOG) corrective regimen sliding scale   SubCutaneous three times a day before meals  insulin lispro Injectable (ADMELOG) 2 Unit(s) SubCutaneous three times a day before meals  levothyroxine 88 MICROGram(s) Oral daily  linezolid    Tablet 600 milliGRAM(s) Oral every 12 hours  meropenem  IVPB 500 milliGRAM(s) IV Intermittent every 12 hours  metoprolol tartrate 50 milliGRAM(s) Oral every 6 hours  nystatin    Suspension [id number] Unit(s) Oral four times a day  pantoprazole    Tablet 40 milliGRAM(s) Oral before breakfast  povidone iodine 10% Ointment 1 Application(s) Topical every 12 hours  senna 2 Tablet(s) Oral at bedtime  sodium bicarbonate 650 milliGRAM(s) Oral every 8 hours  sodium chloride 0.9%. 1000 milliLiter(s) IV Continuous <Continuous>      HEALTH ISSUES - PROBLEM Dx:         SOWMYA AMADO  76y  Female      Patient is a 76y old  Female who presents with a chief complaint of fever (21 Apr 2021 15:10)      INTERVAL HPI/OVERNIGHT EVENTS: No acute overnight events. Pt is resting comfortably in bed. Slept well. Feeling better. Denies pain. Last BM a few days ago. Denies dysuria.                REVIEW OF SYSTEMS:  None unless otherwise noted.     FAMILY HISTORY:  Family history of lung cancer (Mother)    Cancer (Father)  Myeloma      T(C): 37.3 (04-21-21 @ 23:51), Max: 37.3 (04-21-21 @ 23:51)  HR: 90 (04-21-21 @ 23:51) (79 - 90)  BP: 159/70 (04-21-21 @ 23:51) (153/68 - 159/70)  RR: 18 (04-21-21 @ 23:51) (18 - 18)  SpO2: 100% (04-21-21 @ 08:23) (100% - 100%)  Wt(kg): --Vital Signs Last 24 Hrs  T(C): 37.3 (21 Apr 2021 23:51), Max: 37.3 (21 Apr 2021 23:51)  T(F): 99.1 (21 Apr 2021 23:51), Max: 99.1 (21 Apr 2021 23:51)  HR: 90 (21 Apr 2021 23:51) (79 - 90)  BP: 159/70 (21 Apr 2021 23:51) (153/68 - 159/70)  BP(mean): --  RR: 18 (21 Apr 2021 23:51) (18 - 18)  SpO2: 100% (21 Apr 2021 08:23) (100% - 100%)  Gluten (Stomach Upset; Vomiting; Nausea; Diarrhea)  latex (Pruritus; Rash)  Pineapple (Unknown)  sulfonamides (Unknown)      PHYSICAL EXAM:  GEN: No acute distress  LUNGS: Clear to auscultation bilaterally   HEART: S1/S2 present. RRR.   ABD: Soft, non-tender, non-distended. Bowel sounds present  EXT: NC/NC/NE/2+PP/LAZARO/Skin Intact.   NEURO: AAOX3  : Springer in place    Consultant(s) Notes Reviewed:  [x ] YES  [ ] NO  Care Discussed with Consultants/Other Providers [ x] YES  [ ] NO    LABS:                        8.5    7.08  )-----------( 265      ( 22 Apr 2021 04:30 )             25.8       04-21    134<L>  |  109  |  21<H>  ----------------------------<  88  4.7   |  19  |  1.3    Ca    7.2<L>      21 Apr 2021 07:03    TPro  4.1<L>  /  Alb  2.0<L>  /  TBili  <0.2  /  DBili  x   /  AST  80<H>  /  ALT  62<H>  /  AlkPhos  216<H>  04-21            CAPILLARY BLOOD GLUCOSE      POCT Blood Glucose.: 108 mg/dL (22 Apr 2021 07:28)      ALBUTerol    90 MICROgram(s) HFA Inhaler 1 Puff(s) Inhalation every 6 hours  aMIOdarone    Tablet 200 milliGRAM(s) Oral two times a day  apixaban 2.5 milliGRAM(s) Oral two times a day  atorvastatin 40 milliGRAM(s) Oral at bedtime  chlorhexidine 4% Liquid 1 Application(s) Topical <User Schedule>  clopidogrel Tablet 75 milliGRAM(s) Oral daily  dextrose 40% Gel 15 Gram(s) Oral once  dextrose 5%. 1000 milliLiter(s) IV Continuous <Continuous>  dextrose 50% Injectable 25 Gram(s) IV Push once  dextrose 50% Injectable 12.5 Gram(s) IV Push once  dextrose 50% Injectable 25 Gram(s) IV Push once  famotidine  Oral Tab/Cap - Peds 20 milliGRAM(s) Oral at bedtime  glucagon  Injectable 1 milliGRAM(s) IntraMuscular once  insulin glargine Injectable (LANTUS) 7 Unit(s) SubCutaneous at bedtime  insulin lispro (ADMELOG) corrective regimen sliding scale   SubCutaneous three times a day before meals  insulin lispro Injectable (ADMELOG) 2 Unit(s) SubCutaneous three times a day before meals  levothyroxine 88 MICROGram(s) Oral daily  linezolid    Tablet 600 milliGRAM(s) Oral every 12 hours  meropenem  IVPB 500 milliGRAM(s) IV Intermittent every 12 hours  metoprolol tartrate 50 milliGRAM(s) Oral every 6 hours  nystatin    Suspension [id number] Unit(s) Oral four times a day  pantoprazole    Tablet 40 milliGRAM(s) Oral before breakfast  povidone iodine 10% Ointment 1 Application(s) Topical every 12 hours  senna 2 Tablet(s) Oral at bedtime  sodium bicarbonate 650 milliGRAM(s) Oral every 8 hours  sodium chloride 0.9%. 1000 milliLiter(s) IV Continuous <Continuous>

## 2021-04-22 NOTE — PROGRESS NOTE ADULT - SUBJECTIVE AND OBJECTIVE BOX
BS well-controlled-recommend discharge on current basal/bolus therapy (pt now prefers injections vs pump.)  Follow-up with her endocrinologist in 1-2 weeks.

## 2021-04-22 NOTE — CHART NOTE - NSCHARTNOTEFT_GEN_A_CORE
patient is congested, SOB , edematous . She denied any true reaction to sulfa drugs, she vomited when she had them before. She denies any rash, hypotension, shortness of breath, angioedema or anaphylaxis. Will give her lasix,

## 2021-04-23 NOTE — PROGRESS NOTE ADULT - TIME BILLING
Discussed on rounds with Housestaff
Discussed on rounds with Housestaff.

## 2021-04-23 NOTE — PROGRESS NOTE ADULT - SUBJECTIVE AND OBJECTIVE BOX
SOWMYA AMADO  76y  Female      Patient is a 76y old  Female who presents with a chief complaint of fever (22 Apr 2021 15:14)      INTERVAL HPI/OVERNIGHT EVENTS:      REVIEW OF SYSTEMS:  CONSTITUTIONAL: No fever, weight loss, or fatigue  EYES: No eye pain, visual disturbances, or discharge  ENMT:  No difficulty hearing, tinnitus, vertigo; No sinus or throat pain  NECK: No pain or stiffness  BREASTS: No pain, masses, or nipple discharge  RESPIRATORY: No cough, wheezing, chills or hemoptysis; No shortness of breath  CARDIOVASCULAR: No chest pain, palpitations, dizziness, or leg swelling  GASTROINTESTINAL: No abdominal or epigastric pain. No nausea, vomiting, or hematemesis; No diarrhea or constipation. No melena or hematochezia.  GENITOURINARY: No dysuria, frequency, hematuria, or incontinence  NEUROLOGICAL: No headaches, memory loss, loss of strength, numbness, or tremors  SKIN: No itching, burning, rashes, or lesions   LYMPH NODES: No enlarged glands  ENDOCRINE: No heat or cold intolerance; No hair loss  MUSCULOSKELETAL: No joint pain or swelling; No muscle, back, or extremity pain  PSYCHIATRIC: No depression, anxiety, mood swings, or difficulty sleeping  HEME/LYMPH: No easy bruising, or bleeding gums  ALLERY AND IMMUNOLOGIC: No hives or eczema  FAMILY HISTORY:  Family history of lung cancer (Mother)    Cancer (Father)  Myeloma      T(C): 35.8 (04-23-21 @ 00:00), Max: 37.7 (04-22-21 @ 07:48)  HR: 72 (04-23-21 @ 05:00) (72 - 105)  BP: 166/80 (04-23-21 @ 05:00) (134/74 - 166/80)  RR: 18 (04-23-21 @ 00:00) (18 - 19)  SpO2: --  Wt(kg): --Vital Signs Last 24 Hrs  T(C): 35.8 (23 Apr 2021 00:00), Max: 37.7 (22 Apr 2021 07:48)  T(F): 96.5 (23 Apr 2021 00:00), Max: 99.8 (22 Apr 2021 07:48)  HR: 72 (23 Apr 2021 05:00) (72 - 105)  BP: 166/80 (23 Apr 2021 05:00) (134/74 - 166/80)  BP(mean): --  RR: 18 (23 Apr 2021 00:00) (18 - 19)  SpO2: --  Gluten (Stomach Upset; Vomiting; Nausea; Diarrhea)  latex (Pruritus; Rash)  Pineapple (Unknown)  sulfonamides (Unknown)      PHYSICAL EXAM:  GENERAL: NAD, well-groomed, well-developed  HEAD:  Atraumatic, Normocephalic  EYES: EOMI, PERRLA, conjunctiva and sclera clear  ENMT: No tonsillar erythema, exudates, or enlargement; Moist mucous membranes, Good dentition, No lesions  NECK: Supple, No JVD, Normal thyroid  NERVOUS SYSTEM:  Alert & Oriented X3, Good concentration; Motor Strength 5/5 B/L upper and lower extremities; DTRs 2+ intact and symmetric  CHEST/LUNG: Clear to percussion bilaterally; No rales, rhonchi, wheezing, or rubs  HEART: Regular rate and rhythm; No murmurs, rubs, or gallops  ABDOMEN: Soft, Nontender, Nondistended; Bowel sounds present  EXTREMITIES:  2+ Peripheral Pulses, No clubbing, cyanosis, or edema  LYMPH: No lymphadenopathy noted  SKIN: No rashes or lesions    Consultant(s) Notes Reviewed:  [x ] YES  [ ] NO  Care Discussed with Consultants/Other Providers [ x] YES  [ ] NO    LABS:      RADIOLOGY & ADDITIONAL TESTS:    Imaging Personally Reviewed:  [ ] YES  [ ] NO  ALBUTerol    90 MICROgram(s) HFA Inhaler 1 Puff(s) Inhalation every 6 hours  aMIOdarone    Tablet 200 milliGRAM(s) Oral two times a day  apixaban 2.5 milliGRAM(s) Oral two times a day  atorvastatin 40 milliGRAM(s) Oral at bedtime  chlorhexidine 4% Liquid 1 Application(s) Topical <User Schedule>  clopidogrel Tablet 75 milliGRAM(s) Oral daily  dextrose 40% Gel 15 Gram(s) Oral once  dextrose 5%. 1000 milliLiter(s) IV Continuous <Continuous>  dextrose 50% Injectable 25 Gram(s) IV Push once  dextrose 50% Injectable 12.5 Gram(s) IV Push once  dextrose 50% Injectable 25 Gram(s) IV Push once  famotidine  Oral Tab/Cap - Peds 20 milliGRAM(s) Oral at bedtime  glucagon  Injectable 1 milliGRAM(s) IntraMuscular once  insulin glargine Injectable (LANTUS) 7 Unit(s) SubCutaneous at bedtime  insulin lispro (ADMELOG) corrective regimen sliding scale   SubCutaneous three times a day before meals  insulin lispro Injectable (ADMELOG) 2 Unit(s) SubCutaneous three times a day before meals  levothyroxine 88 MICROGram(s) Oral daily  linezolid    Tablet 600 milliGRAM(s) Oral every 12 hours  meropenem  IVPB 500 milliGRAM(s) IV Intermittent every 12 hours  metoprolol tartrate 50 milliGRAM(s) Oral every 6 hours  nystatin    Suspension 404555 Unit(s) Oral four times a day  pantoprazole    Tablet 40 milliGRAM(s) Oral before breakfast  povidone iodine 10% Ointment 1 Application(s) Topical every 12 hours  senna 2 Tablet(s) Oral at bedtime  sodium bicarbonate 650 milliGRAM(s) Oral every 8 hours  sodium chloride 0.9%. 1000 milliLiter(s) IV Continuous <Continuous>      HEALTH ISSUES - PROBLEM Dx:         SOWMYA AMADO  76y  Female      Patient is a 76y old  Female who presents with a chief complaint of fever (22 Apr 2021 15:14)      INTERVAL HPI/OVERNIGHT EVENTS: At 8 PM pt was SOB. OPT was assessed by the MD was found to have edema and crackles. Was given 1 dose of lasix 20mg IV with improvement of symptoms. Pt failed trial of void and a Springer was placed. Pt slept well overnight. Denies fevers, SOB, and chest pain.       REVIEW OF SYSTEMS:  none unless otherwise noted.     FAMILY HISTORY:  Family history of lung cancer (Mother)    Cancer (Father)  Myeloma      T(C): 35.8 (04-23-21 @ 00:00), Max: 37.7 (04-22-21 @ 07:48)  HR: 72 (04-23-21 @ 05:00) (72 - 105)  BP: 166/80 (04-23-21 @ 05:00) (134/74 - 166/80)  RR: 18 (04-23-21 @ 00:00) (18 - 19)  SpO2: --  Wt(kg): --Vital Signs Last 24 Hrs  T(C): 35.8 (23 Apr 2021 00:00), Max: 37.7 (22 Apr 2021 07:48)  T(F): 96.5 (23 Apr 2021 00:00), Max: 99.8 (22 Apr 2021 07:48)  HR: 72 (23 Apr 2021 05:00) (72 - 105)  BP: 166/80 (23 Apr 2021 05:00) (134/74 - 166/80)  BP(mean): --  RR: 18 (23 Apr 2021 00:00) (18 - 19)  SpO2: --  Gluten (Stomach Upset; Vomiting; Nausea; Diarrhea)  latex (Pruritus; Rash)  Pineapple (Unknown)  sulfonamides (Unknown)      PHYSICAL EXAM:  GEN: No acute distress  LUNGS: Decrease lung sounds at the bases  HEART: S1/S2 present. RRR.   ABD: Soft, non-tender, non-distended. Bowel sounds present  EXT: NC/NC/NE/2+PP/LAZARO/Skin Intact.   NEURO: AAOX3  : Springer in place draining clear fluid    Consultant(s) Notes Reviewed:  [x ] YES  [ ] NO  Care Discussed with Consultants/Other Providers [ x] YES  [ ] NO    LABS:                        8.5    7.08  )-----------( 265      ( 22 Apr 2021 04:30 )             25.8       04-22    133<L>  |  108  |  18  ----------------------------<  108<H>  4.2   |  17  |  1.3    Ca    7.2<L>      22 Apr 2021 04:30    TPro  4.5<L>  /  Alb  2.1<L>  /  TBili  0.2  /  DBili  x   /  AST  96<H>  /  ALT  70<H>  /  AlkPhos  232<H>  04-22            CAPILLARY BLOOD GLUCOSE      POCT Blood Glucose.: 60 mg/dL (23 Apr 2021 07:23)        RADIOLOGY & ADDITIONAL TESTS:  Chest x-ray examined. New bilateral opacities. R>L.     Imaging Personally Reviewed:  [x] YES  [ ] NO  ALBUTerol    90 MICROgram(s) HFA Inhaler 1 Puff(s) Inhalation every 6 hours  aMIOdarone    Tablet 200 milliGRAM(s) Oral two times a day  apixaban 2.5 milliGRAM(s) Oral two times a day  atorvastatin 40 milliGRAM(s) Oral at bedtime  chlorhexidine 4% Liquid 1 Application(s) Topical <User Schedule>  clopidogrel Tablet 75 milliGRAM(s) Oral daily  dextrose 40% Gel 15 Gram(s) Oral once  dextrose 5%. 1000 milliLiter(s) IV Continuous <Continuous>  dextrose 50% Injectable 25 Gram(s) IV Push once  dextrose 50% Injectable 12.5 Gram(s) IV Push once  dextrose 50% Injectable 25 Gram(s) IV Push once  famotidine  Oral Tab/Cap - Peds 20 milliGRAM(s) Oral at bedtime  glucagon  Injectable 1 milliGRAM(s) IntraMuscular once  insulin glargine Injectable (LANTUS) 7 Unit(s) SubCutaneous at bedtime  insulin lispro (ADMELOG) corrective regimen sliding scale   SubCutaneous three times a day before meals  insulin lispro Injectable (ADMELOG) 2 Unit(s) SubCutaneous three times a day before meals  levothyroxine 88 MICROGram(s) Oral daily  linezolid    Tablet 600 milliGRAM(s) Oral every 12 hours  meropenem  IVPB 500 milliGRAM(s) IV Intermittent every 12 hours  metoprolol tartrate 50 milliGRAM(s) Oral every 6 hours  nystatin    Suspension [id number] Unit(s) Oral four times a day  pantoprazole    Tablet 40 milliGRAM(s) Oral before breakfast  povidone iodine 10% Ointment 1 Application(s) Topical every 12 hours  senna 2 Tablet(s) Oral at bedtime  sodium bicarbonate 650 milliGRAM(s) Oral every 8 hours  sodium chloride 0.9%. 1000 milliLiter(s) IV Continuous <Continuous>         SOWMYA AMADO  76y  Female      Patient is a 76y old  Female who presents with a chief complaint of fever (22 Apr 2021 15:14)      INTERVAL HPI/OVERNIGHT EVENTS: At 8 PM pt was SOB. OPT was assessed by the MD was found to have edema and crackles. Was given 1 dose of lasix 20mg IV with improvement of symptoms. Pt failed trial of void and a Springer was placed. Pt slept well overnight. Denies fevers, SOB, and chest pain.       REVIEW OF SYSTEMS:  none unless otherwise noted.     FAMILY HISTORY:  Family history of lung cancer (Mother)    Cancer (Father)  Myeloma      T(C): 35.8 (04-23-21 @ 00:00), Max: 37.7 (04-22-21 @ 07:48)  HR: 72 (04-23-21 @ 05:00) (72 - 105)  BP: 166/80 (04-23-21 @ 05:00) (134/74 - 166/80)  RR: 18 (04-23-21 @ 00:00) (18 - 19)  SpO2: --  Wt(kg): --Vital Signs Last 24 Hrs  T(C): 35.8 (23 Apr 2021 00:00), Max: 37.7 (22 Apr 2021 07:48)  T(F): 96.5 (23 Apr 2021 00:00), Max: 99.8 (22 Apr 2021 07:48)  HR: 72 (23 Apr 2021 05:00) (72 - 105)  BP: 166/80 (23 Apr 2021 05:00) (134/74 - 166/80)  BP(mean): --  RR: 18 (23 Apr 2021 00:00) (18 - 19)  SpO2: --  Gluten (Stomach Upset; Vomiting; Nausea; Diarrhea)  latex (Pruritus; Rash)  Pineapple (Unknown)  sulfonamides (Unknown)      PHYSICAL EXAM:  GEN: No acute distress  LUNGS: Decrease lung sounds at the bases  HEART: S1/S2 present. RRR.   ABD: Soft, non-tender, non-distended. Bowel sounds present  EXT: 1+ pitting edema b/l  NEURO: AAOX3  : Springer in place draining clear fluid    Consultant(s) Notes Reviewed:  [x ] YES  [ ] NO  Care Discussed with Consultants/Other Providers [ x] YES  [ ] NO    LABS:                        8.5    7.08  )-----------( 265      ( 22 Apr 2021 04:30 )             25.8       04-22    133<L>  |  108  |  18  ----------------------------<  108<H>  4.2   |  17  |  1.3    Ca    7.2<L>      22 Apr 2021 04:30    TPro  4.5<L>  /  Alb  2.1<L>  /  TBili  0.2  /  DBili  x   /  AST  96<H>  /  ALT  70<H>  /  AlkPhos  232<H>  04-22            CAPILLARY BLOOD GLUCOSE      POCT Blood Glucose.: 60 mg/dL (23 Apr 2021 07:23)        RADIOLOGY & ADDITIONAL TESTS:  Chest x-ray examined. New bilateral opacities. R>L.     Imaging Personally Reviewed:  [x] YES  [ ] NO  ALBUTerol    90 MICROgram(s) HFA Inhaler 1 Puff(s) Inhalation every 6 hours  aMIOdarone    Tablet 200 milliGRAM(s) Oral two times a day  apixaban 2.5 milliGRAM(s) Oral two times a day  atorvastatin 40 milliGRAM(s) Oral at bedtime  chlorhexidine 4% Liquid 1 Application(s) Topical <User Schedule>  clopidogrel Tablet 75 milliGRAM(s) Oral daily  dextrose 40% Gel 15 Gram(s) Oral once  dextrose 5%. 1000 milliLiter(s) IV Continuous <Continuous>  dextrose 50% Injectable 25 Gram(s) IV Push once  dextrose 50% Injectable 12.5 Gram(s) IV Push once  dextrose 50% Injectable 25 Gram(s) IV Push once  famotidine  Oral Tab/Cap - Peds 20 milliGRAM(s) Oral at bedtime  glucagon  Injectable 1 milliGRAM(s) IntraMuscular once  insulin glargine Injectable (LANTUS) 7 Unit(s) SubCutaneous at bedtime  insulin lispro (ADMELOG) corrective regimen sliding scale   SubCutaneous three times a day before meals  insulin lispro Injectable (ADMELOG) 2 Unit(s) SubCutaneous three times a day before meals  levothyroxine 88 MICROGram(s) Oral daily  linezolid    Tablet 600 milliGRAM(s) Oral every 12 hours  meropenem  IVPB 500 milliGRAM(s) IV Intermittent every 12 hours  metoprolol tartrate 50 milliGRAM(s) Oral every 6 hours  nystatin    Suspension 410002 Unit(s) Oral four times a day  pantoprazole    Tablet 40 milliGRAM(s) Oral before breakfast  povidone iodine 10% Ointment 1 Application(s) Topical every 12 hours  senna 2 Tablet(s) Oral at bedtime  sodium bicarbonate 650 milliGRAM(s) Oral every 8 hours  sodium chloride 0.9%. 1000 milliLiter(s) IV Continuous <Continuous>

## 2021-04-23 NOTE — DIETITIAN INITIAL EVALUATION ADULT. - ADD RECOMMEND
Continue current diet order. Add glucerna q24hrs and prosource gelatin sugar-free q24hrs to optimize po intake

## 2021-04-23 NOTE — PROGRESS NOTE ADULT - ASSESSMENT
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 Pt was admitted to ICU for Sepsis, Pyelonephritis and DKA.    # SOB and leg edema - fluid overload vs. malnourishment  - Xray with b/l opacities  - Echo - EF 58%   - albumin 2.1  - received lasix last night with improved in symptoms  - f/u prealbumin     # Elevated transaminitis  - uptrending since admission  - f/u TRUQ u/s    # Sepsis secondary to recurrent  pyelonephritis -resolved  # Urinary retention with bilateral hydronephrosis  - Blood Cx NGTD   - Urine culture: ESBL E.coli and VRE   - US Renal w/ b/l hydronephrosis   - d/c meropenem   - appreciate ID recs, c/w Linezolid PO for VRE and ertapenem 1g IV q24 x 7 days via midline, Estrogen cream contraindicated in the setting of known H/O CAD and PVD   - Springer catheter was placed for retention   - Failed trial of void, Springer placed     # Diabetic ketoacidosis- resolved  # IDDM with episodes of hypoglycemia   - A1C /with Estimated Average Glucose Result: 8.1 % (03.15.21 @ 06:16)  - S/p insulin drip  - c/w lantus dose to 7 units HS and Lispro 2u tid per Endocrine   - FS 60 in the AM  - Endo following     # Acute kidney injury on CKD stage 3- improved   # Metabolic acidosis  # Chronic hyponatremia  # Hyperkalemia-resolved  - c/w Bicarb  - monitor BMP    # H/o chronic  afib-rate controlled  # H/o SSS S/p PPM  - c/w amiodarone, metoprolol  - c/w eliquis    # H/o CAD S/p PCI  # H/o PVD S/p stent  - c/w plavix, eliquis, statin, metoprolol    # Hypothyroidism  - c/w synthroid    # H/o Inclusion body myositis  - on Gamunex every 5 weeks  - Neuro recommended outpatient f/u no indication for infusion in the hospital     # Anemia- Hgb stable  - monitor HB/HCT    # Full code 77 yo Female with a PMHx of DM on insulin pump, HTN, CKD3, Afib s/p ablation on Eliquis, CAD s/p stenting in 2020, sick sinus syndrome s/p PPM, PVD s/p right peripheral leg stent, 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 Pt was admitted to ICU for Sepsis, Pyelonephritis and DKA.    # SOB and leg edema - fluid overload vs. malnourishment  - Xray with b/l opacities  - Echo - EF 58%   - albumin 2.1  - received lasix last night with improved in symptoms  - f/u prealbumin     # Elevated transaminitis  - uptrending since admission  - f/u RUQ u/s    # Sepsis secondary to recurrent  pyelonephritis -resolved  # Urinary retention with bilateral hydronephrosis  - Blood Cx NGTD   - Urine culture: ESBL E.coli and VRE   - US Renal w/ b/l hydronephrosis   - d/c meropenem   - appreciate ID recs, c/w Linezolid PO for VRE and ertapenem 1g IV q24 x 7 days via midline, Estrogen cream contraindicated in the setting of known H/O CAD and PVD   - Springer catheter was placed for retention   - Failed trial of void, Springer placed     # Diabetic ketoacidosis- resolved  # IDDM with episodes of hypoglycemia   - A1C /with Estimated Average Glucose Result: 8.1 % (03.15.21 @ 06:16)  - S/p insulin drip  - c/w lantus dose to 7 units HS and Lispro 2u tid per Endocrine   - FS 60 in the AM  - Endo following     # Acute kidney injury on CKD stage 3- improved   # Metabolic acidosis  # Chronic hyponatremia  # Hyperkalemia-resolved  - c/w Bicarb  - monitor BMP    # H/o chronic  afib-rate controlled  # H/o SSS S/p PPM  - c/w amiodarone, metoprolol  - c/w eliquis    # H/o CAD S/p PCI  # H/o PVD S/p stent  - c/w plavix, eliquis, statin, metoprolol    # Hypothyroidism  - c/w synthroid    # H/o Inclusion body myositis  - on Gamunex every 5 weeks  - Neuro recommended outpatient f/u no indication for infusion in the hospital     # Anemia- Hgb stable  - monitor HB/HCT    # Full code 77 yo Female with a PMHx of DM on insulin pump, HTN, CKD3, Afib s/p ablation on Eliquis, CAD s/p stenting in 2020, sick sinus syndrome s/p PPM, PVD s/p right peripheral leg stent, 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 Pt was admitted to ICU for Sepsis, Pyelonephritis and DKA.    # SOB and leg edema - fluid overload vs. malnourishment  - Xray with b/l opacities  - Echo - EF 58%   - Albumin 2.1  - Received lasix last night with improved in symptoms  - Started Lasix 20mg daily   - f/u prealbumin     # Elevated transaminitis  - uptrending since admission  - f/u RUQ u/s    # Sepsis secondary to recurrent  pyelonephritis -resolved  # Urinary retention with bilateral hydronephrosis  - Blood Cx NGTD   - Urine culture: ESBL E.coli and VRE   - US Renal w/ b/l hydronephrosis   - Meropenem today day 7/7  - C/w Linezolid PO day 3/7    - Estrogen cream contraindicated in the setting of known H/O CAD and PVD   - Springer catheter was placed for retention   - Failed trial of void, Springer placed     # Diabetic ketoacidosis- resolved  # IDDM with episodes of hypoglycemia   - A1C /with Estimated Average Glucose Result: 8.1 % (03.15.21 @ 06:16)  - S/p insulin drip  - c/w lantus dose to 7 units HS and Lispro 2u tid per Endocrine   - FS 60 in the AM  - Evening snack    # Acute kidney injury on CKD stage 3- improved   # Metabolic acidosis  # Chronic hyponatremia  # Hyperkalemia-resolved  - c/w Bicarb  - monitor BMP    # H/o chronic  afib-rate controlled  # H/o SSS S/p PPM  - c/w amiodarone, metoprolol  - c/w eliquis    # H/o CAD S/p PCI  # H/o PVD S/p stent  - c/w plavix, eliquis, statin, metoprolol    # Hypothyroidism  - c/w synthroid    # H/o Inclusion body myositis  - on Gamunex every 5 weeks  - Neuro recommended outpatient f/u no indication for infusion in the hospital     # Anemia- Hgb stable  - monitor HB/HCT    # Full code

## 2021-04-23 NOTE — PROGRESS NOTE ADULT - SUBJECTIVE AND OBJECTIVE BOX
Patient is a 76y old  Female who presents with a chief complaint of fever (19 Apr 2021 10:16)    Patient was seen and examined.  Pt was sob last evening , xray chest revealed fluid overload, pt received IV lasix.  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  Inclusion body myositis (IBM)    History of surgery  Right Peripheral Stent 5 yrs  Cardiac pacemaker 9/9/17  MOHS procedure (face) x 3  S/P cataract surgery  S/P ablation of atrial fibrillation    Allergies  latex (Pruritus; Rash)  sulfonamides (Unknown)    Intolerances  Gluten (Stomach Upset; Vomiting; Nausea; Diarrhea)  Pineapple (Unknown)    MEDICATIONS  (STANDING):  ALBUTerol    90 MICROgram(s) HFA Inhaler 1 Puff(s) Inhalation every 6 hours  aMIOdarone    Tablet 200 milliGRAM(s) Oral two times a day  apixaban 2.5 milliGRAM(s) Oral two times a day  atorvastatin 40 milliGRAM(s) Oral at bedtime  chlorhexidine 4% Liquid 1 Application(s) Topical <User Schedule>  clopidogrel Tablet 75 milliGRAM(s) Oral daily  famotidine  Oral Tab/Cap - Peds 20 milliGRAM(s) Oral at bedtime  furosemide   Injectable 20 milliGRAM(s) IV Push daily  glucagon  Injectable 1 milliGRAM(s) IntraMuscular once  insulin glargine Injectable (LANTUS) 7 Unit(s) SubCutaneous at bedtime  insulin lispro (ADMELOG) corrective regimen sliding scale   SubCutaneous three times a day before meals  insulin lispro Injectable (ADMELOG) 2 Unit(s) SubCutaneous three times a day before meals  levothyroxine 88 MICROGram(s) Oral daily  linezolid    Tablet 600 milliGRAM(s) Oral every 12 hours  meropenem  IVPB 500 milliGRAM(s) IV Intermittent every 12 hours  metoprolol tartrate 50 milliGRAM(s) Oral every 6 hours  nystatin    Suspension 970316 Unit(s) Oral four times a day  pantoprazole    Tablet 40 milliGRAM(s) Oral before breakfast  povidone iodine 10% Ointment 1 Application(s) Topical every 12 hours  senna 2 Tablet(s) Oral at bedtime  sodium bicarbonate 650 milliGRAM(s) Oral every 8 hours    MEDICATIONS  (PRN):    T(C): 35.6 (04-23-21 @ 08:30), Max: 35.9 (04-22-21 @ 16:00)  HR: 85 (04-23-21 @ 08:30) (72 - 85)  BP: 138/63 (04-23-21 @ 08:30) (134/74 - 166/80)  RR: 18 (04-23-21 @ 08:30) (18 - 19)  SpO2: --    O/E:  Awake, alert, not in distress.  HEENT: atraumatic, EOMI.  Chest: decreased breath sounds at bases  CVS: SIS2 +,irregular, systolic  murmur+  P/A: Soft, BS+  CNS: non focal.  Ext: no edema feet.  Skin: no rash, no ulcers.  All systems reviewed positive findings as above.                                               8.5<L>  7.08  )-----------( 265      ( 22 Apr 2021 04:30 )             25.8<L>  04-22    133<L>  |  108  |  18  ----------------------------<  108<H>  4.2   |  17  |  1.3    Ca    7.2<L>      22 Apr 2021 04:30    TPro  4.5<L>  /  Alb  2.1<L>  /  TBili  0.2  /  DBili  x   /  AST  96<H>  /  ALT  70<H>  /  AlkPhos  232<H>  04-22

## 2021-04-23 NOTE — DIETITIAN INITIAL EVALUATION ADULT. - OTHER INFO
75 yo F with PMH DM on insulin pump, HTN, CKD3, chronic anemia, hypothyroidism, presented for 1 day history of fever. pt admitted to ICU for sepsis, pyelonephritis (resolved) and DKA (resolved). Downgraded to medicine floor 4/18. Noted A1c 8.1% (3/15) Dispo planning to [facility].     Pt reports overall a good appetite and po intake. Usually lives at home and cooks for herself. Reports intake has been variable the past month since going to rehab depending on what food is offered to her. Takes MVI and vit C, occasionally drinks glucerna. Confirms pineapple allergy (tongue/lip itch). No Hindu or cultural food preferences. Denies difficulties chewing or swallowing.     In house appetite is variable, consuming >% of her meals. C/o nausea this morning, resolved on its own. C/o constipation, no BM for a few days.     UBW 130lbs, endorses recent wt loss within the past 5 weeks d/t recent hospitalization and rehab adm. Previous adm wt 144lbs (3/25) Noted wt 162.4kg (4/23). Took bed scale wt during assessment, 158lbs. ?accuracy of wt trends as noted pt with edema. Will continue to monitor.     Ht:  61"   Wt: 122.1lbs (4/19) vs 162.4lbs (4/23)    IBW:105lbs   +/-10%    BMI:  23.1 kg/m2      Skin:  IAD per RN flow sheets  Edema: +2 R+L leg edema noted per RN flow sheets

## 2021-04-23 NOTE — DIETITIAN INITIAL EVALUATION ADULT. - REASON
Nutrition focused physical exam performed with verbal consent from pt. Nutrition focused physical exam showed no signs of muscle wasting or fat loss. Mild muscle wasting of temporals likely d/t aging.

## 2021-04-23 NOTE — DIETITIAN INITIAL EVALUATION ADULT. - PERTINENT LABORATORY DATA
(4/22) H/H 8.5/25.8, Na 133, , eGFR 39  CAPILLARY BLOOD GLUCOSE  POCT Blood Glucose.: 124 mg/dL (23 Apr 2021 11:11)  POCT Blood Glucose.: 114 mg/dL (23 Apr 2021 09:29)  POCT Blood Glucose.: 60 mg/dL (23 Apr 2021 07:23)  POCT Blood Glucose.: 168 mg/dL (22 Apr 2021 20:47)  POCT Blood Glucose.: 101 mg/dL (22 Apr 2021 16:19)

## 2021-04-23 NOTE — PROGRESS NOTE ADULT - ASSESSMENT
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  Pt was admitted to ICU diagnosed  and treated in ICU for  Sepsis, Pyelonephritis and DKA. Pt was transferd to Medical floor on 4/18/21    # Acute on chr diastolic  CHF  -  Xray Chest 1 View- PORTABLE-Urgent (Xray Chest 1 View- PORTABLE-Urgent .) (04.23.21 @ 08:37) >New bilateral effusions and interstitial edema.  - TTE Echo Complete w/o Contrast w/ Doppler (03.18.21 @ 13:32) > EF of 58 %.Moderate tricuspid regurgitation.  - start lasix 20mg daily    # Transaminitis  -  US Abdomen Limited (04.23.21 @ 09:58) >Right-sided pleural effusion. No ductal dilatation or gallstones.  - hold statin   -  monitor LFT    # sepsis POA sec to recurrent  pyelonephritis -resolved  # Recurrent  Pyelonephritis  # urinary retention with left hydronephrosis  - blood Culture Results: No growth to date. (04.16.21 @ 21:23)  - urine culture: ESBL E.coli  -  US Renal (04.20.21 @ 11:12) >Urinary bladder volume is 1573 cc. Patient reported she urinated prior to test and had no urge to void. Urinary bladder debris is present. Moderate right hydronephrosis. Mild left hydronephrosis.  - c/w linezolid 600mg PO BID x 7 days  - c/w meropenem 1g IV q24 x 7 days via midline  -  topical vaginal estrogen cream if no contraindication to decrease recurrent UTI    - Failed TOV, c/w wiggins    # Diabetic ketoacidosis resolved  # IDDM with hypoglycemia-resolved  - A1C with Estimated Average Glucose Result: 8.1 % (03.15.21 @ 06:16)  - S/p insulin drip  -  c/w lantus, lispro  - evening/HS snacks  - evaluated by Endocrine.     # Acute kidney injury on CKD stage 3, prerenal ans sec to urinary retention-resolved  # Metabolic acidosis  # Hyponatremia  # Hyperkalemia-resolved  - c/w Bicarb  - monitor BMP    # H/o chronic  afib-rate controlled  # H/o SSS S/p PPM  - c/w amiodarone, metoprolol  - c/w eliquis    # H/o CAD S/p PCI  - c/w plavix, eliquis, metoprolol  - statin held    # H/o PVD S/p stent  - c/w plavix    # Hypothyroidism  - c/w synthroid    # H/o Inclusion body myositis  -  on Gamunex every 5 weeks  - Neuro recommended outpatient f/u no indication for infusion in the hospital     # Anemia-stable     # Full code    # Pending: repeat xray chest in AM  # Discussed plan of care with patient, and her daughter  # Disposition: Home when stable             75 yo Female with a PMHx of DM on insulin pump, HTN, CKD3, Afib s/p ablation on Eliquis, CAD s/p stenting in 2020, sick sinus syndrome s/p PPM, PVD s/p right peripheral leg stent, 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  Pt was admitted to ICU diagnosed  and treated in ICU for  Sepsis, Pyelonephritis and DKA. Pt was transferd to Medical floor on 4/18/21    # Acute on chr diastolic  CHF  -  Xray Chest 1 View- PORTABLE-Urgent (Xray Chest 1 View- PORTABLE-Urgent .) (04.23.21 @ 08:37) >New bilateral effusions and interstitial edema.  - TTE Echo Complete w/o Contrast w/ Doppler (03.18.21 @ 13:32) > EF of 58 %.Moderate tricuspid regurgitation.  - start lasix 20mg daily    # Transaminitis  -  US Abdomen Limited (04.23.21 @ 09:58) >Right-sided pleural effusion. No ductal dilatation or gallstones.  - hold statin   -  monitor LFT    # sepsis POA sec to recurrent  pyelonephritis -resolved  # Recurrent  Pyelonephritis  # urinary retention with left hydronephrosis  - blood Culture Results: No growth to date. (04.16.21 @ 21:23)  - urine culture: ESBL E.coli  -  US Renal (04.20.21 @ 11:12) >Urinary bladder volume is 1573 cc. Patient reported she urinated prior to test and had no urge to void. Urinary bladder debris is present. Moderate right hydronephrosis. Mild left hydronephrosis.  - c/w linezolid 600mg PO BID x 7 days  - c/w meropenem 1g IV q24 x 7 days via midline  -  topical vaginal estrogen cream if no contraindication to decrease recurrent UTI    - Failed TOV, c/w wiggnis    # Diabetic ketoacidosis resolved  # IDDM with hypoglycemia-resolved  - A1C with Estimated Average Glucose Result: 8.1 % (03.15.21 @ 06:16)  - S/p insulin drip  -  c/w lantus, lispro  - evening/HS snacks  - evaluated by Endocrine.     # Acute kidney injury on CKD stage 3, prerenal ans sec to urinary retention-resolved  # Metabolic acidosis  # Hyponatremia  # Hyperkalemia-resolved  - c/w Bicarb  - monitor BMP    # H/o chronic  afib-rate controlled  # H/o SSS S/p PPM  - c/w amiodarone, metoprolol  - c/w eliquis    # H/o CAD S/p PCI  - c/w plavix, eliquis, metoprolol  - statin held    # H/o PVD S/p stent  - c/w plavix    # Hypothyroidism  - c/w synthroid    # H/o Inclusion body myositis  -  on Gamunex every 5 weeks  - Neuro recommended outpatient f/u no indication for infusion in the hospital/rehab. will F/u once discharged from rehab    # Anemia-stable     # Full code    # Pending: repeat xray chest in AM  # Discussed plan of care with patient, and her daughter  # Disposition: Home when stable

## 2021-04-24 NOTE — PROVIDER CONTACT NOTE (OTHER) - SITUATION
Pt has no IV access at this time, pt had previous ultrasound IV that is no longer patent, pt needs access for IV fluids and IV abx, IV attempted twice by two RNs with no success, MD will use US
Pt c.o nausea. No antiemetics on order.
Pt failed TOV. Bladder scanner shows 258mL in bladder.

## 2021-04-24 NOTE — PROGRESS NOTE ADULT - SUBJECTIVE AND OBJECTIVE BOX
SOWMYA AMADO  76y, Female  Allergy: Gluten (Stomach Upset; Vomiting; Nausea; Diarrhea)  latex (Pruritus; Rash)  Pineapple (Unknown)  sulfonamides (Unknown)    Hospital Day: 7d    Patient seen and examined earlier today. Endorsing nausea, no other complaints.     PMH/PSH:  PAST MEDICAL & SURGICAL HISTORY:  Afib  on Eliquis, amio and toprol    Essential hypertension    Palpitations  occasionally; not for couple yrs    Pacemaker  Sept 2017    Diabetes  Insulin dependent - (has insulin Pump)    SSS (sick sinus syndrome)  S/p PPM    Hypothyroid    Anemia  Chronic    Skin cancer  Basal Cell Cancer face  (around nose) s/p MOHS procedure x 3    Seasonal allergies    PVD (peripheral vascular disease)  S/p Right Peripheral leg Stent    Dry eyes, bilateral    Transfusion history  S/p Hysterectomy    Lung nodules  tree in bud    Inclusion body myositis (IBM)    H/O total hysterectomy  25+ yrs ago    History of surgery  Right Peripheral Stent 5 yrs    Cardiac pacemaker  9/9/17    History of surgery  MOHS procedure (face) x 3    S/P cataract surgery    S/P ablation of atrial fibrillation        LAST 24-Hr EVENTS:    VITALS:  T(F): 96 (04-24-21 @ 07:57), Max: 96.7 (04-24-21 @ 00:00)  HR: 78 (04-24-21 @ 11:15)  BP: 100/57 (04-24-21 @ 11:15) (100/57 - 119/57)  RR: 18 (04-24-21 @ 07:57)  SpO2: --        TESTS & MEASUREMENTS:  Weight (Kg):       04-22-21 @ 07:01  -  04-23-21 @ 07:00  --------------------------------------------------------  IN: 0 mL / OUT: 4300 mL / NET: -4300 mL    04-23-21 @ 07:01  -  04-24-21 @ 07:00  --------------------------------------------------------  IN: 0 mL / OUT: 3400 mL / NET: -3400 mL    04-24-21 @ 07:01  -  04-24-21 @ 16:48  --------------------------------------------------------  IN: 0 mL / OUT: 600 mL / NET: -600 mL                            8.6    5.92  )-----------( 257      ( 24 Apr 2021 06:39 )             25.7       04-24    133<L>  |  102  |  19  ----------------------------<  135<H>  3.9   |  21  |  1.2    Ca    7.4<L>      24 Apr 2021 06:39  Mg     1.2     04-24    TPro  3.9<L>  /  Alb  1.8<L>  /  TBili  0.3  /  DBili  x   /  AST  163<H>  /  ALT  93<H>  /  AlkPhos  228<H>  04-24    LIVER FUNCTIONS - ( 24 Apr 2021 06:39 )  Alb: 1.8 g/dL / Pro: 3.9 g/dL / ALK PHOS: 228 U/L / ALT: 93 U/L / AST: 163 U/L / GGT: x                   Procalcitonin, Serum: 0.74 ng/mL (04-18-21 @ 06:57)          COVID-19 PCR: NotDetec (04-21-21 @ 18:25)      RADIOLOGY, ECG, & ADDITIONAL TESTS:      RECENT DIAGNOSTIC ORDERS:  Acute Hepatitis Panel: 11:00 (04-24-21 @ 09:03)      MEDICATIONS:  MEDICATIONS  (STANDING):  ALBUTerol    90 MICROgram(s) HFA Inhaler 1 Puff(s) Inhalation every 6 hours  aMIOdarone    Tablet 200 milliGRAM(s) Oral two times a day  apixaban 2.5 milliGRAM(s) Oral two times a day  chlorhexidine 4% Liquid 1 Application(s) Topical <User Schedule>  clopidogrel Tablet 75 milliGRAM(s) Oral daily  dextrose 40% Gel 15 Gram(s) Oral once  dextrose 5%. 1000 milliLiter(s) (100 mL/Hr) IV Continuous <Continuous>  dextrose 50% Injectable 25 Gram(s) IV Push once  dextrose 50% Injectable 12.5 Gram(s) IV Push once  dextrose 50% Injectable 25 Gram(s) IV Push once  famotidine  Oral Tab/Cap - Peds 20 milliGRAM(s) Oral at bedtime  furosemide   Injectable 20 milliGRAM(s) IV Push daily  glucagon  Injectable 1 milliGRAM(s) IntraMuscular once  insulin glargine Injectable (LANTUS) 6 Unit(s) SubCutaneous at bedtime  insulin lispro (ADMELOG) corrective regimen sliding scale   SubCutaneous three times a day before meals  insulin lispro Injectable (ADMELOG) 2 Unit(s) SubCutaneous three times a day before meals  levothyroxine 88 MICROGram(s) Oral daily  linezolid    Tablet 600 milliGRAM(s) Oral every 12 hours  meropenem  IVPB 500 milliGRAM(s) IV Intermittent every 12 hours  metoprolol tartrate 50 milliGRAM(s) Oral every 6 hours  nystatin    Suspension 875812 Unit(s) Oral four times a day  pantoprazole    Tablet 40 milliGRAM(s) Oral before breakfast  povidone iodine 10% Ointment 1 Application(s) Topical every 12 hours  senna 2 Tablet(s) Oral at bedtime  sodium bicarbonate 650 milliGRAM(s) Oral every 8 hours    MEDICATIONS  (PRN):      HOME MEDICATIONS:  albuterol 90 mcg/inh inhalation aerosol (03-24)  amiodarone 200 mg oral tablet (03-24)  atorvastatin 40 mg oral tablet (04-21)  Basaglar KwikPen 100 units/mL subcutaneous solution (04-21)  Gamunex 10% intravenous solution (04-02)  insulin lispro 100 units/mL injectable solution (04-21)  levothyroxine 88 mcg (0.088 mg) oral tablet (07-23)  linezolid 600 mg oral tablet (04-21)  metoprolol tartrate 25 mg oral tablet (03-24)  Multiple Vitamins oral tablet (07-23)  nystatin 100,000 units/mL oral suspension (04-17)  sodium bicarbonate 650 mg oral tablet (04-21)  Vitamin C 250 mg oral tablet (07-23)      PHYSICAL EXAM:  GENERAL: A&O x3,  in NAD  HNENT: EOMI, PERRLA    NECK: No LAD/swelling  CHEST/LUNG:  CTAB no wheezes/rales/ronchi  HEART: RRR, No murmurs  ABDOMEN: Soft, NT, ND,  Bowel sounds present  EXTREMITIES:  Warm well perfused, 1-2+ lower extremity edema in posterior shins

## 2021-04-24 NOTE — PROGRESS NOTE ADULT - SUBJECTIVE AND OBJECTIVE BOX
SOWMYA AMADO  76y  Female      Patient is a 76y old  Female who presents with a chief complaint of fever (23 Apr 2021 15:41)      INTERVAL HPI/OVERNIGHT EVENTS:      REVIEW OF SYSTEMS:  CONSTITUTIONAL: No fever, weight loss, or fatigue  EYES: No eye pain, visual disturbances, or discharge  ENMT:  No difficulty hearing, tinnitus, vertigo; No sinus or throat pain  NECK: No pain or stiffness  BREASTS: No pain, masses, or nipple discharge  RESPIRATORY: No cough, wheezing, chills or hemoptysis; No shortness of breath  CARDIOVASCULAR: No chest pain, palpitations, dizziness, or leg swelling  GASTROINTESTINAL: No abdominal or epigastric pain. No nausea, vomiting, or hematemesis; No diarrhea or constipation. No melena or hematochezia.  GENITOURINARY: No dysuria, frequency, hematuria, or incontinence  NEUROLOGICAL: No headaches, memory loss, loss of strength, numbness, or tremors  SKIN: No itching, burning, rashes, or lesions   LYMPH NODES: No enlarged glands  ENDOCRINE: No heat or cold intolerance; No hair loss  MUSCULOSKELETAL: No joint pain or swelling; No muscle, back, or extremity pain  PSYCHIATRIC: No depression, anxiety, mood swings, or difficulty sleeping  HEME/LYMPH: No easy bruising, or bleeding gums  ALLERY AND IMMUNOLOGIC: No hives or eczema  FAMILY HISTORY:  Family history of lung cancer (Mother)    Cancer (Father)  Myeloma      T(C): 35.9 (04-24-21 @ 00:00), Max: 35.9 (04-23-21 @ 16:00)  HR: 86 (04-24-21 @ 00:00) (72 - 86)  BP: 104/55 (04-24-21 @ 00:00) (104/55 - 138/63)  RR: 18 (04-24-21 @ 00:00) (18 - 18)  SpO2: --  Wt(kg): --Vital Signs Last 24 Hrs  T(C): 35.9 (24 Apr 2021 00:00), Max: 35.9 (23 Apr 2021 16:00)  T(F): 96.7 (24 Apr 2021 00:00), Max: 96.7 (24 Apr 2021 00:00)  HR: 86 (24 Apr 2021 00:00) (72 - 86)  BP: 104/55 (24 Apr 2021 00:00) (104/55 - 138/63)  BP(mean): --  RR: 18 (24 Apr 2021 00:00) (18 - 18)  SpO2: --  Gluten (Stomach Upset; Vomiting; Nausea; Diarrhea)  latex (Pruritus; Rash)  Pineapple (Unknown)  sulfonamides (Unknown)      PHYSICAL EXAM:  GENERAL: NAD, well-groomed, well-developed  HEAD:  Atraumatic, Normocephalic  EYES: EOMI, PERRLA, conjunctiva and sclera clear  ENMT: No tonsillar erythema, exudates, or enlargement; Moist mucous membranes, Good dentition, No lesions  NECK: Supple, No JVD, Normal thyroid  NERVOUS SYSTEM:  Alert & Oriented X3, Good concentration; Motor Strength 5/5 B/L upper and lower extremities; DTRs 2+ intact and symmetric  CHEST/LUNG: Clear to percussion bilaterally; No rales, rhonchi, wheezing, or rubs  HEART: Regular rate and rhythm; No murmurs, rubs, or gallops  ABDOMEN: Soft, Nontender, Nondistended; Bowel sounds present  EXTREMITIES:  2+ Peripheral Pulses, No clubbing, cyanosis, or edema  LYMPH: No lymphadenopathy noted  SKIN: No rashes or lesions    Consultant(s) Notes Reviewed:  [x ] YES  [ ] NO  Care Discussed with Consultants/Other Providers [ x] YES  [ ] NO    LABS:      RADIOLOGY & ADDITIONAL TESTS:    Imaging Personally Reviewed:  [ ] YES  [ ] NO  ALBUTerol    90 MICROgram(s) HFA Inhaler 1 Puff(s) Inhalation every 6 hours  aMIOdarone    Tablet 200 milliGRAM(s) Oral two times a day  apixaban 2.5 milliGRAM(s) Oral two times a day  chlorhexidine 4% Liquid 1 Application(s) Topical <User Schedule>  clopidogrel Tablet 75 milliGRAM(s) Oral daily  dextrose 40% Gel 15 Gram(s) Oral once  dextrose 5%. 1000 milliLiter(s) IV Continuous <Continuous>  dextrose 50% Injectable 25 Gram(s) IV Push once  dextrose 50% Injectable 12.5 Gram(s) IV Push once  dextrose 50% Injectable 25 Gram(s) IV Push once  famotidine  Oral Tab/Cap - Peds 20 milliGRAM(s) Oral at bedtime  furosemide   Injectable 20 milliGRAM(s) IV Push daily  glucagon  Injectable 1 milliGRAM(s) IntraMuscular once  insulin glargine Injectable (LANTUS) 6 Unit(s) SubCutaneous at bedtime  insulin lispro (ADMELOG) corrective regimen sliding scale   SubCutaneous three times a day before meals  insulin lispro Injectable (ADMELOG) 2 Unit(s) SubCutaneous three times a day before meals  levothyroxine 88 MICROGram(s) Oral daily  linezolid    Tablet 600 milliGRAM(s) Oral every 12 hours  meropenem  IVPB 500 milliGRAM(s) IV Intermittent every 12 hours  metoprolol tartrate 50 milliGRAM(s) Oral every 6 hours  nystatin    Suspension [id number] Unit(s) Oral four times a day  pantoprazole    Tablet 40 milliGRAM(s) Oral before breakfast  povidone iodine 10% Ointment 1 Application(s) Topical every 12 hours  senna 2 Tablet(s) Oral at bedtime  sodium bicarbonate 650 milliGRAM(s) Oral every 8 hours      HEALTH ISSUES - PROBLEM Dx:         SOWMYA AMADO  76y  Female      Patient is a 76y old  Female who presents with a chief complaint of fever (23 Apr 2021 15:41)      INTERVAL HPI/OVERNIGHT EVENTS: No acute overnight events. Pt denies SOB, wheezing and CP. Eating comfortably in bed.       REVIEW OF SYSTEMS:  none unless otherwise noted.     FAMILY HISTORY:  Family history of lung cancer (Mother)    Cancer (Father)  Myeloma      T(C): 35.9 (04-24-21 @ 00:00), Max: 35.9 (04-23-21 @ 16:00)  HR: 86 (04-24-21 @ 00:00) (72 - 86)  BP: 104/55 (04-24-21 @ 00:00) (104/55 - 138/63)  RR: 18 (04-24-21 @ 00:00) (18 - 18)  SpO2: --  Wt(kg): --Vital Signs Last 24 Hrs  T(C): 35.9 (24 Apr 2021 00:00), Max: 35.9 (23 Apr 2021 16:00)  T(F): 96.7 (24 Apr 2021 00:00), Max: 96.7 (24 Apr 2021 00:00)  HR: 86 (24 Apr 2021 00:00) (72 - 86)  BP: 104/55 (24 Apr 2021 00:00) (104/55 - 138/63)  BP(mean): --  RR: 18 (24 Apr 2021 00:00) (18 - 18)  SpO2: --  Gluten (Stomach Upset; Vomiting; Nausea; Diarrhea)  latex (Pruritus; Rash)  Pineapple (Unknown)  sulfonamides (Unknown)      PHYSICAL EXAM:  GEN: No acute distress  LUNGS: Decrease lung sounds at the bases  HEART: S1/S2 present. RRR.   ABD: Soft, non-tender, non-distended. Bowel sounds present  EXT: No edema, cyanosis, w  NEURO: AAOX3  : Springer in place draining clear fluid    Consultant(s) Notes Reviewed:  [x ] YES  [ ] NO  Care Discussed with Consultants/Other Providers [ x] YES  [ ] NO    LABS:                        8.6    5.92  )-----------( 257      ( 24 Apr 2021 06:39 )             25.7       04-24    133<L>  |  102  |  19  ----------------------------<  135<H>  3.9   |  21  |  1.2    Ca    7.4<L>      24 Apr 2021 06:39  Mg     1.2     04-24    TPro  3.9<L>  /  Alb  1.8<L>  /  TBili  0.3  /  DBili  x   /  AST  163<H>  /  ALT  93<H>  /  AlkPhos  228<H>  04-24          CAPILLARY BLOOD GLUCOSE      POCT Blood Glucose.: 113 mg/dL (24 Apr 2021 07:14)        RADIOLOGY & ADDITIONAL TESTS:  < from: US Abdomen Limited (04.23.21 @ 09:58) >    EXAM:  US ABDOMEN LIMITED            PROCEDURE DATE:  04/23/2021          INTERPRETATION:  CLINICAL INFORMATION: Elevated liver function enzymes.    COMPARISON: CT scan of the abdomen and pelvis dated March 24, 2021    TECHNIQUE: Sonography of the right upper quadrant.    FINDINGS:    Liver: Within normal limits.  Bile ducts: Normal caliber. Common bile duct measures 7 mm.  Gallbladder: Gallbladder is contracted.  Pancreas: Visualized portions are within normal limits.  Right kidney: 8.9 cm. No hydronephrosis.  Ascites: None. There is however a right-sided pleural effusion.  IVC: Visualized portions are within normal limits.    IMPRESSION:    Right-sided pleural effusion.    No ductal dilatation or gallstones.          CHRIS AVILA MD; Attending Interventional Radiologist  This document has been electronically signed. Apr 23 2021 10:44AM    < end of copied text >      Imaging Personally Reviewed:  [x] YES  [ ] NO  ALBUTerol    90 MICROgram(s) HFA Inhaler 1 Puff(s) Inhalation every 6 hours  aMIOdarone    Tablet 200 milliGRAM(s) Oral two times a day  apixaban 2.5 milliGRAM(s) Oral two times a day  chlorhexidine 4% Liquid 1 Application(s) Topical <User Schedule>  clopidogrel Tablet 75 milliGRAM(s) Oral daily  dextrose 40% Gel 15 Gram(s) Oral once  dextrose 5%. 1000 milliLiter(s) IV Continuous <Continuous>  dextrose 50% Injectable 25 Gram(s) IV Push once  dextrose 50% Injectable 12.5 Gram(s) IV Push once  dextrose 50% Injectable 25 Gram(s) IV Push once  famotidine  Oral Tab/Cap - Peds 20 milliGRAM(s) Oral at bedtime  furosemide   Injectable 20 milliGRAM(s) IV Push daily  glucagon  Injectable 1 milliGRAM(s) IntraMuscular once  insulin glargine Injectable (LANTUS) 6 Unit(s) SubCutaneous at bedtime  insulin lispro (ADMELOG) corrective regimen sliding scale   SubCutaneous three times a day before meals  insulin lispro Injectable (ADMELOG) 2 Unit(s) SubCutaneous three times a day before meals  levothyroxine 88 MICROGram(s) Oral daily  linezolid    Tablet 600 milliGRAM(s) Oral every 12 hours  meropenem  IVPB 500 milliGRAM(s) IV Intermittent every 12 hours  metoprolol tartrate 50 milliGRAM(s) Oral every 6 hours  nystatin    Suspension [id number] Unit(s) Oral four times a day  pantoprazole    Tablet 40 milliGRAM(s) Oral before breakfast  povidone iodine 10% Ointment 1 Application(s) Topical every 12 hours  senna 2 Tablet(s) Oral at bedtime  sodium bicarbonate 650 milliGRAM(s) Oral every 8 hours

## 2021-04-24 NOTE — PROGRESS NOTE ADULT - ASSESSMENT
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 Pt was admitted to ICU for Sepsis, Pyelonephritis and DKA.    # SOB and leg edema - fluid overload vs. malnourishment  - Xray with b/l opacities  - Echo - EF 58%   - Albumin 2.1  - Received lasix last night with improved in symptoms  - Started Lasix 20mg daily   - prealbumin 5    # Elevated transaminitis  - uptrending since admission  - RUQ u/s: No ductal dilatation or gallstones. R plural effusion.   - Hep panel   - GI Consult       # Sepsis secondary to recurrent  pyelonephritis -resolved  # Urinary retention with bilateral hydronephrosis  - Blood Cx NGTD   - Urine culture: ESBL E.coli and VRE   - US Renal w/ b/l hydronephrosis   - Meropenem finsihed today  - C/w Linezolid PO day 4/7    - Estrogen cream contraindicated in the setting of known H/O CAD and PVD   - Springer catheter was placed for retention   - Failed trial of void, Springer placed     # Diabetic ketoacidosis- resolved  # IDDM with episodes of hypoglycemia   - A1C /with Estimated Average Glucose Result: 8.1 % (03.15.21 @ 06:16)  - S/p insulin drip  - decrease lantus dose to 6 units HS and Lispro 2u tid per Endocrine   - Monitor FS      # Acute kidney injury on CKD stage 3- improved   # Metabolic acidosis  # Chronic hyponatremia  # Hyperkalemia-resolved  - c/w Bicarb  - monitor BMP    # H/o chronic  afib-rate controlled  # H/o SSS S/p PPM  - c/w amiodarone, metoprolol  - c/w eliquis    # H/o CAD S/p PCI  # H/o PVD S/p stent  - c/w plavix, eliquis, statin, metoprolol    # Hypothyroidism  - c/w synthroid    # H/o Inclusion body myositis  - on Gamunex every 5 weeks  - Neuro recommended outpatient f/u no indication for infusion in the hospital     # Anemia- Hgb stable  - monitor HB/HCT    # Full code

## 2021-04-24 NOTE — PROGRESS NOTE ADULT - ASSESSMENT
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  Pt was admitted to ICU for Sepsis, Pyelonephritis and DKA    # Sepsis POA 2/2 recurrent  pyelonephritis -resolved  # Urinary retention with bilateral hydronephrosis  - Blood Cx NGTD   - Urine culture: ESBLE.coli and VRE   - US Renal w/ b/l hydronephrosis   - c/w meropenem and linezolid   - ID following   - s/p wiggins catheter    # Diabetic ketoacidosis- resolved  # IDDM with hypoglycemia  - A1C with Estimated Average Glucose Result: 8.1 % (03.15.21 @ 06:16)  - S/p insulin drip  - decrease lantus dose to 7 units HS and Lispro 2u tid per Endocrine      # Acute kidney injury on CKD stage 3- improved   # Metabolic acidosis  # Hyponatremia  # Hyperkalemia-resolved  - c/w Bicarb  - monitor BMP    # HFpEF Acute Exacerbation   - CXR w/ bl effusions and interstitial edema  - Echo - EF 58%   - Started Lasix 20mg IV daily     # Elevated transaminitis  - uptrending since admission  - RUQ u/s: No ductal dilatation or gallstones.   - Hep panel   - GI Consult     # H/o chronic  afib-rate controlled  # H/o SSS S/p PPM  - c/w amiodarone, metoprolol  - c/w eliquis    # H/o CAD S/p PCI  # H/o PVD S/p stent  - c/w plavix, eliquis, statin, metoprolol    # Hypothyroidism  - c/w synthroid    # H/o Inclusion body myositis  -  on Gamunex every 5 weeks    # Anemia  - monitor HB/HC    #Progress Note Handoff:  Pending (specify): pending improvement in heart failure, switch to PO lasix tomorrow   Disposition: Home___/SNF___/Other________/Unknown at this time____x____      Mari Caceres, DO

## 2021-04-24 NOTE — PROVIDER CONTACT NOTE (OTHER) - ACTION/TREATMENT ORDERED:
MD to evaluate.
Ok, I will place the order.
said will put in order
Md said to just give routine dose of metoprolol.

## 2021-04-25 NOTE — CONSULT NOTE ADULT - ASSESSMENT
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  Admitted with sepsis sec to pyelonephritis received merropenem and Linezolid. No abd pain, nausea, vomiting , prior h/o liver disease , hepatitis.     GI consulted for Elevated Liver enzymes .   Her liver enzymes were altered for past one month and trending up recently with currently 2-3 times ULN value , mixed pattern, normal T.kimberly   RUQ sono - normal liver, no gallstones , no ductal dilation   Hep c Ab positive - no prior h/o hep c treatment   Medications - Amiodarone, linezolid, Gamunex   REC:  Check Hep C RNA PCR   Check CLD work up `GARFIELD, ASMA, AMA, anti LKM-1, soluble liver antigen, Ig G ,  EBV PCR, CMV PCR, HSV PCR, immunoglobulin, ceruloplasmin, alpha-1 antitrypsin, iron panel (iron, TIBC, ferritin), celiac panel  Check hepatic and portal vessels duplex  monitor LFT and INR for now

## 2021-04-25 NOTE — CONSULT NOTE ADULT - TIME BILLING
coordination of care
I have personally seen and examined this patient.  I have reviewed all pertinent clinical information and reviewed all relevant imaging and diagnostic studies personally.   I counseled the patient about diagnostic testing and treatment plan. All questions were answered.  I discussed recommendations with the primary team.

## 2021-04-25 NOTE — CONSULT NOTE ADULT - CONSULT REASON
pt arrived from ED, no CBC drawn post 1unit PRBC; clarification on Keppra orders; Heparin and Protonix not hung at time ordered.; pt BP elevated upon arrival
LFT elevation
DM on insulin pump, with DKA
coli ESBL and VRE in urine

## 2021-04-25 NOTE — CONSULT NOTE ADULT - ATTENDING COMMENTS
as above
I edited the note
I have personally seen and examined this patient.  I have fully participated in the care of this patient.  I have reviewed all pertinent clinical information, including history, physical exam, plan and note.  I have reviewed all pertinent clinical information and reviewed all relevant imaging and diagnostic studies personally.  Corrections and edits were made wherever needed.       #Pyelonephritis  UCX ecoli esbl & VRE  < from: US Renal (04.20.21 @ 11:12) >  Urinary bladder volume is 1573 cc. Patient reported she urinated prior to test and had no urge to void. Urinary bladder debris is present.  Moderate right hydronephrosis. Mild left hydronephrosis.  #Hyponatremia  #Transaminitis    - Plan as per above    Please call with any questions or send a message on [employer] Teams  Spectra [phone]

## 2021-04-25 NOTE — CONSULT NOTE ADULT - SUBJECTIVE AND OBJECTIVE BOX
GI HPI:  Patient is a 76y old  Female who presents with a chief complaint of fever (24 Apr 2021 16:47)  . 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    patient reported having fever of 102F 1 day prior to presentation, associated with mild left flank pain . Also endorses dribbling of urine since last admission and unchanged chronic non productive cough . Denies any frequency, urgency, dysuria, nausea, vomiting or diarrhea,  abdominal pain, palpitation, sob, cp, no other complaints.      Of note patient was recently admitted to ICU in March, found to have Pyelonephritis ( ESBL) with DKA and TEOFILO, treated with IV Abx and insulin drip .     In the ED Vital Signs T 99, , /56, Spo2 100 on RA.  s/p 2 liters of LR and meropenem   Labs were significant for WBC 4.7k, corrected sodium 126, glucose 527, Cr 2.1, K 5.7 and Beta hydroxy: 2.8  UA: ++  CXR: WNL   called to evaluate   (17 Apr 2021 05:50)      PAST MEDICAL & SURGICAL HISTORY  Afib  on Eliquis, amio and toprol    Essential hypertension    Palpitations  occasionally; not for couple yrs    Pacemaker  Sept 2017    Diabetes  Insulin dependent - (has insulin Pump)    SSS (sick sinus syndrome)  S/p PPM    Hypothyroid    Anemia  Chronic    Skin cancer  Basal Cell Cancer face  (around nose) s/p MOHS procedure x 3    Seasonal allergies    PVD (peripheral vascular disease)  S/p Right Peripheral leg Stent    Dry eyes, bilateral    Transfusion history  S/p Hysterectomy    Lung nodules  tree in bud    Inclusion body myositis (IBM)    H/O total hysterectomy  25+ yrs ago    History of surgery  Right Peripheral Stent 5 yrs    Cardiac pacemaker  9/9/17    History of surgery  MOHS procedure (face) x 3    S/P cataract surgery    S/P ablation of atrial fibrillation        FAMILY HISTORY:  FAMILY HISTORY:  Family history of lung cancer (Mother)    Cancer (Father)  Myeloma        SOCIAL HISTORY:  smoker:   Alcohol:  Drug:    ALLERGIES:  latex (Pruritus; Rash)  sulfonamides (Unknown)      MEDICATIONS:  MEDICATIONS  (STANDING):  ALBUTerol    90 MICROgram(s) HFA Inhaler 1 Puff(s) Inhalation every 6 hours  aMIOdarone    Tablet 200 milliGRAM(s) Oral two times a day  apixaban 2.5 milliGRAM(s) Oral two times a day  chlorhexidine 4% Liquid 1 Application(s) Topical <User Schedule>  clopidogrel Tablet 75 milliGRAM(s) Oral daily  dextrose 40% Gel 15 Gram(s) Oral once  dextrose 5%. 1000 milliLiter(s) (100 mL/Hr) IV Continuous <Continuous>  dextrose 50% Injectable 25 Gram(s) IV Push once  dextrose 50% Injectable 12.5 Gram(s) IV Push once  dextrose 50% Injectable 25 Gram(s) IV Push once  famotidine  Oral Tab/Cap - Peds 20 milliGRAM(s) Oral at bedtime  glucagon  Injectable 1 milliGRAM(s) IntraMuscular once  insulin glargine Injectable (LANTUS) 6 Unit(s) SubCutaneous at bedtime  insulin lispro (ADMELOG) corrective regimen sliding scale   SubCutaneous three times a day before meals  insulin lispro Injectable (ADMELOG) 2 Unit(s) SubCutaneous three times a day before meals  levothyroxine 88 MICROGram(s) Oral daily  linezolid    Tablet 600 milliGRAM(s) Oral every 12 hours  meropenem  IVPB 500 milliGRAM(s) IV Intermittent every 12 hours  metoprolol tartrate 50 milliGRAM(s) Oral every 6 hours  nystatin    Suspension 119367 Unit(s) Oral four times a day  pantoprazole    Tablet 40 milliGRAM(s) Oral before breakfast  povidone iodine 10% Ointment 1 Application(s) Topical every 12 hours  senna 2 Tablet(s) Oral at bedtime  sodium bicarbonate 650 milliGRAM(s) Oral every 8 hours    MEDICATIONS  (PRN):      HOME MEDICATIONS:  Home Medications:  albuterol 90 mcg/inh inhalation aerosol: 1 puff(s) inhaled every 6 hours (24 Mar 2021 11:25)  amiodarone 200 mg oral tablet: 1 tab(s) orally 2 times a day (24 Mar 2021 10:48)  atorvastatin 40 mg oral tablet: 1 tab(s) orally once a day (at bedtime) (21 Apr 2021 15:18)  Basaglar KwikPen 100 units/mL subcutaneous solution: 7 unit(s) subcutaneous once a day (at bedtime) (21 Apr 2021 15:18)  Gamunex 10% intravenous solution: every 5 weeks; Contiune to hold. Needs to start after follow up with neurologist (02 Apr 2021 17:47)  insulin lispro 100 units/mL injectable solution: 2 unit(s) subcutaneous 3 times a day (with meals) (21 Apr 2021 15:18)  levothyroxine 88 mcg (0.088 mg) oral tablet: 1 tab(s) orally once a day (23 Jul 2020 08:25)  linezolid 600 mg oral tablet: 1 tab(s) orally every 12 hours (21 Apr 2021 15:18)  metoprolol tartrate 25 mg oral tablet: 1 tab(s) orally every 6 hours (24 Mar 2021 10:48)  Multiple Vitamins oral tablet: 1 tab(s) orally once a day (23 Jul 2020 08:25)  nystatin 100,000 units/mL oral suspension: 5 milliliter(s) orally 4 times a day (17 Apr 2021 08:04)  sodium bicarbonate 650 mg oral tablet: 1 tab(s) orally every 8 hours (21 Apr 2021 15:18)  Vitamin C 250 mg oral tablet: 1 tab(s) orally 2 times a day (23 Jul 2020 08:25)      ROS:     REVIEW OF SYSTEMS  General:  No fevers  Eyes:  No reported pain   ENT:  No sore throat   NECK: No stiffness   CV:  No chest pain   Resp:  No shortness of breath  GI:  See HPI  :  No dysuria  Muscle:  No weakness  Neuro:  No tingling  Endocrine:  No polyuria  Heme:  No ecchymosis          VITALS:   T(F): 96.6 (04-25 @ 05:42), Max: 99.8 (04-22 @ 07:48)  HR: 71 (04-25 @ 05:42) (70 - 105)  BP: 118/56 (04-25 @ 05:42) (100/57 - 166/80)  BP(mean): --  RR: 18 (04-25 @ 05:42) (18 - 19)  SpO2: --    I&O's Summary    24 Apr 2021 07:01  -  25 Apr 2021 07:00  --------------------------------------------------------  IN: 0 mL / OUT: 1400 mL / NET: -1400 mL        PHYSICAL EXAM:  EYES: No scleral icterus   LUNG: Clear to auscultation bilaterally; No rales, rhonchi, wheezing, or rubs  HEART: RRR; No murmurs  ABDOMEN: Soft, +BS, Abdominal Tenderness, No guarding, No Bautista Sign   Rectal Exam:     LABS:                        8.6    5.92  )-----------( 257      ( 24 Apr 2021 06:39 )             25.7       LIVER FUNCTIONS - ( 24 Apr 2021 06:39 )  Alb: 1.8 g/dL / Pro: 3.9 g/dL / ALK PHOS: 228 U/L / ALT: 93 U/L / AST: 163 U/L / GGT: x           04-24    133<L>  |  102  |  19  ----------------------------<  135<H>  3.9   |  21  |  1.2    Ca    7.4<L>      24 Apr 2021 06:39  Mg     1.2     04-24              Previous EGD:    Previous colonoscopy:       RADIOLOGY:        GI HPI:  Patient is a 76y old  Female who presents with a chief complaint of fever (24 Apr 2021 16:47)    75 yo Female with a PMHx of DM on insulin pump, HTN, CKD3, Afib s/p ablation on Eliquis, CAD s/p stenting in 2020, sick sinus syndrome s/p PPM, PVD s/p right peripheral leg stent, 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  Admitted with sepsis sec to pyelonephritis received merropenem and Linezolid    GI consulted for elevated Liver enzymes . No abd pain, nausea, vomiting , prior h/o liver disease , hepatitis.       PAST MEDICAL & SURGICAL HISTORY  Afib  on Eliquis, amio and toprol    Essential hypertension    Palpitations  occasionally; not for couple yrs    Pacemaker  Sept 2017    Diabetes  Insulin dependent - (has insulin Pump)    SSS (sick sinus syndrome)  S/p PPM    Hypothyroid    Anemia  Chronic    Skin cancer  Basal Cell Cancer face  (around nose) s/p MOHS procedure x 3    Seasonal allergies    PVD (peripheral vascular disease)  S/p Right Peripheral leg Stent    Dry eyes, bilateral    Transfusion history  S/p Hysterectomy    Lung nodules  tree in bud    Inclusion body myositis (IBM)    H/O total hysterectomy  25+ yrs ago    History of surgery  Right Peripheral Stent 5 yrs    Cardiac pacemaker  9/9/17    History of surgery  MOHS procedure (face) x 3    S/P cataract surgery    S/P ablation of atrial fibrillation        FAMILY HISTORY:  FAMILY HISTORY:  Family history of lung cancer (Mother)    Cancer (Father)  Myeloma        SOCIAL HISTORY:  smoker: not smoker   Alcohol: no alcohol use   Drug: no drug use     ALLERGIES:  latex (Pruritus; Rash)  sulfonamides (Unknown)      MEDICATIONS:  MEDICATIONS  (STANDING):  ALBUTerol    90 MICROgram(s) HFA Inhaler 1 Puff(s) Inhalation every 6 hours  aMIOdarone    Tablet 200 milliGRAM(s) Oral two times a day  apixaban 2.5 milliGRAM(s) Oral two times a day  chlorhexidine 4% Liquid 1 Application(s) Topical <User Schedule>  clopidogrel Tablet 75 milliGRAM(s) Oral daily  dextrose 40% Gel 15 Gram(s) Oral once  dextrose 5%. 1000 milliLiter(s) (100 mL/Hr) IV Continuous <Continuous>  dextrose 50% Injectable 25 Gram(s) IV Push once  dextrose 50% Injectable 12.5 Gram(s) IV Push once  dextrose 50% Injectable 25 Gram(s) IV Push once  famotidine  Oral Tab/Cap - Peds 20 milliGRAM(s) Oral at bedtime  glucagon  Injectable 1 milliGRAM(s) IntraMuscular once  insulin glargine Injectable (LANTUS) 6 Unit(s) SubCutaneous at bedtime  insulin lispro (ADMELOG) corrective regimen sliding scale   SubCutaneous three times a day before meals  insulin lispro Injectable (ADMELOG) 2 Unit(s) SubCutaneous three times a day before meals  levothyroxine 88 MICROGram(s) Oral daily  linezolid    Tablet 600 milliGRAM(s) Oral every 12 hours  meropenem  IVPB 500 milliGRAM(s) IV Intermittent every 12 hours  metoprolol tartrate 50 milliGRAM(s) Oral every 6 hours  nystatin    Suspension 725006 Unit(s) Oral four times a day  pantoprazole    Tablet 40 milliGRAM(s) Oral before breakfast  povidone iodine 10% Ointment 1 Application(s) Topical every 12 hours  senna 2 Tablet(s) Oral at bedtime  sodium bicarbonate 650 milliGRAM(s) Oral every 8 hours    MEDICATIONS  (PRN):      HOME MEDICATIONS:  Home Medications:  albuterol 90 mcg/inh inhalation aerosol: 1 puff(s) inhaled every 6 hours (24 Mar 2021 11:25)  amiodarone 200 mg oral tablet: 1 tab(s) orally 2 times a day (24 Mar 2021 10:48)  atorvastatin 40 mg oral tablet: 1 tab(s) orally once a day (at bedtime) (21 Apr 2021 15:18)  Basaglar KwikPen 100 units/mL subcutaneous solution: 7 unit(s) subcutaneous once a day (at bedtime) (21 Apr 2021 15:18)  Gamunex 10% intravenous solution: every 5 weeks; Contiune to hold. Needs to start after follow up with neurologist (02 Apr 2021 17:47)  insulin lispro 100 units/mL injectable solution: 2 unit(s) subcutaneous 3 times a day (with meals) (21 Apr 2021 15:18)  levothyroxine 88 mcg (0.088 mg) oral tablet: 1 tab(s) orally once a day (23 Jul 2020 08:25)  linezolid 600 mg oral tablet: 1 tab(s) orally every 12 hours (21 Apr 2021 15:18)  metoprolol tartrate 25 mg oral tablet: 1 tab(s) orally every 6 hours (24 Mar 2021 10:48)  Multiple Vitamins oral tablet: 1 tab(s) orally once a day (23 Jul 2020 08:25)  nystatin 100,000 units/mL oral suspension: 5 milliliter(s) orally 4 times a day (17 Apr 2021 08:04)  sodium bicarbonate 650 mg oral tablet: 1 tab(s) orally every 8 hours (21 Apr 2021 15:18)  Vitamin C 250 mg oral tablet: 1 tab(s) orally 2 times a day (23 Jul 2020 08:25)      ROS:     REVIEW OF SYSTEMS  General:  No fevers  Eyes:  No reported pain   ENT:  No sore throat   NECK: No stiffness   CV:  No chest pain   Resp:  No shortness of breath  GI:  See HPI  :  No dysuria  Muscle:  No weakness  Neuro:  No tingling  Endocrine:  No polyuria  Heme:  No ecchymosis          VITALS:   T(F): 96.6 (04-25 @ 05:42), Max: 99.8 (04-22 @ 07:48)  HR: 71 (04-25 @ 05:42) (70 - 105)  BP: 118/56 (04-25 @ 05:42) (100/57 - 166/80)  BP(mean): --  RR: 18 (04-25 @ 05:42) (18 - 19)  SpO2: --    I&O's Summary    24 Apr 2021 07:01  -  25 Apr 2021 07:00  --------------------------------------------------------  IN: 0 mL / OUT: 1400 mL / NET: -1400 mL        PHYSICAL EXAM:  Gen: comfortable   EYES: No scleral icterus   LUNG: Clear to auscultation bilaterally;   HEART: S1 and S2 heard   ABDOMEN: Soft, +BS, no abd distension, no Abdominal Tenderness, No guarding, No Bautista Sign   Neuro: AAO x 3, no asterix   Ext: no edema     LABS:                        8.6    5.92  )-----------( 257      ( 24 Apr 2021 06:39 )             25.7       LIVER FUNCTIONS - ( 24 Apr 2021 06:39 )  Alb: 1.8 g/dL / Pro: 3.9 g/dL / ALK PHOS: 228 U/L / ALT: 93 U/L / AST: 163 U/L / GGT: x           04-24    133<L>  |  102  |  19  ----------------------------<  135<H>  3.9   |  21  |  1.2    Ca    7.4<L>      24 Apr 2021 06:39  Mg     1.2     04-2       RADIOLOGY:    < from: US Abdomen Limited (04.23.21 @ 09:58) >  FINDINGS:    Liver: Within normal limits.  Bile ducts: Normal caliber. Common bile duct measures 7 mm.  Gallbladder: Gallbladder is contracted.  Pancreas: Visualized portions are within normal limits.  Right kidney: 8.9 cm. No hydronephrosis.  Ascites: None. There is however a right-sided pleural effusion.  IVC: Visualized portions are within normal limits.    IMPRESSION:    Right-sided pleural effusion.    No ductal dilatation or gallstones.    < end of copied text >  `

## 2021-04-25 NOTE — PROGRESS NOTE ADULT - ASSESSMENT
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  Pt was admitted to ICU for Sepsis, Pyelonephritis and DKA    # Sepsis POA 2/2 recurrent  pyelonephritis -resolved  # Urinary retention with bilateral hydronephrosis  - Blood Cx NGTD   - Urine culture: ESBLE.coli and VRE   - US Renal w/ b/l hydronephrosis   - c/w meropenem and linezolid   - ID following   - s/p wiggins catheter    # Diabetic ketoacidosis- resolved  # IDDM with hypoglycemia  - A1C with Estimated Average Glucose Result: 8.1 % (03.15.21 @ 06:16)  - S/p insulin drip  - decrease lantus dose to 5 units HS and Lispro 2u tid per Endocrine      # Acute kidney injury on CKD stage 3- improved   # Metabolic acidosis  # Hyponatremia  # Hyperkalemia-resolved  - c/w Bicarb  - monitor BMP    # HFpEF Acute Exacerbation - improved   - CXR w/ bl effusions and interstitial edema  - Echo - EF 58%   - Started Lasix 20mg IV , switch to PO lasix tomorrow     # Elevated transaminitis  - uptrending since admission  - RUQ u/s: No ductal dilatation or gallstones.   - Hep panel   - GI Consult pending     # H/o chronic  afib-rate controlled  # H/o SSS S/p PPM  - c/w amiodarone, metoprolol  - c/w eliquis    # H/o CAD S/p PCI  # H/o PVD S/p stent  - c/w plavix, eliquis, statin, metoprolol    # Hypothyroidism  - c/w synthroid    # H/o Inclusion body myositis  -  on Gamunex every 5 weeks    # Anemia  - monitor HB/HC    #Progress Note Handoff:  Pending (specify): PO lasix tomorrow   Disposition: Home___/SNF___/Other________/Unknown at this time____x____      Mari Caceres DO

## 2021-04-25 NOTE — PROGRESS NOTE ADULT - SUBJECTIVE AND OBJECTIVE BOX
SOWMYA AMADO  76y, Female  Allergy: Gluten (Stomach Upset; Vomiting; Nausea; Diarrhea)  latex (Pruritus; Rash)  Pineapple (Unknown)  sulfonamides (Unknown)    Hospital Day: 8d    Patient seen and examined earlier today. Feeling well, pending dc back to [facility].     PMH/PSH:  PAST MEDICAL & SURGICAL HISTORY:  Afib  on Eliquis, amio and toprol    Essential hypertension    Palpitations  occasionally; not for couple yrs    Pacemaker  Sept 2017    Diabetes  Insulin dependent - (has insulin Pump)    SSS (sick sinus syndrome)  S/p PPM    Hypothyroid    Anemia  Chronic    Skin cancer  Basal Cell Cancer face  (around nose) s/p MOHS procedure x 3    Seasonal allergies    PVD (peripheral vascular disease)  S/p Right Peripheral leg Stent    Dry eyes, bilateral    Transfusion history  S/p Hysterectomy    Lung nodules  tree in bud    Inclusion body myositis (IBM)    H/O total hysterectomy  25+ yrs ago    History of surgery  Right Peripheral Stent 5 yrs    Cardiac pacemaker  9/9/17    History of surgery  MOHS procedure (face) x 3    S/P cataract surgery    S/P ablation of atrial fibrillation        LAST 24-Hr EVENTS:    VITALS:  T(F): 96 (04-25-21 @ 12:34), Max: 97.5 (04-24-21 @ 21:17)  HR: 70 (04-25-21 @ 12:34)  BP: 116/57 (04-25-21 @ 12:34) (104/52 - 128/56)  RR: 18 (04-25-21 @ 12:34)  SpO2: --        TESTS & MEASUREMENTS:  Weight (Kg):       04-23-21 @ 07:01  -  04-24-21 @ 07:00  --------------------------------------------------------  IN: 0 mL / OUT: 3400 mL / NET: -3400 mL    04-24-21 @ 07:01  -  04-25-21 @ 07:00  --------------------------------------------------------  IN: 0 mL / OUT: 1400 mL / NET: -1400 mL    04-25-21 @ 07:01  -  04-25-21 @ 16:08  --------------------------------------------------------  IN: 0 mL / OUT: 1625 mL / NET: -1625 mL                            8.6    5.92  )-----------( 257      ( 24 Apr 2021 06:39 )             25.7       04-24    133<L>  |  102  |  19  ----------------------------<  135<H>  3.9   |  21  |  1.2    Ca    7.4<L>      24 Apr 2021 06:39  Mg     1.2     04-24    TPro  3.9<L>  /  Alb  1.8<L>  /  TBili  0.3  /  DBili  x   /  AST  163<H>  /  ALT  93<H>  /  AlkPhos  228<H>  04-24    LIVER FUNCTIONS - ( 24 Apr 2021 06:39 )  Alb: 1.8 g/dL / Pro: 3.9 g/dL / ALK PHOS: 228 U/L / ALT: 93 U/L / AST: 163 U/L / GGT: x                   Procalcitonin, Serum: 0.74 ng/mL (04-18-21 @ 06:57)          COVID-19 PCR: NotDetec (04-21-21 @ 18:25)      RADIOLOGY, ECG, & ADDITIONAL TESTS:      RECENT DIAGNOSTIC ORDERS:  Magnesium, Serum: 16:00 (04-25-21 @ 11:34)  Basic Metabolic Panel: 16:00 (04-25-21 @ 11:34)  Complete Blood Count: 16:00 (04-25-21 @ 11:34)  COVID-19 PCR: Routine  Specimen Source: Nasopharyngeal (04-25-21 @ 11:33)  Hepatitis C RNA, Qualitative: 11:06 (04-25-21 @ 04:12)      MEDICATIONS:  MEDICATIONS  (STANDING):  ALBUTerol    90 MICROgram(s) HFA Inhaler 1 Puff(s) Inhalation every 6 hours  aMIOdarone    Tablet 200 milliGRAM(s) Oral two times a day  apixaban 2.5 milliGRAM(s) Oral two times a day  chlorhexidine 4% Liquid 1 Application(s) Topical <User Schedule>  clopidogrel Tablet 75 milliGRAM(s) Oral daily  dextrose 40% Gel 15 Gram(s) Oral once  dextrose 5%. 1000 milliLiter(s) (100 mL/Hr) IV Continuous <Continuous>  dextrose 50% Injectable 25 Gram(s) IV Push once  dextrose 50% Injectable 12.5 Gram(s) IV Push once  dextrose 50% Injectable 25 Gram(s) IV Push once  famotidine  Oral Tab/Cap - Peds 20 milliGRAM(s) Oral at bedtime  glucagon  Injectable 1 milliGRAM(s) IntraMuscular once  insulin glargine Injectable (LANTUS) 5 Unit(s) SubCutaneous at bedtime  insulin lispro (ADMELOG) corrective regimen sliding scale   SubCutaneous three times a day before meals  insulin lispro Injectable (ADMELOG) 2 Unit(s) SubCutaneous three times a day before meals  levothyroxine 88 MICROGram(s) Oral daily  linezolid    Tablet 600 milliGRAM(s) Oral every 12 hours  meropenem  IVPB 500 milliGRAM(s) IV Intermittent every 12 hours  metoprolol tartrate 50 milliGRAM(s) Oral every 6 hours  nystatin    Suspension [id number] Unit(s) Oral four times a day  pantoprazole    Tablet 40 milliGRAM(s) Oral before breakfast  povidone iodine 10% Ointment 1 Application(s) Topical every 12 hours  senna 2 Tablet(s) Oral at bedtime  sodium bicarbonate 650 milliGRAM(s) Oral every 8 hours    MEDICATIONS  (PRN):      HOME MEDICATIONS:  albuterol 90 mcg/inh inhalation aerosol (03-24)  amiodarone 200 mg oral tablet (03-24)  atorvastatin 40 mg oral tablet (04-21)  Basaglar KwikPen 100 units/mL subcutaneous solution (04-21)  Gamunex 10% intravenous solution (04-02)  insulin lispro 100 units/mL injectable solution (04-21)  levothyroxine 88 mcg (0.088 mg) oral tablet (07-23)  linezolid 600 mg oral tablet (04-21)  metoprolol tartrate 25 mg oral tablet (03-24)  Multiple Vitamins oral tablet (07-23)  nystatin 100,000 units/mL oral suspension (04-17)  sodium bicarbonate 650 mg oral tablet (04-21)  Vitamin C 250 mg oral tablet (07-23)      PHYSICAL EXAM:  GENERAL: A&O x3,  in NAD  HNENT: EOMI, PERRLA    NECK: No LAD/swelling  CHEST/LUNG:  CTAB no wheezes/rales/ronchi  HEART: RRR, No murmurs  ABDOMEN: Soft, NT, ND,  Bowel sounds present  EXTREMITIES:  Warm well perfused, 1-2+ lower extremity edema in posterior shins

## 2021-04-26 NOTE — PROGRESS NOTE ADULT - ASSESSMENT
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 Pt was admitted to ICU for Sepsis, Pyelonephritis and DKA.    # SOB and leg edema - fluid overload vs. malnourishment  - Xray with b/l opacities  - Echo - EF 58%   - Albumin 2.1  - Started Lasix 20mg daily, switch to po lasix today  - prealbumin 5    # Elevated transaminitis  - uptrending since admission  - RUQ u/s: No ductal dilatation or gallstones. R plural effusion.   - Hep panel   - GI Consult       # Sepsis secondary to recurrent  pyelonephritis -resolved  # Urinary retention with bilateral hydronephrosis  - Blood Cx NGTD   - Urine culture: ESBL E.coli and VRE   - US Renal w/ b/l hydronephrosis   - Meropenem finsihed today  - C/w Linezolid PO day 4/7    - Estrogen cream contraindicated in the setting of known H/O CAD and PVD   - Springer catheter was placed for retention   - Failed trial of void, Springer placed     # Diabetic ketoacidosis- resolved  # IDDM with episodes of hypoglycemia   - A1C /with Estimated Average Glucose Result: 8.1 % (03.15.21 @ 06:16)  - S/p insulin drip  - decrease lantus dose to 6 units HS and Lispro 2u tid per Endocrine   - Monitor FS      # Acute kidney injury on CKD stage 3- improved   # Metabolic acidosis  # Chronic hyponatremia  # Hyperkalemia-resolved  - c/w Bicarb  - monitor BMP    # H/o chronic  afib-rate controlled  # H/o SSS S/p PPM  - c/w amiodarone, metoprolol  - c/w eliquis    # H/o CAD S/p PCI  # H/o PVD S/p stent  - c/w plavix, eliquis, statin, metoprolol    # Hypothyroidism  - c/w synthroid    # H/o Inclusion body myositis  - on Gamunex every 5 weeks  - Neuro recommended outpatient f/u no indication for infusion in the hospital     # Anemia- Hgb stable  - monitor HB/HCT    # Full code    75 yo Female with a PMHx of DM on insulin pump, HTN, CKD3, Afib s/p ablation on Eliquis, CAD s/p stenting in 2020, sick sinus syndrome s/p PPM, PVD s/p right peripheral leg stent, 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 Pt was admitted to ICU for Sepsis, Pyelonephritis and DKA.    # Acute on chronic diastolic CHF exac   - Xray with b/l opacities  - Echo - EF 58%   - Albumin 2.1  - Started Lasix 20mg daily, switch to po lasix today  - prealbumin 5    # Transaminitis  - uptrending since admission  - RUQ u/s: No ductal dilatation or gallstones. R plural effusion.   - Hep panel   - GI Consult       # Sepsis POA secondary to recurrent  pyelonephritis -resolved  # Acute Urinary retention with bilateral hydronephrosis  - Blood Cx NGTD   - Urine culture: ESBL E.coli and VRE   - US Renal w/ b/l hydronephrosis   - Meropenem finsihed today  - C/w Linezolid PO day 4/7    - Estrogen cream contraindicated in the setting of known H/O CAD and PVD   - Springer catheter was placed for retention   - Failed trial of void, Springer placed     # Diabetic ketoacidosis- resolved  # IDDM with episodes of hypoglycemia   - A1C /with Estimated Average Glucose Result: 8.1 % (03.15.21 @ 06:16)  - S/p insulin drip  - decrease lantus dose to 6 units HS and Lispro 2u tid per Endocrine   - Monitor FS      # Acute kidney injury on CKD stage 3- improved   # Metabolic acidosis  # Chronic hyponatremia  # Hyperkalemia-resolved  - c/w Bicarb  - monitor BMP    # H/o chronic  afib-rate controlled  # H/o SSS S/p PPM  - c/w amiodarone, metoprolol  - c/w eliquis    # H/o CAD S/p PCI  # H/o PVD S/p stent  - c/w plavix, eliquis, statin, metoprolol    # Hypothyroidism  - c/w synthroid    # H/o Inclusion body myositis  - on Gamunex every 5 weeks  - Neuro recommended outpatient f/u no indication for infusion in the hospital     # Anemia- Hgb stable  - monitor HB/HCT    # Full code

## 2021-04-26 NOTE — PROGRESS NOTE ADULT - ATTENDING COMMENTS
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  Pt was admitted to ICU for Sepsis, Pyelonephritis and DKA    # Sepsis POA 2/2 recurrent  pyelonephritis -resolved  # Urinary retention with bilateral hydronephrosis  - Blood Cx NGTD   - Urine culture: ESBLE.coli and VRE   - US Renal w/ b/l hydronephrosis   - c/w meropenem  - appreciate ID reccs, unclear if we need to start Linezolid for VRE   - s/p wiggins catheter    # Diabetic ketoacidosis- resolved  # IDDM with hypoglycemia  - A1C with Estimated Average Glucose Result: 8.1 % (03.15.21 @ 06:16)  - S/p insulin drip  - decrease lantus dose to 7 units HS and Lispro 2u tid per Endocrine      # Acute kidney injury on CKD stage 3- improved   # Metabolic acidosis  # Hyponatremia  # Hyperkalemia-resolved  - c/w Bicarb  - monitor BMP    # H/o chronic  afib-rate controlled  # H/o SSS S/p PPM  - c/w amiodarone, metoprolol  - c/w eliquis    # H/o CAD S/p PCI  # H/o PVD S/p stent  - c/w plavix, eliquis, statin, metoprolol    # Hypothyroidism  - c/w synthroid    # H/o Inclusion body myositis  -  on Gamunex every 5 weeks    # Anemia  - monitor HB/HCT    # Full code      #Progress Note Handoff:  Pending (specify):  final ID reccomendations, s/p midline today for outpatient abx   Disposition: Home___/SNF___/Other________/Unknown at this time____x____      Mari Caceres, 
Patient seen and examined. No complaints.     On exam, rales b/l up to the midlungs. LLE peripheral edema.     # Acute on chronic diastolic CHF exac   - start IV Lasix 40mg daily, monitor output   - strict I/Os, fluid restriction     # Transaminitis  - CLD w/u, GI following   - HCV RNA, Hep C ab positive     # Sepsis POA secondary to recurrent acute pyelonephritis    # Acute Urinary retention with bilateral hydronephrosis  - Urine culture: ESBL E.coli and VRE   - C/w meropenem and Zyvox as per ID   - TOV today     # Diabetic ketoacidosis- resolved  # IDDM with episodes of hypoglycemia   - insulin as per endo recs     # Acute kidney injury on CKD stage 3 2/2 acute urinary retention   # Metabolic acidosis - resolved   - discontinue sodium bicarbonate     # H/o chronic  afib-rate controlled  # H/o SSS S/p PPM  - c/w amiodarone, metoprolol  - c/w Eliquis    # H/o CAD S/p PCI  # H/o PVD S/p stent  - c/w Plavix, Eliquis, statin, metoprolol    # Hypothyroidism  - c/w synthroid    # H/o Inclusion body myositis  - on Gamunex every 5 weeks  - Outpatient neurologist recommended outpatient f/u no indication for infusion in the hospital      #Progress Note Handoff  Pending (specify):  IV diuresis, TOV, monitor for retention   Family discussion: d/w pt plan as above   Disposition: [facility]

## 2021-04-26 NOTE — PROGRESS NOTE ADULT - SUBJECTIVE AND OBJECTIVE BOX
SUBJECTIVE:    Patient is a 76y old Female who presents with a chief complaint of fever (25 Apr 2021 16:08)    Currently admitted to medicine with the primary diagnosis of UTI (urinary tract infection)       Today is hospital day 9d. This morning she is resting comfortably in bed and reports no new issues or overnight events.     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    Inclusion body myositis (IBM)    H/O total hysterectomy  25+ yrs ago    History of surgery  Right Peripheral Stent 5 yrs    Cardiac pacemaker  9/9/17    History of surgery  MOHS procedure (face) x 3    S/P cataract surgery    S/P ablation of atrial fibrillation        ALLERGIES:  latex (Pruritus; Rash)  sulfonamides (Unknown)    MEDICATIONS:  STANDING MEDICATIONS  ALBUTerol    90 MICROgram(s) HFA Inhaler 1 Puff(s) Inhalation every 6 hours  aMIOdarone    Tablet 200 milliGRAM(s) Oral two times a day  apixaban 2.5 milliGRAM(s) Oral two times a day  chlorhexidine 4% Liquid 1 Application(s) Topical <User Schedule>  clopidogrel Tablet 75 milliGRAM(s) Oral daily  dextrose 40% Gel 15 Gram(s) Oral once  dextrose 5%. 1000 milliLiter(s) IV Continuous <Continuous>  dextrose 50% Injectable 25 Gram(s) IV Push once  dextrose 50% Injectable 12.5 Gram(s) IV Push once  dextrose 50% Injectable 25 Gram(s) IV Push once  famotidine  Oral Tab/Cap - Peds 20 milliGRAM(s) Oral at bedtime  furosemide    Tablet 20 milliGRAM(s) Oral daily  glucagon  Injectable 1 milliGRAM(s) IntraMuscular once  insulin glargine Injectable (LANTUS) 5 Unit(s) SubCutaneous at bedtime  insulin lispro (ADMELOG) corrective regimen sliding scale   SubCutaneous three times a day before meals  insulin lispro Injectable (ADMELOG) 2 Unit(s) SubCutaneous three times a day before meals  levothyroxine 88 MICROGram(s) Oral daily  linezolid    Tablet 600 milliGRAM(s) Oral every 12 hours  meropenem  IVPB 500 milliGRAM(s) IV Intermittent every 12 hours  metoprolol tartrate 50 milliGRAM(s) Oral every 6 hours  nystatin    Suspension 761550 Unit(s) Oral four times a day  pantoprazole    Tablet 40 milliGRAM(s) Oral before breakfast  povidone iodine 10% Ointment 1 Application(s) Topical every 12 hours  senna 2 Tablet(s) Oral at bedtime  sodium bicarbonate 650 milliGRAM(s) Oral every 8 hours    PRN MEDICATIONS    VITALS:   T(F): 96.1  HR: 60  BP: 127/58  RR: 18  SpO2: --    LABS:                        RADIOLOGY:    PHYSICAL EXAM:  GEN: No acute distress  PULM/CHEST: Clear to auscultation bilaterally, no rales, rhonchi or wheezes   CVS: Regular rate and rhythm, S1-S2, no murmurs  ABD: Soft, non-tender, non-distended, +BS  EXT: No edema  NEURO: AAOx3    Springer Catheter:   Indwelling Urethral Catheter:     Connect To:  Straight Drainage/[place]    Indication:  Urinary Retention / Obstruction (04-24-21 @ 14:27) (not performed) [Active]  Indwelling Urethral Catheter:     Connect To:  Straight Drainage/[place]    Indication:  Urinary Retention / Obstruction (04-23-21 @ 13:01) (not performed) [Active]  Indwelling Urethral Catheter:     Connect To:  Straight Drainage/[place]    Indication:  Urinary Retention / Obstruction (04-22-21 @ 18:22) (not performed) [Active]

## 2021-04-27 NOTE — CHART NOTE - NSCHARTNOTEFT_GEN_A_CORE
Registered Dietitian Follow-Up     Patient Profile Reviewed                           Yes [x]   No []     Nutrition History Previously Obtained        Yes [x]  No []       Pertinent Subjective Information: Pt reports that her appetite is getting a little better. She reports ongoing nausea, that she reports is not related to po intake. She has been tolerating bland foods-- having toast with jelly for breakfast. She also reports that she likes GlucerKakKstati shakes, and has one at bedside. No recent episodes of emesis-- last episode was ~2-3 days ago we pt.      Pertinent Medical Interventions:     Diet order: Diet, DASH/TLC:   Sodium & Cholesterol Restricted  Consistent Carbohydrate {Evening Snack}  1000mL Fluid Restriction ([id number]) (21 @ 08:33) [Active]    Anthropometrics:  Height: 61"  Weight: dosing 55.4 kg  BMI: 23.1  IBW: 47.7 kg    Daily Weight in k.7 (04-23)   % Weight Change daily wt likey inaccurate; per appearance + closer to UBW    MEDICATIONS  (STANDING):  ALBUTerol    90 MICROgram(s) HFA Inhaler 1 Puff(s) Inhalation every 6 hours  aMIOdarone    Tablet 200 milliGRAM(s) Oral two times a day  apixaban 2.5 milliGRAM(s) Oral two times a day  chlorhexidine 4% Liquid 1 Application(s) Topical <User Schedule>  clopidogrel Tablet 75 milliGRAM(s) Oral daily  dextrose 40% Gel 15 Gram(s) Oral once  dextrose 5%. 1000 milliLiter(s) (100 mL/Hr) IV Continuous <Continuous>  dextrose 50% Injectable 25 Gram(s) IV Push once  dextrose 50% Injectable 12.5 Gram(s) IV Push once  dextrose 50% Injectable 25 Gram(s) IV Push once  famotidine  Oral Tab/Cap - Peds 20 milliGRAM(s) Oral at bedtime  furosemide   Injectable 40 milliGRAM(s) IV Push daily  glucagon  Injectable 1 milliGRAM(s) IntraMuscular once  insulin glargine Injectable (LANTUS) 5 Unit(s) SubCutaneous at bedtime  insulin lispro (ADMELOG) corrective regimen sliding scale   SubCutaneous three times a day before meals  insulin lispro Injectable (ADMELOG) 2 Unit(s) SubCutaneous three times a day before meals  levothyroxine 88 MICROGram(s) Oral daily  linezolid    Tablet 600 milliGRAM(s) Oral every 12 hours  meropenem  IVPB 500 milliGRAM(s) IV Intermittent every 8 hours  metoprolol tartrate 50 milliGRAM(s) Oral every 6 hours  nystatin    Suspension [id number] Unit(s) Oral four times a day  pantoprazole    Tablet 40 milliGRAM(s) Oral before breakfast  povidone iodine 10% Ointment 1 Application(s) Topical every 12 hours  senna 2 Tablet(s) Oral at bedtime    MEDICATIONS  (PRN):    Pertinent Labs:  @ 07:26: Na 134<L>, BUN 26<H>, Cr 1.5, <H>, K+ 3.9, Phos --, Mg 1.6<L>, Alk Phos 285<H>, ALT/SGPT 77<H>, AST/SGOT 97<H>, HbA1c --   @ 12:20: Na --, BUN --, Cr --, BG --, K+ --, Phos --, Mg --, Alk Phos 265<H>, ALT/SGPT 84<H>, AST/SGOT 133<H>, HbA1c --    Finger Sticks:  POCT Blood Glucose.: 192 mg/dL ( @ 07:38)  POCT Blood Glucose.: 289 mg/dL ( @ 21:02)  POCT Blood Glucose.: 175 mg/dL ( @ 16:08)  POCT Blood Glucose.: 176 mg/dL ( @ 12:06)    Physical Findings:  - Appearance:  - GI function:  - Tubes:  - Oral/Mouth cavity:  - Skin:     Nutrition Requirements:  Weight Used:     Estimated Energy Needs    Continue []  Adjust []  Adjusted Energy Recommendations:   kcal/day        Estimated Protein Needs    Continue []  Adjust []  Adjusted Protein Recommendations:   gm/day        Estimated Fluid Needs        Continue []  Adjust []  Adjusted Fluid Recommendations:   mL/day     Nutrient Intake:     [] Previous Nutrition Diagnosis:            [] Ongoing          [] Resolved    [] No active nutrition diagnosis identified at this time     Nutrition Intervention      Goal/Expected Outcome:      Indicator/Monitoring:      Recommendation: Registered Dietitian Follow-Up     Patient Profile Reviewed                           Yes [x]   No []     Nutrition History Previously Obtained        Yes [x]  No []       Pertinent Subjective Information: Pt reports that her appetite is getting a little better. She reports ongoing nausea, that she reports is not related to po intake. She has been tolerating bland foods-- having toast with jelly for breakfast. She also reports that she likes Glucerna shakes, and has one at bedside. No recent episodes of emesis-- last episode was ~2-3 days ago per pt.      Pertinent Medical Interventions: GI consulted r/t elevated liver enzymes; RUQ sonogram- normal liver, no gall stones, no ductal dilation; hep C (+); rec CLD workup + hepatic and portal vessel duplex. TEOFILO on CKD3 improved. DKA resolved.     Diet order: Diet, DASH/TLC:   Sodium & Cholesterol Restricted  Consistent Carbohydrate {Evening Snack}  1000mL Fluid Restriction ([id number]) (21 @ 08:33) [Active]    Anthropometrics:  Height: 61"  Weight: dosing 55.4 kg  BMI: 23.1  IBW: 47.7 kg    Daily Weight in k.7 (-23)   % Weight Change daily wt likey inaccurate; per appearance + closer to UBW    MEDICATIONS  (STANDING):  ALBUTerol    90 MICROgram(s) HFA Inhaler 1 Puff(s) Inhalation every 6 hours  aMIOdarone    Tablet 200 milliGRAM(s) Oral two times a day  apixaban 2.5 milliGRAM(s) Oral two times a day  chlorhexidine 4% Liquid 1 Application(s) Topical <User Schedule>  clopidogrel Tablet 75 milliGRAM(s) Oral daily  dextrose 40% Gel 15 Gram(s) Oral once  dextrose 5%. 1000 milliLiter(s) (100 mL/Hr) IV Continuous <Continuous>  dextrose 50% Injectable 25 Gram(s) IV Push once  dextrose 50% Injectable 12.5 Gram(s) IV Push once  dextrose 50% Injectable 25 Gram(s) IV Push once  famotidine  Oral Tab/Cap - Peds 20 milliGRAM(s) Oral at bedtime  furosemide   Injectable 40 milliGRAM(s) IV Push daily  glucagon  Injectable 1 milliGRAM(s) IntraMuscular once  insulin glargine Injectable (LANTUS) 5 Unit(s) SubCutaneous at bedtime  insulin lispro (ADMELOG) corrective regimen sliding scale   SubCutaneous three times a day before meals  insulin lispro Injectable (ADMELOG) 2 Unit(s) SubCutaneous three times a day before meals  levothyroxine 88 MICROGram(s) Oral daily  linezolid    Tablet 600 milliGRAM(s) Oral every 12 hours  meropenem  IVPB 500 milliGRAM(s) IV Intermittent every 8 hours  metoprolol tartrate 50 milliGRAM(s) Oral every 6 hours  nystatin    Suspension [id number] Unit(s) Oral four times a day  pantoprazole    Tablet 40 milliGRAM(s) Oral before breakfast  povidone iodine 10% Ointment 1 Application(s) Topical every 12 hours  senna 2 Tablet(s) Oral at bedtime    Pertinent Labs:  RBC 3.21, H/H 8.8/26.3, Na 134, BUN 26, glucose 179, elevated LFTs, Mg 1.6    Finger Sticks:  POCT Blood Glucose.: 192 mg/dL ( @ 07:38)  POCT Blood Glucose.: 289 mg/dL ( @ 21:02)  POCT Blood Glucose.: 175 mg/dL ( @ 16:08)  POCT Blood Glucose.: 176 mg/dL ( @ 12:06)    Physical Findings:  - Appearance: well nourished; () +2 edema (L arm), + 3 edema (L leg).  - GI function: pt states that she was having diarrhea, now improving. Last BM .   - Tubes: N/A.  - Oral/Mouth cavity: no noted chewing/swallowing issues.  - Skin: L heel wound.     Nutrition Requirements:  Weight Used: 53kg IBW+10% (continue from assessment on )     Estimated Energy Needs    Continue [x]  1325-1590kcal (25-30kcal/kg IBW)    Estimated Protein Needs    Continue [x]  53-64g (1-1.2g/kg IBW)     Estimated Fluid Needs        Continue [x]  per LIP     Nutrient Intake: %, widely varies; still with nausea at this time.     [x] Previous Nutrition Diagnosis: Inadequate protein/energy intake            [x] Ongoing               Nutrition Intervention meals and snacks, medical food supplements     Goal/Expected Outcome: pt to maintain po intake >75% within the next 3 days.     Indicator/Monitoring: RD to monitor diet order, energy intake, body composition, NFPF, glucose/renal/electrolyte profiles.     Recommendation: continue current diet order as tolerated, order glucerna 24h + prosource gelatein sugar free q24h, encourage po intake, provide assistance with meals PRN. Registered Dietitian Follow-Up     Patient Profile Reviewed                           Yes [x]   No []     Nutrition History Previously Obtained        Yes [x]  No []       Pertinent Subjective Information: Pt reports that her appetite is getting a little better. She reports ongoing nausea, that she reports is not related to po intake. She has been tolerating bland foods-- having toast with jelly for breakfast. She also reports that she likes Glucerna shakes, and has one at bedside. No recent episodes of emesis-- last episode was ~2-3 days ago per pt.      Pertinent Medical Interventions: GI consulted r/t elevated liver enzymes; RUQ sonogram- normal liver, no gall stones, no ductal dilation; hep C (+); rec CLD workup + hepatic and portal vessel duplex. TEOFILO on CKD3 improved. DKA resolved.     Diet order: Diet, DASH/TLC:   Sodium & Cholesterol Restricted  Consistent Carbohydrate {Evening Snack}  1000mL Fluid Restriction ([id number]) (21 @ 08:33) [Active]    Anthropometrics:  Height: 61"  Weight: dosing 55.4 kg  BMI: 23.1  IBW: 47.7 kg    Daily Weight in k.7 (-23)   % Weight Change daily wt likey inaccurate; per appearance + closer to UBW    MEDICATIONS  (STANDING):  ALBUTerol    90 MICROgram(s) HFA Inhaler 1 Puff(s) Inhalation every 6 hours  aMIOdarone    Tablet 200 milliGRAM(s) Oral two times a day  apixaban 2.5 milliGRAM(s) Oral two times a day  chlorhexidine 4% Liquid 1 Application(s) Topical <User Schedule>  clopidogrel Tablet 75 milliGRAM(s) Oral daily  dextrose 40% Gel 15 Gram(s) Oral once  dextrose 5%. 1000 milliLiter(s) (100 mL/Hr) IV Continuous <Continuous>  dextrose 50% Injectable 25 Gram(s) IV Push once  dextrose 50% Injectable 12.5 Gram(s) IV Push once  dextrose 50% Injectable 25 Gram(s) IV Push once  famotidine  Oral Tab/Cap - Peds 20 milliGRAM(s) Oral at bedtime  furosemide   Injectable 40 milliGRAM(s) IV Push daily  glucagon  Injectable 1 milliGRAM(s) IntraMuscular once  insulin glargine Injectable (LANTUS) 5 Unit(s) SubCutaneous at bedtime  insulin lispro (ADMELOG) corrective regimen sliding scale   SubCutaneous three times a day before meals  insulin lispro Injectable (ADMELOG) 2 Unit(s) SubCutaneous three times a day before meals  levothyroxine 88 MICROGram(s) Oral daily  linezolid    Tablet 600 milliGRAM(s) Oral every 12 hours  meropenem  IVPB 500 milliGRAM(s) IV Intermittent every 8 hours  metoprolol tartrate 50 milliGRAM(s) Oral every 6 hours  nystatin    Suspension [id number] Unit(s) Oral four times a day  pantoprazole    Tablet 40 milliGRAM(s) Oral before breakfast  povidone iodine 10% Ointment 1 Application(s) Topical every 12 hours  senna 2 Tablet(s) Oral at bedtime    Pertinent Labs:  RBC 3.21, H/H 8.8/26.3, Na 134, BUN 26, glucose 179, elevated LFTs, Mg 1.6    Finger Sticks:  POCT Blood Glucose.: 192 mg/dL ( @ 07:38)  POCT Blood Glucose.: 289 mg/dL ( @ 21:02)  POCT Blood Glucose.: 175 mg/dL ( @ 16:08)  POCT Blood Glucose.: 176 mg/dL ( @ 12:06)    Physical Findings:  - Appearance: well nourished; () +2 edema (L arm), + 3 edema (L leg).  - GI function: pt states that she was having diarrhea, now improving. Last BM .   - Tubes: N/A.  - Oral/Mouth cavity: no noted chewing/swallowing issues.  - Skin: L heel wound.     Nutrition Requirements:  Weight Used: 53kg IBW+10% (continue from assessment on )     Estimated Energy Needs    Continue [x]  1325-1590kcal (25-30kcal/kg IBW)    Estimated Protein Needs    Continue [x]  53-64g (1-1.2g/kg IBW)     Estimated Fluid Needs        Continue [x]  per LIP     Nutrient Intake: %, widely varies; still with nausea at this time.     [x] Previous Nutrition Diagnosis: Inadequate protein/energy intake            [x] Ongoing               Nutrition Intervention meals and snacks, medical food supplements     Goal/Expected Outcome: pt to maintain po intake >75% within the next 3 days.     Indicator/Monitoring: RD to monitor diet order, energy intake, body composition, NFPF, glucose/renal/electrolyte profiles.     Recommendation: continue current diet order as tolerated, order glucerna 24h + prosource gelatein sugar free q24h, encourage po intake, provide assistance with meals PRN. recs discussed with Dr Win [phone].

## 2021-04-27 NOTE — PROGRESS NOTE ADULT - PROVIDER SPECIALTY LIST ADULT
Internal Medicine
Endocrinology
Hospitalist
Internal Medicine
Internal Medicine
Endocrinology
Hospitalist
Hospitalist
Internal Medicine
Hospitalist
Internal Medicine
Pulmonology
Internal Medicine

## 2021-04-27 NOTE — DISCHARGE NOTE NURSING/CASE MANAGEMENT/SOCIAL WORK - PATIENT PORTAL LINK FT
You can access the FollowMyHealth Patient Portal offered by [facility] by registering at the following website: [email or web address]. By joining [facility]’s FollowMyHealth portal, you will also be able to view your health information using other applications (apps) compatible with our system.

## 2021-04-27 NOTE — PROGRESS NOTE ADULT - REASON FOR ADMISSION
fever

## 2021-04-27 NOTE — PROGRESS NOTE ADULT - SUBJECTIVE AND OBJECTIVE BOX
SUBJECTIVE:    Patient is a 76y old Female who presents with a chief complaint of fever (26 Apr 2021 08:25)    Currently admitted to medicine with the primary diagnosis of UTI (urinary tract infection)       Today is hospital day 10d. This morning she is resting comfortably in bed and reports no new issues or overnight events.     INTERVAL EVENTS:   passed tov    PAST MEDICAL & SURGICAL HISTORY  Afib  on Eliquis, amio and toprol    Essential hypertension    Palpitations  occasionally; not for couple yrs    Pacemaker  Sept 2017    Diabetes  Insulin dependent - (has insulin Pump)    SSS (sick sinus syndrome)  S/p PPM    Hypothyroid    Anemia  Chronic    Skin cancer  Basal Cell Cancer face  (around nose) s/p MOHS procedure x 3    Seasonal allergies    PVD (peripheral vascular disease)  S/p Right Peripheral leg Stent    Dry eyes, bilateral    Transfusion history  S/p Hysterectomy    Lung nodules  tree in bud    Inclusion body myositis (IBM)    H/O total hysterectomy  25+ yrs ago    History of surgery  Right Peripheral Stent 5 yrs    Cardiac pacemaker  9/9/17    History of surgery  MOHS procedure (face) x 3    S/P cataract surgery    S/P ablation of atrial fibrillation        ALLERGIES:  latex (Pruritus; Rash)  sulfonamides (Unknown)    MEDICATIONS:  STANDING MEDICATIONS  ALBUTerol    90 MICROgram(s) HFA Inhaler 1 Puff(s) Inhalation every 6 hours  aMIOdarone    Tablet 200 milliGRAM(s) Oral two times a day  apixaban 2.5 milliGRAM(s) Oral two times a day  chlorhexidine 4% Liquid 1 Application(s) Topical <User Schedule>  clopidogrel Tablet 75 milliGRAM(s) Oral daily  dextrose 40% Gel 15 Gram(s) Oral once  dextrose 5%. 1000 milliLiter(s) IV Continuous <Continuous>  dextrose 50% Injectable 25 Gram(s) IV Push once  dextrose 50% Injectable 12.5 Gram(s) IV Push once  dextrose 50% Injectable 25 Gram(s) IV Push once  famotidine  Oral Tab/Cap - Peds 20 milliGRAM(s) Oral at bedtime  furosemide   Injectable 40 milliGRAM(s) IV Push daily  glucagon  Injectable 1 milliGRAM(s) IntraMuscular once  insulin glargine Injectable (LANTUS) 5 Unit(s) SubCutaneous at bedtime  insulin lispro (ADMELOG) corrective regimen sliding scale   SubCutaneous three times a day before meals  insulin lispro Injectable (ADMELOG) 2 Unit(s) SubCutaneous three times a day before meals  levothyroxine 88 MICROGram(s) Oral daily  linezolid    Tablet 600 milliGRAM(s) Oral every 12 hours  meropenem  IVPB 500 milliGRAM(s) IV Intermittent every 8 hours  metoprolol tartrate 50 milliGRAM(s) Oral every 6 hours  nystatin    Suspension 137811 Unit(s) Oral four times a day  pantoprazole    Tablet 40 milliGRAM(s) Oral before breakfast  povidone iodine 10% Ointment 1 Application(s) Topical every 12 hours  senna 2 Tablet(s) Oral at bedtime    PRN MEDICATIONS    VITALS:   T(F): 96.1  HR: 62  BP: 114/55  RR: 18  SpO2: 100%    LABS:                        8.8    7.14  )-----------( 283      ( 27 Apr 2021 07:26 )             26.3     04-27    134<L>  |  95<L>  |  26<H>  ----------------------------<  179<H>  3.9   |  28  |  1.5    Ca    8.4<L>      27 Apr 2021 07:26  Mg     1.6     04-27    TPro  5.1<L>  /  Alb  2.5<L>  /  TBili  0.4  /  DBili  x   /  AST  97<H>  /  ALT  77<H>  /  AlkPhos  285<H>  04-27                  RADIOLOGY:    PHYSICAL EXAM:  GEN: No acute distress  PULM/CHEST: bilateral rales  CVS: Regular rate and rhythm, S1-S2, no murmurs  ABD: Soft, non-tender, non-distended, +BS  EXT: No edema  NEURO: AAOx3    Springer Catheter:

## 2021-04-27 NOTE — PROGRESS NOTE ADULT - ASSESSMENT
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 Pt was admitted to ICU for Sepsis, Pyelonephritis and DKA.    # Acute on chronic diastolic CHF exac   - Xray with b/l opacities  - Echo - EF 58%   - Albumin 2.1  - Started Lasix 20mg daily, switch to po lasix today  - prealbumin 5    # Transaminitis  - uptrending since admission  - RUQ u/s: No ductal dilatation or gallstones. R plural effusion.   - Hep panel   - GI Consult       # Sepsis POA secondary to recurrent  pyelonephritis -resolved  # Acute Urinary retention with bilateral hydronephrosis  - Blood Cx NGTD   - Urine culture: ESBL E.coli and VRE   - US Renal w/ b/l hydronephrosis   - Meropenem x 7 dats  - C/w Linezolid PO x 7 days    - Estrogen cream contraindicated in the setting of known H/O CAD and PVD   - Springer catheter was placed for retention , removed on 4/26, passed tov    # Diabetic ketoacidosis- resolved  # IDDM with episodes of hypoglycemia   - A1C /with Estimated Average Glucose Result: 8.1 % (03.15.21 @ 06:16)  - S/p insulin drip  - decrease lantus dose to 6 units HS and Lispro 2u tid per Endocrine   - Monitor FS      # Acute kidney injury on CKD stage 3- improved   # Metabolic acidosis  # Chronic hyponatremia  # Hyperkalemia-resolved  - c/w Bicarb  - monitor BMP    # H/o chronic  afib-rate controlled  # H/o SSS S/p PPM  - c/w amiodarone, metoprolol  - c/w eliquis    # H/o CAD S/p PCI  # H/o PVD S/p stent  - c/w plavix, eliquis, statin, metoprolol    # Hypothyroidism  - c/w synthroid    # H/o Inclusion body myositis  - on Gamunex every 5 weeks  - Neuro recommended outpatient f/u no indication for infusion in the hospital     # Anemia- Hgb stable  - monitor HB/HCT    # Full code

## 2021-04-27 NOTE — PROGRESS NOTE ADULT - NSICDXPILOT_GEN_ALL_CORE
[place]

## 2021-05-09 PROBLEM — R91.8 OTHER NONSPECIFIC ABNORMAL FINDING OF LUNG FIELD: Chronic | Status: ACTIVE | Noted: 2018-08-20

## 2021-05-09 PROBLEM — I48.91 UNSPECIFIED ATRIAL FIBRILLATION: Chronic | Status: ACTIVE | Noted: 2018-08-20

## 2021-05-09 PROBLEM — D64.9 ANEMIA, UNSPECIFIED: Chronic | Status: ACTIVE | Noted: 2018-08-20

## 2021-05-09 NOTE — H&P ADULT - NSHPLABSRESULTS_GEN_ALL_CORE
6.9    4.58  )-----------( 101      ( 09 May 2021 00:45 )             20.9   05-08    129<L>  |  88<L>  |  28<H>  ----------------------------<  108<H>  4.1   |  29  |  1.7<H>    Ca    8.3<L>      08 May 2021 23:24    TPro  6.2  /  Alb  3.1<L>  /  TBili  0.3  /  DBili  x   /  AST  42<H>  /  ALT  37  /  AlkPhos  207<H>  05-08

## 2021-05-09 NOTE — ED PROVIDER NOTE - ATTENDING CONTRIBUTION TO CARE
75 y/o female sent to ER for eval of anemia.  Pt with h/o afib on eliquis, cad s/p stent, SSS s/p ppm, PVD s/p RLE stent, hypothyroidism, htn, dm,  anemia (on iron, but has only needed 1 transfusion in past when she had a hysterectomy); pt was recently admitted to [facility] 4/17/21-4/21/21 for UTI, was d/c'd to a rehab facility, today had routine lab work done and hgb was 6.5 so sent to ER.  on 4/27/21 hgb was 8.8. pt denies any rectal bleeding or black stool.  no n/v/d.  no abd pain. no sob/scott, no cp/palpitations, no ha/dizziness/syncope/near syncope.  states many years ago she had an ulcer, but she had dark stool at that time.  reports neg colonoscopy/egd ~ 2 yrs ago.  PE - nad, nc/at, eomi, perrl, pale conjunctiva, op - clear, mmm, neck supple, cta b/l, no w/r/r, irreg rhythm, abd - soft, nt/nd, nabs, rectal - no blood as in resident exam, from x 4, no le swelling/tenderness, A&O x 3, no focal neuro deficits.  -check labs, transfuse as needed, admit for w/u. 77 y/o female sent to ER for eval of anemia.  Pt with h/o afib on eliquis, cad s/p stent, SSS s/p ppm, PVD s/p RLE stent, hypothyroidism, htn, dm,  anemia (on iron, but has only needed 1 transfusion in past when she had a hysterectomy); pt was recently admitted to [facility] 4/17/21-4/21/21 for UTI, was d/c'd to a rehab facility, today had routine lab work done and hgb was 6.5 so sent to ER.  on 4/27/21 hgb was 8.8. pt denies any rectal bleeding or black stool.  no n/v/d.  no abd pain. no sob/scott, no cp/palpitations, no ha/dizziness/syncope/near syncope.  states many years ago she had an ulcer, but she had dark stool at that time.  reports neg colonoscopy/egd ~ 2 yrs ago.  PE - nad, nc/at, eomi, perrl, pale conjunctiva, op - clear, mmm, neck supple, cta b/l, no w/r/r, rrr, abd - soft, nt/nd, nabs, rectal - no blood as in resident exam, from x 4, no le swelling/tenderness, A&O x 3, no focal neuro deficits.  -check labs, transfuse as needed, admit for w/u.

## 2021-05-09 NOTE — ED PROVIDER NOTE - PHYSICAL EXAMINATION
CONSTITUTIONAL: Well-developed; well-nourished; in no acute distress.   SKIN: pale  HEAD: Normocephalic; atraumatic.  EYES: no conjunctival injection. PERRL.   ENT: No nasal discharge; airway clear.  NECK: Supple; non tender.  CARD: S1, S2 normal; no murmurs, gallops, or rubs. Regular rate and rhythm.   RESP: No wheezes, rales or rhonchi.  ABD: soft ntnd  EXT: Normal ROM.  No clubbing, cyanosis or edema.   LYMPH: No acute cervical adenopathy.  NEURO: Alert, oriented, grossly unremarkable  PSYCH: Cooperative, appropriate.  Rectal: Negative for gross blood

## 2021-05-09 NOTE — H&P ADULT - ATTENDING COMMENTS
77 YO F with a PMH of DM2 on insulin pump, HTN, CKD3, chronic Afib s/p ablation (Eliquis), CAD s/p stenting, SSS s/p PPM, PVD s/p right peripheral leg stent, Inclusion body myositis, basal cell carcinoma on the face (around nose) s/p MOHS procedure x 3 (5 years ago), chronic anemia, and hypothyroidism who was sent in from her NH ([facility]) for eval of abnormal out-pt labs showing low Hgb.     The pt denies any bleeding symptoms. No hematemesis, black/bloody stools, ABD pain, bruising, N/V/D, or hematuria.     In the ED, ROLAND was negative. Hgb is 6.9. T&S obtained and pt transfused 1 unit of PRBCs.     Physical exam shows pt in NAD. VSS, afebrile, not hypoxic on RA. A&Ox3. Pale conjunctiva. Non-focal neuro exam. Muscle strength/sensation intact. CTA B/L with no W/C/R. Irregular, no M/G/R. ABD is soft and non-tender, normoactive BSs. LEs without swelling. No rashes. Labs and radiology as above.     Normocytic anemia with pan-cytopenia, below baseline. Pt denies bleeding symptoms. Send anemia work-up. Active T&S. Replace as necessary.   -Heme/Onc eval    Hyponatremia. Send urine/serum osmo and urine lytes. TSH. Fluid restriction. Serial BMPs. Do not over correct (<8-10 in 24 hours).     Hypoalbuminemia, from poor oral intake. Nutrition eval.     Hx of DM2 on insulin pump, HTN, CKD3, chronic Afib s/p ablation (Eliquis), CAD s/p stenting, SSS s/p PPM, PVD s/p right peripheral leg stent, Inclusion body myositis, basal cell carcinoma on the face (around nose) s/p MOHS procedure x 3 (5 years ago), chronic anemia, and hypothyroidism. Restart home meds, except as stated above. DVT PPX. Inform PCP of pt's admission to hospital. My note supersedes the residents note.     Spoke with family:

## 2021-05-09 NOTE — ED ADULT NURSE NOTE - PMH
Afib  on Eliquis, amio and toprol  Anemia  Chronic  Diabetes  Insulin dependent - (has insulin Pump)  Dry eyes, bilateral    Essential hypertension    Hypothyroid    Inclusion body myositis (IBM)    Lung nodules  tree in bud  Pacemaker  Sept 2017  Palpitations  occasionally; not for couple yrs  PVD (peripheral vascular disease)  S/p Right Peripheral leg Stent  Seasonal allergies    Skin cancer  Basal Cell Cancer face  (around nose) s/p MOHS procedure x 3  SSS (sick sinus syndrome)  S/p PPM  Transfusion history  S/p Hysterectomy

## 2021-05-09 NOTE — ED PROVIDER NOTE - OBJECTIVE STATEMENT
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 Last colonoscopy within the past 10 years was normal and pt was told she did not need anymore. No f/c/n/v. No melena, hematochezia. No Chest pain, SOB. No abdominal pain.

## 2021-05-09 NOTE — H&P ADULT - NSHPPHYSICALEXAM_GEN_ALL_CORE
GENERAL: NAD, AOX3  HEENT: Atraumatic normocephalic  CHEST: Clear to auscultate bilaterally   HEART: Normal S1 and S2, Systolic murmur  ABDOMEN: Soft nontender  EXTREMITIES: No edema

## 2021-05-09 NOTE — ED ADULT NURSE NOTE - NSIMPLEMENTINTERV_GEN_ALL_ED
Implemented All Fall Risk Interventions:  [place] to call system. Call bell, personal items and telephone within reach. Instruct patient to call for assistance. Room bathroom lighting operational. Non-slip footwear when patient is off stretcher. Physically safe environment: no spills, clutter or unnecessary equipment. Stretcher in lowest position, wheels locked, appropriate side rails in place. Provide visual cue, wrist band, yellow gown, etc. Monitor gait and stability. Monitor for mental status changes and reorient to person, place, and time. Review medications for side effects contributing to fall risk. Reinforce activity limits and safety measures with patient and family.

## 2021-05-09 NOTE — ED PROVIDER NOTE - NS ED ROS FT
Eyes:  No visual changes, eye pain or discharge.  ENMT:  No hearing changes, pain, no sore throat or runny nose, no difficulty swallowing  Cardiac:  No chest pain, SOB or edema. No chest pain with exertion.  Respiratory:  No cough or respiratory distress. No hemoptysis. No history of asthma or RAD.  GI:  No nausea, vomiting, diarrhea or abdominal pain.  :  No dysuria, frequency or burning.  MS:  No myalgia, muscle weakness, joint pain or back pain.  Neuro:  No headache or weakness.  No LOC.  Skin:  No skin rash.   Endocrine: +diabetes

## 2021-05-09 NOTE — H&P ADULT - ASSESSMENT
# Acute on chronic Anemia, Low platelets.   -No source of bleeding noted. ROLAND in ER negative. S/p 1 unit of PRBC  -Will send for anemia workup however S/p a unit of PRBC. may not be helpful  -Patient says she has iron deficiency anemia and had been following Dr. Palafox outpatient.  -Maintain 2 large bore IVs. PPI and consider GI consult if patient is actively bleeding    #chronic diastolic Heart failure- Stable  - Echo - EF 58%   -Holding lasix for now. Mild TEOFILO over CKD    # Transaminitis    # Insulin dependant DM  -Continue with FS monitoring and insulin regimen    # H/o chronic  afib-rate controlled  # H/o SSS S/p PPM  - c/w amiodarone, metoprolol  - holding eliquis For now.     # H/o CAD S/p PCI  # H/o PVD S/p stent  - c/w plavix, statin, metoprolol    # Hypothyroidism  - c/w synthroid    # H/o Inclusion body myositis  - on Gamunex every 5 weeks    # Full code  # Acute on chronic Anemia, Low platelets.   -No source of bleeding noted. ROLAND in ER negative. S/p 1 unit of PRBC  -Will send for anemia workup however S/p a unit of PRBC. may not be helpful  -Patient says she has iron deficiency anemia and had been following Dr. Palafox outpatient.  -Maintain 2 large bore IVs. PPI and consider GI consult if patient is actively bleeding    #chronic diastolic Heart failure- Stable  - Echo - EF 58%   -Holding lasix for now. Mild TEOFILO over CKD    # Transaminitis  -Stable. Can consider re-evaluation from GI if trending up.     # Insulin dependant DM  -Continue with FS monitoring and insulin regimen    # H/o chronic  afib-rate controlled  # H/o SSS S/p PPM  - c/w amiodarone, metoprolol  - holding eliquis For now.     # H/o CAD S/p PCI  # H/o PVD S/p stent  - c/w plavix, statin, metoprolol    # Hypothyroidism  - c/w synthroid    # H/o Inclusion body myositis  - on Gamunex every 5 weeks    # Full code

## 2021-05-09 NOTE — H&P ADULT - HISTORY OF PRESENT ILLNESS
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 As per the patient she was recently discharged from hospital after being treated for UTI and was doing well in the nursing home. However, when the nursing home repeated the labs recently they informed her that they are significantly abnormal and need to be sent back to the hospital.   She was noted to have acute on chronic anemia and was given 1 unit of PRBC.   Patient denies any visible bleeding. She says she had bleeding stomach ulcer long ago and had undergone endoscopic treatments. She also had colonoscopy done outpatient and reports it to be normal.   She denies any belly pain, headaches, nausea vomiting, chest pain.

## 2021-05-10 NOTE — CONSULT NOTE ADULT - SUBJECTIVE AND OBJECTIVE BOX
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 due to abnormal labs. As per the patient she was recently discharged from hospital after being treated for UTI and was doing well in the nursing home. However, when the nursing home repeated the labs recently they informed her that they are significantly abnormal and need to be sent back to the hospital.   She was noted to have acute on chronic anemia and was given 1 unit of PRBC.   Patient denies any visible bleeding. She says she had bleeding stomach ulcer long ago and had undergone endoscopic treatments. She also had colonoscopy done outpatient and reports it to be normal.   She denies any belly pain, headaches, nausea vomiting, chest pain.  (09 May 2021 04:50)      PAST MEDICAL & SURGICAL HISTORY:  Afib  on Eliquis, amio and toprol    Essential hypertension    Palpitations  occasionally; not for couple yrs    Pacemaker  Sept 2017    Diabetes  Insulin dependent - (has insulin Pump)    SSS (sick sinus syndrome)  S/p PPM    Hypothyroid    Anemia  Chronic    Skin cancer  Basal Cell Cancer face  (around nose) s/p MOHS procedure x 3    Seasonal allergies    PVD (peripheral vascular disease)  S/p Right Peripheral leg Stent    Dry eyes, bilateral    Transfusion history  S/p Hysterectomy    Lung nodules  tree in bud    Inclusion body myositis (IBM)    H/O total hysterectomy  25+ yrs ago    History of surgery  Right Peripheral Stent 5 yrs    Cardiac pacemaker  9/9/17    History of surgery  MOHS procedure (face) x 3    S/P cataract surgery    S/P ablation of atrial fibrillation        FAMILY HISTORY:  Family history of lung cancer (Mother)    Cancer (Father)  Myeloma        Social History: (-) x 3    Allergies    latex (Pruritus; Rash)  sulfonamides (Unknown)    Intolerances    Gluten (Stomach Upset; Vomiting; Nausea; Diarrhea)  Pineapple (Unknown)      MEDICATIONS  (STANDING):  ALBUTerol    90 MICROgram(s) HFA Inhaler 1 Puff(s) Inhalation every 6 hours  aMIOdarone    Tablet 200 milliGRAM(s) Oral two times a day  amLODIPine   Tablet 5 milliGRAM(s) Oral daily  ascorbic acid 500 milliGRAM(s) Oral daily  atorvastatin 40 milliGRAM(s) Oral at bedtime  chlorhexidine 4% Liquid 1 Application(s) Topical <User Schedule>  clopidogrel Tablet 75 milliGRAM(s) Oral daily  dextrose 40% Gel 15 Gram(s) Oral once  dextrose 5%. 1000 milliLiter(s) (50 mL/Hr) IV Continuous <Continuous>  dextrose 5%. 1000 milliLiter(s) (100 mL/Hr) IV Continuous <Continuous>  dextrose 50% Injectable 25 Gram(s) IV Push once  dextrose 50% Injectable 12.5 Gram(s) IV Push once  dextrose 50% Injectable 25 Gram(s) IV Push once  glucagon  Injectable 1 milliGRAM(s) IntraMuscular once  heparin   Injectable 5000 Unit(s) SubCutaneous every 12 hours  insulin glargine Injectable (LANTUS) 7 Unit(s) SubCutaneous at bedtime  insulin lispro Injectable (ADMELOG) 2 Unit(s) SubCutaneous three times a day before meals  levothyroxine 88 MICROGram(s) Oral daily  metoprolol tartrate 25 milliGRAM(s) Oral every 6 hours  multivitamin 1 Tablet(s) Oral daily  pantoprazole  Injectable 40 milliGRAM(s) IV Push two times a day  senna 2 Tablet(s) Oral at bedtime  sodium bicarbonate 650 milliGRAM(s) Oral every 8 hours    MEDICATIONS  (PRN):      Review of systems:    Constitutional: as per HPI  Eyes: No eye pain or discharge  ENMT:  No difficulty hearing; No sinus or throat pain  Neck: No pain or stiffness  Respiratory: No cough, wheezing, chills or hemoptysis  Cardiovascular: No chest pain, palpitations, shortness of breath, dyspnea on exertion  Gastrointestinal: No abdominal pain, nausea, vomiting or hematemesis; No diarrhea or constipation.   Genitourinary: No dysuria, frequency, hematuria or incontinence  Neurological: As per HPI  Skin: No rashes or lesions   Endocrine: No heat or cold intolerance; No hair loss  Musculoskeletal: No joint pain or swelling  Psychiatric: No depression, anxiety, mood swings  Heme/Lymph: No easy bruising or bleeding gums    Vital Signs Last 24 Hrs  T(C): 36.8 (10 May 2021 13:00), Max: 36.8 (10 May 2021 13:00)  T(F): 98.3 (10 May 2021 13:00), Max: 98.3 (10 May 2021 13:00)  HR: 60 (10 May 2021 13:00) (60 - 62)  BP: 176/85 (10 May 2021 13:00) (156/69 - 182/72)  BP(mean): --  RR: 18 (10 May 2021 13:00) (18 - 18)  SpO2: 96% (10 May 2021 13:00) (96% - 96%)    Examination:  General:  Appearance is consistent with chronologic age.  No abnormal facies.  Gross skin survey within normal limits.    Cognitive/Language:  The patient is oriented to person, place, time and date.  Recent and remote memory intact.  Fund of knowledge is intact and normal.  Language with normal repetition, comprehension and naming.  Nondysarthric.    Eyes: intact VA, VFF.  EOMI w/o nystagmus, skew or reported double vision.  PERRL.  No ptosis/weakness of eyelid closure.    Face:  Facial sensation normal V1 - 3, no facial asymmetry.    Ears/Nose/Throat:  Hearing grossly intact b/l.  Palate elevates midline.  Tongue and uvula midline.   Motor examination:   Normal tone, bulk and range of motion.  No tenderness, twitching, tremors or involuntary movements.  Formal Muscle Strength Testing: (MRC grade R/L) 4/5 UE; 3/5 LE.  No observable drift.  Reflexes:   2+ b/l pectoralis, biceps, triceps, brachioradialis, patella and Achilles.  Plantar response downgoing b/l.  Jaw jerk, De[place], clonus absent.  Sensory examination:   Intact to light touch in all extremities.  Cerebellum:   FTN/HKS intact with normal KYLER in all limbs.  No dysmetria or dysdiadokinesia.     Respiratory:  no audible wheezing or inspiratory stridor.  no use of accessory muscles.   Cardiac: pulse palpable, no audible bruits  Abdomen: supple, no guarding, no TTP    Labs:   CBC Full  -  ( 10 May 2021 05:37 )  WBC Count : 3.90 K/uL  RBC Count : 2.81 M/uL  Hemoglobin : 7.9 g/dL  Hematocrit : 23.4 %  Platelet Count - Automated : 81 K/uL  Mean Cell Volume : 83.3 fL  Mean Cell Hemoglobin : 28.1 pg  Mean Cell Hemoglobin Concentration : 33.8 g/dL  Auto Neutrophil # : 1.04 K/uL  Auto Lymphocyte # : 2.13 K/uL  Auto Monocyte # : 0.55 K/uL  Auto Eosinophil # : 0.15 K/uL  Auto Basophil # : 0.02 K/uL  Auto Neutrophil % : 26.7 %  Auto Lymphocyte % : 54.6 %  Auto Monocyte % : 14.1 %  Auto Eosinophil % : 3.8 %  Auto Basophil % : 0.5 %    05-10    135  |  98  |  20  ----------------------------<  178<H>  3.9   |  26  |  1.4    Ca    7.9<L>      10 May 2021 05:37    TPro  5.0<L>  /  Alb  2.7<L>  /  TBili  0.3  /  DBili  x   /  AST  56<H>  /  ALT  43<H>  /  AlkPhos  172<H>  05-10    LIVER FUNCTIONS - ( 10 May 2021 05:37 )  Alb: 2.7 g/dL / Pro: 5.0 g/dL / ALK PHOS: 172 U/L / ALT: 43 U/L / AST: 56 U/L / GGT: x           PT/INR - ( 08 May 2021 23:24 )   PT: 15.80 sec;   INR: 1.37 ratio         PTT - ( 08 May 2021 23:24 )  PTT:35.8 sec        [facility]     [facility]-21 @ 13:28

## 2021-05-10 NOTE — PROGRESS NOTE ADULT - ASSESSMENT
76 year old female with  PMH of DM on insulin pump, Celiac disease, HTN, CKD3, Afib s/p ablation on Eliquis, CAD s/p stenting in 2020, sick sinus syndrome s/p PPM, PVD s/p right peripheral leg stent, Inclusion body myositis, basal cell carcinoma , chronic KEYANNA, hypothyroidism, presented for anemia.    # Acute on chronic Anemia  # H/o iron deficiency anemia  # Thrombocytopenia  - Hgb 6.9 in ED, s/p 1u PRBC  - Patient denies any bleeding or dark bowel movements, ROLAND (-)  - Will need outpt anemia workup, follows with Dr. Palafox outpatient  - Maintain 2 large bore IVs. PPI and consider GI consult if patient is actively bleeding  - F/u heme onc recs  - F/u stool guaiac  - C/w anticoagulation given no evidence of active bleed at this time    #HFpEF, stable  - Echo - EF 58%   - Holding lasix for now    # H/o Inclusion body myositis  - Patient endorses missing past two infusions given recent hospitalizations  - Normally gets Gamunex infusions every 5 weeks  - F/u neurology consult    # Transaminitis, improving  - Monitor CMP    # DMII  - C/w home insulin regimen    # H/o chronic  afib-rate controlled  # H/o SSS S/p PPM  - c/w amiodarone, metoprolol  - holding eliquis For now.     # H/o CAD S/p PCI  # H/o PVD S/p stent  - c/w plavix, statin, metoprolol    # Hypothyroidism  - c/w synthroid    GI ppx:  [x] Not indicated  /  [] Pantoprazole 40mg PO Daily  DVT ppx: [] Not indicated  /  [x] Heparin 5000mg SubQ  /  [] Lovenox 40mg SubQ  /  [] SCDs  Fluids:  [x] PO  /  [] IVF  Activity:  [x] Increase as Tolerated  /  [] OOB w/ assist  /  [] Bed Rest  Diet:  [x] DASH / [x] Carb Consistent  /  [] Renal  /  [] Dysphagia  /  [] NPO  Patient to be discharged when condition(s) optimized:  [] Home  / [x] SNF  /  [] Long Term Rehab    CODE STATUS:   [x] FULL   /   [] DNR     76 year old female with  PMH of DM on insulin pump, Celiac disease, HTN, CKD3, Afib s/p ablation on Eliquis, CAD s/p stenting in 2020, sick sinus syndrome s/p PPM, PVD s/p right peripheral leg stent, Inclusion body myositis, basal cell carcinoma , chronic KEYANNA, hypothyroidism, presented for anemia.    # Acute on chronic Anemia  # H/o iron deficiency anemia  # Thrombocytopenia  - Hgb 6.9 in ED, s/p 1u PRBC  - Patient denies any bleeding or dark bowel movements, ROLAND (-)  - Will need outpt anemia workup, follows with Dr. Palafox outpatient  - Maintain 2 large bore IVs. PPI and consider GI consult if patient is actively bleeding  - F/u heme onc recs  - F/u stool guaiac  - C/w anticoagulation given no evidence of active bleed at this time  - Consult GI if evidence of bleeding (sees Dr. Sheets as outpt)    #HFpEF, stable  - Echo - EF 58%   - Holding lasix for now    # H/o Inclusion body myositis  - Patient endorses missing past two infusions given recent hospitalizations  - Normally gets Gamunex infusions every 5 weeks  - F/u neurology consult (sees Dr. Win as outpt)    # Transaminitis, improving  - Monitor CMP    # DMII  - C/w home insulin regimen    # H/o chronic  afib-rate controlled  # H/o SSS S/p PPM  - c/w amiodarone, metoprolol  - holding eliquis For now.     # H/o CAD S/p PCI  # H/o PVD S/p stent  - c/w plavix, statin, metoprolol    # Hypothyroidism  - c/w synthroid    GI ppx:  [x] Not indicated  /  [] Pantoprazole 40mg PO Daily  DVT ppx: [] Not indicated  /  [x] Heparin 5000mg SubQ  /  [] Lovenox 40mg SubQ  /  [] SCDs  Fluids:  [x] PO  /  [] IVF  Activity:  [x] Increase as Tolerated  /  [] OOB w/ assist  /  [] Bed Rest  Diet:  [x] DASH / [x] Carb Consistent  /  [] Renal  /  [] Dysphagia  /  [] NPO  Patient to be discharged when condition(s) optimized:  [] Home  / [x] SNF  /  [] Long Term Rehab    CODE STATUS:   [x] FULL   /   [] DNR

## 2021-05-10 NOTE — PROGRESS NOTE ADULT - SUBJECTIVE AND OBJECTIVE BOX
DAILY PROGRESS NOTE  ===========================================================    Patient Information:  SOWMYA AMADO  /  76y  /  Female  /  MRN#: [id number]    Hospital Day: 1d     |:::::::::::::::::::::::::::| SUBJECTIVE |:::::::::::::::::::::::::::|    OVERNIGHT EVENTS: None  TODAY: Patient was seen today at bedside. Still endorses some weakness, otherwise no complaints.    |:::::::::::::::::::::::::::| OBJECTIVE |:::::::::::::::::::::::::::|    VITAL SIGNS: Last 24 Hours  T(C): 36.3 (10 May 2021 05:00), Max: 36.7 (09 May 2021 21:04)  T(F): 97.3 (10 May 2021 05:00), Max: 98 (09 May 2021 21:04)  HR: 61 (10 May 2021 05:00) (60 - 63)  BP: 160/70 (10 May 2021 05:00) (156/69 - 184/72)  BP(mean): --  RR: 18 (10 May 2021 05:00) (16 - 18)  SpO2: 100% (09 May 2021 12:43) (100% - 100%)    05-09-21 @ 07:01  -  05-10-21 @ 07:00  --------------------------------------------------------  IN: 400 mL / OUT: 3400 mL / NET: -3000 mL      PHYSICAL EXAM:  GENERAL:   Awake, alert; NAD.  HEENT:  Head NC/AT; Conjunctivae pink, Sclera anicteric; Oral mucosa moist.  CARDIO:   Regular rate; Regular rhythm; S1 & S2.  RESP:   No rales, wheezing, or rhonchi appreciated.  GI:   Soft; NT/ND; BS; No guarding; No rebound tenderness.  EXT:   Strength UE 5/5; Strength LE 5/5; No edema.   SKIN:   Intact. Wound dressing over L forearm.    LAB RESULTS:                        7.9    3.90  )-----------( 81       ( 10 May 2021 05:37 )             23.4     05-10    135  |  98  |  20  ----------------------------<  178<H>  3.9   |  26  |  1.4    Ca    7.9<L>      10 May 2021 05:37    TPro  5.0<L>  /  Alb  2.7<L>  /  TBili  0.3  /  DBili  x   /  AST  56<H>  /  ALT  43<H>  /  AlkPhos  172<H>  05-10    PT/INR - ( 08 May 2021 23:24 )   PT: 15.80 sec;   INR: 1.37 ratio         PTT - ( 08 May 2021 23:24 )  PTT:35.8 sec            MICROBIOLOGY:    RADIOLOGY:    ALLERGIES: latex (Pruritus; Rash)  sulfonamides (Unknown)      ===========================================================

## 2021-05-10 NOTE — CONSULT NOTE ADULT - ASSESSMENT
Ms. Sabillon is a 76 year old female with PMHx of DM on insulin pump, Celiac disease, HTN, CKD3, Afib s/p ablation on Eliquis, CAD s/p stenting in 2020, sick sinus syndrome s/p PPM, PVD s/p right peripheral leg stent, 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,  basal cell carcinoma on the face (around nose) s/p MOHS procedure x 3 (5 years ago) , chronic anemia, hypothyroidism, presented from [facility] due to acute on chronic anemia, hemoglobin on admission 6.9, requiring 1 unit of pRBC.     #) Pancytopenia, new on current admission   #) Acute on Chronic Anemia with history of Iron Defiency s/p 1 unit pRBC         INCOMPLETE NOTE  Ms. Sabillon is a 76 year old female with PMHx of DM on insulin pump, Celiac disease, HTN, CKD3, Afib s/p ablation on Eliquis, CAD s/p stenting in 2020, sick sinus syndrome s/p PPM, PVD s/p right peripheral leg stent, 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,  basal cell carcinoma on the face (around nose) s/p MOHS procedure x 3 (5 years ago) , chronic anemia, hypothyroidism, presented from [facility] due to acute on chronic anemia, hemoglobin on admission 6.9, requiring 1 unit of pRBC.     #) Pancytopenia, new on current admission   #) Acute on Chronic Anemia with history of Iron Defiency s/p 1 unit pRBC   - Her leukopenia and thrombocytopenia are new on current admission. These counts were normal on 4/27/21. Likely, this is secondary to medication effect with Ertapenem and Linezolid both of which can be associated with anemia, thrombocytopenia, and some leukopenia   - Please resume oral iron at 1 tab per day (her usual home dose). Follow up Ferritin level. If significantly low, can consider IV Venofer but she usually takes oral iron with no major side effects (as reported per patient)  - Noel is noted, eluate is pending but likely this is secondary to her chronic IVIG therapy   - Transfuse for Hb <7 or Platelet count <10K or <50K and any evidence of active bleeding   - Would recommend SCDs for now in place of heparin subQ given her hemoglobin was 6.9  - Consider GI work up (defer to primary team)  - From hematology perspective if hemoglobin remains stable as well as her WBC and platelet count, she could be discharged with outpatient follow up with Dr. Palafox.

## 2021-05-10 NOTE — CONSULT NOTE ADULT - ASSESSMENT
76 year old female with  PMH of DM on insulin pump, Celiac disease, HTN, CKD3, Afib s/p ablation on Eliquis, CAD s/p stenting in 2020, sick sinus syndrome s/p PPM, PVD s/p right peripheral leg stent, Inclusion body myositis, basal cell carcinoma , chronic KEYANNA, hypothyroidism, presented for anemia.  Patient receives Gamunex infusions every 5 weeks outpatient for inclusion body myositis and notes that she has a good response.  She has missed her last 2 infusions due to being in the hospital.  Neurology consulted for recommendations regarding administration of gamunex during this hospitalization.        Recommendations  - Patient may receive Gamunex infusion while in the hospital if there is no contraindication   - Patient will have a better response to Gamunex (vs. other IVIG treatment) so if this is not available, please have pharmacy order  76 year old female with  PMH of DM on insulin pump, Celiac disease, HTN, CKD3, Afib s/p ablation on Eliquis, CAD s/p stenting in 2020, sick sinus syndrome s/p PPM, PVD s/p right peripheral leg stent, Inclusion body myositis, basal cell carcinoma , chronic KEYANNA, hypothyroidism, presented for anemia.  Patient receives Gamunex infusions every 5 weeks outpatient for inclusion body myositis and notes that she has a good response.  She has missed her last 2 infusions due to being in the hospital.  Neurology consulted for recommendations regarding administration of gamunex during this hospitalization.        Recommendations  - Patient may receive Gammagard infusion 15 grams infused over 6 hours daily x 2 days if there is no contraindication due too current admission  - Patient will have a better response to Gammagard (vs. other IVIG treatment) so if this is not available, please have pharmacy order   - Premedicate with Tylenol 650mg PO x1 and Benadryl 25mg PO x1

## 2021-05-10 NOTE — CONSULT NOTE ADULT - SUBJECTIVE AND OBJECTIVE BOX
Patient is a 76y old  Female who presents with a chief complaint of     HPI:  76 year old female with  PMH of DM on insulin pump, Celiac disease, HTN, CKD3, Afib s/p ablation on Eliquis, CAD s/p stenting in 2020, sick sinus syndrome s/p PPM, PVD s/p right peripheral leg stent, 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,  basal cell carcinoma on the face (around nose) s/p MOHS procedure x 3 (5 years ago) , chronic anemia, hypothyroidism, presented from [facility] due to abnormal labs. As per the patient she was recently discharged from hospital after being treated for UTI and was doing well in the nursing home. However, when the nursing home repeated the labs recently they informed her that they are significantly abnormal and need to be sent back to the hospital.   She was noted to have acute on chronic anemia and was given 1 unit of PRBC.   Patient denies any visible bleeding. She says she had bleeding stomach ulcer long ago and had undergone endoscopic treatments. She also had colonoscopy done outpatient and reports it to be normal.   She denies any belly pain, headaches, nausea vomiting, chest pain.  (09 May 2021 04:50)    Additional History  Ms. Sabillon is a 77yo F with PMHx as above who was recently admitted from [facility] for acute on chronic anemia. She follows with Dr. Palafox for her history of iron deficiency anemia that she has been on oral iron therapy for years (she usually fluctuates between 1 to two iron pill per day. She was taking her iron pills until recently, when she was hospitalized in March 2021 with a UTI and since then, she has been off of oral iron. She reports she was doing well with PT at [facility]. She denies any shortness of breath, dizziness, or lightheadedness. She ws sent to the emergency room where she received a blood transfusion for a hemoglobin of 6.9. She has received only 1 unit of PRBC. She denies any overt signs of bleeding. She reports her last EDG and Colonoscopy were 2 years ago with Dr. Sheets and reportedly were normal.     Work up so far shows normocytic anemia. Her iron studies were taken after blood transfusion and show total iron 93, TIBC 174, % sat 53. Ferritin is pending. LDH is 253. Haptoglobin and Retic are pending. Noel is also pending though her total bilirubin is normal at 0.3. She had Myeloma panel done during her stay in March which was unremarkable.       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    Inclusion body myositis (IBM)    H/O total hysterectomy  25+ yrs ago    History of surgery  Right Peripheral Stent 5 yrs    Cardiac pacemaker  9/9/17    History of surgery  MOHS procedure (face) x 3    S/P cataract surgery    S/P ablation of atrial fibrillation        SOCIAL HISTORY:    FAMILY HISTORY:  Family history of lung cancer (Mother)    Cancer (Father)  Myeloma        MEDICATIONS  (STANDING):  ALBUTerol    90 MICROgram(s) HFA Inhaler 1 Puff(s) Inhalation every 6 hours  aMIOdarone    Tablet 200 milliGRAM(s) Oral two times a day  amLODIPine   Tablet 5 milliGRAM(s) Oral daily  ascorbic acid 500 milliGRAM(s) Oral daily  atorvastatin 40 milliGRAM(s) Oral at bedtime  chlorhexidine 4% Liquid 1 Application(s) Topical <User Schedule>  clopidogrel Tablet 75 milliGRAM(s) Oral daily  dextrose 40% Gel 15 Gram(s) Oral once  dextrose 5%. 1000 milliLiter(s) (50 mL/Hr) IV Continuous <Continuous>  dextrose 5%. 1000 milliLiter(s) (100 mL/Hr) IV Continuous <Continuous>  dextrose 50% Injectable 25 Gram(s) IV Push once  dextrose 50% Injectable 12.5 Gram(s) IV Push once  dextrose 50% Injectable 25 Gram(s) IV Push once  glucagon  Injectable 1 milliGRAM(s) IntraMuscular once  heparin   Injectable 5000 Unit(s) SubCutaneous every 12 hours  insulin glargine Injectable (LANTUS) 7 Unit(s) SubCutaneous at bedtime  insulin lispro Injectable (ADMELOG) 2 Unit(s) SubCutaneous three times a day before meals  levothyroxine 88 MICROGram(s) Oral daily  metoprolol tartrate 25 milliGRAM(s) Oral every 6 hours  multivitamin 1 Tablet(s) Oral daily  pantoprazole  Injectable 40 milliGRAM(s) IV Push two times a day  senna 2 Tablet(s) Oral at bedtime  sodium bicarbonate 650 milliGRAM(s) Oral every 8 hours    MEDICATIONS  (PRN):      Allergies    latex (Pruritus; Rash)  sulfonamides (Unknown)    Intolerances    Gluten (Stomach Upset; Vomiting; Nausea; Diarrhea)  Pineapple (Unknown)      Vital Signs Last 24 Hrs  T(C): 36.3 (10 May 2021 05:00), Max: 36.7 (09 May 2021 21:04)  T(F): 97.3 (10 May 2021 05:00), Max: 98 (09 May 2021 21:04)  HR: 61 (10 May 2021 05:00) (60 - 63)  BP: 160/70 (10 May 2021 05:00) (156/69 - 184/72)  BP(mean): --  RR: 18 (10 May 2021 05:00) (18 - 18)  SpO2: --    PHYSICAL EXAM  General: adult in NAD  HEENT: anicteric sclera, pink conjunctiva  Neck: supple  CV: normal S1/S2 with no murmur rubs or gallops  Lungs: CTABL  Abdomen: soft non-tender non-distended  Ext: no edema  Neuro: alert and oriented X 4, no focal deficits      LABS:                          7.9    3.90  )-----------( 81       ( 10 May 2021 05:37 )             23.4         Mean Cell Volume : 83.3 fL  Mean Cell Hemoglobin : 28.1 pg  Mean Cell Hemoglobin Concentration : 33.8 g/dL  Auto Neutrophil # : 1.04 K/uL  Auto Lymphocyte # : 2.13 K/uL  Auto Monocyte # : 0.55 K/uL  Auto Eosinophil # : 0.15 K/uL  Auto Basophil # : 0.02 K/uL  Auto Neutrophil % : 26.7 %  Auto Lymphocyte % : 54.6 %  Auto Monocyte % : 14.1 %  Auto Eosinophil % : 3.8 %  Auto Basophil % : 0.5 %      Serial CBC's  05-10 @ 05:37  Hct-23.4 / Hgb-7.9 / Plat-81 / RBC-2.81 / WBC-3.90  Serial CBC's  05-09 @ 11:00  Hct-26.6 / Hgb-9.2 / Plat-85 / RBC-3.20 / WBC-4.00  Serial CBC's  05-09 @ 00:45  Hct-20.9 / Hgb-6.9 / Plat-101 / RBC-2.57 / WBC-4.58      05-10    135  |  98  |  20  ----------------------------<  178<H>  3.9   |  26  |  1.4    Ca    7.9<L>      10 May 2021 05:37    TPro  5.0<L>  /  Alb  2.7<L>  /  TBili  0.3  /  DBili  x   /  AST  56<H>  /  ALT  43<H>  /  AlkPhos  172<H>  05-10      PT/INR - ( 08 May 2021 23:24 )   PT: 15.80 sec;   INR: 1.37 ratio         PTT - ( 08 May 2021 23:24 )  PTT:35.8 sec    Iron - Total Binding Capacity.: 174 ug/dL (05-09 @ 11:00)    % Saturation, Iron: 53 % (05.09.21 @ 11:00)    Iron Total, Serum: 93 ug/dL (05.09.21 @ 11:00)    Unsaturated Iron Binding Capacity: 81 ug/dL (05.09.21 @ 11:00)    Lactate Dehydrogenase, Serum: 253 (05.09.21 @ 11:00)              BLOOD SMEAR INTERPRETATION:       RADIOLOGY & ADDITIONAL STUDIES:

## 2021-05-11 NOTE — PROGRESS NOTE ADULT - SUBJECTIVE AND OBJECTIVE BOX
SOWMYA AMADO  76y  [facility] T1-3A [id number] B      Patient is a 76y old  Female who presents with a chief complaint of     INTERVAL HPI/OVERNIGHT EVENTS:    no acute events overnight , currently undergoing iv infusion    REVIEW OF SYSTEMS:  CONSTITUTIONAL: No fever, weight loss, or fatigue  EYES: No eye pain, visual disturbances, or discharge  ENMT:  No difficulty hearing, tinnitus, vertigo; No sinus or throat pain  NECK: No pain or stiffness  BREASTS: No pain, masses, or nipple discharge  RESPIRATORY: No cough, wheezing, chills or hemoptysis; No shortness of breath  CARDIOVASCULAR: No chest pain, palpitations, dizziness, or leg swelling  GASTROINTESTINAL: No abdominal or epigastric pain. No nausea, vomiting, or hematemesis; No diarrhea or constipation. No melena or hematochezia.  GENITOURINARY: No dysuria, frequency, hematuria, or incontinence  NEUROLOGICAL: No headaches, memory loss, loss of strength, numbness, or tremors  SKIN: No itching, burning, rashes, or lesions   LYMPH NODES: No enlarged glands  ENDOCRINE: No heat or cold intolerance; No hair loss  MUSCULOSKELETAL: No joint pain or swelling; No muscle, back, or extremity pain  PSYCHIATRIC: No depression, anxiety, mood swings, or difficulty sleeping  HEME/LYMPH: No easy bruising, or bleeding gums  ALLERY AND IMMUNOLOGIC: No hives or eczema  FAMILY HISTORY:  Family history of lung cancer (Mother)    Cancer (Father)  Myeloma      T(C): 35.6 (05-11-21 @ 05:54), Max: 36.8 (05-10-21 @ 13:00)  HR: 61 (05-11-21 @ 05:54) (60 - 61)  BP: 171/70 (05-11-21 @ 05:54) (162/65 - 210/83)  RR: 18 (05-11-21 @ 05:54) (16 - 18)  SpO2: 96% (05-10-21 @ 13:00) (96% - 96%)  Wt(kg): --Vital Signs Last 24 Hrs  T(C): 35.6 (11 May 2021 05:54), Max: 36.8 (10 May 2021 13:00)  T(F): 96 (11 May 2021 05:54), Max: 98.3 (10 May 2021 13:00)  HR: 61 (11 May 2021 05:54) (60 - 61)  BP: 171/70 (11 May 2021 05:54) (162/65 - 210/83)  BP(mean): 102 (10 May 2021 19:30) (102 - 119)  RR: 18 (11 May 2021 05:54) (16 - 18)  SpO2: 96% (10 May 2021 13:00) (96% - 96%)    PHYSICAL EXAM:  GENERAL: NAD, well-groomed, well-developed  HEAD:  Atraumatic, Normocephalic  EYES: EOMI, PERRLA, conjunctiva and sclera clear  ENMT: No tonsillar erythema, exudates, or enlargement; Moist mucous membranes, Good dentition, No lesions  NECK: Supple, No JVD, Normal thyroid  NERVOUS SYSTEM:  Alert & Oriented X3,   PULM: Clear to auscultation bilaterally  CARDIAC: Regular rate and rhythm; No murmurs, rubs, or gallops  GI: Soft, Nontender, Nondistended; Bowel sounds present  EXTREMITIES:  2+ Peripheral Pulses, No clubbing, cyanosis, or edema  LYMPH: No lymphadenopathy noted  SKIN: No rashes or lesions    LABS:                            8.4    3.69  )-----------( 99       ( 11 May 2021 07:16 )             25.3   05-11    134<L>  |  98  |  16  ----------------------------<  207<H>  3.5   |  28  |  1.2    Ca    7.9<L>      11 May 2021 07:16    TPro  5.7<L>  /  Alb  2.8<L>  /  TBili  0.3  /  DBili  x   /  AST  47<H>  /  ALT  41  /  AlkPhos  179<H>  05-11            ALBUTerol    90 MICROgram(s) HFA Inhaler 1 Puff(s) Inhalation every 6 hours  aMIOdarone    Tablet 200 milliGRAM(s) Oral two times a day  amLODIPine   Tablet 5 milliGRAM(s) Oral daily  ascorbic acid 500 milliGRAM(s) Oral daily  atorvastatin 40 milliGRAM(s) Oral at bedtime  chlorhexidine 4% Liquid 1 Application(s) Topical <User Schedule>  clopidogrel Tablet 75 milliGRAM(s) Oral daily  dextrose 40% Gel 15 Gram(s) Oral once  dextrose 5%. 1000 milliLiter(s) IV Continuous <Continuous>  dextrose 5%. 1000 milliLiter(s) IV Continuous <Continuous>  dextrose 50% Injectable 25 Gram(s) IV Push once  dextrose 50% Injectable 12.5 Gram(s) IV Push once  dextrose 50% Injectable 25 Gram(s) IV Push once  ferrous    sulfate 325 milliGRAM(s) Oral daily  glucagon  Injectable 1 milliGRAM(s) IntraMuscular once  insulin glargine Injectable (LANTUS) 7 Unit(s) SubCutaneous at bedtime  insulin lispro Injectable (ADMELOG) 2 Unit(s) SubCutaneous three times a day before meals  levothyroxine 88 MICROGram(s) Oral daily  metoprolol tartrate 25 milliGRAM(s) Oral every 6 hours  multivitamin 1 Tablet(s) Oral daily  pantoprazole  Injectable 40 milliGRAM(s) IV Push two times a day  senna 2 Tablet(s) Oral at bedtime  sodium bicarbonate 650 milliGRAM(s) Oral every 8 hours      HEALTH ISSUES - PROBLEM Dx:          Case Discussed with House Staff   Spectra [phone]

## 2021-05-11 NOTE — PROGRESS NOTE ADULT - SUBJECTIVE AND OBJECTIVE BOX
DAILY PROGRESS NOTE  ===========================================================    Patient Information:  SOWMYA AMADO  /  76y  /  Female  /  MRN#: [id number]    Hospital Day: 2d     |:::::::::::::::::::::::::::| SUBJECTIVE |:::::::::::::::::::::::::::|    OVERNIGHT EVENTS: None  TODAY: Patient was seen today at bedside. No complaints today    |:::::::::::::::::::::::::::| OBJECTIVE |:::::::::::::::::::::::::::|    VITAL SIGNS: Last 24 Hours  T(C): 35.6 (11 May 2021 05:54), Max: 36.8 (10 May 2021 13:00)  T(F): 96 (11 May 2021 05:54), Max: 98.3 (10 May 2021 13:00)  HR: 61 (11 May 2021 05:54) (60 - 61)  BP: 171/70 (11 May 2021 05:54) (162/65 - 210/83)  BP(mean): 102 (10 May 2021 19:30) (102 - 119)  RR: 18 (11 May 2021 05:54) (16 - 18)  SpO2: 96% (10 May 2021 13:00) (96% - 96%)    05-10-21 @ 07:01  -  05-11-21 @ 07:00  --------------------------------------------------------  IN: 0 mL / OUT: 2250 mL / NET: -2250 mL      PHYSICAL EXAM:  GENERAL:   Awake, alert; NAD.  HEENT:  Head NC/AT; Conjunctivae pink, Sclera anicteric; Oral mucosa moist.  CARDIO:   Regular rate; Regular rhythm; S1 & S2.  RESP:   No rales, wheezing, or rhonchi appreciated.  GI:   Soft; NT/ND; BS; No guarding; No rebound tenderness.  EXT:   Strength UE 5/5; Strength LE 5/5; No edema.   SKIN:   Intact.    LAB RESULTS:                        8.4    3.69  )-----------( 99       ( 11 May 2021 07:16 )             25.3     05-11    134<L>  |  98  |  16  ----------------------------<  207<H>  3.5   |  28  |  1.2    Ca    7.9<L>      11 May 2021 07:16    TPro  5.7<L>  /  Alb  2.8<L>  /  TBili  0.3  /  DBili  x   /  AST  47<H>  /  ALT  41  /  AlkPhos  179<H>  05-11        MICROBIOLOGY:    RADIOLOGY:    ALLERGIES: latex (Pruritus; Rash)  sulfonamides (Unknown)      ===========================================================

## 2021-05-11 NOTE — PROGRESS NOTE ADULT - ASSESSMENT
76 year old female with  PMH of DM on insulin pump, Celiac disease, HTN, CKD3, Afib s/p ablation on Eliquis, CAD s/p stenting in 2020, sick sinus syndrome s/p PPM, PVD s/p right peripheral leg stent, Inclusion body myositis, basal cell carcinoma , chronic KEYANNA, hypothyroidism, presented for anemia.    #Pancytopenia secondary to linezolid   stable   cw iron sulfate at home    #Chronic HF pEF     #Chronic atrial fibrillation - rate controlled  , holding a/c     #Sick sinus syndrome s/p ppm    #CAD     #PVD    #Hyponatremia no intervention indicated    #DM   CAPILLARY BLOOD GLUCOSE  POCT Blood Glucose.: 225 mg/dL (11 May 2021 11:27)  POCT Blood Glucose.: 199 mg/dL (11 May 2021 08:05)  POCT Blood Glucose.: 343 mg/dL (10 May 2021 22:03)  POCT Blood Glucose.: 267 mg/dL (10 May 2021 16:57)  controlled    #Inclusion body myositis ivig infusion today    #CKD 3    #Moderate tricuspid regurgitation.    # pulmonary hypertension.    Progress Note Handoff    Pending:  DC planning after iv ig infusion     Family discussion: patient verbalized understanding and agreeable to plan of care     Disposition: Home___

## 2021-05-11 NOTE — PROGRESS NOTE ADULT - ASSESSMENT
76 year old female with  PMH of DM on insulin pump, Celiac disease, HTN, CKD3, Afib s/p ablation on Eliquis, CAD s/p stenting in 2020, sick sinus syndrome s/p PPM, PVD s/p right peripheral leg stent, Inclusion body myositis, basal cell carcinoma , chronic KEYANNA, hypothyroidism, presented for anemia.    # Pancytopenia likely drug induced 2/2 linezolid or ertapenem  # H/o iron deficiency anemia  - Hgb 6.9 in ED, s/p 1u PRBC  - Patient denies any bleeding or dark bowel movements, ROLAND (-)  - Maintain 2 large bore IVs. PPI and consider GI consult if patient is actively bleeding  - F/u stool guaiac  - VTE ppx with SCD  - Consult GI if evidence of bleeding (sees Dr. Sheets as outpt)  - Heme onc following: Ferritin 1473 -> can c/w home Fe supplements  -- (+) Noel 2/2 IVIG  -- Transfuse for Hb <7 or Platelet count <10K or <50K and any evidence of active bleeding  -- Can f/u wit Dr. Palafox as outpt for remaining anemia w/u    #HFpEF, stable  - Echo - EF 58%   - Holding lasix for now    # H/o Inclusion body myositis  - Patient endorses missing past two infusions given recent hospitalizations  - Normally gets Gamunex infusions every 5 weeks  - F/u neurology consult (sees Dr. Win as outpt)    # Transaminitis, improving  - Monitor CMP    # DMII  - C/w home insulin regimen    # H/o chronic  afib-rate controlled  # H/o SSS S/p PPM  - c/w amiodarone, metoprolol  - holding eliquis For now.     # H/o CAD S/p PCI  # H/o PVD S/p stent  - c/w plavix, statin, metoprolol    # Hypothyroidism  - c/w synthroid    GI ppx:  [x] Not indicated  /  [] Pantoprazole 40mg PO Daily  DVT ppx: [] Not indicated  /  [x] Heparin 5000mg SubQ  /  [] Lovenox 40mg SubQ  /  [] SCDs  Fluids:  [x] PO  /  [] IVF  Activity:  [x] Increase as Tolerated  /  [] OOB w/ assist  /  [] Bed Rest  Diet:  [x] DASH / [x] Carb Consistent  /  [] Renal  /  [] Dysphagia  /  [] NPO  Patient to be discharged when condition(s) optimized:  [] Home  / [x] SNF  /  [] Long Term Rehab    CODE STATUS:   [x] FULL   /   [] DNR   76 year old female with  PMH of DM on insulin pump, Celiac disease, HTN, CKD3, Afib s/p ablation on Eliquis, CAD s/p stenting in 2020, sick sinus syndrome s/p PPM, PVD s/p right peripheral leg stent, Inclusion body myositis, basal cell carcinoma , chronic KEYANNA, hypothyroidism, presented for anemia.    # Pancytopenia likely drug induced 2/2 linezolid or ertapenem  # H/o iron deficiency anemia  - Hgb 6.9 in ED, s/p 1u PRBC  - Patient denies any bleeding or dark bowel movements, ROLAND (-)  - Maintain 2 large bore IVs. PPI and consider GI consult if patient is actively bleeding  - F/u stool guaiac  - VTE ppx with SCD  - Consult GI if evidence of bleeding (sees Dr. Sheets as outpt)  - Heme onc following: Ferritin 1473 -> can c/w home Fe supplements  -- (+) Noel 2/2 IVIG  -- Transfuse for Hb <7 or Platelet count <10K or <50K and any evidence of active bleeding  -- Can f/u wit Dr. Palafox as outpt for remaining anemia w/u    #HFpEF, stable  - Echo - EF 58%   - Holding lasix for now    # H/o Inclusion body myositis  - Patient endorses missing past two infusions given recent hospitalizations  - Normally gets Gamunex infusions every 5 weeks but missed last two infusions  - Per Neuro: Give gamunex 15mL over 6 hours daily x2 days  -- F/u Dr. Win as outpt    # Transaminitis, improving  - Monitor CMP    # DMII  - C/w home insulin regimen    # H/o chronic  afib-rate controlled  # H/o SSS S/p PPM  - c/w amiodarone, metoprolol  - holding eliquis For now.     # H/o CAD S/p PCI  # H/o PVD S/p stent  - c/w plavix, statin, metoprolol    # Hypothyroidism  - c/w synthroid    GI ppx:  [x] Not indicated  /  [] Pantoprazole 40mg PO Daily  DVT ppx: [] Not indicated  /  [x] Heparin 5000mg SubQ  /  [] Lovenox 40mg SubQ  /  [] SCDs  Fluids:  [x] PO  /  [] IVF  Activity:  [x] Increase as Tolerated  /  [] OOB w/ assist  /  [] Bed Rest  Diet:  [x] DASH / [x] Carb Consistent  /  [] Renal  /  [] Dysphagia  /  [] NPO  Patient to be discharged when condition(s) optimized:  [] Home  / [x] SNF  /  [] Long Term Rehab    CODE STATUS:   [x] FULL   /   [] DNR

## 2021-05-11 NOTE — PHYSICAL THERAPY INITIAL EVALUATION ADULT - PERTINENT HX OF CURRENT PROBLEM, REHAB EVAL
6y F PMHx of DM on insulin pump, HTN, CKD3, Afib s/p ablation on Eliquis, CAD s/p stenting in 2020, sick sinus syndrome s/p PPM, PVD s/p right peripheral leg stent, 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,  basal cell carcinoma on the face (around nose) s/p MOHS procedure x 3 (5 years ago) , chronic anemia, hypothyroidism presenting with low Hgb on routine blood work performed at [facility] facility today

## 2021-05-11 NOTE — PHYSICAL THERAPY INITIAL EVALUATION ADULT - GENERAL OBSERVATIONS, REHAB EVAL
14:05-14:15. Pt encountered semifowler in bed in NAD, + IV IVIG at b/s. Will f/ with PT when IVIG is completed. Lois CHACON notified and aware.
15:00-15:25. Pt encountered semifowler in bed in NAD, IVIG completed as per June RN. Pt without complaints, agreeable to PT.

## 2021-05-11 NOTE — PHYSICAL THERAPY INITIAL EVALUATION ADULT - ADDITIONAL COMMENTS
Prior to this hospitalization pt was in Short term Rehab and was amb with RW with support. Prior   previous hosptilization (UTI) pt used RW for amb as needed and was indep with ADL's.

## 2021-05-12 NOTE — PROGRESS NOTE ADULT - ASSESSMENT
76 year old female with  PMH of DM on insulin pump, Celiac disease, HTN, CKD3, Afib s/p ablation on Eliquis, CAD s/p stenting in 2020, sick sinus syndrome s/p PPM, PVD s/p right peripheral leg stent, Inclusion body myositis, basal cell carcinoma , chronic KEYANNA, hypothyroidism, presented for anemia.    # Pancytopenia likely drug induced 2/2 linezolid or ertapenem  # H/o iron deficiency anemia  - Hgb 6.9 in ED, s/p 1u PRBC  - Patient denies any bleeding or dark bowel movements, ROLAND (-)  - Maintain 2 large bore IVs. PPI and consider GI consult if patient is actively bleeding  - F/u stool guaiac  - VTE ppx with SCD  - Consult GI if evidence of bleeding (sees Dr. Sheets as outpt)  - Heme onc following: Ferritin 1473 -> can c/w home Fe supplements  -- (+) Noel 2/2 IVIG  -- Transfuse for Hb <7 or Platelet count <10K or <50K and any evidence of active bleeding  -- Can f/u wit Dr. Palafox as outpt for remaining anemia w/u    #HFpEF, stable  - Echo - EF 58%   - Holding lasix for now    # H/o Inclusion body myositis  - Patient endorses missing past two infusions given recent hospitalizations  - Normally gets Gamunex infusions every 5 weeks but missed last two infusions  - Per Neuro: Give gamunex 15mL over 6 hours daily x2 days  -- F/u Dr. Win as outpt    # Transaminitis, improving  - Monitor CMP    # DMII  - C/w home insulin regimen    # H/o chronic  afib-rate controlled  # H/o SSS S/p PPM  - c/w amiodarone, metoprolol  - holding eliquis For now.     # H/o CAD S/p PCI  # H/o PVD S/p stent  - c/w plavix, statin, metoprolol    # Hypothyroidism  - c/w synthroid    GI ppx:  [x] Not indicated  /  [] Pantoprazole 40mg PO Daily  DVT ppx: [] Not indicated  /  [x] Heparin 5000mg SubQ  /  [] Lovenox 40mg SubQ  /  [] SCDs  Fluids:  [x] PO  /  [] IVF  Activity:  [x] Increase as Tolerated  /  [] OOB w/ assist  /  [] Bed Rest  Diet:  [x] DASH / [x] Carb Consistent  /  [] Renal  /  [] Dysphagia  /  [] NPO  Patient to be discharged when condition(s) optimized:  [] Home  / [x] SNF  /  [] Long Term Rehab    CODE STATUS:   [x] FULL   /   [] DNR   76 year old female with  PMH of DM on insulin pump, Celiac disease, HTN, CKD3, Afib s/p ablation on Eliquis, CAD s/p stenting in 2020, sick sinus syndrome s/p PPM, PVD s/p right peripheral leg stent, Inclusion body myositis, basal cell carcinoma , chronic KEYANNA, hypothyroidism, presented for anemia.    # Pancytopenia likely drug induced 2/2 linezolid or ertapenem  # H/o iron deficiency anemia  - Hgb 6.9 in ED, s/p 1u PRBC  - Patient denies any bleeding or dark bowel movements, ROLAND (-)  - Maintain 2 large bore IVs. PPI and consider GI consult if patient is actively bleeding  - F/u stool guaiac  - VTE ppx with SCD  - Consult GI if evidence of bleeding (sees Dr. Sheets as outpt)  - Heme onc following: Ferritin 1473 -> can c/w home Fe supplements  -- (+) Noel 2/2 IVIG  -- Transfuse for Hb <7 or Platelet count <10K or <50K and any evidence of active bleeding  -- Can f/u wit Dr. Palafox as outpt for remaining anemia w/u    #HFpEF, stable  - Echo - EF 58%   - Holding lasix for now    # H/o Inclusion body myositis  - Patient endorses missing past two infusions given recent hospitalizations  - Normally gets Gamunex infusions every 5 weeks but missed last two infusions  - Per Neuro: Give gamunex 15mL over 6 hours daily x2 days  -- F/u Dr. Win as outpt    # Transaminitis, improving  - Monitor CMP    # DMII  - C/w home insulin regimen    # H/o chronic  afib-rate controlled  # H/o SSS S/p PPM  - c/w amiodarone, metoprolol  - holding eliquis For now.     # H/o CAD S/p PCI  # H/o PVD S/p stent  - c/w plavix, statin, metoprolol    # Hypothyroidism  - c/w synthroid    GI ppx:  [x] Not indicated  /  [] Pantoprazole 40mg PO Daily  DVT ppx: [] Not indicated  /  [x] Heparin 5000mg SubQ  /  [] Lovenox 40mg SubQ  /  [] SCDs  Fluids:  [x] PO  /  [] IVF  Activity:  [x] Increase as Tolerated  /  [] OOB w/ assist  /  [] Bed Rest  Diet:  [x] DASH / [x] Carb Consistent  /  [] Renal  /  [] Dysphagia  /  [] NPO  Patient to be discharged when condition(s) optimized:  [] Home  / [x] SNF  /  [] Long Term Rehab    CODE STATUS:   [x] FULL   /   [] DNR    Attending Attestation:    Pt has been seen and examined. Case and Plan discussed at rounds and agree with above documentation as corrected.   Pt is doing well and I have seen her ambulate with PT on the arias with rolling walker with contact guard.   CM will send [facility] out for SNF and Obtain Insurance Authorization for SNF  Check Stat Covid tomorrow per CM  TOV to d/c rosalie today   PANCytopenia presumed to be Linezolid Induced  - Watch CBC and f/u w/ Hematologist in the office - cell lines improving  Inclusion body Myosities  - s/p Rx  - Pls get Occupational Therapy Evaluation for weakness of Upper Body Care.    Dispo: SNF arrangements w/ CM in progress / Insurance Auth needed per CM

## 2021-05-12 NOTE — PROGRESS NOTE ADULT - SUBJECTIVE AND OBJECTIVE BOX
DAILY PROGRESS NOTE  ===========================================================    Patient Information:  SOWMYA AMADO  /  76y  /  Female  /  MRN#: [id number]    Hospital Day: 3d     |:::::::::::::::::::::::::::| SUBJECTIVE |:::::::::::::::::::::::::::|    OVERNIGHT EVENTS: None  TODAY: Patient was seen today at bedside. No complaints, pending insurance auth for rehab    |:::::::::::::::::::::::::::| OBJECTIVE |:::::::::::::::::::::::::::|    VITAL SIGNS: Last 24 Hours  T(C): 36.1 (12 May 2021 12:15), Max: 36.1 (12 May 2021 12:15)  T(F): 96.9 (12 May 2021 12:15), Max: 96.9 (12 May 2021 12:15)  HR: 61 (12 May 2021 12:15) (60 - 63)  BP: 159/73 (12 May 2021 12:15) (143/70 - 199/88)  BP(mean): --  RR: 18 (12 May 2021 12:15) (18 - 18)  SpO2: 100% (12 May 2021 05:41) (100% - 100%)    05-11-21 @ 07:01  -  05-12-21 @ 07:00  --------------------------------------------------------  IN: 0 mL / OUT: 625 mL / NET: -625 mL      PHYSICAL EXAM:  GENERAL:   Awake, alert; NAD.  HEENT:  Head NC/AT; Conjunctivae pink, Sclera anicteric; Oral mucosa moist.  CARDIO:   Regular rate; Regular rhythm; S1 & S2.  RESP:   No rales, wheezing, or rhonchi appreciated.  GI:   Soft; NT/ND; BS; No guarding; No rebound tenderness.  EXT:   Strength UE 5/5; Strength LE 5/5; No edema.   SKIN:   Intact.    LAB RESULTS:                        8.3    4.92  )-----------( 129      ( 12 May 2021 05:02 )             25.3     05-12    136  |  100  |  15  ----------------------------<  192<H>  4.1   |  23  |  1.1    Ca    7.8<L>      12 May 2021 05:02    TPro  6.0  /  Alb  2.6<L>  /  TBili  0.3  /  DBili  x   /  AST  51<H>  /  ALT  42<H>  /  AlkPhos  191<H>  05-12      MICROBIOLOGY:    RADIOLOGY:    ALLERGIES: latex (Pruritus; Rash)  sulfonamides (Unknown)      ===========================================================     DAILY PROGRESS NOTE  ===========================================================    Patient Information:  SOWMYA AMADO  /  76y  /  Female  /  MRN#: [id number]    Hospital Day: 3d     |:::::::::::::::::::::::::::| SUBJECTIVE |:::::::::::::::::::::::::::|    OVERNIGHT EVENTS: None  TODAY: Patient was seen today at bedside. No complaints, pending insurance auth for rehab    |:::::::::::::::::::::::::::| OBJECTIVE |:::::::::::::::::::::::::::|    VITAL SIGNS: Last 24 Hours  T(C): 36.1 (12 May 2021 12:15), Max: 36.1 (12 May 2021 12:15)  T(F): 96.9 (12 May 2021 12:15), Max: 96.9 (12 May 2021 12:15)  HR: 61 (12 May 2021 12:15) (60 - 63)  BP: 159/73 (12 May 2021 12:15) (143/70 - 199/88)  RR: 18 (12 May 2021 12:15) (18 - 18)  SpO2: 100% (12 May 2021 05:41) (100% - 100%)    05-11-21 @ 07:01  -  05-12-21 @ 07:00  --------------------------------------------------------  IN: 0 mL / OUT: 625 mL / NET: -625 mL    PHYSICAL EXAM:  GENERAL:   Awake, alert; NAD.  HEENT:  Head NC/AT; Conjunctivae pink, Sclera anicteric; Oral mucosa moist.  CARDIO:   Regular rate; Regular rhythm; S1 & S2.  RESP:   No rales, wheezing, or rhonchi appreciated.  GI:   Soft; NT/ND; BS; No guarding; No rebound tenderness.  EXT:   Strength UE 5/5; Strength LE 5/5; No edema.   SKIN:   Intact.    LAB RESULTS:                        8.3    4.92  )-----------( 129      ( 12 May 2021 05:02 )             25.3     05-12    136  |  100  |  15  ----------------------------<  192<H>  4.1   |  23  |  1.1    Ca    7.8<L>      12 May 2021 05:02    TPro  6.0  /  Alb  2.6<L>  /  TBili  0.3  /  DBili  x   /  AST  51<H>  /  ALT  42<H>  /  AlkPhos  191<H>  05-12      ALLERGIES: latex (Pruritus; Rash)  sulfonamides (Unknown)      ===========================================================

## 2021-05-13 NOTE — DISCHARGE NOTE NURSING/CASE MANAGEMENT/SOCIAL WORK - PATIENT PORTAL LINK FT
You can access the FollowMyHealth Patient Portal offered by [facility] by registering at the following website: [email or web address]. By joining [facility]’s FollowMyHealth portal, you will also be able to view your health information using other applications (apps) compatible with our system.

## 2021-05-13 NOTE — DISCHARGE NOTE PROVIDER - CARE PROVIDERS DIRECT ADDRESSES
,[email or web address],DirectAddress_Unknown ,[email or web address],DirectAddress_Unknown,[email or web address] ,[email or web address],[email or web address],[email or web address],DirectAddress_Unknown

## 2021-05-13 NOTE — PROGRESS NOTE ADULT - SUBJECTIVE AND OBJECTIVE BOX
SOWMYA AMADO  76y  [facility] T1-3A [address]    Patient is a 76y old  Female who presents with a chief complaint of     OVERNIGHT EVENTS:  no acute events overnight , currently undergoing iv infusion    REVIEW OF SYSTEMS:  CONSTITUTIONAL: No fever, weight loss, or fatigue  EYES: No eye pain, visual disturbances, or discharge  ENMT:  No difficulty hearing, tinnitus, vertigo; No sinus or throat pain  NECK: No pain or stiffness  BREASTS: No pain, masses, or nipple discharge  RESPIRATORY: No cough, wheezing, chills or hemoptysis; No shortness of breath  CARDIOVASCULAR: No chest pain, palpitations, dizziness, or leg swelling  GASTROINTESTINAL: No abdominal or epigastric pain. No nausea, vomiting, or hematemesis; No diarrhea or constipation. No melena or hematochezia.  GENITOURINARY: No dysuria, frequency, hematuria, or incontinence  NEUROLOGICAL: No headaches, memory loss, loss of strength, numbness, or tremors  SKIN: No itching, burning, rashes, or lesions   LYMPH NODES: No enlarged glands  ENDOCRINE: No heat or cold intolerance; No hair loss  MUSCULOSKELETAL: No joint pain or swelling; No muscle, back, or extremity pain  PSYCHIATRIC: No depression, anxiety, mood swings, or difficulty sleeping  HEME/LYMPH: No easy bruising, or bleeding gums  ALLERY AND IMMUNOLOGIC: No hives or eczema  FAMILY HISTORY:  Family history of lung cancer (Mother)    Cancer (Father)  Myeloma      Vital Signs Last 24 Hrs  T(C): 36.3 (13 May 2021 05:47), Max: 36.3 (13 May 2021 05:47)  T(F): 97.3 (13 May 2021 05:47), Max: 97.3 (13 May 2021 05:47)  HR: 74 (13 May 2021 05:47) (60 - 74)  BP: 144/73 (13 May 2021 05:47) (143/63 - 186/77)  RR: 18 (12 May 2021 23:00) (18 - 18)  SpO2: 99% RA    PHYSICAL EXAM:  GENERAL: NAD, well-groomed, well-developed  HEAD:  Atraumatic, Normocephalic  EYES: EOMI, PERRLA, conjunctiva and sclera clear  ENMT: No tonsillar erythema, exudates, or enlargement; Moist mucous membranes, Good dentition, No lesions  NECK: Supple, No JVD, Normal thyroid  NERVOUS SYSTEM:  Alert & Oriented X3,   PULM: Clear to auscultation bilaterally  CARDIAC: Regular rate and rhythm; No murmurs, rubs, or gallops  GI: Soft, Nontender, Nondistended; Bowel sounds present  EXTREMITIES:  2+ Peripheral Pulses, No clubbing, cyanosis, or edema  LYMPH: No lymphadenopathy noted  SKIN: No rashes or lesions    LABS:                        8.2    5.85  )-----------( 197      ( 13 May 2021 07:02 )             24.5       05-13    136  |  101  |  12  ----------------------------<  123<H>  4.1   |  27  |  1.0    Ca    8.2<L>      13 May 2021 07:02    TPro  6.2  /  Alb  2.9<L>  /  TBili  0.4  /  DBili  x   /  AST  39  /  ALT  36  /  AlkPhos  153<H>  05-13    MEDICATIONS  (STANDING):  ALBUTerol    90 MICROgram(s) HFA Inhaler 1 Puff(s) Inhalation every 6 hours  aMIOdarone    Tablet 200 milliGRAM(s) Oral two times a day  amLODIPine   Tablet 5 milliGRAM(s) Oral daily  ascorbic acid 500 milliGRAM(s) Oral daily  atorvastatin 40 milliGRAM(s) Oral at bedtime  chlorhexidine 4% Liquid 1 Application(s) Topical <User Schedule>  clopidogrel Tablet 75 milliGRAM(s) Oral daily  dextrose 40% Gel 15 Gram(s) Oral once  dextrose 5%. 1000 milliLiter(s) (50 mL/Hr) IV Continuous <Continuous>  dextrose 5%. 1000 milliLiter(s) (100 mL/Hr) IV Continuous <Continuous>  dextrose 50% Injectable 25 Gram(s) IV Push once  dextrose 50% Injectable 12.5 Gram(s) IV Push once  dextrose 50% Injectable 25 Gram(s) IV Push once  ferrous    sulfate 325 milliGRAM(s) Oral daily  glucagon  Injectable 1 milliGRAM(s) IntraMuscular once  insulin glargine Injectable (LANTUS) 7 Unit(s) SubCutaneous at bedtime  insulin lispro Injectable (ADMELOG) 2 Unit(s) SubCutaneous three times a day before meals  levothyroxine 88 MICROGram(s) Oral daily  metoprolol tartrate 25 milliGRAM(s) Oral every 6 hours  multivitamin 1 Tablet(s) Oral daily  pantoprazole  Injectable 40 milliGRAM(s) IV Push two times a day  senna 2 Tablet(s) Oral at bedtime  sodium bicarbonate 650 milliGRAM(s) Oral every 8 hours    Case Discussed with House Staff   Spectra x [phone]

## 2021-05-13 NOTE — OCCUPATIONAL THERAPY INITIAL EVALUATION ADULT - PERTINENT HX OF CURRENT PROBLEM, REHAB EVAL
77 YO F with a PMH of DM2 on insulin pump, HTN, CKD3, chronic Afib s/p ablation (Eliquis), CAD s/p stenting, SSS s/p PPM, PVD s/p right peripheral leg stent, Inclusion body myositis, basal cell carcinoma on the face (around nose) s/p MOHS procedure x 3 (5 years ago), chronic anemia, and hypothyroidism who was sent in from her [facility] ([facility]) for eval of abnormal out-pt labs showing low Hgb.

## 2021-05-13 NOTE — DISCHARGE NOTE PROVIDER - NSDCMRMEDTOKEN_GEN_ALL_CORE_FT
albuterol 90 mcg/inh inhalation aerosol: 1 puff(s) inhaled every 6 hours  amiodarone 200 mg oral tablet: 1 tab(s) orally 2 times a day  atorvastatin 40 mg oral tablet: 1 tab(s) orally once a day (at bedtime)  Basaglar KwikPen 100 units/mL subcutaneous solution: 7 unit(s) subcutaneous once a day (at bedtime)  clopidogrel 75 mg oral tablet: 1 tab(s) orally once a day MDD:1  Eliquis 2.5 mg oral tablet: 1 tab(s) orally 2 times a day MDD:2    Gamunex 10% intravenous solution: every 5 weeks; Contiune to hold. Needs to start after follow up with neurologist  insulin lispro 100 units/mL injectable solution: 2 unit(s) subcutaneous 3 times a day (with meals)  Lasix 40 mg oral tablet: 1 tab(s) orally once a day  levothyroxine 88 mcg (0.088 mg) oral tablet: 1 tab(s) orally once a day  metoprolol tartrate 25 mg oral tablet: 1 tab(s) orally every 6 hours  Multiple Vitamins oral tablet: 1 tab(s) orally once a day  nystatin 100,000 units/mL oral suspension: 5 milliliter(s) orally 4 times a day  pantoprazole 40 mg oral delayed release tablet: 1 tab(s) orally once a day (before a meal)  senna oral tablet: 2 tab(s) orally once a day (at bedtime)  sodium bicarbonate 650 mg oral tablet: 1 tab(s) orally every 8 hours  Vitamin C 250 mg oral tablet: 1 tab(s) orally 2 times a day   albuterol 90 mcg/inh inhalation aerosol: 1 puff(s) inhaled every 6 hours  amiodarone 200 mg oral tablet: 1 tab(s) orally 2 times a day  atorvastatin 40 mg oral tablet: 1 tab(s) orally once a day (at bedtime)  Basaglar KwikPen 100 units/mL subcutaneous solution: 7 unit(s) subcutaneous once a day (at bedtime)  clopidogrel 75 mg oral tablet: 1 tab(s) orally once a day MDD:1  Eliquis 2.5 mg oral tablet: 1 tab(s) orally 2 times a day MDD:2    ertapenem 1 g injection: 1 gram(s) injectable once a day for a total of 14 days, end on 6/2  Gamunex 10% intravenous solution: every 5 weeks; Contiune to hold. Needs to start after follow up with neurologist  insulin lispro 100 units/mL injectable solution: 2 unit(s) subcutaneous 3 times a day (with meals)  Lasix 40 mg oral tablet: 1 tab(s) orally once a day  levothyroxine 88 mcg (0.088 mg) oral tablet: 1 tab(s) orally once a day  metoprolol tartrate 25 mg oral tablet: 1 tab(s) orally every 6 hours  Multiple Vitamins oral tablet: 1 tab(s) orally once a day  nystatin 100,000 units/mL oral suspension: 5 milliliter(s) orally 4 times a day  pantoprazole 40 mg oral delayed release tablet: 1 tab(s) orally once a day (before a meal)  senna oral tablet: 2 tab(s) orally once a day (at bedtime)  senna oral tablet: 2 tab(s) orally once a day (at bedtime)  sodium bicarbonate 650 mg oral tablet: 1 tab(s) orally every 8 hours  Vitamin C 250 mg oral tablet: 1 tab(s) orally 2 times a day   albuterol 90 mcg/inh inhalation aerosol: 1 puff(s) inhaled every 6 hours, As Needed  amiodarone 200 mg oral tablet: 1 tab(s) orally 2 times a day  atorvastatin 40 mg oral tablet: 1 tab(s) orally once a day (at bedtime)  Basaglar KwikPen 100 units/mL subcutaneous solution: 16 unit(s) subcutaneous once a day (at bedtime)  clopidogrel 75 mg oral tablet: 1 tab(s) orally once a day MDD:1  Eliquis 2.5 mg oral tablet: 1 tab(s) orally 2 times a day MDD:2    ertapenem 1 g injection: 1 gram(s) injectable once a day for a total of 14 days, end on 6/2  Gamunex 10% intravenous solution: every 5 weeks; Last dose given May 10th. Needs to start after follow up with neurologist Dr. Win  insulin lispro 100 units/mL injectable solution: 2 unit(s) subcutaneous 3 times a day (with meals)  Lasix 40 mg oral tablet: 1 tab(s) orally once a day, As Needed  CONTINUE TO HOLD, patient has NOT demonstrated signs of hypervolemia through her entire inpatient stay, reevaluate volume status at SNF and possibly reinstitute   levothyroxine 88 mcg (0.088 mg) oral tablet: 1 tab(s) orally once a day  metoprolol tartrate 25 mg oral tablet: 1 tab(s) orally every 6 hours  Multiple Vitamins oral tablet: 1 tab(s) orally once a day  pantoprazole 40 mg oral delayed release tablet: 1 tab(s) orally once a day (before a meal)  senna oral tablet: 2 tab(s) orally once a day (at bedtime)  sodium bicarbonate 650 mg oral tablet: 1 tab(s) orally every 8 hours  Vitamin C 250 mg oral tablet: 1 tab(s) orally 2 times a day

## 2021-05-13 NOTE — DISCHARGE NOTE PROVIDER - NPI NUMBER (FOR SYSADMIN USE ONLY) :
[[id number] [[id number]],[[id number]],[[id number] [[id number]],[[id number]],[[id number]],[[id number]]

## 2021-05-13 NOTE — DISCHARGE NOTE PROVIDER - PROVIDER TOKENS
PROVIDER:[TOKEN:[[id number]],FOLLOWUP:[2 weeks]],PROVIDER:[TOKEN:[[id number]],FOLLOWUP:[2 weeks]],PROVIDER:[TOKEN:[[id number]],FOLLOWUP:[2 weeks]] PROVIDER:[TOKEN:[[id number]],FOLLOWUP:[2 weeks]],PROVIDER:[TOKEN:[[id number]],FOLLOWUP:[2 weeks]],PROVIDER:[TOKEN:[[id number]],FOLLOWUP:[2 weeks]],PROVIDER:[TOKEN:[[id number]],FOLLOWUP:[2 weeks]] PROVIDER:[TOKEN:[[id number]],FOLLOWUP:[2 weeks]],PROVIDER:[TOKEN:[[id number]],FOLLOWUP:[2 weeks]],PROVIDER:[TOKEN:[[id number]],FOLLOWUP:[2 weeks]],PROVIDER:[TOKEN:[[id number]],FOLLOWUP:[2 weeks]],PROVIDER:[TOKEN:[[id number]],FOLLOWUP:[1 week]] PROVIDER:[TOKEN:[[id number]],FOLLOWUP:[2 weeks]],PROVIDER:[TOKEN:[[id number]],FOLLOWUP:[2 weeks]],PROVIDER:[TOKEN:[[id number]],FOLLOWUP:[2 weeks]],PROVIDER:[TOKEN:[[id number]],FOLLOWUP:[2 weeks]],PROVIDER:[TOKEN:[[id number]],SCHEDULEDAPPT:[05/27/2021],SCHEDULEDAPPTTIME:[03:15 PM]]

## 2021-05-13 NOTE — DISCHARGE NOTE PROVIDER - CARE PROVIDER_API CALL
Rico Liz)  [address]  [address] [place] [address]  Phone: [phone]  Fax: [phone]  Follow Up Time: 2 weeks    Cristobal Win)  Neurology  [address] [place] [address]  Phone: [phone]  Fax: [phone]  Follow Up Time: 2 weeks    Wilfredo Payton  INTERNAL MEDICINE  [address], [place] [address]  Phone: [phone]  Fax: [phone]  Follow Up Time: 2 weeks   Rico Liz)  [address]  [address] [place] [address]  Phone: [phone]  Fax: [phone]  Follow Up Time: 2 weeks    Cristobal Win)  Neurology  [address] [place] [address], [facility] [address]  [place] [address]  Phone: [phone]  Fax: [phone]  Follow Up Time: 2 weeks    Wilfredo Payton  INTERNAL MEDICINE  [address], [place] [address]  Phone: [phone]  Fax: [phone]  Follow Up Time: 2 weeks    Pb Palafox)  Internal Medicine; Medical Oncology  [address] [place] [address]  Phone: [phone]  Fax: [phone]  Follow Up Time: 2 weeks   Rico Liz)  [address]  [address] [place] [address]  Phone: [phone]  Fax: [phone]  Follow Up Time: 2 weeks    Cristobal Win)  Neurology  [address] [facility], [address]  [place] [address]  Phone: [phone]  Fax: [phone]  Follow Up Time: 2 weeks    Wilfredo Payton  INTERNAL MEDICINE  [address] [place] [address]  Phone: [phone]  Fax: [phone]  Follow Up Time: 2 weeks    Pb Palafox)  Internal Medicine; Medical Oncology  [address] [address]  Phone: [phone]  Fax: [phone]  Follow Up Time: 2 weeks    Jose Velarde)  Urology  [address] [place] [address]  Phone: [phone]  Fax: [phone]  Follow Up Time: 1 week   Rico Liz)  [address]  [address] [place] [address]  Phone: [phone]  Fax: [phone]  Follow Up Time: 2 weeks    Cristobal Win)  Neurology  [address] [facility], [address]  [place] [address]  Phone: [phone]  Fax: [phone]  Follow Up Time: 2 weeks    Wilfredo Payton  INTERNAL MEDICINE  [address] [place] [address]  Phone: [phone]  Fax: [phone]  Follow Up Time: 2 weeks    Pb Palafox)  Internal Medicine; Medical Oncology  [address] [address]  Phone: [phone]  Fax: [phone]  Follow Up Time: 2 weeks    Jose Velarde)  Urology  [address] [place] [address]  Phone: [phone]  Fax: [phone]  Scheduled Appointment: 05/27/2021 03:15 PM

## 2021-05-13 NOTE — PROGRESS NOTE ADULT - SUBJECTIVE AND OBJECTIVE BOX
DAILY PROGRESS NOTE  ===========================================================    Patient Information:  SOWMYA AMADO  /  76y  /  Female  /  MRN#: [id number]    Hospital Day: 4d     |:::::::::::::::::::::::::::| SUBJECTIVE |:::::::::::::::::::::::::::|    OVERNIGHT EVENTS: None  TODAY: Patient was seen today at bedside. Patient has no complaints, pending results of COVID PCR for d/c.    |:::::::::::::::::::::::::::| OBJECTIVE |:::::::::::::::::::::::::::|    VITAL SIGNS: Last 24 Hours  T(C): 36.3 (13 May 2021 05:47), Max: 36.3 (13 May 2021 05:47)  T(F): 97.3 (13 May 2021 05:47), Max: 97.3 (13 May 2021 05:47)  HR: 74 (13 May 2021 05:47) (60 - 74)  BP: 144/73 (13 May 2021 05:47) (143/63 - 186/77)  BP(mean): --  RR: 18 (12 May 2021 23:00) (18 - 18)  SpO2: --    05-12-21 @ 07:01  -  05-13-21 @ 07:00  --------------------------------------------------------  IN: 0 mL / OUT: 1500 mL / NET: -1500 mL    05-13-21 @ 07:01  - 05-13-21 @ 13:41  --------------------------------------------------------  IN: 0 mL / OUT: 200 mL / NET: -200 mL      PHYSICAL EXAM:  GENERAL:   Awake, alert; NAD.  HEENT:  Head NC/AT; Conjunctivae pink, Sclera anicteric; Oral mucosa moist.  CARDIO:   Regular rate; Regular rhythm; S1 & S2.  RESP:   No rales, wheezing, or rhonchi appreciated.  GI:   Soft; NT/ND; BS; No guarding; No rebound tenderness.  EXT:   Strength UE 5/5; Strength LE 5/5; No edema.   SKIN:   Intact.    LAB RESULTS:                        8.2    5.85  )-----------( 197      ( 13 May 2021 07:02 )             24.5     05-13    136  |  101  |  12  ----------------------------<  123<H>  4.1   |  27  |  1.0    Ca    8.2<L>      13 May 2021 07:02    TPro  6.2  /  Alb  2.9<L>  /  TBili  0.4  /  DBili  x   /  AST  39  /  ALT  36  /  AlkPhos  153<H>  05-13          MICROBIOLOGY:    RADIOLOGY:    ALLERGIES: latex (Pruritus; Rash)  sulfonamides (Unknown)      ===========================================================

## 2021-05-13 NOTE — DISCHARGE NOTE PROVIDER - NSDCCPCAREPLAN_GEN_ALL_CORE_FT
PRINCIPAL DISCHARGE DIAGNOSIS  Diagnosis: Drug-induced pancytopenia  Assessment and Plan of Treatment: We found that the levels of your different blood cells were low. This was probably caused by medications that you took the last time you were hospitalized. Please follow up with Dr. Palafox for further management.  SEEK IMMEDIATE MEDICAL CARE IF YOU HAVE ANY OF THE FOLLOWING SYMPTOMS: extreme weakness/chest pain/shortness of breath, black or bloody stools, vomiting blood, fainting, fever, or any signs of dehydration.      SECONDARY DISCHARGE DIAGNOSES  Diagnosis: Stage 3 chronic kidney disease  Assessment and Plan of Treatment: You have chronic kidney disease which may be contributing to your anemia. Please follow up with Dr. Payton for further management.    Diagnosis: Inclusion body myositis  Assessment and Plan of Treatment: You were given 2 Gamunex infusions while hospitalized here. Please follow with Dr. Win for further management.    Diagnosis: Chronic atrial fibrillation  Assessment and Plan of Treatment: You have a history of AFib. You should restart your eliquis, but watch for any signs of bleeding and return to the emergency if you notice any bloody stools, weakness, or any other bleeding.     PRINCIPAL DISCHARGE DIAGNOSIS  Diagnosis: Drug-induced pancytopenia  Assessment and Plan of Treatment: We found that the levels of your different blood cells were low. This was probably caused by medications that you took the last time you were hospitalized. Please follow up with Dr. Palafox for further management.  SEEK IMMEDIATE MEDICAL CARE IF YOU HAVE ANY OF THE FOLLOWING SYMPTOMS: extreme weakness/chest pain/shortness of breath, black or bloody stools, vomiting blood, fainting, fever, or any signs of dehydration.  you were transfused blood during this admission   you must follow up with hematologist as well as your Kidney Dr for this anemia      SECONDARY DISCHARGE DIAGNOSES  Diagnosis: Stage 3 chronic kidney disease  Assessment and Plan of Treatment: You have chronic kidney disease which may be contributing to your anemia. Please follow up with Dr. Payton for further management.    Diagnosis: Inclusion body myositis  Assessment and Plan of Treatment: You were given 2 Gamunex infusions while hospitalized here. Please follow with Dr. Win for further management.    Diagnosis: Chronic atrial fibrillation  Assessment and Plan of Treatment: You have a history of AFib. You should restart your eliquis, but watch for any signs of bleeding and return to the emergency if you notice any bloody stools, weakness, or any other bleeding.     PRINCIPAL DISCHARGE DIAGNOSIS  Diagnosis: Drug-induced pancytopenia  Assessment and Plan of Treatment: We found that the levels of your different blood cells were low. This was probably caused by medications that you took the last time you were hospitalized. Please follow up with Dr. Palafox for further management.  SEEK IMMEDIATE MEDICAL CARE IF YOU HAVE ANY OF THE FOLLOWING SYMPTOMS: extreme weakness/chest pain/shortness of breath, black or bloody stools, vomiting blood, fainting, fever, or any signs of dehydration.  you were transfused blood during this admission   you must follow up with hematologist as well as your Kidney Dr for this anemia      SECONDARY DISCHARGE DIAGNOSES  Diagnosis: Stage 3 chronic kidney disease  Assessment and Plan of Treatment: You have chronic kidney disease which may be contributing to your anemia. Please follow up with Dr. Payton for further management.    Diagnosis: Inclusion body myositis  Assessment and Plan of Treatment: You were given 2 Gamunex infusions while hospitalized here. Please follow with Dr. Win for further management.    Diagnosis: Chronic atrial fibrillation  Assessment and Plan of Treatment: You have a history of AFib. You should restart your eliquis, but watch for any signs of bleeding and return to the emergency if you notice any bloody stools, weakness, or any other bleeding.    Diagnosis: E-coli UTI  Assessment and Plan of Treatment: You were noted either on arrival or during this hospitalization to have a Urinary Tract Infection. You may have already been treated and completed the antibiotics, please refer to the list of medications present on your discharge paperwork. If you notice that there are antibiotics listed, these may be to treat your infection, be sure to complete taking the full course, whether you have symptoms or not, as prescribed.  While taking antibiotics, you may benefit from taking a probiotic such as florastore to help to try and prevent an infectious type of diarrhea known as C Diff. If you notice that you begin having severe watery diarrhea, more than 4-5 episodes a day, please see your Primary Care Doctor or come to the ER to have your stool tested for this infection.   It is not necessary to repeat a urine test to see if the infection is gone, it is assumed that after treatment it should have resolved. However, if you continue to have symptoms, please see your Primary Care doctor or return to the ER.       PRINCIPAL DISCHARGE DIAGNOSIS  Diagnosis: Drug-induced pancytopenia  Assessment and Plan of Treatment: We found that the levels of your different blood cells were low. This was probably caused by medications that you took the last time you were hospitalized. Please follow up with Dr. Palafox for further management.  SEEK IMMEDIATE MEDICAL CARE IF YOU HAVE ANY OF THE FOLLOWING SYMPTOMS: extreme weakness/chest pain/shortness of breath, black or bloody stools, vomiting blood, fainting, fever, or any signs of dehydration.  you were transfused blood during this admission   you must follow up with hematologist as well as your Kidney Dr for this anemia      SECONDARY DISCHARGE DIAGNOSES  Diagnosis: Stage 3 chronic kidney disease  Assessment and Plan of Treatment: You have chronic kidney disease which may be contributing to your anemia. Please follow up with Dr. Payton for further management.    Diagnosis: Inclusion body myositis  Assessment and Plan of Treatment: You were given 2 Gamunex infusions while hospitalized here. Please follow with Dr. Win for further management.    Diagnosis: Chronic atrial fibrillation  Assessment and Plan of Treatment: You have a history of AFib. You should restart your eliquis, but watch for any signs of bleeding and return to the emergency if you notice any bloody stools, weakness, or any other bleeding.    Diagnosis: E-coli UTI  Assessment and Plan of Treatment: You were noted either on arrival or during this hospitalization to have a Urinary Tract Infection. You may have already been treated and completed the antibiotics, please refer to the list of medications present on your discharge paperwork. If you notice that there are antibiotics listed, these may be to treat your infection, be sure to complete taking the full course, whether you have symptoms or not, as prescribed.  While taking antibiotics, you may benefit from taking a probiotic such as florastore to help to try and prevent an infectious type of diarrhea known as C Diff. If you notice that you begin having severe watery diarrhea, more than 4-5 episodes a day, please see your Primary Care Doctor or come to the ER to have your stool tested for this infection.   It is not necessary to repeat a urine test to see if the infection is gone, it is assumed that after treatment it should have resolved. However, if you continue to have symptoms, please see your Primary Care doctor or return to the ER.      Diagnosis: Urinary retention  Assessment and Plan of Treatment: you had urinary retnetion and we had to put a wiggins to help with relieving your bladder. please follow up with Dr Velarde as outpatient for further intervention     PRINCIPAL DISCHARGE DIAGNOSIS  Diagnosis: Drug-induced pancytopenia  Assessment and Plan of Treatment: We found that the levels of your different blood cells were low. This was probably caused by medications that you took the last time you were hospitalized. Please follow up with Dr. Palafox for further management.  SEEK IMMEDIATE MEDICAL CARE IF YOU HAVE ANY OF THE FOLLOWING SYMPTOMS: extreme weakness/chest pain/shortness of breath, black or bloody stools, vomiting blood, fainting, fever, or any signs of dehydration.  you were transfused blood during this admission   you must follow up with hematologist as well as your Kidney Dr for this anemia      SECONDARY DISCHARGE DIAGNOSES  Diagnosis: Stage 3 chronic kidney disease  Assessment and Plan of Treatment: You have chronic kidney disease which may be contributing to your anemia. Please follow up with Dr. Payton for further management.    Diagnosis: Inclusion body myositis  Assessment and Plan of Treatment: You were given 2 Gamunex infusions while hospitalized here. Please follow with Dr. Win for further management.    Diagnosis: Chronic atrial fibrillation  Assessment and Plan of Treatment: You have a history of AFib. You should restart your eliquis, but watch for any signs of bleeding and return to the emergency if you notice any bloody stools, weakness, or any other bleeding.    Diagnosis: E-coli UTI  Assessment and Plan of Treatment: Please continue Abx until 6/2 .  You were noted either on arrival or during this hospitalization to have a Urinary Tract Infection. You may have already been treated and completed the antibiotics, please refer to the list of medications present on your discharge paperwork. If you notice that there are antibiotics listed, these may be to treat your infection, be sure to complete taking the full course, whether you have symptoms or not, as prescribed.  While taking antibiotics, you may benefit from taking a probiotic such as florastore to help to try and prevent an infectious type of diarrhea known as C Diff. If you notice that you begin having severe watery diarrhea, more than 4-5 episodes a day, please see your Primary Care Doctor or come to the ER to have your stool tested for this infection.   It is not necessary to repeat a urine test to see if the infection is gone, it is assumed that after treatment it should have resolved. However, if you continue to have symptoms, please see your Primary Care doctor or return to the ER.      Diagnosis: Urinary retention  Assessment and Plan of Treatment: you had urinary retnetion and we had to put a wiggins to help with relieving your bladder. please follow up with Dr Velarde as outpatient for further intervention/removal wiggins     PRINCIPAL DISCHARGE DIAGNOSIS  Diagnosis: Drug-induced pancytopenia  Assessment and Plan of Treatment: We found that the levels of your different blood cells were low. This was probably caused by medications that you took the last time you were hospitalized. Please follow up with Dr. Palafox for further management.  SEEK IMMEDIATE MEDICAL CARE IF YOU HAVE ANY OF THE FOLLOWING SYMPTOMS: extreme weakness/chest pain/shortness of breath, black or bloody stools, vomiting blood, fainting, fever, or any signs of dehydration.  you were transfused blood during this admission   you must follow up with hematologist as well as your Kidney Dr for this anemia      SECONDARY DISCHARGE DIAGNOSES  Diagnosis: Stage 3 chronic kidney disease  Assessment and Plan of Treatment: You have chronic kidney disease which may be contributing to your anemia. Please follow up with Dr. Payton for further management.    Diagnosis: Inclusion body myositis  Assessment and Plan of Treatment: You were given 2 Gamunex infusions while hospitalized here. Please follow with Dr. Win for further management.    Diagnosis: Chronic atrial fibrillation  Assessment and Plan of Treatment: You have a history of AFib. You should restart your eliquis, but watch for any signs of bleeding and return to the emergency if you notice any bloody stools, weakness, or any other bleeding.    Diagnosis: E-coli UTI  Assessment and Plan of Treatment: Please continue Abx until 6/2 .  You were noted either on arrival or during this hospitalization to have a Urinary Tract Infection. You may have already been treated and completed the antibiotics, please refer to the list of medications present on your discharge paperwork. If you notice that there are antibiotics listed, these may be to treat your infection, be sure to complete taking the full course, whether you have symptoms or not, as prescribed.  While taking antibiotics, you may benefit from taking a probiotic such as florastore to help to try and prevent an infectious type of diarrhea known as C Diff. If you notice that you begin having severe watery diarrhea, more than 4-5 episodes a day, please see your Primary Care Doctor or come to the ER to have your stool tested for this infection.   It is not necessary to repeat a urine test to see if the infection is gone, it is assumed that after treatment it should have resolved. However, if you continue to have symptoms, please see your Primary Care doctor or return to the ER.      Diagnosis: Urinary retention  Assessment and Plan of Treatment: you had urinary retnetion and we had to put a wiggins to help with relieving your bladder. please follow up with Dr Velarde as outpatient for further intervention/removal wiggins    Diagnosis: Pyelonephritis  Assessment and Plan of Treatment:     Diagnosis: Hydronephrosis  Assessment and Plan of Treatment:

## 2021-05-13 NOTE — PROGRESS NOTE ADULT - ASSESSMENT
76 year old female with  PMH of DM on insulin pump, Celiac disease, HTN, CKD3, Afib s/p ablation on Eliquis, CAD s/p stenting in 2020, sick sinus syndrome s/p PPM, PVD s/p right peripheral leg stent, Inclusion body myositis, basal cell carcinoma , chronic KEYANNA, hypothyroidism, presented for anemia.    # Pancytopenia likely drug induced 2/2 linezolid or ertapenem  # H/o iron deficiency anemia  - Hgb 6.9 in ED, s/p 1u PRBC  - Patient denies any bleeding or dark bowel movements, ROLAND (-)  - Maintain 2 large bore IVs. PPI and consider GI consult if patient is actively bleeding  - F/u stool guaiac  - VTE ppx with SCD  - Consult GI if evidence of bleeding (sees Dr. Sheets as outpt)  - Heme onc following: Ferritin 1473 -> can c/w home Fe supplements  -- (+) Noel 2/2 IVIG  -- Transfuse for Hb <7 or Platelet count <10K or <50K and any evidence of active bleeding  -- Can f/u wit Dr. Palafox as outpt for remaining anemia w/u    #HFpEF, stable  - Echo - EF 58%   - Holding lasix for now    # H/o Inclusion body myositis  - Patient endorses missing past two infusions given recent hospitalizations  - Normally gets Gamunex infusions every 5 weeks but missed last two infusions  - Per Neuro: Give gamunex 15mL over 6 hours daily x2 days  -- F/u Dr. Win as outpt    # Transaminitis, improving  - Monitor CMP    # DMII  - C/w home insulin regimen    # H/o chronic  afib-rate controlled  # H/o SSS S/p PPM  - c/w amiodarone, metoprolol  - holding eliquis For now.     # H/o CAD S/p PCI  # H/o PVD S/p stent  - c/w plavix, statin, metoprolol    # Hypothyroidism  - c/w synthroid    GI ppx:  [x] Not indicated  /  [] Pantoprazole 40mg PO Daily  DVT ppx: [] Not indicated  /  [x] Heparin 5000mg SubQ  /  [] Lovenox 40mg SubQ  /  [] SCDs  Fluids:  [x] PO  /  [] IVF  Activity:  [x] Increase as Tolerated  /  [] OOB w/ assist  /  [] Bed Rest  Diet:  [x] DASH / [x] Carb Consistent  /  [] Renal  /  [] Dysphagia  /  [] NPO  Patient to be discharged when condition(s) optimized:  [] Home  / [x] SNF  /  [] Long Term Rehab    CODE STATUS:   [x] FULL   /   [] DNR

## 2021-05-13 NOTE — DISCHARGE NOTE PROVIDER - NSDCFUSCHEDAPPT_GEN_ALL_CORE_FT
SOWMYA AMADO ; 05/20/2021 ; [facility] [address] [facility] SOWMYA Jung ; 06/25/2021 ; [facility] [address] [facility] SOWMYA Jung ; 07/13/2021 ; [facility] [address] SOWMYA AMADO ; 05/20/2021 ; [facility] Card[facility] [address] [facility] SOWMYA Jung ; 06/25/2021 ; [facility] Card[facility] [address] [facility] SOWMYA Jung ; 07/13/2021 ; [facility] Neuro [address] SOWMYA Jung ; 08/17/2021 ; [facility] Cardio [address] [facility] [address] SOWMYA AMADO ; 06/25/2021 ; [facility] [address] [facility] SOWMYA Jung ; 07/13/2021 ; [facility] [address] SOWMYA Jung ; 08/17/2021 ; [facility] [address] [facility] [address]

## 2021-05-13 NOTE — PROGRESS NOTE ADULT - ASSESSMENT
76 year old female with  PMH of DM on insulin pump, Celiac disease, HTN, CKD3, Afib s/p ablation on Eliquis, CAD s/p stenting in 2020, sick sinus syndrome s/p PPM, PVD s/p right peripheral leg stent, Inclusion body myositis, basal cell carcinoma , chronic KEYANNA, hypothyroidism, presented for anemia.    #Pancytopenia secondary to linezolid   improving   f/u with hematology in the office     Anemia of CD 2/2 CKD3  cw iron sulfate at home  ?role for epo  f/u Nephro in clinic     #Chronic HFpEF (compensated)     #Chronic atrial fibrillation   - rate controlled  - ?AC    #Sick sinus syndrome s/p ppm    #CAD     #PVD    #Hyponatremia no intervention indicated    #DM   CAPILLARY BLOOD GLUCOSE  POCT Blood Glucose.: 205 mg/dL (13 May 2021 11:18)  POCT Blood Glucose.: 134 mg/dL (13 May 2021 07:31)  POCT Blood Glucose.: 184 mg/dL (12 May 2021 22:05)  POCT Blood Glucose.: 227 mg/dL (12 May 2021 16:36)    #Inclusion body myositis  -s/p IVIG  -f/u with Neurology in the office and Rheumatology in the office     #CKD 3 (stable)     #Moderate tricuspid regurgitation.    # pulmonary hypertension.    Progress Note Handoff    Family discussion: patient verbalized understanding and agreeable to plan of care     Disposition: SNF (Insurance Authorization received. COVID TEST PENDING - Order Placed in am)

## 2021-05-14 NOTE — PROGRESS NOTE ADULT - ASSESSMENT
76 year old female with  PMH of DM on insulin pump, Celiac disease, HTN, CKD3, Afib s/p ablation on Eliquis, CAD s/p stenting in 2020, sick sinus syndrome s/p PPM, PVD s/p right peripheral leg stent, Inclusion body myositis, basal cell carcinoma , chronic KEYANNA, hypothyroidism, presented for anemia.    #?Fever  - NH refused patient for T101, afebrile throughout hospital stay, WBC wnl  - CXR (-)  - Large leuk esterase on UA, asymptomatic. Treat if spikes another fever  - F/u UCx, ESR, CRP, CK    # Pancytopenia likely drug induced 2/2 linezolid or ertapenem  # H/o iron deficiency anemia  - Hgb 6.9 in ED, s/p 1u PRBC  - Patient denies any bleeding or dark bowel movements, ROLAND (-)  - Maintain 2 large bore IVs. PPI and consider GI consult if patient is actively bleeding  - F/u stool guaiac  - VTE ppx with SCD  - Consult GI if evidence of bleeding (sees Dr. Sheets as outpt)  - Heme onc following: Ferritin 1473 -> can c/w home Fe supplements  -- (+) Noel 2/2 IVIG  -- Transfuse for Hb <7 or Platelet count <10K or <50K and any evidence of active bleeding  -- Can f/u wit Dr. Palafox as outpt for remaining anemia w/u    #HFpEF, stable  - Echo - EF 58%   - Holding lasix for now    # H/o Inclusion body myositis  - Patient endorses missing past two infusions given recent hospitalizations  - Normally gets Gamunex infusions every 5 weeks but missed last two infusions  - Per Neuro: S/p Gamunex 15mL over 6 hours daily x2 days, finished 5/11  -- F/u Dr. Win as outpt    # Transaminitis, improving  - Monitor CMP    # DMII  - C/w home insulin regimen    # H/o chronic  afib-rate controlled  # H/o SSS S/p PPM  - c/w amiodarone, metoprolol  - holding eliquis For now.     # H/o CAD S/p PCI  # H/o PVD S/p stent  - c/w plavix, statin, metoprolol    # Hypothyroidism  - c/w synthroid    GI ppx:  [x] Not indicated  /  [] Pantoprazole 40mg PO Daily  DVT ppx: [] Not indicated  /  [x] Heparin 5000mg SubQ  /  [] Lovenox 40mg SubQ  /  [] SCDs  Fluids:  [x] PO  /  [] IVF  Activity:  [x] Increase as Tolerated  /  [] OOB w/ assist  /  [] Bed Rest  Diet:  [x] DASH / [x] Carb Consistent  /  [] Renal  /  [] Dysphagia  /  [] NPO  Patient to be discharged when condition(s) optimized:  [] Home  / [x] SNF  /  [] Long Term Rehab    CODE STATUS:   [x] FULL   /   [] DNR

## 2021-05-14 NOTE — PROGRESS NOTE ADULT - SUBJECTIVE AND OBJECTIVE BOX
DAILY PROGRESS NOTE  ===========================================================    Patient Information:  SOWMYA AMADO  /  76y  /  Female  /  MRN#: [id number]    Hospital Day: 5d     |:::::::::::::::::::::::::::| SUBJECTIVE |:::::::::::::::::::::::::::|    OVERNIGHT EVENTS: NH refused patient for T101. Overnight TMax was 100.5F.  TODAY: Patient was seen today at bedside. She has no complaints and feels well.    |:::::::::::::::::::::::::::| OBJECTIVE |:::::::::::::::::::::::::::|    VITAL SIGNS: Last 24 Hours  T(C): 37.4 (14 May 2021 05:16), Max: 38.1 (14 May 2021 05:11)  T(F): 99.3 (14 May 2021 05:16), Max: 100.5 (14 May 2021 05:11)  HR: 63 (14 May 2021 05:11) (60 - 63)  BP: 152/70 (14 May 2021 05:11) (152/70 - 185/79)  BP(mean): --  RR: 18 (14 May 2021 05:11) (18 - 18)  SpO2: 99% (13 May 2021 19:40) (99% - 100%)    21 @ 07:01  -  21 @ 07:00  --------------------------------------------------------  IN: 0 mL / OUT: 200 mL / NET: -200 mL      PHYSICAL EXAM:  GENERAL:   Awake, alert; NAD.  HEENT:  Head NC/AT; Conjunctivae pink, Sclera anicteric; Oral mucosa moist.  CARDIO:   Regular rate; Regular rhythm; S1 & S2.  RESP:   No rales, wheezing, or rhonchi appreciated.  GI:   Soft; NT/ND; BS; No guarding; No rebound tenderness.  EXT:   Strength UE 5/5; Strength LE 5/5; No edema.   SKIN:   Intact.    LAB RESULTS:                        8.0    6.96  )-----------( 267      ( 14 May 2021 05:59 )             24.1         130<L>  |  95<L>  |  16  ----------------------------<  98  3.8   |  25  |  1.3    Ca    7.6<L>      14 May 2021 05:59    TPro  5.7<L>  /  Alb  2.6<L>  /  TBili  0.5  /  DBili  x   /  AST  43<H>  /  ALT  36  /  AlkPhos  133<H>        Urinalysis Basic - ( 14 May 2021 11:41 )    Color: Yellow / Appearance: Turbid / S.011 / pH: x  Gluc: x / Ketone: Negative  / Bili: Negative / Urobili: <2 mg/dL   Blood: x / Protein: 300 mg/dL / Nitrite: Negative   Leuk Esterase: Large / RBC: x / WBC x   Sq Epi: x / Non Sq Epi: x / Bacteria: x              MICROBIOLOGY:    RADIOLOGY:    ALLERGIES: latex (Pruritus; Rash)  sulfonamides (Unknown)      ===========================================================     DAILY PROGRESS NOTE  ===========================================================    Patient Information:  SOWMYA AMADO  /  76y  /  Female  /  MRN#: [id number]    Hospital Day: 5d     |:::::::::::::::::::::::::::| SUBJECTIVE |:::::::::::::::::::::::::::|    OVERNIGHT EVENTS: NH refused patient for T101. Overnight TMax was 100.5F.  TODAY: Patient was seen today at bedside. She has no complaints and feels well.    |:::::::::::::::::::::::::::| OBJECTIVE |:::::::::::::::::::::::::::|    VITAL SIGNS: Last 24 Hours  T(C): 37.4 (14 May 2021 05:16), Max: 38.1 (14 May 2021 05:11)  T(F): 99.3 (14 May 2021 05:16), Max: 100.5 (14 May 2021 05:11)  HR: 63 (14 May 2021 05:11) (60 - 63)  BP: 152/70 (14 May 2021 05:11) (152/70 - 185/79)  RR: 18 (14 May 2021 05:11) (18 - 18)  SpO2: 99% (13 May 2021 19:40) (99% - 100%)    21 @ 07:01  -  21 @ 07:00  --------------------------------------------------------  IN: 0 mL / OUT: 200 mL / NET: -200 mL      PHYSICAL EXAM:  GENERAL:   Awake, alert; NAD.  HEENT:  Head NC/AT; Conjunctivae pink, Sclera anicteric; Oral mucosa moist.  CARDIO:   Regular rate; Regular rhythm; S1 & S2.  RESP:   No rales, wheezing, or rhonchi appreciated.  GI:   Soft; NT/ND; BS; No guarding; No rebound tenderness.  EXT:   Strength UE 5/5; Strength LE 5/5; No edema.   SKIN:   Intact.    LAB RESULTS:                        8.0    6.96  )-----------( 267      ( 14 May 2021 05:59 )             24.1         130<L>  |  95<L>  |  16  ----------------------------<  98  3.8   |  25  |  1.3    Ca    7.6<L>      14 May 2021 05:59    TPro  5.7<L>  /  Alb  2.6<L>  /  TBili  0.5  /  DBili  x   /  AST  43<H>  /  ALT  36  /  AlkPhos  133<H>      Urinalysis Basic - ( 14 May 2021 11:41 )    Color: Yellow / Appearance: Turbid / S.011 / pH: x  Gluc: x / Ketone: Negative  / Bili: Negative / Urobili: <2 mg/dL   Blood: x / Protein: 300 mg/dL / Nitrite: Negative   Leuk Esterase: Large / RBC: x / WBC x   Sq Epi: x / Non Sq Epi: x / Bacteria: x    ALLERGIES: latex (Pruritus; Rash)    sulfonamides (Unknown)      ===========================================================

## 2021-05-15 NOTE — PROGRESS NOTE ADULT - SUBJECTIVE AND OBJECTIVE BOX
DAILY PROGRESS NOTE  ===========================================================    Patient Information:  SOWMYA AMADO  /  76y  /  Female  /  MRN#: [id number]    Hospital Day: 6d     |:::::::::::::::::::::::::::| SUBJECTIVE |:::::::::::::::::::::::::::|    OVERNIGHT EVENTS: None  TODAY: Patient was seen today at bedside. Review of systems is otherwise negative. Denies any urinary symptoms    |:::::::::::::::::::::::::::| OBJECTIVE |:::::::::::::::::::::::::::|    VITAL SIGNS: Last 24 Hours  T(C): 37.4 (15 May 2021 05:03), Max: 37.7 (14 May 2021 21:36)  T(F): 99.3 (15 May 2021 05:03), Max: 99.8 (14 May 2021 21:36)  HR: 62 (15 May 2021 05:03) (59 - 64)  BP: 164/74 (15 May 2021 05:03) (125/66 - 164/74)  BP(mean): --  RR: 18 (15 May 2021 05:03) (18 - 18)  SpO2: 98% (14 May 2021 20:05) (98% - 99%)    PHYSICAL EXAM:  GENERAL:   Awake, alert; NAD.  HEENT:  Head NC/AT; Conjunctivae pink, Sclera anicteric; Oral mucosa moist.  CARDIO:   Regular rate; Regular rhythm; S1 & S2.  RESP:   No rales, wheezing, or rhonchi appreciated.  GI:   Soft; NT/ND; BS; No guarding; No rebound tenderness.  EXT:   Strength UE 5/5; Strength LE 5/5; No edema.   SKIN:   Intact.    LAB RESULTS:                        8.0    6.96  )-----------( 267      ( 14 May 2021 05:59 )             24.1         130<L>  |  95<L>  |  16  ----------------------------<  98  3.8   |  25  |  1.3    Ca    7.6<L>      14 May 2021 05:59    TPro  5.7<L>  /  Alb  2.6<L>  /  TBili  0.5  /  DBili  x   /  AST  43<H>  /  ALT  36  /  AlkPhos  133<H>        Urinalysis Basic - ( 14 May 2021 11:41 )    Color: Yellow / Appearance: Turbid / S.011 / pH: x  Gluc: x / Ketone: Negative  / Bili: Negative / Urobili: <2 mg/dL   Blood: x / Protein: 300 mg/dL / Nitrite: Negative   Leuk Esterase: Large / RBC: 15 /HPF / WBC >720 /HPF   Sq Epi: x / Non Sq Epi: 0 /HPF / Bacteria: Many        Sedimentation Rate, Erythrocyte: 63 mm/Hr *H* (21 @ 12:42)  Creatine Kinase, Serum: 24 U/L (21 @ 12:42)    CARDIAC MARKERS ( 14 May 2021 12:42 )  x     / x     / 24 U/L / x     / x          MICROBIOLOGY:    RADIOLOGY:    ALLERGIES: latex (Pruritus; Rash)  sulfonamides (Unknown)      ===========================================================     DAILY PROGRESS NOTE  ===========================================================    Patient Information:  SOWMYA AMADO  /  76y  /  Female  /  MRN#: [id number]    Hospital Day: 6d     |:::::::::::::::::::::::::::| SUBJECTIVE |:::::::::::::::::::::::::::|    OVERNIGHT EVENTS: None  TODAY: Patient was seen today at bedside. Review of systems is otherwise negative. Denies any urinary symptoms    |:::::::::::::::::::::::::::| OBJECTIVE |:::::::::::::::::::::::::::|    VITAL SIGNS: Last 24 Hours  T(C): 37.4 (15 May 2021 05:03), Max: 37.7 (14 May 2021 21:36)  T(F): 99.3 (15 May 2021 05:03), Max: 99.8 (14 May 2021 21:36)  HR: 62 (15 May 2021 05:03) (59 - 64)  BP: 164/74 (15 May 2021 05:03) (125/66 - 164/74)  RR: 18 (15 May 2021 05:03) (18 - 18)  SpO2: 98% (14 May 2021 20:05) (98% - 99%)    PHYSICAL EXAM:  GENERAL:   Awake, alert; NAD.  HEENT:  Head NC/AT; Conjunctivae pink, Sclera anicteric; Oral mucosa moist.  CARDIO:   Regular rate; Regular rhythm; S1 & S2.  RESP:   No rales, wheezing, or rhonchi appreciated.  GI:   Soft; NT/ND; BS; No guarding; No rebound tenderness.  EXT:   Strength UE 5/5; Strength LE 5/5; No edema.   SKIN:   Intact.    LAB RESULTS:                        8.0    6.96  )-----------( 267      ( 14 May 2021 05:59 )             24.1         130<L>  |  95<L>  |  16  ----------------------------<  98  3.8   |  25  |  1.3    Ca    7.6<L>      14 May 2021 05:59    TPro  5.7<L>  /  Alb  2.6<L>  /  TBili  0.5  /  DBili  x   /  AST  43<H>  /  ALT  36  /  AlkPhos  133<H>        Urinalysis Basic - ( 14 May 2021 11:41 )    Color: Yellow / Appearance: Turbid / S.011 / pH: x  Gluc: x / Ketone: Negative  / Bili: Negative / Urobili: <2 mg/dL   Blood: x / Protein: 300 mg/dL / Nitrite: Negative   Leuk Esterase: Large / RBC: 15 /HPF / WBC >720 /HPF   Sq Epi: x / Non Sq Epi: 0 /HPF / Bacteria: Many      Sedimentation Rate, Erythrocyte: 63 mm/Hr *H* (21 @ 12:42)  Creatine Kinase, Serum: 24 U/L (21 @ 12:42)    CARDIAC MARKERS ( 14 May 2021 12:42 )  x     / x     / 24 U/L / x     / x          MICROBIOLOGY:    RADIOLOGY:    ALLERGIES: latex (Pruritus; Rash)  sulfonamides (Unknown)      ===========================================================

## 2021-05-15 NOTE — PROGRESS NOTE ADULT - ASSESSMENT
76 year old female with  PMH of DM on insulin pump, Celiac disease, HTN, CKD3, Afib s/p ablation on Eliquis, CAD s/p stenting in 2020, sick sinus syndrome s/p PPM, PVD s/p right peripheral leg stent, Inclusion body myositis, basal cell carcinoma , chronic KEYANNA, hypothyroidism, presented for anemia.    # ?Fever  # Possible cystitis vs. contamination  - NH refused patient for T101, afebrile throughout hospital stay, WBC wnl  - CXR (-)  - Large leuk esterase on UA, asymptomatic  - UCx: 50-99k E. Ecoli ESBL and 10-49k E. faecium VRE  - BCx: NGTD  - F/u repeat UCx, no abx for now unless spikes another fever    # Pancytopenia likely drug induced 2/2 linezolid or ertapenem  # H/o iron deficiency anemia  - Hgb 6.9 in ED, s/p 1u PRBC  - Patient denies any bleeding or dark bowel movements, ROLAND (-)  - Maintain 2 large bore IVs. PPI and consider GI consult if patient is actively bleeding  - F/u stool guaiac  - VTE ppx with SCD  - Consult GI if evidence of bleeding (sees Dr. Sheets as outpt)  - Heme onc following: Ferritin 1473 -> can c/w home Fe supplements  -- (+) Noel 2/2 IVIG  -- Transfuse for Hb <7 or Platelet count <10K or <50K and any evidence of active bleeding  -- Can f/u wit Dr. Palafox as outpt for remaining anemia w/u    #HFpEF, stable  - Echo - EF 58%   - Holding lasix for now    # H/o Inclusion body myositis  - Patient endorses missing past two infusions given recent hospitalizations  - Normally gets Gamunex infusions every 5 weeks but missed last two infusions  - Per Neuro: S/p Gamunex 15mL over 6 hours daily x2 days, finished 5/11  -- F/u Dr. Win as outpt    # Transaminitis, improving  - Monitor CMP    # DMII  - C/w home insulin regimen    # H/o chronic  afib-rate controlled  # H/o SSS S/p PPM  - c/w amiodarone, metoprolol  - holding eliquis For now.     # H/o CAD S/p PCI  # H/o PVD S/p stent  - c/w plavix, statin, metoprolol    # Hypothyroidism  - c/w synthroid    GI ppx:  [x] Not indicated  /  [] Pantoprazole 40mg PO Daily  DVT ppx: [] Not indicated  /  [x] Heparin 5000mg SubQ  /  [] Lovenox 40mg SubQ  /  [] SCDs  Fluids:  [x] PO  /  [] IVF  Activity:  [x] Increase as Tolerated  /  [] OOB w/ assist  /  [] Bed Rest  Diet:  [x] DASH / [x] Carb Consistent  /  [] Renal  /  [] Dysphagia  /  [] NPO  Patient to be discharged when condition(s) optimized:  [] Home  / [x] SNF  /  [] Long Term Rehab    CODE STATUS:   [x] FULL   /   [] DNR   76 year old female with  PMH of DM on insulin pump, Celiac disease, HTN, CKD3, Afib s/p ablation on Eliquis, CAD s/p stenting in 2020, sick sinus syndrome s/p PPM, PVD s/p right peripheral leg stent, Inclusion body myositis, basal cell carcinoma , chronic KEYANNA, hypothyroidism, presented for anemia.    # ?Fever  # Possible cystitis vs. contamination  - NH refused patient for T101, afebrile throughout hospital stay, WBC wnl  - CXR (-)  - Large leuk esterase on UA, asymptomatic  - UCx: 50-99k E. Ecoli ESBL and 10-49k E. faecium VRE  - BCx: NGTD  - F/u repeat UCx, no abx for now unless spikes another fever    # Pancytopenia likely drug induced 2/2 linezolid or ertapenem  # H/o iron deficiency anemia  - Hgb 6.9 in ED, s/p 1u PRBC  - Patient denies any bleeding or dark bowel movements, ROLAND (-)  - Maintain 2 large bore IVs. PPI and consider GI consult if patient is actively bleeding  - F/u stool guaiac  - VTE ppx with SCD  - Consult GI if evidence of bleeding (sees Dr. Sheets as outpt)  - Heme onc following: Ferritin 1473 -> can c/w home Fe supplements  -- (+) Noel 2/2 IVIG  -- Transfuse for Hb <7 or Platelet count <10K or <50K and any evidence of active bleeding  -- Can f/u wit Dr. Palafox as outpt for remaining anemia w/u    #HFpEF, stable  - Echo - EF 58%   - Holding lasix for now    # H/o Inclusion body myositis  - Patient endorses missing past two infusions given recent hospitalizations  - Normally gets Gamunex infusions every 5 weeks but missed last two infusions  - Per Neuro: S/p Gamunex 15mL over 6 hours daily x2 days, finished 5/11  -- F/u Dr. Win as outpt    # Transaminitis, improving  - Monitor CMP    # DMII  - C/w home insulin regimen    # H/o chronic  afib-rate controlled  # H/o SSS S/p PPM  - c/w amiodarone, metoprolol  - holding eliquis For now.     # H/o CAD S/p PCI  # H/o PVD S/p stent  - c/w plavix, statin, metoprolol    # Hypothyroidism  - c/w synthroid    GI ppx:  [x] Not indicated   DVT ppx:  [x] Heparin 5000mg SubQ   Fluids:  [x] PO    Activity:  [x] Increase as Tolerated   Diet:  [x] DASH / [x] Carb Consistent   Patient to be discharged when condition(s) optimized:  [] Home  / [x] SNF  /  [] Long Term Rehab    CODE STATUS:   [x] FULL

## 2021-05-16 NOTE — PROGRESS NOTE ADULT - SUBJECTIVE AND OBJECTIVE BOX
DAILY PROGRESS NOTE  ===========================================================    Patient Information:  SOWMYA AMADO  /  76y  /  Female  /  MRN#: [id number]    Hospital Day: 6d     |:::::::::::::::::::::::::::| SUBJECTIVE |:::::::::::::::::::::::::::|    OVERNIGHT EVENTS: None  TODAY: Patient was seen today at bedside. Review of systems is otherwise negative. Denies any urinary symptoms    |:::::::::::::::::::::::::::| OBJECTIVE |:::::::::::::::::::::::::::|    VITAL SIGNS: Last 24 Hours  Vital Signs Last 24 Hrs  T(C): 36.4 (16 May 2021 12:10), Max: 36.9 (16 May 2021 05:00)  T(F): 97.5 (16 May 2021 12:10), Max: 98.4 (16 May 2021 05:00)  HR: 60 (16 May 2021 11:37) (60 - 65)  BP: 131/47 (16 May 2021 11:37) (131/47 - 158/72)  RR: 15 (16 May 2021 12:10) (15 - 18)    PHYSICAL EXAM:  GENERAL:   Awake, alert; NAD.  HEENT:  Head NC/AT; Conjunctivae pink, Sclera anicteric; Oral mucosa moist.  CARDIO:   Regular rate; Regular rhythm; S1 & S2.  RESP:   No rales, wheezing, or rhonchi appreciated.  GI:   Soft; NT/ND; BS; No guarding; No rebound tenderness.  EXT:   Strength UE 5/5; Strength LE 5/5; No edema.   SKIN:   Intact.    LAB RESULTS:                        7.3    6.07  )-----------( 350      ( 16 May 2021 06:51 )             21.8       05-16    128<L>  |  95<L>  |  29<H>  ----------------------------<  109<H>  3.8   |  21  |  1.9<H>    Ca    7.4<L>      16 May 2021 06:51    TPro  5.1<L>  /  Alb  2.5<L>  /  TBili  0.5  /  DBili  x   /  AST  40  /  ALT  43<H>  /  AlkPhos  174<H>      Urinalysis Basic - ( 14 May 2021 11:41 )    Color: Yellow / Appearance: Turbid / S.011 / pH: x  Gluc: x / Ketone: Negative  / Bili: Negative / Urobili: <2 mg/dL   Blood: x / Protein: 300 mg/dL / Nitrite: Negative   Leuk Esterase: Large / RBC: 15 /HPF / WBC >720 /HPF   Sq Epi: x / Non Sq Epi: 0 /HPF / Bacteria: Many    Sedimentation Rate, Erythrocyte: 63 mm/Hr *H* (21 @ 12:42)  Creatine Kinase, Serum: 24 U/L (21 @ 12:42)    CARDIAC MARKERS ( 14 May 2021 12:42 )  x     / x     / 24 U/L / x     / x        ALLERGIES: latex (Pruritus; Rash)    sulfonamides (Unknown)      ===========================================================

## 2021-05-16 NOTE — PROGRESS NOTE ADULT - ASSESSMENT
76 year old female with  PMH of DM on insulin pump, Celiac disease, HTN, CKD3, Afib s/p ablation on Eliquis, CAD s/p stenting in 2020, sick sinus syndrome s/p PPM, PVD s/p right peripheral leg stent, Inclusion body myositis, basal cell carcinoma , chronic KEYANNA, hypothyroidism, presented for anemia.    # Fever  # Possible cystitis vs. contamination  - NH refused patient for T101, afebrile throughout hospital stay, WBC wnl  - CXR (-)  - Large leuk esterase on UA, asymptomatic  - UCx: 50-99k E. Ecoli ESBL and 10-49k E. faecium VRE  - BCx: NGTD  - F/u repeat UCx, no abx for now unless spikes another fever    # Pancytopenia likely drug induced 2/2 linezolid or ertapenem  # H/o iron deficiency anemia  - Hgb 6.9 in ED, s/p 1u PRBC  - Patient denies any bleeding or dark bowel movements, ROLAND (-)  - Maintain 2 large bore IVs. PPI and consider GI consult if patient is actively bleeding  - F/u stool guaiac  - VTE ppx with SCD  - Consult GI if evidence of bleeding (sees Dr. Sheets as outpt)  - Heme onc following: Ferritin 1473 -> can c/w home Fe supplements  -- (+) Noel 2/2 IVIG  -- Transfuse for Hb <7 or Platelet count <10K or <50K and any evidence of active bleeding  -- Can f/u wit Dr. Palafox as outpt for remaining anemia w/u    #HFpEF, stable  - Echo - EF 58%   - Holding lasix for now    # H/o Inclusion body myositis  - Patient endorses missing past two infusions given recent hospitalizations  - Normally gets Gamunex infusions every 5 weeks but missed last two infusions  - Per Neuro: S/p Gamunex 15mL over 6 hours daily x2 days, finished 5/11  -- F/u Dr. Win as outpt    # Transaminitis, improving  - Monitor CMP    # DMII  - C/w home insulin regimen    # H/o chronic  afib-rate controlled  # H/o SSS S/p PPM  - c/w amiodarone, metoprolol  - holding eliquis For now.     # H/o CAD S/p PCI  # H/o PVD S/p stent  - c/w plavix, statin, metoprolol    # Hypothyroidism  - c/w synthroid    GI ppx:  [x] Not indicated   DVT ppx:  [x] Heparin 5000mg SubQ   Fluids:  [x] PO    Activity:  [x] Increase as Tolerated   Diet:  [x] DASH / [x] Carb Consistent   Patient to be discharged when condition(s) optimized:  [] Home  / [x] SNF  /  [] Long Term Rehab    CODE STATUS:   [x] FULL    Dispo: Anticipate for d/c to SNF tomorrow. c/w 1L Fluid Restrictions. f/u Ucxs

## 2021-05-17 NOTE — DIETITIAN INITIAL EVALUATION ADULT. - OTHER CALCULATIONS
Calories; 1160 - 1257 kcals (MSJ x 1.2 - 1.3 AF) Protein: 54 - 65 gm (1.0 - 1.2 gm/kg ) Fluid: 1ml/kcal or per LIP

## 2021-05-17 NOTE — DIETITIAN INITIAL EVALUATION ADULT. - PERTINENT MEDS FT
MEDICATIONS  (STANDING):  aMIOdarone    Tablet 200 milliGRAM(s) Oral two times a day  amLODIPine   Tablet 5 milliGRAM(s) Oral daily  apixaban 2.5 milliGRAM(s) Oral every 12 hours  ascorbic acid 500 milliGRAM(s) Oral daily  atorvastatin 40 milliGRAM(s) Oral at bedtime  ferrous    sulfate 325 milliGRAM(s) Oral daily  insulin glargine Injectable (LANTUS) 7 Unit(s) SubCutaneous at bedtime  insulin lispro Injectable (ADMELOG) 2 Unit(s) SubCutaneous three times a day before meals  metoprolol tartrate 25 milliGRAM(s) Oral every 6 hours  multivitamin 1 Tablet(s) Oral daily  pantoprazole  Injectable 40 milliGRAM(s) IV Push two times a day  senna 2 Tablet(s) Oral at bedtime  sodium bicarbonate 650 milliGRAM(s) Oral every 8 hours

## 2021-05-17 NOTE — DIETITIAN INITIAL EVALUATION ADULT. - OTHER INFO
Possible cystitis vs. contamination - repeat UCx. H/o iron deficiency anemia. HFpEF, stable.  Transaminitis, improving. T2DM on insulin

## 2021-05-17 NOTE — DIETITIAN INITIAL EVALUATION ADULT. - FACTORS AFF FOOD INTAKE
Pt is eating well here, although she wishes she had more Gluten free options. She is tired of chicken w/o gravy. Last BM 5/16

## 2021-05-17 NOTE — DIETITIAN INITIAL EVALUATION ADULT. - ORAL INTAKE PTA/DIET HISTORY
Pt reports she's had overall a normal appetite. Recently she's been eating a bit less d/t illness. No chewing/swallowing difficulty. Confirms allergy to pineapple and gluten. Does not take nutrition supplements. Her family cooks for her. Aware of DM diet

## 2021-05-17 NOTE — PROGRESS NOTE ADULT - SUBJECTIVE AND OBJECTIVE BOX
<<<RESIDENT DISCHARGE NOTE>>>     SOWMYA AMADO  MRN-[id number]   pt is stable, ready for discharge, pending ucx.     VITAL SIGNS:  T(F): 96.8 (05-17-21 @ 05:05), Max: 97.5 (05-16-21 @ 12:10)  HR: 62 (05-17-21 @ 05:05)  BP: 152/67 (05-17-21 @ 05:05)  SpO2: --      PHYSICAL EXAMINATION:  T(C): 36 (05-17-21 @ 05:05), Max: 36.4 (05-16-21 @ 12:10)  HR: 62 (05-17-21 @ 05:05) (60 - 64)  BP: 152/67 (05-17-21 @ 05:05) (130/62 - 152/67)  RR: 18 (05-17-21 @ 05:05) (15 - 18)  SpO2: --    PHYSICAL EXAM:  GENERAL: NAD, well-developed  HEAD:  Atraumatic, Normocephalic  EYES: EOMI, PERRLA, conjunctiva and sclera clear  ENT:No nasal obstruction or discharge. No tonsillar exudate, swelling or erythema.  NECK: Supple, No JVD  CHEST/LUNG: Clear to auscultation bilaterally; No wheeze  HEART: Regular rate and rhythm; No murmurs, rubs, or gallops  ABDOMEN: Soft, Nontender, Nondistended; Bowel sounds present  EXTREMITIES:  2+ Peripheral Pulses, No clubbing, cyanosis, or edema  PSYCH: AAOx3  NEUROLOGY: non-focal  SKIN: No rashes or lesions    TEST RESULTS:                        7.5    6.69  )-----------( 416      ( 17 May 2021 06:07 )             22.5       05-17    130<L>  |  96<L>  |  27<H>  ----------------------------<  185<H>  3.9   |  23  |  1.6<H>    Ca    7.4<L>      17 May 2021 06:07    TPro  5.3<L>  /  Alb  2.6<L>  /  TBili  0.4  /  DBili  x   /  AST  30  /  ALT  37  /  AlkPhos  170<H>  05-17      FINAL DISCHARGE INTERVIEW:  Resident(s) Present: (Name:_________Florentinoadi____), RN Present: (Name:  ___________)    DISCHARGE MEDICATION RECONCILIATION  reviewed with Attending (Name:___Damian________)    DISPOSITION:   [  ] Home,    [  ] Home with Visiting Nursing Services,   [   X ]  SNF/ NH,    [   ] Acute Rehab (4A),   [   ] Other (Specify:_________)                          <<<RESIDENT DISCHARGE NOTE>>>     SOWMYA AMADO  MRN-[id number]   pt is stable, ready for discharge, pending ucx.     VITAL SIGNS:  T(F): 96.8 (05-17-21 @ 05:05), Max: 97.5 (05-16-21 @ 12:10)  HR: 62 (05-17-21 @ 05:05)  BP: 152/67 (05-17-21 @ 05:05)  SpO2: --      PHYSICAL EXAMINATION:  T(C): 36 (05-17-21 @ 05:05), Max: 36.4 (05-16-21 @ 12:10)  HR: 62 (05-17-21 @ 05:05) (60 - 64)  BP: 152/67 (05-17-21 @ 05:05) (130/62 - 152/67)  RR: 18 (05-17-21 @ 05:05) (15 - 18)    PHYSICAL EXAM:  GENERAL: NAD, well-developed  HEAD:  Atraumatic, Normocephalic  EYES: EOMI, PERRLA, conjunctiva and sclera clear  ENT:No nasal obstruction or discharge. No tonsillar exudate, swelling or erythema.  NECK: Supple, No JVD  CHEST/LUNG: Clear to auscultation bilaterally; No wheeze  HEART: Regular rate and rhythm; No murmurs, rubs, or gallops  ABDOMEN: Soft, Nontender, Nondistended; Bowel sounds present  EXTREMITIES:  2+ Peripheral Pulses, No clubbing, cyanosis, or edema  PSYCH: AAOx3  NEUROLOGY: non-focal  SKIN: No rashes or lesions    TEST RESULTS:                        7.5    6.69  )-----------( 416      ( 17 May 2021 06:07 )             22.5       05-17    130<L>  |  96<L>  |  27<H>  ----------------------------<  185<H>  3.9   |  23  |  1.6<H>    Ca    7.4<L>      17 May 2021 06:07    TPro  5.3<L>  /  Alb  2.6<L>  /  TBili  0.4  /  DBili  x   /  AST  30  /  ALT  37  /  AlkPhos  170<H>  05-17      FINAL DISCHARGE INTERVIEW:  Resident(s) Present: (Name:_________Eliseo____), RN Present: (Name:  ___________)    DISCHARGE MEDICATION RECONCILIATION  reviewed with Attending (Name:___Damian________)    DISPOSITION:   [  ] Home,    [  ] Home with Visiting Nursing Services,   [   X ]  SNF/ NH,    [   ] Acute Rehab (4A),   [   ] Other (Specify:_________)

## 2021-05-17 NOTE — DIETITIAN INITIAL EVALUATION ADULT. - PHYSCIAL ASSESSMENT
Pt reports she usually weighs 125-130#. ecchymosis per flow sheets. 1+ edema b/l leg per flow sheets. No physical signs of mal nutrition

## 2021-05-18 NOTE — PROGRESS NOTE ADULT - SUBJECTIVE AND OBJECTIVE BOX
SUBJECTIVE  Patient is a 76y old Female who presents with a chief complaint of Weakness (17 May 2021 13:23)  Currently admitted to medicine with the primary diagnosis of Drug-induced pancytopenia    Today is hospital day 9d, and this morning she is reporting epigastric pain and reports no overnight events.         OBJECTIVE  PAST MEDICAL & SURGICAL HISTORY  Afib  on Eliquis, amio and toprol    Essential hypertension    Palpitations  occasionally; not for couple yrs    Pacemaker  Sept 2017    Diabetes  Insulin dependent - (has insulin Pump)    SSS (sick sinus syndrome)  S/p PPM    Hypothyroid    Anemia  Chronic    Skin cancer  Basal Cell Cancer face  (around nose) s/p MOHS procedure x 3    Seasonal allergies    PVD (peripheral vascular disease)  S/p Right Peripheral leg Stent    Dry eyes, bilateral    Transfusion history  S/p Hysterectomy    Lung nodules  tree in bud    Inclusion body myositis (IBM)    H/O total hysterectomy  25+ yrs ago    History of surgery  Right Peripheral Stent 5 yrs    Cardiac pacemaker  9/9/17    History of surgery  MOHS procedure (face) x 3    S/P cataract surgery    S/P ablation of atrial fibrillation      ALLERGIES:  latex (Pruritus; Rash)  sulfonamides (Unknown)    MEDICATIONS:  STANDING MEDICATIONS  ALBUTerol    90 MICROgram(s) HFA Inhaler 1 Puff(s) Inhalation every 6 hours  aMIOdarone    Tablet 200 milliGRAM(s) Oral two times a day  amLODIPine   Tablet 5 milliGRAM(s) Oral daily  apixaban 2.5 milliGRAM(s) Oral every 12 hours  ascorbic acid 500 milliGRAM(s) Oral daily  atorvastatin 40 milliGRAM(s) Oral at bedtime  chlorhexidine 4% Liquid 1 Application(s) Topical <User Schedule>  clopidogrel Tablet 75 milliGRAM(s) Oral daily  dextrose 40% Gel 15 Gram(s) Oral once  dextrose 5%. 1000 milliLiter(s) IV Continuous <Continuous>  dextrose 5%. 1000 milliLiter(s) IV Continuous <Continuous>  dextrose 50% Injectable 25 Gram(s) IV Push once  dextrose 50% Injectable 12.5 Gram(s) IV Push once  dextrose 50% Injectable 25 Gram(s) IV Push once  famotidine Injectable 20 milliGRAM(s) IV Push once  ferrous    sulfate 325 milliGRAM(s) Oral daily  glucagon  Injectable 1 milliGRAM(s) IntraMuscular once  insulin glargine Injectable (LANTUS) 7 Unit(s) SubCutaneous at bedtime  insulin lispro Injectable (ADMELOG) 2 Unit(s) SubCutaneous three times a day before meals  levothyroxine 88 MICROGram(s) Oral daily  meropenem  IVPB 1000 milliGRAM(s) IV Intermittent every 12 hours  metoprolol tartrate 25 milliGRAM(s) Oral every 6 hours  multivitamin 1 Tablet(s) Oral daily  pantoprazole    Tablet 40 milliGRAM(s) Oral before breakfast  senna 2 Tablet(s) Oral at bedtime  sodium bicarbonate 650 milliGRAM(s) Oral every 8 hours    PRN MEDICATIONS  acetaminophen   Tablet .. 650 milliGRAM(s) Oral every 6 hours PRN      VITAL SIGNS: Last 24 Hours  T(C): 36.6 (18 May 2021 05:00), Max: 36.6 (18 May 2021 05:00)  T(F): 97.9 (18 May 2021 05:00), Max: 97.9 (18 May 2021 05:00)  HR: 61 (18 May 2021 05:00) (60 - 64)  BP: 169/72 (18 May 2021 05:00) (149/64 - 178/83)  BP(mean): --  RR: 16 (18 May 2021 05:00) (16 - 18)  SpO2: --    LABS:                        7.9    6.54  )-----------( 508      ( 18 May 2021 06:01 )             23.6     05-18    129<L>  |  94<L>  |  23<H>  ----------------------------<  267<H>  3.8   |  21  |  1.4    Ca    7.7<L>      18 May 2021 06:01  Mg     1.5     05-18    TPro  5.3<L>  /  Alb  2.6<L>  /  TBili  0.4  /  DBili  x   /  AST  30  /  ALT  37  /  AlkPhos  170<H>  05-17              Culture - Urine (collected 15 May 2021 11:40)  Source: .Urine Clean Catch (Midstream)  Preliminary Report (17 May 2021 18:40):    >100,000 CFU/ml Escherichia coli          RADIOLOGY:      PHYSICAL EXAM:    GENERAL: NAD, well-developed, AAOx3  HEENT:  Atraumatic, Normocephalic. EOMI, PERRLA, conjunctiva and sclera clear, No JVD  PULMONARY: Clear to auscultation bilaterally; No wheeze  CARDIOVASCULAR: Regular rate and rhythm; No murmurs, rubs, or gallops  GASTROINTESTINAL: Soft, Nontender, Nondistended; Bowel sounds present  MUSCULOSKELETAL:  2+ Peripheral Pulses, No clubbing, cyanosis, or edema  NEUROLOGY: non-focal  SKIN: No rashes or lesions      ADMISSION SUMMARY  76 year old female with  PMH of DM on insulin pump, Celiac disease, HTN, CKD3, Afib s/p ablation on Eliquis, CAD s/p stenting in 2020, sick sinus syndrome s/p PPM, PVD s/p right peripheral leg stent, Inclusion body myositis, basal cell carcinoma , chronic KEYANNA, hypothyroidism, presented for anemia.    #Epigastric Pain:  -FU troponin, ECG.  - on pantoprazole.   - Given Famotidine one time.     # Possible cystitis vs. contamination  - NH refused patient for T101, afebrile throughout hospital stay, WBC wnl  - CXR (-)  - Large leuk esterase on UA, asymptomatic  - UCx: 50-99k E. Ecoli ESBL and 10-49k E. faecium VRE  - BCx: NGTD  - F/u repeat UCx  - Started on Meropenem 5/18    # Pancytopenia likely drug induced 2/2 linezolid or ertapenem  # H/o iron deficiency anemia  - Hgb 6.9 in ED, s/p 1u PRBC  - Patient denies any bleeding or dark bowel movements, ROLAND (-)  - Maintain 2 large bore IVs. PPI and consider GI consult if patient is actively bleeding  - F/u stool guaiac  - VTE ppx with SCD  - Consult GI if evidence of bleeding (sees Dr. Sheets as outpt)  - Heme onc following: Ferritin 1473 -> can c/w home Fe supplements  -- (+) Noel 2/2 IVIG  -- Transfuse for Hb <7 or Platelet count <10K or <50K and any evidence of active bleeding  -- Can f/u wit Dr. Palafox as outpt for remaining anemia w/u  -     #HFpEF, stable  - Echo - EF 58%   - Holding lasix for now    # H/o Inclusion body myositis  - Patient endorses missing past two infusions given recent hospitalizations  - Normally gets Gamunex infusions every 5 weeks but missed last two infusions  - Per Neuro: S/p Gamunex 15mL over 6 hours daily x2 days, finished 5/11  -- F/u Dr. Win as outpt    # Transaminitis, improving  - Monitor CMP    # DMII  - C/w home insulin regimen    # H/o chronic  afib-rate controlled  # H/o SSS S/p PPM  - c/w amiodarone, metoprolol  - holding eliquis For now.     # H/o CAD S/p PCI  # H/o PVD S/p stent  - c/w plavix, statin, metoprolol    # Hypothyroidism  - c/w synthroid    GI ppx:  [x] Not indicated   DVT ppx:  [x] Heparin 5000mg SubQ   Fluids:  [x] PO    Activity:  [x] Increase as Tolerated   Diet:  [x] DASH / [x] Carb Consistent   Patient to be discharged when condition(s) optimized:  [] Home  / [x] SNF  /  [] Long Term Rehab    CODE STATUS:   [x] FULL     SUBJECTIVE  Patient is a 76y old Female who presents with a chief complaint of Weakness (17 May 2021 13:23)  Currently admitted to medicine with the primary diagnosis of Drug-induced pancytopenia    Today is hospital day 9d, and this morning she is reporting epigastric pain and reports no overnight events.         OBJECTIVE  PAST MEDICAL & SURGICAL HISTORY  Afib  on Eliquis, amio and toprol    Essential hypertension    Palpitations  occasionally; not for couple yrs    Pacemaker  Sept 2017    Diabetes  Insulin dependent - (has insulin Pump)    SSS (sick sinus syndrome)  S/p PPM    Hypothyroid    Anemia  Chronic    Skin cancer  Basal Cell Cancer face  (around nose) s/p MOHS procedure x 3    Seasonal allergies    PVD (peripheral vascular disease)  S/p Right Peripheral leg Stent    Dry eyes, bilateral    Transfusion history  S/p Hysterectomy    Lung nodules  tree in bud    Inclusion body myositis (IBM)    H/O total hysterectomy  25+ yrs ago    History of surgery  Right Peripheral Stent 5 yrs    Cardiac pacemaker  9/9/17    History of surgery  MOHS procedure (face) x 3    S/P cataract surgery    S/P ablation of atrial fibrillation      ALLERGIES:  latex (Pruritus; Rash)  sulfonamides (Unknown)    MEDICATIONS:  STANDING MEDICATIONS  ALBUTerol    90 MICROgram(s) HFA Inhaler 1 Puff(s) Inhalation every 6 hours  aMIOdarone    Tablet 200 milliGRAM(s) Oral two times a day  amLODIPine   Tablet 5 milliGRAM(s) Oral daily  apixaban 2.5 milliGRAM(s) Oral every 12 hours  ascorbic acid 500 milliGRAM(s) Oral daily  atorvastatin 40 milliGRAM(s) Oral at bedtime  chlorhexidine 4% Liquid 1 Application(s) Topical <User Schedule>  clopidogrel Tablet 75 milliGRAM(s) Oral daily  dextrose 40% Gel 15 Gram(s) Oral once  dextrose 5%. 1000 milliLiter(s) IV Continuous <Continuous>  dextrose 5%. 1000 milliLiter(s) IV Continuous <Continuous>  dextrose 50% Injectable 25 Gram(s) IV Push once  dextrose 50% Injectable 12.5 Gram(s) IV Push once  dextrose 50% Injectable 25 Gram(s) IV Push once  famotidine Injectable 20 milliGRAM(s) IV Push once  ferrous    sulfate 325 milliGRAM(s) Oral daily  glucagon  Injectable 1 milliGRAM(s) IntraMuscular once  insulin glargine Injectable (LANTUS) 7 Unit(s) SubCutaneous at bedtime  insulin lispro Injectable (ADMELOG) 2 Unit(s) SubCutaneous three times a day before meals  levothyroxine 88 MICROGram(s) Oral daily  meropenem  IVPB 1000 milliGRAM(s) IV Intermittent every 12 hours  metoprolol tartrate 25 milliGRAM(s) Oral every 6 hours  multivitamin 1 Tablet(s) Oral daily  pantoprazole    Tablet 40 milliGRAM(s) Oral before breakfast  senna 2 Tablet(s) Oral at bedtime  sodium bicarbonate 650 milliGRAM(s) Oral every 8 hours    PRN MEDICATIONS  acetaminophen   Tablet .. 650 milliGRAM(s) Oral every 6 hours PRN      VITAL SIGNS: Last 24 Hours  T(C): 36.6 (18 May 2021 05:00), Max: 36.6 (18 May 2021 05:00)  T(F): 97.9 (18 May 2021 05:00), Max: 97.9 (18 May 2021 05:00)  HR: 61 (18 May 2021 05:00) (60 - 64)  BP: 169/72 (18 May 2021 05:00) (149/64 - 178/83)  BP(mean): --  RR: 16 (18 May 2021 05:00) (16 - 18)  SpO2: --    LABS:                        7.9    6.54  )-----------( 508      ( 18 May 2021 06:01 )             23.6     05-18    129<L>  |  94<L>  |  23<H>  ----------------------------<  267<H>  3.8   |  21  |  1.4    Ca    7.7<L>      18 May 2021 06:01  Mg     1.5     05-18    TPro  5.3<L>  /  Alb  2.6<L>  /  TBili  0.4  /  DBili  x   /  AST  30  /  ALT  37  /  AlkPhos  170<H>  05-17              Culture - Urine (collected 15 May 2021 11:40)  Source: .Urine Clean Catch (Midstream)  Preliminary Report (17 May 2021 18:40):    >100,000 CFU/ml Escherichia coli          RADIOLOGY:      PHYSICAL EXAM:    GENERAL: NAD, well-developed, AAOx3  HEENT:  Atraumatic, Normocephalic. EOMI, PERRLA, conjunctiva and sclera clear, No JVD  PULMONARY: Clear to auscultation bilaterally; No wheeze  CARDIOVASCULAR: Regular rate and rhythm; No murmurs, rubs, or gallops  GASTROINTESTINAL: Soft, Nontender, Nondistended; Bowel sounds present  MUSCULOSKELETAL:  2+ Peripheral Pulses, No clubbing, cyanosis, or edema  NEUROLOGY: non-focal  SKIN: No rashes or lesions      ADMISSION SUMMARY  76 year old female with  PMH of DM on insulin pump, Celiac disease, HTN, CKD3, Afib s/p ablation on Eliquis, CAD s/p stenting in 2020, sick sinus syndrome s/p PPM, PVD s/p right peripheral leg stent, Inclusion body myositis, basal cell carcinoma , chronic KEYANNA, hypothyroidism, presented for anemia.    #Epigastric Pain:  -FU troponin, ECG.  - on pantoprazole.   - Given Famotidine one time.     #Chronic Hyponatremia(>48h)  - Lq=511.   - no symptoms.   - FU serum osm, urine osm, urine lytes.   - Treat accordingly  - Increase salt intake.     # Possible cystitis vs. contamination  - NH refused patient for T101, afebrile throughout hospital stay, WBC wnl  - CXR (-)  - Large leuk esterase on UA, asymptomatic  - UCx: 50-99k E. Ecoli ESBL and 10-49k E. faecium VRE  - BCx: NGTD  - F/u repeat UCx  - Started on Meropenem 5/18    # Pancytopenia likely drug induced 2/2 linezolid or ertapenem  # H/o iron deficiency anemia  - Hgb 6.9 in ED, s/p 1u PRBC  - Patient denies any bleeding or dark bowel movements, ROLAND (-)  - Maintain 2 large bore IVs. PPI and consider GI consult if patient is actively bleeding  - F/u stool guaiac  - VTE ppx with SCD  - Consult GI if evidence of bleeding (sees Dr. Sheets as outpt)  - Heme onc following: Ferritin 1473 -> can c/w home Fe supplements  -- (+) Noel 2/2 IVIG  -- Transfuse for Hb <7 or Platelet count <10K or <50K and any evidence of active bleeding  -- Can f/u wit Dr. Palafox as outpt for remaining anemia w/u  -     #HFpEF, stable  - Echo - EF 58%   - Holding lasix for now    # H/o Inclusion body myositis  - Patient endorses missing past two infusions given recent hospitalizations  - Normally gets Gamunex infusions every 5 weeks but missed last two infusions  - Per Neuro: S/p Gamunex 15mL over 6 hours daily x2 days, finished 5/11  -- F/u Dr. Win as outpt    # Transaminitis, improving  - Monitor CMP    # DMII  - C/w home insulin regimen    # H/o chronic  afib-rate controlled  # H/o SSS S/p PPM  - c/w amiodarone, metoprolol  - holding eliquis For now.     # H/o CAD S/p PCI  # H/o PVD S/p stent  - c/w plavix, statin, metoprolol    # Hypothyroidism  - c/w synthroid    GI ppx:  [x] Not indicated   DVT ppx:  [x] Heparin 5000mg SubQ   Fluids:  [x] PO    Activity:  [x] Increase as Tolerated   Diet:  [x] DASH / [x] Carb Consistent   Patient to be discharged when condition(s) optimized:  [] Home  / [x] SNF  /  [] Long Term Rehab    CODE STATUS:   [x] FULL     SUBJECTIVE  Patient is a 76y old Female who presents with a chief complaint of Weakness (17 May 2021 13:23)  Currently admitted to medicine with the primary diagnosis of Drug-induced pancytopenia    Today is hospital day 9d, and this morning she is reporting epigastric pain and reports no overnight events.         OBJECTIVE  PAST MEDICAL & SURGICAL HISTORY  Afib  on Eliquis, amio and toprol    Essential hypertension    Palpitations  occasionally; not for couple yrs    Pacemaker  Sept 2017    Diabetes  Insulin dependent - (has insulin Pump)    SSS (sick sinus syndrome)  S/p PPM    Hypothyroid    Anemia  Chronic    Skin cancer  Basal Cell Cancer face  (around nose) s/p MOHS procedure x 3    Seasonal allergies    PVD (peripheral vascular disease)  S/p Right Peripheral leg Stent    Dry eyes, bilateral    Transfusion history  S/p Hysterectomy    Lung nodules  tree in bud    Inclusion body myositis (IBM)    H/O total hysterectomy  25+ yrs ago    History of surgery  Right Peripheral Stent 5 yrs    Cardiac pacemaker  9/9/17    History of surgery  MOHS procedure (face) x 3    S/P cataract surgery    S/P ablation of atrial fibrillation      ALLERGIES:  latex (Pruritus; Rash)  sulfonamides (Unknown)    MEDICATIONS:  STANDING MEDICATIONS  ALBUTerol    90 MICROgram(s) HFA Inhaler 1 Puff(s) Inhalation every 6 hours  aMIOdarone    Tablet 200 milliGRAM(s) Oral two times a day  amLODIPine   Tablet 5 milliGRAM(s) Oral daily  apixaban 2.5 milliGRAM(s) Oral every 12 hours  ascorbic acid 500 milliGRAM(s) Oral daily  atorvastatin 40 milliGRAM(s) Oral at bedtime  chlorhexidine 4% Liquid 1 Application(s) Topical <User Schedule>  clopidogrel Tablet 75 milliGRAM(s) Oral daily  famotidine Injectable 20 milliGRAM(s) IV Push once  ferrous    sulfate 325 milliGRAM(s) Oral daily  glucagon  Injectable 1 milliGRAM(s) IntraMuscular once  insulin glargine Injectable (LANTUS) 7 Unit(s) SubCutaneous at bedtime  insulin lispro Injectable (ADMELOG) 2 Unit(s) SubCutaneous three times a day before meals  levothyroxine 88 MICROGram(s) Oral daily  meropenem  IVPB 1000 milliGRAM(s) IV Intermittent every 12 hours  metoprolol tartrate 25 milliGRAM(s) Oral every 6 hours  multivitamin 1 Tablet(s) Oral daily  pantoprazole    Tablet 40 milliGRAM(s) Oral before breakfast  senna 2 Tablet(s) Oral at bedtime  sodium bicarbonate 650 milliGRAM(s) Oral every 8 hours    PRN MEDICATIONS  acetaminophen   Tablet .. 650 milliGRAM(s) Oral every 6 hours PRN    VITAL SIGNS: Last 24 Hours  T(C): 36.6 (18 May 2021 05:00), Max: 36.6 (18 May 2021 05:00)  T(F): 97.9 (18 May 2021 05:00), Max: 97.9 (18 May 2021 05:00)  HR: 61 (18 May 2021 05:00) (60 - 64)  BP: 169/72 (18 May 2021 05:00) (149/64 - 178/83)  RR: 16 (18 May 2021 05:00) (16 - 18)    LABS:                        7.9    6.54  )-----------( 508      ( 18 May 2021 06:01 )             23.6     05-18    129<L>  |  94<L>  |  23<H>  ----------------------------<  267<H>  3.8   |  21  |  1.4    Ca    7.7<L>      18 May 2021 06:01  Mg     1.5     05-18    TPro  5.3<L>  /  Alb  2.6<L>  /  TBili  0.4  /  DBili  x   /  AST  30  /  ALT  37  /  AlkPhos  170<H>  05-17      Culture - Urine (collected 15 May 2021 11:40)  Source: .Urine Clean Catch (Midstream)  Preliminary Report (17 May 2021 18:40):    >100,000 CFU/ml Escherichia coli    PHYSICAL EXAM:    GENERAL: NAD, well-developed, AAOx3  HEENT:  Atraumatic, Normocephalic. EOMI, PERRLA, conjunctiva and sclera clear, No JVD  PULMONARY: Clear to auscultation bilaterally; No wheeze  CARDIOVASCULAR: Regular rate and rhythm; No murmurs, rubs, or gallops  GASTROINTESTINAL: Soft, Nontender, Nondistended; Bowel sounds present  MUSCULOSKELETAL:  2+ Peripheral Pulses, No clubbing, cyanosis, or edema  NEUROLOGY: non-focal  SKIN: No rashes or lesions      ADMISSION SUMMARY  76 year old female with  PMH of DM on insulin pump, Celiac disease, HTN, CKD3, Afib s/p ablation on Eliquis, CAD s/p stenting in 2020, sick sinus syndrome s/p PPM, PVD s/p right peripheral leg stent, Inclusion body myositis, basal cell carcinoma , chronic KEYANNA, hypothyroidism, presented for anemia.    #Epigastric Pain:  -FU troponin, ECG.  - on pantoprazole.   - Given Famotidine one time.     #Chronic Hyponatremia(>48h)  - En=793.   - no symptoms.   - FU serum osm, urine osm, urine lytes.   - Treat accordingly  - Increase salt intake.     # Possible cystitis vs. contamination  - NH refused patient for T101, afebrile throughout hospital stay, WBC wnl  - CXR (-)  - Large leuk esterase on UA, asymptomatic  - UCx: 50-99k E. Ecoli ESBL and 10-49k E. faecium VRE  - BCx: NGTD  - F/u repeat UCx  - Started on Meropenem 5/18    # Pancytopenia likely drug induced 2/2 linezolid or ertapenem  # H/o iron deficiency anemia  - Hgb 6.9 in ED, s/p 1u PRBC  - Patient denies any bleeding or dark bowel movements, ROLAND (-)  - Maintain 2 large bore IVs. PPI and consider GI consult if patient is actively bleeding  - F/u stool guaiac  - VTE ppx with SCD  - Consult GI if evidence of bleeding (sees Dr. Sheets as outpt)  - Heme onc following: Ferritin 1473 -> can c/w home Fe supplements  -- (+) Noel 2/2 IVIG  -- Transfuse for Hb <7 or Platelet count <10K or <50K and any evidence of active bleeding  -- Can f/u wit Dr. Palafox as outpt for remaining anemia w/u  -     #HFpEF, stable  - Echo - EF 58%   - Holding lasix for now    # H/o Inclusion body myositis  - Patient endorses missing past two infusions given recent hospitalizations  - Normally gets Gamunex infusions every 5 weeks but missed last two infusions  - Per Neuro: S/p Gamunex 15mL over 6 hours daily x2 days, finished 5/11  -- F/u Dr. Win as outpt    # Transaminitis, improving  - Monitor CMP    # DMII  - C/w home insulin regimen    # H/o chronic  afib-rate controlled  # H/o SSS S/p PPM  - c/w amiodarone, metoprolol  - holding eliquis For now.     # H/o CAD S/p PCI  # H/o PVD S/p stent  - c/w plavix, statin, metoprolol    # Hypothyroidism  - c/w synthroid    GI ppx:  [x] Not indicated   DVT ppx:  [x] Heparin 5000mg SubQ   Fluids:  [x] PO    Activity:  [x] Increase as Tolerated   Diet:  [x] DASH / [x] Carb Consistent   Patient to be discharged when condition(s) optimized:  [] Home  / [x] SNF  /  [] Long Term Rehab    CODE STATUS:   [x] FULL

## 2021-05-19 NOTE — CONSULT NOTE ADULT - CONSULT REASON
inclusion body myositis, missed last 2 infusions
history of iron deficiency anemia
right hydronephrosis
UTI

## 2021-05-19 NOTE — CONSULT NOTE ADULT - ASSESSMENT
77 y/o f with persistent right hydronephrosis, LUTS with incontinence.    A) Right hydronephrosis  Incontinence  UTI  pancytopenia      P) Bladder scan after pt voids to check PVR's  Would consider cystogram to r/o reflux which could explain hydronephrosis.  treat UTI  Medical clearance for general anesthesia.  Pt can f/u as OP for definitive w/u  No acute urologic intervention at this time.  d/w attending 75 y/o f with persistent right hydronephrosis, LUTS with incontinence.    A) Right hydronephrosis  Incontinence  UTI  pancytopenia      P) Bladder scan after pt voids to check PVR's  Would consider cystogram to r/o reflux which could explain hydronephrosis.  treat UTI  Pt can f/u as OP for definitive w/u  No acute urologic intervention at this time.  d/w attending

## 2021-05-19 NOTE — PROGRESS NOTE ADULT - SUBJECTIVE AND OBJECTIVE BOX
SUBJECTIVE  Patient is a 76y old Female who presents with a chief complaint of Weakness (17 May 2021 13:23)  Currently admitted to medicine with the primary diagnosis of Drug-induced pancytopenia    Today is hospital day 10d, and this morning she is  feeling better, no longer having abdominal pain and reports no overnight events.   US abdomen showed Moderate Right hydronephrosis.       OBJECTIVE  PAST MEDICAL & SURGICAL HISTORY  Afib  on Eliquis, amio and toprol    Essential hypertension    Palpitations  occasionally; not for couple yrs    Pacemaker  Sept 2017    Diabetes  Insulin dependent - (has insulin Pump)    SSS (sick sinus syndrome)  S/p PPM    Hypothyroid    Anemia  Chronic    Skin cancer  Basal Cell Cancer face  (around nose) s/p MOHS procedure x 3    Seasonal allergies    PVD (peripheral vascular disease)  S/p Right Peripheral leg Stent    Dry eyes, bilateral    Transfusion history  S/p Hysterectomy    Lung nodules  tree in bud    Inclusion body myositis (IBM)    H/O total hysterectomy  25+ yrs ago    History of surgery  Right Peripheral Stent 5 yrs    Cardiac pacemaker  9/9/17    History of surgery  MOHS procedure (face) x 3    S/P cataract surgery    S/P ablation of atrial fibrillation      ALLERGIES:  latex (Pruritus; Rash)  sulfonamides (Unknown)    MEDICATIONS:  STANDING MEDICATIONS  ALBUTerol    90 MICROgram(s) HFA Inhaler 1 Puff(s) Inhalation every 6 hours  aMIOdarone    Tablet 200 milliGRAM(s) Oral two times a day  amLODIPine   Tablet 5 milliGRAM(s) Oral daily  apixaban 2.5 milliGRAM(s) Oral every 12 hours  ascorbic acid 500 milliGRAM(s) Oral daily  atorvastatin 40 milliGRAM(s) Oral at bedtime  chlorhexidine 4% Liquid 1 Application(s) Topical <User Schedule>  clopidogrel Tablet 75 milliGRAM(s) Oral daily  dextrose 40% Gel 15 Gram(s) Oral once  dextrose 5%. 1000 milliLiter(s) IV Continuous <Continuous>  dextrose 5%. 1000 milliLiter(s) IV Continuous <Continuous>  dextrose 50% Injectable 25 Gram(s) IV Push once  dextrose 50% Injectable 12.5 Gram(s) IV Push once  dextrose 50% Injectable 25 Gram(s) IV Push once  ferrous    sulfate 325 milliGRAM(s) Oral daily  glucagon  Injectable 1 milliGRAM(s) IntraMuscular once  insulin glargine Injectable (LANTUS) 7 Unit(s) SubCutaneous at bedtime  insulin lispro Injectable (ADMELOG) 2 Unit(s) SubCutaneous three times a day before meals  levothyroxine 88 MICROGram(s) Oral daily  meropenem  IVPB 1000 milliGRAM(s) IV Intermittent every 12 hours  metoprolol tartrate 25 milliGRAM(s) Oral every 6 hours  multivitamin 1 Tablet(s) Oral daily  pantoprazole    Tablet 40 milliGRAM(s) Oral before breakfast  senna 2 Tablet(s) Oral at bedtime  sodium bicarbonate 650 milliGRAM(s) Oral every 8 hours    PRN MEDICATIONS  acetaminophen   Tablet .. 650 milliGRAM(s) Oral every 6 hours PRN  ondansetron Injectable 4 milliGRAM(s) IV Push every 6 hours PRN      VITAL SIGNS: Last 24 Hours  T(C): 36 (19 May 2021 05:25), Max: 36 (19 May 2021 05:25)  T(F): 96.8 (19 May 2021 05:25), Max: 96.8 (19 May 2021 05:25)  HR: 61 (19 May 2021 05:25) (60 - 63)  BP: 135/54 (19 May 2021 05:25) (134/60 - 160/70)  BP(mean): 84 (19 May 2021 05:25) (84 - 91)  RR: 14 (19 May 2021 05:25) (14 - 18)  SpO2: 100% (19 May 2021 05:25) (98% - 100%)    LABS:                        7.9    6.54  )-----------( 508      ( 18 May 2021 06:01 )             23.6     05-18    128<L>  |  91<L>  |  23<H>  ----------------------------<  190<H>  4.4   |  20  |  1.4    Ca    8.0<L>      18 May 2021 18:45  Mg     1.5     05-18    TPro  5.8<L>  /  Alb  2.8<L>  /  TBili  0.4  /  DBili  0.2  /  AST  27  /  ALT  35  /  AlkPhos  186<H>  05-18          Troponin T, Serum: 0.03 ng/mL ** (05-18-21 @ 18:45)  Troponin T, Serum: 0.03 ng/mL ** (05-18-21 @ 11:32)      CARDIAC MARKERS ( 18 May 2021 18:45 )  x     / 0.03 ng/mL / x     / x     / x      CARDIAC MARKERS ( 18 May 2021 11:32 )  x     / 0.03 ng/mL / x     / x     / x          RADIOLOGY:  < from: US Abdomen Upper Quadrant Right (05.18.21 @ 16:17) >  IMPRESSION:    No evidence for cholelithiasis or cholecystitis.    Moderate right hydronephrosis.    Small to moderate right upper quadrant ascites.    Partially imaged right pleural effusion.    < end of copied text >  < from: US Kidney and Bladder (05.09.21 @ 09:48) >  IMPRESSION:    Markedly distended urinary bladder with bilateral hydronephrosis which likely reflects sequela of back up pressure      < end of copied text >      PHYSICAL EXAM:    GENERAL: NAD, well-developed, AAOx3  HEENT:  Atraumatic, Normocephalic. EOMI, PERRLA, conjunctiva and sclera clear, No JVD  PULMONARY: Clear to auscultation bilaterally; No wheeze  CARDIOVASCULAR: Regular rate and rhythm; No murmurs, rubs, or gallops  GASTROINTESTINAL: Soft, Nontender, Nondistended; Bowel sounds present  MUSCULOSKELETAL:  2+ Peripheral Pulses, No clubbing, cyanosis, or edema  NEUROLOGY: non-focal  SKIN: No rashes or lesions      ADMISSION SUMMARY  76 year old female with  PMH of DM on insulin pump, Celiac disease, HTN, CKD3, Afib s/p ablation on Eliquis, CAD s/p stenting in 2020, sick sinus syndrome s/p PPM, PVD s/p right peripheral leg stent, Inclusion body myositis, basal cell carcinoma , chronic KEYANNA, hypothyroidism, presented for anemia.    #Epigastric Pain--> Resolved  - troponin 0.03--0.03, ECG no changes  - on pantoprazole.   - Given Famotidine one time.   - US ABDOMEN negative for cholecystitis.   - lipase and amylase normal    #Chronic Hyponatremia(>48h)  - Cn=551.   - no symptoms.   - osm serum 278, urin osm 308  - Treat accordingly  - Increase salt intake.     # Possible cystitis vs. contamination  - NH refused patient for T101, afebrile throughout hospital stay, WBC wnl  - CXR (-)  - Large leuk esterase on UA, asymptomatic  - UCx: 50-99k E. Ecoli ESBL and 10-49k E. faecium VRE  - BCx: NGTD  - presence of moderate right hydronephrosis, slight improved compared to before ( prior had bilateral hydronephrosis).   -repeat UCx: E.Coli ESBL  - Started on Meropenem 5/18  - FU ID    # Pancytopenia likely drug induced 2/2 linezolid or ertapenem  # H/o iron deficiency anemia  - Hgb 6.9 in ED, s/p 1u PRBC  - Patient denies any bleeding or dark bowel movements, ROLAND (-)  - Maintain 2 large bore IVs. PPI and consider GI consult if patient is actively bleeding  - F/u stool guaiac  - VTE ppx with SCD  - Consult GI if evidence of bleeding (sees Dr. Sheets as outpt)  - Heme onc following: Ferritin 1473 -> can c/w home Fe supplements  -- (+) Noel 2/2 IVIG  -- Transfuse for Hb <7 or Platelet count <10K or <50K and any evidence of active bleeding  -- Can f/u wit Dr. Palafox as outpt for remaining anemia w/u  -     #HFpEF, stable  - Echo - EF 58%   - Holding lasix for now    # H/o Inclusion body myositis  - Patient endorses missing past two infusions given recent hospitalizations  - Normally gets Gamunex infusions every 5 weeks but missed last two infusions  - Per Neuro: S/p Gamunex 15mL over 6 hours daily x2 days, finished 5/11  -- F/u Dr. Win as outpt  - OOB, PT    # Transaminitis, improving  - Monitor CMP    # DMII  - C/w home insulin regimen    # H/o chronic  afib-rate controlled  # H/o SSS S/p PPM  - c/w amiodarone, metoprolol  - holding eliquis For now.     # H/o CAD S/p PCI  # H/o PVD S/p stent  - c/w plavix, statin, metoprolol    # Hypothyroidism  - c/w synthroid    GI ppx:  [x] Not indicated   DVT ppx:  [x] Heparin 5000mg SubQ   Fluids:  [x] PO    Activity:  [x] Increase as Tolerated   Diet:  [x] DASH / [x] Carb Consistent   Patient to be discharged when condition(s) optimized:  [] Home  / [x] SNF  /  [] Long Term Rehab    CODE STATUS:   [x] FULL

## 2021-05-19 NOTE — CONSULT NOTE ADULT - SUBJECTIVE AND OBJECTIVE BOX
Patient is a 76y old  Female who presents with a chief complaint of Pancytopenia (19 May 2021 11:34)      HPI:  76 year old female with  PMH of DM on insulin pump, Celiac disease, HTN, CKD3, Afib s/p ablation on Eliquis, CAD s/p stenting in 2020, sick sinus syndrome s/p PPM, PVD s/p right peripheral leg stent, 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,  basal cell carcinoma on the face (around nose) s/p MOHS procedure x 3 (5 years ago) , chronic anemia, hypothyroidism, presented from [facility] due to abnormal labs. As per the patient she was recently discharged from hospital after being treated for UTI and was doing well in the nursing home. However, when the nursing home repeated the labs recently they informed her that they are significantly abnormal and need to be sent back to the hospital.   She was noted to have acute on chronic anemia and was given 1 unit of PRBC.   Patient denies any visible bleeding. She says she had bleeding stomach ulcer long ago and had undergone endoscopic treatments. She also had colonoscopy done outpatient and reports it to be normal.   She denies any belly pain, headaches, nausea vomiting, chest pain.  (09 May 2021 04:50)    Urology: Pt known to urology, last seen in March for the same issue. Work up at that time was negative, with renal scan being negative for hydronephrosis.  It was felt that the hydro was a result of her recurrent pyelonephritis. Pt now admitted for pancytopenia.  She c/o + frequency and incontinence which is worsening. Pt admits to loosing control when standing on occasion, she usually use 4 pads/day and range from damp to wet.  denies dysuria, hematuria.     PAST MEDICAL & SURGICAL HISTORY:  Afib  on Eliquis, amio and toprol    Essential hypertension    Palpitations  occasionally; not for couple yrs    Pacemaker  Sept 2017    Diabetes  Insulin dependent - (has insulin Pump)    SSS (sick sinus syndrome)  S/p PPM    Hypothyroid    Anemia  Chronic    Skin cancer  Basal Cell Cancer face  (around nose) s/p MOHS procedure x 3    Seasonal allergies    PVD (peripheral vascular disease)  S/p Right Peripheral leg Stent    Dry eyes, bilateral    Transfusion history  S/p Hysterectomy    Lung nodules  tree in bud    Inclusion body myositis (IBM)    H/O total hysterectomy  25+ yrs ago    History of surgery  Right Peripheral Stent 5 yrs    Cardiac pacemaker  9/9/17    History of surgery  MOHS procedure (face) x 3    S/P cataract surgery    S/P ablation of atrial fibrillation        REVIEW OF SYSTEMS:    [x ] a 10 point review of systems was negative except where noted    MEDICATIONS  (STANDING):  ALBUTerol    90 MICROgram(s) HFA Inhaler 1 Puff(s) Inhalation every 6 hours  aMIOdarone    Tablet 200 milliGRAM(s) Oral two times a day  amLODIPine   Tablet 5 milliGRAM(s) Oral daily  apixaban 2.5 milliGRAM(s) Oral every 12 hours  ascorbic acid 500 milliGRAM(s) Oral daily  atorvastatin 40 milliGRAM(s) Oral at bedtime  chlorhexidine 4% Liquid 1 Application(s) Topical <User Schedule>  clopidogrel Tablet 75 milliGRAM(s) Oral daily  dextrose 40% Gel 15 Gram(s) Oral once  dextrose 5%. 1000 milliLiter(s) (50 mL/Hr) IV Continuous <Continuous>  dextrose 5%. 1000 milliLiter(s) (100 mL/Hr) IV Continuous <Continuous>  dextrose 50% Injectable 25 Gram(s) IV Push once  dextrose 50% Injectable 12.5 Gram(s) IV Push once  dextrose 50% Injectable 25 Gram(s) IV Push once  ferrous    sulfate 325 milliGRAM(s) Oral daily  glucagon  Injectable 1 milliGRAM(s) IntraMuscular once  insulin glargine Injectable (LANTUS) 7 Unit(s) SubCutaneous at bedtime  insulin lispro Injectable (ADMELOG) 2 Unit(s) SubCutaneous three times a day before meals  levothyroxine 88 MICROGram(s) Oral daily  metoprolol tartrate 25 milliGRAM(s) Oral every 6 hours  multivitamin 1 Tablet(s) Oral daily  pantoprazole    Tablet 40 milliGRAM(s) Oral before breakfast  senna 2 Tablet(s) Oral at bedtime  sodium bicarbonate 650 milliGRAM(s) Oral every 8 hours    MEDICATIONS  (PRN):  acetaminophen   Tablet .. 650 milliGRAM(s) Oral every 6 hours PRN Temp greater or equal to 38C (100.4F), Moderate Pain (4 - 6)  ondansetron Injectable 4 milliGRAM(s) IV Push every 6 hours PRN Nausea and/or Vomiting      Allergies    latex (Pruritus; Rash)  sulfonamides (Unknown)      Gluten (Stomach Upset; Vomiting; Nausea; Diarrhea)  Pineapple (Unknown)      SOCIAL HISTORY: No illicit drug use    FAMILY HISTORY:  Family history of lung cancer (Mother)    Cancer (Father)  Myeloma      Vital Signs Last 24 Hrs  T(C): 36.1 (19 May 2021 13:00), Max: 36.1 (19 May 2021 13:00)  T(F): 96.9 (19 May 2021 13:00), Max: 96.9 (19 May 2021 13:00)  HR: 60 (19 May 2021 13:00) (60 - 63)  BP: 141/67 (19 May 2021 13:00) (134/60 - 156/71)  BP(mean): 84 (19 May 2021 05:25) (84 - 91)  RR: 20 (19 May 2021 13:00) (14 - 20)  SpO2: 98% (19 May 2021 13:00) (98% - 100%)    PHYSICAL EXAM:    Constitutional: NAD, well-developed, well nourished  HEENT: NC/AT  Neck: no pain, FROM  Back: No CVA tenderness  Respiratory: No accessory respiratory muscle use  Abd: Soft, NT/ND  no organomegally  no hernia  + palpable bladder non tender + pressure with deep palpation.  : voiding into prima fit  Extremities: no edema  Neurological: A/O x 3  Psychiatric: Normal mood, normal affect  Skin: No rashes    I&O's Summary      LABS:                        8.5    6.08  )-----------( 643      ( 19 May 2021 12:52 )             26.0     05-19    126<L>  |  90<L>  |  24<H>  ----------------------------<  251<H>  4.3   |  23  |  1.5    Ca    8.4<L>      19 May 2021 12:52  Mg     2.0     05-19    TPro  6.2  /  Alb  2.9<L>  /  TBili  0.5  /  DBili  x   /  AST  24  /  ALT  31  /  AlkPhos  166<H>  05-19        Urine Culture:   Culture - Urine (05.15.21 @ 11:40)   - Trimethoprim/Sulfamethoxazole: S <=0.5/9.5   - Imipenem: S <=1   - Levofloxacin: R >4   - Meropenem: S <=1   - Nitrofurantoin: I 64 Should not be used to treat pyelonephritis   - Piperacillin/Tazobactam: I 64   - Tigecycline: S <=2   - Tobramycin: R >8   - Amikacin: S <=16   - Amoxicillin/Clavulanic Acid: R >16/8   - Ampicillin: R >16 These ampicillin results predict results for amoxicillin   - Ampicillin/Sulbactam: R >16/8 Enterobacter, Citrobacter, and Serratia may develop resistance during prolonged therapy (3-4 days)   - Aztreonam: R >16   - Cefazolin: R >16 ([email or web address]) For uncomplicated UTI with K. pneumoniae, E. coli, or P. mirablis: JONATHAN <=16 is sensitive and JONATHAN >=32 is resistant. This also predicts results for oral agents cefaclor, cefdinir, cefpodoxime, cefprozil, cefuroxime axetil, cephalexin and locarbef for uncomplicated UTI. Note that some isolates may be susceptible to these agents while testing resistant to cefazolin.   - Cefepime: R >16   - Cefoxitin: S <=8   - Ceftriaxone: R >32 Enterobacter, Citrobacter, and Serratia may develop resistance during prolonged therapy   - Ciprofloxacin: R >2   - Ertapenem: S <=0.5   - Gentamicin: R >8   Specimen Source: .Urine Clean Catch (Midstream)   Culture Results:   >100,000 CFU/ml Escherichia coli ESBL   Organism Identification: Escherichia coli ESBL   Organism: Escherichia coli ESBL   Method Type: JONATHAN       Historical Values  Culture - Urine (05.15.21 @ 11:40)   - Trimethoprim/Sulfamethoxazole: S <=0.5/9.5   - Imipenem: S <=1   - Levofloxacin: R >4   - Meropenem: S <=1   - Nitrofurantoin: I 64 Should not be used to treat pyelonephritis   - Piperacillin/Tazobactam: I 64   - Tigecycline: S <=2   - Tobramycin: R >8   - Amikacin: S <=16   - Amoxicillin/Clavulanic Acid: R >16/8   - Ampicillin: R >16 These ampicillin results predict results for amoxicillin   - Ampicillin/Sulbactam: R >16/8 Enterobacter, Citrobacter, and Serratia may develop resistance during prolonged therapy (3-4 days)   - Aztreonam: R >16   - Cefazolin: R >16 ([email or web address]_[email or web address]) For uncomplicated UTI with K. pneumoniae, E. coli, or P. mirablis: JONATHAN <=16 is sensitive and JONATHAN >=32 is resistant. This also predicts results for oral agents cefaclor, cefdinir, cefpodoxime, cefprozil, cefuroxime axetil, cephalexin and locarbef for uncomplicated UTI. Note that some isolates may be susceptible to these agents while testing resistant to cefazolin.   - Cefepime: R >16   - Cefoxitin: S <=8   - Ceftriaxone: R >32 Enterobacter, Citrobacter, and Serratia may develop resistance during prolonged therapy   - Ciprofloxacin: R >2   - Ertapenem: S <=0.5   - Gentamicin: R >8   Specimen Source: .Urine Clean Catch (Midstream)   Culture Results:   >100,000 CFU/ml Escherichia coli ESBL   Organism Identification: Escherichia coli ESBL   Organism: Escherichia coli ESBL   Method Type: JONATHAN   Culture - Urine (04.16.21 @ 21:23)   - Vancomycin: R >16   - Trimethoprim/Sulfamethoxazole: S <=0.5/9.5   - Tobramycin: R >8   - Tetra/Doxy: R >8   - Tigecycline: S <=2   - Nitrofurantoin: S <=32 Should not be used to treat pyelonephritis   - Nitrofurantoin: I 64 Should not be used to treat pyelonephritis.   - Piperacillin/Tazobactam: S <=8   - Levofloxacin: R >4   - Levofloxacin: R >4   - Linezolid: S <=1   - Meropenem: S <=1   - Ampicillin: R >16 These ampicillin results predict results for amoxicillin   - Ampicillin: R >8 Predicts results to ampicillin/sulbactam, amoxacillin-clavulanate and piperacillin-tazobactam.   - Amikacin: I 32   - Aztreonam: R >16   - Amoxicillin/Clavulanic Acid: S <=8/4   - Ampicillin/Sulbactam: I 16/8 Enterobacter, Citrobacter, and Serratia may develop resistance during prolonged therapy (3-4 days)   - Gentamicin: R >8   - Imipenem: S <=1   - Daptomycin: SDD 4 The breakpoint for SDD (sensitive dose dependent)is based on a dosage regimen of 8-12 mg/kg administered every 24 h in adults and is intended for serious infections due to E. faecium. Consultation with an infectious diseases specialist is recommended.   - Ertapenem: S <=0.5   - Ciprofloxacin: R >2   - Ciprofloxacin: R >2   - Ceftriaxone: R >32 Enterobacter, Citrobacter, and Serratia may develop resistance during prolonged therapy   - Cefazolin: R >16 ([email or web address]_[email or web address]) For uncomplicated UTI with K. pneumoniae, E. coli, or P. mirablis: JONATHAN <=16 is sensitive and JONATHAN >=32 is resistant. This also predicts results for oral agents cefaclor, cefdinir, cefpodoxime, cefprozil, cefuroxime axetil, cephalexin and locarbef for uncomplicated UTI. Note that some isolates may be susceptible to these agents while testing resistant to cefazolin.   - Cefoxitin: S <=8   - Cefepime: R >16   Specimen Source: .Urine Clean Catch (Midstream)   Culture Results:   50,000 - 99,000 CFU/mL Escherichia coli ESBL   10,000 - 49,000 CFU/mL Enterococcus faecium (vancomycin resistant)   Organism Identification: Escherichia coli ESBL   Enterococcus faecium (vancomycin resistant)   Organism: Escherichia coli ESBL   Organism: Enterococcus faecium (vancomycin resistant)   Method Type: JONATHAN   Method Type: JONATHAN   Culture - Urine (03.14.21 @ 22:50)   - Trimethoprim/Sulfamethoxazole: S <=0.5/9.5   - Tobramycin: R >8   - Piperacillin/Tazobactam: S <=8   - Tigecycline: S <=2   - Levofloxacin: R >4   - Nitrofurantoin: S <=32 Should not be used to treat pyelonephritis   - Meropenem: S <=1   - Imipenem: S <=1   - Gentamicin: R >8   - Ciprofloxacin: R >2   - Ertapenem: S <=0.5   - Ceftriaxone: R >32 Enterobacter, Citrobacter, and Serratia may develop resistance during prolonged therapy   - Cefoxitin: S <=8   - Cefepime: R >16   - Cefazolin: R >16 ([email or web address]_COM_[email or web address]) For uncomplicated UTI with K. pneumoniae, E. coli, or P. mirablis: JONATHAN <=16 is sensitive and JONATHAN >=32 is resistant. This also predicts results for oral agents cefaclor, cefdinir, cefpodoxime, cefprozil, cefuroxime axetil, cephalexin and locarbef for uncomplicated UTI. Note that some isolates may be susceptible to these agents while testing resistant to cefazolin.   - Aztreonam: R >16   - Ampicillin/Sulbactam: I 16/8 Enterobacter, Citrobacter, and Serratia may develop resistance during prolonged therapy (3-4 days)   - Amikacin: S <=16   - Amoxicillin/Clavulanic Acid: I 16/8   - Ampicillin: R >16 These ampicillin results predict results for amoxicillin   Specimen Source: .Urine Clean Catch (Midstream)   Culture Results:   50,000 - 99,000 CFU/mL Escherichia coli ESBL   Organism Identification: Escherichia coli ESBL   Organism: Escherichia coli ESBL   Method Type: JONATHAN         RADIOLOGY & ADDITIONAL STUDIES:    < from: US Abdomen Upper Quadrant Right (05.18.21 @ 16:17) >    EXAM:  US ABDOMEN RT UPR QUADRANT            PROCEDURE DATE:  05/18/2021        INTERPRETATION:  CLINICAL INFORMATION: Right upper quadrant pain    COMPARISON: Right quadrant ultrasound 4/23/2021 and renal ultrasound dated 5/9/2021    TECHNIQUE: Sonography of the right upper quadrant.    FINDINGS:    Liver: Within normal limits.  Bile ducts: Normal caliber. Common bile duct measures 5 mm.  Gallbladder: Within normal limits.  Pancreas: Limited visualization of the head is unremarkable. Majority is obscured.  Right kidney: 9.4 cm. Moderate right hydronephrosis (also present on prior).  Ascites: Small to moderate right upper quadrant ascites. Partially imaged right pleural effusion.  IVC: Visualized portions are within normal limits.    IMPRESSION:    No evidence for cholelithiasis or cholecystitis.    Moderate right hydronephrosis.    Small to moderate right upper quadrant ascites.    Partially imaged right pleural effusion.        MURPHY MENDENHALL MD; Attending Radiologist  This document has been electronically signed. May 18 2021  4:32PM    < end of copied text >

## 2021-05-19 NOTE — CONSULT NOTE ADULT - SUBJECTIVE AND OBJECTIVE BOX
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 As per the patient she was recently discharged from hospital after being treated for UTI and was doing well in the nursing home. However, when the nursing home repeated the labs recently they informed her that they are significantly abnormal and need to be sent back to the hospital.   She was noted to have acute on chronic anemia and was given 1 unit of PRBC.   Patient denies any visible bleeding. She says she had bleeding stomach ulcer long ago and had undergone endoscopic treatments. She also had colonoscopy done outpatient and reports it to be normal.   She denies any belly pain, headaches, nausea vomiting, chest pain.  (09 May 2021 04:50)      INFECTIOUS DISEASE HISTORY:  Recently admissted for UTI/pyelonpehritis  Hx of RIght pyelonephritis in 3/14 with right sided hydro  Urine Cx 4/16 growing VRE and ESBL E coli (She has had previous bacteremia with ESBL E coli).  SHe was treated with 7 day course of linzeolid and Ertapenem  US Renal (04.20.21 @ 11:12): Urinary bladder volume is 1573 cc. Patient reported she urinated prior to test and had no urge to void. Urinary bladder debris is present. Moderate right hydronephrosis. Mild left hydronephrosis.  On routine labs, found to have cytopenia and sent to hospital.   She has had isolated fever on 5/14, she was not on antibiotics at the time.   Started on Meropenem 5/18 once urine Cx returned with ESBL E coli.   US Kidney and Bladder (05.09.21 @ 09:48): Markedly distended urinary bladder with bilateral hydronephrosis which likely reflects sequela of back up pressure            PAST MEDICAL & SURGICAL HISTORY:  Afib  on Eliquis, amio and toprol    Essential hypertension    Palpitations  occasionally; not for couple yrs    Pacemaker  Sept 2017    Diabetes  Insulin dependent - (has insulin Pump)    SSS (sick sinus syndrome)  S/p PPM    Hypothyroid    Anemia  Chronic    Skin cancer  Basal Cell Cancer face  (around nose) s/p MOHS procedure x 3    Seasonal allergies    PVD (peripheral vascular disease)  S/p Right Peripheral leg Stent    Dry eyes, bilateral    Transfusion history  S/p Hysterectomy    Lung nodules  tree in bud    Inclusion body myositis (IBM)    H/O total hysterectomy  25+ yrs ago    History of surgery  Right Peripheral Stent 5 yrs    Cardiac pacemaker  9/9/17    History of surgery  MOHS procedure (face) x 3    S/P cataract surgery    S/P ablation of atrial fibrillation        FAMILY HISTORY  Family history of lung cancer (Mother)    Cancer (Father)        SOCIAL HISTORY  Social History:  non smoker  no ETOH or drug misuse  from [facility] , walks with cane (17 Apr 2021 05:50)        ROS  General: Denies rigors, nightsweats  HEENT: Denies headache, rhinorrhea, sore throat, eye pain  CV: Denies CP, palpitations  PULM: Denies wheezing, hemoptysis  GI: Denies hematemesis, hematochezia, melena  : Denies discharge, hematuria  MSK: Denies arthralgias, myalgias  SKIN: Denies rash, lesions  NEURO: Denies paresthesias, weakness  PSYCH: Denies depression, anxiety    VITALS:  T(F): 96.8, Max: 96.8 (05-19-21 @ 05:25)  HR: 61  BP: 135/54  RR: 14Vital Signs Last 24 Hrs  T(C): 36 (19 May 2021 05:25), Max: 36 (19 May 2021 05:25)  T(F): 96.8 (19 May 2021 05:25), Max: 96.8 (19 May 2021 05:25)  HR: 61 (19 May 2021 05:25) (60 - 63)  BP: 135/54 (19 May 2021 05:25) (134/60 - 160/70)  BP(mean): 84 (19 May 2021 05:25) (84 - 91)  RR: 14 (19 May 2021 05:25) (14 - 18)  SpO2: 100% (19 May 2021 05:25) (98% - 100%)    PHYSICAL EXAM:  Gen: NAD, resting in bed  HEENT: Normocephalic, atraumatic  Neck: supple, no lymphadenopathy  CV: Regular rate & regular rhythm  Lungs: decreased BS at bases, no fremitus  Abdomen: Soft, BS present  Ext: Warm, well perfused  Neuro: non focal, awake  Skin: no rash, no erythema  Lines: no phlebitis    TESTS & MEASUREMENTS:                        7.9    6.54  )-----------( 508      ( 18 May 2021 06:01 )             23.6     05-18    128<L>  |  91<L>  |  23<H>  ----------------------------<  190<H>  4.4   |  20  |  1.4    Ca    8.0<L>      18 May 2021 18:45  Mg     1.5     05-18    TPro  5.8<L>  /  Alb  2.8<L>  /  TBili  0.4  /  DBili  0.2  /  AST  27  /  ALT  35  /  AlkPhos  186<H>  05-18    eGFR if Non African American: 36 mL/min/1.73M2 (05-18-21 @ 18:45)  eGFR if : 42 mL/min/1.73M2 (05-18-21 @ 18:45)    LIVER FUNCTIONS - ( 18 May 2021 18:45 )  Alb: 2.8 g/dL / Pro: 5.8 g/dL / ALK PHOS: 186 U/L / ALT: 35 U/L / AST: 27 U/L / GGT: x                       INFECTIOUS DISEASES TESTING  COVID-19 PCR: NotDetec (05-13-21 @ 15:33)  COVID-19 PCR: NotDetec (05-09-21 @ 03:21)  COVID-19 PCR: NotDetec (04-26-21 @ 22:00)  Hepatitis C Virus Interpretation: Reactive (04-24-21 @ 11:06)  Hepatitis B Surface Antigen: Nonreact (04-24-21 @ 11:06)  COVID-19 PCR: NotDetec (04-21-21 @ 18:25)  Procalcitonin, Serum: 0.74 ng/mL (04-18-21 @ 06:57)  COVID-19 PCR: NotDetec (04-01-21 @ 15:56)  COVID-19 PCR: NotDetec (03-30-21 @ 23:00)  COVID-19 PCR: NotDetec (03-28-21 @ 21:49)  COVID-19 PCR: NotDetec (03-26-21 @ 16:15)  COVID-19 PCR: NotDetec (03-24-21 @ 23:50)  COVID-19 PCR: NotDetec (03-14-21 @ 20:40)      RADIOLOGY & ADDITIONAL TESTS:  I have personally reviewed the last Chest xray  CXR      CT      CARDIOLOGY TESTING  12 Lead ECG:   Ventricular Rate 61 BPM    Atrial Rate 61 BPM    P-R Interval 110 ms    QRS Duration 96 ms    Q-T Interval 446 ms    QTC Calculation(Bazett) 448 ms    P Axis 4 degrees    R Axis 23 degrees    T Axis 25 degrees    Diagnosis Line Sinus rhythm with short ME  Nonspecific ST and T wave abnormality  Abnormal ECG    Confirmed by CRISTOBAL MCCALL MD ([id number]),  THEO CHRISTIANSON MD ([id number]) on  5/18/2021 3:10:04 PM (05-18-21 @ 14:37)  12 Lead ECG:   Ventricular Rate 60 BPM    Atrial Rate 187 BPM    P-R Interval 194 ms    QRS Duration 92 ms    Q-T Interval 476 ms    QTC Calculation(Bazett) 476 ms    P Axis 16 degrees    R Axis 10 degrees    T Axis 18 degrees    Diagnosis Line Atrial-paced rhythm  Abnormal ECG    Confirmed by CRISTOBAL MCCALL MD ([id number]) on 5/18/2021 11:22:10 AM (05-18-21 @ 08:12)      MEDICATIONS  ALBUTerol    90 MICROgram(s) HFA Inhaler 1 Inhalation every 6 hours  aMIOdarone    Tablet 200 Oral two times a day  amLODIPine   Tablet 5 Oral daily  apixaban 2.5 Oral every 12 hours  ascorbic acid 500 Oral daily  atorvastatin 40 Oral at bedtime  chlorhexidine 4% Liquid 1 Topical <User Schedule>  clopidogrel Tablet 75 Oral daily  dextrose 40% Gel 15 Oral once  dextrose 5%. 1000 IV Continuous <Continuous>  dextrose 5%. 1000 IV Continuous <Continuous>  dextrose 50% Injectable 25 IV Push once  dextrose 50% Injectable 12.5 IV Push once  dextrose 50% Injectable 25 IV Push once  ferrous    sulfate 325 Oral daily  glucagon  Injectable 1 IntraMuscular once  insulin glargine Injectable (LANTUS) 7 SubCutaneous at bedtime  insulin lispro Injectable (ADMELOG) 2 SubCutaneous three times a day before meals  levothyroxine 88 Oral daily  meropenem  IVPB 1000 IV Intermittent every 12 hours  metoprolol tartrate 25 Oral every 6 hours  multivitamin 1 Oral daily  pantoprazole    Tablet 40 Oral before breakfast  senna 2 Oral at bedtime  sodium bicarbonate 650 Oral every 8 hours      Weight  Weight (kg): 54.4 (05-08-21 @ 23:00)    ANTIBIOTICS:  meropenem  IVPB 1000 milliGRAM(s) IV Intermittent every 12 hours      ALLERGIES:  Gluten (Stomach Upset; Vomiting; Nausea; Diarrhea)  latex (Pruritus; Rash)  Pineapple (Unknown)  sulfonamides (Unknown)      ASSESSMENT  76 year old female with  PMH of DM on insulin pump, Celiac disease, HTN, CKD3, Afib s/p ablation on Eliquis, CAD s/p stenting in 2020, sick sinus syndrome s/p PPM, PVD s/p right peripheral leg stent, 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,  basal cell carcinoma on the face (around nose) s/p MOHS procedure x 3 (5 years ago) , chronic anemia, hypothyroidism, presented from [facility] due to abnormal labs      IMPRESSION  #  #Severe Sepsis on admission T<96.8F, T>101F, Pulse>90, Resp Rate>20, WBC>12, wbc<4, Bands>10%, lactic acidosis, metabolic encephalopathy, metabolic acidosis, metabolic alkalosis, TEOFILO due to suspected Gram negative pneumonia, aspiration pneumonia, pyelonephritis, cystitis, cellulitis, bacteremia  Pt has an acute illness which poses threat to bodily function   #Lactic acidosis  #Hyponatremia   #Obesity BMI (kg/m2): 22.7  #DM   #Abx allergy:       RECOMMENDATIONS  - F/u blood cultures, urine culture, wound culture  - Continue  - Continue  - Please check vanc trough 30 min prior to the 4th dose     Spectra [id number]     SOWMYA AMADO  76y, Female  Allergy: Gluten (Stomach Upset; Vomiting; Nausea; Diarrhea)  latex (Pruritus; Rash)  Pineapple (Unknown)  sulfonamides (Unknown)      CHIEF COMPLAINT: Weakness (17 May 2021 13:23)      LOS  10d    HPI:  76 year old female with  PMH of DM on insulin pump, Celiac disease, HTN, CKD3, Afib s/p ablation on Eliquis, CAD s/p stenting in 2020, sick sinus syndrome s/p PPM, PVD s/p right peripheral leg stent, 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 As per the patient she was recently discharged from hospital after being treated for UTI and was doing well in the nursing home. However, when the nursing home repeated the labs recently they informed her that they are significantly abnormal and need to be sent back to the hospital.   She was noted to have acute on chronic anemia and was given 1 unit of PRBC.   Patient denies any visible bleeding. She says she had bleeding stomach ulcer long ago and had undergone endoscopic treatments. She also had colonoscopy done outpatient and reports it to be normal.   She denies any belly pain, headaches, nausea vomiting, chest pain.  (09 May 2021 04:50)      INFECTIOUS DISEASE HISTORY:  Recently admissted for UTI/pyelonpehritis  Hx of RIght pyelonephritis in 3/14 with right sided hydro  Urine Cx 4/16 growing VRE and ESBL E coli (She has had previous bacteremia with ESBL E coli).  SHe was treated with 7 day course of linzeolid and Ertapenem  US Renal (04.20.21 @ 11:12): Urinary bladder volume is 1573 cc. Patient reported she urinated prior to test and had no urge to void. Urinary bladder debris is present. Moderate right hydronephrosis. Mild left hydronephrosis.  On routine labs, found to have cytopenia and sent to hospital.   She has had isolated fever on 5/14, she was not on antibiotics at the time.   Started on Meropenem 5/18 once urine Cx returned with ESBL E coli.   US Kidney and Bladder (05.09.21 @ 09:48): Markedly distended urinary bladder with bilateral hydronephrosis which likely reflects sequela of back up pressure            PAST MEDICAL & SURGICAL HISTORY:  Afib  on Eliquis, amio and toprol    Essential hypertension    Palpitations  occasionally; not for couple yrs    Pacemaker  Sept 2017    Diabetes  Insulin dependent - (has insulin Pump)    SSS (sick sinus syndrome)  S/p PPM    Hypothyroid    Anemia  Chronic    Skin cancer  Basal Cell Cancer face  (around nose) s/p MOHS procedure x 3    Seasonal allergies    PVD (peripheral vascular disease)  S/p Right Peripheral leg Stent    Dry eyes, bilateral    Transfusion history  S/p Hysterectomy    Lung nodules  tree in bud    Inclusion body myositis (IBM)    H/O total hysterectomy  25+ yrs ago    History of surgery  Right Peripheral Stent 5 yrs    Cardiac pacemaker  9/9/17    History of surgery  MOHS procedure (face) x 3    S/P cataract surgery    S/P ablation of atrial fibrillation        FAMILY HISTORY  Family history of lung cancer (Mother)    Cancer (Father)        SOCIAL HISTORY  Social History:  non smoker  no ETOH or drug misuse  from [facility] , walks with cane (17 Apr 2021 05:50)        ROS  General: Denies rigors, nightsweats  HEENT: Denies headache, rhinorrhea, sore throat, eye pain  CV: Denies CP, palpitations  PULM: Denies wheezing, hemoptysis  GI: Denies hematemesis, hematochezia, melena  : Denies discharge, hematuria  MSK: Denies arthralgias, myalgias  SKIN: Denies rash, lesions  NEURO: Denies paresthesias, weakness  PSYCH: Denies depression, anxiety    VITALS:  T(F): 96.8, Max: 96.8 (05-19-21 @ 05:25)  HR: 61  BP: 135/54  RR: 14Vital Signs Last 24 Hrs  T(C): 36 (19 May 2021 05:25), Max: 36 (19 May 2021 05:25)  T(F): 96.8 (19 May 2021 05:25), Max: 96.8 (19 May 2021 05:25)  HR: 61 (19 May 2021 05:25) (60 - 63)  BP: 135/54 (19 May 2021 05:25) (134/60 - 160/70)  BP(mean): 84 (19 May 2021 05:25) (84 - 91)  RR: 14 (19 May 2021 05:25) (14 - 18)  SpO2: 100% (19 May 2021 05:25) (98% - 100%)    PHYSICAL EXAM:  Gen: NAD, resting in bed  HEENT: Normocephalic, atraumatic  Neck: supple, no lymphadenopathy  CV: Regular rate & regular rhythm  Lungs: decreased BS at bases, no fremitus  Abdomen: Soft, BS present  Ext: Warm, well perfused  Neuro: non focal, awake  Skin: no rash, no erythema  Lines: no phlebitis    TESTS & MEASUREMENTS:                        7.9    6.54  )-----------( 508      ( 18 May 2021 06:01 )             23.6     05-18    128<L>  |  91<L>  |  23<H>  ----------------------------<  190<H>  4.4   |  20  |  1.4    Ca    8.0<L>      18 May 2021 18:45  Mg     1.5     05-18    TPro  5.8<L>  /  Alb  2.8<L>  /  TBili  0.4  /  DBili  0.2  /  AST  27  /  ALT  35  /  AlkPhos  186<H>  05-18    eGFR if Non African American: 36 mL/min/1.73M2 (05-18-21 @ 18:45)  eGFR if : 42 mL/min/1.73M2 (05-18-21 @ 18:45)    LIVER FUNCTIONS - ( 18 May 2021 18:45 )  Alb: 2.8 g/dL / Pro: 5.8 g/dL / ALK PHOS: 186 U/L / ALT: 35 U/L / AST: 27 U/L / GGT: x                       INFECTIOUS DISEASES TESTING  COVID-19 PCR: NotDetec (05-13-21 @ 15:33)  COVID-19 PCR: NotDetec (05-09-21 @ 03:21)  COVID-19 PCR: NotDetec (04-26-21 @ 22:00)  Hepatitis C Virus Interpretation: Reactive (04-24-21 @ 11:06)  Hepatitis B Surface Antigen: Nonreact (04-24-21 @ 11:06)  COVID-19 PCR: NotDetec (04-21-21 @ 18:25)  Procalcitonin, Serum: 0.74 ng/mL (04-18-21 @ 06:57)  COVID-19 PCR: NotDetec (04-01-21 @ 15:56)  COVID-19 PCR: NotDetec (03-30-21 @ 23:00)  COVID-19 PCR: NotDetec (03-28-21 @ 21:49)  COVID-19 PCR: NotDetec (03-26-21 @ 16:15)  COVID-19 PCR: NotDetec (03-24-21 @ 23:50)  COVID-19 PCR: NotDetec (03-14-21 @ 20:40)      RADIOLOGY & ADDITIONAL TESTS:  I have personally reviewed the last Chest xray  CXR      CT      CARDIOLOGY TESTING  12 Lead ECG:   Ventricular Rate 61 BPM    Atrial Rate 61 BPM    P-R Interval 110 ms    QRS Duration 96 ms    Q-T Interval 446 ms    QTC Calculation(Bazett) 448 ms    P Axis 4 degrees    R Axis 23 degrees    T Axis 25 degrees    Diagnosis Line Sinus rhythm with short VA  Nonspecific ST and T wave abnormality  Abnormal ECG    Confirmed by CRISTOBAL MCCALL MD ([id number]),  THEO CHRISTIANSON MD ([id number]) on  5/18/2021 3:10:04 PM (05-18-21 @ 14:37)  12 Lead ECG:   Ventricular Rate 60 BPM    Atrial Rate 187 BPM    P-R Interval 194 ms    QRS Duration 92 ms    Q-T Interval 476 ms    QTC Calculation(Bazett) 476 ms    P Axis 16 degrees    R Axis 10 degrees    T Axis 18 degrees    Diagnosis Line Atrial-paced rhythm  Abnormal ECG    Confirmed by CRISTOBAL MCCALL MD ([id number]) on 5/18/2021 11:22:10 AM (05-18-21 @ 08:12)      MEDICATIONS  ALBUTerol    90 MICROgram(s) HFA Inhaler 1 Inhalation every 6 hours  aMIOdarone    Tablet 200 Oral two times a day  amLODIPine   Tablet 5 Oral daily  apixaban 2.5 Oral every 12 hours  ascorbic acid 500 Oral daily  atorvastatin 40 Oral at bedtime  chlorhexidine 4% Liquid 1 Topical <User Schedule>  clopidogrel Tablet 75 Oral daily  dextrose 40% Gel 15 Oral once  dextrose 5%. 1000 IV Continuous <Continuous>  dextrose 5%. 1000 IV Continuous <Continuous>  dextrose 50% Injectable 25 IV Push once  dextrose 50% Injectable 12.5 IV Push once  dextrose 50% Injectable 25 IV Push once  ferrous    sulfate 325 Oral daily  glucagon  Injectable 1 IntraMuscular once  insulin glargine Injectable (LANTUS) 7 SubCutaneous at bedtime  insulin lispro Injectable (ADMELOG) 2 SubCutaneous three times a day before meals  levothyroxine 88 Oral daily  meropenem  IVPB 1000 IV Intermittent every 12 hours  metoprolol tartrate 25 Oral every 6 hours  multivitamin 1 Oral daily  pantoprazole    Tablet 40 Oral before breakfast  senna 2 Oral at bedtime  sodium bicarbonate 650 Oral every 8 hours      Weight  Weight (kg): 54.4 (05-08-21 @ 23:00)    ANTIBIOTICS:  meropenem  IVPB 1000 milliGRAM(s) IV Intermittent every 12 hours      ALLERGIES:  Gluten (Stomach Upset; Vomiting; Nausea; Diarrhea)  latex (Pruritus; Rash)  Pineapple (Unknown)  sulfonamides (Unknown)

## 2021-05-19 NOTE — CONSULT NOTE ADULT - ATTENDING COMMENTS
Patient seen and examined and agree with above except as noted.  Patients history, notes ,labs, imaging, meds and vitals reviewed.  Reviewed out patient records from Dr. Win and spoke with  handling her IVIG infusions for dose.  she will be getting IVIG in [place] 15g/d for 2 days every 5 weeks.  While she is hospital she can start this back up as she has missed the last 2 doses.  Her exam shows weakness in distal UE and proximal LE consistent with her diagnosis of IBM.    Plan as above (Recall for any new neurological issues or questions)
The patient was seen. Agree with above.  76 year old female with PMHx of multiple medical problems was brought to hospital for worsening of anemia with Hb 6.9, s/p one unit blood transfusion. Her CBC also revealed mild leukopenia and thrombocytopenia. During her recent admission in 4/2021, her WBC and PLT were normal. It is unclear if new onset pancytopenia is due to medication, viral infection or flaring of inflammatory disease. She has Inclusion body myositis and has been on Gamunex every 5 weeks.     -- Worsening of anemia, likely due to combination of chronic disease, iron deficiency, CKD. Continue oral iron supplement. Followup Ferritin result.  -- Positive Direct Noel is due to IVIG. Check Haptoglobin, LDH and retic count.  -- Check stool occult blood to r/o bleeding  -- Leukopenia and thrombocytopenia, likely due to medication. She was on Ertapenem and Linezolid both of which can be associated with anemia, thrombocytopenia, and some leukopenia. Monitor CBC.
Please see my recommendations above

## 2021-05-19 NOTE — CONSULT NOTE ADULT - ASSESSMENT
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  SHe was treated with 7 day course of linzeolid and Ertapenem  - US Renal (04.20.21 @ 11:12): Urinary bladder volume is 1573 cc. Patient reported she urinated prior to test and had no urge to void. Urinary bladder debris is present. Moderate right hydronephrosis. Mild left hydronephrosis.   - US Kidney and Bladder (05.09.21 @ 09:48): Markedly distended urinary bladder with bilateral hydronephrosis which likely reflects sequela of back up pressure    #Asymptomatic Bacteriuria - Urine Cx with ESBL E coli     #CAD s/p Stent  #Atrial Fibrillation s/p PPM  #Inclusion body myositis  #Abx allergy: NKDA    RECOMMENDATIONS  - suspect asymptomatic bacteruria with overflow incontinence - does not seem to have change in chronic urinary habits to suggest UTI   - recommend monitoring off antibiotics  - recommend urology follow-up     Please call or message on Microsoft Teams if with any questions.  Spectra [phone]

## 2021-05-19 NOTE — CONSULT NOTE ADULT - PROBLEM SELECTOR RECOMMENDATION 9
Please insert wiggins catheter.  There is a very distended bladder on recent CT scan.  Treat UTI with abx  cystoscopy and urodynamics as outpatient.

## 2021-05-20 NOTE — PROGRESS NOTE ADULT - SUBJECTIVE AND OBJECTIVE BOX
Patient had a wiggins catheter inserted earlier today.  Feels well.        INTERVAL EVENTS/HPI  - No acute events overnight  - T(F): , Max: 96.5 (05-20-21 @ 05:23)  - CT reviewed -- showing right sided perinephrtic stranding, worsening of right sided hydro   - Denies any worsening symptoms   - Tolerating medication  - WBC Count: 5.13 (05-20-21 @ 06:17)  WBC Count: 6.08 (05-19-21 @ 12:52)     - Creatinine, Serum: 1.3 (05-20-21 @ 06:17)  Creatinine, Serum: 1.5 (05-19-21 @ 12:52)       ROS  General: Denies rigors, nightsweats  HEENT: Denies headache, rhinorrhea, sore throat, eye pain  CV: Denies CP, palpitations  PULM: Denies wheezing, hemoptysis  GI: Denies hematemesis, hematochezia, melena  : Denies discharge, hematuria  MSK: Denies arthralgias, myalgias  SKIN: Denies rash, lesions  NEURO: Denies paresthesias, weakness  PSYCH: Denies depression, anxiety    VITALS:  T(F): 96.5, Max: 96.5 (05-20-21 @ 05:23)  HR: 64  BP: 140/63  RR: 18Vital Signs Last 24 Hrs  T(C): 35.8 (20 May 2021 05:23), Max: 35.8 (20 May 2021 05:23)  T(F): 96.5 (20 May 2021 05:23), Max: 96.5 (20 May 2021 05:23)  HR: 64 (20 May 2021 05:23) (63 - 64)  BP: 140/63 (20 May 2021 05:23) (139/65 - 140/63)  BP(mean): --  RR: 18 (20 May 2021 05:23) (18 - 18)  SpO2: 100% (20 May 2021 05:23) (100% - 100%)    PHYSICAL EXAM:  Gen: NAD, resting in bed  HEENT: Normocephalic, atraumatic  Neck: supple, no lymphadenopathy  CV: Regular rate & regular rhythm  Lungs: decreased BS at bases, no fremitus  Abdomen: Soft, BS present  :  No CVA tenderness, no suprapubic fullness, wiggins catheter in place with cloudy drainage.  Ext: Warm, well perfused  Neuro: non focal, awake  Skin: no rash, no erythema  Lines: no phlebitis    FH: Non-contributory  Social Hx: Non-contributory    TESTS & MEASUREMENTS:                        7.9    5.13  )-----------( 585      ( 20 May 2021 06:17 )             23.3     05-20    128<L>  |  94<L>  |  21<H>  ----------------------------<  212<H>  4.5   |  23  |  1.3    Ca    8.1<L>      20 May 2021 06:17  Mg     1.8     05-20    TPro  6.2  /  Alb  2.9<L>  /  TBili  0.5  /  DBili  x   /  AST  24  /  ALT  31  /  AlkPhos  166<H>  05-19    eGFR if Non African American: 39 mL/min/1.73M2 (05-20-21 @ 06:17)  eGFR if : 46 mL/min/1.73M2 (05-20-21 @ 06:17)    LIVER FUNCTIONS - ( 19 May 2021 12:52 )  Alb: 2.9 g/dL / Pro: 6.2 g/dL / ALK PHOS: 166 U/L / ALT: 31 U/L / AST: 24 U/L / GGT: x               Culture - Urine (collected 05-15-21 @ 11:40)  Source: .Urine Clean Catch (Midstream)  Final Report (05-18-21 @ 18:13):    >100,000 CFU/ml Escherichia coli ESBL  Organism: Escherichia coli ESBL (05-18-21 @ 18:13)  Organism: Escherichia coli ESBL (05-18-21 @ 18:13)      -  Amikacin: S <=16      -  Amoxicillin/Clavulanic Acid: R >16/8      -  Ampicillin: R >16 These ampicillin results predict results for amoxicillin      -  Ampicillin/Sulbactam: R >16/8 Enterobacter, Citrobacter, and Serratia may develop resistance during prolonged therapy (3-4 days)      -  Aztreonam: R >16      -  Cefazolin: R >16 (MIC_CL_COM_ENTERIC_CEFAZU) For uncomplicated UTI with K. pneumoniae, E. coli, or P. mirablis: JONATHAN <=16 is sensitive and JONATHAN >=32 is resistant. This also predicts results for oral agents cefaclor, cefdinir, cefpodoxime, cefprozil, cefuroxime axetil, cephalexin and locarbef for uncomplicated UTI. Note that some isolates may be susceptible to these agents while testing resistant to cefazolin.      -  Cefepime: R >16      -  Cefoxitin: S <=8      -  Ceftriaxone: R >32 Enterobacter, Citrobacter, and Serratia may develop resistance during prolonged therapy      -  Ciprofloxacin: R >2      -  Ertapenem: S <=0.5      -  Gentamicin: R >8      -  Imipenem: S <=1      -  Levofloxacin: R >4      -  Meropenem: S <=1      -  Nitrofurantoin: I 64 Should not be used to treat pyelonephritis      -  Piperacillin/Tazobactam: I 64      -  Tigecycline: S <=2      -  Tobramycin: R >8      -  Trimethoprim/Sulfamethoxazole: S <=0.5/9.5      Method Type: JONATHAN    Culture - Blood (collected 05-14-21 @ 12:42)  Source: .Blood None  Final Report (05-19-21 @ 22:17):    No Growth Final            INFECTIOUS DISEASES TESTING  COVID-19 PCR: NotDetec (05-13-21 @ 15:33)  COVID-19 PCR: NotDetec (05-09-21 @ 03:21)  COVID-19 PCR: NotDetec (04-26-21 @ 22:00)  COVID-19 PCR: NotDetec (04-21-21 @ 18:25)  Procalcitonin, Serum: 0.74 (04-18-21 @ 06:57)  Rapid RVP Result: NotDetec (04-17-21 @ 01:00)  COVID-19 PCR: NotDetec (04-01-21 @ 15:56)  COVID-19 PCR: NotDetec (03-30-21 @ 23:00)  COVID-19 PCR: NotDetec (03-28-21 @ 21:49)  COVID-19 PCR: NotDetec (03-26-21 @ 16:15)  COVID-19 PCR: NotDetec (03-24-21 @ 23:50)  COVID-19 PCR: NotDetec (03-14-21 @ 20:40)      INFLAMMATORY MARKERS  Sedimentation Rate, Erythrocyte: 63 mm/Hr (05-14-21 @ 12:42)  C-Reactive Protein, Serum: 125 mg/L (05-14-21 @ 12:42)      RADIOLOGY & ADDITIONAL TESTS:  I have personally reviewed the last available Chest xray  CXR      CT  CT Abdomen and Pelvis No Cont:   EXAM:  CT ABDOMEN AND PELVIS            PROCEDURE DATE:  05/19/2021            INTERPRETATION:  CLINICAL STATEMENT: Hydronephrosis. Pancytopenia.  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  Reformatted images in the coronal and sagittal planes were acquired.    COMPARISON CT: CT scan of the abdomen pelvis dated 3/14/2021    OTHER STUDIES USED FOR CORRELATION: None.    FINDINGS:    LOWER CHEST: Pacemaker leads are noted. Small bilateral pleural effusions are noted. There are areas of prior atelectasis at the lung bases. No pericardial effusion. Fluid is noted within the lower esophagus.    HEPATIC: The liver is normal in appearance with no evidence of mass or bile duct dilatation.    BILIARY: No calcified gallstones are noted.    SPLEEN: Unremarkable.    PANCREAS: The pancreas is atrophic. No evidence of mass or pancreatitis.    ADRENAL GLANDS: Unremarkable.    KIDNEYS: Compared to the previous CT scan of 3/14/2021, there is worsening of the right sided hydronephrosis. The degree of hydronephrosis has progressed from moderate to moderate-to-severe. There is moderate-to-severe dilatation of the right ureter to the level of the urinary bladder. Right-sided perinephric stranding is noted.    Patient with the previous CT scan of 3/14/2021, there is now moderate left-sided hydronephrosis and hydroureter.    No radiopaque calculi are identified within the kidneys, along the course of the right or left ureter, or at the right or left ureterovesical junctions. The bladder is distended to the level of the umbilicus. Possibility of bladder outlet obstruction should be considered.    ABDOMINOPELVIC NODES: Unremarkable.    PELVIC ORGANS: Bladder - see above.    Status post hysterectomy. No evidence of pelvic mass, lymphadenopathy, or fluid collection.    PERITONEUM/MESENTERY/BOWEL: No evidence of bowel obstruction, colitis, inflammatory process, or ascites within the abdomen or pelvis. No pneumoperitoneum.  The appendix is normal in appearance.    BONES/SOFT TISSUES: Degenerative changes of the spine are noted.    OTHER: No evidence of abdominal aortic aneurysm.

## 2021-05-20 NOTE — PROGRESS NOTE ADULT - NSICDXPILOT_GEN_ALL_CORE
[place]

## 2021-05-20 NOTE — PROGRESS NOTE ADULT - SUBJECTIVE AND OBJECTIVE BOX
SOWMYA AMADO  76y, Female  Allergy: Gluten (Stomach Upset; Vomiting; Nausea; Diarrhea)  latex (Pruritus; Rash)  Pineapple (Unknown)  sulfonamides (Unknown)      LOS  11d    CHIEF COMPLAINT: Pancytopenia (19 May 2021 11:34)      INTERVAL EVENTS/HPI  - No acute events overnight  - T(F): , Max: 96.5 (05-20-21 @ 05:23)  - CT reviewed -- showing right sided perinephrtic stranding, worsening of right sided hydro   - Denies any worsening symptoms   - Tolerating medication  - WBC Count: 5.13 (05-20-21 @ 06:17)  WBC Count: 6.08 (05-19-21 @ 12:52)     - Creatinine, Serum: 1.3 (05-20-21 @ 06:17)  Creatinine, Serum: 1.5 (05-19-21 @ 12:52)       ROS  General: Denies rigors, nightsweats  HEENT: Denies headache, rhinorrhea, sore throat, eye pain  CV: Denies CP, palpitations  PULM: Denies wheezing, hemoptysis  GI: Denies hematemesis, hematochezia, melena  : Denies discharge, hematuria  MSK: Denies arthralgias, myalgias  SKIN: Denies rash, lesions  NEURO: Denies paresthesias, weakness  PSYCH: Denies depression, anxiety    VITALS:  T(F): 96.5, Max: 96.5 (05-20-21 @ 05:23)  HR: 64  BP: 140/63  RR: 18Vital Signs Last 24 Hrs  T(C): 35.8 (20 May 2021 05:23), Max: 35.8 (20 May 2021 05:23)  T(F): 96.5 (20 May 2021 05:23), Max: 96.5 (20 May 2021 05:23)  HR: 64 (20 May 2021 05:23) (63 - 64)  BP: 140/63 (20 May 2021 05:23) (139/65 - 140/63)  BP(mean): --  RR: 18 (20 May 2021 05:23) (18 - 18)  SpO2: 100% (20 May 2021 05:23) (100% - 100%)    PHYSICAL EXAM:  Gen: NAD, resting in bed  HEENT: Normocephalic, atraumatic  Neck: supple, no lymphadenopathy  CV: Regular rate & regular rhythm  Lungs: decreased BS at bases, no fremitus  Abdomen: Soft, BS present  Ext: Warm, well perfused  Neuro: non focal, awake  Skin: no rash, no erythema  Lines: no phlebitis    FH: Non-contributory  Social Hx: Non-contributory    TESTS & MEASUREMENTS:                        7.9    5.13  )-----------( 585      ( 20 May 2021 06:17 )             23.3     05-20    128<L>  |  94<L>  |  21<H>  ----------------------------<  212<H>  4.5   |  23  |  1.3    Ca    8.1<L>      20 May 2021 06:17  Mg     1.8     05-20    TPro  6.2  /  Alb  2.9<L>  /  TBili  0.5  /  DBili  x   /  AST  24  /  ALT  31  /  AlkPhos  166<H>  05-19    eGFR if Non African American: 39 mL/min/1.73M2 (05-20-21 @ 06:17)  eGFR if : 46 mL/min/1.73M2 (05-20-21 @ 06:17)    LIVER FUNCTIONS - ( 19 May 2021 12:52 )  Alb: 2.9 g/dL / Pro: 6.2 g/dL / ALK PHOS: 166 U/L / ALT: 31 U/L / AST: 24 U/L / GGT: x               Culture - Urine (collected 05-15-21 @ 11:40)  Source: .Urine Clean Catch (Midstream)  Final Report (05-18-21 @ 18:13):    >100,000 CFU/ml Escherichia coli ESBL  Organism: Escherichia coli ESBL (05-18-21 @ 18:13)  Organism: Escherichia coli ESBL (05-18-21 @ 18:13)      -  Amikacin: S <=16      -  Amoxicillin/Clavulanic Acid: R >16/8      -  Ampicillin: R >16 These ampicillin results predict results for amoxicillin      -  Ampicillin/Sulbactam: R >16/8 Enterobacter, Citrobacter, and Serratia may develop resistance during prolonged therapy (3-4 days)      -  Aztreonam: R >16      -  Cefazolin: R >16 (MIC_CL_COM_ENTERIC_CEFAZU) For uncomplicated UTI with K. pneumoniae, E. coli, or P. mirablis: JONATHAN <=16 is sensitive and JONATHAN >=32 is resistant. This also predicts results for oral agents cefaclor, cefdinir, cefpodoxime, cefprozil, cefuroxime axetil, cephalexin and locarbef for uncomplicated UTI. Note that some isolates may be susceptible to these agents while testing resistant to cefazolin.      -  Cefepime: R >16      -  Cefoxitin: S <=8      -  Ceftriaxone: R >32 Enterobacter, Citrobacter, and Serratia may develop resistance during prolonged therapy      -  Ciprofloxacin: R >2      -  Ertapenem: S <=0.5      -  Gentamicin: R >8      -  Imipenem: S <=1      -  Levofloxacin: R >4      -  Meropenem: S <=1      -  Nitrofurantoin: I 64 Should not be used to treat pyelonephritis      -  Piperacillin/Tazobactam: I 64      -  Tigecycline: S <=2      -  Tobramycin: R >8      -  Trimethoprim/Sulfamethoxazole: S <=0.5/9.5      Method Type: JONATHAN    Culture - Blood (collected 05-14-21 @ 12:42)  Source: .Blood None  Final Report (05-19-21 @ 22:17):    No Growth Final            INFECTIOUS DISEASES TESTING  COVID-19 PCR: NotDetec (05-13-21 @ 15:33)  COVID-19 PCR: NotDetec (05-09-21 @ 03:21)  COVID-19 PCR: NotDetec (04-26-21 @ 22:00)  COVID-19 PCR: NotDetec (04-21-21 @ 18:25)  Procalcitonin, Serum: 0.74 (04-18-21 @ 06:57)  Rapid RVP Result: NotDetec (04-17-21 @ 01:00)  COVID-19 PCR: NotDetec (04-01-21 @ 15:56)  COVID-19 PCR: NotDetec (03-30-21 @ 23:00)  COVID-19 PCR: NotDetec (03-28-21 @ 21:49)  COVID-19 PCR: NotDetec (03-26-21 @ 16:15)  COVID-19 PCR: NotDetec (03-24-21 @ 23:50)  COVID-19 PCR: NotDetec (03-14-21 @ 20:40)      INFLAMMATORY MARKERS  Sedimentation Rate, Erythrocyte: 63 mm/Hr (05-14-21 @ 12:42)  C-Reactive Protein, Serum: 125 mg/L (05-14-21 @ 12:42)      RADIOLOGY & ADDITIONAL TESTS:  I have personally reviewed the last available Chest xray  CXR      CT  CT Abdomen and Pelvis No Cont:   EXAM:  CT ABDOMEN AND PELVIS            PROCEDURE DATE:  05/19/2021            INTERPRETATION:  CLINICAL STATEMENT: Hydronephrosis. Pancytopenia.  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  Reformatted images in the coronal and sagittal planes were acquired.    COMPARISON CT: CT scan of the abdomen pelvis dated 3/14/2021    OTHER STUDIES USED FOR CORRELATION: None.    FINDINGS:    LOWER CHEST: Pacemaker leads are noted. Small bilateral pleural effusions are noted. There are areas of prior atelectasis at the lung bases. No pericardial effusion. Fluid is noted within the lower esophagus.    HEPATIC: The liver is normal in appearance with no evidence of mass or bile duct dilatation.    BILIARY: No calcified gallstones are noted.    SPLEEN: Unremarkable.    PANCREAS: The pancreas is atrophic. No evidence of mass or pancreatitis.    ADRENAL GLANDS: Unremarkable.    KIDNEYS: Compared to the previous CT scan of 3/14/2021, there is worsening of the right sided hydronephrosis. The degree of hydronephrosis has progressed from moderate to moderate-to-severe. There is moderate-to-severe dilatation of the right ureter to the level of the urinary bladder. Right-sided perinephric stranding is noted.    Patient with the previous CT scan of 3/14/2021, there is now moderate left-sided hydronephrosis and hydroureter.    No radiopaque calculi are identified within the kidneys, along the course of the right or left ureter, or at the right or left ureterovesical junctions. The bladder is distended to the level of the umbilicus. Possibility of bladder outlet obstruction should be considered.    ABDOMINOPELVIC NODES: Unremarkable.    PELVIC ORGANS: Bladder - see above.    Status post hysterectomy. No evidence of pelvic mass, lymphadenopathy, or fluid collection.    PERITONEUM/MESENTERY/BOWEL: No evidence of bowel obstruction, colitis, inflammatory process, or ascites within the abdomen or pelvis. No pneumoperitoneum.  The appendix is normal in appearance.    BONES/SOFT TISSUES: Degenerative changes of the spine are noted.    OTHER: No evidence of abdominal aortic aneurysm.      IMPRESSION:    Compared to the previous CT scan of 3/14/2021, there is worsening of the right sided hydronephrosis. The degree of hydronephrosis has progressed from moderate to moderate-to-severe. There is moderate-to-severe dilatation of the right ureter to the level of the urinary bladder. Right-sided perinephric stranding is noted.    Patient with the previous CT scan of 3/14/2021, there is now moderate left-sided hydronephrosis and hydroureter.    No radiopaque calculi are identified within the kidneys, along the course of the right or left ureter, or at the right or left ureterovesical junctions.    The bladder is distended to the level of the umbilicus. Possibility of bladder outlet obstruction should be considered.              HUMA LITTLE MD; Attending Interventional Radiologist  This document has been electronically signed. May 19 2021  8:29PM (05-19-21 @ 19:29)      CARDIOLOGY TESTING  12 Lead ECG:   Ventricular Rate 61 BPM    Atrial Rate 61 BPM    P-R Interval 110 ms    QRS Duration 96 ms    Q-T Interval 446 ms    QTC Calculation(Bazett) 448 ms    P Axis 4 degrees    R Axis 23 degrees    T Axis 25 degrees    Diagnosis Line Sinus rhythm with short TX  Nonspecific ST and T wave abnormality  Abnormal ECG    Confirmed by CRISTOBAL MCCALL MD ([id number]),  THEO CHRISTIANSON MD ([id number]) on  5/18/2021 3:10:04 PM (05-18-21 @ 14:37)  12 Lead ECG:   Ventricular Rate 60 BPM    Atrial Rate 187 BPM    P-R Interval 194 ms    QRS Duration 92 ms    Q-T Interval 476 ms    QTC Calculation(Bazett) 476 ms    P Axis 16 degrees    R Axis 10 degrees    T Axis 18 degrees    Diagnosis Line Atrial-paced rhythm  Abnormal ECG    Confirmed by CRISTOBAL MCCALL MD ([id number]) on 5/18/2021 11:22:10 AM (05-18-21 @ 08:12)      MEDICATIONS  ALBUTerol    90 MICROgram(s) HFA Inhaler 1 Inhalation every 6 hours  aMIOdarone    Tablet 200 Oral two times a day  amLODIPine   Tablet 5 Oral daily  apixaban 2.5 Oral every 12 hours  ascorbic acid 500 Oral daily  atorvastatin 40 Oral at bedtime  chlorhexidine 4% Liquid 1 Topical <User Schedule>  clopidogrel Tablet 75 Oral daily  dextrose 40% Gel 15 Oral once  dextrose 5%. 1000 IV Continuous <Continuous>  dextrose 5%. 1000 IV Continuous <Continuous>  dextrose 50% Injectable 25 IV Push once  dextrose 50% Injectable 12.5 IV Push once  dextrose 50% Injectable 25 IV Push once  ferrous    sulfate 325 Oral daily  glucagon  Injectable 1 IntraMuscular once  insulin glargine Injectable (LANTUS) 16 SubCutaneous at bedtime  insulin lispro (ADMELOG) corrective regimen sliding scale  SubCutaneous three times a day before meals  insulin lispro Injectable (ADMELOG) 2 SubCutaneous three times a day before meals  levothyroxine 88 Oral daily  meropenem  IVPB 1000 IV Intermittent every 12 hours  metoprolol tartrate 25 Oral every 6 hours  multivitamin 1 Oral daily  pantoprazole    Tablet 40 Oral before breakfast  polyethylene glycol 3350 17 Oral daily  senna 2 Oral at bedtime  senna 2 Oral at bedtime  sodium bicarbonate 650 Oral every 8 hours      WEIGHT  Weight (kg): 54.4 (05-08-21 @ 23:00)  Creatinine, Serum: 1.3 mg/dL (05-20-21 @ 06:17)      ANTIBIOTICS:  meropenem  IVPB 1000 milliGRAM(s) IV Intermittent every 12 hours      All available historical records have been reviewed

## 2021-05-20 NOTE — PROGRESS NOTE ADULT - PROBLEM SELECTOR PLAN 1
PLease get a renal sonogram now that the patient has an indwelling catheter.  Hydronephrosis could be due to urinary retention.

## 2021-05-20 NOTE — PROGRESS NOTE ADULT - ATTENDING COMMENTS
Please see my above recommendations.
Pt has been seen and examined. I agree with above note as reviewed.  Pt is doing well. No complaints. Blood cxs so far negative.  CXR WNL 5/14                        7.9    6.83  )-----------( 289      ( 15 May 2021 11:53 )             23.2       05-15    127<L>  |  93<L>  |  27<H>  ----------------------------<  152<H>  4.3   |  20  |  1.9<H>    Ca    7.8<L>      15 May 2021 11:53    TPro  5.7<L>  /  Alb  2.7<L>  /  TBili  0.6  /  DBili  x   /  AST  57<H>  /  ALT  51<H>  /  AlkPhos  158<H>  05-15    Vital Signs Last 24 Hrs  T(C): 36.7 (15 May 2021 12:13), Max: 37.7 (14 May 2021 21:36)  T(F): 98 (15 May 2021 12:13), Max: 99.8 (14 May 2021 21:36)  HR: 62 (15 May 2021 12:13) (60 - 64)  BP: 143/74 (15 May 2021 12:13) (125/66 - 164/74)  RR: 15 (15 May 2021 12:13) (15 - 18)  SpO2: 98% (14 May 2021 20:05) (98% - 99%)    Dispo: Fever completed w/up. Can go to SNF. Na 127 liberate salt in diet.
Pt continues to be doing well. Awaiting Urine Cxs report. Pt has had no fevers in the last 2days.   Anticipate for d/c for tomorrow  to SNF  c/w all care   c/w 1L Fluid Restrictions  encourage oral intake (HypoNatremia likely from not eating well) Pt with no appetite at times nausea                          7.5    6.69  )-----------( 416      ( 17 May 2021 06:07 )             22.5   05-17    130<L>  |  96<L>  |  27<H>  ----------------------------<  185<H>  3.9   |  23  |  1.6<H>    Ca    7.4<L>      17 May 2021 06:07    TPro  5.3<L>  /  Alb  2.6<L>  /  TBili  0.4  /  DBili  x   /  AST  30  /  ALT  37  /  AlkPhos  170<H>  05-17
Fever 2/2 Acute UTI   - History of ESBL start on Meropenem IV  - f/u Cxs Sensitivities  - Blood cxs Neg    HypoNatremia   - f/u lytes and Osmo Urine  - 1L Fluid Restriction  - f/u BMP am     Debility 2/2 Prolonged Hospitalization - Inclusion Body Myosities  - f/u Neurology clinic from [facility]  - OOB to Chair and PT regularly    Dispo: SNF / f/u U sens
Pt has been seen and examined. I agree with above note as reviewed.  Pt is doing well. No complaints. Blood cxs so far negative.  CXR WNL 5/14                        7.9    6.83  )-----------( 289      ( 15 May 2021 11:53 )             23.2       05-15    127<L>  |  93<L>  |  27<H>  ----------------------------<  152<H>  4.3   |  20  |  1.9<H>    Ca    7.8<L>      15 May 2021 11:53    TPro  5.7<L>  /  Alb  2.7<L>  /  TBili  0.6  /  DBili  x   /  AST  57<H>  /  ALT  51<H>  /  AlkPhos  158<H>  05-15    Vital Signs Last 24 Hrs  T(C): 36.7 (15 May 2021 12:13), Max: 37.7 (14 May 2021 21:36)  T(F): 98 (15 May 2021 12:13), Max: 99.8 (14 May 2021 21:36)  HR: 62 (15 May 2021 12:13) (60 - 64)  BP: 143/74 (15 May 2021 12:13) (125/66 - 164/74)  RR: 15 (15 May 2021 12:13) (15 - 18)  SpO2: 98% (14 May 2021 20:05) (98% - 99%)    Dispo: Fever completed w/up. Can go to SNF. Na 127 liberate salt in diet.
Pt seen and examined. Case and Plan discussed at rounds and agree with above note.  Pt was discharged to SNF yesterday and spiked a fever of 101F at snf upon arrival.  - Fever w/up in progress but clinically no complaints and feels at her baseline.  - f/u Urine/Blood Cxs/CXR/CBC  - Hold Off on Abx  - Could this be due to Inclusion Body Myosities ?Autoimmune inflammatory condition maybe possible to give fever however for now rulling out infection and observing.     Dispo: Possible d/c SNF on [place] - f/u fever w/up / c/w PT Care

## 2021-05-20 NOTE — PROGRESS NOTE ADULT - SUBJECTIVE AND OBJECTIVE BOX
SUBJECTIVE  Patient is a 76y old Female who presents with a chief complaint of Pancytopenia (19 May 2021 11:34)  Currently admitted to medicine with the primary diagnosis of Drug-induced pancytopenia    Today is hospital day 11d, and this morning she is alert, had a ct abdomen showed Right perinephric stranding , resumed Meropenem  and reports no overnight events.       OBJECTIVE  PAST MEDICAL & SURGICAL HISTORY  Afib  on Eliquis, amio and toprol    Essential hypertension    Palpitations  occasionally; not for couple yrs    Pacemaker  Sept 2017    Diabetes  Insulin dependent - (has insulin Pump)    SSS (sick sinus syndrome)  S/p PPM    Hypothyroid    Anemia  Chronic    Skin cancer  Basal Cell Cancer face  (around nose) s/p MOHS procedure x 3    Seasonal allergies    PVD (peripheral vascular disease)  S/p Right Peripheral leg Stent    Dry eyes, bilateral    Transfusion history  S/p Hysterectomy    Lung nodules  tree in bud    Inclusion body myositis (IBM)    H/O total hysterectomy  25+ yrs ago    History of surgery  Right Peripheral Stent 5 yrs    Cardiac pacemaker  9/9/17    History of surgery  MOHS procedure (face) x 3    S/P cataract surgery    S/P ablation of atrial fibrillation      ALLERGIES:  latex (Pruritus; Rash)  sulfonamides (Unknown)    MEDICATIONS:  STANDING MEDICATIONS  ALBUTerol    90 MICROgram(s) HFA Inhaler 1 Puff(s) Inhalation every 6 hours  aMIOdarone    Tablet 200 milliGRAM(s) Oral two times a day  amLODIPine   Tablet 5 milliGRAM(s) Oral daily  apixaban 2.5 milliGRAM(s) Oral every 12 hours  ascorbic acid 500 milliGRAM(s) Oral daily  atorvastatin 40 milliGRAM(s) Oral at bedtime  chlorhexidine 4% Liquid 1 Application(s) Topical <User Schedule>  clopidogrel Tablet 75 milliGRAM(s) Oral daily  dextrose 40% Gel 15 Gram(s) Oral once  dextrose 5%. 1000 milliLiter(s) IV Continuous <Continuous>  dextrose 5%. 1000 milliLiter(s) IV Continuous <Continuous>  dextrose 50% Injectable 25 Gram(s) IV Push once  dextrose 50% Injectable 12.5 Gram(s) IV Push once  dextrose 50% Injectable 25 Gram(s) IV Push once  ferrous    sulfate 325 milliGRAM(s) Oral daily  glucagon  Injectable 1 milliGRAM(s) IntraMuscular once  insulin glargine Injectable (LANTUS) 12 Unit(s) SubCutaneous at bedtime  insulin lispro Injectable (ADMELOG) 2 Unit(s) SubCutaneous three times a day before meals  levothyroxine 88 MICROGram(s) Oral daily  meropenem  IVPB 1000 milliGRAM(s) IV Intermittent every 12 hours  metoprolol tartrate 25 milliGRAM(s) Oral every 6 hours  multivitamin 1 Tablet(s) Oral daily  pantoprazole    Tablet 40 milliGRAM(s) Oral before breakfast  polyethylene glycol 3350 17 Gram(s) Oral daily  senna 2 Tablet(s) Oral at bedtime  senna 2 Tablet(s) Oral at bedtime  sodium bicarbonate 650 milliGRAM(s) Oral every 8 hours    PRN MEDICATIONS  acetaminophen   Tablet .. 650 milliGRAM(s) Oral every 6 hours PRN  ondansetron Injectable 4 milliGRAM(s) IV Push every 6 hours PRN      VITAL SIGNS: Last 24 Hours  T(C): 35.8 (20 May 2021 05:23), Max: 36.1 (19 May 2021 13:00)  T(F): 96.5 (20 May 2021 05:23), Max: 96.9 (19 May 2021 13:00)  HR: 64 (20 May 2021 05:23) (60 - 64)  BP: 140/63 (20 May 2021 05:23) (139/65 - 141/67)  BP(mean): --  RR: 18 (20 May 2021 05:23) (18 - 20)  SpO2: 100% (20 May 2021 05:23) (98% - 100%)    LABS:                        7.9    5.13  )-----------( 585      ( 20 May 2021 06:17 )             23.3     05-20    128<L>  |  94<L>  |  21<H>  ----------------------------<  212<H>  4.5   |  23  |  1.3    Ca    8.1<L>      20 May 2021 06:17  Mg     1.8     05-20    TPro  6.2  /  Alb  2.9<L>  /  TBili  0.5  /  DBili  x   /  AST  24  /  ALT  31  /  AlkPhos  166<H>  05-19              CARDIAC MARKERS ( 18 May 2021 18:45 )  x     / 0.03 ng/mL / x     / x     / x      CARDIAC MARKERS ( 18 May 2021 11:32 )  x     / 0.03 ng/mL / x     / x     / x          RADIOLOGY:      PHYSICAL EXAM:    GENERAL: NAD, well-developed, AAOx3  HEENT:  Atraumatic, Normocephalic. EOMI, PERRLA, conjunctiva and sclera clear, No JVD  PULMONARY: Clear to auscultation bilaterally; No wheeze  CARDIOVASCULAR: Regular rate and rhythm; No murmurs, rubs, or gallops  GASTROINTESTINAL: Soft, Nontender, Nondistended; Bowel sounds present  MUSCULOSKELETAL:  2+ Peripheral Pulses, No clubbing, cyanosis, or edema  NEUROLOGY: non-focal  SKIN: No rashes or lesions      ADMISSION SUMMARY     SUBJECTIVE  Patient is a 76y old Female who presents with a chief complaint of Pancytopenia (19 May 2021 11:34)  Currently admitted to medicine with the primary diagnosis of Drug-induced pancytopenia    Today is hospital day 11d, and this morning she is alert, had a ct abdomen showed Right perinephric stranding , resumed Meropenem  and reports no overnight events.       OBJECTIVE  PAST MEDICAL & SURGICAL HISTORY  Afib  on Eliquis, amio and toprol    Essential hypertension    Palpitations  occasionally; not for couple yrs    Pacemaker  Sept 2017    Diabetes  Insulin dependent - (has insulin Pump)    SSS (sick sinus syndrome)  S/p PPM    Hypothyroid    Anemia  Chronic    Skin cancer  Basal Cell Cancer face  (around nose) s/p MOHS procedure x 3    Seasonal allergies    PVD (peripheral vascular disease)  S/p Right Peripheral leg Stent    Dry eyes, bilateral    Transfusion history  S/p Hysterectomy    Lung nodules  tree in bud    Inclusion body myositis (IBM)    H/O total hysterectomy  25+ yrs ago    History of surgery  Right Peripheral Stent 5 yrs    Cardiac pacemaker  9/9/17    History of surgery  MOHS procedure (face) x 3    S/P cataract surgery    S/P ablation of atrial fibrillation      ALLERGIES:  latex (Pruritus; Rash)  sulfonamides (Unknown)    MEDICATIONS:  STANDING MEDICATIONS  ALBUTerol    90 MICROgram(s) HFA Inhaler 1 Puff(s) Inhalation every 6 hours  aMIOdarone    Tablet 200 milliGRAM(s) Oral two times a day  amLODIPine   Tablet 5 milliGRAM(s) Oral daily  apixaban 2.5 milliGRAM(s) Oral every 12 hours  ascorbic acid 500 milliGRAM(s) Oral daily  atorvastatin 40 milliGRAM(s) Oral at bedtime  chlorhexidine 4% Liquid 1 Application(s) Topical <User Schedule>  clopidogrel Tablet 75 milliGRAM(s) Oral daily  dextrose 40% Gel 15 Gram(s) Oral once  dextrose 5%. 1000 milliLiter(s) IV Continuous <Continuous>  dextrose 5%. 1000 milliLiter(s) IV Continuous <Continuous>  dextrose 50% Injectable 25 Gram(s) IV Push once  dextrose 50% Injectable 12.5 Gram(s) IV Push once  dextrose 50% Injectable 25 Gram(s) IV Push once  ferrous    sulfate 325 milliGRAM(s) Oral daily  glucagon  Injectable 1 milliGRAM(s) IntraMuscular once  insulin glargine Injectable (LANTUS) 12 Unit(s) SubCutaneous at bedtime  insulin lispro Injectable (ADMELOG) 2 Unit(s) SubCutaneous three times a day before meals  levothyroxine 88 MICROGram(s) Oral daily  meropenem  IVPB 1000 milliGRAM(s) IV Intermittent every 12 hours  metoprolol tartrate 25 milliGRAM(s) Oral every 6 hours  multivitamin 1 Tablet(s) Oral daily  pantoprazole    Tablet 40 milliGRAM(s) Oral before breakfast  polyethylene glycol 3350 17 Gram(s) Oral daily  senna 2 Tablet(s) Oral at bedtime  senna 2 Tablet(s) Oral at bedtime  sodium bicarbonate 650 milliGRAM(s) Oral every 8 hours    PRN MEDICATIONS  acetaminophen   Tablet .. 650 milliGRAM(s) Oral every 6 hours PRN  ondansetron Injectable 4 milliGRAM(s) IV Push every 6 hours PRN      VITAL SIGNS: Last 24 Hours  T(C): 35.8 (20 May 2021 05:23), Max: 36.1 (19 May 2021 13:00)  T(F): 96.5 (20 May 2021 05:23), Max: 96.9 (19 May 2021 13:00)  HR: 64 (20 May 2021 05:23) (60 - 64)  BP: 140/63 (20 May 2021 05:23) (139/65 - 141/67)  BP(mean): --  RR: 18 (20 May 2021 05:23) (18 - 20)  SpO2: 100% (20 May 2021 05:23) (98% - 100%)    LABS:                        7.9    5.13  )-----------( 585      ( 20 May 2021 06:17 )             23.3     05-20    128<L>  |  94<L>  |  21<H>  ----------------------------<  212<H>  4.5   |  23  |  1.3    Ca    8.1<L>      20 May 2021 06:17  Mg     1.8     05-20    TPro  6.2  /  Alb  2.9<L>  /  TBili  0.5  /  DBili  x   /  AST  24  /  ALT  31  /  AlkPhos  166<H>  05-19              CARDIAC MARKERS ( 18 May 2021 18:45 )  x     / 0.03 ng/mL / x     / x     / x      CARDIAC MARKERS ( 18 May 2021 11:32 )  x     / 0.03 ng/mL / x     / x     / x          RADIOLOGY:  < from: CT Abdomen and Pelvis No Cont (05.19.21 @ 19:29) >  IMPRESSION:    Compared to the previous CT scan of 3/14/2021, there is worsening of the right sided hydronephrosis. The degree of hydronephrosis has progressed from moderate to moderate-to-severe. There is moderate-to-severe dilatation of the right ureter to the level of the urinary bladder. Right-sided perinephric stranding is noted.    Patient with the previous CT scan of 3/14/2021, there is now moderate left-sided hydronephrosis and hydroureter.    No radiopaque calculi are identified within the kidneys, along the course of the right or left ureter, or at the right or left ureterovesical junctions.    The bladder is distended to the level of the umbilicus. Possibility of bladder outlet obstruction should be considered.        < end of copied text >      PHYSICAL EXAM:    GENERAL: NAD, well-developed, AAOx3  HEENT:  Atraumatic, Normocephalic. EOMI, PERRLA, conjunctiva and sclera clear, No JVD  PULMONARY: Clear to auscultation bilaterally; No wheeze  CARDIOVASCULAR: Regular rate and rhythm; No murmurs, rubs, or gallops  GASTROINTESTINAL: Soft, Nontender, Nondistended; Bowel sounds present  MUSCULOSKELETAL:  2+ Peripheral Pulses, No clubbing, cyanosis, or edema  NEUROLOGY: non-focal  SKIN: No rashes or lesions      ADMISSION SUMMARY  76 year old female with  PMH of DM on insulin pump, Celiac disease, HTN, CKD3, Afib s/p ablation on Eliquis, CAD s/p stenting in 2020, sick sinus syndrome s/p PPM, PVD s/p right peripheral leg stent, Inclusion body myositis, basal cell carcinoma , chronic KEYANNA, hypothyroidism, presented for anemia, now has B/L hydronephrosis, pyelonephritis with E.Coli ESBL    # Perinephric fat stranding on the right side kidneys  #Hydronephrosis B/L, B/L Hydroureter  #Urinary retention  #Pyelonephritis, E.coli ESBL  - NH refused patient for T101, afebrile throughout hospital stay, WBC wnl  - CXR (-)  - Large leuk esterase on UA, asymptomatic  - UCx: 50-99k E. Ecoli ESBL and 10-49k E. faecium VRE  - BCx: NGTD  -CT abdomen showed B/L hydronephrosis and hydroureter  -repeat UCx: E.Coli ESBL  - Started on Meropenem 5/20  - FU ID, possible discharge on Ertapenem, will need midline prior to discharge  - pt had 600 ml of urine residue in the bladder, pt is reporting incontinence.   - Would consider cystogram to r/o reflux which could explain hydronephrosis, pt not cleared now, given recent TEOFILO  - Pt can f/u as OP for definitive w/u with Dr Velarde.   - No acute urologic intervention at this time.    # Pancytopenia likely drug induced 2/2 linezolid or ertapenem  # H/o iron deficiency anemia  - Hgb 6.9 in ED, s/p 1u PRBC  - Patient denies any bleeding or dark bowel movements, ROLAND (-)  - Maintain 2 large bore IVs. PPI and consider GI consult if patient is actively bleeding  - F/u stool guaiac  - VTE ppx with SCD  - Consult GI if evidence of bleeding (sees Dr. Sheets as outpt)  - Heme onc following: Ferritin 1473 -> can c/w home Fe supplements  -- (+) Noel 2/2 IVIG  -- Transfuse for Hb <7 or Platelet count <10K or <50K and any evidence of active bleeding  -- Can f/u wit Dr. Palafox as outpt for remaining anemia w/u  -     #HFpEF, stable  - Echo - EF 58%   - Holding lasix for now    # H/o Inclusion body myositis  - Patient endorses missing past two infusions given recent hospitalizations  - Normally gets Gamunex infusions every 5 weeks but missed last two infusions  - Per Neuro: S/p Gamunex 15mL over 6 hours daily x2 days, finished 5/11  -- F/u Dr. Win as outpt  - OOB, PT    # Transaminitis, improving  - Monitor CMP      # DMII  - C/w home insulin regimen    # H/o chronic  afib-rate controlled  # H/o SSS S/p PPM  - c/w amiodarone, metoprolol  - holding eliquis For now.     # H/o CAD S/p PCI  # H/o PVD S/p stent  - c/w plavix, statin, metoprolol    # Hypothyroidism  - c/w synthroid      #Chronic Hyponatremia(>48h)  - Yo=525.   - no symptoms.   - osm serum 278, urin osm 308  - Treat accordingly  - Increase salt intake.     GI ppx:  [x] Not indicated   DVT ppx:  [x] Heparin 5000mg SubQ   Fluids:  [x] PO    Activity:  [x] Increase as Tolerated   Diet:  [x] DASH / [x] Carb Consistent   Patient to be discharged when condition(s) optimized:  [] Home  / [x] SNF  /  [] Long Term Rehab    CODE STATUS:   [x] FULLP) Bladder scan after pt voids to check PVR's

## 2021-05-20 NOTE — PROGRESS NOTE ADULT - PROVIDER SPECIALTY LIST ADULT
Hospitalist
Internal Medicine
Hospitalist
Infectious Disease
Internal Medicine
Urology
Hospitalist

## 2021-05-20 NOTE — PROGRESS NOTE ADULT - ASSESSMENT
ASSESSMENT  76 year old female with  PMH of DM on insulin pump, Celiac disease, HTN, CKD3, Afib s/p ablation on Eliquis, CAD s/p stenting in 2020, sick sinus syndrome s/p PPM, PVD s/p right peripheral leg stent, 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,  basal cell carcinoma on the face (around nose) s/p MOHS procedure x 3 (5 years ago) , chronic anemia, hypothyroidism, presented from [facility] due to abnormal labs      IMPRESSION  #Cytopenia - possibly secondary to linezolid  #Bilateral Hydronephrosis  - Hx of RIght pyelonephritis in 3/14 with right sided hydro  - Urine Cx 4/16 growing VRE and ESBL E coli (She has had previous bacteremia with ESBL E coli).  SHe was treated with 7 day course of linzeolid and Ertapenem  - US Renal (04.20.21 @ 11:12): Urinary bladder volume is 1573 cc. Patient reported she urinated prior to test and had no urge to void. Urinary bladder debris is present. Moderate right hydronephrosis. Mild left hydronephrosis.   - US Kidney and Bladder (05.09.21 @ 09:48): Markedly distended urinary bladder with bilateral hydronephrosis which likely reflects sequela of back up pressure  -  CT Abdomen and Pelvis No Cont (05.19.21 @ 19:29) >  Compared to the previous CT scan of 3/14/2021, there is worsening of the right sided hydronephrosis. The degree of hydronephrosis has progressed from moderate to moderate-to-severe. There is moderate-to-severe dilatation of the right ureter to the level of the urinary bladder. Right-sided perinephric stranding is noted. Patient with the previous CT scan of 3/14/2021, there is now moderate left-sided hydronephrosis and hydroureter. No radiopaque calculi are identified within the kidneys, along the course of the right or left ureter, or at the right or left ureterovesical junctions. The bladder is distended to the level of the umbilicus. Possibility of bladder outlet obstruction should be considered.      #Asymptomatic Bacteriuria - Urine Cx with ESBL E coli     #CAD s/p Stent  #Atrial Fibrillation s/p PPM  #Inclusion body myositis  #Abx allergy: NKDA    RECOMMENDATIONS  - CT reviewed -- showing worsening right sided hydronephrosis with right perinephric stranding -- perinephric stranding has been present also in March 2021 CT Abd/Pelvis  - can treat with meropenem 1g q 8 hours while here -- on discharge will need ertapenem 1g daily for 14 days total carbapenem therapy (5/20-6/2)  - urology follow-up for possible bladder outlet obstruction -- would try to evaluate once completed course of antibiotics as she is high risk for recurrent UTI given possible obstruction     Please call or message on Microsoft Teams if with any questions.  Spectra [phone]

## 2021-05-22 NOTE — CHART NOTE - NSCHARTNOTEFT_GEN_A_CORE
no acute complaints   PE unchanged  wiggins no sediment    77yo F c PMHx CAD, celiac dx, IDDM2, afib on AC, inclusion body myositis here with drug induced pancytopenia, functional debility, course complicated by pyelonephritis 2/2 esbl e coli, possibly 2/2 bladder outlet obstruction?    no fevers. urine with mild sediment    CT findings below:    Compared to the previous CT scan of 3/14/2021, there is worsening of the right sided hydronephrosis. The degree of hydronephrosis has progressed from moderate to moderate-to-severe. There is moderate-to-severe dilatation of the right ureter to the level of the urinary bladder. Right-sided perinephric stranding is noted.    Patient with the previous CT scan of 3/14/2021, there is now moderate left-sided hydronephrosis and hydroureter.    No radiopaque calculi are identified within the kidneys, along the course of the right or left ureter, or at the right or left ureterovesical junctions.    The bladder is distended to the level of the umbilicus. Possibility of bladder outlet obstruction should be considered.      complete ertapenem course  possible urosurgical intervention following tx of infection- obtain urology reeval upon completion of tx  repeat us improving hydro, MAINTAIN WIGGINS  cont to encourage PT  outpatient neuro f/u regarding inclusion body dx c Dr. Win  outpatient hem/onc f/u for chronic anemia  Patient seen independently of resident.    medically stable for snf discharge today    see discharge summary for further recommendation
<<<RESIDENT DISCHARGE NOTE>>>     SOWMYA MAADO  MRN-[id number]    pt is stable , pending US kidneys.     T(C): 36.4 (05-21-21 @ 05:00), Max: 36.4 (05-21-21 @ 05:00)  HR: 60 (05-21-21 @ 05:00) (60 - 60)  BP: 152/68 (05-21-21 @ 05:00) (144/67 - 153/69)  RR: 18 (05-21-21 @ 05:00) (18 - 18)  SpO2: 100% (05-20-21 @ 14:23) (100% - 100%)    PHYSICAL EXAM:  GENERAL: NAD, well-developed  HEAD:  Atraumatic, Normocephalic  EYES: EOMI, PERRLA, conjunctiva and sclera clear  ENT:No nasal obstruction or discharge. No tonsillar exudate, swelling or erythema.  NECK: Supple, No JVD  CHEST/LUNG: Clear to auscultation bilaterally; No wheeze  HEART: Regular rate and rhythm; No murmurs, rubs, or gallops  ABDOMEN: Soft, Nontender, Nondistended; Bowel sounds present  EXTREMITIES:  2+ Peripheral Pulses, No clubbing, cyanosis, or edema  PSYCH: AAOx3  NEUROLOGY: non-focal  SKIN: No rashes or lesions  TEST RESULTS:                        7.3    6.20  )-----------( 538      ( 21 May 2021 07:49 )             22.2       05-21    138  |  102  |  17  ----------------------------<  88  4.2   |  24  |  1.2    Ca    8.2<L>      21 May 2021 07:49  Mg     1.6     05-21    TPro  6.2  /  Alb  2.9<L>  /  TBili  0.5  /  DBili  x   /  AST  24  /  ALT  31  /  AlkPhos  166<H>  05-19      FINAL DISCHARGE INTERVIEW:  Resident(s) Present: (Name: Eliseo____), RN Present: (Name:  ___________)    DISCHARGE MEDICATION RECONCILIATION  reviewed with Attending (Name:Oniel Burton_____)    DISPOSITION:   [  ] Home,    [  ] Home with Visiting Nursing Services,   [ X   ]  SNF/ NH,    [   ] Acute Rehab (4A),   [   ] Other (Specify:_________)

## 2021-06-22 NOTE — CONSULT NOTE ADULT - CONSULT REQUESTED DATE/TIME
26-Mar-2021 14:53
16-Mar-2021 10:57
24-Mar-2021 16:43
14-Mar-2021 21:21
15-Mar-2021 10:09
16-Mar-2021 17:15
17-Mar-2021 09:45
[id number] Comprehensive

## 2021-09-28 NOTE — H&P ADULT - HISTORY OF PRESENT ILLNESS
outpatient visit for ivig infusion She will follow up with Dr. Goldberg at the end of this week.   Discussed with PMD and patient in detail.   Yes

## 2021-12-29 ENCOUNTER — APPOINTMENT (OUTPATIENT)
Dept: CARDIOLOGY | Facility: CLINIC | Age: 76
End: 2021-12-29

## 2022-01-04 ENCOUNTER — APPOINTMENT (OUTPATIENT)
Dept: CARDIOLOGY | Facility: CLINIC | Age: 77
End: 2022-01-04

## 2022-04-27 NOTE — PHYSICAL THERAPY INITIAL EVALUATION ADULT - ASSISTIVE DEVICE FOR TRANSFER: SIT/STAND, REHAB EVAL
[facility]     Emergency Department     Faculty Attestation    I performed a history and physical examination of the patient and discussed management with the resident. I reviewed the resident´s note and agree with the documented findings and plan of care. Any areas of disagreement are noted on the chart. I was personally present for the key portions of any procedures. I have documented in the chart those procedures where I was not present during the key portions. I have reviewed the emergency nurses triage note. I agree with the chief complaint, past medical history, past surgical history, allergies, medications, social and family history as documented unless otherwise noted below. For Physician Assistant/ Nurse Practitioner cases/documentation I have personally evaluated this patient and have completed at least one if not all key elements of the E/M (history, physical exam, and MDM). Additional findings are as noted. Abdomen soft and nontender, no guarding or rebounding, no peritoneal signs, no pain at McBurney's point.      Rob Sales MD  04/26/22 [id number] PT anterior to assist with weight shifting and safety

## 2022-06-27 NOTE — PROGRESS NOTE ADULT - SUBJECTIVE AND OBJECTIVE BOX
"I feel much better"  No flank pain.    - No acute events overnight  - T(F): , Max: 97.8 (03-19-21 @ 16:00)  - Denies any worsening symptoms  - Tolerating medication  - WBC Count: 25.98 (03-20-21 @ 04:30)  WBC Count: 26.88 (03-19-21 @ 18:05)     - Creatinine, Serum: 1.3 (03-20-21 @ 04:30)  Creatinine, Serum: 1.4 (03-19-21 @ 04:50)       ROS  General: Denies rigors, nightsweats  HEENT: Denies headache, rhinorrhea, sore throat, eye pain  CV: Denies CP, palpitations  PULM: Denies wheezing, hemoptysis  GI: Denies hematemesis, hematochezia, melena  : Denies discharge, hematuria  MSK: Denies arthralgias, myalgias  SKIN: Denies rash, lesions  NEURO: Denies paresthesias, weakness  PSYCH: Denies depression, anxiety    VITALS:  T(F): 97, Max: 97.8 (03-19-21 @ 16:00)  HR: 116  BP: 106/61  RR: 18Vital Signs Last 24 Hrs  T(C): 36.1 (20 Mar 2021 08:00), Max: 36.6 (19 Mar 2021 16:00)  T(F): 97 (20 Mar 2021 08:00), Max: 97.8 (19 Mar 2021 16:00)  HR: 116 (20 Mar 2021 12:00) (110 - 126)  BP: 106/61 (20 Mar 2021 12:00) (92/51 - 161/83)  BP(mean): 85 (20 Mar 2021 12:00) (63 - 122)  RR: 18 (20 Mar 2021 12:00) (18 - 22)  SpO2: 99% (20 Mar 2021 10:00) (98% - 100%)    PHYSICAL EXAM:  Gen: NAD, resting in bed  HEENT: Normocephalic, atraumatic  Neck: supple, no lymphadenopathy  CV: no JVD  Lungs: decreased BS at bases, no fremitus  Abdomen: Soft, BS present  :  no CVA tenderness  Ext: Warm, well perfused, edema in upper and lower extremities with no Benny's Sign  Neuro: non focal, awake  Skin: no rash, no erythema  Lines: no phlebitis    FH: Non-contributory  Social Hx: Non-contributory    TESTS & MEASUREMENTS:                        10.2   25.98 )-----------( 26       ( 20 Mar 2021 04:30 )             27.8     03-20    126<L>  |  91<L>  |  35<H>  ----------------------------<  114<H>  3.5   |  27  |  1.3    Ca    7.7<L>      20 Mar 2021 04:30  Phos  2.9     03-20  Mg     2.2     03-20    TPro  4.3<L>  /  Alb  2.1<L>  /  TBili  0.5  /  DBili  x   /  AST  64<H>  /  ALT  52<H>  /  AlkPhos  196<H>  03-20    eGFR if Non African American: 39 mL/min/1.73M2 (03-20-21 @ 04:30)  eGFR if African American: 46 mL/min/1.73M2 (03-20-21 @ 04:30)    LIVER FUNCTIONS - ( 20 Mar 2021 04:30 )  Alb: 2.1 g/dL / Pro: 4.3 g/dL / ALK PHOS: 196 U/L / ALT: 52 U/L / AST: 64 U/L / GGT: x               Culture - Blood (collected 03-18-21 @ 04:30)  Source: .Blood Blood-Peripheral  Gram Stain (03-19-21 @ 02:23):    Growth in anaerobic bottle: Gram Negative Rods  Preliminary Report (03-19-21 @ 21:07):    Growth in anaerobic bottle: Escherichia coli    Culture - Urine (collected 03-14-21 @ 22:50)  Source: .Urine Clean Catch (Midstream)  Final Report (03-17-21 @ 09:26):    50,000 - 99,000 CFU/mL Escherichia coli ESBL  Organism: Escherichia coli ESBL (03-17-21 @ 09:26)  Organism: Escherichia coli ESBL (03-17-21 @ 09:26)      -  Amikacin: S <=16      -  Amoxicillin/Clavulanic Acid: I 16/8      -  Ampicillin: R >16 These ampicillin results predict results for amoxicillin      -  Ampicillin/Sulbactam: I 16/8 Enterobacter, Citrobacter, and Serratia may develop resistance during prolonged therapy (3-4 days)      -  Aztreonam: R >16      -  Cefazolin: R >16 (MIC_CL_COM_ENTERIC_CEFAZU) For uncomplicated UTI with K. pneumoniae, E. coli, or P. mirablis: JONATHAN <=16 is sensitive and JONATHAN >=32 is resistant. This also predicts results for oral agents cefaclor, cefdinir, cefpodoxime, cefprozil, cefuroxime axetil, cephalexin and locarbef for uncomplicated UTI. Note that some isolates may be susceptible to these agents while testing resistant to cefazolin.      -  Cefepime: R >16      -  Cefoxitin: S <=8      -  Ceftriaxone: R >32 Enterobacter, Citrobacter, and Serratia may develop resistance during prolonged therapy      -  Ciprofloxacin: R >2      -  Ertapenem: S <=0.5      -  Gentamicin: R >8      -  Imipenem: S <=1      -  Levofloxacin: R >4      -  Meropenem: S <=1      -  Nitrofurantoin: S <=32 Should not be used to treat pyelonephritis      -  Piperacillin/Tazobactam: S <=8      -  Tigecycline: S <=2      -  Tobramycin: R >8      -  Trimethoprim/Sulfamethoxazole: S <=0.5/9.5      Method Type: JONATHAN    Culture - Blood (collected 03-14-21 @ 18:30)  Source: .Blood Blood  Gram Stain (03-15-21 @ 12:47):    Growth in aerobic bottle: Gram Negative Rods    Growth in anaerobic bottle: Gram Negative Rods  Final Report (03-17-21 @ 11:02):    Growth in aerobic and anaerobic bottles: Escherichia coli ESBL    MDRO detected in BCID PCR, resistance marker = CTX-M (ESBL)    ***Blood Panel PCR results on this specimen are available    approximately 3 hours after the Gram stain result.***    Gram stain, PCR, and/or culture results may not always    correspond due to difference in methodologies.    ************************************************************    This PCR assay was performed by multiplex PCR. This    Assay tests for 66 bacterial and resistance gene targets.    Please refer to the [facility] test directory    at [email or web address] for details.  Organism: Blood Culture PCR  Escherichia coli ESBL (03-17-21 @ 11:02)  Organism: Escherichia coli ESBL (03-17-21 @ 11:02)      -  Amikacin: S <=16      -  Ampicillin: R >16 These ampicillin results predict results for amoxicillin      -  Ampicillin/Sulbactam: R 16/8 Enterobacter, Citrobacter, and Serratia may develop resistance during prolonged therapy (3-4 days)      -  Aztreonam: R >16      -  Cefazolin: R >16 Enterobacter, Citrobacter, and Serratia may develop resistance during prolonged therapy (3-4 days)      -  Cefepime: R >16      -  Cefoxitin: S <=8      -  Ceftriaxone: R >32 Enterobacter, Citrobacter, and Serratia may develop resistance during prolonged therapy      -  Ciprofloxacin: R >2      -  Ertapenem: S <=0.5      -  Gentamicin: R >8      -  Imipenem: S <=1      -  Levofloxacin: R >4      -  Meropenem: S <=1      -  Piperacillin/Tazobactam: R <=8      -  Tobramycin: R >8      -  Trimethoprim/Sulfamethoxazole: S <=0.5/9.5      Method Type: JONATHAN  Organism: Blood Culture PCR (03-17-21 @ 11:02)      -  ESBL: Detec      -  Escherichia coli: Detec      Method Type: PCR    Culture - Blood (collected 03-14-21 @ 18:30)  Source: .Blood Blood  Gram Stain (03-15-21 @ 11:56):    Growth in anaerobic bottle: Gram Negative Rods    Growth in aerobic bottle: Gram Negative Rods  Final Report (03-17-21 @ 11:03):    Growth in aerobic and anaerobic bottles: Escherichia coli ESBL    See previous culture [id number]        Lactate, Blood: 1.1 mmol/L (03-17-21 @ 16:00)      INFECTIOUS DISEASES TESTING  COVID-19 PCR: NotDetec (03-14-21 @ 20:40)      INFLAMMATORY MARKERS      RADIOLOGY & ADDITIONAL TESTS:  I have personally reviewed the last available Chest xray  CXR  Xray Chest 1 View- PORTABLE-Urgent:   EXAM:  XR CHEST PORTABLE URGENT 1V            PROCEDURE DATE:  03/18/2021            INTERPRETATION:  STUDY INDICATION: Shortness of breath Comparison: XR  3/14/2021.    Technique/Positioning: Frontal view of the chest.    Findings:    Support devices: None.    Cardiac/mediastinum/hilum: Stable appearance of the cardiomediastinal silhouette. Left pacemaker is stable.    Lung parenchyma/Pleura: New bibasal left greater than right pleural-parenchymal opacities. Increased diffuse interstitial prominence. No pneumothorax.    Skeleton/soft tissues: Unchanged.   "I feel much better"  No flank pain.    - No acute events overnight  - T(F): , Max: 98F  - Denies any worsening symptoms  - Tolerating medication  - WBC Count: 25.98 (03-20-21 @ 04:30)  WBC Count: 26.88 (03-19-21 @ 18:05)     - Creatinine, Serum: 1.3 (03-20-21 @ 04:30)  Creatinine, Serum: 1.4 (03-19-21 @ 04:50)       ROS  General: Denies rigors, nightsweats  HEENT: Denies headache, rhinorrhea, sore throat, eye pain  CV: Denies CP, palpitations  PULM: Denies wheezing, hemoptysis  GI: Denies hematemesis, hematochezia, melena  : Denies discharge, hematuria  MSK: Denies arthralgias, myalgias  SKIN: Denies rash, lesions  NEURO: Denies paresthesias, weakness  PSYCH: Denies depression, anxiety    VITALS:  T(F): 97, Max: 97.8 (03-19-21 @ 16:00)  HR: 116  BP: 106/61  RR: 18Vital Signs Last 24 Hrs  T(C): 36.1 (20 Mar 2021 08:00), Max: 36.6 (19 Mar 2021 16:00)  T(F): 97 (20 Mar 2021 08:00), Max: 97.8 (19 Mar 2021 16:00)  HR: 116 (20 Mar 2021 12:00) (110 - 126)  BP: 106/61 (20 Mar 2021 12:00) (92/51 - 161/83)  BP(mean): 85 (20 Mar 2021 12:00) (63 - 122)  RR: 18 (20 Mar 2021 12:00) (18 - 22)  SpO2: 99% (20 Mar 2021 10:00) (98% - 100%)    PHYSICAL EXAM:  Gen: NAD, resting in bed  HEENT: Normocephalic, atraumatic  Neck: supple, no lymphadenopathy  CV: no JVD  Lungs: decreased BS at bases, no fremitus  Abdomen: Soft, BS present  :  no CVA tenderness  Ext: Warm, well perfused, edema in upper and lower extremities with no Benny's Sign  Neuro: non focal, awake  Skin: no rash, no erythema  Lines: no phlebitis    FH: Non-contributory  Social Hx: Non-contributory    TESTS & MEASUREMENTS:                        10.2   25.98 )-----------( 26       ( 20 Mar 2021 04:30 )             27.8     03-20    126<L>  |  91<L>  |  35<H>  ----------------------------<  114<H>  3.5   |  27  |  1.3    Ca    7.7<L>      20 Mar 2021 04:30  Phos  2.9     03-20  Mg     2.2     03-20    TPro  4.3<L>  /  Alb  2.1<L>  /  TBili  0.5  /  DBili  x   /  AST  64<H>  /  ALT  52<H>  /  AlkPhos  196<H>  03-20    eGFR if Non African American: 39 mL/min/1.73M2 (03-20-21 @ 04:30)  eGFR if African American: 46 mL/min/1.73M2 (03-20-21 @ 04:30)    LIVER FUNCTIONS - ( 20 Mar 2021 04:30 )  Alb: 2.1 g/dL / Pro: 4.3 g/dL / ALK PHOS: 196 U/L / ALT: 52 U/L / AST: 64 U/L / GGT: x               Culture - Blood (collected 03-18-21 @ 04:30)  Source: .Blood Blood-Peripheral  Gram Stain (03-19-21 @ 02:23):    Growth in anaerobic bottle: Gram Negative Rods  Preliminary Report (03-19-21 @ 21:07):    Growth in anaerobic bottle: Escherichia coli    Culture - Urine (collected 03-14-21 @ 22:50)  Source: .Urine Clean Catch (Midstream)  Final Report (03-17-21 @ 09:26):    50,000 - 99,000 CFU/mL Escherichia coli ESBL  Organism: Escherichia coli ESBL (03-17-21 @ 09:26)  Organism: Escherichia coli ESBL (03-17-21 @ 09:26)      -  Amikacin: S <=16      -  Amoxicillin/Clavulanic Acid: I 16/8      -  Ampicillin: R >16 These ampicillin results predict results for amoxicillin      -  Ampicillin/Sulbactam: I 16/8 Enterobacter, Citrobacter, and Serratia may develop resistance during prolonged therapy (3-4 days)      -  Aztreonam: R >16      -  Cefazolin: R >16 (MIC_CL_COM_ENTERIC_CEFAZU) For uncomplicated UTI with K. pneumoniae, E. coli, or P. mirablis: JONATHAN <=16 is sensitive and JONATHAN >=32 is resistant. This also predicts results for oral agents cefaclor, cefdinir, cefpodoxime, cefprozil, cefuroxime axetil, cephalexin and locarbef for uncomplicated UTI. Note that some isolates may be susceptible to these agents while testing resistant to cefazolin.      -  Cefepime: R >16      -  Cefoxitin: S <=8      -  Ceftriaxone: R >32 Enterobacter, Citrobacter, and Serratia may develop resistance during prolonged therapy      -  Ciprofloxacin: R >2      -  Ertapenem: S <=0.5      -  Gentamicin: R >8      -  Imipenem: S <=1      -  Levofloxacin: R >4      -  Meropenem: S <=1      -  Nitrofurantoin: S <=32 Should not be used to treat pyelonephritis      -  Piperacillin/Tazobactam: S <=8      -  Tigecycline: S <=2      -  Tobramycin: R >8      -  Trimethoprim/Sulfamethoxazole: S <=0.5/9.5      Method Type: JONATHAN    Culture - Blood (collected 03-14-21 @ 18:30)  Source: .Blood Blood  Gram Stain (03-15-21 @ 12:47):    Growth in aerobic bottle: Gram Negative Rods    Growth in anaerobic bottle: Gram Negative Rods  Final Report (03-17-21 @ 11:02):    Growth in aerobic and anaerobic bottles: Escherichia coli ESBL    MDRO detected in BCID PCR, resistance marker = CTX-M (ESBL)    ***Blood Panel PCR results on this specimen are available    approximately 3 hours after the Gram stain result.***    Gram stain, PCR, and/or culture results may not always    correspond due to difference in methodologies.    ************************************************************    This PCR assay was performed by multiplex PCR. This    Assay tests for 66 bacterial and resistance gene targets.    Please refer to the [facility] test directory    at [email or web address] for details.  Organism: Blood Culture PCR  Escherichia coli ESBL (03-17-21 @ 11:02)  Organism: Escherichia coli ESBL (03-17-21 @ 11:02)      -  Amikacin: S <=16      -  Ampicillin: R >16 These ampicillin results predict results for amoxicillin      -  Ampicillin/Sulbactam: R 16/8 Enterobacter, Citrobacter, and Serratia may develop resistance during prolonged therapy (3-4 days)      -  Aztreonam: R >16      -  Cefazolin: R >16 Enterobacter, Citrobacter, and Serratia may develop resistance during prolonged therapy (3-4 days)      -  Cefepime: R >16      -  Cefoxitin: S <=8      -  Ceftriaxone: R >32 Enterobacter, Citrobacter, and Serratia may develop resistance during prolonged therapy      -  Ciprofloxacin: R >2      -  Ertapenem: S <=0.5      -  Gentamicin: R >8      -  Imipenem: S <=1      -  Levofloxacin: R >4      -  Meropenem: S <=1      -  Piperacillin/Tazobactam: R <=8      -  Tobramycin: R >8      -  Trimethoprim/Sulfamethoxazole: S <=0.5/9.5      Method Type: JONATHAN  Organism: Blood Culture PCR (03-17-21 @ 11:02)      -  ESBL: Detec      -  Escherichia coli: Detec      Method Type: PCR    Culture - Blood (collected 03-14-21 @ 18:30)  Source: .Blood Blood  Gram Stain (03-15-21 @ 11:56):    Growth in anaerobic bottle: Gram Negative Rods    Growth in aerobic bottle: Gram Negative Rods  Final Report (03-17-21 @ 11:03):    Growth in aerobic and anaerobic bottles: Escherichia coli ESBL    See previous culture [id number]        Lactate, Blood: 1.1 mmol/L (03-17-21 @ 16:00)      INFECTIOUS DISEASES TESTING  COVID-19 PCR: NotDetec (03-14-21 @ 20:40)      INFLAMMATORY MARKERS      RADIOLOGY & ADDITIONAL TESTS:  I have personally reviewed the last available Chest xray  CXR  Xray Chest 1 View- PORTABLE-Urgent:   EXAM:  XR CHEST PORTABLE URGENT 1V            PROCEDURE DATE:  03/18/2021            INTERPRETATION:  STUDY INDICATION: Shortness of breath Comparison: XR  3/14/2021.    Technique/Positioning: Frontal view of the chest.    Findings:    Support devices: None.    Cardiac/mediastinum/hilum: Stable appearance of the cardiomediastinal silhouette. Left pacemaker is stable.    Lung parenchyma/Pleura: New bibasal left greater than right pleural-parenchymal opacities. Increased diffuse interstitial prominence. No pneumothorax.    Skeleton/soft tissues: Unchanged.   Home

## 2022-07-11 NOTE — ASU PATIENT PROFILE, ADULT - NS PRO TALK SOMEONE YN
no Minoxidil Counseling: Minoxidil is a topical medication which can increase blood flow where it is applied. It is uncertain how this medication increases hair growth. Side effects are uncommon and include stinging and allergic reactions.

## 2022-08-05 NOTE — ASU PATIENT PROFILE, ADULT - FALLEN IN THE PAST
Spoke with patient and all questions answered.  [place] ASCCP guidelines.  2018 ASCUS, HPV pos.  Colpo neg.  2020 NEM, HPV non 16/18 pos.  2022 NEM HPV non 16/18 pos.  Recommend colposcopy.  Patient in agreement.     Please schedule for colposcopy in within 6 weeks.  Thank you!   yes

## 2022-12-31 NOTE — DIETITIAN INITIAL EVALUATION ADULT. - DOB: +DATEOFBIRTH
Discussed and provided patient with  informational packet on  mood and anxiety disorders (resources/education).
Statement Selected

## 2023-01-04 NOTE — H&P PST ADULT - TEACHING/LEARNING FACTORS IMPACT ABILITY TO LEARN
- Decreasing Effexor from 150mg to 75mg daily.  - Continue Buspar 7.5mg BID PRN.  - Re-evaluation in 1 month.   none

## 2023-04-16 NOTE — PROCEDURE NOTE - NSSITEPREP_SKIN_A_CORE
SURGERY: KULDEEP  Post op Visit  Stefani Watson  04/17/2023    Ms. Watson  presents today after having undergone Surgery twice, first 1/9/23, of an open right hemicolectomy and BSO, followed by an open ileocolic resection for SBO 2/23/23.  She still had difficulty with an ileus postop but was ultimately discharged on 3/9/23.  During her second admission, she had a ureteral/pelvic kidney leak that was stented successfully but Dr Marshall Antonio.  She is to see Dr Antonio soon for stent attention, previously having been exchanged.       Today, she confirms that she did stop the magnesium oxide and her diarrhea stopped.  She is still taking the calcium which she takes with calcitriol for hypoparathyroidism after thyroid surgery 20 years ago.  Her intact PTH is actually normal.     Her pathology was a T1N0 happily, her polyp being a carpet polyp.      She still says she has an acid feeling in her upper stomach.  She is on omeprazole and pepcid prn.   Stopped the omeprazole and wrote for protonix instead.   She is taking a probiotic because she is still on abx for her stent.     Will go ahead and put in for EGD in 6 weeks and if she is better, will cancel.     Norma Barillas MD  13:54 EDT         chlorhexidine/Adherence to aseptic technique: hand hygiene prior to donning barriers (gown, gloves), don cap and mask, sterile drape over patient

## 2023-04-16 NOTE — ASU PATIENT PROFILE, ADULT - IS PATIENT PREGNANT?
Encounter Date: 2023       History     Chief Complaint   Patient presents with    Headache     With HTN     [age] year female past medical history of hypertension, anxiety, GERD, asthma, PE presents secondary to headache.  Patient states she noticed her blood pressure was elevated and she began having headaches as well.  She denies any fevers, chills, sweats, chest pain or shortness of breath associated.  Patient otherwise stable and has no other complaints.    Review of patient's allergies indicates:  No Known Allergies  Past Medical History:   Diagnosis Date    Antithrombin III deficiency 2021    Asthma     Claustrophobia     Elevated factor VIII level 2021    Esophageal reflux     Gastroesophageal reflux    Generalized anxiety disorder     Anxiety, Generalized    Herniated disc     Hypertension     Iron deficiency anemia due to chronic blood loss 10/27/2021    Lower extremity weakness     Morbid obesity     Obesity     Protein S deficiency 2021    Pulmonary embolism     Upper extremity weakness      Past Surgical History:   Procedure Laterality Date     SECTION      CHOLECYSTECTOMY      TONSILLECTOMY       No family history on file.  Social History     Tobacco Use    Smoking status: Former     Types: Cigarettes, Cigars     Quit date: 2019     Years since quittin.2    Smokeless tobacco: Never   Substance Use Topics    Alcohol use: Yes     Alcohol/week: 0.8 standard drinks     Types: 1 Standard drinks or equivalent per week     Comment: rarely    Drug use: No     Review of Systems   Neurological:  Positive for headaches.   All other systems reviewed and are negative.    Physical Exam     Initial Vitals [23 0324]   BP Pulse Resp Temp SpO2   (!) 142/103 105 (!) 24 98.5 °F (36.9 °C) (!) 88 %      MAP       --         Physical Exam    Nursing note and vitals reviewed.  Constitutional: She appears well-developed and well-nourished. She appears distressed.   HENT:   Head:  Normocephalic and atraumatic.   Mouth/Throat: Oropharynx is clear and moist.   Eyes: Conjunctivae and EOM are normal. Pupils are equal, round, and reactive to light.   Neck: No tracheal deviation present. No JVD present.   Normal range of motion.  Cardiovascular:  Normal rate, regular rhythm, normal heart sounds and intact distal pulses.     Exam reveals no gallop and no friction rub.       No murmur heard.  Pulmonary/Chest: Breath sounds normal. No respiratory distress. She has no wheezes. She exhibits no tenderness.   Abdominal: Abdomen is soft. Bowel sounds are normal. She exhibits no distension. There is no abdominal tenderness. There is no rebound and no guarding.   Musculoskeletal:         General: No tenderness or edema. Normal range of motion.      Cervical back: Normal range of motion.     Neurological: She is alert and oriented to person, place, and time. She has normal strength. No cranial nerve deficit or sensory deficit.   Skin: Skin is warm and dry. Capillary refill takes less than 2 seconds. No erythema.   Psychiatric: She has a normal mood and affect.       ED Course   Procedures  Labs Reviewed   CBC W/ AUTO DIFFERENTIAL - Abnormal; Notable for the following components:       Result Value    WBC 12.99 (*)     MCHC 31.6 (*)     Gran # (ANC) 10.1 (*)     Gran % 77.9 (*)     Lymph % 14.5 (*)     All other components within normal limits   COMPREHENSIVE METABOLIC PANEL - Abnormal; Notable for the following components:    CO2 33 (*)     Glucose 128 (*)     Albumin 3.4 (*)     All other components within normal limits    Narrative:     Recoll. [id number] by [facility] at 04/16/2023 06:00, reason: hemolyzed   URINALYSIS, REFLEX TO URINE CULTURE - Abnormal; Notable for the following components:    Protein, UA Trace (*)     All other components within normal limits    Narrative:     Specimen Source->Urine   D DIMER, QUANTITATIVE - Abnormal; Notable for the following components:    D-Dimer 1.01 (*)     All other  components within normal limits    Narrative:     ddimr critical result(s) repeated. Called and verbal readback   obtained from Sarah Teran RN by LS1 04/16/2023 07:21   POCT URINE PREGNANCY          Imaging Results              US Lower Extremity Veins Bilateral (Final result)  Result time 04/16/23 09:38:17      Final result by Lg Xavier MD (04/16/23 09:38:17)                   Narrative:    HISTORY: Pulmonary embolism suspected,  positive d-dimer.    FINDINGS: Grayscale, color and spectral Doppler analysis of the bilateral lower extremity deep venous system was performed. Comparison to prior exams.    There is normal compressibility, with normal flow by color and spectral Doppler analysis in the bilateral lower extremity deep venous system, with normal augmentation and no evidence of deep venous thrombosis.    IMPRESSION: Negative for lower extremity deep venous thrombosis.    Electronically signed by:  Lg Xavier MD  4/16/2023 9:38 AM CDT Workstation: [id number]                                     CTA Chest Non-Coronary (PE Studies) (Final result)  Result time 04/16/23 09:28:58      Final result by Lg Xavier MD (04/16/23 09:28:58)                   Narrative:    CMS MANDATED QUALITY DATA-CT RADIATION DOSE-436  All CT scans at this facility use dose modulation, iterative reconstruction, and or weight-based dosing when appropriate to reduce radiation dose to as low as reasonably achievable.    HISTORY: Pulmonary embolism (PE) suspected, positive D-dimer    FINDINGS: Thin axial imaging through the chest was performed with 100 mL Omnipaque 350 IV contrast, with sagittal and coronal reformatted images and MIP reconstructions performed, and images stored in the patient's permanent electronic medical record.    Comparison to multiple prior exams. The study is limited by poor timing of the contrast bolus, which prevents evaluation of the peripheral pulmonary arteries. There are no central pulmonary arterial  filling defects to suggest pulmonary thromboembolism. The central pulmonary arteries are normal in caliber.    The aorta enhances normally and tapers appropriately, with no aortic dissection or evidence of aortic intramural hematoma. The heart is enlarged, with no pericardial effusion. There are no enlarged mediastinal or hilar lymph nodes.    Scattered linear and bandlike opacities in both lungs are consistent with atelectasis. There are several noncalcified pulmonary nodules, including two 4 mm subpleural nodules in the right upper lobe image 70 series 5, 4 mm subpleural nodule in the right upper lobe image 73 series 5, 5 mm right middle lobe nodule image 129 series 5, 4 mm right lower lobe subpleural nodule image 136 series 5, a 5 mm subpleural opacity in the right middle lobe image 151 series 5, and 5-6 mm subpleural opacities in the right lower lobe posteriorly, image 186 series 5, image 221 series 5, and image 200 series 5.    A 3 mm subpleural nodule in the left upper lobe image 63 series 5 is new, as are a 4 mm left lower lobe subpleural nodule image 145 series 5, and 4 mm left lower lobe nodule image 23 series 5.    The central airways are patent. There is no pleural effusion or pneumothorax. Images of the upper abdomen are unremarkable. There are no acute fractures or destructive osseous lesions.    IMPRESSION:  1. Limited exam as described, with no central pulmonary thromboembolism.  2. Numerous noncalcified pulmonary nodules in both lungs, some of which are new or have enlarged in the interval, nonspecific. Follow-up noncontrast chest CT in 6 months is recommended to help differentiate benign from potentially malignant etiologies.  3. Additional observations as described.    Electronically signed by:  Lg Xavier MD  4/16/2023 9:28 AM CDT Workstation: [id number]                                     CT Head Without Contrast (Final result)  Result time 04/16/23 06:49:37      Final result by Lowell PIERRE  MD [employer] (04/16/23 06:49:37)                   Narrative:    CMS MANDATED QUALITY DATA - CT RADIATION 436    All CT scans at this facility utilize dose modulation, iterative reconstruction, and/or weight based dosing when appropriate to reduce radiation dose to as low as reasonably achievable.      CT brain and skull without contrast, 4/16/2023 5:36 AM CDT    HISTORY: Headache, sudden, severe; Headache, new or worsening, neuro deficit (Age 19-49y).    TECHNIQUE: Axial imaging of the brain from skull vertex to upper neck is obtained without IV contrast administration with evaluation of soft tissue and bone window images.    COMPARISON: 12/26/2022    FINDINGS:    Ventricles:The ventricles are normal in size and symmetric.    Gray/white matter: Normal attenuation with no lesions identified    Intra-axial structures:There is no intracranial mass, edema, or hemorrhage.    Extra-axial areas:There is no extra-axial fluid or blood.    Sulci:There is no evidence of atrophy with no findings of sulcal effacement.    Orbits:The globes and intraorbital structures have a normal CT appearance.    Scalp: There is no evidence of scalp swelling, hematoma, or laceration.    Sinuses:The sinuses and mastoids are normally aerated and are clear.    Bony structures: There is no evidence of fracture or other bony lesion.    IMPRESSION:    1.  Negative CT study of the brain and skull      Electronically signed by:  Lowell Peck MD  4/16/2023 6:49 AM CDT Workstation: [id number]                                     Medications   prochlorperazine injection Soln 10 mg (10 mg Intravenous Given 4/16/23 0343)   ketorolac injection 15 mg (15 mg Intravenous Given 4/16/23 0343)   ondansetron injection 4 mg (4 mg Intravenous Given 4/16/23 0343)   diphenhydrAMINE injection 25 mg (25 mg Intravenous Given 4/16/23 0343)   sodium chloride 0.9% bolus 1,000 mL 1,000 mL (0 mLs Intravenous Stopped 4/16/23 1044)   droPERidol injection 1.25 mg (1.25 mg  Intravenous Given 4/16/23 0630)   labetaloL injection 10 mg (10 mg Intravenous Given 4/16/23 0630)   labetaloL injection 10 mg (10 mg Intravenous Given 4/16/23 0742)   iohexoL (OMNIPAQUE 350) injection 100 mL (100 mLs Intravenous Given 4/16/23 0838)     Medical Decision Making:   Initial Assessment:   47-year-old female initial assessment in mild to moderate distress secondary to headache.  Patient is alert oriented x3, neurologically and neurovascular intact no focal deficits.  She is nontoxic-appearing and vitals stable at this time.  Differential Diagnosis:   Hypertensive emergency, intracranial hemorrhage, seizure, migraine  Clinical Tests:   Lab Tests: Ordered and Reviewed  The following lab test(s) were unremarkable: CBC, CMP and Urinalysis  Radiological Study: Reviewed and Ordered                        Clinical Impression:   Final diagnoses:  [R79.89] Positive D dimer  [I16.0] Hypertensive urgency (Primary)  [G43.009] Migraine without aura and without status migrainosus, not intractable  [R91.8] Pulmonary nodules        ED Disposition Condition    Discharge Stable          ED Prescriptions       Medication Sig Dispense Start Date End Date Auth. Provider    butalbital-acetaminophen-caffeine -40 mg (FIORICET, ESGIC) -40 mg per tablet Take 1 tablet by mouth every 4 (four) hours as needed for Pain. 16 tablet 4/16/2023 5/16/2023 Frank Gibson MD          Follow-up Information       Follow up With Specialties Details Why Contact Info    Harpreet Rendon MD Internal Medicine Schedule an appointment as soon as possible for a visit in 1 week For recheck/continuing care [address] [place] [address] [address]  [facility] [address]  [phone]      Martín Lindsay MD Pulmonary Disease Schedule an appointment as soon as possible for a visit in 1 week For recheck/continuing care [address] [place] [address]  [phone]  [facility] [address]  [phone]               Frank Gibson MD  04/16/23 2308     no

## 2023-08-21 NOTE — DIETITIAN INITIAL EVALUATION ADULT. - OTHER CALCULATIONS
no WB restrictions
wt used: 53kg IBW+10% kcal: 1325-1590kcal (25-30kcal/kg IBW) protein: 53-64g (1-1.2g/kg IBW) fluids: per LIP

## 2023-09-06 NOTE — ASU PREOP CHECKLIST - AS TEMP SITE
Please contact endocrine office  to link Tandem insulin pump and Dexcom. Recommend contact office weekly for me to review blood sugars. Once per month charge for one interpretation.     Recommend free carb counting soo such as ClearFit or similar, count ALL carbs, bolus accurately.      If insulin pump breaks, take Lantus insulin 15 units once daily - do not restart insulin pump until 24 hours after last Lantus insulin injection.    Recommend carry medical identification at all times.    Recommend yearly diabetes eye exams. Please have copies of future eye exam faxed to [facility] endocrinology office at .     --Medication changes:   Remain off Tradjenta (or Alogliptin) but don't throw out.    Continue Metformin XR 500mg 2 tablets daily.     Start Ozempic 0.25mg weekly. In four weeks (after four injections), increase to 0.5mg weekly. On rare occasions this medication causes pancreatitis. If abdominal pain or severe nausea with or without vomiting, stop medication and call office. Store unused pens in refrigerator.    - Continue basal insulin rate over 24 hours 0.75 units per hour.     - Carb ratio changed from 1:2 and 1:4 to 1:10.    Call office if having blood sugars less than 70mg/dL or over 300mg/dL.    Test blood sugars before driving, before meals and bedtime, bring glucometer to office visits.    Hypoglycemia treatment reviewed with patient.     Return to Clinic:  1 month.     Lab Result Notifications            If labs were ordered at your appointment today, we will contact you with results once all your labs have resulted. Please note certain hormone labs can take up to 10 business days to result.     Prescription Policy     Our goal is to serve you on a more timely basis. Currently, our office receives a large volume of phone requests for medication refills. We are requesting your cooperation with the following:    Look over your medications, diabetic supplies, etc. before  coming to your appointment to see if you need any refills.    Request your medication (or supply) refills during your office visit.    Outside of an office visit, requests should be directed to your pharmacy even if you have zero refills. Call your pharmacy after 72 hours to see if your prescription is ready for pick-up.     Pharmacy Refills: All prescriptions take 48-72 hours to refill.                                              Our Patients are Important      Our goal is for your appointment to start at your appointment time.     Please arrive 15 minutes early to allow our staff adequate time to prepare you for your visit to meet with your provider at the scheduled appointment time.     Additionally, if you need to cancel or change your appointment our clinic requests 24 - 48 hours notice, to allow us to refill that open appointment. Thank you.    We want to improve and you can help. You may receive a survey asking you about this visit. Please complete this survey; we will use your feedback to make improvements. Thank you.     oral

## 2023-10-02 NOTE — ED ADULT NURSE NOTE - AS SC BRADEN MOISTURE
Patient is aware and Dex rubio sent to her pharmacy. Verified her allergies. She has taken them before.   (4) rarely moist

## 2023-10-07 NOTE — PHYSICAL THERAPY INITIAL EVALUATION ADULT - GENERAL OBSERVATIONS, REHAB EVAL
Atrial fibrillation Pt laying semifowler in bed in NAD. Agreeable to PT IE. + PrimaFit, + IV locked.

## 2024-06-12 NOTE — ASU PATIENT PROFILE, ADULT - ABILITY TO HEAR (WITH HEARING AID OR HEARING APPLIANCE IF NORMALLY USED):
Individual Psychotherapy (PhD/LCSW)    6/12/2024    Site:  [facility]         Therapeutic Intervention: Met with patient.  Outpatient - Insight oriented psychotherapy 45 min - CPT code 34066 and Outpatient - Behavior modifying psychotherapy 45 min - CPT code 35279    Chief complaint/reason for encounter: sleep     Interval history and content of current session:   Cognitive Behavioral Therapy for Insomnia (CBT-i), Session #1 (material from sessions 1 and 2 combined)  Session Focus:  Introduction to CBT-i  Sleep and Insomnia Basic Concepts  Sleep medications  Summarize and graph Sleep Diary  SE:  69.4%  TST: 5.87 hours  TIB: 8.44 hours  Introduce Stimulus Control and Sleep Hygiene  Introduce Sleep Restriction  Prescribed TTB:  11pm  Prescribed TOB:  5am       Practice Assignments:  1) Review treatment materials as needed.  2) Complete the Sleep Diary every day.  Fill it out within two hours of getting up.  Treatment plan:  Target symptoms:  sleep  Why chosen therapy is appropriate versus another modality: relevant to diagnosis, evidence based practice  Outcome monitoring methods: self-report, checklist/rating scale  Therapeutic intervention type: insight oriented psychotherapy, behavior modifying psychotherapy    Risk parameters:  Patient reports no suicidal ideation  Patient reports no homicidal ideation  Patient reports no self-injurious behavior  Patient reports no violent behavior    Verbal deficits: None    Patient's response to intervention:  The patient's response to intervention is accepting.    Progress toward goals and other mental status changes:  The patient's progress toward goals is fair .    Diagnosis:     ICD-10-CM ICD-9-CM   1. Insomnia due to other mental disorder  F51.05 300.9    F99 327.02   2. Anxiety disorder, unspecified type  F41.9 300.00       Plan:  individual psychotherapy    Return to clinic: 1 week    Length of Service (minutes): 45         Adequate: hears normal conversation without difficulty

## 2024-08-15 NOTE — DISCHARGE NOTE PROVIDER - NSDCQMERRANDS_GEN_ALL_CORE
Pt is going for MRI and is claustrophobic. She is requesting valium. She is asking if you can prescribe 2 pills since her appt is in the afternoon. She would like to los one in  the morning and one an hour before the test. Please send in to her local pharmacy.     Yes

## 2024-12-31 NOTE — ED ADULT TRIAGE NOTE - AS TEMP SITE
Treatment time: 3 hours  Net UF: 3000 ml     Pre weight: 125.2 kg  Post weight:122.2 kg  EDW: TBD     Access used: RTDC    Access function: well with  ml/min     Medications or blood products given: none     Regular outpatient schedule: MWF     Summary of response to treatment: Patient tolerated treatment well and without any complications. Patient remained stable throughout entire treatment and upon the exiting the dialysis suite via transport.     Report given to Richard Ross RN and copy of dialysis treatment record placed in chart, to be scanned into EMR.   oral

## 2025-01-10 NOTE — ASU DISCHARGE PLAN (ADULT/PEDIATRIC). - ACCOMPANYING ADULT'S SIGNATURE_______________________________________
Steven L Wertheimer is seen today at the request of Jordan Ortega MD    Patient stated that he is unsure if his hearing has changed since his last hearing evaluation on 11/10/2023.    Patient did not report any additional concerns regarding his hearing/ear related symptoms.       AUDIOMETRIC RESULTS  OBJECTIVE:  Otoscopic Evaluation:  Examination revealed clear ear canals and visible tympanic membranes bilaterally.     Audiogram:  Right: Mild sloping to severe sensorineural hearing loss (SNHL) 250-4000 Hz rising to moderately severe high frequency hearing loss 6142-6506 Hz.  Left: Mild sloping to moderately severe SNHL 250-8000 Hz.     Speech Audiometry:  Speech Reception Thresholds:  40 dB HL right ear and 35 dB HL left ear. PTAs were in good agreement with SRTs in both ears.  Word Recognition Test Scores:  88% right ear when presented at 75 dB HL in quiet and 96% left ear when presented at 70 dB HL in quiet.      Acoustic Immittance:  Right ear:  Did not test due to absence of relevant symptoms     Left ear:  Did not test due to absence of relevant symptoms    IMPRESSIONS:  1. Bilateral sensorineural hearing loss          RECOMMENDATIONS:  These results were reviewed with the patient and will be forwarded to Jordan Ortega MD    When compared to his last hearing evaluation on 11/10/2023 thresholds are relatively stable from 250-8000 Hz in both ears.    Patient had asked if he could pursue hearing aid services through [facility]. He stated that he obtained the hearing aids through [facility] approximately one year ago. Patient was informed that he could obtain hearing aid services through [facility] but since he obtained the hearing aids through an outside facility there would be a service charge for certain appointments. Patient expressed understanding of this information.     The patient indicated understanding of the diagnosis and was encouraged to contact our department with any questions or concerns.   Statement Selected